# Patient Record
Sex: MALE | Race: WHITE | NOT HISPANIC OR LATINO | Employment: OTHER | URBAN - METROPOLITAN AREA
[De-identification: names, ages, dates, MRNs, and addresses within clinical notes are randomized per-mention and may not be internally consistent; named-entity substitution may affect disease eponyms.]

---

## 2017-02-14 ENCOUNTER — TRANSCRIBE ORDERS (OUTPATIENT)
Dept: LAB | Facility: CLINIC | Age: 77
End: 2017-02-14

## 2017-02-14 ENCOUNTER — APPOINTMENT (OUTPATIENT)
Dept: LAB | Facility: CLINIC | Age: 77
End: 2017-02-14
Payer: COMMERCIAL

## 2017-02-14 DIAGNOSIS — R10.9 ABDOMINAL PAIN, UNSPECIFIED SITE: ICD-10-CM

## 2017-02-14 DIAGNOSIS — R73.9 BLOOD GLUCOSE ELEVATED: ICD-10-CM

## 2017-02-14 DIAGNOSIS — R53.83 OTHER MALAISE AND FATIGUE: ICD-10-CM

## 2017-02-14 DIAGNOSIS — E55.9 UNSPECIFIED VITAMIN D DEFICIENCY: ICD-10-CM

## 2017-02-14 DIAGNOSIS — N40.0: ICD-10-CM

## 2017-02-14 DIAGNOSIS — N13.8 ENLARGED PROSTATE WITH URINARY OBSTRUCTION: ICD-10-CM

## 2017-02-14 DIAGNOSIS — D64.0 CONGENITAL SIDEROBLASTIC ANEMIA (HCC): ICD-10-CM

## 2017-02-14 DIAGNOSIS — E78.2 MIXED HYPERLIPIDEMIA: Primary | ICD-10-CM

## 2017-02-14 DIAGNOSIS — R94.5 NONSPECIFIC ABNORMAL RESULTS OF LIVER FUNCTION STUDY: ICD-10-CM

## 2017-02-14 DIAGNOSIS — M79.10 MYALGIA: ICD-10-CM

## 2017-02-14 DIAGNOSIS — R53.81 OTHER MALAISE AND FATIGUE: ICD-10-CM

## 2017-02-14 DIAGNOSIS — R07.9 CHEST PAIN, UNSPECIFIED: ICD-10-CM

## 2017-02-14 DIAGNOSIS — I10 ESSENTIAL HYPERTENSION, BENIGN: ICD-10-CM

## 2017-02-14 DIAGNOSIS — E10.9 INSULIN DEPENDENT DIABETES MELLITUS TYPE IA (HCC): ICD-10-CM

## 2017-02-14 DIAGNOSIS — M25.00: ICD-10-CM

## 2017-02-14 DIAGNOSIS — N40.1 ENLARGED PROSTATE WITH URINARY OBSTRUCTION: ICD-10-CM

## 2017-02-14 DIAGNOSIS — R51.9 FACIAL PAIN: ICD-10-CM

## 2017-02-14 DIAGNOSIS — Z09 FOLLOW-UP EXAMINATION, FOLLOWING OTHER SURGERY: ICD-10-CM

## 2017-02-14 DIAGNOSIS — Z79.899 ENCOUNTER FOR LONG-TERM (CURRENT) USE OF OTHER MEDICATIONS: ICD-10-CM

## 2017-02-14 DIAGNOSIS — N18.1 CHRONIC KIDNEY DISEASE, STAGE I: ICD-10-CM

## 2017-02-14 LAB
ALBUMIN SERPL BCP-MCNC: 3.1 G/DL (ref 3.5–5)
ALP SERPL-CCNC: 103 U/L (ref 46–116)
ALT SERPL W P-5'-P-CCNC: 31 U/L (ref 12–78)
ANION GAP SERPL CALCULATED.3IONS-SCNC: 7 MMOL/L (ref 4–13)
AST SERPL W P-5'-P-CCNC: 20 U/L (ref 5–45)
BACTERIA UR QL AUTO: ABNORMAL /HPF
BASOPHILS # BLD AUTO: 0.02 THOUSANDS/ΜL (ref 0–0.1)
BASOPHILS NFR BLD AUTO: 0 % (ref 0–1)
BILIRUB SERPL-MCNC: 0.82 MG/DL (ref 0.2–1)
BILIRUB UR QL STRIP: NEGATIVE
BUN SERPL-MCNC: 21 MG/DL (ref 5–25)
CALCIUM SERPL-MCNC: 9 MG/DL (ref 8.3–10.1)
CHLORIDE SERPL-SCNC: 105 MMOL/L (ref 100–108)
CHOLEST SERPL-MCNC: 116 MG/DL (ref 50–200)
CLARITY UR: ABNORMAL
CO2 SERPL-SCNC: 28 MMOL/L (ref 21–32)
COLOR UR: YELLOW
CREAT SERPL-MCNC: 1.23 MG/DL (ref 0.6–1.3)
EOSINOPHIL # BLD AUTO: 0.31 THOUSAND/ΜL (ref 0–0.61)
EOSINOPHIL NFR BLD AUTO: 5 % (ref 0–6)
ERYTHROCYTE [DISTWIDTH] IN BLOOD BY AUTOMATED COUNT: 14.7 % (ref 11.6–15.1)
EST. AVERAGE GLUCOSE BLD GHB EST-MCNC: 131 MG/DL
GFR SERPL CREATININE-BSD FRML MDRD: 57.2 ML/MIN/1.73SQ M
GLUCOSE SERPL-MCNC: 112 MG/DL (ref 65–140)
GLUCOSE UR STRIP-MCNC: NEGATIVE MG/DL
HBA1C MFR BLD: 6.2 % (ref 4.2–6.3)
HCT VFR BLD AUTO: 37.8 % (ref 36.5–49.3)
HDLC SERPL-MCNC: 34 MG/DL (ref 40–60)
HGB BLD-MCNC: 12.7 G/DL (ref 12–17)
HGB UR QL STRIP.AUTO: ABNORMAL
HYALINE CASTS #/AREA URNS LPF: ABNORMAL /LPF
KETONES UR STRIP-MCNC: NEGATIVE MG/DL
LDLC SERPL CALC-MCNC: 57 MG/DL (ref 0–100)
LEUKOCYTE ESTERASE UR QL STRIP: ABNORMAL
LYMPHOCYTES # BLD AUTO: 2.04 THOUSANDS/ΜL (ref 0.6–4.47)
LYMPHOCYTES NFR BLD AUTO: 32 % (ref 14–44)
MCH RBC QN AUTO: 30.3 PG (ref 26.8–34.3)
MCHC RBC AUTO-ENTMCNC: 33.6 G/DL (ref 31.4–37.4)
MCV RBC AUTO: 90 FL (ref 82–98)
MONOCYTES # BLD AUTO: 0.6 THOUSAND/ΜL (ref 0.17–1.22)
MONOCYTES NFR BLD AUTO: 9 % (ref 4–12)
NEUTROPHILS # BLD AUTO: 3.48 THOUSANDS/ΜL (ref 1.85–7.62)
NEUTS SEG NFR BLD AUTO: 54 % (ref 43–75)
NITRITE UR QL STRIP: NEGATIVE
NON-SQ EPI CELLS URNS QL MICRO: ABNORMAL /HPF
NRBC BLD AUTO-RTO: 0 /100 WBCS
PH UR STRIP.AUTO: 5.5 [PH] (ref 4.5–8)
PLATELET # BLD AUTO: 138 THOUSANDS/UL (ref 149–390)
PMV BLD AUTO: 10.6 FL (ref 8.9–12.7)
POTASSIUM SERPL-SCNC: 4.5 MMOL/L (ref 3.5–5.3)
PROT SERPL-MCNC: 7.8 G/DL (ref 6.4–8.2)
PROT UR STRIP-MCNC: ABNORMAL MG/DL
RBC # BLD AUTO: 4.19 MILLION/UL (ref 3.88–5.62)
RBC #/AREA URNS AUTO: ABNORMAL /HPF
SODIUM SERPL-SCNC: 140 MMOL/L (ref 136–145)
SP GR UR STRIP.AUTO: 1.02 (ref 1–1.03)
TRIGL SERPL-MCNC: 127 MG/DL
TSH SERPL DL<=0.05 MIU/L-ACNC: 2.08 UIU/ML (ref 0.36–3.74)
UROBILINOGEN UR QL STRIP.AUTO: 0.2 E.U./DL
WBC # BLD AUTO: 6.47 THOUSAND/UL (ref 4.31–10.16)
WBC #/AREA URNS AUTO: ABNORMAL /HPF

## 2017-02-14 PROCEDURE — 80053 COMPREHEN METABOLIC PANEL: CPT

## 2017-02-14 PROCEDURE — 84443 ASSAY THYROID STIM HORMONE: CPT

## 2017-02-14 PROCEDURE — 85025 COMPLETE CBC W/AUTO DIFF WBC: CPT

## 2017-02-14 PROCEDURE — 83036 HEMOGLOBIN GLYCOSYLATED A1C: CPT

## 2017-02-14 PROCEDURE — 36415 COLL VENOUS BLD VENIPUNCTURE: CPT

## 2017-02-14 PROCEDURE — 81001 URINALYSIS AUTO W/SCOPE: CPT

## 2017-02-14 PROCEDURE — 80061 LIPID PANEL: CPT

## 2017-04-13 ENCOUNTER — OFFICE VISIT (OUTPATIENT)
Dept: CARDIAC REHAB | Facility: CLINIC | Age: 77
End: 2017-04-13
Payer: COMMERCIAL

## 2017-04-13 DIAGNOSIS — Z95.1 POSTSURGICAL AORTOCORONARY BYPASS STATUS: ICD-10-CM

## 2017-04-13 PROCEDURE — 93797 PHYS/QHP OP CAR RHAB WO ECG: CPT

## 2017-04-17 ENCOUNTER — OFFICE VISIT (OUTPATIENT)
Dept: CARDIAC REHAB | Facility: CLINIC | Age: 77
End: 2017-04-17
Payer: COMMERCIAL

## 2017-04-17 DIAGNOSIS — Z95.1 POSTSURGICAL AORTOCORONARY BYPASS STATUS: ICD-10-CM

## 2017-04-17 PROCEDURE — 93798 PHYS/QHP OP CAR RHAB W/ECG: CPT

## 2017-04-19 ENCOUNTER — OFFICE VISIT (OUTPATIENT)
Dept: CARDIAC REHAB | Facility: CLINIC | Age: 77
End: 2017-04-19
Payer: COMMERCIAL

## 2017-04-19 DIAGNOSIS — Z95.1 POSTSURGICAL AORTOCORONARY BYPASS STATUS: ICD-10-CM

## 2017-04-19 PROCEDURE — 93798 PHYS/QHP OP CAR RHAB W/ECG: CPT

## 2017-04-21 ENCOUNTER — OFFICE VISIT (OUTPATIENT)
Dept: CARDIAC REHAB | Facility: CLINIC | Age: 77
End: 2017-04-21
Payer: COMMERCIAL

## 2017-04-21 DIAGNOSIS — Z95.1 POSTSURGICAL AORTOCORONARY BYPASS STATUS: ICD-10-CM

## 2017-04-21 PROCEDURE — 93798 PHYS/QHP OP CAR RHAB W/ECG: CPT

## 2017-04-24 ENCOUNTER — OFFICE VISIT (OUTPATIENT)
Dept: CARDIAC REHAB | Facility: CLINIC | Age: 77
End: 2017-04-24
Payer: COMMERCIAL

## 2017-04-24 DIAGNOSIS — Z95.1 POSTSURGICAL AORTOCORONARY BYPASS STATUS: ICD-10-CM

## 2017-04-24 PROCEDURE — 93798 PHYS/QHP OP CAR RHAB W/ECG: CPT

## 2017-04-26 ENCOUNTER — OFFICE VISIT (OUTPATIENT)
Dept: CARDIAC REHAB | Facility: CLINIC | Age: 77
End: 2017-04-26
Payer: COMMERCIAL

## 2017-04-26 DIAGNOSIS — Z95.1 POSTSURGICAL AORTOCORONARY BYPASS STATUS: ICD-10-CM

## 2017-04-26 PROCEDURE — 93798 PHYS/QHP OP CAR RHAB W/ECG: CPT

## 2017-04-28 ENCOUNTER — APPOINTMENT (OUTPATIENT)
Dept: CARDIAC REHAB | Facility: CLINIC | Age: 77
End: 2017-04-28
Payer: COMMERCIAL

## 2017-05-01 ENCOUNTER — OFFICE VISIT (OUTPATIENT)
Dept: CARDIAC REHAB | Facility: CLINIC | Age: 77
End: 2017-05-01
Payer: COMMERCIAL

## 2017-05-01 DIAGNOSIS — Z95.1 POSTSURGICAL AORTOCORONARY BYPASS STATUS: ICD-10-CM

## 2017-05-01 PROCEDURE — 93798 PHYS/QHP OP CAR RHAB W/ECG: CPT

## 2017-05-03 ENCOUNTER — OFFICE VISIT (OUTPATIENT)
Dept: CARDIAC REHAB | Facility: CLINIC | Age: 77
End: 2017-05-03
Payer: COMMERCIAL

## 2017-05-03 DIAGNOSIS — Z95.1 POSTSURGICAL AORTOCORONARY BYPASS STATUS: ICD-10-CM

## 2017-05-03 PROCEDURE — 93798 PHYS/QHP OP CAR RHAB W/ECG: CPT

## 2017-05-05 ENCOUNTER — OFFICE VISIT (OUTPATIENT)
Dept: CARDIAC REHAB | Facility: CLINIC | Age: 77
End: 2017-05-05
Payer: COMMERCIAL

## 2017-05-05 DIAGNOSIS — Z95.1 POSTSURGICAL AORTOCORONARY BYPASS STATUS: ICD-10-CM

## 2017-05-05 PROCEDURE — 93798 PHYS/QHP OP CAR RHAB W/ECG: CPT

## 2017-05-08 ENCOUNTER — OFFICE VISIT (OUTPATIENT)
Dept: CARDIAC REHAB | Facility: CLINIC | Age: 77
End: 2017-05-08
Payer: COMMERCIAL

## 2017-05-08 DIAGNOSIS — Z95.1 POSTSURGICAL AORTOCORONARY BYPASS STATUS: ICD-10-CM

## 2017-05-08 PROCEDURE — 93798 PHYS/QHP OP CAR RHAB W/ECG: CPT

## 2017-05-10 ENCOUNTER — OFFICE VISIT (OUTPATIENT)
Dept: CARDIAC REHAB | Facility: CLINIC | Age: 77
End: 2017-05-10
Payer: COMMERCIAL

## 2017-05-10 DIAGNOSIS — Z95.1 POSTSURGICAL AORTOCORONARY BYPASS STATUS: ICD-10-CM

## 2017-05-10 PROCEDURE — 93798 PHYS/QHP OP CAR RHAB W/ECG: CPT

## 2017-05-12 ENCOUNTER — OFFICE VISIT (OUTPATIENT)
Dept: CARDIAC REHAB | Facility: CLINIC | Age: 77
End: 2017-05-12
Payer: COMMERCIAL

## 2017-05-12 DIAGNOSIS — Z95.1 POSTSURGICAL AORTOCORONARY BYPASS STATUS: ICD-10-CM

## 2017-05-12 PROCEDURE — 93798 PHYS/QHP OP CAR RHAB W/ECG: CPT

## 2017-05-15 ENCOUNTER — OFFICE VISIT (OUTPATIENT)
Dept: CARDIAC REHAB | Facility: CLINIC | Age: 77
End: 2017-05-15
Payer: COMMERCIAL

## 2017-05-15 DIAGNOSIS — Z95.1 POSTSURGICAL AORTOCORONARY BYPASS STATUS: ICD-10-CM

## 2017-05-15 PROCEDURE — 93798 PHYS/QHP OP CAR RHAB W/ECG: CPT

## 2017-05-17 ENCOUNTER — OFFICE VISIT (OUTPATIENT)
Dept: CARDIAC REHAB | Facility: CLINIC | Age: 77
End: 2017-05-17
Payer: COMMERCIAL

## 2017-05-17 DIAGNOSIS — Z95.1 POSTSURGICAL AORTOCORONARY BYPASS STATUS: ICD-10-CM

## 2017-05-17 PROCEDURE — 93798 PHYS/QHP OP CAR RHAB W/ECG: CPT

## 2017-05-19 ENCOUNTER — OFFICE VISIT (OUTPATIENT)
Dept: CARDIAC REHAB | Facility: CLINIC | Age: 77
End: 2017-05-19
Payer: COMMERCIAL

## 2017-05-19 DIAGNOSIS — I21.4 NSTEMI (NON-ST ELEVATED MYOCARDIAL INFARCTION) (HCC): ICD-10-CM

## 2017-05-19 PROCEDURE — 93798 PHYS/QHP OP CAR RHAB W/ECG: CPT

## 2017-05-22 ENCOUNTER — OFFICE VISIT (OUTPATIENT)
Dept: CARDIAC REHAB | Facility: CLINIC | Age: 77
End: 2017-05-22
Payer: COMMERCIAL

## 2017-05-22 DIAGNOSIS — I21.4 NSTEMI (NON-ST ELEVATED MYOCARDIAL INFARCTION) (HCC): ICD-10-CM

## 2017-05-22 PROCEDURE — 93798 PHYS/QHP OP CAR RHAB W/ECG: CPT

## 2017-05-26 ENCOUNTER — OFFICE VISIT (OUTPATIENT)
Dept: CARDIAC REHAB | Facility: CLINIC | Age: 77
End: 2017-05-26
Payer: COMMERCIAL

## 2017-05-26 DIAGNOSIS — I21.4 NSTEMI (NON-ST ELEVATED MYOCARDIAL INFARCTION) (HCC): ICD-10-CM

## 2017-05-26 PROCEDURE — 93798 PHYS/QHP OP CAR RHAB W/ECG: CPT

## 2017-05-31 ENCOUNTER — OFFICE VISIT (OUTPATIENT)
Dept: CARDIAC REHAB | Facility: CLINIC | Age: 77
End: 2017-05-31
Payer: COMMERCIAL

## 2017-05-31 DIAGNOSIS — Z95.1 POSTSURGICAL AORTOCORONARY BYPASS STATUS: ICD-10-CM

## 2017-05-31 PROCEDURE — 93798 PHYS/QHP OP CAR RHAB W/ECG: CPT

## 2017-06-02 ENCOUNTER — OFFICE VISIT (OUTPATIENT)
Dept: CARDIAC REHAB | Facility: CLINIC | Age: 77
End: 2017-06-02
Payer: COMMERCIAL

## 2017-06-02 DIAGNOSIS — Z95.1 POSTSURGICAL AORTOCORONARY BYPASS STATUS: ICD-10-CM

## 2017-06-02 PROCEDURE — 93798 PHYS/QHP OP CAR RHAB W/ECG: CPT

## 2017-06-05 ENCOUNTER — OFFICE VISIT (OUTPATIENT)
Dept: CARDIAC REHAB | Facility: CLINIC | Age: 77
End: 2017-06-05
Payer: COMMERCIAL

## 2017-06-05 DIAGNOSIS — Z95.1 POSTSURGICAL AORTOCORONARY BYPASS STATUS: ICD-10-CM

## 2017-06-05 PROCEDURE — 93798 PHYS/QHP OP CAR RHAB W/ECG: CPT

## 2017-06-07 ENCOUNTER — OFFICE VISIT (OUTPATIENT)
Dept: CARDIAC REHAB | Facility: CLINIC | Age: 77
End: 2017-06-07
Payer: COMMERCIAL

## 2017-06-07 DIAGNOSIS — Z95.1 POSTSURGICAL AORTOCORONARY BYPASS STATUS: ICD-10-CM

## 2017-06-07 PROCEDURE — 93798 PHYS/QHP OP CAR RHAB W/ECG: CPT

## 2017-06-09 ENCOUNTER — OFFICE VISIT (OUTPATIENT)
Dept: CARDIAC REHAB | Facility: CLINIC | Age: 77
End: 2017-06-09
Payer: COMMERCIAL

## 2017-06-09 DIAGNOSIS — Z95.1 STATUS POST CORONARY ARTERY BYPASS GRAFT: ICD-10-CM

## 2017-06-09 PROCEDURE — 93798 PHYS/QHP OP CAR RHAB W/ECG: CPT

## 2017-06-16 ENCOUNTER — APPOINTMENT (OUTPATIENT)
Dept: CARDIAC REHAB | Facility: CLINIC | Age: 77
End: 2017-06-16
Payer: COMMERCIAL

## 2017-06-16 DIAGNOSIS — I21.4 NSTEMI (NON-ST ELEVATED MYOCARDIAL INFARCTION) (HCC): ICD-10-CM

## 2017-06-16 PROCEDURE — 93798 PHYS/QHP OP CAR RHAB W/ECG: CPT

## 2017-06-19 ENCOUNTER — APPOINTMENT (OUTPATIENT)
Dept: CARDIAC REHAB | Facility: CLINIC | Age: 77
End: 2017-06-19
Payer: COMMERCIAL

## 2017-06-21 ENCOUNTER — APPOINTMENT (OUTPATIENT)
Dept: CARDIAC REHAB | Facility: CLINIC | Age: 77
End: 2017-06-21
Payer: COMMERCIAL

## 2017-06-21 DIAGNOSIS — Z95.1 POSTSURGICAL AORTOCORONARY BYPASS STATUS: ICD-10-CM

## 2017-06-21 PROCEDURE — 93798 PHYS/QHP OP CAR RHAB W/ECG: CPT

## 2017-06-23 ENCOUNTER — APPOINTMENT (OUTPATIENT)
Dept: CARDIAC REHAB | Facility: CLINIC | Age: 77
End: 2017-06-23
Payer: COMMERCIAL

## 2017-06-23 DIAGNOSIS — Z95.1 POSTSURGICAL AORTOCORONARY BYPASS STATUS: ICD-10-CM

## 2017-06-23 PROCEDURE — 93798 PHYS/QHP OP CAR RHAB W/ECG: CPT

## 2017-06-26 ENCOUNTER — APPOINTMENT (OUTPATIENT)
Dept: CARDIAC REHAB | Facility: CLINIC | Age: 77
End: 2017-06-26
Payer: COMMERCIAL

## 2017-06-26 DIAGNOSIS — Z95.1 POSTSURGICAL AORTOCORONARY BYPASS STATUS: ICD-10-CM

## 2017-06-26 PROCEDURE — 93798 PHYS/QHP OP CAR RHAB W/ECG: CPT

## 2017-06-28 ENCOUNTER — APPOINTMENT (OUTPATIENT)
Dept: CARDIAC REHAB | Facility: CLINIC | Age: 77
End: 2017-06-28
Payer: COMMERCIAL

## 2017-06-28 DIAGNOSIS — Z95.1 POSTSURGICAL AORTOCORONARY BYPASS STATUS: ICD-10-CM

## 2017-06-28 PROCEDURE — 93798 PHYS/QHP OP CAR RHAB W/ECG: CPT

## 2017-06-30 ENCOUNTER — APPOINTMENT (OUTPATIENT)
Dept: CARDIAC REHAB | Facility: CLINIC | Age: 77
End: 2017-06-30
Payer: COMMERCIAL

## 2017-06-30 DIAGNOSIS — Z95.1 POSTSURGICAL AORTOCORONARY BYPASS STATUS: ICD-10-CM

## 2017-06-30 PROCEDURE — 93798 PHYS/QHP OP CAR RHAB W/ECG: CPT

## 2017-07-03 ENCOUNTER — APPOINTMENT (OUTPATIENT)
Dept: CARDIAC REHAB | Facility: CLINIC | Age: 77
End: 2017-07-03
Payer: COMMERCIAL

## 2017-07-03 DIAGNOSIS — Z95.1 POSTSURGICAL AORTOCORONARY BYPASS STATUS: ICD-10-CM

## 2017-07-03 PROCEDURE — 93798 PHYS/QHP OP CAR RHAB W/ECG: CPT

## 2017-07-05 ENCOUNTER — APPOINTMENT (OUTPATIENT)
Dept: CARDIAC REHAB | Facility: CLINIC | Age: 77
End: 2017-07-05
Payer: COMMERCIAL

## 2017-07-05 DIAGNOSIS — Z95.1 POSTSURGICAL AORTOCORONARY BYPASS STATUS: ICD-10-CM

## 2017-07-05 PROCEDURE — 93798 PHYS/QHP OP CAR RHAB W/ECG: CPT

## 2017-07-07 ENCOUNTER — APPOINTMENT (OUTPATIENT)
Dept: CARDIAC REHAB | Facility: CLINIC | Age: 77
End: 2017-07-07
Payer: COMMERCIAL

## 2017-07-07 DIAGNOSIS — Z95.1 POSTSURGICAL AORTOCORONARY BYPASS STATUS: ICD-10-CM

## 2017-07-07 PROCEDURE — 93798 PHYS/QHP OP CAR RHAB W/ECG: CPT

## 2017-07-10 ENCOUNTER — APPOINTMENT (OUTPATIENT)
Dept: CARDIAC REHAB | Facility: CLINIC | Age: 77
End: 2017-07-10
Payer: COMMERCIAL

## 2017-07-10 DIAGNOSIS — Z95.1 POSTSURGICAL AORTOCORONARY BYPASS STATUS: ICD-10-CM

## 2017-07-10 PROCEDURE — 93798 PHYS/QHP OP CAR RHAB W/ECG: CPT

## 2017-07-12 ENCOUNTER — APPOINTMENT (OUTPATIENT)
Dept: CARDIAC REHAB | Facility: CLINIC | Age: 77
End: 2017-07-12
Payer: COMMERCIAL

## 2017-07-12 DIAGNOSIS — Z95.1 POSTSURGICAL AORTOCORONARY BYPASS STATUS: ICD-10-CM

## 2017-07-12 PROCEDURE — 93798 PHYS/QHP OP CAR RHAB W/ECG: CPT

## 2017-07-14 ENCOUNTER — APPOINTMENT (OUTPATIENT)
Dept: CARDIAC REHAB | Facility: CLINIC | Age: 77
End: 2017-07-14
Payer: COMMERCIAL

## 2017-11-17 ENCOUNTER — HOSPITAL ENCOUNTER (OUTPATIENT)
Dept: RADIOLOGY | Facility: HOSPITAL | Age: 77
Discharge: HOME | End: 2017-11-17
Attending: INTERNAL MEDICINE
Payer: COMMERCIAL

## 2017-11-17 ENCOUNTER — TRANSCRIBE ORDERS (OUTPATIENT)
Dept: ADMINISTRATIVE | Facility: HOSPITAL | Age: 77
End: 2017-11-17

## 2017-11-17 DIAGNOSIS — R52 PAIN: Primary | ICD-10-CM

## 2017-11-17 DIAGNOSIS — R60.9 SWELLING: ICD-10-CM

## 2017-11-17 DIAGNOSIS — R52 PAIN: ICD-10-CM

## 2017-11-17 PROCEDURE — 73060 X-RAY EXAM OF HUMERUS: CPT

## 2017-11-17 PROCEDURE — 73030 X-RAY EXAM OF SHOULDER: CPT

## 2019-07-03 ENCOUNTER — APPOINTMENT (EMERGENCY)
Dept: RADIOLOGY | Facility: HOSPITAL | Age: 79
DRG: 280 | End: 2019-07-03
Attending: EMERGENCY MEDICINE
Payer: COMMERCIAL

## 2019-07-03 ENCOUNTER — APPOINTMENT (EMERGENCY)
Dept: RADIOLOGY | Facility: HOSPITAL | Age: 79
DRG: 280 | End: 2019-07-03
Payer: COMMERCIAL

## 2019-07-03 ENCOUNTER — HOSPITAL ENCOUNTER (INPATIENT)
Facility: HOSPITAL | Age: 79
LOS: 12 days | Discharge: NURSING, REHAB OR LONG-TERM CARE | DRG: 280 | End: 2019-07-15
Attending: EMERGENCY MEDICINE | Admitting: INTERNAL MEDICINE
Payer: COMMERCIAL

## 2019-07-03 DIAGNOSIS — E10.22 STAGE 3 CHRONIC KIDNEY DISEASE DUE TO TYPE 1 DIABETES MELLITUS (HCC): ICD-10-CM

## 2019-07-03 DIAGNOSIS — N18.30 STAGE 3 CHRONIC KIDNEY DISEASE (HCC): Chronic | ICD-10-CM

## 2019-07-03 DIAGNOSIS — I50.9 ACUTE CONGESTIVE HEART FAILURE (HCC): Primary | ICD-10-CM

## 2019-07-03 DIAGNOSIS — I21.4 NSTEMI (NON-ST ELEVATED MYOCARDIAL INFARCTION) (HCC): ICD-10-CM

## 2019-07-03 DIAGNOSIS — K59.00 CONSTIPATION: ICD-10-CM

## 2019-07-03 DIAGNOSIS — F32.A DEPRESSION: ICD-10-CM

## 2019-07-03 DIAGNOSIS — I12.9 BENIGN HYPERTENSION WITH CHRONIC KIDNEY DISEASE, STAGE III (HCC): ICD-10-CM

## 2019-07-03 DIAGNOSIS — N18.9 CRF (CHRONIC RENAL FAILURE), UNSPECIFIED STAGE: ICD-10-CM

## 2019-07-03 DIAGNOSIS — N18.30 CKD (CHRONIC KIDNEY DISEASE), STAGE III (HCC): Chronic | ICD-10-CM

## 2019-07-03 DIAGNOSIS — I50.33 ACUTE ON CHRONIC DIASTOLIC CHF (CONGESTIVE HEART FAILURE) (HCC): ICD-10-CM

## 2019-07-03 DIAGNOSIS — N18.30 BENIGN HYPERTENSION WITH CHRONIC KIDNEY DISEASE, STAGE III (HCC): ICD-10-CM

## 2019-07-03 DIAGNOSIS — J96.01 ACUTE HYPOXEMIC RESPIRATORY FAILURE (HCC): ICD-10-CM

## 2019-07-03 DIAGNOSIS — J18.9 PNEUMONIA: ICD-10-CM

## 2019-07-03 DIAGNOSIS — N18.30 STAGE 3 CHRONIC KIDNEY DISEASE DUE TO TYPE 1 DIABETES MELLITUS (HCC): ICD-10-CM

## 2019-07-03 LAB
ALBUMIN SERPL BCP-MCNC: 3.3 G/DL (ref 3.5–5)
ALP SERPL-CCNC: 130 U/L (ref 46–116)
ALT SERPL W P-5'-P-CCNC: 36 U/L (ref 12–78)
ANION GAP SERPL CALCULATED.3IONS-SCNC: 8 MMOL/L (ref 4–13)
APTT PPP: 31 SECONDS (ref 24–33)
AST SERPL W P-5'-P-CCNC: 27 U/L (ref 5–45)
BACTERIA UR QL AUTO: ABNORMAL /HPF
BASOPHILS # BLD AUTO: 0.02 THOUSANDS/ΜL (ref 0–0.1)
BASOPHILS NFR BLD AUTO: 0 % (ref 0–1)
BILIRUB SERPL-MCNC: 1.4 MG/DL (ref 0.2–1)
BILIRUB UR QL STRIP: NEGATIVE
BUN SERPL-MCNC: 45 MG/DL (ref 5–25)
CALCIUM SERPL-MCNC: 8.8 MG/DL (ref 8.3–10.1)
CHLORIDE SERPL-SCNC: 105 MMOL/L (ref 100–108)
CLARITY UR: ABNORMAL
CO2 SERPL-SCNC: 26 MMOL/L (ref 21–32)
COLOR UR: YELLOW
CREAT SERPL-MCNC: 1.8 MG/DL (ref 0.6–1.3)
DEPRECATED D DIMER PPP: 1400 NG/ML (FEU) (ref 190–520)
EOSINOPHIL # BLD AUTO: 0.15 THOUSAND/ΜL (ref 0–0.61)
EOSINOPHIL NFR BLD AUTO: 2 % (ref 0–6)
ERYTHROCYTE [DISTWIDTH] IN BLOOD BY AUTOMATED COUNT: 14.3 % (ref 11.6–15.1)
EST. AVERAGE GLUCOSE BLD GHB EST-MCNC: 146 MG/DL
FINE GRAN CASTS URNS QL MICRO: ABNORMAL /LPF
GFR SERPL CREATININE-BSD FRML MDRD: 35 ML/MIN/1.73SQ M
GLUCOSE SERPL-MCNC: 103 MG/DL (ref 65–140)
GLUCOSE SERPL-MCNC: 223 MG/DL (ref 65–140)
GLUCOSE SERPL-MCNC: 91 MG/DL (ref 65–140)
GLUCOSE UR STRIP-MCNC: NEGATIVE MG/DL
HBA1C MFR BLD: 6.7 % (ref 4.2–6.3)
HCT VFR BLD AUTO: 38.4 % (ref 36.5–49.3)
HGB BLD-MCNC: 12.8 G/DL (ref 12–17)
HGB UR QL STRIP.AUTO: ABNORMAL
HYALINE CASTS #/AREA URNS LPF: ABNORMAL /LPF
INR PPP: 1.08 (ref 0.86–1.16)
KETONES UR STRIP-MCNC: NEGATIVE MG/DL
LEUKOCYTE ESTERASE UR QL STRIP: NEGATIVE
LYMPHOCYTES # BLD AUTO: 1.39 THOUSANDS/ΜL (ref 0.6–4.47)
LYMPHOCYTES NFR BLD AUTO: 16 % (ref 14–44)
MCH RBC QN AUTO: 30.6 PG (ref 26.8–34.3)
MCHC RBC AUTO-ENTMCNC: 33.3 G/DL (ref 31.4–37.4)
MCV RBC AUTO: 92 FL (ref 82–98)
MONOCYTES # BLD AUTO: 0.81 THOUSAND/ΜL (ref 0.17–1.22)
MONOCYTES NFR BLD AUTO: 9 % (ref 4–12)
MUCOUS THREADS UR QL AUTO: ABNORMAL
NEUTROPHILS # BLD AUTO: 6.55 THOUSANDS/ΜL (ref 1.85–7.62)
NEUTS SEG NFR BLD AUTO: 73 % (ref 43–75)
NITRITE UR QL STRIP: NEGATIVE
NON-SQ EPI CELLS URNS QL MICRO: ABNORMAL /HPF
NT-PROBNP SERPL-MCNC: 2452 PG/ML
PH UR STRIP.AUTO: 5.5 [PH]
PLATELET # BLD AUTO: 143 THOUSANDS/UL (ref 149–390)
PMV BLD AUTO: 10.8 FL (ref 8.9–12.7)
POTASSIUM SERPL-SCNC: 4.7 MMOL/L (ref 3.5–5.3)
PROCALCITONIN SERPL-MCNC: 0.09 NG/ML
PROT SERPL-MCNC: 7.7 G/DL (ref 6.4–8.2)
PROT UR STRIP-MCNC: >=300 MG/DL
PROTHROMBIN TIME: 11.3 SECONDS (ref 9.4–11.7)
RBC # BLD AUTO: 4.18 MILLION/UL (ref 3.88–5.62)
RBC #/AREA URNS AUTO: ABNORMAL /HPF
SODIUM SERPL-SCNC: 139 MMOL/L (ref 136–145)
SP GR UR STRIP.AUTO: >=1.03 (ref 1–1.03)
TROPONIN I SERPL-MCNC: <0.02 NG/ML
UROBILINOGEN UR QL STRIP.AUTO: 0.2 E.U./DL
WBC # BLD AUTO: 8.92 THOUSAND/UL (ref 4.31–10.16)
WBC #/AREA URNS AUTO: ABNORMAL /HPF

## 2019-07-03 PROCEDURE — 85730 THROMBOPLASTIN TIME PARTIAL: CPT | Performed by: EMERGENCY MEDICINE

## 2019-07-03 PROCEDURE — 99285 EMERGENCY DEPT VISIT HI MDM: CPT

## 2019-07-03 PROCEDURE — 80053 COMPREHEN METABOLIC PANEL: CPT | Performed by: EMERGENCY MEDICINE

## 2019-07-03 PROCEDURE — 85379 FIBRIN DEGRADATION QUANT: CPT | Performed by: EMERGENCY MEDICINE

## 2019-07-03 PROCEDURE — 93005 ELECTROCARDIOGRAM TRACING: CPT

## 2019-07-03 PROCEDURE — 93970 EXTREMITY STUDY: CPT

## 2019-07-03 PROCEDURE — 71045 X-RAY EXAM CHEST 1 VIEW: CPT

## 2019-07-03 PROCEDURE — 85610 PROTHROMBIN TIME: CPT | Performed by: EMERGENCY MEDICINE

## 2019-07-03 PROCEDURE — 81001 URINALYSIS AUTO W/SCOPE: CPT | Performed by: EMERGENCY MEDICINE

## 2019-07-03 PROCEDURE — 36415 COLL VENOUS BLD VENIPUNCTURE: CPT | Performed by: EMERGENCY MEDICINE

## 2019-07-03 PROCEDURE — A9540 TC99M MAA: HCPCS

## 2019-07-03 PROCEDURE — 85025 COMPLETE CBC W/AUTO DIFF WBC: CPT | Performed by: EMERGENCY MEDICINE

## 2019-07-03 PROCEDURE — 84484 ASSAY OF TROPONIN QUANT: CPT | Performed by: EMERGENCY MEDICINE

## 2019-07-03 PROCEDURE — 84145 PROCALCITONIN (PCT): CPT | Performed by: EMERGENCY MEDICINE

## 2019-07-03 PROCEDURE — A9539 TC99M PENTETATE: HCPCS

## 2019-07-03 PROCEDURE — 83880 ASSAY OF NATRIURETIC PEPTIDE: CPT | Performed by: EMERGENCY MEDICINE

## 2019-07-03 PROCEDURE — 82948 REAGENT STRIP/BLOOD GLUCOSE: CPT

## 2019-07-03 PROCEDURE — 78582 LUNG VENTILAT&PERFUS IMAGING: CPT

## 2019-07-03 PROCEDURE — 83036 HEMOGLOBIN GLYCOSYLATED A1C: CPT | Performed by: INTERNAL MEDICINE

## 2019-07-03 RX ORDER — AMLODIPINE BESYLATE 5 MG/1
5 TABLET ORAL 2 TIMES DAILY
Status: DISCONTINUED | OUTPATIENT
Start: 2019-07-03 | End: 2019-07-15 | Stop reason: HOSPADM

## 2019-07-03 RX ORDER — CEFTRIAXONE 1 G/50ML
1000 INJECTION, SOLUTION INTRAVENOUS ONCE
Status: COMPLETED | OUTPATIENT
Start: 2019-07-03 | End: 2019-07-03

## 2019-07-03 RX ORDER — ALPRAZOLAM 0.25 MG/1
0.25 TABLET ORAL
Status: DISCONTINUED | OUTPATIENT
Start: 2019-07-03 | End: 2019-07-15 | Stop reason: HOSPADM

## 2019-07-03 RX ORDER — AMLODIPINE BESYLATE 10 MG/1
TABLET ORAL
COMMUNITY
End: 2019-07-15 | Stop reason: HOSPADM

## 2019-07-03 RX ORDER — ATORVASTATIN CALCIUM 40 MG/1
TABLET, FILM COATED ORAL
COMMUNITY
End: 2021-03-22 | Stop reason: HOSPADM

## 2019-07-03 RX ORDER — CEFTRIAXONE 1 G/50ML
1000 INJECTION, SOLUTION INTRAVENOUS EVERY 24 HOURS
Status: DISCONTINUED | OUTPATIENT
Start: 2019-07-04 | End: 2019-07-06

## 2019-07-03 RX ORDER — ACETAMINOPHEN 325 MG/1
650 TABLET ORAL EVERY 6 HOURS PRN
Status: DISCONTINUED | OUTPATIENT
Start: 2019-07-03 | End: 2019-07-15 | Stop reason: HOSPADM

## 2019-07-03 RX ORDER — FUROSEMIDE 10 MG/ML
40 INJECTION INTRAMUSCULAR; INTRAVENOUS ONCE
Status: COMPLETED | OUTPATIENT
Start: 2019-07-03 | End: 2019-07-03

## 2019-07-03 RX ORDER — ESCITALOPRAM OXALATE 10 MG/1
20 TABLET ORAL
Status: DISCONTINUED | OUTPATIENT
Start: 2019-07-03 | End: 2019-07-09

## 2019-07-03 RX ORDER — FUROSEMIDE 10 MG/ML
40 INJECTION INTRAMUSCULAR; INTRAVENOUS
Status: DISCONTINUED | OUTPATIENT
Start: 2019-07-04 | End: 2019-07-06

## 2019-07-03 RX ORDER — ATORVASTATIN CALCIUM 40 MG/1
40 TABLET, FILM COATED ORAL
Status: DISCONTINUED | OUTPATIENT
Start: 2019-07-03 | End: 2019-07-06

## 2019-07-03 RX ORDER — ASPIRIN 81 MG/1
81 TABLET ORAL DAILY
Status: DISCONTINUED | OUTPATIENT
Start: 2019-07-04 | End: 2019-07-15 | Stop reason: HOSPADM

## 2019-07-03 RX ORDER — AZITHROMYCIN 250 MG/1
500 TABLET, FILM COATED ORAL EVERY 24 HOURS
Status: DISCONTINUED | OUTPATIENT
Start: 2019-07-04 | End: 2019-07-12

## 2019-07-03 RX ORDER — ASPIRIN 81 MG/1
TABLET ORAL DAILY
COMMUNITY
Start: 2017-02-24

## 2019-07-03 RX ORDER — HYDRALAZINE HYDROCHLORIDE 20 MG/ML
10 INJECTION INTRAMUSCULAR; INTRAVENOUS EVERY 6 HOURS PRN
Status: DISCONTINUED | OUTPATIENT
Start: 2019-07-03 | End: 2019-07-15 | Stop reason: HOSPADM

## 2019-07-03 RX ORDER — AZITHROMYCIN 250 MG/1
500 TABLET, FILM COATED ORAL ONCE
Status: COMPLETED | OUTPATIENT
Start: 2019-07-03 | End: 2019-07-03

## 2019-07-03 RX ADMIN — HYDRALAZINE HYDROCHLORIDE 10 MG: 20 INJECTION INTRAMUSCULAR; INTRAVENOUS at 18:17

## 2019-07-03 RX ADMIN — ATORVASTATIN CALCIUM 40 MG: 40 TABLET, FILM COATED ORAL at 18:16

## 2019-07-03 RX ADMIN — ESCITALOPRAM OXALATE 20 MG: 10 TABLET ORAL at 21:37

## 2019-07-03 RX ADMIN — INSULIN LISPRO 2 UNITS: 100 INJECTION, SOLUTION INTRAVENOUS; SUBCUTANEOUS at 21:37

## 2019-07-03 RX ADMIN — CEFTRIAXONE 1000 MG: 1 INJECTION, SOLUTION INTRAVENOUS at 16:45

## 2019-07-03 RX ADMIN — AZITHROMYCIN 500 MG: 250 TABLET, FILM COATED ORAL at 18:16

## 2019-07-03 RX ADMIN — METOPROLOL TARTRATE 25 MG: 25 TABLET, FILM COATED ORAL at 21:37

## 2019-07-03 RX ADMIN — ALPRAZOLAM 0.25 MG: 0.25 TABLET ORAL at 21:37

## 2019-07-03 RX ADMIN — FUROSEMIDE 40 MG: 10 INJECTION, SOLUTION INTRAMUSCULAR; INTRAVENOUS at 16:35

## 2019-07-03 RX ADMIN — INSULIN DETEMIR 20 UNITS: 100 INJECTION, SOLUTION SUBCUTANEOUS at 21:37

## 2019-07-03 RX ADMIN — AMLODIPINE BESYLATE 5 MG: 5 TABLET ORAL at 18:17

## 2019-07-03 NOTE — ED PROVIDER NOTES
History  Chief Complaint   Patient presents with    Shortness of Breath     Pt c/o SOB for the past 3-4 days, made worse by walking  Pt states hes fine if hes sitting still     Patient presents for evaluation of shortness of breath with exertion for 3-4 days  Does not feel any symptoms at rest and denies chest pain  States feels like its getting worse  No recent illness or fever  No cough  Feels some fatigue as well  History provided by:  Patient   used: No    Shortness of Breath   Associated symptoms: no chest pain, no cough and no fever        Prior to Admission Medications   Prescriptions Last Dose Informant Patient Reported? Taking? amLODIPine (NORVASC) 10 mg tablet 7/2/2019 at Unknown time  Yes Yes   Sig: amlodipine besylate 10 mg tabs   aspirin (ASPIR-LOW) 81 mg EC tablet 7/2/2019 at Unknown time  Yes Yes   Sig: Daily   atorvastatin (LIPITOR) 40 mg tablet 7/2/2019 at Unknown time  Yes Yes   Sig: atorvastatin 40 mg tablet   TAKE ONE TABLET DAILY   escitalopram (LEXAPRO) 20 mg tablet 7/2/2019 at Unknown time  Yes Yes   Sig: Take 20 mg by mouth daily at bedtime  insulin aspart (NovoLOG) 100 units/mL injection 7/2/2019 at Unknown time  Yes Yes   Sig: Inject 15 Units under the skin 3 (three) times a day before meals   insulin aspart protamine-insulin aspart (NOVOLOG MIX 70/30 FLEXPEN) 100 Units/mL injection pen 7/2/2019 at Unknown time  Yes Yes   Sig: 3 (three) times a day   insulin detemir (LEVEMIR) 100 units/mL subcutaneous injection 7/2/2019 at Unknown time  Yes Yes   Sig: Inject 50 Units under the skin 2 (two) times a day Indications: Type 2 Diabetes  Taken at 0700 and 1700    metoprolol tartrate (LOPRESSOR) 25 mg tablet 7/2/2019 at Unknown time  Yes Yes   Sig: Take 25 mg by mouth every morning          Facility-Administered Medications: None       Past Medical History:   Diagnosis Date    Arthritis     Back pain     Bladder cancer ([facility] )     diagnosed  2/2016    Cancer ([facility] ) bladder; chemo; resection;     Coronary artery disease     has 2 coronary stents    Dental crowns present      5    Diabetes mellitus [facility] )     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    Hematuria     hx of    Hypercholesteremia     Hypertension     Kidney stones     Wears glasses        Past Surgical History:   Procedure Laterality Date    ABDOMINAL SURGERY      CARDIAC SURGERY      CHOLECYSTECTOMY      open    CORONARY ANGIOPLASTY WITH STENT PLACEMENT      2 stents  2009    CYSTECTOMY, PARTIAL N/A 11/30/2016    Procedure: PARTIAL CYSTECTOMY, LEFT LYMPHADENECTOMY;  Surgeon: Elvia Herman MD;  Location: [address] [place] [address];  Service:     CYSTOSCOPY Left 10/20/2016    Procedure: CYSTOSCOPY, BILATERAL RETROGRADE PYLEOGRAM, LEFT SIDE BLADDER BIOPSY, TURBT;  Surgeon: Elvia Herman MD;  Location: [address] [place] [address];  Service:     HERNIA REPAIR      repair [facility]    ID CYSTOURETHROSCOPY N/A 2/25/2016    Procedure: CYSTOSCOPY;  Surgeon: Elvia Herman MD;  Location: [address] [place] [address];  Service: Urology    ID CYSTOURETHROSCOPY,FULGUR 2-5 CM LESN N/A 2/25/2016    Procedure: TRANSURETHRAL RESECTION OF BLADDER TUMOR (TURBT); Surgeon: Elvia Herman MD;  Location: [address] [place] [address];  Service: Urology    TRANSURETHRAL RESECTION OF BLADDER TUMOR N/A 7/7/2016    Procedure: TRANSURETHRAL RESECTION OF BLADDER TUMOR (TURBT), Cystoscopy, deep biopsy;  Surgeon: Elvia Herman MD;  [facility];  Service:     TRANSURETHRAL RESECTION OF BLADDER TUMOR Bilateral 6/9/2016    Procedure: TRANSURETHRAL RESECTION OF BLADDER TUMOR (TURBT), Cysto, retrograde, BLADDER BIOPSY;  Surgeon: Elvia Herman MD;  Location: [facility];  Service:        Family History   Problem Relation Age of Onset    Heart disease Mother     Diabetes Mother     Diabetes Father      I have reviewed and agree with the history as documented      Social History     Tobacco Use    Smoking status: Former Smoker     Packs/day: 0 50     Years: 5 00 Pack years: 2 50     Last attempt to quit: 1965     Years since quittin 5    Smokeless tobacco: Never Used   Substance Use Topics    Alcohol use: Yes     Comment: socially    Drug use: No        Review of Systems   Constitutional: Negative for chills and fever  Respiratory: Positive for shortness of breath  Negative for cough  Cardiovascular: Positive for leg swelling  Negative for chest pain  All other systems reviewed and are negative  Physical Exam  Physical Exam   Constitutional: He is oriented to person, place, and time  No distress  HENT:   Mouth/Throat: Oropharynx is clear and moist    Eyes: Pupils are equal, round, and reactive to light  Neck: Normal range of motion  No JVD present  Cardiovascular: Normal rate, regular rhythm and intact distal pulses  Pulmonary/Chest: Effort normal  No respiratory distress  He has rales  Rales right lower lung   Abdominal: Soft  There is no tenderness  Musculoskeletal: Normal range of motion  He exhibits edema  +1 edema bilaterally with chronic venous stasis changes bilaterally  Neurological: He is alert and oriented to person, place, and time  Skin: Capillary refill takes less than 2 seconds  He is not diaphoretic  Nursing note and vitals reviewed  Vital Signs  ED Triage Vitals [19 1015]   Temperature Pulse Respirations Blood Pressure SpO2   98 8 °F (37 1 °C) 69 22 (!) 190/81 97 %      Temp Source Heart Rate Source Patient Position - Orthostatic VS BP Location FiO2 (%)   Temporal Monitor Lying Right arm --      Pain Score       No Pain           Vitals:    19 1015   BP: (!) 190/81   Pulse: 69   Patient Position - Orthostatic VS: Lying         Visual Acuity      ED Medications  Medications - No data to display    Diagnostic Studies  Results Reviewed     Procedure Component Value Units Date/Time    D-Dimer [[id number]] Collected:  19 1045    Lab Status:   In process Specimen:  Blood from Arm, Right Updated: 07/03/19 1118    B-type natriuretic peptide [[id number]]  (Abnormal) Collected:  07/03/19 1045    Lab Status:  Final result Specimen:  Blood from Arm, Right Updated:  07/03/19 1114     NT-proBNP 2,452 pg/mL     Troponin I [[id number]]  (Normal) Collected:  07/03/19 1045    Lab Status:  Final result Specimen:  Blood from Arm, Right Updated:  07/03/19 1113     Troponin I <0 02 ng/mL     Protime-INR [[id number]]  (Normal) Collected:  07/03/19 1045    Lab Status:  Final result Specimen:  Blood from Arm, Right Updated:  07/03/19 1110     Protime 11 3 seconds      INR 1 08    APTT [[id number]]  (Normal) Collected:  07/03/19 1045    Lab Status:  Final result Specimen:  Blood from Arm, Right Updated:  07/03/19 1110     PTT 31 seconds     Comprehensive metabolic panel [[id number]]  (Abnormal) Collected:  07/03/19 1045    Lab Status:  Final result Specimen:  Blood from Arm, Right Updated:  07/03/19 1109     Sodium 139 mmol/L      Potassium 4 7 mmol/L      Chloride 105 mmol/L      CO2 26 mmol/L      ANION GAP 8 mmol/L      BUN 45 mg/dL      Creatinine 1 80 mg/dL      Glucose 103 mg/dL      Calcium 8 8 mg/dL      AST 27 U/L      ALT 36 U/L      Alkaline Phosphatase 130 U/L      Total Protein 7 7 g/dL      Albumin 3 3 g/dL      Total Bilirubin 1 40 mg/dL      eGFR 35 ml/min/1 73sq m     Narrative:       [place] guidelines for Chronic Kidney Disease (CKD):     Stage 1 with normal or high GFR (GFR > 90 mL/min/1 73 square meters)    Stage 2 Mild CKD (GFR = 60-89 mL/min/1 73 square meters)    Stage 3A Moderate CKD (GFR = 45-59 mL/min/1 73 square meters)    Stage 3B Moderate CKD (GFR = 30-44 mL/min/1 73 square meters)    Stage 4 Severe CKD (GFR = 15-29 mL/min/1 73 square meters)    Stage 5 End Stage CKD (GFR <15 mL/min/1 73 square meters)  Note: GFR calculation is accurate only with a steady state creatinine    CBC and differential [[id number]]  (Abnormal) Collected:  07/03/19 1045    Lab Status:  Final result Specimen:  Blood from Arm, Right Updated:  07/03/19 1052     WBC 8 92 Thousand/uL      RBC 4 18 Million/uL      Hemoglobin 12 8 g/dL      Hematocrit 38 4 %      MCV 92 fL      MCH 30 6 pg      MCHC 33 3 g/dL      RDW 14 3 %      MPV 10 8 fL      Platelets 195 Thousands/uL      Neutrophils Relative 73 %      Lymphocytes Relative 16 %      Monocytes Relative 9 %      Eosinophils Relative 2 %      Basophils Relative 0 %      Neutrophils Absolute 6 55 Thousands/µL      Lymphocytes Absolute 1 39 Thousands/µL      Monocytes Absolute 0 81 Thousand/µL      Eosinophils Absolute 0 15 Thousand/µL      Basophils Absolute 0 02 Thousands/µL     UA w Reflex to Microscopic [[id number]]     Lab Status:  No result Specimen:  Urine                  XR chest 1 view portable    (Results Pending)   VAS lower limb venous duplex study, complete bilateral    (Results Pending)              Procedures  Procedures       ED Course  ED Course as of Jul 04 1737 Wed Jul 03, 2019   1120 Spoke with Dr Alejandro Thomas, states patient was hypoxic in the office 87% and had increased LE swelling that was asymmetric R>L, concerned for DVT/PE       1223 Discussed admission with the patient as NM can not do the scan until   Patient states he lives alone and has "things to do" but does not elaborate  States he cant stay but he can come back Friday if needed  1228 Elevated however GFR<40 unable to get CTA  Will order VQ scan and US doppler LE    D-DIMER QUANTITATIVE(!): 1,400   1604 Patient now agreeable to admission after discussion of test results  Dr Alejandro Thomas called, no answer left voicemail  1614 CXR showed effusion and radiology reading right hilar infiltrate  No fever or elevated WBC clinical history more consistent with CHF  Will treat for CAP and send procalcitonin to guide further treatment  Will also give dose of IV lasix                     PERC Rule for PE      Most Recent Value   PERC Rule for PE   Age >=50  1 Filed at: 07/03/2019 1122 HR >=100  0 Filed at: 07/03/2019 1122   O2 Sat on room air < 95%  0 Filed at: 07/03/2019 1122   History of PE or DVT  0 Filed at: 07/03/2019 1122   Recent trauma or surgery  0 Filed at: 07/03/2019 1122   Hemoptysis  0 Filed at: 07/03/2019 1122   Exogenous estrogen  0 Filed at: 07/03/2019 1122   Unilateral leg swelling  0 Filed at: 07/03/2019 1122   PERC Rule for PE Results  1 Filed at: 07/03/2019 1122                Wells' Criteria for PE      Most Recent Value   Wells' Criteria for PE   Clinical signs and symptoms of DVT  3 Filed at: 07/03/2019 1121   PE is primary diagnosis or equally likely  3 Filed at: 07/03/2019 1121   HR >100  0 Filed at: 07/03/2019 1121   Immobilization at least 3 days or Surgery in the previous 4 weeks  0 Filed at: 07/03/2019 1121   Previous, objectively diagnosed PE or DVT  0 Filed at: 07/03/2019 1121   Hemoptysis  0 Filed at: 07/03/2019 1121   Malignancy with treatment within 6 months or palliative  1 Filed at: 07/03/2019 1121   Wells' Criteria Total  7 Filed at: 07/03/2019 1121            MDM  Number of Diagnoses or Management Options  Diagnosis management comments: Pulse ox 97% on RA indicating adequate oxygenation  CXR: right pleural effusion, PVC as read by me       Amount and/or Complexity of Data Reviewed  Clinical lab tests: ordered and reviewed  Tests in the radiology section of CPT®: ordered and reviewed  Decide to obtain previous medical records or to obtain history from someone other than the patient: yes  Review and summarize past medical records: yes  Discuss the patient with other providers: yes  Independent visualization of images, tracings, or specimens: yes    Patient Progress  Patient progress: stable      Disposition  Final diagnoses:   None     ED Disposition     None      Follow-up Information    None         Patient's Medications   Discharge Prescriptions    No medications on file     No discharge procedures on file      ED Provider  Electronically Signed by Christian Pacheco,   07/04/19 1731

## 2019-07-03 NOTE — ED PROCEDURE NOTE
PROCEDURE  ECG 12 Lead Documentation Only  Date/Time: 7/3/2019 10:24 AM  Performed by: Iván Neal DO  Authorized by: Iván Neal DO     ECG reviewed by me, the ED Provider: yes    Patient location:  ED  Interpretation:     Interpretation: abnormal    Rate:     ECG rate:  71    ECG rate assessment: normal    Rhythm:     Rhythm: sinus rhythm    Ectopy:     Ectopy: none    Conduction:     Conduction: abnormal      Abnormal conduction: 1st degree    ST segments:     ST segments:  Non-specific    Depression:  AVL and I  Other findings:     Other findings: [address] [place] [address]  07/03/19 1025

## 2019-07-03 NOTE — PLAN OF CARE
Problem: Potential for Falls  Goal: Patient will remain free of falls  Description  INTERVENTIONS:  - Assess patient frequently for physical needs  -  Identify cognitive and physical deficits and behaviors that affect risk of falls    -  [place] fall precautions as indicated by assessment   - Educate patient/family on patient safety including physical limitations  - Instruct patient to call for assistance with activity based on assessment  - Modify environment to reduce risk of injury  - Consider OT/PT consult to assist with strengthening/mobility  Outcome: Progressing     Problem: PAIN - ADULT  Goal: Verbalizes/displays adequate comfort level or baseline comfort level  Description  Interventions:  - Encourage patient to monitor pain and request assistance  - Assess pain using appropriate pain scale  - Administer analgesics based on type and severity of pain and evaluate response  - Implement non-pharmacological measures as appropriate and evaluate response  - Consider cultural and social influences on pain and pain management  - Notify physician/advanced practitioner if interventions unsuccessful or patient reports new pain  Outcome: Progressing     Problem: INFECTION - ADULT  Goal: Absence or prevention of progression during hospitalization  Description  INTERVENTIONS:  - Assess and monitor for signs and symptoms of infection  - Monitor lab/diagnostic results  - Monitor all insertion sites, i e  IV  - [place] appropriate cooling/warming therapies per order  - Administer medications as ordered  - Instruct and encourage patient and family to use good hand hygiene technique  - Identify and instruct in appropriate isolation precautions for identified infection/condition   Outcome: Progressing     Problem: SAFETY ADULT  Goal: Patient will remain free of falls  Description  INTERVENTIONS:  - Assess patient frequently for physical needs  -  Identify cognitive and physical deficits and behaviors that affect risk of falls   -  [place] fall precautions as indicated by assessment   - Educate patient/family on patient safety including physical limitations  - Instruct patient to call for assistance with activity based on assessment  - Modify environment to reduce risk of injury  - Consider OT/PT consult to assist with strengthening/mobility  Outcome: Progressing  Goal: Maintain or return to baseline ADL function  Description  INTERVENTIONS:  -  Assess patient's ability to carry out ADLs; assess patient's baseline for ADL function and identify physical deficits which impact ability to perform ADLs (bathing, care of mouth/teeth, toileting, grooming, dressing, etc )  - Assess/evaluate cause of self-care deficits   - Assess range of motion  - Assess patient's mobility; develop plan if impaired  - Assess patient's need for assistive devices and provide as appropriate  - Encourage maximum independence but intervene and supervise when necessary  ¯ Involve family in performance of ADLs  ¯ Assess for home care needs following discharge   ¯ Request OT consult to assist with ADL evaluation and planning for discharge  ¯ Provide patient education as appropriate  Outcome: Progressing  Goal: Maintain or return mobility status to optimal level  Description  INTERVENTIONS:  - Assess patient's baseline mobility status (ambulation, transfers, stairs, etc )    - Identify cognitive and physical deficits and behaviors that affect mobility  - Identify mobility aids required to assist with transfers and/or ambulation (gait belt, sit-to-stand, lift, walker, cane, etc )  - [place] fall precautions as indicated by assessment  - Record patient progress and toleration of activity level on Mobility SBAR; progress patient to next Phase/Stage  - Instruct patient to call for assistance with activity based on assessment  - Request Rehabilitation consult to assist with strengthening/weightbearing, etc   Outcome: Progressing     Problem: DISCHARGE PLANNING  Goal: Discharge to home or other facility with appropriate resources  Description  INTERVENTIONS:  - Identify barriers to discharge w/patient and caregiver  - Arrange for needed discharge resources and transportation as appropriate  - Identify discharge learning needs (meds, wound care, etc )  - Arrange for interpretive services to assist at discharge as needed  - Refer to Case Management Department for coordinating discharge planning if the patient needs post-hospital services based on physician/advanced practitioner order or complex needs related to functional status, cognitive ability, or social support system  Outcome: Progressing     Problem: Knowledge Deficit  Goal: Patient/family/caregiver demonstrates understanding of disease process, treatment plan, medications, and discharge instructions  Description  Complete learning assessment and assess knowledge base    Interventions:  - Provide teaching at level of understanding  - Provide teaching via preferred learning methods  Outcome: Progressing

## 2019-07-03 NOTE — ED NOTES
Pt experienced increased SOB when trying to remove pants    Dr Edgar Perez made aware     Haseeb Martínez, RN  07/03/19 9991

## 2019-07-03 NOTE — ED NOTES
Spoke with nuclear medicine, scan will not occur until 3:30 or 4:00 pm   Pt made aware of delay     Beryl Knutson RN  07/03/19 2311

## 2019-07-03 NOTE — H&P
H&P Exam - Adrianne Naik 66 y o  male MRN: [id number]    Unit/Bed#: KARLY Encounter: [id number]      History of Present Illness     HPI:  Adrianne Naik is a 66 y o  male who presents with increasing difficulty breathing and swelling of both lower extremity for last 2 days patient has past medical history of diabetes Hypertension hypertensive heart disease coronary artery disease status post CABG was in usual state of health about 2 days ago when started having some difficulty breathing on minimal activities and last 24 hour patient felt a trouble breathing even with day-to-day activities and some coughing with scanty expectoration and also notation last 2 days patient has both legs were swollen and has been getting worse for last 1 week seen in the office with patient's pulse ox on room air was 87% and patient's respiration rate was 28 and was mild respiratory distress and patient claims that even walking few steps that was making him sit down for few minutes before start any new activities denies any fever chills so was sent to the emergency room where patient evaluated and a chest x-ray showed pleural effusion and suspected pneumonia along with congestive heart failure also had lower extremity venous Doppler no DVT and V/Q scan low probability of PE was admitted for further workup and treatment        Review of Systems   Constitutional: Positive for activity change, appetite change, diaphoresis and fatigue  Negative for chills, fever and unexpected weight change  HENT: Negative for congestion, dental problem, drooling, ear discharge, ear pain, facial swelling, hearing loss, mouth sores, nosebleeds, postnasal drip, rhinorrhea, sinus pressure, sneezing, sore throat, tinnitus, trouble swallowing and voice change  Eyes: Negative for photophobia, pain, discharge, redness, itching and visual disturbance  Respiratory: Positive for cough, chest tightness and shortness of breath   Negative for apnea, choking, wheezing and stridor  Cardiovascular: Positive for palpitations and leg swelling  Negative for chest pain  Gastrointestinal: Negative for abdominal distention, abdominal pain, anal bleeding, blood in stool, constipation, diarrhea, nausea, rectal pain and vomiting  Endocrine: Negative for cold intolerance, heat intolerance, polydipsia, polyphagia and polyuria  Genitourinary: Negative for decreased urine volume, difficulty urinating, dysuria, enuresis, flank pain, frequency, genital sores, hematuria and urgency  Musculoskeletal: Negative for arthralgias, back pain, gait problem, joint swelling, myalgias, neck pain and neck stiffness  Skin: Negative for color change, pallor, rash and wound  Allergic/Immunologic: Negative  Negative for environmental allergies, food allergies and immunocompromised state  Neurological: Negative for dizziness, tremors, seizures, syncope, facial asymmetry, speech difficulty, weakness, light-headedness, numbness and headaches  Psychiatric/Behavioral: Negative for agitation, behavioral problems, confusion, decreased concentration, dysphoric mood, hallucinations, self-injury, sleep disturbance and suicidal ideas  The patient is not nervous/anxious and is not hyperactive          Historical Information   Past Medical History:   Diagnosis Date    Arthritis     Back pain     Bladder cancer ([facility] [address] )     diagnosed  2/2016    [facility])     bladder; chemo; resection;     Coronary artery disease     has 2 coronary stents    Dental crowns present      5    Diabetes mellitus ([facility] [address] )     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[facility] [address] )     fluid behind right eye    Hematuria     hx of    Hypercholesteremia     Hypertension     Kidney stones     Wears glasses      Past Surgical History:   Procedure Laterality Date    ABDOMINAL SURGERY      CARDIAC SURGERY      CHOLECYSTECTOMY      open    CORONARY ANGIOPLASTY WITH STENT PLACEMENT      2 stents  2009    CYSTECTOMY, PARTIAL N/A 2016    Procedure: PARTIAL CYSTECTOMY, LEFT LYMPHADENECTOMY;  Surgeon: Allie Shearer MD;  Location: [address] [place] [address];  Service:    Candy Marquez Left 10/20/2016    Procedure: CYSTOSCOPY, BILATERAL RETROGRADE PYLEOGRAM, LEFT SIDE BLADDER BIOPSY, TURBT;  Surgeon: Allie Shearer MD;  Location: [address] [place] [address];  Service:     HERNIA REPAIR      repair RI    [place] CYSTOURETHROSCOPY N/A 2016    Procedure: Thurnell Livers;  Surgeon: Allie Shearer MD;  Location: [address] [place] [address];  Service: Urology    IL CYSTOURETHROSCOPY,FULGUR 2-5 CM LESN N/A 2016    Procedure: TRANSURETHRAL RESECTION OF BLADDER TUMOR (TURBT); Surgeon: Allie Shearer MD;  Location: [address] [place] [address];  Service: Urology    TRANSURETHRAL RESECTION OF BLADDER TUMOR N/A 2016    Procedure: TRANSURETHRAL RESECTION OF BLADDER TUMOR (TURBT), Cystoscopy, deep biopsy;  Surgeon: Allie Shearer MD;  Location: [facility];  Service:     TRANSURETHRAL RESECTION OF BLADDER TUMOR Bilateral 2016    Procedure: TRANSURETHRAL RESECTION OF BLADDER TUMOR (TURBT), Cysto, retrograde, BLADDER BIOPSY;  Surgeon: Allie Shearer MD;  Location: [facility];  Service:      Social History   Social History     Substance and Sexual Activity   Alcohol Use Yes    Comment: socially     Social History     Substance and Sexual Activity   Drug Use No     Social History     Tobacco Use   Smoking Status Former Smoker    Packs/day: 0 50    Years: 5 00    Pack years: 2 50    Last attempt to quit: Yady Shown Years since quittin 5   Smokeless Tobacco Never Used     Family History: non-contributory    Meds/Allergies   all medications and allergies reviewed  No Known Allergies    Objective   Vitals: Blood pressure (!) 187/89, pulse 78, temperature 98 2 °F (36 8 °C), temperature source Tympanic, resp  rate 20, SpO2 98 %      No intake or output data in the 24 hours ending 19 1658    Invasive Devices     Peripheral Intravenous Line            Peripheral IV 16 Left Antecubital 941 days Peripheral IV 12/04/16 Left Forearm 941 days    Peripheral IV 07/03/19 Right Antecubital less than 1 day          Drain            Open Drain Left; Anterior; Other (Comment)  days    Urethral Catheter Latex 22 Fr  945 days                Physical Exam   Constitutional: He is oriented to person, place, and time  He appears well-developed and well-nourished  Non-toxic appearance  He does not have a sickly appearance  He does not appear ill  He appears distressed  HENT:   Head: Normocephalic and atraumatic  Right Ear: External ear normal    Left Ear: External ear normal    Nose: Nose normal    Mouth/Throat: Oropharynx is clear and moist  No oropharyngeal exudate  Eyes: Pupils are equal, round, and reactive to light  Conjunctivae, EOM and lids are normal  Lids are everted and swept, no foreign bodies found  Right eye exhibits no discharge  Left eye exhibits no discharge  No scleral icterus  Neck: Normal range of motion  Neck supple  Normal carotid pulses, no hepatojugular reflux and no JVD present  No tracheal tenderness, no spinous process tenderness and no muscular tenderness present  Carotid bruit is not present  No neck rigidity  No tracheal deviation, no edema, no erythema and normal range of motion present  No thyromegaly present  Cardiovascular: Normal rate, regular rhythm, intact distal pulses and normal pulses  Exam reveals no gallop and no friction rub  Murmur heard  Pulmonary/Chest: Effort normal and breath sounds normal  No stridor  No respiratory distress  He has no wheezes  He has no rales  He exhibits no tenderness  Decreased air entry bilateral bilateral rales   Abdominal: Soft  Bowel sounds are normal  He exhibits no distension, no ascites and no mass  There is no tenderness  There is no rebound and no guarding  Musculoskeletal: Normal range of motion  He exhibits no edema, tenderness or deformity     Lymphadenopathy:        Head (right side): No submental, no submandibular, no tonsillar, no preauricular and no posterior auricular adenopathy present  Head (left side): No submental, no submandibular, no tonsillar, no preauricular, no posterior auricular and no occipital adenopathy present  He has no cervical adenopathy  Right cervical: No superficial cervical, no deep cervical and no posterior cervical adenopathy present  Left cervical: No superficial cervical, no deep cervical and no posterior cervical adenopathy present  Right axillary: No pectoral and no lateral adenopathy present  Left axillary: No pectoral and no lateral adenopathy present  Neurological: He is alert and oriented to person, place, and time  He has normal strength and normal reflexes  He displays no tremor and normal reflexes  No cranial nerve deficit or sensory deficit  He exhibits normal muscle tone  He displays a negative Romberg sign  He displays no seizure activity  Coordination abnormal  Gait normal    Skin: Skin is warm  No rash noted  He is diaphoretic  No erythema  No pallor  Psychiatric: He has a normal mood and affect  His behavior is normal  Judgment and thought content normal        Lab Results: I have personally reviewed pertinent reports      Admission on 07/03/2019   Component Date Value    WBC 07/03/2019 8 92     RBC 07/03/2019 4 18     Hemoglobin 07/03/2019 12 8     Hematocrit 07/03/2019 38 4     MCV 07/03/2019 92     MCH 07/03/2019 30 6     MCHC 07/03/2019 33 3     RDW 07/03/2019 14 3     MPV 07/03/2019 10 8     Platelets 72/62/1151 143*    Neutrophils Relative 07/03/2019 73     Lymphocytes Relative 07/03/2019 16     Monocytes Relative 07/03/2019 9     Eosinophils Relative 07/03/2019 2     Basophils Relative 07/03/2019 0     Neutrophils Absolute 07/03/2019 6 55     Lymphocytes Absolute 07/03/2019 1 39     Monocytes Absolute 07/03/2019 0 81     Eosinophils Absolute 07/03/2019 0 15     Basophils Absolute 07/03/2019 0 02     Protime 07/03/2019 11 3     INR 07/03/2019 1 08     PTT 07/03/2019 31     Sodium 07/03/2019 139     Potassium 07/03/2019 4 7     Chloride 07/03/2019 105     CO2 07/03/2019 26     ANION GAP 07/03/2019 8     BUN 07/03/2019 45*    Creatinine 07/03/2019 1 80*    Glucose 07/03/2019 103     Calcium 07/03/2019 8 8     AST 07/03/2019 27     ALT 07/03/2019 36     Alkaline Phosphatase 07/03/2019 130*    Total Protein 07/03/2019 7 7     Albumin 07/03/2019 3 3*    Total Bilirubin 07/03/2019 1 40*    eGFR 07/03/2019 35     Troponin I 07/03/2019 <0 02     NT-proBNP 07/03/2019 2,452*    Color, UA 07/03/2019 Yellow     Clarity, UA 07/03/2019 Slightly Cloudy     Specific Gravity, UA 07/03/2019 >=1 030     pH, UA 07/03/2019 5 5     Leukocytes, UA 07/03/2019 Negative     Nitrite, UA 07/03/2019 Negative     Protein, UA 07/03/2019 >=300*    Glucose, UA 07/03/2019 Negative     Ketones, UA 07/03/2019 Negative     Urobilinogen, UA 07/03/2019 0 2     Bilirubin, UA 07/03/2019 Negative     Blood, UA 07/03/2019 Moderate*    D-Dimer, Quant 07/03/2019 1,400*    RBC, UA 07/03/2019 1-2*    WBC, UA 07/03/2019 4-10*    Epithelial Cells 07/03/2019 Occasional     Bacteria, UA 07/03/2019 Occasional     Hyaline Casts, UA 07/03/2019 0-1*    Fine granular casts 07/03/2019 1-2     MUCUS THREADS 07/03/2019 Occasional*       Imaging: I have personally reviewed pertinent reports  EKG, Pathology, and Other Studies: I have personally reviewed pertinent reports  Assessment/Plan     Active Problems:    * No active hospital problems   *    Admitted with respiratory distress and shortness of breath very high BNP chest x-ray suggestive of CHF and pleural effusion and suspected pneumonia  Assessment:  Acute on chronic diastolic CHF  Pneumonia  Pleural effusion  Type 1 diabetes  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  Hypertension uncontrolled    Plan:  Admit the patient on telemetry with daily weight and input output  IV Lasix q 12  Cardiology consult  Resume home med  IV hydralazine p r n  For blood pressure control  Nephrology consult  Sliding scale with NovoLog  Labs in the morning  DVT prophylaxis      Code Status: Prior  Advance Directive and Living Will:      Power of :    POLST:      Counseling / Coordination of Care  Total floor / unit time spent today 55 minutes  Greater than 50% of total time was spent with the patient and / or family counseling and / or coordination of care  A description of the counseling / coordination of care:  Discussed at length with the ER physician floor

## 2019-07-04 ENCOUNTER — APPOINTMENT (INPATIENT)
Dept: RADIOLOGY | Facility: HOSPITAL | Age: 79
DRG: 280 | End: 2019-07-04
Payer: COMMERCIAL

## 2019-07-04 PROBLEM — I50.33 ACUTE ON CHRONIC DIASTOLIC CHF (CONGESTIVE HEART FAILURE) (HCC): Status: ACTIVE | Noted: 2019-07-04

## 2019-07-04 LAB
ANION GAP SERPL CALCULATED.3IONS-SCNC: 7 MMOL/L (ref 4–13)
BUN SERPL-MCNC: 47 MG/DL (ref 5–25)
CALCIUM SERPL-MCNC: 8.1 MG/DL (ref 8.3–10.1)
CHLORIDE SERPL-SCNC: 106 MMOL/L (ref 100–108)
CK MB SERPL-MCNC: 1.7 % (ref 0–2.5)
CK MB SERPL-MCNC: 4.9 NG/ML (ref 0–5)
CK SERPL-CCNC: 287 U/L (ref 39–308)
CO2 SERPL-SCNC: 26 MMOL/L (ref 21–32)
CREAT SERPL-MCNC: 1.91 MG/DL (ref 0.6–1.3)
GFR SERPL CREATININE-BSD FRML MDRD: 33 ML/MIN/1.73SQ M
GLUCOSE SERPL-MCNC: 127 MG/DL (ref 65–140)
GLUCOSE SERPL-MCNC: 203 MG/DL (ref 65–140)
GLUCOSE SERPL-MCNC: 219 MG/DL (ref 65–140)
GLUCOSE SERPL-MCNC: 222 MG/DL (ref 65–140)
GLUCOSE SERPL-MCNC: 226 MG/DL (ref 65–140)
POTASSIUM SERPL-SCNC: 4.9 MMOL/L (ref 3.5–5.3)
PROCALCITONIN SERPL-MCNC: 0.1 NG/ML
SODIUM SERPL-SCNC: 139 MMOL/L (ref 136–145)

## 2019-07-04 PROCEDURE — 82550 ASSAY OF CK (CPK): CPT | Performed by: INTERNAL MEDICINE

## 2019-07-04 PROCEDURE — 71250 CT THORAX DX C-: CPT

## 2019-07-04 PROCEDURE — 84145 PROCALCITONIN (PCT): CPT | Performed by: INTERNAL MEDICINE

## 2019-07-04 PROCEDURE — 93970 EXTREMITY STUDY: CPT | Performed by: SURGERY

## 2019-07-04 PROCEDURE — 99222 1ST HOSP IP/OBS MODERATE 55: CPT | Performed by: INTERNAL MEDICINE

## 2019-07-04 PROCEDURE — 82948 REAGENT STRIP/BLOOD GLUCOSE: CPT

## 2019-07-04 PROCEDURE — 80048 BASIC METABOLIC PNL TOTAL CA: CPT | Performed by: INTERNAL MEDICINE

## 2019-07-04 PROCEDURE — 82553 CREATINE MB FRACTION: CPT | Performed by: INTERNAL MEDICINE

## 2019-07-04 RX ORDER — HYDRALAZINE HYDROCHLORIDE 25 MG/1
25 TABLET, FILM COATED ORAL EVERY 8 HOURS SCHEDULED
Status: DISCONTINUED | OUTPATIENT
Start: 2019-07-04 | End: 2019-07-05

## 2019-07-04 RX ADMIN — FUROSEMIDE 40 MG: 10 INJECTION, SOLUTION INTRAMUSCULAR; INTRAVENOUS at 08:17

## 2019-07-04 RX ADMIN — AMLODIPINE BESYLATE 5 MG: 5 TABLET ORAL at 17:28

## 2019-07-04 RX ADMIN — INSULIN LISPRO 2 UNITS: 100 INJECTION, SOLUTION INTRAVENOUS; SUBCUTANEOUS at 21:50

## 2019-07-04 RX ADMIN — HYDRALAZINE HYDROCHLORIDE 25 MG: 25 TABLET ORAL at 21:49

## 2019-07-04 RX ADMIN — HYDRALAZINE HYDROCHLORIDE 25 MG: 25 TABLET ORAL at 15:00

## 2019-07-04 RX ADMIN — METOPROLOL TARTRATE 25 MG: 25 TABLET, FILM COATED ORAL at 21:49

## 2019-07-04 RX ADMIN — ALPRAZOLAM 0.25 MG: 0.25 TABLET ORAL at 21:49

## 2019-07-04 RX ADMIN — CEFTRIAXONE 1000 MG: 1 INJECTION, SOLUTION INTRAVENOUS at 17:26

## 2019-07-04 RX ADMIN — AZITHROMYCIN 500 MG: 250 TABLET, FILM COATED ORAL at 08:18

## 2019-07-04 RX ADMIN — INSULIN DETEMIR 20 UNITS: 100 INJECTION, SOLUTION SUBCUTANEOUS at 21:50

## 2019-07-04 RX ADMIN — INSULIN DETEMIR 20 UNITS: 100 INJECTION, SOLUTION SUBCUTANEOUS at 09:50

## 2019-07-04 RX ADMIN — INSULIN LISPRO 4 UNITS: 100 INJECTION, SOLUTION INTRAVENOUS; SUBCUTANEOUS at 17:26

## 2019-07-04 RX ADMIN — HYDRALAZINE HYDROCHLORIDE 25 MG: 25 TABLET ORAL at 09:50

## 2019-07-04 RX ADMIN — METOPROLOL TARTRATE 25 MG: 25 TABLET, FILM COATED ORAL at 08:18

## 2019-07-04 RX ADMIN — ASPIRIN 81 MG: 81 TABLET, COATED ORAL at 08:20

## 2019-07-04 RX ADMIN — FUROSEMIDE 40 MG: 10 INJECTION, SOLUTION INTRAMUSCULAR; INTRAVENOUS at 15:21

## 2019-07-04 RX ADMIN — ENOXAPARIN SODIUM 40 MG: 40 INJECTION SUBCUTANEOUS at 09:49

## 2019-07-04 RX ADMIN — AMLODIPINE BESYLATE 5 MG: 5 TABLET ORAL at 08:20

## 2019-07-04 RX ADMIN — INSULIN LISPRO 4 UNITS: 100 INJECTION, SOLUTION INTRAVENOUS; SUBCUTANEOUS at 12:27

## 2019-07-04 RX ADMIN — ESCITALOPRAM OXALATE 20 MG: 10 TABLET ORAL at 21:49

## 2019-07-04 RX ADMIN — ATORVASTATIN CALCIUM 40 MG: 40 TABLET, FILM COATED ORAL at 17:28

## 2019-07-04 NOTE — PLAN OF CARE
Problem: Potential for Falls  Goal: Patient will remain free of falls  Description  INTERVENTIONS:  - Assess patient frequently for physical needs  -  Identify cognitive and physical deficits and behaviors that affect risk of falls    -  [place] fall precautions as indicated by assessment   - Educate patient/family on patient safety including physical limitations  - Instruct patient to call for assistance with activity based on assessment  - Modify environment to reduce risk of injury  - Consider OT/PT consult to assist with strengthening/mobility  Outcome: Progressing     Problem: PAIN - ADULT  Goal: Verbalizes/displays adequate comfort level or baseline comfort level  Description  Interventions:  - Encourage patient to monitor pain and request assistance  - Assess pain using appropriate pain scale  - Administer analgesics based on type and severity of pain and evaluate response  - Implement non-pharmacological measures as appropriate and evaluate response  - Consider cultural and social influences on pain and pain management  - Notify physician/advanced practitioner if interventions unsuccessful or patient reports new pain  Outcome: Progressing     Problem: INFECTION - ADULT  Goal: Absence or prevention of progression during hospitalization  Description  INTERVENTIONS:  - Assess and monitor for signs and symptoms of infection  - Monitor lab/diagnostic results  - Monitor all insertion sites, i e  IV  - [place] appropriate cooling/warming therapies per order  - Administer medications as ordered  - Instruct and encourage patient and family to use good hand hygiene technique  - Identify and instruct in appropriate isolation precautions for identified infection/condition   Outcome: Progressing     Problem: SAFETY ADULT  Goal: Patient will remain free of falls  Description  INTERVENTIONS:  - Assess patient frequently for physical needs  -  Identify cognitive and physical deficits and behaviors that affect risk of falls   -  [place] fall precautions as indicated by assessment   - Educate patient/family on patient safety including physical limitations  - Instruct patient to call for assistance with activity based on assessment  - Modify environment to reduce risk of injury  - Consider OT/PT consult to assist with strengthening/mobility  Outcome: Progressing  Goal: Maintain or return to baseline ADL function  Description  INTERVENTIONS:  -  Assess patient's ability to carry out ADLs; assess patient's baseline for ADL function and identify physical deficits which impact ability to perform ADLs (bathing, care of mouth/teeth, toileting, grooming, dressing, etc )  - Assess/evaluate cause of self-care deficits   - Assess range of motion  - Assess patient's mobility; develop plan if impaired  - Assess patient's need for assistive devices and provide as appropriate  - Encourage maximum independence but intervene and supervise when necessary  ¯ Involve family in performance of ADLs  ¯ Assess for home care needs following discharge   ¯ Request OT consult to assist with ADL evaluation and planning for discharge  ¯ Provide patient education as appropriate  Outcome: Progressing  Goal: Maintain or return mobility status to optimal level  Description  INTERVENTIONS:  - Assess patient's baseline mobility status (ambulation, transfers, stairs, etc )    - Identify cognitive and physical deficits and behaviors that affect mobility  - Identify mobility aids required to assist with transfers and/or ambulation (gait belt, sit-to-stand, lift, walker, cane, etc )  - [place] fall precautions as indicated by assessment  - Record patient progress and toleration of activity level on Mobility SBAR; progress patient to next Phase/Stage  - Instruct patient to call for assistance with activity based on assessment  - Request Rehabilitation consult to assist with strengthening/weightbearing, etc   Outcome: Progressing     Problem: DISCHARGE PLANNING  Goal: Discharge to home or other facility with appropriate resources  Description  INTERVENTIONS:  - Identify barriers to discharge w/patient and caregiver  - Arrange for needed discharge resources and transportation as appropriate  - Identify discharge learning needs (meds, wound care, etc )  - Arrange for interpretive services to assist at discharge as needed  - Refer to Case Management Department for coordinating discharge planning if the patient needs post-hospital services based on physician/advanced practitioner order or complex needs related to functional status, cognitive ability, or social support system  Outcome: Progressing     Problem: Knowledge Deficit  Goal: Patient/family/caregiver demonstrates understanding of disease process, treatment plan, medications, and discharge instructions  Description  Complete learning assessment and assess knowledge base    Interventions:  - Provide teaching at level of understanding  - Provide teaching via preferred learning methods  Outcome: Progressing

## 2019-07-04 NOTE — PROGRESS NOTES
Progress Note - Ilene Deleon 66 y o  male MRN: [id number]    Unit/Bed#: 24882 [facility] 422-01 Encounter: [id number]      Subjective:   Patient still having difficulty breathing although improved minimal coughing was not able to sleep last night some anxiety no chest pain no fever chills remaining review of system unremarkable unchanged since yesterday total of 12 done    Objective:   As below    Vitals: Blood pressure 150/69, pulse 68, temperature 97 8 °F (36 6 °C), temperature source Oral, resp  rate 18, height 6' 1" (1 854 m), weight 104 kg (230 lb 2 6 oz), SpO2 95 %  ,Body mass index is 30 37 kg/m²      Intake/Output:    Intake/Output Summary (Last 24 hours) at 7/4/2019 0934  Last data filed at 7/4/2019 6264  Gross per 24 hour   Intake    Output 650 ml   Net -650 ml       Physical Exam:  General Appearance:  Not any acute distress anxious alert oriented x3  Skin:  No rash  H/ENT:  No congestion  Eyes:  No redness no discharged  Cardiac:  Regular soft systolic murmur  Pulmonary:  Decreased air entry bilateral bilateral rales  Gastrointestinal:  Nontender no distension no mass palpable  Extremities:  2+ edema bilateral  Musculoskeletal:  Knee swelling bilateral  Neuro:  No new deficit gait dysfunction    Current Facility-Administered Medications   Medication Dose Route Frequency    acetaminophen (TYLENOL) tablet 650 mg  650 mg Oral Q6H PRN    ALPRAZolam (XANAX) tablet 0 25 mg  0 25 mg Oral HS    amLODIPine (NORVASC) tablet 5 mg  5 mg Oral BID    aspirin (ECOTRIN LOW STRENGTH) EC tablet 81 mg  81 mg Oral Daily    atorvastatin (LIPITOR) tablet 40 mg  40 mg Oral Daily With Dinner    azithromycin (ZITHROMAX) tablet 500 mg  500 mg Oral Q24H    cefTRIAXone (ROCEPHIN) IVPB (premix) 1,000 mg  1,000 mg Intravenous Q24H    enoxaparin (LOVENOX) subcutaneous injection 40 mg  40 mg Subcutaneous Daily    escitalopram (LEXAPRO) tablet 20 mg  20 mg Oral HS    furosemide (LASIX) injection 40 mg  40 mg Intravenous BID (diuretic)  hydrALAZINE (APRESOLINE) injection 10 mg  10 mg Intravenous Q6H PRN    hydrALAZINE (APRESOLINE) tablet 25 mg  25 mg Oral Q8H [address]    insulin detemir (LEVEMIR) subcutaneous injection 20 Units  20 Units Subcutaneous Q12H [address]    insulin lispro (HumaLOG) 100 units/mL subcutaneous injection 1-6 Units  1-6 Units Subcutaneous HS    insulin lispro (HumaLOG) 100 units/mL subcutaneous injection 2-12 Units  2-12 Units Subcutaneous TID AC    metoprolol tartrate (LOPRESSOR) tablet 25 mg  25 mg Oral Q12H [address]    pneumococcal 13-valent conjugate vaccine (PREVNAR-13) IM injection 0 5 mL  0 5 mL Intramuscular Prior to discharge       Radiology Results:  Reviewed    Lab, Imaging and other studies:   CBC:   Lab Results   Component Value Date    WBC 8 92 07/03/2019    WBC 8 8 01/05/2016    RBC 4 18 07/03/2019    RBC 4 61 (L) 01/05/2016     BMP:   Lab Results   Component Value Date    GLUCOSE 131 (H) 01/05/2016    SODIUM 139 07/03/2019    CO2 26 07/03/2019    CO2 27 01/05/2016    BUN 45 (H) 07/03/2019    BUN 35 (H) 01/05/2016    CREATININE 1 80 (H) 07/03/2019    CREATININE 1 5 (H) 01/05/2016    CALCIUM 8 8 07/03/2019    CALCIUM 9 0 01/05/2016     Coagulation:   Lab Results   Component Value Date    INR 1 08 07/03/2019     Cardiac markers: No results found for: CKMB, TROPONINT, MYOGLOBIN  ABGs: No results found for: PH   Results from last 7 days   Lab Units 07/03/19  1045   POTASSIUM mmol/L 4 7   CHLORIDE mmol/L 105   CO2 mmol/L 26   BUN mg/dL 45*   CREATININE mg/dL 1 80*   CALCIUM mg/dL 8 8   ALK PHOS U/L 130*   ALT U/L 36   AST U/L 27       Assessment:  Principal Problem:    Acute on chronic diastolic CHF (congestive heart failure) (HCC)   Acute on chronic renal failure as evident the creatinine 1 8 and baseline creatinine 1 3  suspected pneumonia  Assessment:  Acute on chronic diastolic CHF  Pneumonia  Pleural effusion  Type 1 diabetes  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  Hypertension uncontrolled  Plan:  Continue monitoring on telemetry while patient being treated for acute CHF  Continue IV Lasix q 12  Added hydralazine for blood pressure control  Continue IV ceftriaxone Zithromax for suspected pneumonia while awaiting CT of the chest to confirm pneumonia  Follow up on the procalcitonin level  Follow up on the BMP today and tomorrow and  Nephrology consult reviewed appreciated  Awaiting cardiology consult      VTE Pharmacologic Prophylaxis: Enoxaparin (Lovenox)    Belkys Caceres MD  7/4/2019, 9:34 AM

## 2019-07-04 NOTE — CONSULTS
[address] Fadi Cagle 66 y o  male MRN: [id number]  Unit/Bed#: [address]01 Encounter: [id number]    ASSESSMENT and PLAN:  1  BRIAN (POA)  · Admission creatinine of 1 80  No new labs today - will recheck  · UA on admission is not indicative of hematuria but does have moderate blood on dip and has proteinuria  Will check a CK level  · Suspect that there may be a component of prerenal azotemia from fluid overload - would continue with diuresis with Furosemide 40 mg IV BID  · Check renal US and bladder scan to evaluate for post renal etiologies  2  CKD III:  · Baseline creatinine is around 1 2 to 1 5 since 2015  · No prior renal follow up  3  HTN: BP elevated  Monitor with diuretics  4  Leg edema, SOB: Possibly due to CHF  Continue Furosemide 40 mg IV BID  5  Proteinuria: Check UPC ratio once at steady state  SUMMARY OF RECOMMENDATIONS:  · Continue Furosemide 40 mg IV BID  · Check CK level and BMP today  · Check renal US and bladder scan  · Keep O>I  · Daily weights  · Consider checking echocardiogram      HISTORY OF PRESENT ILLNESS:  Requesting Physician: Dana Banda MD  Reason for Consult: renal disease  Fide Martínez is a 66 y o  male who was admitted to [place] [address] on 7/3/19 after presenting with SOB and leg swelling  A renal consultation is requested today for assistance in the management of CKD  Mr Claudia Saul has a history of HTN, DM, HLP, CAD s/p stents and CABG, osteoarthritis, bladder cancer s/p partial cystectomy and CKD  He is aware of his renal disease but does not see a nephrologist  His baseline creatinine is around 1 2 to 1 5 dating back to around 2015  His most recent creatinine in the [facility]'s system is 1 23 from 2/14/17  He now presents with SOB and leg swelling  He reports SOB on exertion over the past 3-4 days associated with some cough  He denied any chest pain, fever, or chills  There is no orthopnea or PND   Although he was not aware of it, his PCP noted leg swelling as well  Due to such, he came to the [facility] ER and was subsequently admitted  On admission, his creatinine was 1 80 prompting renal consultation  Work up revealed a R infrahilar infiltrate and a small pleural effusion  He is being treated for a pneumonia and CHF with antibiotics and IV diuresis respectively  PAST MEDICAL HISTORY:  Past Medical History:   Diagnosis Date    Arthritis     Back pain     Bladder cancer ([facility] [address] )     diagnosed  2/2016    Cancer [facility])     bladder; chemo; resection;     Coronary artery disease     has 2 coronary stents    Dental crowns present      5    Diabetes mellitus ([facility] [address] )     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    Hematuria     hx of    Hypercholesteremia     Hypertension     Kidney stones     Wears glasses      PAST SURGICAL HISTORY:  Past Surgical History:   Procedure Laterality Date    ABDOMINAL SURGERY      CARDIAC SURGERY      CHOLECYSTECTOMY      open    CORONARY ANGIOPLASTY WITH STENT PLACEMENT      2 stents  2009    CYSTECTOMY, PARTIAL N/A 11/30/2016    Procedure: PARTIAL CYSTECTOMY, LEFT LYMPHADENECTOMY;  Surgeon: Elham Moncada MD;  Location: [address] [place] [address];  Service:     CYSTOSCOPY Left 10/20/2016    Procedure: CYSTOSCOPY, BILATERAL RETROGRADE PYLEOGRAM, LEFT SIDE BLADDER BIOPSY, TURBT;  Surgeon: Elham Moncada MD;  Location: [address] [place] [address];  Service:     HERNIA REPAIR      repair [facility]    MT CYSTOURETHROSCOPY N/A 2/25/2016    Procedure: CYSTOSCOPY;  Surgeon: Elham Moncada MD;  Location: [address] [place] [address];  Service: Urology    MT CYSTOURETHROSCOPY,FULGUR 2-5 CM LESN N/A 2/25/2016    Procedure: TRANSURETHRAL RESECTION OF BLADDER TUMOR (TURBT);   Surgeon: Elham Moncada MD;  Location: [address] [place] [address];  Service: Urology    TRANSURETHRAL RESECTION OF BLADDER TUMOR N/A 7/7/2016    Procedure: TRANSURETHRAL RESECTION OF BLADDER TUMOR (TURBT), Cystoscopy, deep biopsy;  Surgeon: Elham Moncada MD; Location: [facility];  Service:     TRANSURETHRAL RESECTION OF BLADDER TUMOR Bilateral 2016    Procedure: TRANSURETHRAL RESECTION OF BLADDER TUMOR (TURBT), Cysto, retrograde, BLADDER BIOPSY;  Surgeon: Rio Pedroza MD;  Location: [facility];  Service:      ALLERGIES:  No Known Allergies    SOCIAL HISTORY:  Social History     Substance and Sexual Activity   Alcohol Use Yes    Comment: socially     Social History     Substance and Sexual Activity   Drug Use No     Social History     Tobacco Use   Smoking Status Former Smoker    Packs/day: 0 50    Years: 5 00    Pack years: 2 50    Last attempt to quit: [facility] Years since quittin 5   Smokeless Tobacco Never Used     FAMILY HISTORY:  Family History   Problem Relation Age of Onset    Heart disease Mother     Diabetes Mother     Diabetes Father      MEDICATIONS:    Current Facility-Administered Medications:     acetaminophen (TYLENOL) tablet 650 mg, 650 mg, Oral, Q6H PRN, Sean Alvarez MD    ALPRAZolam (XANAX) tablet 0 25 mg, 0 25 mg, Oral, HS, William Yen MD, 0 25 mg at 19    amLODIPine (NORVASC) tablet 5 mg, 5 mg, Oral, BID, William Yen MD, 5 mg at 19    aspirin (ECOTRIN LOW STRENGTH) EC tablet 81 mg, 81 mg, Oral, Daily, Sean Alvarez MD    atorvastatin (LIPITOR) tablet 40 mg, 40 mg, Oral, Daily With Franklin Bhatt MD, 40 mg at 19    azithromycin (ZITHROMAX) tablet 500 mg, 500 mg, Oral, Q24H, Sean Alvarez MD    cefTRIAXone (ROCEPHIN) IVPB (premix) 1,000 mg, 1,000 mg, Intravenous, Q24H, Sean Alvarez MD    enoxaparin (LOVENOX) subcutaneous injection 40 mg, 40 mg, Subcutaneous, Daily, Sean Alvarez MD    escitalopram (LEXAPRO) tablet 20 mg, 20 mg, Oral, HS, Sean Alvarez MD, 20 mg at 19    furosemide (LASIX) injection 40 mg, 40 mg, Intravenous, BID (diuretic), Sean Alvarez MD    hydrALAZINE (APRESOLINE) injection 10 mg, 10 mg, Intravenous, Q6H PRN, Sean Alvarez MD, 10 mg at 19    insulin detemir (LEVEMIR) subcutaneous injection 20 Units, 20 Units, Subcutaneous, Q12H [address], Sean Alvarez MD, 20 Units at 07/03/19 2137    insulin lispro (HumaLOG) 100 units/mL subcutaneous injection 1-6 Units, 1-6 Units, Subcutaneous, HS, eSan Alvarez MD, 2 Units at 07/03/19 2137    insulin lispro (HumaLOG) 100 units/mL subcutaneous injection 2-12 Units, 2-12 Units, Subcutaneous, TID AC **AND** Fingerstick Glucose (POCT), , , TID AC, Sean Alvarez MD    metoprolol tartrate (LOPRESSOR) tablet 25 mg, 25 mg, Oral, Q12H [address], Sean Alvarez MD, 25 mg at 07/03/19 2137    pneumococcal 13-valent conjugate vaccine (PREVNAR-13) IM injection 0 5 mL, 0 5 mL, Intramuscular, Prior to discharge, Franchesca Schwarz MD    REVIEW OF SYSTEMS:  Constitutional: Negative for fatigue, anorexia, fever, chills, diaphoresis  HENT: Negative for postnasal drip  Eyes: Negative for visual disturbance  Respiratory: (+) cough, exertional dyspnea  Negative for wheezing  Cardiovascular: (+) leg swelling  Negative for chest pain, palpitations  No PND or orthopnea  Gastrointestinal: Negative for abdominal pain, constipation, diarrhea, nausea and vomiting  Genitourinary: No dysuria, hematuria  Endocrine: Negative for polyuria  Musculoskeletal: (+) arthralgias, back pain  Negative for joint swelling  Skin: Negative for rash  Neurological: Negative for focal weakness, headaches, dizziness  Hematological: Negative for easy bruising or bleeding  Psychiatric/Behavioral: Negative for confusion and sleep disturbance  All the systems were reviewed and were negative except as documented on the HPI      PHYSICAL EXAM:  Current Weight: Weight - Scale: 104 kg (230 lb 2 6 oz)  First Weight: Weight - Scale: 106 kg (234 lb 5 6 oz)  Vitals:    07/03/19 2137 07/03/19 2300 07/04/19 0335 07/04/19 0600   BP:  169/77 163/74    BP Location:  Right arm Right arm    Pulse: 64 67 62    Resp:  20 20    Temp:  98 1 °F (36 7 °C) 98 3 °F (36 8 °C)    TempSrc:  Tympanic Tympanic SpO2:  99% 92%    Weight:    104 kg (230 lb 2 6 oz)   Height:           Intake/Output Summary (Last 24 hours) at 7/4/2019 0801  Last data filed at 7/4/2019 8749  Gross per 24 hour   Intake    Output 650 ml   Net -650 ml     Physical Exam  General: conscious, coherent, cooperative, not in distress  Skin: warm, dry, good turgor  Eyes: pink conjunctivae, no scleral icterus  ENT: moist lips and mucous membranes  Neck: supple, (+) JVD  Chest/Lungs: decreased on the R base with some crackles  CVS: distinct heart sounds, normal rate, regular rhythm  Abdomen: soft, non-tender, non-distended, normoactive bowel sounds  Extremities: (+) edema of thighs and legs  : deferred   Neuro: awake, alert, oriented to time, place and person  Psych: appropriate affect         Lab Results:   Results from last 7 days   Lab Units 07/03/19  1045   WBC Thousand/uL 8 92   HEMOGLOBIN g/dL 12 8   HEMATOCRIT % 38 4   PLATELETS Thousands/uL 143*   POTASSIUM mmol/L 4 7   CHLORIDE mmol/L 105   CO2 mmol/L 26   BUN mg/dL 45*   CREATININE mg/dL 1 80*   CALCIUM mg/dL 8 8   ALK PHOS U/L 130*   ALT U/L 36   AST U/L 27     Other Studies:

## 2019-07-04 NOTE — UTILIZATION REVIEW
Initial Clinical Review    Admission: Date/Time/Statement: 7/3/19 @ 1624     Orders Placed This Encounter   Procedures    Inpatient Admission (expected length of stay for this patient Order details is greater than two midnights)     Standing Status:   Standing     Number of Occurrences:   1     Order Specific Question:   Admitting Physician     Answer:   JOSELUIS VAZQUEZ [[id number]]     Order Specific Question:   Level of Care     Answer:   Med Surg [[id number]]     Order Specific Question:   Estimated length of stay     Answer:   More than 2 Midnights     Order Specific Question:   Certification     Answer:   I certify that inpatient services are medically necessary for this patient for a duration of greater than two midnights  See H&P and MD Progress Notes for additional information about the patient's course of treatment  ED Arrival Information     Expected Arrival Acuity Means of Arrival Escorted By Service Admission Type    - 7/3/2019 10:07 Urgent Walk-In Self General Medicine Urgent    Arrival Complaint    -        Chief Complaint   Patient presents with    Shortness of Breath     Pt c/o SOB for the past 3-4 days, made worse by walking  Pt states hes fine if hes sitting still     Assessment/Plan:   79 YO MALE AMBULATORY TO ER FROM HOME C/O INCREASED DIFFICULTY BREATHING & SWELLING BLE X2 DAYS  HX DM, HTN, CAD/CABG, CKD, PARTIAL CYSTECTOMY FOR BLADDER CANCER  PRESENTS HYPERTENSIVE & SOB WITH BILATERAL RALES, PALPITATIONS &  BLE EDEMA  ADMISSION WORK-UP SHOWING R PLEURAL EFFUSION & INFILTRATE  ADMITTED TO INPATIENT STATUS FOR ACUTE ON CHRONIC CHF, STARTED ON IV DIURESIS WITH LASIX BID & IVABT  CARDIO & RENAL CONSULTED     ED Triage Vitals   Temperature Pulse Respirations Blood Pressure SpO2   07/03/19 1015 07/03/19 1015 07/03/19 1015 07/03/19 1015 07/03/19 1015   98 8 °F (37 1 °C) 69 22 (!) 190/81 97 %      Temp Source Heart Rate Source Patient Position - Orthostatic VS BP Location FiO2 (%)   07/03/19 1015 07/03/19 1015 07/03/19 1015 07/03/19 1015 07/03/19 2300   Temporal Monitor Lying Right arm 2      Pain Score       07/03/19 1015       No Pain        Wt Readings from Last 1 Encounters:   07/04/19 104 kg (230 lb 2 6 oz)     Additional Vital Signs:   07/03/19 1953  98 8 °F (37 1 °C)  71  19  150/68  95 %  None (Room air)  Lying   07/03/19 1700  98 8 °F (37 1 °C)  69  20  176/75Abnormal   96 %  None (Room air)  Lying   07/03/19 1640  98 2 °F (36 8 °C)  78  20  187/89Abnormal   98 %  None (Room air)  Lying   07/03/19 1330    64  21  172/74Abnormal   96 %    Lying   07/03/19 1245    66  19  174/78Abnormal   97 %  None (Room air)     07/03/19 1215    66  21  162/73  98 %  None (Room air)  Lying   07/03/19 1145    64  18  163/93  95 %  None (Room air)  Lying   07/03/19 1130    62  21  170/72  96 %       07/03/19 1026            None (Room air)     07/03/19 1015  98 8 °F (37 1 °C)  69  22  190/81Abnormal   97 %  None (Room air)  Lying   Pertinent Labs/Diagnostic Test Results:   Results from last 7 days   Lab Units 07/03/19  1045   WBC Thousand/uL 8 92   HEMOGLOBIN g/dL 12 8   HEMATOCRIT % 38 4   PLATELETS Thousands/uL 143*   NEUTROS ABS Thousands/µL 6 55     Results from last 7 days   Lab Units 07/03/19  1045   SODIUM mmol/L 139   POTASSIUM mmol/L 4 7   CHLORIDE mmol/L 105   CO2 mmol/L 26   ANION GAP mmol/L 8   BUN mg/dL 45*   CREATININE mg/dL 1 80*   EGFR ml/min/1 73sq m 35   CALCIUM mg/dL 8 8     Results from last 7 days   Lab Units 07/03/19  1045   AST U/L 27   ALT U/L 36   ALK PHOS U/L 130*   TOTAL PROTEIN g/dL 7 7   ALBUMIN g/dL 3 3*   TOTAL BILIRUBIN mg/dL 1 40*     Results from last 7 days   Lab Units 07/04/19  0649 07/03/19  2100 07/03/19  1712   POC GLUCOSE mg/dl 127 223* 91     Results from last 7 days   Lab Units 07/03/19  1045   GLUCOSE RANDOM mg/dL 103     Results from last 7 days   Lab Units 07/03/19  1729   HEMOGLOBIN A1C % 6 7*   EAG mg/dl 146     Results from last 7 days   Lab Units 07/03/19  1045 TROPONIN I ng/mL <0 02     Results from last 7 days   Lab Units 07/03/19  1045   D DIMER QUANT ng/ml (FEU) 1,400*     Results from last 7 days   Lab Units 07/03/19  1045   PROTIME seconds 11 3   INR  1 08   PTT seconds 31     Results from last 7 days   Lab Units 07/03/19  1641   PROCALCITONIN ng/ml 0 09     Results from last 7 days   Lab Units 07/03/19  1045   NT-PRO BNP pg/mL 2,452*     Results from last 7 days   Lab Units 07/03/19  1331   CLARITY UA  Slightly Cloudy   COLOR UA  Yellow   SPEC GRAV UA  >=1 030   PH UA  5 5   GLUCOSE UA mg/dl Negative   KETONES UA mg/dl Negative   BLOOD UA  Moderate*   PROTEIN UA mg/dl >=300*   NITRITE UA  Negative   BILIRUBIN UA  Negative   UROBILINOGEN UA E U /dl 0 2   LEUKOCYTES UA  Negative   WBC UA /hpf 4-10*   RBC UA /hpf 1-2*   BACTERIA UA /hpf Occasional   EPITHELIAL CELLS WET PREP /hpf Occasional   MUCUS THREADS  Occasional*   CXR=Right infrahilar infiltrate with small pleural effusion  VQ SCAN=The probability for pulmonary embolus is low  BLE VENOUS DUPLEX=RIGHT LOWER LIMB:  No evidence of acute or chronic deep vein thrombosis  No evidence of superficial thrombophlebitis noted  Doppler evaluation shows a normal response to augmentation maneuvers  Popliteal, posterior tibial and anterior tibial arterial Doppler waveforms are  triphasic  LEFT LOWER LIMB:  No evidence of acute or chronic deep vein thrombosis  No evidence of superficial thrombophlebitis noted  Doppler evaluation shows a normal response to augmentation maneuvers  Popliteal, posterior tibial and anterior tibial arterial Doppler waveforms are  triphasic  Pulsatile waveforms in the venous system may suggest heart failure or tricuspid valve insufficiency    EKG=Rhythm: sinus rhythm      Ectopy: none      Conduction: abnormal      Abnormal conduction: 1st degree      ST segments:  Non-specific    Depression:  AVL and I    Other findings: LVH    ED Treatment:   Medication Administration from 07/03/2019 1006 to 07/03/2019 1707       Date/Time Order Dose Route Action Action by Comments     07/03/2019 1645 cefTRIAXone (ROCEPHIN) IVPB (premix) 1,000 mg 1,000 mg Intravenous New Bag Kristel Altman RN      07/03/2019 1635 furosemide (LASIX) injection 40 mg 40 mg Intravenous Given Presley Gautam RN         Past Medical History:   Diagnosis Date    Acute on chronic diastolic CHF (congestive heart failure) ([facility] ) 7/4/2019    Arthritis     Back pain     Bladder cancer ([place] [address] )     diagnosed  2/2016    Cancer ([place] [address] )     bladder; chemo; resection;     Coronary artery disease     has 2 coronary stents    Dental crowns present      5    Diabetes mellitus ([place] [address] )     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[place] [address] )     fluid behind right eye    Hematuria     hx of    Hypercholesteremia     Hypertension     Kidney stones     Wears glasses      Present on Admission:   Acute on chronic diastolic CHF (congestive heart failure) ([facility])  Admitting Diagnosis: Shortness of breath [R06 02]  Acute congestive heart failure (HCC) [I50 9]  Stage 3 chronic kidney disease ([facility] ) [N18 3]  Age/Sex: 66 y o  male  Admission Orders:  TELEMETRY  ACCUCHECKS WITH COVERAGE SCALE  CONSULT CARDIO  CONSULT RENAL  Current Facility-Administered Medications:  acetaminophen 650 mg Oral Q6H PRN   ALPRAZolam 0 25 mg Oral HS   amLODIPine 5 mg Oral BID   aspirin 81 mg Oral Daily   atorvastatin 40 mg Oral Daily With Dinner   azithromycin 500 mg Oral Q24H   cefTRIAXone 1,000 mg Intravenous Q24H   enoxaparin 40 mg Subcutaneous Daily   escitalopram 20 mg Oral HS   furosemide 40 mg Intravenous BID (diuretic)   hydrALAZINE 10 mg Intravenous Q6H PRN   hydrALAZINE 25 mg Oral Q8H [address]   insulin detemir 20 Units Subcutaneous Q12H [place]   insulin lispro 1-6 Units Subcutaneous HS   insulin lispro 2-12 Units Subcutaneous TID AC   metoprolol tartrate 25 mg Oral Q12H [address]   pneumococcal 13-valent conjugate vaccine 0 5 mL Intramuscular Prior to discharge     Network Utilization Review Department  Phone: [phone]; Fax [phone]  [email or web address]  ATTENTION: Please call with any questions or concerns to [phone] and carefully listen to the prompts so that you are directed to the right person  Send all requests for admission clinical reviews, approved or denied determinations and any other requests to fax [phone]   All voicemails are confidential

## 2019-07-04 NOTE — CONSULTS
Consultation - Cardiology   Steve Currie 66 y o  male MRN: [id number]  Unit/Bed#: [address] [address] Encounter: [id number]  07/04/19  8:18 AM          Physician Requesting Consult: Fatou Rogel MD  Reason for Consult / Principal Problem: CHF      Assessment:  1  Likely acute on chronic diastolic CHF - will obtain 2D echocardiogram tomorrow for evaluation of EF  Continue Lasix 40mg IV bid  2  Hypertension - continue current medication regimen  3  CAD with previous PCI and CABG I- continue ASA  4  DM - continue insulin  5  Dyslipidemia    Plan:  As above  Discussed with PMD       History of Present Illness   HPI: Steve Currie is a 66y o  year old male who presents with shortness of breath and bilateral LE edema  Symptoms have been present for the past week  3 weeks ago he had bronchitis/cough that was treated with antibiotics  He felt better afterwards but last week, he developed worsening dyspnea with exertion  He would have symptoms with minimal exertion such as walking in his house  He is sedentary due to chronic back pain  He saw Dr Valinda Boast and was referred to ED  He has a history of CAD with previous PCI and CABG  He had a NSTEMI in 2016 while ASA and Plavix were stopped prior to bladder surgery  He was treated with CABG at [facility]   Echocardiogram done at that time showed an EF of 55-60% with grade 2 diastolic dysfunction and mild AS and MR  Review of Systems:    Review of Systems   Constitutional: Positive for fatigue  Negative for chills and fever  HENT: Negative for congestion, nosebleeds and postnasal drip  Respiratory: Positive for shortness of breath  Negative for cough and chest tightness  Cardiovascular: Positive for leg swelling  Negative for chest pain and palpitations  Gastrointestinal: Negative for abdominal distention, abdominal pain, diarrhea, nausea and vomiting  Endocrine: Negative for polydipsia, polyphagia and polyuria     Musculoskeletal: Negative for gait problem and myalgias  Skin: Negative for color change, pallor and rash  Allergic/Immunologic: Negative for environmental allergies, food allergies and immunocompromised state  Neurological: Negative for dizziness, seizures, syncope and light-headedness  Hematological: Negative for adenopathy  Does not bruise/bleed easily  Psychiatric/Behavioral: Negative for dysphoric mood  The patient is not nervous/anxious  Historical Information   Past Medical History:   Diagnosis Date    Arthritis     Back pain     [facility] )     diagnosed  2/2016    Cancer [facility])     bladder; chemo; resection;     Coronary artery disease     has 2 coronary stents    Dental crowns present      5    Diabetes mellitus ([facility] )     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    Hematuria     hx of    Hypercholesteremia     Hypertension     Kidney stones     Wears glasses      Past Surgical History:   Procedure Laterality Date    ABDOMINAL SURGERY      CARDIAC SURGERY      CHOLECYSTECTOMY      open    CORONARY ANGIOPLASTY WITH STENT PLACEMENT      2 stents  2009    CYSTECTOMY, PARTIAL N/A 11/30/2016    Procedure: PARTIAL CYSTECTOMY, LEFT LYMPHADENECTOMY;  Surgeon: Allie Shearer MD;  Location: [address] [place] [address];  Service:     CYSTOSCOPY Left 10/20/2016    Procedure: CYSTOSCOPY, BILATERAL RETROGRADE PYLEOGRAM, LEFT SIDE BLADDER BIOPSY, TURBT;  Surgeon: lAlie Shearer MD;  Location: [address] [place] [address];  Service:     HERNIA REPAIR      repair [facility]    IL CYSTOURETHROSCOPY N/A 2/25/2016    Procedure: CYSTOSCOPY;  Surgeon: Allie Shearer MD;  Location: [address] [place] [address];  Service: Urology    IL CYSTOURETHROSCOPY,FULGUR 2-5 CM LESN N/A 2/25/2016    Procedure: TRANSURETHRAL RESECTION OF BLADDER TUMOR (TURBT);   Surgeon: Allie Shearer MD;  Location: [address] [place] [address];  Service: Urology    TRANSURETHRAL RESECTION OF BLADDER TUMOR N/A 7/7/2016    Procedure: TRANSURETHRAL RESECTION OF BLADDER TUMOR (TURBT), Cystoscopy, deep biopsy;  Surgeon: Lattie Hatchet, MD;  Location: [facility];  Service:     TRANSURETHRAL RESECTION OF BLADDER TUMOR Bilateral 2016    Procedure: TRANSURETHRAL RESECTION OF BLADDER TUMOR (TURBT), Cysto, retrograde, BLADDER BIOPSY;  Surgeon: Lattie Hatchet, MD;  Location: [facility];  Service:      Social History     Substance and Sexual Activity   Alcohol Use Yes    Comment: socially     Social History     Substance and Sexual Activity   Drug Use No     Social History     Tobacco Use   Smoking Status Former Smoker    Packs/day: 0 50    Years: 5 00    Pack years: 2 50    Last attempt to quit: 1965    Years since quittin 5   Smokeless Tobacco Never Used       Family History:   Family History   Problem Relation Age of Onset    Heart disease Mother     Diabetes Mother     Diabetes Father        Meds/Allergies   current meds:   Current Facility-Administered Medications   Medication Dose Route Frequency    acetaminophen (TYLENOL) tablet 650 mg  650 mg Oral Q6H PRN    ALPRAZolam (XANAX) tablet 0 25 mg  0 25 mg Oral HS    amLODIPine (NORVASC) tablet 5 mg  5 mg Oral BID    aspirin (ECOTRIN LOW STRENGTH) EC tablet 81 mg  81 mg Oral Daily    atorvastatin (LIPITOR) tablet 40 mg  40 mg Oral Daily With Dinner    azithromycin (ZITHROMAX) tablet 500 mg  500 mg Oral Q24H    cefTRIAXone (ROCEPHIN) IVPB (premix) 1,000 mg  1,000 mg Intravenous Q24H    enoxaparin (LOVENOX) subcutaneous injection 40 mg  40 mg Subcutaneous Daily    escitalopram (LEXAPRO) tablet 20 mg  20 mg Oral HS    furosemide (LASIX) injection 40 mg  40 mg Intravenous BID (diuretic)    hydrALAZINE (APRESOLINE) injection 10 mg  10 mg Intravenous Q6H PRN    insulin detemir (LEVEMIR) subcutaneous injection 20 Units  20 Units Subcutaneous Q12H [facility]    insulin lispro (HumaLOG) 100 units/mL subcutaneous injection 1-6 Units  1-6 Units Subcutaneous HS    insulin lispro (HumaLOG) 100 units/mL subcutaneous injection 2-12 Units  2-12 Units Subcutaneous TID AC    metoprolol tartrate (LOPRESSOR) tablet 25 mg  25 mg Oral Q12H [address]    pneumococcal 13-valent conjugate vaccine (PREVNAR-13) IM injection 0 5 mL  0 5 mL Intramuscular Prior to discharge     No Known Allergies    Objective   Vitals: Blood pressure 150/69, pulse 68, temperature 97 8 °F (36 6 °C), temperature source Oral, resp  rate 18, height 6' 1" (1 854 m), weight 104 kg (230 lb 2 6 oz), SpO2 95 %  , Body mass index is 30 37 kg/m²  Physical Exam   Constitutional: He appears healthy  No distress  HENT:   Nose: Nose normal    Mouth/Throat: Oropharynx is clear  Eyes: Pupils are equal, round, and reactive to light  Conjunctivae are normal    Neck: Neck supple  No JVD present  Cardiovascular: Normal rate and regular rhythm  Exam reveals no distant heart sounds and no friction rub  Murmur heard  Pulmonary/Chest: Effort normal  He has no wheezes  He has bibasilar rales  He exhibits no tenderness  Abdominal: Soft  He exhibits no distension  There is no tenderness  Musculoskeletal: He exhibits edema  Neurological: He is alert and oriented to person, place, and time  Skin: Skin is warm and dry  No rash noted         Lab Results:     Troponins:   Results from last 7 days   Lab Units 07/03/19  1045   TROPONIN I ng/mL <0 02       CBC with diff:   Results from last 7 days   Lab Units 07/03/19  1045   WBC Thousand/uL 8 92   HEMOGLOBIN g/dL 12 8   HEMATOCRIT % 38 4   MCV fL 92   PLATELETS Thousands/uL 143*   MCH pg 30 6   MCHC g/dL 33 3   RDW % 14 3   MPV fL 10 8       CMP:   Results from last 7 days   Lab Units 07/03/19  1045   POTASSIUM mmol/L 4 7   CHLORIDE mmol/L 105   CO2 mmol/L 26   BUN mg/dL 45*   CREATININE mg/dL 1 80*   CALCIUM mg/dL 8 8   AST U/L 27   ALT U/L 36   ALK PHOS U/L 130*   EGFR ml/min/1 73sq m 35       Magnesium:       Coags:   Results from last 7 days   Lab Units 07/03/19  1045   PTT seconds 31   INR  1 08       Lipid Profile:         Cardiac testing:   Results for orders placed during the hospital encounter of 16   Echo complete with contrast if indicated    Narrative [address]  [address] [facility] [address] [phone]    Transthoracic Echocardiogram  2D, M-mode, Doppler, and Color Doppler    Study date:  05-Dec-2016    Patient: Tracy Deleon  MR number: [id number]  Account number: [de-identified]  : [id number]  Age: 68 years  Gender: Male  Status: Routine  Location: Bedside  Height: 73 in  Weight: 216 5 lb  BP: 163/ 95 mmHg    Indications: Chest Pain    Diagnoses: 786 50 - CHEST PAIN NOS    Sonographer:  Terrance Parisi  Primary Physician:  Horacio Balderas MD  Referring Physician:  Silvia Knowles MD  Group:  [facility]  Interpreting Physician:  Kaylin Quiroz MD    SUMMARY    PROCEDURE INFORMATION:  The procedure was performed at the bedside  This was a technically difficult study  LEFT VENTRICLE:  Systolic function was normal  Ejection fraction was estimated in the range of  55 % to 60 %  Although no diagnostic regional wall motion abnormality was  identified, this possibility cannot be completely excluded on the basis of this  study  There was mild concentric hypertrophy  Features were consistent with a pseudonormal left ventricular filling pattern,  with concomitant abnormal relaxation and increased filling pressure (grade 2  diastolic dysfunction)  LEFT ATRIUM:  The atrium was mildly dilated  MITRAL VALVE:  There was mild annular calcification  There was no evidence for stenosis  There was mild regurgitation  AORTIC VALVE:  Transaortic velocity was increased at 2 1 m/s due to valvular stenosis  Peak  gradient was 17 with mean of 9 mmHg  There was mild stenosis  TRICUSPID VALVE:  Regurgitation could not be evaluated due to poor window  At least mild  regurgitation noted  The tricuspid jet envelope definition was inadequate for  estimation of RV systolic pressure   There are no indirect findings (abnormal RV  volume or geometry, altered pulmonary flow velocity profile, or leftward septal  displacement) which would suggest moderate or severe pulmonary hypertension  PULMONIC VALVE:  Transpulmonic velocity was increased due to increased transvalvular flow  There was mild regurgitation  IVC, HEPATIC VEINS:  Respirophasic changes were blunted (less than 50% variation)  HISTORY: PRIOR HISTORY: HTN, HCL, DM, CAD, S/P Stents, Bladder Cancer    PROCEDURE: The procedure was performed at the bedside  This was a routine  study  The transthoracic approach was used  The study included complete 2D  imaging, M-mode, complete spectral Doppler, and color Doppler  The heart rate  was 65 bpm, at the start of the study  This was a technically difficult study  LEFT VENTRICLE: Size was normal  Systolic function was normal  Ejection  fraction was estimated in the range of 55 % to 60 %  Although no diagnostic  regional wall motion abnormality was identified, this possibility cannot be  completely excluded on the basis of this study  There was mild concentric  hypertrophy  DOPPLER: Features were consistent with a pseudonormal left  ventricular filling pattern, with concomitant abnormal relaxation and increased  filling pressure (grade 2 diastolic dysfunction)  RIGHT VENTRICLE: The size was normal  Systolic function was normal     LEFT ATRIUM: The atrium was mildly dilated  RIGHT ATRIUM: Size was normal     MITRAL VALVE: There was mild annular calcification  There was mild thickening  No echocardiographic evidence for prolapse  DOPPLER: There was no evidence for  stenosis  There was mild regurgitation  AORTIC VALVE: The valve was trileaflet  Leaflets exhibited mildly increased  thickness, mild calcification, and sclerosis  DOPPLER: Transaortic velocity was  increased at 2 1 m/s due to valvular stenosis  Peak gradient was 17 with mean  of 9 mmHg  There was mild stenosis   There was no significant regurgitation  TRICUSPID VALVE: DOPPLER: Regurgitation could not be evaluated due to poor  window  At least mild regurgitation noted  The tricuspid jet envelope  definition was inadequate for estimation of RV systolic pressure  There are no  indirect findings (abnormal RV volume or geometry, altered pulmonary flow  velocity profile, or leftward septal displacement) which would suggest moderate  or severe pulmonary hypertension  PULMONIC VALVE: DOPPLER: Transpulmonic velocity was increased due to increased  transvalvular flow  There was mild regurgitation  PERICARDIUM: There was no pericardial effusion  The pericardium was normal in  appearance  AORTA: The root exhibited normal size  SYSTEMIC VEINS: IVC: The inferior vena cava was normal in size  Respirophasic  changes were blunted (less than 50% variation)  SYSTEM MEASUREMENT TABLES    2D mode  AoR Diam 2D: 3 4 cm  LA Diam (2D): 4 7 cm  LA/Ao (2D): 1 38  FS (2D Teich): 32 9 %  IVSd (2D): 1 3 cm  LVDEV: 127 cm³  LVESV: 49 5 cm³  LVIDd(2D): 5 16 cm  LVISd (2D): 3 46 cm  LVOT Area 2D: 3 46 cm squared  LVPWd (2D): 1 27 cm  SV (Teich): 77 5 cm³    Apical four chamber  LVEF A4C: 54 %    Unspecified Scan Mode  VIRAL Cont Eq (Peak Darvin): 1 77 cm squared  VIRAL Cont Eq (VTI): 1 86 cm squared  LVOT (VTI): 23 8 cm  LVOT Diam : 2 1 cm  LVOT Vmax: 1060 mm/s  LVOT Vmax; Mean: 1060 mm/s  Peak Grad ; Mean: 4 mm[Hg]  SV (LVOT): 82 cm³  VTI;Mean: 2 mm[Hg]  MV Peak A Darvin: 787 mm/s  MV Peak E Darvin   Mean: 945 mm/s  MVA (PHT): 3 24 cm squared  PHT: 68 ms  RA Area: 13 8 cm squared  RA Volume: 26 5 cm³  TAPSE: 2 3 cm    [employer] Accredited Echocardiography Laboratory    Prepared and electronically signed by    Denis Tomas MD  Signed 05-Dec-2016 10:10:21         EKG: Personally reviewed: NSR with LVH    Imaging: I have personally reviewed pertinent films in PACS

## 2019-07-05 ENCOUNTER — APPOINTMENT (INPATIENT)
Dept: RADIOLOGY | Facility: HOSPITAL | Age: 79
DRG: 280 | End: 2019-07-05
Payer: COMMERCIAL

## 2019-07-05 ENCOUNTER — TRANSCRIBE ORDERS (OUTPATIENT)
Dept: ADMINISTRATIVE | Facility: HOSPITAL | Age: 79
End: 2019-07-05

## 2019-07-05 ENCOUNTER — APPOINTMENT (INPATIENT)
Dept: NON INVASIVE DIAGNOSTICS | Facility: HOSPITAL | Age: 79
DRG: 280 | End: 2019-07-05
Payer: COMMERCIAL

## 2019-07-05 PROBLEM — N18.30 BENIGN HYPERTENSION WITH CHRONIC KIDNEY DISEASE, STAGE III (HCC): Status: ACTIVE | Noted: 2019-07-05

## 2019-07-05 PROBLEM — I12.9 BENIGN HYPERTENSION WITH CHRONIC KIDNEY DISEASE, STAGE III (HCC): Status: ACTIVE | Noted: 2019-07-05

## 2019-07-05 LAB
ANION GAP SERPL CALCULATED.3IONS-SCNC: 9 MMOL/L (ref 4–13)
ATRIAL RATE: 71 BPM
BASOPHILS # BLD AUTO: 0.02 THOUSANDS/ΜL (ref 0–0.1)
BASOPHILS NFR BLD AUTO: 0 % (ref 0–1)
BUN SERPL-MCNC: 47 MG/DL (ref 5–25)
CALCIUM SERPL-MCNC: 8.2 MG/DL (ref 8.3–10.1)
CHLORIDE SERPL-SCNC: 106 MMOL/L (ref 100–108)
CO2 SERPL-SCNC: 25 MMOL/L (ref 21–32)
CREAT SERPL-MCNC: 1.91 MG/DL (ref 0.6–1.3)
EOSINOPHIL # BLD AUTO: 0.1 THOUSAND/ΜL (ref 0–0.61)
EOSINOPHIL NFR BLD AUTO: 1 % (ref 0–6)
ERYTHROCYTE [DISTWIDTH] IN BLOOD BY AUTOMATED COUNT: 14 % (ref 11.6–15.1)
GFR SERPL CREATININE-BSD FRML MDRD: 33 ML/MIN/1.73SQ M
GLUCOSE SERPL-MCNC: 108 MG/DL (ref 65–140)
GLUCOSE SERPL-MCNC: 198 MG/DL (ref 65–140)
GLUCOSE SERPL-MCNC: 198 MG/DL (ref 65–140)
GLUCOSE SERPL-MCNC: 225 MG/DL (ref 65–140)
GLUCOSE SERPL-MCNC: 91 MG/DL (ref 65–140)
HCT VFR BLD AUTO: 34.5 % (ref 36.5–49.3)
HGB BLD-MCNC: 11.2 G/DL (ref 12–17)
IMM GRANULOCYTES # BLD AUTO: 0.04 THOUSAND/UL (ref 0–0.2)
IMM GRANULOCYTES NFR BLD AUTO: 0 % (ref 0–2)
L PNEUMO1 AG UR QL IA.RAPID: NEGATIVE
LYMPHOCYTES # BLD AUTO: 1.21 THOUSANDS/ΜL (ref 0.6–4.47)
LYMPHOCYTES NFR BLD AUTO: 13 % (ref 14–44)
MCH RBC QN AUTO: 30.4 PG (ref 26.8–34.3)
MCHC RBC AUTO-ENTMCNC: 32.5 G/DL (ref 31.4–37.4)
MCV RBC AUTO: 94 FL (ref 82–98)
MONOCYTES # BLD AUTO: 0.97 THOUSAND/ΜL (ref 0.17–1.22)
MONOCYTES NFR BLD AUTO: 11 % (ref 4–12)
NEUTROPHILS # BLD AUTO: 6.78 THOUSANDS/ΜL (ref 1.85–7.62)
NEUTS SEG NFR BLD AUTO: 75 % (ref 43–75)
NRBC BLD AUTO-RTO: 0 /100 WBCS
P AXIS: 60 DEGREES
PLATELET # BLD AUTO: 116 THOUSANDS/UL (ref 149–390)
PMV BLD AUTO: 10.8 FL (ref 8.9–12.7)
POTASSIUM SERPL-SCNC: 4.2 MMOL/L (ref 3.5–5.3)
PR INTERVAL: 210 MS
QRS AXIS: -22 DEGREES
QRSD INTERVAL: 100 MS
QT INTERVAL: 430 MS
QTC INTERVAL: 467 MS
RBC # BLD AUTO: 3.69 MILLION/UL (ref 3.88–5.62)
S PNEUM AG UR QL: NEGATIVE
SODIUM SERPL-SCNC: 140 MMOL/L (ref 136–145)
T WAVE AXIS: 69 DEGREES
TROPONIN I SERPL-MCNC: 0.05 NG/ML
TROPONIN I SERPL-MCNC: 0.13 NG/ML
TROPONIN I SERPL-MCNC: 1.08 NG/ML
VENTRICULAR RATE: 71 BPM
WBC # BLD AUTO: 9.12 THOUSAND/UL (ref 4.31–10.16)

## 2019-07-05 PROCEDURE — 99222 1ST HOSP IP/OBS MODERATE 55: CPT | Performed by: PHYSICIAN ASSISTANT

## 2019-07-05 PROCEDURE — 80048 BASIC METABOLIC PNL TOTAL CA: CPT | Performed by: INTERNAL MEDICINE

## 2019-07-05 PROCEDURE — 76770 US EXAM ABDO BACK WALL COMP: CPT

## 2019-07-05 PROCEDURE — 93306 TTE W/DOPPLER COMPLETE: CPT

## 2019-07-05 PROCEDURE — 82948 REAGENT STRIP/BLOOD GLUCOSE: CPT

## 2019-07-05 PROCEDURE — 99233 SBSQ HOSP IP/OBS HIGH 50: CPT | Performed by: INTERNAL MEDICINE

## 2019-07-05 PROCEDURE — 99232 SBSQ HOSP IP/OBS MODERATE 35: CPT | Performed by: INTERNAL MEDICINE

## 2019-07-05 PROCEDURE — 85025 COMPLETE CBC W/AUTO DIFF WBC: CPT | Performed by: INTERNAL MEDICINE

## 2019-07-05 PROCEDURE — 93010 ELECTROCARDIOGRAM REPORT: CPT | Performed by: INTERNAL MEDICINE

## 2019-07-05 PROCEDURE — 87449 NOS EACH ORGANISM AG IA: CPT | Performed by: PHYSICIAN ASSISTANT

## 2019-07-05 PROCEDURE — 84484 ASSAY OF TROPONIN QUANT: CPT | Performed by: INTERNAL MEDICINE

## 2019-07-05 PROCEDURE — 93005 ELECTROCARDIOGRAM TRACING: CPT

## 2019-07-05 PROCEDURE — 93306 TTE W/DOPPLER COMPLETE: CPT | Performed by: INTERNAL MEDICINE

## 2019-07-05 RX ORDER — ISOSORBIDE MONONITRATE 30 MG/1
30 TABLET, EXTENDED RELEASE ORAL DAILY
Status: DISCONTINUED | OUTPATIENT
Start: 2019-07-05 | End: 2019-07-15 | Stop reason: HOSPADM

## 2019-07-05 RX ORDER — NITROGLYCERIN 0.4 MG/1
0.4 TABLET SUBLINGUAL
Status: DISCONTINUED | OUTPATIENT
Start: 2019-07-05 | End: 2019-07-15 | Stop reason: HOSPADM

## 2019-07-05 RX ORDER — MAGNESIUM HYDROXIDE/ALUMINUM HYDROXICE/SIMETHICONE 120; 1200; 1200 MG/30ML; MG/30ML; MG/30ML
30 SUSPENSION ORAL EVERY 4 HOURS PRN
Status: DISCONTINUED | OUTPATIENT
Start: 2019-07-05 | End: 2019-07-15 | Stop reason: HOSPADM

## 2019-07-05 RX ORDER — ONDANSETRON 2 MG/ML
4 INJECTION INTRAMUSCULAR; INTRAVENOUS ONCE
Status: COMPLETED | OUTPATIENT
Start: 2019-07-05 | End: 2019-07-05

## 2019-07-05 RX ORDER — HYDRALAZINE HYDROCHLORIDE 25 MG/1
50 TABLET, FILM COATED ORAL EVERY 8 HOURS SCHEDULED
Status: DISCONTINUED | OUTPATIENT
Start: 2019-07-05 | End: 2019-07-15 | Stop reason: HOSPADM

## 2019-07-05 RX ADMIN — ESCITALOPRAM OXALATE 20 MG: 10 TABLET ORAL at 21:21

## 2019-07-05 RX ADMIN — ISOSORBIDE MONONITRATE 30 MG: 30 TABLET, EXTENDED RELEASE ORAL at 17:11

## 2019-07-05 RX ADMIN — FUROSEMIDE 40 MG: 10 INJECTION, SOLUTION INTRAMUSCULAR; INTRAVENOUS at 09:02

## 2019-07-05 RX ADMIN — NITROGLYCERIN 0.4 MG: 0.4 TABLET SUBLINGUAL at 16:52

## 2019-07-05 RX ADMIN — CEFTRIAXONE 1000 MG: 1 INJECTION, SOLUTION INTRAVENOUS at 16:18

## 2019-07-05 RX ADMIN — HYDRALAZINE HYDROCHLORIDE 50 MG: 25 TABLET ORAL at 21:22

## 2019-07-05 RX ADMIN — INSULIN LISPRO 2 UNITS: 100 INJECTION, SOLUTION INTRAVENOUS; SUBCUTANEOUS at 12:17

## 2019-07-05 RX ADMIN — INSULIN LISPRO 2 UNITS: 100 INJECTION, SOLUTION INTRAVENOUS; SUBCUTANEOUS at 21:23

## 2019-07-05 RX ADMIN — ASPIRIN 81 MG: 81 TABLET, COATED ORAL at 09:02

## 2019-07-05 RX ADMIN — ALUMINUM HYDROXIDE, MAGNESIUM HYDROXIDE, AND SIMETHICONE 30 ML: 200; 200; 20 SUSPENSION ORAL at 18:10

## 2019-07-05 RX ADMIN — NITROGLYCERIN 0.4 MG: 0.4 TABLET SUBLINGUAL at 16:18

## 2019-07-05 RX ADMIN — ATORVASTATIN CALCIUM 40 MG: 40 TABLET, FILM COATED ORAL at 15:44

## 2019-07-05 RX ADMIN — ENOXAPARIN SODIUM 40 MG: 40 INJECTION SUBCUTANEOUS at 09:02

## 2019-07-05 RX ADMIN — METOPROLOL TARTRATE 25 MG: 25 TABLET, FILM COATED ORAL at 09:02

## 2019-07-05 RX ADMIN — AMLODIPINE BESYLATE 5 MG: 5 TABLET ORAL at 17:11

## 2019-07-05 RX ADMIN — HYDRALAZINE HYDROCHLORIDE 25 MG: 25 TABLET ORAL at 06:36

## 2019-07-05 RX ADMIN — INSULIN DETEMIR 20 UNITS: 100 INJECTION, SOLUTION SUBCUTANEOUS at 21:22

## 2019-07-05 RX ADMIN — ALPRAZOLAM 0.25 MG: 0.25 TABLET ORAL at 21:22

## 2019-07-05 RX ADMIN — AMLODIPINE BESYLATE 5 MG: 5 TABLET ORAL at 09:02

## 2019-07-05 RX ADMIN — AZITHROMYCIN 500 MG: 250 TABLET, FILM COATED ORAL at 09:02

## 2019-07-05 RX ADMIN — FUROSEMIDE 40 MG: 10 INJECTION, SOLUTION INTRAMUSCULAR; INTRAVENOUS at 15:44

## 2019-07-05 RX ADMIN — METOPROLOL TARTRATE 25 MG: 25 TABLET, FILM COATED ORAL at 21:21

## 2019-07-05 RX ADMIN — INSULIN DETEMIR 20 UNITS: 100 INJECTION, SOLUTION SUBCUTANEOUS at 09:03

## 2019-07-05 RX ADMIN — ONDANSETRON 4 MG: 2 INJECTION INTRAMUSCULAR; INTRAVENOUS at 16:49

## 2019-07-05 RX ADMIN — ENOXAPARIN SODIUM 105 MG: 120 INJECTION SUBCUTANEOUS at 23:06

## 2019-07-05 RX ADMIN — HYDRALAZINE HYDROCHLORIDE 50 MG: 25 TABLET ORAL at 14:46

## 2019-07-05 NOTE — PROGRESS NOTES
Progress Note - Adrianne Naik 66 y o  male MRN: [id number]    Unit/Bed#: [address] [place] [address] Encounter: [id number]      Subjective:   Patient improved but still having some difficulty breathing persistent leg edema which has improved minimally no acute events overnight could not sleep last night no fever chills minimal coughing consultant follow-up reviewed nephrology and cardiology follow-up reviewed labs reviewed x-ray reviewed remaining review of system unremarkable unchanged since yesterday total of 12 done    Objective:   As below    Vitals: Blood pressure 152/68, pulse 72, temperature 97 7 °F (36 5 °C), temperature source Oral, resp  rate 18, height 6' 1" (1 854 m), weight 105 kg (231 lb 4 2 oz), SpO2 95 %  ,Body mass index is 30 51 kg/m²      Intake/Output:    Intake/Output Summary (Last 24 hours) at 7/5/2019 0817  Last data filed at 7/5/2019 0450  Gross per 24 hour   Intake 1180 ml   Output 2700 ml   Net -1520 ml       Physical Exam:  General Appearance:  Awake alert x3 oriented x3 not any acute distress  Skin:  No rash normal  H/ENT:  No congestion  Eyes:  No redness no discharged normal  Cardiac:  Regular soft systolic murmur  Pulmonary:  Decreased air entry bilateral bilateral rales no wheezing Complete exam done today  Gastrointestinal:  Nontender no distension no mass palpable  Extremities:  2+ edema bilateral persistent bilateral  Musculoskeletal:  Knee swelling bilateral  Neuro:  No new deficit gait dysfunction  Complete exam done as above for July 5, 2019  Current Facility-Administered Medications   Medication Dose Route Frequency    acetaminophen (TYLENOL) tablet 650 mg  650 mg Oral Q6H PRN    ALPRAZolam (XANAX) tablet 0 25 mg  0 25 mg Oral HS    amLODIPine (NORVASC) tablet 5 mg  5 mg Oral BID    aspirin (ECOTRIN LOW STRENGTH) EC tablet 81 mg  81 mg Oral Daily    atorvastatin (LIPITOR) tablet 40 mg  40 mg Oral Daily With Dinner    azithromycin (ZITHROMAX) tablet 500 mg  500 mg Oral Q24H    cefTRIAXone (ROCEPHIN) IVPB (premix) 1,000 mg  1,000 mg Intravenous Q24H    enoxaparin (LOVENOX) subcutaneous injection 40 mg  40 mg Subcutaneous Daily    escitalopram (LEXAPRO) tablet 20 mg  20 mg Oral HS    furosemide (LASIX) injection 40 mg  40 mg Intravenous BID (diuretic)    hydrALAZINE (APRESOLINE) injection 10 mg  10 mg Intravenous Q6H PRN    hydrALAZINE (APRESOLINE) tablet 50 mg  50 mg Oral Q8H [address]    insulin detemir (LEVEMIR) subcutaneous injection 20 Units  20 Units Subcutaneous Q12H [address]    insulin lispro (HumaLOG) 100 units/mL subcutaneous injection 1-6 Units  1-6 Units Subcutaneous HS    insulin lispro (HumaLOG) 100 units/mL subcutaneous injection 2-12 Units  2-12 Units Subcutaneous TID AC    metoprolol tartrate (LOPRESSOR) tablet 25 mg  25 mg Oral Q12H [address]    pneumococcal 13-valent conjugate vaccine (PREVNAR-13) IM injection 0 5 mL  0 5 mL Intramuscular Prior to discharge       Radiology Results:  Reviewed    Lab, Imaging and other studies:   CBC:   Lab Results   Component Value Date    WBC 9 12 07/05/2019    WBC 8 8 01/05/2016    RBC 3 69 (L) 07/05/2019    RBC 4 61 (L) 01/05/2016     BMP:   Lab Results   Component Value Date    GLUCOSE 131 (H) 01/05/2016    SODIUM 140 07/05/2019    CO2 25 07/05/2019    CO2 27 01/05/2016    BUN 47 (H) 07/05/2019    BUN 35 (H) 01/05/2016    CREATININE 1 91 (H) 07/05/2019    CREATININE 1 5 (H) 01/05/2016    CALCIUM 8 2 (L) 07/05/2019    CALCIUM 9 0 01/05/2016     Coagulation:   Lab Results   Component Value Date    INR 1 08 07/03/2019     Cardiac markers:   Lab Results   Component Value Date    CKMB 4 9 07/04/2019     ABGs: No results found for: PH   Results from last 7 days   Lab Units 07/05/19  0631  07/03/19  1045   POTASSIUM mmol/L 4 2   < > 4 7   CHLORIDE mmol/L 106   < > 105   CO2 mmol/L 25   < > 26   BUN mg/dL 47*   < > 45*   CREATININE mg/dL 1 91*   < > 1 80*   CALCIUM mg/dL 8 2*   < > 8 8   ALK PHOS U/L  --   --  130*   ALT U/L  --   --  36   AST U/L --   --  27    < > = values in this interval not displayed         Assessment:  Principal Problem:    Acute on chronic diastolic CHF (congestive heart failure) (HCC)   Acute on chronic renal failure as evident the creatinine 1 8 and baseline creatinine 1 3  suspected pneumonia  Assessment:  Acute on chronic diastolic CHF  Pneumonia  Pleural effusion  Type 1 diabetes  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  Hypertension uncontrolled  Plan:  Continue monitoring on telemetry while patient being treated for acute CHF  Continue IV Lasix q 12  Added hydralazine for blood pressure control  Continue IV ceftriaxone Zithromax for suspected pneumonia  CT of the chest reviewed shows bilateral patchy infiltrate  Consult Pulmonary  Follow up on the procalcitonin level  Follow up on the BMP today and tomorrow and  Nephrology consult reviewed appreciated   cardiology consult reviewed awaiting echocardiogram      VTE Pharmacologic Prophylaxis: Enoxaparin (Lovenox)    William Yen MD  7/5/2019, 8:17 AM

## 2019-07-05 NOTE — PROGRESS NOTES
[address] [facility] NOTE   Kylah Elizabeth 66 y o  male MRN: [id number]  Unit/Bed#: [address] [place] [address] Encounter: [id number]  Reason for Consult: BRIAN, CKD    ASSESSMENT and PLAN:  65 yo make with HTN, DM, HLP, CAD/CABG, CKD III, OA, bladder cancer s/p partial cystectomy admitted to [facility] on 7/3/19 with SOB and edema  Diagnosed with pneumonia vs CHF and on abx and diuretics  1  BRIAN (POA)  · Admission creatinine of 1 80 on 7/3/19  · Creatinine up to 1 91 the past 2 days in the setting of diuresis  · Etiology felt to be cardiorenal syndrome  Follow up renal US  · Still fluid overloaded - continue with diuretics       2  CKD III: Baseline creatinine is around 1 2 to 1 5 since 2015  No prior renal follow up  3  HTN: BP elevated  Monitor with diuretics  On Amlodipine + Metoprolol at home  Hydralazine added this admission  3  Leg edema, SOB: Possibly due to CHF  Continue Furosemide 40 mg IV BID  For echo today  4  Proteinuria: Check UPC ratio once at steady state  DISPOSITION:  · Continue Furosemide 40 mg IV BID  · Follow up echo and renal US  SUBJECTIVE / INTERVAL HISTORY:  Reports that he has not walked around enough to become SOB  Legs still swollen  OBJECTIVE:  Current Weight: Weight - Scale: 105 kg (231 lb 4 2 oz)  Vitals:    07/05/19 0412 07/05/19 0547 07/05/19 0636 07/05/19 0752   BP: 151/71  157/74 152/68   BP Location: Left arm   Left arm   Pulse: 64   72   Resp: 20   18   Temp: 97 9 °F (36 6 °C)   97 7 °F (36 5 °C)   TempSrc: Oral   Oral   SpO2: 97%   95%   Weight:  105 kg (231 lb 4 2 oz)     Height:           Intake/Output Summary (Last 24 hours) at 7/5/2019 0913  Last data filed at 7/5/2019 0450  Gross per 24 hour   Intake 1180 ml   Output 2700 ml   Net -1520 ml     General: conscious, coherent, cooperative, no distress  Chest/Lungs: clear breath sounds  CVS: distinct heart sounds, normal rate  Abdomen: soft  Extremities: (+) bilateral thigh and leg edema     : no chowdhury catheter  Neuro: awake, alert       Medications:    Current Facility-Administered Medications:     acetaminophen (TYLENOL) tablet 650 mg, 650 mg, Oral, Q6H PRN, Sean Alvarez MD    ALPRAZolam (XANAX) tablet 0 25 mg, 0 25 mg, Oral, HS, Sean Alvarez MD, 0 25 mg at 07/04/19 2149    amLODIPine (NORVASC) tablet 5 mg, 5 mg, Oral, BID, Sean Alvarez MD, 5 mg at 07/05/19 0902    aspirin (ECOTRIN LOW STRENGTH) EC tablet 81 mg, 81 mg, Oral, Daily, Sean Alvarez MD, 81 mg at 07/05/19 0902    atorvastatin (LIPITOR) tablet 40 mg, 40 mg, Oral, Daily With Dinner, Delmar Cortez MD, 40 mg at 07/04/19 1728    azithromycin (ZITHROMAX) tablet 500 mg, 500 mg, Oral, Q24H, Sean Alvarez MD, 500 mg at 07/05/19 0902    cefTRIAXone (ROCEPHIN) IVPB (premix) 1,000 mg, 1,000 mg, Intravenous, Q24H, Sean Alvarez MD, Last Rate: 100 mL/hr at 07/04/19 1726, 1,000 mg at 07/04/19 1726    enoxaparin (LOVENOX) subcutaneous injection 40 mg, 40 mg, Subcutaneous, Daily, Sean Alvarez MD, 40 mg at 07/05/19 0902    escitalopram (LEXAPRO) tablet 20 mg, 20 mg, Oral, HS, Sean Alvarez MD, 20 mg at 07/04/19 2149    furosemide (LASIX) injection 40 mg, 40 mg, Intravenous, BID (diuretic), Delmar Cortez MD, 40 mg at 07/05/19 0902    hydrALAZINE (APRESOLINE) injection 10 mg, 10 mg, Intravenous, Q6H PRN, Delmar Cortez MD, 10 mg at 07/03/19 1817    hydrALAZINE (APRESOLINE) tablet 50 mg, 50 mg, Oral, Q8H [address], TABITHA Espinal    insulin detemir (LEVEMIR) subcutaneous injection 20 Units, 20 Units, Subcutaneous, Q12H [address], Delmar Cortez MD, 20 Units at 07/05/19 0903    insulin lispro (HumaLOG) 100 units/mL subcutaneous injection 1-6 Units, 1-6 Units, Subcutaneous, HS, Sean Alvarez MD, 2 Units at 07/04/19 2150    insulin lispro (HumaLOG) 100 units/mL subcutaneous injection 2-12 Units, 2-12 Units, Subcutaneous, TID AC, 4 Units at 07/04/19 1726 **AND** Fingerstick Glucose (POCT), , , TID AC, Sean Alvarez MD    metoprolol tartrate (LOPRESSOR) tablet 25 mg, 25 mg, Oral, Q12H [address], Muna Eller MD, 25 mg at 07/05/19 0902    pneumococcal 13-valent conjugate vaccine (PREVNAR-13) IM injection 0 5 mL, 0 5 mL, Intramuscular, Prior to discharge, Muna Eller MD    Laboratory Results:  Results from last 7 days   Lab Units 07/05/19  0631 07/04/19  1005 07/03/19  1045   WBC Thousand/uL 9 12  --  8 92   HEMOGLOBIN g/dL 11 2*  --  12 8   HEMATOCRIT % 34 5*  --  38 4   PLATELETS Thousands/uL 116*  --  143*   POTASSIUM mmol/L 4 2 4 9 4 7   CHLORIDE mmol/L 106 106 105   CO2 mmol/L 25 26 26   BUN mg/dL 47* 47* 45*   CREATININE mg/dL 1 91* 1 91* 1 80*   CALCIUM mg/dL 8 2* 8 1* 8 8

## 2019-07-05 NOTE — PROGRESS NOTES
Pt received two doses of sublingual nitro afterwards he stated he felt "better" but still had some discomfort  Pt had one episode of sudden nausea and brought up "only mucus" as he stated  Called and informed Dr Fabio Lai about condition and received order for Zofran  Pt's vitals continue to be stable  Dr Shelley Haque was also updated on pt's condition and prescribed imdur  Will continue to monitor pt closely

## 2019-07-05 NOTE — PROGRESS NOTES
Pt found resting in bed and c/o left sided chest pain rating it a 2 out of 10, pt describes it as discomfort  Pt as c/o shortness of breath and "trouble catching his breath"  Vitals done all within normal limits  /70, HR 78, O2 96 on 2L NC  EKG performed and showed NSR, possible left atrial enlargement, left ventricular hypertrophy with repolarization abnormality, inferior infarct age undetermined; anteroseptal infarct, age undetermined, abnormal EKG  When asking the pt if he's ever had chest pain like this he stated " yes right before I had my heart attack" Dr Vincent Ziegler, cardiologist made aware and said he would put in for troponins  Will continue to monitor pt closely

## 2019-07-05 NOTE — CONSULTS
Consultation - Pulmonary Medicine  Fide Martínez 66 y o  male MRN: [id number]  Unit/Bed#: [address] [place] [address] Encounter: [id number]    Assessment/Plan:  Acute Hypoxemic Respiratory Failure in setting of CHF, Pleural Effusions and small right-sided basilar atelectasis, and Possible Pneumonia:  -hypoxemia is likely largely secondary to CHF as are the pleural effusions  -currently only requiring 2 L nasal cannula  -can treat effusions with diuresis at this time, if refractory hypoxemia will consider imaging with U/S for thoracentesis  -patient is continue ongoing diuresis as managed per Nephrology and Cardiology  -okay to continue antibiotics for total of 5 days, would convert to P O  Azithromycin and Ceftin   -given lack of mediastinal adenopathy could simply likely follow-up with PA and lateral x-ray in the outpatient setting without need for repeat CT        Thank you for this consultation; we will be happy to follow with you     ______________________________________________________________________      History of Present Illness   Physician Requesting Consult: Dana Banda MD  Reason for Consult / Principal Problem:  Pneumonia  HX and PE limited by:     HPI:  Fide Martínez is a 66 y o  male  with a past medical history of HTN, hyperlipidemia, type 2 diabetes, CKD 3 baseline creatinine 1 2-1 5, CAD post remote CABG, and chronic diastolic heart failure with grade 2 diastolic dysfunction as of most recent echocardiogram December 2016 with follow-up echocardiogram performed today pending result  He was admitted with shortness of breath and leg edema with kidney injury on his chronic kidney disease with the admission serum creatinine of 1 8 in calculated GFR of 35 up from his baseline of approximately 55    Nephrology and Cardiology are consulted and following the patient with diuretic management and further echocardiographic imaging    The initial noncontrast CT chest showed multifocal areas of consolidation as well as bilateral pleural effusions right greater than left in the setting of hypoxemic respiratory failure and normal procalcitonin levels, no fevers  He was in respiratory distress in the emergency department with respiratory rate of 28 and oxygen saturation of 87% on room air oxygen  He also had a V/Q scan performed which was low probability for PE and venous duplex of lower extremities which were negative  His history is that he was having several days worth of progressive dyspnea without any significant cough, mucus production or fevers, or rigors  He noted approximately 20 lb weight gain over several weeks and progressive dyspnea on exertion leading up to an outpatient visit to his PCP and subsequent redirection to the emergency department for his ROWLEY  He has no history of asthma and a very minimal smoking history  No family history of COPD, no family history of lung cancers or other significant lung parenchymal disease  He specifically denies any history of dysphagia and tolerates both liquids and solids like without any signs of aspirating  HPI    Smoking history:  He is a former smoker of 1/2 pack per day x5 years and quit 1965  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  Travel history:  No recent travel  Lives locally in [address] [place] [address]  No recent sick contacts      Review of Systems   Constitutional: Positive for fatigue  Negative for activity change and appetite change  HENT: Negative for dental problem, drooling, postnasal drip, rhinorrhea, sinus pressure, sinus pain and sneezing  Eyes: Negative for visual disturbance  Respiratory: Positive for shortness of breath  Negative for apnea  Cardiovascular: Positive for leg swelling  Negative for chest pain  Gastrointestinal: Negative for abdominal pain, diarrhea, nausea and vomiting  Endocrine: Negative for cold intolerance and heat intolerance     Genitourinary: Negative for dysuria and flank pain  Musculoskeletal: Negative for arthralgias, joint swelling and myalgias  Skin: Negative for color change and rash  Allergic/Immunologic: Negative for environmental allergies  Neurological: Negative for dizziness and syncope  Hematological: Does not bruise/bleed easily  Psychiatric/Behavioral: Negative for confusion  Past Medical/Surgical History  Past Medical History:   Diagnosis Date    Acute on chronic diastolic CHF (congestive heart failure) ([facility] ) 7/4/2019    Arthritis     Back pain     Bladder cancer ([facility] )     diagnosed  2/2016    Cancer [facility])     bladder; chemo; resection;     Coronary artery disease     has 2 coronary stents    Dental crowns present      5    [facility]     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    Hematuria     hx of    Hypercholesteremia     Hypertension     Kidney stones     Wears glasses      Past Surgical History:   Procedure Laterality Date    ABDOMINAL SURGERY      CARDIAC SURGERY      CHOLECYSTECTOMY      open    CORONARY ANGIOPLASTY WITH STENT PLACEMENT      2 stents  2009    CYSTECTOMY, PARTIAL N/A 11/30/2016    Procedure: PARTIAL CYSTECTOMY, LEFT LYMPHADENECTOMY;  Surgeon: Michael Garcia MD;  Location: [address] [place] [address];  Service:     CYSTOSCOPY Left 10/20/2016    Procedure: CYSTOSCOPY, BILATERAL RETROGRADE PYLEOGRAM, LEFT SIDE BLADDER BIOPSY, TURBT;  Surgeon: Michael Garcia MD;  Location: [facility];  Service:     HERNIA REPAIR      repair [facility]    HI CYSTOURETHROSCOPY N/A 2/25/2016    Procedure: CYSTOSCOPY;  Surgeon: Michael Garcia MD;  Location: [address] [place] [address];  Service: Urology    HI CYSTOURETHROSCOPY,FULGUR 2-5 CM LESN N/A 2/25/2016    Procedure: TRANSURETHRAL RESECTION OF BLADDER TUMOR (TURBT);   Surgeon: Michael Garcia MD;  Location: [address] [place] [address];  Service: Urology    TRANSURETHRAL RESECTION OF BLADDER TUMOR N/A 7/7/2016    Procedure: TRANSURETHRAL RESECTION OF BLADDER TUMOR (TURBT), Cystoscopy, deep biopsy;  Surgeon: Ren Palm MD;  Location: [facility];  Service:     TRANSURETHRAL RESECTION OF BLADDER TUMOR Bilateral 2016    Procedure: TRANSURETHRAL RESECTION OF BLADDER TUMOR (TURBT), Cysto, retrograde, BLADDER BIOPSY;  Surgeon: Ren Palm MD;  Location: [facility];  Service:        Social History  Social History     Substance and Sexual Activity   Alcohol Use Yes    Comment: socially     Social History     Substance and Sexual Activity   Drug Use No     Social History     Tobacco Use   Smoking Status Former Smoker    Packs/day: 0 50    Years: 5 00    Pack years: 2 50    Last attempt to quit: 1965    Years since quittin 5   Smokeless Tobacco Never Used       Family History  Family History   Problem Relation Age of Onset    Heart disease Mother     Diabetes Mother     Diabetes Father        Allergies  No Known Allergies    Home Meds:   Medications Prior to Admission   Medication Sig Dispense Refill Last Dose    [] amLODIPine (NORVASC) 10 mg tablet amlodipine besylate 10 mg tabs   2019 at Unknown time    aspirin (ASPIR-LOW) 81 mg EC tablet Daily   2019 at Unknown time    atorvastatin (LIPITOR) 40 mg tablet atorvastatin 40 mg tablet   TAKE ONE TABLET DAILY   2019 at Unknown time    escitalopram (LEXAPRO) 20 mg tablet Take 20 mg by mouth daily at bedtime  2019 at Unknown time    insulin aspart (NovoLOG) 100 units/mL injection Inject 15 Units under the skin 3 (three) times a day before meals   2019 at Unknown time    insulin aspart protamine-insulin aspart (NOVOLOG MIX 70/30 FLEXPEN) 100 Units/mL injection pen 3 (three) times a day   2019 at Unknown time    insulin detemir (LEVEMIR) 100 units/mL subcutaneous injection Inject 50 Units under the skin 2 (two) times a day Indications: Type 2 Diabetes  Taken at 0700 and 1700    2019 at Unknown time    metoprolol tartrate (LOPRESSOR) 25 mg tablet Take 25 mg by mouth every morning       2019 at Unknown time     Current Meds:   Scheduled Meds:  Current Facility-Administered Medications:  acetaminophen 650 mg Oral Q6H PRN Sean Alvarez MD    ALPRAZolam 0 25 mg Oral HS Sean Alvarez MD    amLODIPine 5 mg Oral BID Sarah Knott MD    aspirin 81 mg Oral Daily Sean Alvarez MD    atorvastatin 40 mg Oral Daily With Yohan Mcgill MD    azithromycin 500 mg Oral Q24H Sean Alvarez MD    cefTRIAXone 1,000 mg Intravenous Q24H Sean Alvarez MD Last Rate: 1,000 mg (07/04/19 1726)   enoxaparin 40 mg Subcutaneous Daily Sean Alvarez MD    escitalopram 20 mg Oral HS Sean Alvarez MD    furosemide 40 mg Intravenous BID (diuretic) Sarah Knott MD    hydrALAZINE 10 mg Intravenous Q6H PRN Sean Alvarez MD    hydrALAZINE 50 mg Oral Q8H [address] TABITHA Li    insulin detemir 20 Units Subcutaneous Q12H [address] Sean Alvarez MD    insulin lispro 1-6 Units Subcutaneous HS Sarah Knott MD    insulin lispro 2-12 Units Subcutaneous TID AC Sean Alvaerz MD    metoprolol tartrate 25 mg Oral Q12H [address] Sarah Knott MD    pneumococcal 13-valent conjugate vaccine 0 5 mL Intramuscular Prior to discharge Sarah Knott MD      PRN Meds:  acetaminophen 650 mg Q6H PRN   hydrALAZINE 10 mg Q6H PRN   pneumococcal 13-valent conjugate vaccine 0 5 mL Prior to discharge       ____________________________________________________________________    Objective   Vitals:   Temp:  [97 7 °F (36 5 °C)-98 9 °F (37 2 °C)] 97 7 °F (36 5 °C)  HR:  [64-72] 72  Resp:  [18-20] 18  BP: (151-167)/(68-74) 152/68  Weight (last 2 days)     Date/Time   Weight    07/05/19 0547   105 (231 26)    07/04/19 0600   104 (230 16)    07/03/19 1700   106 (234 35)            Oxygen Therapy  SpO2: 95 %  O2 Flow Rate (L/min): 2 L/min        IV Infusions:        Nutrition:        Diet Orders   (From admission, onward)            Start     Ordered    07/04/19 1006  Room Service  Once     Question:  Type of Service  Answer:  Room Service - Appropriate with Assistance    07/04/19 1005    07/03/19 1715  Diet Morgan/CHO Controlled; Consistent Carbohydrate Diet Level 2 (5 carb servings/75 grams CHO/meal)  Diet effective now     Question Answer Comment   Diet Type Morgan/CHO Controlled    Morgan/CHO Controlled Consistent Carbohydrate Diet Level 2 (5 carb servings/75 grams CHO/meal)    RD to adjust diet per protocol? Yes        07/03/19 1714            Ins/Outs:   I/O       07/03 0701 - 07/04 0700 07/04 0701 - 07/05 0700 07/05 0701 - 07/06 0700    P  O   1150 405    I V  (mL/kg)  30 (0 3)     Total Intake(mL/kg)  1180 (11 2) 405 (3 9)    Urine (mL/kg/hr) 650 2700 (1 1) 450 (0 6)    Total Output 650 2700 450    Net -650 -1520 -45                   Lines/Drains:  Invasive Devices     Peripheral Intravenous Line            Peripheral IV 07/03/19 Right Antecubital 2 days          Drain            Open Drain Left; Anterior; Other (Comment)  days                ____________________________________________________________________      Physical Exam   Constitutional: He is oriented to person, place, and time  He appears well-developed  HENT:   Head: Normocephalic and atraumatic  Eyes: Pupils are equal, round, and reactive to light  Conjunctivae are normal    Neck: Normal range of motion  Neck supple  Cardiovascular: Normal rate, regular rhythm and normal heart sounds  Exam reveals no gallop and no friction rub  No murmur heard  Pulmonary/Chest: Effort normal  No accessory muscle usage  No tachypnea  No respiratory distress  He has decreased breath sounds in the right upper field, the right middle field, the right lower field, the left upper field, the left middle field and the left lower field  He has no wheezes  He has no rhonchi  He has no rales  He exhibits no tenderness  Mild to moderately decreased breath sounds bases    Currently on 2 L nasal cannula oxygen resting in bed   Abdominal: Soft  Bowel sounds are normal    Musculoskeletal: Normal range of motion  He exhibits no edema          Right forearm: He exhibits swelling  Left forearm: He exhibits swelling  Right lower leg: He exhibits swelling  Left lower leg: He exhibits swelling  Patient has upper and lower extremity pitting edema   Neurological: He is alert and oriented to person, place, and time  Skin: Skin is warm and dry  Psychiatric: He has a normal mood and affect        ____________________________________________________________________    Labs:   CBC: Results from last 7 days   Lab Units 07/05/19  0631 07/03/19  1045   WBC Thousand/uL 9 12 8 92   HEMOGLOBIN g/dL 11 2* 12 8   HEMATOCRIT % 34 5* 38 4   MCV fL 94 92   PLATELETS Thousands/uL 116* 143*     CMP: Results from last 7 days   Lab Units 07/05/19  0631 07/04/19  1005 07/03/19  1045   POTASSIUM mmol/L 4 2 4 9 4 7   CHLORIDE mmol/L 106 106 105   CO2 mmol/L 25 26 26   BUN mg/dL 47* 47* 45*   CREATININE mg/dL 1 91* 1 91* 1 80*   CALCIUM mg/dL 8 2* 8 1* 8 8   AST U/L  --   --  27   ALT U/L  --   --  36   ALK PHOS U/L  --   --  130*   EGFR ml/min/1 73sq m 33 33 35     Magnesium:     Phosphorous:     Troponin:   Results from last 7 days   Lab Units 07/03/19  1045   TROPONIN I ng/mL <0 02     PT/INR:   Results from last 7 days   Lab Units 07/03/19  1045   PTT seconds 31   INR  1 08     Lactic Acid:     BNP:   Results from last 7 days   Lab Units 07/03/19  1045   NT-PRO BNP pg/mL 2,452*     ABG:      Procalcitonin: Invalid input(s): PROCALCITONIN    Imaging:   CT chest wo contrast   Final Result by Savanna Arango MD (07/04 1200)   1  Patchy areas of consolidation bilaterally most likely pneumonia  Recommend follow-up chest CT in 3 months to ensure complete resolution  2   Moderate right and small left pleural effusions  Mild cardiomegaly        Workstation performed: [id number]         VAS lower limb venous duplex study, complete bilateral   Final Result by Jeffery Chaudhari MD (07/04 7106)      NM lung ventilation / perfusion   Final Result by Divina Saha MD (07/03 6771)      The probability for pulmonary embolus is low  Workstation performed: [id number]         XR chest 1 view portable   Final Result by Aurora Schuler MD ( 1246)      Right infrahilar infiltrate with small pleural effusion  Workstation performed: [id number]         US kidney and bladder    (Results Pending)     [address]  [address] [facility]  [phone]     Transthoracic Echocardiogram  2D, M-mode, Doppler, and Color Doppler     Study date:  05-Dec-2016     Patient: Sherman Marcelino  MR number: [id number]  Account number: [de-identified]  : [id number]  Age: 68 years  Gender: Male  Status: Routine  Location: Bedside  Height: 73 in  Weight: 216 5 lb  BP: 163/ 95 mmHg     Indications: Chest Pain     Diagnoses: 786 50 - CHEST PAIN NOS     Sonographer:  Terrance Gr  Primary Physician:  Ha Rivas MD  Referring Physician:  Jacobo Pelayo MD  Group:  [facility]  Interpreting Physician:  Laura Baez MD     SUMMARY     PROCEDURE INFORMATION:  The procedure was performed at the bedside  This was a technically difficult study      LEFT VENTRICLE:  Systolic function was normal  Ejection fraction was estimated in the range of  55 % to 60 %  Although no diagnostic regional wall motion abnormality was  identified, this possibility cannot be completely excluded on the basis of this  study  There was mild concentric hypertrophy  Features were consistent with a pseudonormal left ventricular filling pattern,  with concomitant abnormal relaxation and increased filling pressure (grade 2  diastolic dysfunction)      LEFT ATRIUM:  The atrium was mildly dilated      MITRAL VALVE:  There was mild annular calcification  There was no evidence for stenosis  There was mild regurgitation      AORTIC VALVE:  Transaortic velocity was increased at 2 1 m/s due to valvular stenosis  Peak  gradient was 17 with mean of 9 mmHg    There was mild stenosis      TRICUSPID VALVE:  Regurgitation could not be evaluated due to poor window  At least mild  regurgitation noted  The tricuspid jet envelope definition was inadequate for  estimation of RV systolic pressure  There are no indirect findings (abnormal RV  volume or geometry, altered pulmonary flow velocity profile, or leftward septal  displacement) which would suggest moderate or severe pulmonary hypertension      PULMONIC VALVE:  Transpulmonic velocity was increased due to increased transvalvular flow  There was mild regurgitation      IVC, HEPATIC VEINS:  Respirophasic changes were blunted (less than 50% variation)      HISTORY: PRIOR HISTORY: HTN, HCL, DM, CAD, S/P Stents, Bladder Cancer     PROCEDURE: The procedure was performed at the bedside  This was a routine  study  The transthoracic approach was used  The study included complete 2D  imaging, M-mode, complete spectral Doppler, and color Doppler  The heart rate  was 65 bpm, at the start of the study  This was a technically difficult study      LEFT VENTRICLE: Size was normal  Systolic function was normal  Ejection  fraction was estimated in the range of 55 % to 60 %  Although no diagnostic  regional wall motion abnormality was identified, this possibility cannot be  completely excluded on the basis of this study  There was mild concentric  hypertrophy  DOPPLER: Features were consistent with a pseudonormal left  ventricular filling pattern, with concomitant abnormal relaxation and increased  filling pressure (grade 2 diastolic dysfunction)      RIGHT VENTRICLE: The size was normal  Systolic function was normal      LEFT ATRIUM: The atrium was mildly dilated      RIGHT ATRIUM: Size was normal      MITRAL VALVE: There was mild annular calcification  There was mild thickening  No echocardiographic evidence for prolapse  DOPPLER: There was no evidence for  stenosis  There was mild regurgitation      AORTIC VALVE: The valve was trileaflet   Leaflets exhibited mildly increased  thickness, mild calcification, and sclerosis  DOPPLER: Transaortic velocity was  increased at 2 1 m/s due to valvular stenosis  Peak gradient was 17 with mean  of 9 mmHg  There was mild stenosis  There was no significant regurgitation      TRICUSPID VALVE: DOPPLER: Regurgitation could not be evaluated due to poor  window  At least mild regurgitation noted  The tricuspid jet envelope  definition was inadequate for estimation of RV systolic pressure  There are no  indirect findings (abnormal RV volume or geometry, altered pulmonary flow  velocity profile, or leftward septal displacement) which would suggest moderate  or severe pulmonary hypertension      PULMONIC VALVE: DOPPLER: Transpulmonic velocity was increased due to increased  transvalvular flow  There was mild regurgitation      PERICARDIUM: There was no pericardial effusion  The pericardium was normal in  appearance      AORTA: The root exhibited normal size      SYSTEMIC VEINS: IVC: The inferior vena cava was normal in size  Respirophasic  changes were blunted (less than 50% variation)      SYSTEM MEASUREMENT TABLES     2D mode  AoR Diam 2D: 3 4 cm  LA Diam (2D): 4 7 cm  LA/Ao (2D): 1 38  FS (2D Teich): 32 9 %  IVSd (2D): 1 3 cm  LVDEV: 127 cm³  LVESV: 49 5 cm³  LVIDd(2D): 5 16 cm  LVISd (2D): 3 46 cm  LVOT Area 2D: 3 46 cm squared  LVPWd (2D): 1 27 cm  SV (Teich): 77 5 cm³     Apical four chamber  LVEF A4C: 54 %     Unspecified Scan Mode  VIRAL Cont Eq (Peak Darvin): 1 77 cm squared  VIRAL Cont Eq (VTI): 1 86 cm squared  LVOT (VTI): 23 8 cm  LVOT Diam : 2 1 cm  LVOT Vmax: 1060 mm/s  LVOT Vmax; Mean: 1060 mm/s  Peak Grad ; Mean: 4 mm[Hg]  SV (LVOT): 82 cm³  VTI;Mean: 2 mm[Hg]  MV Peak A Darvin: 787 mm/s  MV Peak E Darvin   Mean: 945 mm/s  MVA (PHT): 3 24 cm squared  PHT: 68 ms  RA Area: 13 8 cm squared  RA Volume: 26 5 cm³  TAPSE: 2 3 cm     Inters\[facility] [place] [facility]\"" Commission Accredited Echocardiography Laboratory     Prepared and electronically signed by     Pati Causey MD  Signed 05-Dec-2016 10:10:21      Micro: Lab Results   Component Value Date    URINECX  03/04/2016     >100,000 cfu/ml Klebsiella oxytoca-Extended Beta Lactamase     URINECX 80,000-89,000 cfu/ml Mixed Contaminants X3 01/15/2016    MRSACULTURE  11/29/2016     No Methicillin Resistant Staphlyococcus aureus (MRSA) isolated        ____________________________________________________________________      Code Status: Level 1 - Full Code

## 2019-07-05 NOTE — PROGRESS NOTES
Progress Note - Cardiology   [facility]     Deny Martinez 66 y o  male MRN: [id number]  : 1940  Unit/Bed#: [address] [place] [address] Encounter: [id number]    Assessment and Plan:   1  Acute on Chronic Diastolic Heart Failure:  Patient has diuresed approximately 3 liters since admission  Continue Lasix 40 mg IV bid with close monitoring of renal function, vital signs and I&O  No ACE/ARB due to BRIAN/CKD  Continue BB  Hydralazine added on admission,  Patient needs good BP control  CHF education  Repeat Echo is pending  2   Acute Kidney Injury on CKD:  Followed by Nephrology  Appreciate recommendations  3   Hypertension:   BP slowing improving  Continue to monitor with addition of Hydralazine  Continue BB and Norvasc  4   Coronary Artery Disease with History of CABG x1:  Continue ASA, BB and Statin therapy  5   Diabetes:  Managed per primary team    6  Dyslipidemia:  Continue Statin therapy    7  Chronic Back Pain/Osteoarthritis:  Encourage Physical Activity  Avoid NSAIDs due to BRIAN/CKD/CHF  8   CAP:  On Azithromycin per primary team    9:  Chronic Venous Stasis    Subjective / Objective:   Patient seen and examined  Has diuresed approximately 3 liters since admission  He feels that his breathing has improved, but observed to be dyspneic with mild exertion  Vitals: Blood pressure 152/68, pulse 72, temperature 97 7 °F (36 5 °C), temperature source Oral, resp  rate 18, height 6' 1" (1 854 m), weight 105 kg (231 lb 4 2 oz), SpO2 95 %  Vitals:    19 0600 19 0547   Weight: 104 kg (230 lb 2 6 oz) 105 kg (231 lb 4 2 oz)     Body mass index is 30 51 kg/m²    BP Readings from Last 3 Encounters:   19 152/68   16 136/62   10/20/16 168/88     Orthostatic Blood Pressures      Most Recent Value   Blood Pressure  152/68 filed at 2019 0752   Patient Position - Orthostatic VS  Lying filed at 2019 0752        I/O        0701 -  0700 07/04 0701 - 07/05 0700 07/05 0701 - 07/06 0700    P  O   1150 180    I V  (mL/kg)  30 (0 3)     Total Intake(mL/kg)  1180 (11 2) 180 (1 7)    Urine (mL/kg/hr) 650 2700 (1 1) 150 (0 4)    Total Output 650 2700 150    Net -650 -1520 +30               Invasive Devices     Peripheral Intravenous Line            Peripheral IV 07/03/19 Right Antecubital 1 day          Drain            Open Drain Left; Anterior; Other (Comment)  days                  Intake/Output Summary (Last 24 hours) at 7/5/2019 1011  Last data filed at 7/5/2019 0901  Gross per 24 hour   Intake 1100 ml   Output 2450 ml   Net -1350 ml         Physical Exam:   Physical Exam   Constitutional: He is oriented to person, place, and time  He appears well-developed and well-nourished  No distress  HENT:   Head: Normocephalic  Right Ear: External ear normal    Left Ear: External ear normal    Eyes: Pupils are equal, round, and reactive to light  Conjunctivae are normal  Right eye exhibits no discharge  Left eye exhibits no discharge  No scleral icterus  Neck: Normal range of motion  Neck supple  No JVD present  No thyromegaly present  Cardiovascular: Normal rate, regular rhythm, intact distal pulses and normal pulses  Occasional extrasystoles are present  Exam reveals no gallop  No murmur heard  Pulmonary/Chest: No accessory muscle usage  No respiratory distress  He has decreased breath sounds  He has wheezes  He has rales in the right lower field and the left lower field  Dyspneic with slight exertion  Scattered expiratory wheezing  Decreased all fields with bibasilar crackles  Abdominal: Soft  Bowel sounds are normal    Musculoskeletal: He exhibits edema  +2 non-pitting edema BLE to popliteal area  Neurological: He is alert and oriented to person, place, and time  Skin: Skin is warm and dry  Capillary refill takes less than 2 seconds  He is not diaphoretic     Bilateral lower extremity chronic venous status changes   Psychiatric: He has a normal mood and affect  Nursing note and vitals reviewed              Medications/ Allergies:     Current Facility-Administered Medications:  acetaminophen 650 mg Oral Q6H PRN Sean Alvarez MD    ALPRAZolam 0 25 mg Oral HS Sean Alvarez MD    amLODIPine 5 mg Oral BID Lindsay Huynh MD    aspirin 81 mg Oral Daily Sean Alvarez MD    atorvastatin 40 mg Oral Daily With Ishan Paz MD    azithromycin 500 mg Oral Q24H Sean Alvarez MD    cefTRIAXone 1,000 mg Intravenous Q24H Sean Alvarez MD Last Rate: 1,000 mg (07/04/19 1726)   enoxaparin 40 mg Subcutaneous Daily Sean Alvarez MD    escitalopram 20 mg Oral HS Lindasy Hunyh MD    furosemide 40 mg Intravenous BID (diuretic) Lindsay Huynh MD    hydrALAZINE 10 mg Intravenous Q6H PRN Sean Alvarez MD    hydrALAZINE 50 mg Oral Q8H [facility] TABITHA Ruggiero    insulin detemir 20 Units Subcutaneous Q12H [facility] Lindsay Huynh MD    insulin lispro 1-6 Units Subcutaneous HS Lindsay Huynh MD    insulin lispro 2-12 Units Subcutaneous TID AC Sean Alvarez MD    metoprolol tartrate 25 mg Oral Q12H [facility] Lindsay Huynh MD    pneumococcal 13-valent conjugate vaccine 0 5 mL Intramuscular Prior to discharge Lindsay Huynh MD        acetaminophen 650 mg Q6H PRN   hydrALAZINE 10 mg Q6H PRN   pneumococcal 13-valent conjugate vaccine 0 5 mL Prior to discharge     No Known Allergies    VTE Pharmacologic Prophylaxis:   Sequential compression device (Venodyne)     Labs:   Troponins:  Results from last 7 days   Lab Units 07/04/19  1005 07/03/19  1045   CK TOTAL U/L 287  --    TROPONIN I ng/mL  --  <0 02   CK MB INDEX % 1 7  --        CBC with diff:  Results from last 7 days   Lab Units 07/05/19  0631 07/03/19  1045   WBC Thousand/uL 9 12 8 92   HEMOGLOBIN g/dL 11 2* 12 8   HEMATOCRIT % 34 5* 38 4   MCV fL 94 92   PLATELETS Thousands/uL 116* 143*   MCH pg 30 4 30 6   MCHC g/dL 32 5 33 3   RDW % 14 0 14 3   MPV fL 10 8 10 8   NRBC AUTO /100 WBCs 0  --        CMP:  Results from last 7 days   Lab Units 07/05/19  0653 07/04/19  1005 07/03/19  1045   SODIUM mmol/L 140 139 139   POTASSIUM mmol/L 4 2 4 9 4 7   CHLORIDE mmol/L 106 106 105   CO2 mmol/L 25 26 26   ANION GAP mmol/L 9 7 8   BUN mg/dL 47* 47* 45*   CREATININE mg/dL 1 91* 1 91* 1 80*   CALCIUM mg/dL 8 2* 8 1* 8 8   AST U/L  --   --  27   ALT U/L  --   --  36   ALK PHOS U/L  --   --  130*   TOTAL PROTEIN g/dL  --   --  7 7   ALBUMIN g/dL  --   --  3 3*   TOTAL BILIRUBIN mg/dL  --   --  1 40*   EGFR ml/min/1 73sq m 33 33 35     Coags:  Results from last 7 days   Lab Units 07/03/19  1045   PTT seconds 31   INR  1 08     Hgb A1c:  Results from last 7 days   Lab Units 07/03/19  1729   HEMOGLOBIN A1C % 6 7*     NT-proBNP:   Recent Labs     07/03/19  1045   NTBNP 2,452*        Imaging & Testing   I have personally reviewed pertinent reports  Xr Chest 1 View Portable    Result Date: 7/3/2019  Narrative: CHEST INDICATION:   SOB  COMPARISON:  12/4/2016 EXAM PERFORMED/VIEWS:  XR CHEST PORTABLE  AP semierect Images: 1 FINDINGS:  There are median sternotomy wires indicating prior cardiac surgery  Heart shadow is enlarged but unchanged from prior exam  Right infrahilar infiltrate noted with small pleural effusion  Left lung clear  No pneumothorax  Osseous structures appear within normal limits for patient age  Impression: Right infrahilar infiltrate with small pleural effusion  Workstation performed: [id number]     Ct Chest Wo Contrast    Result Date: 7/4/2019  Narrative: CT CHEST WITHOUT IV CONTRAST INDICATION:   Shortness of breath  COMPARISON:  No prior chest CTs available TECHNIQUE: CT examination of the chest was performed without intravenous contrast   Axial, sagittal, and coronal 2D reformatted images were created from the source data and submitted for interpretation  Radiation dose length product (DLP) for this visit:  429 43 mGy-cm     This examination, like all CT scans performed in the [facility], was performed utilizing techniques to minimize radiation dose exposure, including the use of iterative  reconstruction and automated exposure control  FINDINGS: LUNGS:  Patchy areas of focal consolidation involving the left lower lobe, left upper lobe and right middle lobe  Subsegmental atelectasis in the right lower lobe     There is no tracheal or endobronchial lesion  PLEURA:  Moderate right and small left pleural effusions  HEART/GREAT VESSELS:  Mild cardiomegaly status post CABG aortic atherosclerosis, without aneurysm  MEDIASTINUM AND LEEANN:  Unremarkable  CHEST WALL AND LOWER NECK:   Mild bilateral gynecomastia  VISUALIZED STRUCTURES IN THE UPPER ABDOMEN:  Status post cholecystectomy OSSEOUS STRUCTURES:  No acute fracture or destructive osseous lesion  Impression: 1  Patchy areas of consolidation bilaterally most likely pneumonia  Recommend follow-up chest CT in 3 months to ensure complete resolution  2   Moderate right and small left pleural effusions  Mild cardiomegaly  Workstation performed: [id number]     Nm Lung Ventilation / Perfusion    Result Date: 7/3/2019  Narrative: VENTILATION AND PERFUSION SCAN INDICATION: Shortness of breath  COMPARISON:  Chest radiograph  7/3/2019 TECHNIQUE:  Posterior ventilation imaging was performed after the inhalation of 33 mCi nebulized Tc-99m DTPA with deposition of less than 1 mCi of activity within the lungs  Multiplanar perfusion imaging was next performed following the intravenous administration of 4 4 mCi Tc-99m labeled MAA  FINDINGS:  Ventilation imaging demonstrates heterogeneous distribution of the radiopharmaceutical with some clumping centrally  Perfusion imaging demonstrates mildly heterogeneous distribution of the radiopharmaceutical throughout both lungs  There is no significant  segmental or subsegmental defect  Impression: The probability for pulmonary embolus is low   Workstation performed: [id number]     Vas Lower Limb Venous Duplex Study, Complete Bilateral    Result Date: 2019  Narrative:  [facility] REPORT CLINICAL: Indications: Swelling of Limb [R22 4]  Dyspnea [R06 02]  Patient presents with shortness of breath for the past several days  Patient reports short of breath  Risk Factors The patient has history of HTN, Diabetes (Yes), HLD, CKD and previous smoking (quit >10yrs ago)  FINDINGS:  Segment  Right            Left              Impression       Impression       CFV      Normal (Patent)  Normal (Patent)     CONCLUSION:  Impression: RIGHT LOWER LIMB: No evidence of acute or chronic deep vein thrombosis  No evidence of superficial thrombophlebitis noted  Doppler evaluation shows a normal response to augmentation maneuvers  Popliteal, posterior tibial and anterior tibial arterial Doppler waveforms are triphasic  LEFT LOWER LIMB: No evidence of acute or chronic deep vein thrombosis  No evidence of superficial thrombophlebitis noted  Doppler evaluation shows a normal response to augmentation maneuvers  Popliteal, posterior tibial and anterior tibial arterial Doppler waveforms are triphasic  Pulsatile waveforms in the venous system may suggest heart failure or tricuspid valve insufficiency  Technical findings were given to Dr Leland Tariq 13:30  SIGNATURE: Electronically Signed by: Minerva Ng on 2019 09:11:23 AM       EKG / Monitor: Personally reviewed  Telemetry demonstrates Sinus Rhythm with occasional PACs and Sinus Arrhythmia  No PVCs       Cardiac testing:   Results for orders placed during the hospital encounter of 16   Echo complete with contrast if indicated    Narrative [address]  [address] [facility]  [phone]    Transthoracic Echocardiogram  2D, M-mode, Doppler, and Color Doppler    Study date:  05-Dec-2016    Patient: Xochitl Holbrook  MR number: [id number]  Account number: [de-identified]  : [id number]  Age: 68 years  Gender: Male  Status: Routine  Location: Bedside  Height: 73 in  Weight: 216 5 lb  BP: 163/ 95 mmHg    Indications: Chest Pain    Diagnoses: 786 50 - CHEST PAIN NOS    Sonographer:  Terrance Parisi  Primary Physician:  Horacio Balderas MD  Referring Physician:  Silvia Knowles MD  Group:  [facility]  Interpreting Physician:  Kaylin Quiroz MD    SUMMARY    PROCEDURE INFORMATION:  The procedure was performed at the bedside  This was a technically difficult study  LEFT VENTRICLE:  Systolic function was normal  Ejection fraction was estimated in the range of  55 % to 60 %  Although no diagnostic regional wall motion abnormality was  identified, this possibility cannot be completely excluded on the basis of this  study  There was mild concentric hypertrophy  Features were consistent with a pseudonormal left ventricular filling pattern,  with concomitant abnormal relaxation and increased filling pressure (grade 2  diastolic dysfunction)  LEFT ATRIUM:  The atrium was mildly dilated  MITRAL VALVE:  There was mild annular calcification  There was no evidence for stenosis  There was mild regurgitation  AORTIC VALVE:  Transaortic velocity was increased at 2 1 m/s due to valvular stenosis  Peak  gradient was 17 with mean of 9 mmHg  There was mild stenosis  TRICUSPID VALVE:  Regurgitation could not be evaluated due to poor window  At least mild  regurgitation noted  The tricuspid jet envelope definition was inadequate for  estimation of RV systolic pressure  There are no indirect findings (abnormal RV  volume or geometry, altered pulmonary flow velocity profile, or leftward septal  displacement) which would suggest moderate or severe pulmonary hypertension  PULMONIC VALVE:  Transpulmonic velocity was increased due to increased transvalvular flow  There was mild regurgitation  IVC, HEPATIC VEINS:  Respirophasic changes were blunted (less than 50% variation)      HISTORY: PRIOR HISTORY: HTN, HCL, DM, CAD, S/P Stents, Bladder Cancer    PROCEDURE: The procedure was performed at the bedside  This was a routine  study  The transthoracic approach was used  The study included complete 2D  imaging, M-mode, complete spectral Doppler, and color Doppler  The heart rate  was 65 bpm, at the start of the study  This was a technically difficult study  LEFT VENTRICLE: Size was normal  Systolic function was normal  Ejection  fraction was estimated in the range of 55 % to 60 %  Although no diagnostic  regional wall motion abnormality was identified, this possibility cannot be  completely excluded on the basis of this study  There was mild concentric  hypertrophy  DOPPLER: Features were consistent with a pseudonormal left  ventricular filling pattern, with concomitant abnormal relaxation and increased  filling pressure (grade 2 diastolic dysfunction)  RIGHT VENTRICLE: The size was normal  Systolic function was normal     LEFT ATRIUM: The atrium was mildly dilated  RIGHT ATRIUM: Size was normal     MITRAL VALVE: There was mild annular calcification  There was mild thickening  No echocardiographic evidence for prolapse  DOPPLER: There was no evidence for  stenosis  There was mild regurgitation  AORTIC VALVE: The valve was trileaflet  Leaflets exhibited mildly increased  thickness, mild calcification, and sclerosis  DOPPLER: Transaortic velocity was  increased at 2 1 m/s due to valvular stenosis  Peak gradient was 17 with mean  of 9 mmHg  There was mild stenosis  There was no significant regurgitation  TRICUSPID VALVE: DOPPLER: Regurgitation could not be evaluated due to poor  window  At least mild regurgitation noted  The tricuspid jet envelope  definition was inadequate for estimation of RV systolic pressure  There are no  indirect findings (abnormal RV volume or geometry, altered pulmonary flow  velocity profile, or leftward septal displacement) which would suggest moderate  or severe pulmonary hypertension      PULMONIC VALVE: DOPPLER: Transpulmonic velocity was increased due to increased  transvalvular flow  There was mild regurgitation  PERICARDIUM: There was no pericardial effusion  The pericardium was normal in  appearance  AORTA: The root exhibited normal size  SYSTEMIC VEINS: IVC: The inferior vena cava was normal in size  Respirophasic  changes were blunted (less than 50% variation)  SYSTEM MEASUREMENT TABLES    2D mode  AoR Diam 2D: 3 4 cm  LA Diam (2D): 4 7 cm  LA/Ao (2D): 1 38  FS (2D Teich): 32 9 %  IVSd (2D): 1 3 cm  LVDEV: 127 cm³  LVESV: 49 5 cm³  LVIDd(2D): 5 16 cm  LVISd (2D): 3 46 cm  LVOT Area 2D: 3 46 cm squared  LVPWd (2D): 1 27 cm  SV (Teich): 77 5 cm³    Apical four chamber  LVEF A4C: 54 %    Unspecified Scan Mode  VIRAL Cont Eq (Peak Darvin): 1 77 cm squared  VIRAL Cont Eq (VTI): 1 86 cm squared  LVOT (VTI): 23 8 cm  LVOT Diam : 2 1 cm  LVOT Vmax: 1060 mm/s  LVOT Vmax; Mean: 1060 mm/s  Peak Grad ; Mean: 4 mm[Hg]  SV (LVOT): 82 cm³  VTI;Mean: 2 mm[Hg]  MV Peak A Darvin: 787 mm/s  MV Peak E Darvin  Mean: 945 mm/s  MVA (PHT): 3 24 cm squared  PHT: 68 ms  RA Area: 13 8 cm squared  RA Volume: 26 5 cm³  TAPSE: 2 3 cm    [employer] Accredited [facility]ocardi[facility]    Prepared and electronically signed by    Pippa Mcmillan MD  Signed 05-Dec-2016 10:10:21         TABITHA Samuel        "This note has been constructed using a voice recognition system  Therefore there may be syntax, spelling, and/or grammatical errors   Please call if you have any questions  "

## 2019-07-06 ENCOUNTER — APPOINTMENT (INPATIENT)
Dept: RADIOLOGY | Facility: HOSPITAL | Age: 79
DRG: 280 | End: 2019-07-06
Payer: COMMERCIAL

## 2019-07-06 LAB
ALBUMIN SERPL BCP-MCNC: 2.6 G/DL (ref 3.5–5)
ALP SERPL-CCNC: 97 U/L (ref 46–116)
ALT SERPL W P-5'-P-CCNC: 35 U/L (ref 12–78)
ANION GAP SERPL CALCULATED.3IONS-SCNC: 7 MMOL/L (ref 4–13)
APTT PPP: 38 SECONDS (ref 23–37)
ARTERIAL PATENCY WRIST A: YES
AST SERPL W P-5'-P-CCNC: 44 U/L (ref 5–45)
ATRIAL RATE: 63 BPM
ATRIAL RATE: 69 BPM
ATRIAL RATE: 79 BPM
BASE EXCESS BLDA CALC-SCNC: -1.6 MMOL/L
BASOPHILS # BLD AUTO: 0.02 THOUSANDS/ΜL (ref 0–0.1)
BASOPHILS NFR BLD AUTO: 0 % (ref 0–1)
BILIRUB SERPL-MCNC: 1.4 MG/DL (ref 0.2–1)
BODY TEMPERATURE: 98.3 DEGREES FEHRENHEIT
BUN SERPL-MCNC: 56 MG/DL (ref 5–25)
CALCIUM SERPL-MCNC: 8.3 MG/DL (ref 8.3–10.1)
CHLORIDE SERPL-SCNC: 105 MMOL/L (ref 100–108)
CO2 SERPL-SCNC: 27 MMOL/L (ref 21–32)
CREAT SERPL-MCNC: 2.16 MG/DL (ref 0.6–1.3)
EOSINOPHIL # BLD AUTO: 0.04 THOUSAND/ΜL (ref 0–0.61)
EOSINOPHIL NFR BLD AUTO: 1 % (ref 0–6)
ERYTHROCYTE [DISTWIDTH] IN BLOOD BY AUTOMATED COUNT: 14.2 % (ref 11.6–15.1)
ERYTHROCYTE [DISTWIDTH] IN BLOOD BY AUTOMATED COUNT: 14.3 % (ref 11.6–15.1)
GFR SERPL CREATININE-BSD FRML MDRD: 28 ML/MIN/1.73SQ M
GLUCOSE SERPL-MCNC: 122 MG/DL (ref 65–140)
GLUCOSE SERPL-MCNC: 124 MG/DL (ref 65–140)
GLUCOSE SERPL-MCNC: 208 MG/DL (ref 65–140)
GLUCOSE SERPL-MCNC: 245 MG/DL (ref 65–140)
GLUCOSE SERPL-MCNC: 313 MG/DL (ref 65–140)
HCO3 BLDA-SCNC: 23 MMOL/L (ref 22–28)
HCT VFR BLD AUTO: 33.4 % (ref 36.5–49.3)
HCT VFR BLD AUTO: 35.4 % (ref 36.5–49.3)
HGB BLD-MCNC: 10.8 G/DL (ref 12–17)
HGB BLD-MCNC: 11.3 G/DL (ref 12–17)
IMM GRANULOCYTES # BLD AUTO: 0.03 THOUSAND/UL (ref 0–0.2)
IMM GRANULOCYTES NFR BLD AUTO: 0 % (ref 0–2)
INR PPP: 1.12 (ref 0.86–1.16)
LYMPHOCYTES # BLD AUTO: 1.25 THOUSANDS/ΜL (ref 0.6–4.47)
LYMPHOCYTES NFR BLD AUTO: 15 % (ref 14–44)
MCH RBC QN AUTO: 30.3 PG (ref 26.8–34.3)
MCH RBC QN AUTO: 30.6 PG (ref 26.8–34.3)
MCHC RBC AUTO-ENTMCNC: 31.9 G/DL (ref 31.4–37.4)
MCHC RBC AUTO-ENTMCNC: 32.3 G/DL (ref 31.4–37.4)
MCV RBC AUTO: 95 FL (ref 82–98)
MCV RBC AUTO: 95 FL (ref 82–98)
MONOCYTES # BLD AUTO: 0.89 THOUSAND/ΜL (ref 0.17–1.22)
MONOCYTES NFR BLD AUTO: 11 % (ref 4–12)
NASAL CANNULA: 3
NEUTROPHILS # BLD AUTO: 6.08 THOUSANDS/ΜL (ref 1.85–7.62)
NEUTS SEG NFR BLD AUTO: 73 % (ref 43–75)
NRBC BLD AUTO-RTO: 0 /100 WBCS
O2 CT BLDA-SCNC: 15.4 ML/DL (ref 16–23)
OXYHGB MFR BLDA: 93.3 % (ref 94–97)
P AXIS: 29 DEGREES
P AXIS: 50 DEGREES
P AXIS: 86 DEGREES
PCO2 BLDA: 38.4 MM HG (ref 36–44)
PCO2 TEMP ADJ BLDA: 38.1 MM HG (ref 36–44)
PH BLD: 7.4 [PH] (ref 7.35–7.45)
PH BLDA: 7.39 [PH] (ref 7.35–7.45)
PLATELET # BLD AUTO: 118 THOUSANDS/UL (ref 149–390)
PLATELET # BLD AUTO: 127 THOUSANDS/UL (ref 149–390)
PMV BLD AUTO: 10.3 FL (ref 8.9–12.7)
PMV BLD AUTO: 10.8 FL (ref 8.9–12.7)
PO2 BLD: 69.3 MM HG (ref 75–129)
PO2 BLDA: 70.2 MM HG (ref 75–129)
POTASSIUM SERPL-SCNC: 4.4 MMOL/L (ref 3.5–5.3)
PR INTERVAL: 176 MS
PR INTERVAL: 202 MS
PR INTERVAL: 202 MS
PROCALCITONIN SERPL-MCNC: 0.16 NG/ML
PROT SERPL-MCNC: 6.7 G/DL (ref 6.4–8.2)
PROTHROMBIN TIME: 11.7 SECONDS (ref 9.4–11.7)
QRS AXIS: -13 DEGREES
QRS AXIS: -15 DEGREES
QRS AXIS: -24 DEGREES
QRSD INTERVAL: 100 MS
QRSD INTERVAL: 96 MS
QRSD INTERVAL: 96 MS
QT INTERVAL: 420 MS
QT INTERVAL: 434 MS
QT INTERVAL: 468 MS
QTC INTERVAL: 444 MS
QTC INTERVAL: 481 MS
QTC INTERVAL: 501 MS
RBC # BLD AUTO: 3.53 MILLION/UL (ref 3.88–5.62)
RBC # BLD AUTO: 3.73 MILLION/UL (ref 3.88–5.62)
SODIUM SERPL-SCNC: 139 MMOL/L (ref 136–145)
SPECIMEN SOURCE: ABNORMAL
T WAVE AXIS: 103 DEGREES
T WAVE AXIS: 57 DEGREES
T WAVE AXIS: 81 DEGREES
TROPONIN I SERPL-MCNC: 2.74 NG/ML
TROPONIN I SERPL-MCNC: 3.56 NG/ML
TROPONIN I SERPL-MCNC: 3.68 NG/ML
TROPONIN I SERPL-MCNC: 5.16 NG/ML
VENTRICULAR RATE: 63 BPM
VENTRICULAR RATE: 69 BPM
VENTRICULAR RATE: 79 BPM
WBC # BLD AUTO: 10.91 THOUSAND/UL (ref 4.31–10.16)
WBC # BLD AUTO: 8.31 THOUSAND/UL (ref 4.31–10.16)

## 2019-07-06 PROCEDURE — 99232 SBSQ HOSP IP/OBS MODERATE 35: CPT | Performed by: PHYSICIAN ASSISTANT

## 2019-07-06 PROCEDURE — 93010 ELECTROCARDIOGRAM REPORT: CPT | Performed by: INTERNAL MEDICINE

## 2019-07-06 PROCEDURE — 80053 COMPREHEN METABOLIC PANEL: CPT | Performed by: INTERNAL MEDICINE

## 2019-07-06 PROCEDURE — 36600 WITHDRAWAL OF ARTERIAL BLOOD: CPT

## 2019-07-06 PROCEDURE — 99232 SBSQ HOSP IP/OBS MODERATE 35: CPT | Performed by: INTERNAL MEDICINE

## 2019-07-06 PROCEDURE — 85610 PROTHROMBIN TIME: CPT | Performed by: NURSE PRACTITIONER

## 2019-07-06 PROCEDURE — 84484 ASSAY OF TROPONIN QUANT: CPT | Performed by: INTERNAL MEDICINE

## 2019-07-06 PROCEDURE — 85730 THROMBOPLASTIN TIME PARTIAL: CPT | Performed by: NURSE PRACTITIONER

## 2019-07-06 PROCEDURE — 71046 X-RAY EXAM CHEST 2 VIEWS: CPT

## 2019-07-06 PROCEDURE — 93005 ELECTROCARDIOGRAM TRACING: CPT

## 2019-07-06 PROCEDURE — 85025 COMPLETE CBC W/AUTO DIFF WBC: CPT | Performed by: INTERNAL MEDICINE

## 2019-07-06 PROCEDURE — 84484 ASSAY OF TROPONIN QUANT: CPT | Performed by: NURSE PRACTITIONER

## 2019-07-06 PROCEDURE — 85027 COMPLETE CBC AUTOMATED: CPT | Performed by: NURSE PRACTITIONER

## 2019-07-06 PROCEDURE — 84145 PROCALCITONIN (PCT): CPT | Performed by: PHYSICIAN ASSISTANT

## 2019-07-06 PROCEDURE — 99233 SBSQ HOSP IP/OBS HIGH 50: CPT | Performed by: INTERNAL MEDICINE

## 2019-07-06 PROCEDURE — 82948 REAGENT STRIP/BLOOD GLUCOSE: CPT

## 2019-07-06 PROCEDURE — 82805 BLOOD GASES W/O2 SATURATION: CPT | Performed by: STUDENT IN AN ORGANIZED HEALTH CARE EDUCATION/TRAINING PROGRAM

## 2019-07-06 PROCEDURE — 71045 X-RAY EXAM CHEST 1 VIEW: CPT

## 2019-07-06 RX ORDER — CEFUROXIME AXETIL 250 MG/1
500 TABLET ORAL EVERY 12 HOURS SCHEDULED
Status: DISCONTINUED | OUTPATIENT
Start: 2019-07-06 | End: 2019-07-12

## 2019-07-06 RX ORDER — HEPARIN SODIUM 10000 [USP'U]/100ML
3-20 INJECTION, SOLUTION INTRAVENOUS
Status: DISCONTINUED | OUTPATIENT
Start: 2019-07-06 | End: 2019-07-06

## 2019-07-06 RX ORDER — METOPROLOL TARTRATE 50 MG/1
50 TABLET, FILM COATED ORAL EVERY 12 HOURS SCHEDULED
Status: DISCONTINUED | OUTPATIENT
Start: 2019-07-06 | End: 2019-07-15 | Stop reason: HOSPADM

## 2019-07-06 RX ORDER — ATORVASTATIN CALCIUM 80 MG/1
80 TABLET, FILM COATED ORAL
Status: DISCONTINUED | OUTPATIENT
Start: 2019-07-06 | End: 2019-07-15 | Stop reason: HOSPADM

## 2019-07-06 RX ADMIN — FUROSEMIDE 40 MG: 10 INJECTION, SOLUTION INTRAMUSCULAR; INTRAVENOUS at 08:43

## 2019-07-06 RX ADMIN — HYDRALAZINE HYDROCHLORIDE 50 MG: 25 TABLET ORAL at 22:21

## 2019-07-06 RX ADMIN — ATORVASTATIN CALCIUM 80 MG: 80 TABLET ORAL at 17:16

## 2019-07-06 RX ADMIN — INSULIN LISPRO 5 UNITS: 100 INJECTION, SOLUTION INTRAVENOUS; SUBCUTANEOUS at 22:23

## 2019-07-06 RX ADMIN — CEFUROXIME AXETIL 500 MG: 250 TABLET ORAL at 12:31

## 2019-07-06 RX ADMIN — INSULIN DETEMIR 20 UNITS: 100 INJECTION, SOLUTION SUBCUTANEOUS at 08:39

## 2019-07-06 RX ADMIN — AZITHROMYCIN 500 MG: 250 TABLET, FILM COATED ORAL at 08:43

## 2019-07-06 RX ADMIN — ESCITALOPRAM OXALATE 20 MG: 10 TABLET ORAL at 22:20

## 2019-07-06 RX ADMIN — METOPROLOL TARTRATE 50 MG: 50 TABLET, FILM COATED ORAL at 21:45

## 2019-07-06 RX ADMIN — HYDRALAZINE HYDROCHLORIDE 50 MG: 25 TABLET ORAL at 05:54

## 2019-07-06 RX ADMIN — ENOXAPARIN SODIUM 105 MG: 120 INJECTION SUBCUTANEOUS at 08:41

## 2019-07-06 RX ADMIN — ASPIRIN 81 MG: 81 TABLET, COATED ORAL at 08:40

## 2019-07-06 RX ADMIN — HYDRALAZINE HYDROCHLORIDE 50 MG: 25 TABLET ORAL at 14:06

## 2019-07-06 RX ADMIN — INSULIN LISPRO 4 UNITS: 100 INJECTION, SOLUTION INTRAVENOUS; SUBCUTANEOUS at 12:31

## 2019-07-06 RX ADMIN — ALUMINUM HYDROXIDE, MAGNESIUM HYDROXIDE, AND SIMETHICONE 30 ML: 200; 200; 20 SUSPENSION ORAL at 20:35

## 2019-07-06 RX ADMIN — AMLODIPINE BESYLATE 5 MG: 5 TABLET ORAL at 17:16

## 2019-07-06 RX ADMIN — ISOSORBIDE MONONITRATE 30 MG: 30 TABLET, EXTENDED RELEASE ORAL at 08:39

## 2019-07-06 RX ADMIN — CEFUROXIME AXETIL 500 MG: 250 TABLET ORAL at 21:45

## 2019-07-06 RX ADMIN — ALPRAZOLAM 0.25 MG: 0.25 TABLET ORAL at 20:36

## 2019-07-06 RX ADMIN — METOPROLOL TARTRATE 25 MG: 25 TABLET, FILM COATED ORAL at 08:39

## 2019-07-06 RX ADMIN — INSULIN LISPRO 4 UNITS: 100 INJECTION, SOLUTION INTRAVENOUS; SUBCUTANEOUS at 17:16

## 2019-07-06 RX ADMIN — INSULIN DETEMIR 20 UNITS: 100 INJECTION, SOLUTION SUBCUTANEOUS at 21:45

## 2019-07-06 RX ADMIN — AMLODIPINE BESYLATE 5 MG: 5 TABLET ORAL at 08:40

## 2019-07-06 NOTE — PROGRESS NOTES
Progress Note - Pulmonary   Flo Oswald 66 y o  male MRN: [id number]  Unit/Bed#: [address] [place] [address] Encounter: [id number]     Flo Oswald is a 66 y o  male with a past medical history of HTN, hyperlipidemia, type 2 diabetes, CKD 3 baseline creatinine 1 2-1 5, CAD post remote CABG, and chronic diastolic heart failure with grade 2 diastolic dysfunction as of most recent echocardiogram December 2016 and c/w recent echo on 7/5 and moderate MR and has     He had bilateral pulmonary infiltrates with negative procalcitonin levels and no progressive symptoms for pneumonia which could have been c/w either early pneumonia, aspiration and or combination of atelectasis    As of 7/6 she is Antibiotic Day 4 on Rocephin and Zithromax and is fully transitioned to PO Abx at this point  He is still notably volume overloaded and w/ Acute on Chronic DCHF in the setting of NSTEMI with worsening Cr likely 2/2 Cardiorenal syndrome  He currently is requiring minimal oxygen for maintenance of adequate oxygen saturations  Assessment/Plan:  Acute Hypoxemic Respiratory Failure in setting of CHF, Pleural Effusions and small right-sided basilar atelectasis, and Possible Pneumonia:  -hypoxemia is likely largely secondary to CHF as are the pleural effusions  -currently only requiring 2 L nasal cannula  -can treat effusions with diuresis at this time, if refractory hypoxemia will consider imaging with U/S for thoracentesis  -patient is continue ongoing diuresis as managed per Nephrology and Cardiology  -okay to continue antibiotics for total of 5 days, would convert to P O   Azithromycin and Ceftin   -given lack of mediastinal adenopathy could simply likely follow-up with PA and lateral x-ray in the outpatient setting without need for repeat CT  -Patient is otherwise ok to follow up with PCP  -Plan to finish antibiotics tomorrow  -wean oxygen for saturations 90-94%  -Pulmonary signing off but please contact with questions or concerns regarding this patient         ______________________________________________________________________    Subjective: Pt seen and examined at bedside  Was started on heparin infusion for elevated troponins concerning for NSTEMI  No current complaints from the patient  Tele Events:     Vitals:   Temp:  [97 7 °F (36 5 °C)-98 3 °F (36 8 °C)] 98 3 °F (36 8 °C)  HR:  [64-80] 74  Resp:  [18-22] 20  BP: (124-167)/(58-70) 148/66  Weight (last 2 days)     Date/Time   Weight    07/06/19 0600   104 (228 4)    07/05/19 0547   105 (231 26)    07/04/19 0600   104 (230 16)            Oxygen Therapy  SpO2: 95 %  O2 Flow Rate (L/min): 2 L/min    IV Infusions:    heparin (porcine) 3-20 Units/kg/hr (Order-Specific)       Nutrition:        Diet Orders   (From admission, onward)            Start     Ordered    07/04/19 1006  Room Service  Once     Question:  Type of Service  Answer:  Room Service - Appropriate with Assistance    07/04/19 1005    07/03/19 1715  Diet [place]/CHO Controlled; Consistent Carbohydrate Diet Level 2 (5 carb servings/75 grams CHO/meal)  Diet effective now     Question Answer Comment   Diet Type [place]/CHO Controlled    Morgan/CHO Controlled Consistent Carbohydrate Diet Level 2 (5 carb servings/75 grams CHO/meal)    RD to adjust diet per protocol? Yes        07/03/19 1714          Ins/Outs:   I/O       07/04 0701 - 07/05 0700 07/05 0701 - 07/06 0700 07/06 0701 - 07/07 0700    P  O  1150 645     I V  (mL/kg) 30 (0 3) 10 (0 1)     Total Intake(mL/kg) 1180 (11 2) 655 (6 3)     Urine (mL/kg/hr) 2700 (1 1) 1200 (0 5) 240 (0 6)    Total Output 2700 1200 240    Net -1520 -545 -240                 Lines/Drains:  Invasive Devices     Peripheral Intravenous Line            Peripheral IV 07/03/19 Right Antecubital 3 days          Drain            Open Drain Left; Anterior; Other (Comment)  days                 Active medications:  Scheduled Meds:  Current Facility-Administered Medications:  acetaminophen 650 mg Oral Q6H PRN Dilip Valinda Boast, MD   ALPRAZolam 0 25 mg Oral HS Sean Alvarez MD   aluminum-magnesium hydroxide-simethicone 30 mL Oral Q4H PRN Bhavna Kapadia MD   amLODIPine 5 mg Oral BID Sean Alvarez MD   aspirin 81 mg Oral Daily Sean Alvarez MD   atorvastatin 80 mg Oral Daily With Dinner TABITHA White   azithromycin 500 mg Oral Q24H Sean Alvarez MD   cefuroxime 500 mg Oral Q12H [address] [place] [address]   escitalopram 20 mg Oral HS Sean Alvarez MD   heparin (porcine) 3-20 Units/kg/hr (Order-Specific) Intravenous Titrated TABITHA White   hydrALAZINE 10 mg Intravenous Q6H PRN Sean Alvarez MD   hydrALAZINE 50 mg Oral Q8H [address] TABITHA White   insulin detemir 20 Units Subcutaneous Q12H [address] Sean Alvarez MD   insulin lispro 1-6 Units Subcutaneous HS Sean Alvarez MD   insulin lispro 2-12 Units Subcutaneous TID AC Sean Alvarez MD   isosorbide mononitrate 30 mg Oral Daily Navtej Isabella, DO   metoprolol tartrate 25 mg Oral Q12H [address] Sean Alvarez MD   nitroglycerin 0 4 mg Sublingual Q5 Min PRN Navtej Isabella, DO   pneumococcal 13-valent conjugate vaccine 0 5 mL Intramuscular Prior to discharge Fatou Rogel MD     PRN Meds:  acetaminophen 650 mg Q6H PRN   aluminum-magnesium hydroxide-simethicone 30 mL Q4H PRN   hydrALAZINE 10 mg Q6H PRN   nitroglycerin 0 4 mg Q5 Min PRN   pneumococcal 13-valent conjugate vaccine 0 5 mL Prior to discharge     ____________________________________________________________________      Physical Exam   Constitutional: He is oriented to person, place, and time  He appears well-developed  HENT:   Head: Normocephalic and atraumatic  Eyes: Pupils are equal, round, and reactive to light  Conjunctivae are normal    Neck: Normal range of motion  Neck supple  Cardiovascular: Normal rate, regular rhythm and normal heart sounds  Exam reveals no gallop and no friction rub  No murmur heard  Pulmonary/Chest: Effort normal  No accessory muscle usage  No tachypnea  No respiratory distress   He has decreased breath sounds in the right lower field and the left lower field  He has no wheezes  He has no rhonchi  He has no rales  He exhibits no tenderness  Mildly decreased breath sounds in the bases    Currently on 2 L NC      Abdominal: Soft  Bowel sounds are normal    Musculoskeletal: Normal range of motion  He exhibits no edema  Neurological: He is alert and oriented to person, place, and time  Skin: Skin is warm and dry  Psychiatric: He has a normal mood and affect            ____________________________________________________________________    Labs:   CBC: Results from last 7 days   Lab Units 07/06/19  0914 07/06/19  0553 07/05/19  0631   WBC Thousand/uL 10 91* 8 31 9 12   HEMOGLOBIN g/dL 11 3* 10 8* 11 2*   HEMATOCRIT % 35 4* 33 4* 34 5*   MCV fL 95 95 94   PLATELETS Thousands/uL 127* 118* 116*     CMP: Results from last 7 days   Lab Units 07/06/19  0553 07/05/19  0631 07/04/19  1005 07/03/19  1045   POTASSIUM mmol/L 4 4 4 2 4 9 4 7   CHLORIDE mmol/L 105 106 106 105   CO2 mmol/L 27 25 26 26   BUN mg/dL 56* 47* 47* 45*   CREATININE mg/dL 2 16* 1 91* 1 91* 1 80*   CALCIUM mg/dL 8 3 8 2* 8 1* 8 8   AST U/L 44  --   --  27   ALT U/L 35  --   --  36   ALK PHOS U/L 97  --   --  130*   EGFR ml/min/1 73sq m 28 33 33 35     No components found for: ABG    Magnesium:     Phosphorous:     Troponin:   Results from last 7 days   Lab Units 07/06/19  0914 07/06/19  0553   TROPONIN I ng/mL 3 68* 5 16*     PT/INR:   Results from last 7 days   Lab Units 07/06/19 0914   PTT seconds 38*   INR  1 12     Lactic Acid:     BNP:   Results from last 7 days   Lab Units 07/03/19  1045   NT-PRO BNP pg/mL 2,452*     TSH:     Procalcitonin: Invalid input(s): PROCALCITONIN      Imaging:   US kidney and bladder   Final Result by Halima Landin MD (07/05 1505)      No hydronephrosis  Workstation performed: [id number]         CT chest wo contrast   Final Result by Sathish Gasca MD (07/04 1200)   1    Patchy areas of consolidation bilaterally most likely pneumonia  Recommend follow-up chest CT in 3 months to ensure complete resolution  2   Moderate right and small left pleural effusions  Mild cardiomegaly  Workstation performed: [id number]         VAS lower limb venous duplex study, complete bilateral   Final Result by Raul Akhtar MD (07/04 3264)      NM lung ventilation / perfusion   Final Result by Cole Dia MD (07/03 8917)      The probability for pulmonary embolus is low  Workstation performed: [id number]         XR chest 1 view portable   Final Result by Efrain Gregory MD (07/03 0536)      Right infrahilar infiltrate with small pleural effusion              Workstation performed: [id number]               Micro: Lab Results   Component Value Date    URINECX  03/04/2016     >100,000 cfu/ml Klebsiella oxytoca-Extended Beta Lactamase     URINECX 80,000-89,000 cfu/ml Mixed Contaminants X3 01/15/2016    MRSACULTURE  11/29/2016     No Methicillin Resistant Staphlyococcus aureus (MRSA) isolated            Invalid input(s): LEGIONELLAURINARYANTIGEN        Code Status: Level 1 - Full Code

## 2019-07-06 NOTE — PROGRESS NOTES
Progress Note - Jericho Carlos 66 y o  male MRN: [id number]    Unit/Bed#: [address] [facility] [address]01 Encounter: [id number]      Subjective:   Patient had a chest pain yesterday evening EKG done abnormal troponin slightly elevated chest pain subsided this morning feel anxious some difficulty breathing with subsided the E not able to sleep last night remaining review of system unremarkable unchanged since yesterday total of 12 done    Objective:   As below    Vitals: Blood pressure 136/65, pulse 67, temperature 98 2 °F (36 8 °C), temperature source Oral, resp  rate 18, height 6' 1" (1 854 m), weight 104 kg (228 lb 6 3 oz), SpO2 96 %  ,Body mass index is 30 13 kg/m²  Intake/Output:    Intake/Output Summary (Last 24 hours) at 7/6/2019 0859  Last data filed at 7/6/2019 0601  Gross per 24 hour   Intake 655 ml   Output 1200 ml   Net -545 ml       Physical Exam:  General Appearance:   This alert oriented x3 unremarkable  Skin:  No rash normal  H/ENT:  No congestion  Eyes:  No redness no discharged normal  Cardiac:  Regular soft systolic murmur  Pulmonary:  Decreased air entry bilateral bilateral rales no wheezing Complete exam done as above today  Gastrointestinal:  Nontender no distension no mass palpable  Extremities:  2+ edema bilateral persistent bilateral  Musculoskeletal:  Knee swelling bilateral  Neuro:  No new deficit gait dysfunction  Complete exam done today as above  Complete exam done as above for July 6, 2019  Current Facility-Administered Medications   Medication Dose Route Frequency    acetaminophen (TYLENOL) tablet 650 mg  650 mg Oral Q6H PRN    ALPRAZolam (XANAX) tablet 0 25 mg  0 25 mg Oral HS    aluminum-magnesium hydroxide-simethicone (MYLANTA) 200-200-20 mg/5 mL oral suspension 30 mL  30 mL Oral Q4H PRN    amLODIPine (NORVASC) tablet 5 mg  5 mg Oral BID    aspirin (ECOTRIN LOW STRENGTH) EC tablet 81 mg  81 mg Oral Daily    atorvastatin (LIPITOR) tablet 40 mg  40 mg Oral Daily With Dinner    azithromycin [facility]) tablet 500 mg  500 mg Oral Q24H    cefTRIAXone (ROCEPHIN) IVPB (premix) 1,000 mg  1,000 mg Intravenous Q24H    escitalopram (LEXAPRO) tablet 20 mg  20 mg Oral HS    furosemide (LASIX) injection 40 mg  40 mg Intravenous BID (diuretic)    heparin (porcine) 25,000 units in 250 mL infusion (premix)  3-20 Units/kg/hr (Order-Specific) Intravenous Titrated    hydrALAZINE (APRESOLINE) injection 10 mg  10 mg Intravenous Q6H PRN    hydrALAZINE (APRESOLINE) tablet 50 mg  50 mg Oral Q8H [address]    insulin detemir (LEVEMIR) subcutaneous injection 20 Units  20 Units Subcutaneous Q12H [address]    insulin lispro (HumaLOG) 100 units/mL subcutaneous injection 1-6 Units  1-6 Units Subcutaneous HS    insulin lispro (HumaLOG) 100 units/mL subcutaneous injection 2-12 Units  2-12 Units Subcutaneous TID AC    isosorbide mononitrate (IMDUR) 24 hr tablet 30 mg  30 mg Oral Daily    metoprolol tartrate (LOPRESSOR) tablet 25 mg  25 mg Oral Q12H [address]    nitroglycerin (NITROSTAT) SL tablet 0 4 mg  0 4 mg Sublingual Q5 Min PRN    pneumococcal 13-valent conjugate vaccine (PREVNAR-13) IM injection 0 5 mL  0 5 mL Intramuscular Prior to discharge       Radiology Results:  Reviewed    Lab, Imaging and other studies:   CBC:   Lab Results   Component Value Date    WBC 8 31 07/06/2019    WBC 8 8 01/05/2016    RBC 3 53 (L) 07/06/2019    RBC 4 61 (L) 01/05/2016     BMP:   Lab Results   Component Value Date    GLUCOSE 131 (H) 01/05/2016    SODIUM 139 07/06/2019    CO2 27 07/06/2019    CO2 27 01/05/2016    BUN 56 (H) 07/06/2019    BUN 35 (H) 01/05/2016    CREATININE 2 16 (H) 07/06/2019    CREATININE 1 5 (H) 01/05/2016    CALCIUM 8 3 07/06/2019    CALCIUM 9 0 01/05/2016     Coagulation:   Lab Results   Component Value Date    INR 1 08 07/03/2019     Cardiac markers:   Lab Results   Component Value Date    CKMB 4 9 07/04/2019     ABGs: No results found for: PH   Results from last 7 days   Lab Units 07/06/19  0553   POTASSIUM mmol/L 4 4   CHLORIDE mmol/L 105   CO2 mmol/L 27   BUN mg/dL 56*   CREATININE mg/dL 2 16*   CALCIUM mg/dL 8 3   ALK PHOS U/L 97   ALT U/L 35   AST U/L 44       Assessment:  Principal Problem:    Acute on chronic diastolic CHF (congestive heart failure) ([facility])  Active Problems:    CKD (chronic kidney disease), stage III (HCC)    Benign hypertension with chronic kidney disease, stage III (HCC)   Acute on chronic renal failure as evident the creatinine 1 8 and baseline creatinine 1 3  suspected pneumonia  Assessment:  Acute on chronic diastolic CHF  Pneumonia  Pleural effusion  Type 1 diabetes  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  Hypertension uncontrolled  Plan:  Patient's troponin elevated at started on subcu heparin  Continue monitoring on telemetry while patient being treated for acute CHF  Creatinine jumped to 2 1 hold Lasix for now discussed with cardio and Nephro  Added hydralazine for blood pressure control  Continue IV ceftriaxone Zithromax for suspected pneumonia  CT of the chest reviewed shows bilateral patchy infiltrate  Continue current treatment  Follow up on the [facility] today and tomorrow and  Nephrology consult reviewed appreciated   cardiology consult reviewed awaiting echocardiogram      VTE Pharmacologic Prophylaxis: Enoxaparin (Lovenox)    Marisol Carolina MD  7/6/2019, 8:59 AM

## 2019-07-06 NOTE — PROGRESS NOTES
[address] [facility] NOTE   Loyda Bell 66 y o  male MRN: [id number]  Unit/Bed#: [address] [place] [address] Encounter: [id number]  Reason for Consult: BRIAN, CKD    ASSESSMENT and PLAN:  67 yo make with HTN, DM, HLP, CAD/CABG, CKD III, OA, bladder cancer s/p partial cystectomy admitted to [facility] on 7/3/19 with SOB and edema  Diagnosed with pneumonia vs CHF and on abx and diuretics  Developed chest pain on 7/5/19 and now with NSTEMI  1  BRIAN (POA)  · Admission creatinine of 1 80 on 7/3/19  Renal US showed no hydronephrosis  UA not indicative of GN  · Creatinine up to 1 91 with diuresis and now further up to 2  16    · Etiology felt to be cardiorenal syndrome in the setting of hemodynamic alterations from NSTEMI  · Given rise in the creatinine and stable SOB symptoms, will stop Furosemide  2  CKD III:  ·  Baseline creatinine is around 1 2 to 1 5 since 2015  No prior renal follow up  3  NSTEMI: per cardiology  Now on Heparin  4  HTN:   · BP was elevated and now better  · On Amlodipine + Metoprolol at home  · Hydralazine added this admission  3  CHF: Will likely need ischemic work up  4  Proteinuria: Check UPC ratio once at steady state  DISPOSITION:  · Stop Furosemide  · Trend creatinine  SUBJECTIVE / INTERVAL HISTORY:  Developed chest pain yesterday afternoon which is now better after Nitro  Troponins noted to be trending up       OBJECTIVE:  Current Weight: Weight - Scale: 104 kg (228 lb 6 3 oz)  Vitals:    07/05/19 2121 07/05/19 2346 07/06/19 0410 07/06/19 0600   BP: 142/68 124/58 136/65    BP Location:  Right arm Left arm    Pulse: 78 65 67    Resp:  20 18    Temp:  98 °F (36 7 °C) 98 2 °F (36 8 °C)    TempSrc:  Tympanic Oral    SpO2:  96% 96%    Weight:    104 kg (228 lb 6 3 oz)   Height:           Intake/Output Summary (Last 24 hours) at 7/6/2019 0938  Last data filed at 7/6/2019 0601  Gross per 24 hour   Intake 475 ml   Output 1050 ml   Net -575 ml     General: conscious, coherent, cooperative, no distress  Chest/Lungs: clear breath sounds  CVS: distinct heart sounds, normal rate  Abdomen: soft  Extremities: (+) leg and thigh edema - improved      : no chowdhury catheter  Neuro: awake, alert       Medications:    Current Facility-Administered Medications:     acetaminophen (TYLENOL) tablet 650 mg, 650 mg, Oral, Q6H PRN, Sean Alvarez MD    ALPRAZolam (XANAX) tablet 0 25 mg, 0 25 mg, Oral, HS, Sean Alvarez MD, 0 25 mg at 07/05/19 2122    aluminum-magnesium hydroxide-simethicone (MYLANTA) 200-200-20 mg/5 mL oral suspension 30 mL, 30 mL, Oral, Q4H PRN, Sandra Leonardo MD, 30 mL at 07/05/19 1810    amLODIPine (NORVASC) tablet 5 mg, 5 mg, Oral, BID, Sean Alvarez MD, 5 mg at 07/06/19 0840    aspirin (ECOTRIN LOW STRENGTH) EC tablet 81 mg, 81 mg, Oral, Daily, Sean Alvarez MD, 81 mg at 07/06/19 0840    atorvastatin (LIPITOR) tablet 80 mg, 80 mg, Oral, Daily With Dinner, TABITHA Estevez    azithromycin [facility]) tablet 500 mg, 500 mg, Oral, Q24H, Sean Alvarez MD, 500 mg at 07/06/19 0843    cefTRIAXone (ROCEPHIN) IVPB (premix) 1,000 mg, 1,000 mg, Intravenous, Q24H, Sean Alvarez MD, Last Rate: 100 mL/hr at 07/05/19 1618, 1,000 mg at 07/05/19 1618    escitalopram (LEXAPRO) tablet 20 mg, 20 mg, Oral, HS, Genaro Gayle MD, 20 mg at 07/05/19 2121    heparin (porcine) 25,000 units in 250 mL infusion (premix), 3-20 Units/kg/hr (Order-Specific), Intravenous, Titrated, TABITHA Estevez    hydrALAZINE (APRESOLINE) injection 10 mg, 10 mg, Intravenous, Q6H PRN, Genaro Gayle MD, 10 mg at 07/03/19 1817    hydrALAZINE (APRESOLINE) tablet 50 mg, 50 mg, Oral, Q8H [address], TABITHA Estevez, 50 mg at 07/06/19 0554    insulin detemir (LEVEMIR) subcutaneous injection 20 Units, 20 Units, Subcutaneous, Q12H [address], Genaro Gayle MD, 20 Units at 07/06/19 0839    insulin lispro (HumaLOG) 100 units/mL subcutaneous injection 1-6 Units, 1-6 Units, Subcutaneous, HS, Genaro Gayle MD, 2 Units at 07/05/19 8709   insulin lispro (HumaLOG) 100 units/mL subcutaneous injection 2-12 Units, 2-12 Units, Subcutaneous, TID AC, 2 Units at 07/05/19 1217 **AND** Fingerstick Glucose (POCT), , , TID AC, Sean Alvarez MD    isosorbide mononitrate (IMDUR) 24 hr tablet 30 mg, 30 mg, Oral, Daily, Lizzy Mason DO, 30 mg at 07/06/19 0839    metoprolol tartrate (LOPRESSOR) tablet 25 mg, 25 mg, Oral, Q12H [facility], Sean Alvarez MD, 25 mg at 07/06/19 0839    nitroglycerin (NITROSTAT) SL tablet 0 4 mg, 0 4 mg, Sublingual, Q5 Min PRN, Lizzy Mason DO, 0 4 mg at 07/05/19 1652    pneumococcal 13-valent conjugate vaccine (PREVNAR-13) IM injection 0 5 mL, 0 5 mL, Intramuscular, Prior to discharge, Aislinn Mari MD    Laboratory Results:  Results from last 7 days   Lab Units 07/06/19  0914 07/06/19  0553 07/05/19  0631 07/04/19  1005 07/03/19  1045   WBC Thousand/uL 10 91* 8 31 9 12  --  8 92   HEMOGLOBIN g/dL 11 3* 10 8* 11 2*  --  12 8   HEMATOCRIT % 35 4* 33 4* 34 5*  --  38 4   PLATELETS Thousands/uL 127* 118* 116*  --  143*   POTASSIUM mmol/L  --  4 4 4 2 4 9 4 7   CHLORIDE mmol/L  --  105 106 106 105   CO2 mmol/L  --  27 25 26 26   BUN mg/dL  --  56* 47* 47* 45*   CREATININE mg/dL  --  2 16* 1 91* 1 91* 1 80*   CALCIUM mg/dL  --  8 3 8 2* 8 1* 8 8

## 2019-07-06 NOTE — PLAN OF CARE
Problem: Potential for Falls  Goal: Patient will remain free of falls  Description  INTERVENTIONS:  - Assess patient frequently for physical needs  -  Identify cognitive and physical deficits and behaviors that affect risk of falls    -  [place] fall precautions as indicated by assessment   - Educate patient/family on patient safety including physical limitations  - Instruct patient to call for assistance with activity based on assessment  - Modify environment to reduce risk of injury  - Consider OT/PT consult to assist with strengthening/mobility  Outcome: Progressing

## 2019-07-06 NOTE — PROGRESS NOTES
Progress Note - Cardiology   [facility]     Courtney Herman 66 y o  male MRN: [id number]  : 1940  Unit/Bed#: [address] [place] [address] Encounter: [id number]    Assessment and Plan:   1  Chest pain with elevated troponins concerning for ACS:  Patient initially started on weight based Lovenox, due to abnormal renal function will transition him to IV heparin ACLS protocol without bolus  Will continue beta-blocker, aspirin and increase him to high-intensity statin therapy  Will need ischemic workup once stable from respiratory and renal standpoint  Will continue to trend troponins  Will attempt to obtain records from [facility] regarding his open heart surgery  2  Acute on chronic diastolic heart failure:  Patient's diuresis on has brisk as initially on admission  Patient's BUN and creatinine has bumped and creatinine now is 2 16  Will hold diuretics  Nephrology continuing to follow  Continue beta-blocker  3  Acute kidney injury on chronic kidney disease: Followed by Nephrology  4  Hypertension:  Imdur added yesterday  Continue beta-blocker and hydralazine with close monitoring of vital signs  5  Dyslipidemia:  Will increase patient high-intensity statins due to recent abnormal troponins  6  Diabetes:  Managed per primary team    7  Coronary artery disease with history of CABG x1:  Patient does not remember which vessels have been stented or bypass  Will attempt to obtain records from [facility]   Continue optimal medical therapy  8  Community-acquired pneumonia:  Continue azithromycin per primary team    9  Chronic venous stasis    10  Chronic back pain/osteoarthritis  Subjective / Objective:   Patient seen and examined  Breathing at rest appears to be improved from yesterday  Patient had bout of 2/10 chest discomfort yesterday at approximately 4:00 p m  Aleshia Lightning Patient had EKG changes concerning for high lateral ischemia    Initial troponin was 0 05, troponin this morning was 5  16  Will continue to trend  Patient was initially placed on weight based Lovenox, will transition to IV heparin ACLS protocol due to abnormal renal function  Patient's BUN and creatinine has bumped with a creatinine of 2 16 now  Will hold patient's diuretic  Vitals: Blood pressure 136/65, pulse 67, temperature 98 2 °F (36 8 °C), temperature source Oral, resp  rate 18, height 6' 1" (1 854 m), weight 104 kg (228 lb 6 3 oz), SpO2 96 %  Vitals:    07/05/19 0547 07/06/19 0600   Weight: 105 kg (231 lb 4 2 oz) 104 kg (228 lb 6 3 oz)     Body mass index is 30 13 kg/m²  BP Readings from Last 3 Encounters:   07/06/19 136/65   12/05/16 136/62   10/20/16 168/88     Orthostatic Blood Pressures      Most Recent Value   Blood Pressure  136/65 filed at 07/06/2019 0410   Patient Position - Orthostatic VS  Lying filed at 07/06/2019 0410        I/O       07/04 0701 - 07/05 0700 07/05 0701 - 07/06 0700 07/06 0701 - 07/07 0700    P  O  1150 645     I V  (mL/kg) 30 (0 3) 10 (0 1)     Total Intake(mL/kg) 1180 (11 2) 655 (6 3)     Urine (mL/kg/hr) 2700 (1 1) 1200 (0 5)     Total Output 2700 1200     Net -1520 -545                Invasive Devices     Peripheral Intravenous Line            Peripheral IV 07/03/19 Right Antecubital 2 days          Drain            Open Drain Left; Anterior; Other (Comment)  days                  Intake/Output Summary (Last 24 hours) at 7/6/2019 0859  Last data filed at 7/6/2019 0601  Gross per 24 hour   Intake 655 ml   Output 1200 ml   Net -545 ml         Physical Exam:   Physical Exam   Constitutional: He is oriented to person, place, and time  He appears well-developed and well-nourished  No distress  HENT:   Head: Normocephalic and atraumatic  Right Ear: External ear normal    Left Ear: External ear normal    Eyes: Pupils are equal, round, and reactive to light  Conjunctivae are normal  Right eye exhibits no discharge  Left eye exhibits no discharge  No scleral icterus  Neck: Normal range of motion  Neck supple  No JVD present  No thyromegaly present  Cardiovascular: Normal rate, regular rhythm, intact distal pulses and normal pulses  Occasional extrasystoles are present  Murmur heard  Systolic murmur is present with a grade of 2/6  Third intercostal space right sternal border   Pulmonary/Chest: Effort normal  No accessory muscle usage  No respiratory distress  He has decreased breath sounds  He has no wheezes  He has no rales  Abdominal: Soft  Bowel sounds are normal  He exhibits no distension and no mass  Musculoskeletal: He exhibits edema  Plus one pedal and ankle edema   Neurological: He is alert and oriented to person, place, and time  Skin: Skin is warm and dry  Capillary refill takes less than 2 seconds  He is not diaphoretic  Psychiatric: He has a normal mood and affect  Nursing note and vitals reviewed                Medications/ Allergies:     Current Facility-Administered Medications:  acetaminophen 650 mg Oral Q6H PRN Sean Alvarez MD    ALPRAZolam 0 25 mg Oral HS Sean Alvarez MD    aluminum-magnesium hydroxide-simethicone 30 mL Oral Q4H PRN Cory Noel MD    amLODIPine 5 mg Oral BID Sarah Knott MD    aspirin 81 mg Oral Daily Sean Alvarez MD    atorvastatin 40 mg Oral Daily With Yohan Mcgill MD    azithromycin 500 mg Oral Q24H Sean Alvarez MD    cefTRIAXone 1,000 mg Intravenous Q24H Sean Alvarez MD Last Rate: 1,000 mg (07/05/19 1618)   escitalopram 20 mg Oral HS Sean Alvarez MD    furosemide 40 mg Intravenous BID (diuretic) Sarah Knott MD    heparin (porcine) 3-20 Units/kg/hr (Order-Specific) Intravenous Titrated TABITHA Li    hydrALAZINE 10 mg Intravenous Q6H PRN Sean Alvarez MD    hydrALAZINE 50 mg Oral Q8H [address] TABITHA Li    insulin detemir 20 Units Subcutaneous Q12H [address] Sean Alvarez MD    insulin lispro 1-6 Units Subcutaneous HS Sarah Knott MD    insulin lispro 2-12 Units Subcutaneous TID AC Sean Alvarez MD    isosorbide mononitrate 30 mg Oral Daily Navtej Isabella, DO    metoprolol tartrate 25 mg Oral Q12H [facility] Sean Alvarez MD    nitroglycerin 0 4 mg Sublingual Q5 Min PRN Navtej Isabella, DO    pneumococcal 13-valent conjugate vaccine 0 5 mL Intramuscular Prior to discharge Aislinn Mari MD        acetaminophen 650 mg Q6H PRN   aluminum-magnesium hydroxide-simethicone 30 mL Q4H PRN   hydrALAZINE 10 mg Q6H PRN   nitroglycerin 0 4 mg Q5 Min PRN   pneumococcal 13-valent conjugate vaccine 0 5 mL Prior to discharge     No Known Allergies    VTE Pharmacologic Prophylaxis:   Sequential compression device (Venodyne)  and Heparin    Labs:   Troponins:  Results from last 7 days   Lab Units 07/06/19  0553 07/05/19  2208 07/05/19  1921  07/04/19  1005   CK TOTAL U/L  --   --   --   --  287   TROPONIN I ng/mL 5 16* 1 08* 0 13*   < >  --    CK MB INDEX %  --   --   --   --  1 7    < > = values in this interval not displayed       CBC with diff:  Results from last 7 days   Lab Units 07/06/19  0553 07/05/19  0631 07/03/19  1045   WBC Thousand/uL 8 31 9 12 8 92   HEMOGLOBIN g/dL 10 8* 11 2* 12 8   HEMATOCRIT % 33 4* 34 5* 38 4   MCV fL 95 94 92   PLATELETS Thousands/uL 118* 116* 143*   MCH pg 30 6 30 4 30 6   MCHC g/dL 32 3 32 5 33 3   RDW % 14 2 14 0 14 3   MPV fL 10 8 10 8 10 8   NRBC AUTO /100 WBCs 0 0  --      CMP:  Results from last 7 days   Lab Units 07/06/19  0553 07/05/19  0631 07/04/19  1005 07/03/19  1045   SODIUM mmol/L 139 140 139 139   POTASSIUM mmol/L 4 4 4 2 4 9 4 7   CHLORIDE mmol/L 105 106 106 105   CO2 mmol/L 27 25 26 26   ANION GAP mmol/L 7 9 7 8   BUN mg/dL 56* 47* 47* 45*   CREATININE mg/dL 2 16* 1 91* 1 91* 1 80*   CALCIUM mg/dL 8 3 8 2* 8 1* 8 8   AST U/L 44  --   --  27   ALT U/L 35  --   --  36   ALK PHOS U/L 97  --   --  130*   TOTAL PROTEIN g/dL 6 7  --   --  7 7   ALBUMIN g/dL 2 6*  --   --  3 3*   TOTAL BILIRUBIN mg/dL 1 40*  --   --  1 40*   EGFR ml/min/1 73sq m 28 33 33 35     Coags:  Results from last 7 days   Lab Units 07/03/19  1045   PTT seconds 31   INR  1 08     Hgb A1c:  Results from last 7 days   Lab Units 07/03/19  1729   HEMOGLOBIN A1C % 6 7*     NT-proBNP:   Recent Labs     07/03/19  1045   NTBNP 2,452*        Imaging & Testing   I have personally reviewed pertinent reports  Xr Chest 1 View Portable    Result Date: 7/3/2019  Narrative: CHEST INDICATION:   SOB  COMPARISON:  12/4/2016 EXAM PERFORMED/VIEWS:  XR CHEST PORTABLE  AP semierect Images: 1 FINDINGS:  There are median sternotomy wires indicating prior cardiac surgery  Heart shadow is enlarged but unchanged from prior exam  Right infrahilar infiltrate noted with small pleural effusion  Left lung clear  No pneumothorax  Osseous structures appear within normal limits for patient age  Impression: Right infrahilar infiltrate with small pleural effusion  Workstation performed: [id number]     Ct Chest Wo Contrast    Result Date: 7/4/2019  Narrative: CT CHEST WITHOUT IV CONTRAST INDICATION:   Shortness of breath  COMPARISON:  No prior chest CTs available TECHNIQUE: CT examination of the chest was performed without intravenous contrast   Axial, sagittal, and coronal 2D reformatted images were created from the source data and submitted for interpretation  Radiation dose length product (DLP) for this visit:  429 43 mGy-cm   This examination, like all CT scans performed in the [facility], was performed utilizing techniques to minimize radiation dose exposure, including the use of iterative  reconstruction and automated exposure control  FINDINGS: LUNGS:  Patchy areas of focal consolidation involving the left lower lobe, left upper lobe and right middle lobe  Subsegmental atelectasis in the right lower lobe     There is no tracheal or endobronchial lesion  PLEURA:  Moderate right and small left pleural effusions  HEART/GREAT VESSELS:  Mild cardiomegaly status post CABG aortic atherosclerosis, without aneurysm  MEDIASTINUM AND LEEANN:  Unremarkable  CHEST WALL AND LOWER NECK:   Mild bilateral gynecomastia  VISUALIZED STRUCTURES IN THE UPPER ABDOMEN:  Status post cholecystectomy OSSEOUS STRUCTURES:  No acute fracture or destructive osseous lesion  Impression: 1  Patchy areas of consolidation bilaterally most likely pneumonia  Recommend follow-up chest CT in 3 months to ensure complete resolution  2   Moderate right and small left pleural effusions  Mild cardiomegaly  Workstation performed: [id number]     Nm Lung Ventilation / Perfusion    Result Date: 7/3/2019  Narrative: VENTILATION AND PERFUSION SCAN INDICATION: Shortness of breath  COMPARISON:  Chest radiograph  7/3/2019 TECHNIQUE:  Posterior ventilation imaging was performed after the inhalation of 33 mCi nebulized Tc-99m DTPA with deposition of less than 1 mCi of activity within the lungs  Multiplanar perfusion imaging was next performed following the intravenous administration of 4 4 mCi Tc-99m labeled MAA  FINDINGS:  Ventilation imaging demonstrates heterogeneous distribution of the radiopharmaceutical with some clumping centrally  Perfusion imaging demonstrates mildly heterogeneous distribution of the radiopharmaceutical throughout both lungs  There is no significant  segmental or subsegmental defect  Impression: The probability for pulmonary embolus is low  Workstation performed: [id number]     Us Kidney And Bladder    Result Date: 7/5/2019  Narrative: RENAL ULTRASOUND INDICATION:   N18 9: Chronic kidney disease, unspecified  COMPARISON: 12/5/2016 TECHNIQUE:   Ultrasound of the retroperitoneum was performed with a curvilinear transducer utilizing volumetric sweeps and still imaging techniques  FINDINGS: KIDNEYS: Symmetric and normal size  Right kidney:  10 8 x 5 6 cm  Left kidney:  10 6 x 5 3 cm  Right kidney Normal echogenicity and contour  No suspicious masses detected  No hydronephrosis  No shadowing calculi  No perinephric fluid collections  Left kidney Normal echogenicity and contour  No suspicious masses detected  No hydronephrosis  No shadowing calculi  No perinephric fluid collections  URETERS: Nonvisualized  BLADDER: The bladder is underdistended  No focal thickening or mass lesions  Impression: No hydronephrosis  Workstation performed: [id number]     Vas Lower Limb Venous Duplex Study, Complete Bilateral    Result Date: 7/4/2019  Narrative:  [facility] REPORT CLINICAL: Indications: Swelling of Limb [R22 4]  Dyspnea [R06 02]  Patient presents with shortness of breath for the past several days  Patient reports short of breath  Risk Factors The patient has history of HTN, Diabetes (Yes), HLD, CKD and previous smoking (quit >10yrs ago)  FINDINGS:  Segment  Right            Left              Impression       Impression       CFV      Normal (Patent)  Normal (Patent)     CONCLUSION:  Impression: RIGHT LOWER LIMB: No evidence of acute or chronic deep vein thrombosis  No evidence of superficial thrombophlebitis noted  Doppler evaluation shows a normal response to augmentation maneuvers  Popliteal, posterior tibial and anterior tibial arterial Doppler waveforms are triphasic  LEFT LOWER LIMB: No evidence of acute or chronic deep vein thrombosis  No evidence of superficial thrombophlebitis noted  Doppler evaluation shows a normal response to augmentation maneuvers  Popliteal, posterior tibial and anterior tibial arterial Doppler waveforms are triphasic  Pulsatile waveforms in the venous system may suggest heart failure or tricuspid valve insufficiency  Technical findings were given to Dr Torrez Session 13:30  SIGNATURE: Electronically Signed by: Arnol Salomon on 2019-07-04 09:11:23 AM       EKG / Monitor: Personally reviewed      Sinus rhythm high lateral ST depression    Cardiac testing:   Results for orders placed during the hospital encounter of 07/03/19   Echo complete with contrast if indicated    Narrative [address]  [address] [facility] [place] [id number]  [phone]    Transthoracic Echocardiogram  2D, M-mode, Doppler, and Color Doppler    Study date:  2019    Patient: Arcola Lundborg  MR number: [id number]  Account number: [de-identified]  : [id number]  Age: 66 years  Gender: Male  Status: Inpatient  Location: Bedside  Height: 73 in  Weight: 230 6 lb  BP: 124/ 68 mmHg    Indications: Cariomyopathy    Diagnoses: I42 9 - Cardiomyopathy, unspecified    Sonographer:  ROHINI Ford  Primary Physician:  Sobia Robles MD  Referring Physician:  Seda Burden DO  Group:  [facility]  Interpreting Physician:  Seda Burden DO    SUMMARY    LEFT VENTRICLE:  Systolic function was normal by visual assessment  Ejection fraction was estimated to be 55 %  There were no regional wall motion abnormalities  There was moderate concentric hypertrophy  Doppler parameters were consistent with restrictive physiology, indicative of decreased left ventricular diastolic compliance and/or increased left atrial pressure  VENTRICULAR SEPTUM:  There was paradoxical motion  These changes are consistent with a post-thoracotomy state  MITRAL VALVE:  There was mild to moderate regurgitation  AORTIC VALVE:  There was no evidence for stenosis  There was no regurgitation  TRICUSPID VALVE:  There was mild regurgitation  Pulmonary artery systolic pressure was mildly increased  HISTORY: PRIOR HISTORY: HTN, DM, CAD, HCL, CABG, Bladder Cancer, Arthritis    PROCEDURE: The procedure was performed at the bedside  This was a routine study  The transthoracic approach was used  The study included complete 2D imaging, M-mode, complete spectral Doppler, and color Doppler  The heart rate was 60 bpm,  at the start of the study  Image quality was adequate  LEFT VENTRICLE: Size was normal  Systolic function was normal by visual assessment  Ejection fraction was estimated to be 55 %  There were no regional wall motion abnormalities   There was moderate concentric hypertrophy  DOPPLER: Doppler  parameters were consistent with restrictive physiology, indicative of decreased left ventricular diastolic compliance and/or increased left atrial pressure  Doppler parameters were consistent with elevated mean left atrial filling pressure  VENTRICULAR SEPTUM: There was paradoxical motion  These changes are consistent with a post-thoracotomy state  RIGHT VENTRICLE: The size was normal  Systolic function was normal  DOPPLER: Systolic pressure was within the normal range  LEFT ATRIUM: The atrium was moderately dilated  RIGHT ATRIUM: The atrium was mildly dilated  MITRAL VALVE: Valve structure was normal  There was normal leaflet separation  No echocardiographic evidence for prolapse  DOPPLER: The transmitral velocity was within the normal range  There was no evidence for stenosis  There was mild to  moderate regurgitation  AORTIC VALVE: The valve was trileaflet  Leaflets exhibited normal thickness, normal cuspal separation, and sclerosis  DOPPLER: Transaortic velocity was within the normal range  There was no evidence for stenosis  There was no  regurgitation  TRICUSPID VALVE: The valve structure was normal  There was normal leaflet separation  DOPPLER: The transtricuspid velocity was within the normal range  There was mild regurgitation  Pulmonary artery systolic pressure was mildly increased  Estimated peak PA pressure was 44 mmHg  PULMONIC VALVE: Leaflets exhibited normal thickness, no calcification, and normal cuspal separation  DOPPLER: The transpulmonic velocity was within the normal range  There was no regurgitation  PERICARDIUM: There was no thickening  There was no pericardial effusion  AORTA: The root exhibited normal size      PULMONARY ARTERY: The size was normal  The morphology appeared normal     SYSTEM MEASUREMENT TABLES    2D mode  AoR Diam 2D: 3 4 cm  LA Diam (2D): 4 9 cm  LA/Ao (2D): 1 44  FS (2D Teich): 28 4 %  IVSd (2D): 1 29 cm  LVDEV: 101 cmï¾³  LVESV: 45 8 cmï¾³  LVIDd(2D): 4 68 cm  LVISd (2D): 3 35 cm  LVPWd (2D): 1 26 cm  SV (Teich): 55 2 cmï¾³    Apical four chamber  LVEF A4C: 52 %    Unspecified Scan Mode  MV Peak E Darvin  Mean: 1160 mm/s  MVA (PHT): 5 24 cmï¾²  PHT: 42 ms  Max P mm[Hg]  V Max: 3020 mm/s  Vmax: 2780 mm/s  RA Area: 19 6 cmï¾²  RA Volume: 49 7 cmï¾³  TAPSE: 1 5 cm    [facility] Accredited [facility]    Prepared and electronically signed by    Lexis Franklin DO  Signed 2019 14:05:34         Caitie Talavera        "This note has been constructed using a voice recognition system  Therefore there may be syntax, spelling, and/or grammatical errors   Please call if you have any questions  "

## 2019-07-07 LAB
ALBUMIN SERPL BCP-MCNC: 2.8 G/DL (ref 3.5–5)
ALP SERPL-CCNC: 102 U/L (ref 46–116)
ALT SERPL W P-5'-P-CCNC: 42 U/L (ref 12–78)
ANION GAP SERPL CALCULATED.3IONS-SCNC: 11 MMOL/L (ref 4–13)
AST SERPL W P-5'-P-CCNC: 40 U/L (ref 5–45)
BASOPHILS # BLD AUTO: 0.02 THOUSANDS/ΜL (ref 0–0.1)
BASOPHILS NFR BLD AUTO: 0 % (ref 0–1)
BILIRUB SERPL-MCNC: 1.7 MG/DL (ref 0.2–1)
BUN SERPL-MCNC: 73 MG/DL (ref 5–25)
CALCIUM SERPL-MCNC: 8.3 MG/DL (ref 8.3–10.1)
CHLORIDE SERPL-SCNC: 100 MMOL/L (ref 100–108)
CO2 SERPL-SCNC: 23 MMOL/L (ref 21–32)
CREAT SERPL-MCNC: 2.81 MG/DL (ref 0.6–1.3)
EOSINOPHIL # BLD AUTO: 0.01 THOUSAND/ΜL (ref 0–0.61)
EOSINOPHIL NFR BLD AUTO: 0 % (ref 0–6)
ERYTHROCYTE [DISTWIDTH] IN BLOOD BY AUTOMATED COUNT: 14.2 % (ref 11.6–15.1)
GFR SERPL CREATININE-BSD FRML MDRD: 21 ML/MIN/1.73SQ M
GLUCOSE SERPL-MCNC: 203 MG/DL (ref 65–140)
GLUCOSE SERPL-MCNC: 204 MG/DL (ref 65–140)
GLUCOSE SERPL-MCNC: 270 MG/DL (ref 65–140)
GLUCOSE SERPL-MCNC: 316 MG/DL (ref 65–140)
GLUCOSE SERPL-MCNC: 323 MG/DL (ref 65–140)
HCT VFR BLD AUTO: 33.1 % (ref 36.5–49.3)
HGB BLD-MCNC: 10.6 G/DL (ref 12–17)
IMM GRANULOCYTES # BLD AUTO: 0.06 THOUSAND/UL (ref 0–0.2)
IMM GRANULOCYTES NFR BLD AUTO: 1 % (ref 0–2)
LYMPHOCYTES # BLD AUTO: 1.24 THOUSANDS/ΜL (ref 0.6–4.47)
LYMPHOCYTES NFR BLD AUTO: 12 % (ref 14–44)
MAGNESIUM SERPL-MCNC: 2.9 MG/DL (ref 1.6–2.6)
MCH RBC QN AUTO: 30.4 PG (ref 26.8–34.3)
MCHC RBC AUTO-ENTMCNC: 32 G/DL (ref 31.4–37.4)
MCV RBC AUTO: 95 FL (ref 82–98)
MONOCYTES # BLD AUTO: 0.94 THOUSAND/ΜL (ref 0.17–1.22)
MONOCYTES NFR BLD AUTO: 9 % (ref 4–12)
NEUTROPHILS # BLD AUTO: 8.35 THOUSANDS/ΜL (ref 1.85–7.62)
NEUTS SEG NFR BLD AUTO: 78 % (ref 43–75)
NRBC BLD AUTO-RTO: 0 /100 WBCS
PHOSPHATE SERPL-MCNC: 3.8 MG/DL (ref 2.3–4.1)
PLATELET # BLD AUTO: 132 THOUSANDS/UL (ref 149–390)
PMV BLD AUTO: 11.2 FL (ref 8.9–12.7)
POTASSIUM SERPL-SCNC: 4.9 MMOL/L (ref 3.5–5.3)
PROT SERPL-MCNC: 7.1 G/DL (ref 6.4–8.2)
RBC # BLD AUTO: 3.49 MILLION/UL (ref 3.88–5.62)
SODIUM SERPL-SCNC: 134 MMOL/L (ref 136–145)
WBC # BLD AUTO: 10.62 THOUSAND/UL (ref 4.31–10.16)

## 2019-07-07 PROCEDURE — 84100 ASSAY OF PHOSPHORUS: CPT | Performed by: INTERNAL MEDICINE

## 2019-07-07 PROCEDURE — 94640 AIRWAY INHALATION TREATMENT: CPT

## 2019-07-07 PROCEDURE — 99233 SBSQ HOSP IP/OBS HIGH 50: CPT | Performed by: INTERNAL MEDICINE

## 2019-07-07 PROCEDURE — 80053 COMPREHEN METABOLIC PANEL: CPT | Performed by: INTERNAL MEDICINE

## 2019-07-07 PROCEDURE — 99232 SBSQ HOSP IP/OBS MODERATE 35: CPT | Performed by: INTERNAL MEDICINE

## 2019-07-07 PROCEDURE — 85025 COMPLETE CBC W/AUTO DIFF WBC: CPT | Performed by: INTERNAL MEDICINE

## 2019-07-07 PROCEDURE — 82948 REAGENT STRIP/BLOOD GLUCOSE: CPT

## 2019-07-07 PROCEDURE — 99222 1ST HOSP IP/OBS MODERATE 55: CPT | Performed by: PHYSICIAN ASSISTANT

## 2019-07-07 PROCEDURE — 83735 ASSAY OF MAGNESIUM: CPT | Performed by: INTERNAL MEDICINE

## 2019-07-07 PROCEDURE — 94760 N-INVAS EAR/PLS OXIMETRY 1: CPT

## 2019-07-07 PROCEDURE — 94664 DEMO&/EVAL PT USE INHALER: CPT

## 2019-07-07 RX ORDER — IPRATROPIUM BROMIDE AND ALBUTEROL SULFATE 2.5; .5 MG/3ML; MG/3ML
3 SOLUTION RESPIRATORY (INHALATION)
Status: DISCONTINUED | OUTPATIENT
Start: 2019-07-07 | End: 2019-07-13

## 2019-07-07 RX ORDER — FUROSEMIDE 10 MG/ML
80 INJECTION INTRAMUSCULAR; INTRAVENOUS ONCE
Status: COMPLETED | OUTPATIENT
Start: 2019-07-07 | End: 2019-07-07

## 2019-07-07 RX ORDER — FUROSEMIDE 10 MG/ML
20 SYRINGE (ML) INJECTION CONTINUOUS
Status: DISCONTINUED | OUTPATIENT
Start: 2019-07-07 | End: 2019-07-08

## 2019-07-07 RX ADMIN — IPRATROPIUM BROMIDE AND ALBUTEROL SULFATE 3 ML: 2.5; .5 SOLUTION RESPIRATORY (INHALATION) at 07:33

## 2019-07-07 RX ADMIN — ALPRAZOLAM 0.25 MG: 0.25 TABLET ORAL at 21:22

## 2019-07-07 RX ADMIN — CEFUROXIME AXETIL 500 MG: 250 TABLET ORAL at 21:22

## 2019-07-07 RX ADMIN — CEFUROXIME AXETIL 500 MG: 250 TABLET ORAL at 08:24

## 2019-07-07 RX ADMIN — AZITHROMYCIN 500 MG: 250 TABLET, FILM COATED ORAL at 08:24

## 2019-07-07 RX ADMIN — INSULIN LISPRO 5 UNITS: 100 INJECTION, SOLUTION INTRAVENOUS; SUBCUTANEOUS at 21:26

## 2019-07-07 RX ADMIN — ESCITALOPRAM OXALATE 20 MG: 10 TABLET ORAL at 21:22

## 2019-07-07 RX ADMIN — IPRATROPIUM BROMIDE AND ALBUTEROL SULFATE 3 ML: 2.5; .5 SOLUTION RESPIRATORY (INHALATION) at 20:05

## 2019-07-07 RX ADMIN — INSULIN DETEMIR 20 UNITS: 100 INJECTION, SOLUTION SUBCUTANEOUS at 08:23

## 2019-07-07 RX ADMIN — FUROSEMIDE 80 MG: 10 INJECTION, SOLUTION INTRAMUSCULAR; INTRAVENOUS at 17:49

## 2019-07-07 RX ADMIN — METOPROLOL TARTRATE 50 MG: 50 TABLET, FILM COATED ORAL at 08:24

## 2019-07-07 RX ADMIN — METOPROLOL TARTRATE 50 MG: 50 TABLET, FILM COATED ORAL at 21:21

## 2019-07-07 RX ADMIN — AMLODIPINE BESYLATE 5 MG: 5 TABLET ORAL at 08:26

## 2019-07-07 RX ADMIN — INSULIN LISPRO 8 UNITS: 100 INJECTION, SOLUTION INTRAVENOUS; SUBCUTANEOUS at 17:27

## 2019-07-07 RX ADMIN — INSULIN LISPRO 6 UNITS: 100 INJECTION, SOLUTION INTRAVENOUS; SUBCUTANEOUS at 12:22

## 2019-07-07 RX ADMIN — INSULIN DETEMIR 20 UNITS: 100 INJECTION, SOLUTION SUBCUTANEOUS at 21:25

## 2019-07-07 RX ADMIN — HYDRALAZINE HYDROCHLORIDE 50 MG: 25 TABLET ORAL at 21:22

## 2019-07-07 RX ADMIN — IPRATROPIUM BROMIDE AND ALBUTEROL SULFATE 3 ML: 2.5; .5 SOLUTION RESPIRATORY (INHALATION) at 01:45

## 2019-07-07 RX ADMIN — INSULIN LISPRO 4 UNITS: 100 INJECTION, SOLUTION INTRAVENOUS; SUBCUTANEOUS at 08:23

## 2019-07-07 RX ADMIN — Medication 20 MG/HR: at 17:50

## 2019-07-07 RX ADMIN — FUROSEMIDE 80 MG: 10 INJECTION, SOLUTION INTRAMUSCULAR; INTRAVENOUS at 10:26

## 2019-07-07 RX ADMIN — IPRATROPIUM BROMIDE AND ALBUTEROL SULFATE 3 ML: 2.5; .5 SOLUTION RESPIRATORY (INHALATION) at 14:13

## 2019-07-07 RX ADMIN — ISOSORBIDE MONONITRATE 30 MG: 30 TABLET, EXTENDED RELEASE ORAL at 08:26

## 2019-07-07 RX ADMIN — HYDRALAZINE HYDROCHLORIDE 50 MG: 25 TABLET ORAL at 15:15

## 2019-07-07 RX ADMIN — ATORVASTATIN CALCIUM 80 MG: 80 TABLET ORAL at 16:06

## 2019-07-07 RX ADMIN — HYDRALAZINE HYDROCHLORIDE 50 MG: 25 TABLET ORAL at 05:43

## 2019-07-07 RX ADMIN — ASPIRIN 81 MG: 81 TABLET, COATED ORAL at 08:24

## 2019-07-07 RX ADMIN — AMLODIPINE BESYLATE 5 MG: 5 TABLET ORAL at 17:28

## 2019-07-07 NOTE — TREATMENT PLAN
RENAL NOTE   Antony Shack 66 y o  male MRN: [id number]  Unit/Bed#: [address] [place] [address] Encounter: [id number]    · Discussed with nurse  · UO of 400 cc only since Furosemide 80 mg IV  · Will start Lasix drip at 20 mg/hr  · Give 80 mg bolus prior to starting drip

## 2019-07-07 NOTE — CONSULTS
[address] [place] [address] Rd 66 y o  male MRN: [id number]  Unit/Bed#: [address] 422[address]01 Encounter: [id number]    Physician Requesting Consult: Lionel Reddy MD    Reason for Consultation / Chief Complaint: SOB, tripod    History of Present Illness:  Shon Ramirez is a 66 y o  male originally admitted by Dr Colton Nicole for an acute CHF exacerbation with a 20lbs weight gain recently  Critical care was consulted 7/6/19 to evaluate respiratory distress  Arriving at the bedside pt is on 2L NC sats >94%, resting comfortably in his bed and able to fully participate in examination  When asked patient how he was feeling prior he stated that it was like someone "knocked the wind out of me" and he just couldn't catch his breath  He denies sharp pain anywhere specifically, denies exertional dyspnea  BNP on admission 2,500  Procal  25  No signs of infection  History obtained from chart review and patient      Past Medical History:  Past Medical History:   Diagnosis Date    Acute on chronic diastolic CHF (congestive heart failure) ([facility] ) 7/4/2019    Arthritis     Back pain     Bladder cancer ([facility] )     diagnosed  2/2016    Cancer [facility])     bladder; chemo; resection;     Coronary artery disease     has 2 coronary stents    Dental crowns present      5    Diabetes mellitus (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 behind right eye    Hematuria     hx of    Hypercholesteremia     Hypertension     Kidney stones     Wears glasses        Past Surgical History:  Past Surgical History:   Procedure Laterality Date    ABDOMINAL SURGERY      CARDIAC SURGERY      CHOLECYSTECTOMY      open    CORONARY ANGIOPLASTY WITH STENT PLACEMENT      2 stents  2009    CYSTECTOMY, PARTIAL N/A 11/30/2016    Procedure: PARTIAL CYSTECTOMY, LEFT LYMPHADENECTOMY;  Surgeon: Manav García MD;  Location: [address] [place] [address];  Service:     CYSTOSCOPY Left 10/20/2016    Procedure: CYSTOSCOPY, BILATERAL RETROGRADE PYLEOGRAM, LEFT SIDE BLADDER BIOPSY, TURBT;  Surgeon: Jeannie Gann MD;  Location: [address] [place] [address];  Service:     HERNIA REPAIR      repair [facility]    [place] CYSTOURETHROSCOPY N/A 2016    Procedure: Quilla Que;  Surgeon: Jeannie Gann MD;  Location: [address] [place] [address];  Service: Urology    [place] CYSTOURETHROSCOPY,FULGUR 2-5 CM LESN N/A 2016    Procedure: TRANSURETHRAL RESECTION OF BLADDER TUMOR (TURBT); Surgeon: Jeannie Gann MD;  Location: [address] [place] [address];  Service: Urology    TRANSURETHRAL RESECTION OF BLADDER TUMOR N/A 2016    Procedure: TRANSURETHRAL RESECTION OF BLADDER TUMOR (TURBT), Cystoscopy, deep biopsy;  Surgeon: Jeannie Gann MD;  Location: [facility];  Service:     TRANSURETHRAL RESECTION OF BLADDER TUMOR Bilateral 2016    Procedure: TRANSURETHRAL RESECTION OF BLADDER TUMOR (TURBT), Cysto, retrograde, BLADDER BIOPSY;  Surgeon: Jeannie Gann MD;  Location: [facility];  Service:        Past Family History:  Family History   Problem Relation Age of Onset    Heart disease Mother     Diabetes Mother     Diabetes Father        Social History:  Social History     Tobacco Use   Smoking Status Former Smoker    Packs/day: 0 50    Years: 5 00    Pack years: 2 50    Last attempt to quit: 1965    Years since quittin 5   Smokeless Tobacco Never Used     Social History     Substance and Sexual Activity   Alcohol Use Yes    Comment: socially     Social History     Substance and Sexual Activity   Drug Use No     Marital Status: Single    Home Medications:   Prior to Admission medications    Medication Sig Start Date End Date Taking? Authorizing Provider   aspirin (ASPIR-LOW) 81 mg EC tablet Daily 17  Yes Historical Provider, MD   atorvastatin (LIPITOR) 40 mg tablet atorvastatin 40 mg tablet   TAKE ONE TABLET DAILY   Yes Historical Provider, MD   escitalopram (LEXAPRO) 20 mg tablet Take 20 mg by mouth daily at bedtime     Yes Historical Provider, MD   insulin aspart (NovoLOG) 100 units/mL injection Inject 15 Units under the skin 3 (three) times a day before meals   Yes Historical Provider, MD   insulin aspart protamine-insulin aspart (NOVOLOG MIX 70/30 FLEXPEN) 100 Units/mL injection pen 3 (three) times a day   Yes Historical Provider, MD   insulin detemir (LEVEMIR) 100 units/mL subcutaneous injection Inject 50 Units under the skin 2 (two) times a day Indications: Type 2 Diabetes  Taken at 0700 and 1700    Yes Historical Provider, MD   metoprolol tartrate (LOPRESSOR) 25 mg tablet Take 25 mg by mouth every morning       Yes Historical Provider, MD       Inpatient Medications:  Scheduled Meds:  Current Facility-Administered Medications:  acetaminophen 650 mg Oral Q6H PRN Sean Alvarez MD   ALPRAZolam 0 25 mg Oral HS Sean Alvarez MD   aluminum-magnesium hydroxide-simethicone 30 mL Oral Q4H PRN Rosa Gamboa MD   amLODIPine 5 mg Oral BID Sean Alvarez MD   aspirin 81 mg Oral Daily Sean Alvarez MD   atorvastatin 80 mg Oral Daily With Dinner TABITHA Espinal   azithromycin 500 mg Oral Q24H Sean Alvarez MD   cefuroxime 500 mg Oral Q12H [facility]   escitalopram 20 mg Oral HS Sean Alvarez MD   hydrALAZINE 10 mg Intravenous Q6H PRN Sean Alvarez MD   hydrALAZINE 50 mg Oral Q8H [address] TABITHA Espinal   insulin detemir 20 Units Subcutaneous Q12H [address] Sean Alvarez MD   insulin lispro 1-6 Units Subcutaneous HS Sean Alvarez MD   insulin lispro 2-12 Units Subcutaneous TID AC Sean Alvarez MD   ipratropium-albuterol 3 mL Nebulization Q6H Sean Alvarez MD   isosorbide mononitrate 30 mg Oral Daily Navtej Isabella, DO   metoprolol tartrate 50 mg Oral Q12H [address] Navtej Isabella, DO   nitroglycerin 0 4 mg Sublingual Q5 Min PRN Navtej Isabella, DO   pneumococcal 13-valent conjugate vaccine 0 5 mL Intramuscular Prior to discharge Delmar Cortez MD     Continuous Infusions:   PRN Meds:    acetaminophen 650 mg Q6H PRN   aluminum-magnesium hydroxide-simethicone 30 mL Q4H PRN   hydrALAZINE 10 mg Q6H PRN   nitroglycerin 0 4 mg Q5 Min PRN   pneumococcal 13-valent conjugate vaccine 0 5 mL Prior to discharge       Allergies:  No Known Allergies    ROS:   Review of Systems   Respiratory: Negative  Negative for apnea, chest tightness and shortness of breath  Cardiovascular: Negative  Negative for chest pain  Gastrointestinal: Negative  All other systems reviewed and are negative  Vitals:  Vitals:    19 1558 19 0032 19 0148   BP: 139/65 170/72 (P) 137/63    BP Location: Left arm Left arm (P) Left arm    Pulse: 67 78 56 56   Resp: 20 20 20 22   Temp: 98 1 °F (36 7 °C) 98 °F (36 7 °C) (!) (P) 97 4 °F (36 3 °C)    TempSrc: Oral Oral (P) Tympanic    SpO2: 95% 92% 94% 100%   Weight:       Height:         Temperature:   Temp (24hrs), Av 1 °F (36 7 °C), Min:97 4 °F (36 3 °C), Max:98 5 °F (36 9 °C)    Current Temperature: (!) (P) 97 4 °F (36 3 °C)    Weights:   IBW: 79 9 kg  Body mass index is 30 13 kg/m²  Hemodynamic Monitoring:  N/A     Non-Invasive/Invasive Ventilation Settings:  Respiratory    Lab Data (Last 4 hours)    None         O2/Vent Data (Last 4 hours)    None              Lab Results   Component Value Date    PHART 7 395 2019    HBB9DST 38 4 2019    PO2ART 70 2 (L) 2019    NFH6YHV 23 0 2019    BEART -1 6 2019    SOURCE Radial, Left 2019     SpO2: SpO2: 100 %     Physical Exam:  Physical Exam   Constitutional: He is oriented to person, place, and time  He appears well-developed  No distress  HENT:   Head: Normocephalic and atraumatic  Eyes: Pupils are equal, round, and reactive to light  Conjunctivae are normal    Neck: Normal range of motion  Cardiovascular: Normal rate, regular rhythm, normal heart sounds and intact distal pulses  No murmur heard  Pulmonary/Chest: Effort normal  No respiratory distress  He has no wheezes  Abdominal: Soft  Bowel sounds are normal  He exhibits no distension  There is no tenderness  Musculoskeletal: Normal range of motion   He exhibits edema (BLE, BUE (+2) pitting)  Neurological: He is alert and oriented to person, place, and time  Skin: He is not diaphoretic  Labs:  Results from last 7 days   Lab Units 07/06/19  0914 07/06/19  0553 07/05/19  0631 07/03/19  1045   WBC Thousand/uL 10 91* 8 31 9 12 8 92   HEMOGLOBIN g/dL 11 3* 10 8* 11 2* 12 8   HEMATOCRIT % 35 4* 33 4* 34 5* 38 4   PLATELETS Thousands/uL 127* 118* 116* 143*   NEUTROS PCT %  --  73 75 73   MONOS PCT %  --  11 11 9     Results from last 7 days   Lab Units 07/06/19  0553 07/05/19  0631 07/04/19  1005 07/03/19  1045   SODIUM mmol/L 139 140 139 139   POTASSIUM mmol/L 4 4 4 2 4 9 4 7   CHLORIDE mmol/L 105 106 106 105   CO2 mmol/L 27 25 26 26   ANION GAP mmol/L 7 9 7 8   BUN mg/dL 56* 47* 47* 45*   CREATININE mg/dL 2 16* 1 91* 1 91* 1 80*   CALCIUM mg/dL 8 3 8 2* 8 1* 8 8   ALT U/L 35  --   --  36   AST U/L 44  --   --  27   ALK PHOS U/L 97  --   --  130*   ALBUMIN g/dL 2 6*  --   --  3 3*   TOTAL BILIRUBIN mg/dL 1 40*  --   --  1 40*          Results from last 7 days   Lab Units 07/06/19  0914 07/03/19  1045   INR  1 12 1 08   PTT seconds 38* 31     Results from last 7 days   Lab Units 07/06/19  1616 07/06/19  1211 07/06/19  0914 07/06/19  0553 07/05/19  2208 07/05/19  1921 07/05/19  1616   TROPONIN I ng/mL 2 74* 3 56* 3 68* 5 16* 1 08* 0 13* 0 05*         ABG:  Lab Results   Component Value Date    PHART 7 395 07/06/2019    NPM0VQV 38 4 07/06/2019    PO2ART 70 2 (L) 07/06/2019    FQC9SXD 23 0 07/06/2019    BEART -1 6 07/06/2019    SOURCE Radial, Left 07/06/2019     VBG:  Results from last 7 days   Lab Units 07/06/19  2153   ABG SOURCE  Radial, Left     Results from last 7 days   Lab Units 07/06/19  0553 07/04/19  1004 07/03/19  1641   PROCALCITONIN ng/ml 0 16 0 10 0 09       Imaging: mild-moderate vascular congestion consistent with CHF I have personally reviewed pertinent films in PACS  EKG: NSR with LVH and Prolonged Qtc This was personally reviewed by myself     Micro:  Results from last 7 days   Lab Units 07/05/19  1647   LEGIONELLA URINARY ANTIGEN  Negative   STREP PNEUMONIAE ANTIGEN, URINE  Negative       ______________________________________________________________________    Assessment and Plan:   Principal Problem:    Acute on chronic diastolic CHF (congestive heart failure) ([facility])  Active Problems:    CKD (chronic kidney disease), stage III ([facility])    Benign hypertension with chronic kidney disease, stage III ([facility])  Resolved Problems:    * No resolved hospital problems  *      Neuro: aox3, stable    CV:   Stable BP, troponin trending down, continue regimen per cardiology    Lung:   - Increased o2 requirement, baseline non-oxygen dependent, po2 on ABG 70 while on 2L NC  - increase supplemental o2 as needed  - CXR consistent with CHF, if patient should develop significant changes in respiratory status and begins to fail would recommend continued aggressive diuresis in order to optimize lungs  GI: Continue carb controlled diet, consider fluid restriction    : BRIAN progressing, defer to Renals recs but may need to continue diuresis despite elevating Creatinine  ID: consider discontinuing Abx since procal is low    Heme: stable hgb    Endo: elevated BG    Msk/Skin: activity as tolerated    Disposition: continue medsurg    Counseling / Coordination of Care  Total time spent today 30 minutes  Greater than 50% of total time was spent with the patient and / or family counseling and / or coordination of care  A description of the counseling / coordination of care:    ______________________________________________________________________    VTE Pharmacologic Prophylaxis: Heparin Drip  VTE Mechanical Prophylaxis: sequential compression device    Invasive lines and devices: Invasive Devices     Peripheral Intravenous Line            Peripheral IV 07/03/19 Right Antecubital 3 days          Drain            Open Drain Left; Anterior; Other (Comment)  days                Code Status: Level 1 - Full Code  POA:    POLST:      Given critical illness, patient length of stay will require greater than two midnights  Portions of the record may have been created with voice recognition software  Occasional wrong word or "sound a like" substitutions may have occurred due to the inherent limitations of voice recognition software  Read the chart carefully and recognize, using context, where substitutions have occurred        RENETTA Bellamy    Inpatient consult to Medical Critical Care  Consult performed by: RENETTA Bellamy  Consult ordered by: Alina Lou MD

## 2019-07-07 NOTE — PROGRESS NOTES
Patient c/o difficulty of breathing, Vital signs taken as follows, BP-158/64, WI-79-, RR-20, O2 SAT-95%, T-98 3, Dr Luis Enrique Rodriguez was notified, and was told to have the resident assess the patient  Residents came up and saw the patient but will have the ICU resident see the patient, will continue to monitor

## 2019-07-07 NOTE — UTILIZATION REVIEW
Continued Stay Review    Date: 7/5/19                          Current Patient Class: inpatient  Current Level of Care: med surg    HPI:78 y o  male w/hx CKD, CAD, CABG, HTN  initially admitted as inpatient on 7/3/19 due to acute on chronic CHF requiring IV diuresis and IV antbx  Cardiology and renal consulted  Assessment/Plan:  Per medicine:  improved, still difficulty breathing with persistent BLE edema  Unable to sleep last night  Decreased bilat air entry, +2 BLE edema with bilat knee swelling  Continue IV diuresis, adjusting bp meds-adding hydralazine  Intake/Output Summary (Last 24 hours) at 7/5/2019 0817  Last data filed at 7/5/2019 0450      Gross per 24 hour   Intake 1180 ml   Output 2700 ml   Net -1520 ml     Per renal:Also has pneumonia and BRIAN, felt to be cardiorenal syndrome  Still fluid overloaded, needs diuretics  Per cardio: still appears dyspneic on exertion  approx 3 liters diuresis since admission  No ACE/ARB due to BRIAN  Continue BB  C/o 2/10 L chest pain @1604 associated with sob  nsr on tele  Was better after 2 SL ntg, 1 episode nausea, vomiting mucus, zofran given  Pertinent Labs/Diagnostic Results:   Renal US: no hydronephrosis  Echo: LEFT VENTRICLE:Systolic function was normal by visual assessment  Ejection fraction was estimated to be 55 %  There were no regional wall motion abnormalities  There was moderate concentric hypertrophy  Doppler parameters were consistent with restrictive physiology, indicative of decreased left ventricular diastolic compliance and/or increased left atrial pressure    VENTRICULAR SEPTUM:There was paradoxical motion  These changes are consistent with a post-thoracotomy state   Maciel Deleon was mild to moderate regurgitation    AORTIC VALVE:There was no evidence for stenosis  There was no regurgitation    TRICUSPID VALVE:There was mild regurgitation  Pulmonary artery systolic pressure was mildly increased      Results from last 7 days   Lab Units 07/05/19  0631 07/03/19  1045   WBC Thousand/uL 9 12 8 92   HEMOGLOBIN g/dL 11 2* 12 8   HEMATOCRIT % 34 5* 38 4   PLATELETS Thousands/uL 116* 143*   NEUTROS ABS Thousands/µL 6 78 6 55     Results from last 7 days   Lab Units 07/05/19  0631 07/04/19  1005   SODIUM mmol/L 140 139   POTASSIUM mmol/L 4 2 4 9   CHLORIDE mmol/L 106 106   CO2 mmol/L 25 26   ANION GAP mmol/L 9 7   BUN mg/dL 47* 47*   CREATININE mg/dL 1 91* 1 91*   EGFR ml/min/1 73sq m 33 33   CALCIUM mg/dL 8 2* 8 1*   MAGNESIUM mg/dL  --   --    PHOSPHORUS mg/dL  --   --      Results from last 7 days   Lab Units 07/03/19  1045   AST U/L 27   ALT U/L 36   ALK PHOS U/L 130*   TOTAL PROTEIN g/dL 7 7   ALBUMIN g/dL 3 3*   TOTAL BILIRUBIN mg/dL 1 40*     Lab Units 07/05/19  2103 07/05/19  1608 07/05/19  1151 07/05/19  0738   POC GLUCOSE mg/dl 198* 225* 198* 91     Results from last 7 days   Lab Units 07/05/19  0631 07/04/19  1005 07/03/19  1045   GLUCOSE RANDOM mg/dL 108 219* 103     Results from last 7 days   Lab Units 07/03/19  1729   HEMOGLOBIN A1C % 6 7*   EAG mg/dl 146       Results from last 7 days   Lab Units 07/04/19  1005   CK TOTAL U/L 287   CK MB INDEX % 1 7   CK MB ng/mL 4 9       Results from last 7 days   Lab Units 07/03/19  1045   D DIMER QUANT ng/ml (FEU) 1,400*     Results from last 7 days   Lab Units 07/03/19  1045   PROTIME seconds 11 3   INR  1 08   PTT seconds 31     Results from last 7 days   Lab Units 07/04/19  1004 07/03/19  1641   PROCALCITONIN ng/ml 0 10 0 09     Results from last 7 days   Lab Units 07/03/19  1045   NT-PRO BNP pg/mL 2,452*     Results from last 7 days   Lab Units 07/03/19  1331   CLARITY UA  Slightly Cloudy   COLOR UA  Yellow   SPEC GRAV UA  >=1 030   PH UA  5 5   GLUCOSE UA mg/dl Negative   KETONES UA mg/dl Negative   BLOOD UA  Moderate*   PROTEIN UA mg/dl >=300*   NITRITE UA  Negative   BILIRUBIN UA  Negative   UROBILINOGEN UA E U /dl 0 2   LEUKOCYTES UA  Negative   WBC UA /hpf 4-10*   RBC UA /hpf 1-2* BACTERIA UA /hpf Occasional   EPITHELIAL CELLS WET PREP /hpf Occasional   MUCUS THREADS  Occasional*     Results from last 7 days   Lab 07/05/19  1647   STREP PNEUMONIAE ANTIGEN, URINE Negative   LEGIONELLA URINARY ANTIGEN Negative     Vital Signs:   07/05/19 2346  98 °F (36 7 °C)  65  20  124/58  96 %  Nasal cannula   07/05/19 2121    78    142/68       07/05/19 1928  98 1 °F (36 7 °C)  72  22  146/65  96 %  Nasal cannula 2 liters   07/05/19 1707    80  22  144/65  96 %  Nasal cannula 2 liters   07/05/19 1548    78  22  167/70  96 %     07/05/19 1505  98 2 °F (36 8 °C)  71  19  155/70  96 %  Nasal cannula   07/05/19 1201  97 7 °F (36 5 °C)  64  18  149/67  95 %  Nasal cannula   07/05/19 0752  97 7 °F (36 5 °C)  72  18  152/68  95 %  Nasal cannula   07/05/19 0636        157/74       07/05/19 0412  97 9 °F (36 6 °C)  64  20  151/71  97 %  Nasal cannula         Medications:   Scheduled Meds:   Current Facility-Administered Medications:  acetaminophen 650 mg Oral Q6H PRN   ALPRAZolam 0 25 mg Oral HS   aluminum-magnesium hydroxide-simethicone 30 mL Oral Q4H PRN x 1   amLODIPine 5 mg Oral BID   aspirin 81 mg Oral Daily   atorvastatin 80 mg Oral Daily With Dinner   azithromycin 500 mg Oral Q24H   escitalopram 20 mg Oral HS   hydrALAZINE 10 mg Intravenous Q6H PRN   hydrALAZINE 50 mg Oral Q8H [address] (25mg x 1, then 50mg x 2)   insulin detemir 20 Units Subcutaneous Q12H [place]   insulin lispro 1-6 Units Subcutaneous HS   insulin lispro 2-12 Units Subcutaneous TID AC   ipratropium-albuterol 3 mL Nebulization Q6H   isosorbide mononitrate 30 mg Oral Daily   metoprolol tartrate 25 mg Oral Q12H [place]   nitroglycerin 0 4 mg Sublingual Q5 Min PRN x 2 @1618 and 1652   pneumococcal 13-valent conjugate vaccine 0 5 mL Intramuscular Prior to discharge   IV rocephin q24h  IV zofran x 1  IV lasix bid  SQ lovenox x 2    Discharge Plan: TBD    Network Utilization Review Department  Phone: [phone];  Fax [phone]  [email or web address]  ATTENTION: Please call with any questions or concerns to [phone]  and carefully listen to the prompts so that you are directed to the right person  Send all requests for admission clinical reviews, approved or denied determinations and any other requests to fax [phone]   All voicemails are confidential

## 2019-07-07 NOTE — QUICK NOTE
S: Paged by RN due to patient being in respiratory distress  Patient seen and examined at bedside  Patient was in tripod position, with nasal cannula on 3L, SpO2 at 94%  Patient stated he felt short of breath and had not voided much throughout the day  O:  VS: /72 (BP Location: Left arm)   Pulse 78   Temp 98 °F (36 7 °C) (Oral)   Resp 20   Ht 6' 1" (1 854 m)   Wt 104 kg (228 lb 6 3 oz)   SpO2 92%   BMI 30 13 kg/m²    Gen: AAOx3, mild acute distress  HEENT: no cyanosis of lips  Heart: RRR, no murmurs  Lungs: scant movement of air in lungs bilaterally    Assessment and Plan:  Discussed case with Dr Mega Rodriguez who is covering for Dr Glynn Rodriguez recommended to place consult for ICU for further evaluation and care  Placed consult for ICU and discussed case with ICU PA on call

## 2019-07-07 NOTE — RESPIRATORY THERAPY NOTE
RT Protocol Note  Marcia Cannon 66 y o  male MRN: [id number]  Unit/Bed#: [address] [place] [address] Encounter: [id number]    Assessment    Principal Problem:    Acute on chronic diastolic CHF (congestive heart failure) ([place] [address] )  Active Problems:    CKD (chronic kidney disease), stage III ([place] [address] )    Benign hypertension with chronic kidney disease, stage III ([facility])      Home Pulmonary Medications:  NONE       Past Medical History:   Diagnosis Date    Acute on chronic diastolic CHF (congestive heart failure) ([place] [address] ) 2019    Arthritis     Back pain     Bladder cancer ([place] [address] )     diagnosed  2016    Cancer ([place] [address] )     bladder; chemo; resection;     Coronary artery disease     has 2 coronary stents    Dental crowns present      5    Diabetes mellitus ([place] [address] )     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[place] [address] )     fluid behind right eye    Hematuria     hx of    Hypercholesteremia     Hypertension     Kidney stones     Wears glasses      Social History     Socioeconomic History    Marital status: Single     Spouse name: None    Number of children: None    Years of education: None    Highest education level: None   Occupational History    None   Social Needs    Financial resource strain: None    Food insecurity:     Worry: None     Inability: None    Transportation needs:     Medical: None     Non-medical: None   Tobacco Use    Smoking status: Former Smoker     Packs/day: 0 50     Years: 5 00     Pack years: 2 50     Last attempt to quit: 1965     Years since quittin 5    Smokeless tobacco: Never Used   Substance and Sexual Activity    Alcohol use: Yes     Comment: socially    Drug use: No    Sexual activity: Never   Lifestyle    Physical activity:     Days per week: None     Minutes per session: None    Stress: None   Relationships    Social connections:     Talks on phone: None     Gets together: None     Attends Worship service: None     Active member of club or organization: None     Attends meetings of clubs or organizations: None     Relationship status: None    Intimate partner violence:     Fear of current or ex partner: None     Emotionally abused: None     Physically abused: None     Forced sexual activity: None   Other Topics Concern    None   Social History Narrative    None       Subjective         Objective    Physical Exam:   Assessment Type: Pre-treatment  General Appearance: Alert, Awake  Respiratory Pattern: Dyspnea with exertion  Chest Assessment: Chest expansion symmetrical  Bilateral Breath Sounds: Clear, Diminished  Cough: Non-productive  O2 Device: NC    Vitals:  Blood pressure (P) 137/63, pulse 56, temperature (!) (P) 97 4 °F (36 3 °C), temperature source (P) Tympanic, resp  rate 22, height 6' 1" (1 854 m), weight 104 kg (228 lb 6 3 oz), SpO2 100 %      Results from last 7 days   Lab Units 07/06/19  2153   Dangelo 30 ART  7 395   PCO2 ART mm Hg 38 4   PO2 ART mm Hg 70 2*   HCO3 ART mmol/L 23 0   BASE EXC ART mmol/L -1 6   O2 CONTENT ART mL/dL 15 4*   O2 HGB, ARTERIAL % 93 3*   ABG SOURCE  Radial, Left   SWATI TEST  Yes           O2 Device: NC     Plan    Respiratory Plan: Mild Distress pathway

## 2019-07-07 NOTE — PLAN OF CARE
Problem: Potential for Falls  Goal: Patient will remain free of falls  Description  INTERVENTIONS:  - Assess patient frequently for physical needs  -  Identify cognitive and physical deficits and behaviors that affect risk of falls    -  [place] fall precautions as indicated by assessment   - Educate patient/family on patient safety including physical limitations  - Instruct patient to call for assistance with activity based on assessment  - Modify environment to reduce risk of injury  - Consider OT/PT consult to assist with strengthening/mobility  Outcome: Progressing     Problem: PAIN - ADULT  Goal: Verbalizes/displays adequate comfort level or baseline comfort level  Description  Interventions:  - Encourage patient to monitor pain and request assistance  - Assess pain using appropriate pain scale  - Administer analgesics based on type and severity of pain and evaluate response  - Implement non-pharmacological measures as appropriate and evaluate response  - Consider cultural and social influences on pain and pain management  - Notify physician/advanced practitioner if interventions unsuccessful or patient reports new pain  Outcome: Progressing     Problem: INFECTION - ADULT  Goal: Absence or prevention of progression during hospitalization  Description  INTERVENTIONS:  - Assess and monitor for signs and symptoms of infection  - Monitor lab/diagnostic results  - Monitor all insertion sites, i e  IV  - [place] appropriate cooling/warming therapies per order  - Administer medications as ordered  - Instruct and encourage patient and family to use good hand hygiene technique  - Identify and instruct in appropriate isolation precautions for identified infection/condition   Outcome: Progressing     Problem: SAFETY ADULT  Goal: Patient will remain free of falls  Description  INTERVENTIONS:  - Assess patient frequently for physical needs  -  Identify cognitive and physical deficits and behaviors that affect risk of falls   -  [place] fall precautions as indicated by assessment   - Educate patient/family on patient safety including physical limitations  - Instruct patient to call for assistance with activity based on assessment  - Modify environment to reduce risk of injury  - Consider OT/PT consult to assist with strengthening/mobility  Outcome: Progressing  Goal: Maintain or return to baseline ADL function  Description  INTERVENTIONS:  -  Assess patient's ability to carry out ADLs; assess patient's baseline for ADL function and identify physical deficits which impact ability to perform ADLs (bathing, care of mouth/teeth, toileting, grooming, dressing, etc )  - Assess/evaluate cause of self-care deficits   - Assess range of motion  - Assess patient's mobility; develop plan if impaired  - Assess patient's need for assistive devices and provide as appropriate  - Encourage maximum independence but intervene and supervise when necessary  ¯ Involve family in performance of ADLs  ¯ Assess for home care needs following discharge   ¯ Request OT consult to assist with ADL evaluation and planning for discharge  ¯ Provide patient education as appropriate  Outcome: Progressing  Goal: Maintain or return mobility status to optimal level  Description  INTERVENTIONS:  - Assess patient's baseline mobility status (ambulation, transfers, stairs, etc )    - Identify cognitive and physical deficits and behaviors that affect mobility  - Identify mobility aids required to assist with transfers and/or ambulation (gait belt, sit-to-stand, lift, walker, cane, etc )  - [place] fall precautions as indicated by assessment  - Record patient progress and toleration of activity level on Mobility SBAR; progress patient to next Phase/Stage  - Instruct patient to call for assistance with activity based on assessment  - Request Rehabilitation consult to assist with strengthening/weightbearing, etc   Outcome: Progressing     Problem: DISCHARGE PLANNING  Goal: Discharge to home or other facility with appropriate resources  Description  INTERVENTIONS:  - Identify barriers to discharge w/patient and caregiver  - Arrange for needed discharge resources and transportation as appropriate  - Identify discharge learning needs (meds, wound care, etc )  - Arrange for interpretive services to assist at discharge as needed  - Refer to Case Management Department for coordinating discharge planning if the patient needs post-hospital services based on physician/advanced practitioner order or complex needs related to functional status, cognitive ability, or social support system  Outcome: Progressing     Problem: Knowledge Deficit  Goal: Patient/family/caregiver demonstrates understanding of disease process, treatment plan, medications, and discharge instructions  Description  Complete learning assessment and assess knowledge base  Interventions:  - Provide teaching at level of understanding  - Provide teaching via preferred learning methods  Outcome: Progressing     Problem: CARDIOVASCULAR - ADULT  Goal: Maintains optimal cardiac output and hemodynamic stability  Description  INTERVENTIONS:  - Monitor I/O, vital signs and rhythm  - Monitor for S/S and trends of decreased cardiac output i e  bleeding, hypotension  - Administer and titrate ordered vasoactive medications to optimize hemodynamic stability  - Assess quality of pulses, skin color and temperature  - Assess for signs of decreased coronary artery perfusion - ex   Angina  - Instruct patient to report change in severity of symptoms  Outcome: Progressing  Goal: Absence of cardiac dysrhythmias or at baseline rhythm  Description  INTERVENTIONS:  - Continuous cardiac monitoring, monitor vital signs, obtain 12 lead EKG if indicated  - Administer antiarrhythmic and heart rate control medications as ordered  - Monitor electrolytes and administer replacement therapy as ordered  Outcome: Progressing     Problem: RESPIRATORY - ADULT  Goal: Achieves optimal ventilation and oxygenation  Description  INTERVENTIONS:  - Assess for changes in respiratory status  - Assess for changes in mentation and behavior  - Position to facilitate oxygenation and minimize respiratory effort  - Oxygen administration by appropriate delivery method based on oxygen saturation (per order) or ABGs  - Encourage broncho-pulmonary hygiene including cough, deep breathe, Incentive Spirometry  - Assess the need for suctioning and aspirate as needed  - Assess and instruct to report SOB or any respiratory difficulty  - Respiratory Therapy support as indicated   Outcome: Progressing     Problem: Nutrition/Hydration-ADULT  Goal: Nutrient/Hydration intake appropriate for improving, restoring or maintaining nutritional needs  Description  Monitor and assess patient's nutrition/hydration status for malnutrition (ex- brittle hair, bruises, dry skin, pale skin and conjunctiva, muscle wasting, smooth red tongue, and disorientation)  Collaborate with interdisciplinary team and initiate plan and interventions as ordered  Monitor patient's weight and dietary intake as ordered or per policy  Utilize nutrition screening tool and intervene per policy  Determine patient's food preferences and provide high-protein, high-caloric foods as appropriate       INTERVENTIONS:  - Monitor oral intake, urinary output, labs, and treatment plans  - Assess nutrition and hydration status and recommend course of action  - Evaluate amount of meals eaten  - Assist patient with eating if necessary   - Allow adequate time for meals  - Recommend/ encourage appropriate diets, oral nutritional supplements, and vitamin/mineral supplements  - Order, calculate, and assess calorie counts as needed  - Recommend, monitor, and adjust tube feedings and TPN/PPN based on assessed needs  - Assess need for intravenous fluids  - Provide specific nutrition/hydration education as appropriate  - Include patient/family/caregiver in decisions related to nutrition  Outcome: Progressing

## 2019-07-07 NOTE — UTILIZATION REVIEW
Continued Stay Review    Date: 7/6/19                          Current Patient Class: inpatient  Current Level of Care: med surg    Assessment/Plan: 2/6 systolic murmur, decreased breath snds with +1 pedal/ankle edema  Feeling anxious  dyspnea, edema, and chest pain have improved, results c/w NSTEMI  Trops are elevated today  Renal function worsened, holding diuretic  Starting plavix, lovenox/heparin  Increasing beta blocker for better bp control  Although improved, still notably volume overloaded in setting of likely cardiorenal syndrome  Will need future ischemic workup once respiratory and renal conditions are stabilized  zithromax continues for pneumonia  Renal and cardiology are following  Evening episode of "difficulty breathing" per RN note  Pertinent Labs/Diagnostic Results:   7/6 @1254 CXR: Small bilateral pleural effusions right larger than left  Patchy airspace opacities left lung potentially pneumonia  7/2 @1426 EKG: Sinus rhythm with marked sinus arrhythmia  Possible Left atrial enlargement  Left ventricular hypertrophy  7/6 @2159 PCXR: Mild pulmonary vascular congestion  Left upper and lower lobe patchy airspace disease may represent pulmonary edema from CHF or infectious infiltrate      Lab Units 07/06/19  0914 07/06/19  0553 07/05/19  0631 07/03/19  1045   WBC Thousand/uL 10 91* 8 31 9 12 8 92   HEMOGLOBIN g/dL 11 3* 10 8* 11 2* 12 8   HEMATOCRIT % 35 4* 33 4* 34 5* 38 4   PLATELETS Thousands/uL 127* 118* 116* 143*   NEUTROS ABS Thousands/µL  --  6 08 6 78 6 55     Lab Units 07/06/19  0553 07/05/19  0631 07/04/19  1005   SODIUM mmol/L 139 140 139   POTASSIUM mmol/L 4 4 4 2 4 9   CHLORIDE mmol/L 105 106 106   CO2 mmol/L 27 25 26   ANION GAP mmol/L 7 9 7   BUN mg/dL 56* 47* 47*   CREATININE mg/dL 2 16* 1 91* 1 91*   EGFR ml/min/1 73sq m 28 33 33   CALCIUM mg/dL 8 3 8 2* 8 1*   MAGNESIUM mg/dL  --   --   --    PHOSPHORUS mg/dL  --   --   --      Lab Units 07/06/19  0553 07/03/19  1045   AST U/L 44 27   ALT U/L 35 36   ALK PHOS U/L 97 130*   TOTAL PROTEIN g/dL 6 7 7 7   ALBUMIN g/dL 2 6* 3 3*   TOTAL BILIRUBIN mg/dL 1 40* 1 40*     Lab Units 07/06/19  2051 07/06/19  1604 07/06/19  1159 07/06/19  0718 07/05/19  2103 07/05/19  1608 07/05/19  1151 07/05/19  0738   POC GLUCOSE mg/dl 313* 245* 208* 122 198* 225* 198* 91     Lab Units 07/06/19  0553 07/05/19  0631 07/04/19  1005 07/03/19  1045   GLUCOSE RANDOM mg/dL 124 108 219* 103       Results from last 7 days   Lab Units 07/06/19  2153   [place] [address] ART  7 395   PCO2 ART mm Hg 38 4   PO2 ART mm Hg 70 2*   HCO3 ART mmol/L 23 0   BASE EXC ART mmol/L -1 6   O2 CONTENT ART mL/dL 15 4*   O2 HGB, ARTERIAL % 93 3*   ABG SOURCE  Radial, Left     Results from last 7 days   Lab Units 07/06/19  1616 07/06/19  1211 07/06/19  0914 07/06/19  0553   TROPONIN I ng/mL 2 74* 3 56* 3 68* 5 16*       Results from last 7 days   Lab Units 07/06/19  0914 07/03/19  1045   PROTIME seconds 11 7 11 3   INR  1 12 1 08   PTT seconds 38* 31     Results from last 7 days   Lab Units 07/06/19  0553 07/04/19  1004 07/03/19  1641   PROCALCITONIN ng/ml 0 16 0 10 0 09       Vital Signs:   07/06/19 2242            Nasal cannula 5 liters   07/06/19 2014  98 °F (36 7 °C)  78  20  170/72  92 %  Nasal cannula 2 liters   07/06/19 1558  98 1 °F (36 7 °C)  67  20  139/65  95 %  Nasal cannula 2 liters   07/06/19 1207  98 5 °F (36 9 °C)  66  20  140/63  96 %  Nasal cannula 2 liters   07/06/19 0800  98 3 °F (36 8 °C)  74  20  148/66  95 %  Nasal cannula 2 liters   07/06/19 0410  98 2 °F (36 8 °C)  67  18  136/65  96 %  Nasal cannula 2 liters         Medications:   Scheduled Meds:   Current Facility-Administered Medications:  acetaminophen 650 mg Oral Q6H PRN   ALPRAZolam 0 25 mg Oral HS   aluminum-magnesium hydroxide-simethicone 30 mL Oral Q4H PRN x 1   amLODIPine 5 mg Oral BID   aspirin 81 mg Oral Daily   atorvastatin 80 mg Oral Daily With Dinner   azithromycin 500 mg Oral Q24H   cefuroxime 500 mg Oral Q12H [address]   escitalopram 20 mg Oral HS   hydrALAZINE 10 mg Intravenous Q6H PRN   hydrALAZINE 50 mg Oral Q8H [address]   insulin detemir 20 Units Subcutaneous Q12H [address]   insulin lispro 1-6 Units Subcutaneous HS   insulin lispro 2-12 Units Subcutaneous TID AC   ipratropium-albuterol 3 mL Nebulization Q6H   isosorbide mononitrate 30 mg Oral Daily   metoprolol tartrate 25 mg Oral Q12H [address] x 1, then 50mg x 1   nitroglycerin 0 4 mg Sublingual Q5 Min PRN     SQ lovenox x 1  IV lasix x 1    Discharge Plan: TBD    Network Utilization Review Department  Phone: [phone]; Fax [phone]  [email or web address]  ATTENTION: Please call with any questions or concerns to [phone]  and carefully listen to the prompts so that you are directed to the right person  Send all requests for admission clinical reviews, approved or denied determinations and any other requests to fax [phone]   All voicemails are confidential

## 2019-07-07 NOTE — PROGRESS NOTES
Progress Note - Ilene Deleon 66 y o  male MRN: [id number]    Unit/Bed#: [address] [place] [address] Encounter: [id number]      Subjective:   Patient has some difficulty breathing persistent leg edema last night respiratory distress patient's creatinine jumped to 2 8 urine output is low evaluated by the home critical care all the consult follow-up labs x-ray reviewed remaining review of system unremarkable unchanged since yesterday total of 12 done    Objective:   As below    Vitals: Blood pressure 141/67, pulse 55, temperature 98 2 °F (36 8 °C), temperature source Tympanic, resp  rate 22, height 6' 1" (1 854 m), weight 104 kg (230 lb 3 2 oz), SpO2 95 %  ,Body mass index is 30 37 kg/m²      Intake/Output:    Intake/Output Summary (Last 24 hours) at 7/7/2019 1331  Last data filed at 7/7/2019 1251  Gross per 24 hour   Intake 530 ml   Output 820 ml   Net -290 ml       Physical Exam:  General Appearance:  Alert oriented x3  Skin:  No rash normal  H/ENT:  No congestion no swelling  Eyes:  No redness no discharged normal unremarkable  Cardiac:  Regular soft systolic murmur  Pulmonary:  Decreased air entry bilateral bilateral rales no wheezing Complete exam done as above today  Gastrointestinal:  Nontender no distension no mass palpable  Extremities:  2+ edema bilateral persistent bilateral no improvement  Musculoskeletal:  Knee swelling bilateral  Neuro:  No new deficit gait dysfunction  Complete exam done today as above  Complete exam done as above for July 7, 2019  Current Facility-Administered Medications   Medication Dose Route Frequency    acetaminophen (TYLENOL) tablet 650 mg  650 mg Oral Q6H PRN    ALPRAZolam (XANAX) tablet 0 25 mg  0 25 mg Oral HS    aluminum-magnesium hydroxide-simethicone (MYLANTA) 200-200-20 mg/5 mL oral suspension 30 mL  30 mL Oral Q4H PRN    amLODIPine (NORVASC) tablet 5 mg  5 mg Oral BID    aspirin (ECOTRIN LOW STRENGTH) EC tablet 81 mg  81 mg Oral Daily    atorvastatin (LIPITOR) tablet 80 mg  80 mg Oral Daily With Dinner    azithromycin (ZITHROMAX) tablet 500 mg  500 mg Oral Q24H    cefuroxime (CEFTIN) tablet 500 mg  500 mg Oral Q12H [facility]    escitalopram (LEXAPRO) tablet 20 mg  20 mg Oral HS    hydrALAZINE (APRESOLINE) injection 10 mg  10 mg Intravenous Q6H PRN    hydrALAZINE (APRESOLINE) tablet 50 mg  50 mg Oral Q8H [facility]    insulin detemir (LEVEMIR) subcutaneous injection 20 Units  20 Units Subcutaneous Q12H [facility]    insulin lispro (HumaLOG) 100 units/mL subcutaneous injection 1-6 Units  1-6 Units Subcutaneous HS    insulin lispro (HumaLOG) 100 units/mL subcutaneous injection 2-12 Units  2-12 Units Subcutaneous TID AC    ipratropium-albuterol (DUO-NEB) 0 5-2 5 mg/3 mL inhalation solution 3 mL  3 mL Nebulization Q6H    isosorbide mononitrate (IMDUR) 24 hr tablet 30 mg  30 mg Oral Daily    metoprolol tartrate (LOPRESSOR) tablet 50 mg  50 mg Oral Q12H [place]    nitroglycerin (NITROSTAT) SL tablet 0 4 mg  0 4 mg Sublingual Q5 Min PRN    pneumococcal 13-valent conjugate vaccine (PREVNAR-13) IM injection 0 5 mL  0 5 mL Intramuscular Prior to discharge       Radiology Results:  Reviewed    Lab, Imaging and other studies:   CBC:   Lab Results   Component Value Date    WBC 10 62 (H) 07/07/2019    WBC 8 8 01/05/2016    RBC 3 49 (L) 07/07/2019    RBC 4 61 (L) 01/05/2016     BMP:   Lab Results   Component Value Date    GLUCOSE 131 (H) 01/05/2016    SODIUM 134 (L) 07/07/2019    CO2 23 07/07/2019    CO2 27 01/05/2016    BUN 73 (H) 07/07/2019    BUN 35 (H) 01/05/2016    CREATININE 2 81 (H) 07/07/2019    CREATININE 1 5 (H) 01/05/2016    CALCIUM 8 3 07/07/2019    CALCIUM 9 0 01/05/2016     Coagulation:   Lab Results   Component Value Date    INR 1 12 07/06/2019     Cardiac markers:   Lab Results   Component Value Date    CKMB 4 9 07/04/2019     ABGs: No results found for: PH   Results from last 7 days   Lab Units 07/07/19  0510   POTASSIUM mmol/L 4 9   CHLORIDE mmol/L 100   CO2 mmol/L 23   BUN mg/dL 73*   CREATININE mg/dL 2 81*   CALCIUM mg/dL 8 3   ALK PHOS U/L 102   ALT U/L 42   AST U/L 40       Assessment:  Principal Problem:    Acute on chronic diastolic CHF (congestive heart failure) (HCC)  Active Problems:    CKD (chronic kidney disease), stage III (HCC)    Benign hypertension with chronic kidney disease, stage III (HCC)   coronary artery disease status post CABG  suspected pneumonia  Acute on chronic renal failure creatinine now 2 8 baseline 1 3  Acute on chronic diastolic CHF  Pneumonia  Pleural effusion  Type 1 diabetes  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  Acute MI type 1  Plan:  Patient's troponin elevated at started on subcu heparin  Continue monitoring on telemetry while patient being treated for acute CHF  Creatinine jumped 2 8 given Lasix 80 mg 1 dose depending on the the response adjusted dosage or start the patient on Lasix drip  Patient's elevated troponin suggested acute MI possibly type 1 started on Imdur to control the blood pressure with hydralazine line and beta-blocker  Labs in the morning      VTE Pharmacologic Prophylaxis: Enoxaparin (Lovenox)    Sobia Robles MD  7/7/2019, 1:31 PM

## 2019-07-07 NOTE — PROGRESS NOTES
Progress Note - Cardiology   [facility]     Flo Oswald 66 y o  male MRN: [id number]  : 1940  Unit/Bed#: [address] [place] [address] Encounter: [id number]    Assessment and Plan:   1  Acute on chronic diastolic heart failure:  Patient with appearance of worsening CHF on chest x-ray  Discussed with Nephrology, they will order Lasix 80 mg IV x1 if no improvement noted Lasix drip will need be initiated  If no improvement, patient will most likely need hemodialysis  Continue beta-blocker, nitrate and hydralazine  No ACE/ARB due to chronic kidney disease  Will recheck patient's echocardiogram (limited study) to evaluate for any wall motion abnormality due to recent abnormal troponins  2  Mi type 1:  Peak troponin 5  16  Continue beta-blocker, nitrates and hydralazine  Patient on IV heparin and high-intensity statin therapy  Will need ischemic workup once stable from respiratory and renal standpoint  3  Acute kidney injury on chronic kidney disease:  Lasix had been held due to elevation in creatinine  Patient now with increasing CHF, nephrology to order diuretics and if no improvement in patient status he will need hemodialysis  4  Hypertension:  Stable at present time  Will continue to monitor    5  Dyslipidemia:  Continue high-intensity statins    6  Diabetes:  Managed per the primary team    7  Coronary artery disease with history of CABG x1:  Records pending from [facility]   Continue optimal medical management at this time till respiratory and renal standpoint as stabilized  He    8  Community-acquired pneumonia:  Antibiotics per the primary team    9  Chronic venous stasis both lower extremities    10  Chronic back pain     Subjective / Objective:   Patient seen examined  Events of last evening noted  Patient seen by ICU staff for sudden onset of shortness of breath  Chest x-ray was performed which demonstrates increasing CHF    Patient's diuretics had been discontinued earlier in the day due to worsening renal function  Discussed with Nephrology  Vitals: Blood pressure 142/63, pulse 67, temperature 98 °F (36 7 °C), temperature source Tympanic, resp  rate 20, height 6' 1" (1 854 m), weight 104 kg (230 lb 3 2 oz), SpO2 95 %  Vitals:    07/06/19 0600 07/07/19 0600   Weight: 104 kg (228 lb 6 3 oz) 104 kg (230 lb 3 2 oz)     Body mass index is 30 37 kg/m²  BP Readings from Last 3 Encounters:   07/07/19 142/63   12/05/16 136/62   10/20/16 168/88     Orthostatic Blood Pressures      Most Recent Value   Blood Pressure  142/63 filed at 07/07/2019 0819   Patient Position - Orthostatic VS  Lying filed at 07/07/2019 0819        I/O       07/05 0701 - 07/06 0700 07/06 0701 - 07/07 0700 07/07 0701 - 07/08 0700    P  O  645 640     I V  (mL/kg) 10 (0 1) 10 (0 1)     Total Intake(mL/kg) 655 (6 3) 650 (6 3)     Urine (mL/kg/hr) 1200 (0 5) 990 (0 4)     Total Output 1200 990     Net -545 -340                Invasive Devices     Peripheral Intravenous Line            Peripheral IV 07/03/19 Right Antecubital 3 days          Drain            Open Drain Left; Anterior; Other (Comment)  days                  Intake/Output Summary (Last 24 hours) at 7/7/2019 0850  Last data filed at 7/7/2019 0510  Gross per 24 hour   Intake 650 ml   Output 750 ml   Net -100 ml         Physical Exam:   Physical Exam   Constitutional: He is oriented to person, place, and time  He appears well-developed and well-nourished  No distress  HENT:   Head: Normocephalic and atraumatic  Right Ear: External ear normal    Left Ear: External ear normal    Eyes: Pupils are equal, round, and reactive to light  Conjunctivae are normal  Right eye exhibits no discharge  Left eye exhibits no discharge  No scleral icterus  Neck: Normal range of motion  Neck supple  No JVD present  No thyromegaly present  Cardiovascular: Normal rate, regular rhythm, normal heart sounds, intact distal pulses and normal pulses  Occasional extrasystoles are present  No murmur heard  Pulmonary/Chest: Effort normal  No accessory muscle usage  No respiratory distress  He has no wheezes  He has rales in the right middle field, the right lower field and the left lower field  Increasing crackles in lungs  , chest x-ray performed last evening demonstrates worsening CHF  Abdominal: Soft  Bowel sounds are normal  He exhibits no distension and no mass  Musculoskeletal: He exhibits no edema  Neurological: He is alert and oriented to person, place, and time  Skin: Skin is warm and dry  Capillary refill takes less than 2 seconds  He is not diaphoretic  Psychiatric: He has a normal mood and affect  Nursing note and vitals reviewed              Medications/ Allergies:     Current Facility-Administered Medications:  acetaminophen 650 mg Oral Q6H PRN Sean Alvarez MD   ALPRAZolam 0 25 mg Oral HS Sean Alvarez MD   aluminum-magnesium hydroxide-simethicone 30 mL Oral Q4H PRN Loc Nicole MD   amLODIPine 5 mg Oral BID Aislinn Mari MD   aspirin 81 mg Oral Daily Sean Alvarez MD   atorvastatin 80 mg Oral Daily With Dinner TABITHA Mohr   azithromycin 500 mg Oral Q24H Sean Alvarez MD   cefuroxime 500 mg Oral Q12H [facility]   escitalopram 20 mg Oral HS Sean Alvarez MD   hydrALAZINE 10 mg Intravenous Q6H PRN Sean Alvarez MD   hydrALAZINE 50 mg Oral Q8H [facility] TABITHA Mohr   insulin detemir 20 Units Subcutaneous Q12H [facility] Sean Alvarez MD   insulin lispro 1-6 Units Subcutaneous HS Sean Alvarez MD   insulin lispro 2-12 Units Subcutaneous TID AC Sean Alvarez MD   ipratropium-albuterol 3 mL Nebulization Q6H Sean Alvarez MD   isosorbide mononitrate 30 mg Oral Daily [facility], [facility]   metoprolol tartrate 50 mg Oral Q12H [facility], DO   nitroglycerin 0 4 mg Sublingual Q5 Min PRN Navte Isabella, DO   pneumococcal 13-valent conjugate vaccine 0 5 mL Intramuscular Prior to discharge Aislinn Mari MD       acetaminophen 650 mg Q6H PRN aluminum-magnesium hydroxide-simethicone 30 mL Q4H PRN   hydrALAZINE 10 mg Q6H PRN   nitroglycerin 0 4 mg Q5 Min PRN   pneumococcal 13-valent conjugate vaccine 0 5 mL Prior to discharge     No Known Allergies    VTE Pharmacologic Prophylaxis:   Sequential compression device (Venodyne)     Labs:   Troponins:  Results from last 7 days   Lab Units 07/06/19  1616 07/06/19  1211 07/06/19  0914  07/04/19  1005   CK TOTAL U/L  --   --   --   --  287   TROPONIN I ng/mL 2 74* 3 56* 3 68*   < >  --    CK MB INDEX %  --   --   --   --  1 7    < > = values in this interval not displayed       CBC with diff:  Results from last 7 days   Lab Units 07/07/19  0510 07/06/19  0914 07/06/19  0553 07/05/19  0631 07/03/19  1045   WBC Thousand/uL 10 62* 10 91* 8 31 9 12 8 92   HEMOGLOBIN g/dL 10 6* 11 3* 10 8* 11 2* 12 8   HEMATOCRIT % 33 1* 35 4* 33 4* 34 5* 38 4   MCV fL 95 95 95 94 92   PLATELETS Thousands/uL 132* 127* 118* 116* 143*   MCH pg 30 4 30 3 30 6 30 4 30 6   MCHC g/dL 32 0 31 9 32 3 32 5 33 3   RDW % 14 2 14 3 14 2 14 0 14 3   MPV fL 11 2 10 3 10 8 10 8 10 8   NRBC AUTO /100 WBCs 0  --  0 0  --      CMP:  Results from last 7 days   Lab Units 07/07/19  0510 07/06/19  0553 07/05/19  0631 07/04/19  1005 07/03/19  1045   SODIUM mmol/L 134* 139 140 139 139   POTASSIUM mmol/L 4 9 4 4 4 2 4 9 4 7   CHLORIDE mmol/L 100 105 106 106 105   CO2 mmol/L 23 27 25 26 26   ANION GAP mmol/L 11 7 9 7 8   BUN mg/dL 73* 56* 47* 47* 45*   CREATININE mg/dL 2 81* 2 16* 1 91* 1 91* 1 80*   CALCIUM mg/dL 8 3 8 3 8 2* 8 1* 8 8   AST U/L 40 44  --   --  27   ALT U/L 42 35  --   --  36   ALK PHOS U/L 102 97  --   --  130*   TOTAL PROTEIN g/dL 7 1 6 7  --   --  7 7   ALBUMIN g/dL 2 8* 2 6*  --   --  3 3*   TOTAL BILIRUBIN mg/dL 1 70* 1 40*  --   --  1 40*   EGFR ml/min/1 73sq m 21 28 33 33 35     Magnesium:  Results from last 7 days   Lab Units 07/07/19  0510   MAGNESIUM mg/dL 2 9*     Coags:  Results from last 7 days   Lab Units 07/06/19  0914 07/03/19  1045   PTT seconds 38* 31   INR  1 12 1 08     Hgb A1c:  Results from last 7 days   Lab Units 07/03/19  1729   HEMOGLOBIN A1C % 6 7*       Imaging & Testing   I have personally reviewed pertinent reports  Xr Chest Portable    Result Date: 7/7/2019  Narrative: CHEST INDICATION:   SOB  COMPARISON:  July 6, 2019, 12:54 PM  Mickeal Brush PERFORMED/VIEWS:  XR CHEST PORTABLE FINDINGS: Heart shadow is enlarged but unchanged from prior exam   Post median sternotomy  Mild pulmonary vascular congestion  Left upper and lower lobe patchy airspace disease may represent pulmonary edema or infectious infiltrate  The lungs are clear  No pneumothorax or pleural effusion  Osseous structures appear within normal limits for patient age  Postcholecystectomy  Impression: Mild pulmonary vascular congestion  Left upper and lower lobe patchy airspace disease may represent pulmonary edema from CHF or infectious infiltrate  Workstation performed: [id number]     Xr Chest 1 View Portable    Result Date: 7/3/2019  Narrative: CHEST INDICATION:   SOB  COMPARISON:  12/4/2016 EXAM PERFORMED/VIEWS:  XR CHEST PORTABLE  AP semierect Images: 1 FINDINGS:  There are median sternotomy wires indicating prior cardiac surgery  Heart shadow is enlarged but unchanged from prior exam  Right infrahilar infiltrate noted with small pleural effusion  Left lung clear  No pneumothorax  Osseous structures appear within normal limits for patient age  Impression: Right infrahilar infiltrate with small pleural effusion  Workstation performed: [id number]     Ct Chest Wo Contrast    Result Date: 7/4/2019  Narrative: CT CHEST WITHOUT IV CONTRAST INDICATION:   Shortness of breath  COMPARISON:  No prior chest CTs available TECHNIQUE: CT examination of the chest was performed without intravenous contrast   Axial, sagittal, and coronal 2D reformatted images were created from the source data and submitted for interpretation   Radiation dose length product (DLP) for this visit:  429 43 mGy-cm   This examination, like all CT scans performed in the [facility], was performed utilizing techniques to minimize radiation dose exposure, including the use of iterative  reconstruction and automated exposure control  FINDINGS: LUNGS:  Patchy areas of focal consolidation involving the left lower lobe, left upper lobe and right middle lobe  Subsegmental atelectasis in the right lower lobe     There is no tracheal or endobronchial lesion  PLEURA:  Moderate right and small left pleural effusions  HEART/GREAT VESSELS:  Mild cardiomegaly status post CABG aortic atherosclerosis, without aneurysm  MEDIASTINUM AND LEEANN:  Unremarkable  CHEST WALL AND LOWER NECK:   Mild bilateral gynecomastia  VISUALIZED STRUCTURES IN THE UPPER ABDOMEN:  Status post cholecystectomy OSSEOUS STRUCTURES:  No acute fracture or destructive osseous lesion  Impression: 1  Patchy areas of consolidation bilaterally most likely pneumonia  Recommend follow-up chest CT in 3 months to ensure complete resolution  2   Moderate right and small left pleural effusions  Mild cardiomegaly  Workstation performed: [id number]     Nm Lung Ventilation / Perfusion    Result Date: 7/3/2019  Narrative: VENTILATION AND PERFUSION SCAN INDICATION: Shortness of breath  COMPARISON:  Chest radiograph  7/3/2019 TECHNIQUE:  Posterior ventilation imaging was performed after the inhalation of 33 mCi nebulized Tc-99m DTPA with deposition of less than 1 mCi of activity within the lungs  Multiplanar perfusion imaging was next performed following the intravenous administration of 4 4 mCi Tc-99m labeled MAA  FINDINGS:  Ventilation imaging demonstrates heterogeneous distribution of the radiopharmaceutical with some clumping centrally  Perfusion imaging demonstrates mildly heterogeneous distribution of the radiopharmaceutical throughout both lungs  There is no significant  segmental or subsegmental defect  Impression:  The probability for pulmonary embolus is low  Workstation performed: [id number]     Us Kidney And Bladder    Result Date: 7/5/2019  Narrative: RENAL ULTRASOUND INDICATION:   N18 9: Chronic kidney disease, unspecified  COMPARISON: 12/5/2016 TECHNIQUE:   Ultrasound of the retroperitoneum was performed with a curvilinear transducer utilizing volumetric sweeps and still imaging techniques  FINDINGS: KIDNEYS: Symmetric and normal size  Right kidney:  10 8 x 5 6 cm  Left kidney:  10 6 x 5 3 cm  Right kidney Normal echogenicity and contour  No suspicious masses detected  No hydronephrosis  No shadowing calculi  No perinephric fluid collections  Left kidney Normal echogenicity and contour  No suspicious masses detected  No hydronephrosis  No shadowing calculi  No perinephric fluid collections  URETERS: Nonvisualized  BLADDER: The bladder is underdistended  No focal thickening or mass lesions  Impression: No hydronephrosis  Workstation performed: [id number]     Vas Lower Limb Venous Duplex Study, Complete Bilateral    Result Date: 7/4/2019  Narrative:  [facility] REPORT CLINICAL: Indications: Swelling of Limb [R22 4]  Dyspnea [R06 02]  Patient presents with shortness of breath for the past several days  Patient reports short of breath  Risk Factors The patient has history of HTN, Diabetes (Yes), HLD, CKD and previous smoking (quit >10yrs ago)  FINDINGS:  Segment  Right            Left              Impression       Impression       CFV      Normal (Patent)  Normal (Patent)     CONCLUSION:  Impression: RIGHT LOWER LIMB: No evidence of acute or chronic deep vein thrombosis  No evidence of superficial thrombophlebitis noted  Doppler evaluation shows a normal response to augmentation maneuvers  Popliteal, posterior tibial and anterior tibial arterial Doppler waveforms are triphasic  LEFT LOWER LIMB: No evidence of acute or chronic deep vein thrombosis  No evidence of superficial thrombophlebitis noted   Doppler evaluation shows a normal response to augmentation maneuvers  Popliteal, posterior tibial and anterior tibial arterial Doppler waveforms are triphasic  Pulsatile waveforms in the venous system may suggest heart failure or tricuspid valve insufficiency  Technical findings were given to Dr Nicolette Ely 13:30  SIGNATURE: Electronically Signed by: Chelle Riggins on 2019 09:11:23 AM       EKG / Monitor: Personally reviewed  Sinus rhythm    Cardiac testing:   Results for orders placed during the hospital encounter of 19   Echo complete with contrast if indicated    Lizeth Monroy [address]  [address] [facility] [place] [address]  [phone]    Transthoracic Echocardiogram  2D, M-mode, Doppler, and Color Doppler    Study date:  2019    Patient: Patsy Ramirez  MR number: [id number]  Account number: [de-identified]  :   Age: 66 years  Gender: Male  Status: Inpatient  Location: Bedside  Height: 73 in  Weight: 230 6 lb  BP: 124/ 68 mmHg    Indications: Cariomyopathy    Diagnoses: I42 9 - Cardiomyopathy, unspecified    Sonographer:  ROHINI Perera  Primary Physician:  Jorge Martinez MD  Referring Physician:  Prisca Amos DO  Group:  [facility]  Interpreting Physician:  Prisca Amos DO    SUMMARY    LEFT VENTRICLE:  Systolic function was normal by visual assessment  Ejection fraction was estimated to be 55 %  There were no regional wall motion abnormalities  There was moderate concentric hypertrophy  Doppler parameters were consistent with restrictive physiology, indicative of decreased left ventricular diastolic compliance and/or increased left atrial pressure  VENTRICULAR SEPTUM:  There was paradoxical motion  These changes are consistent with a post-thoracotomy state  MITRAL VALVE:  There was mild to moderate regurgitation  AORTIC VALVE:  There was no evidence for stenosis  There was no regurgitation      TRICUSPID VALVE:  There was mild regurgitation  Pulmonary artery systolic pressure was mildly increased  HISTORY: PRIOR HISTORY: HTN, DM, CAD, HCL, CABG, Bladder Cancer, Arthritis    PROCEDURE: The procedure was performed at the bedside  This was a routine study  The transthoracic approach was used  The study included complete 2D imaging, M-mode, complete spectral Doppler, and color Doppler  The heart rate was 60 bpm,  at the start of the study  Image quality was adequate  LEFT VENTRICLE: Size was normal  Systolic function was normal by visual assessment  Ejection fraction was estimated to be 55 %  There were no regional wall motion abnormalities  There was moderate concentric hypertrophy  DOPPLER: Doppler  parameters were consistent with restrictive physiology, indicative of decreased left ventricular diastolic compliance and/or increased left atrial pressure  Doppler parameters were consistent with elevated mean left atrial filling pressure  VENTRICULAR SEPTUM: There was paradoxical motion  These changes are consistent with a post-thoracotomy state  RIGHT VENTRICLE: The size was normal  Systolic function was normal  DOPPLER: Systolic pressure was within the normal range  LEFT ATRIUM: The atrium was moderately dilated  RIGHT ATRIUM: The atrium was mildly dilated  MITRAL VALVE: Valve structure was normal  There was normal leaflet separation  No echocardiographic evidence for prolapse  DOPPLER: The transmitral velocity was within the normal range  There was no evidence for stenosis  There was mild to  moderate regurgitation  AORTIC VALVE: The valve was trileaflet  Leaflets exhibited normal thickness, normal cuspal separation, and sclerosis  DOPPLER: Transaortic velocity was within the normal range  There was no evidence for stenosis  There was no  regurgitation  TRICUSPID VALVE: The valve structure was normal  There was normal leaflet separation  DOPPLER: The transtricuspid velocity was within the normal range   There was mild regurgitation  Pulmonary artery systolic pressure was mildly increased  Estimated peak PA pressure was 44 mmHg  PULMONIC VALVE: Leaflets exhibited normal thickness, no calcification, and normal cuspal separation  DOPPLER: The transpulmonic velocity was within the normal range  There was no regurgitation  PERICARDIUM: There was no thickening  There was no pericardial effusion  AORTA: The root exhibited normal size  PULMONARY ARTERY: The size was normal  The morphology appeared normal     SYSTEM MEASUREMENT TABLES    2D mode  AoR Diam 2D: 3 4 cm  LA Diam (2D): 4 9 cm  LA/Ao (2D): 1 44  FS (2D Teich): 28 4 %  IVSd (2D): 1 29 cm  LVDEV: 101 cmï¾³  LVESV: 45 8 cmï¾³  LVIDd(2D): 4 68 cm  LVISd (2D): 3 35 cm  LVPWd (2D): 1 26 cm  SV (Teich): 55 2 cmï¾³    Apical four chamber  LVEF A4C: 52 %    Unspecified Scan Mode  MV Peak E Darvin  Mean: 1160 mm/s  MVA (PHT): 5 24 cmï¾²  PHT: 42 ms  Max P mm[Hg]  V Max: 3020 mm/s  Vmax: 2780 mm/s  RA Area: 19 6 cmï¾²  RA Volume: 49 7 cmï¾³  TAPSE: 1 5 cm    [employer] Accredited [facility]ocardi[facility]    Prepared and electronically signed by    Ganesh Villegas DO  Signed 2019 14:05:34         Brooke Barber        "This note has been constructed using a voice recognition system  Therefore there may be syntax, spelling, and/or grammatical errors   Please call if you have any questions  "

## 2019-07-07 NOTE — PROGRESS NOTES
[facility] Manuel [facility] PROGRESS NOTE   Solitario Rosenthal 66 y o  male MRN: [id number]  Unit/Bed#: [address] [place] [address] Encounter: [id number]  Reason for Consult: BRIAN, CKD    ASSESSMENT and PLAN:  65 yo make with HTN, DM, HLP, CAD/CABG, CKD III, OA, bladder cancer s/p partial cystectomy admitted to [facility] on 7/3/19 with SOB and edema  Diagnosed with pneumonia vs CHF and on abx and diuretics  Developed chest pain on 7/5/19 and now with NSTEMI  1  BRIAN (POA)  · Admission creatinine of 1 80 on 7/3/19  Renal US showed no hydronephrosis  UA not indicative of GN  · Creatinine has been gradually worsening with appropriate diuresis  · Today, creatinine is up to 2 81 and he appears to be in CHF  · Will give Furosemide 80 mg IV x 1 now  If he responds, then may redose at BID or TID dosing  · If no response, will need Lasix drip  · I discussed the potential need for dialysis with him today  We went over the risks and benefits of dialysis  He is understands and is willing to proceed if necessary  Consent signed and placed in the chart  2  CKD III:  ·  Baseline creatinine is around 1 2 to 1 5 since 2015  No prior renal follow up  3  NSTEMI: per cardiology  4  CHF: per cardiology  5  HTN:   · BP was elevated and now better  · On Amlodipine + Metoprolol at home  · Hydralazine added this admission  6    Proteinuria: Check UPC ratio once at steady state  DISPOSITION:  · Will give Furosemide 80 mg IV x 1 now  · If he responds, then may redose at BID or TID dosing  · If no response, will need Lasix drip  · HD consent signed  Above plan discussed with RN and cardiology NP, Gerri Castellano  SUBJECTIVE / INTERVAL HISTORY:  Had SOB early this morning  CXR done at that time - vascular congestion - personally reviewed by me in PACS  Reports urine output is lower  No new CP  No hypotension       OBJECTIVE:  Current Weight: Weight - Scale: 104 kg (230 lb 3 2 oz)  Vitals:    07/07/19 0415 07/07/19 0543 07/07/19 0600 07/07/19 0819   BP: 163/74 162/74  142/63   BP Location: Left arm   Left arm   Pulse: 60   67   Resp: 22   20   Temp: 98 °F (36 7 °C)   98 °F (36 7 °C)   TempSrc: Oral   Tympanic   SpO2: 97%   95%   Weight:   104 kg (230 lb 3 2 oz)    Height:           Intake/Output Summary (Last 24 hours) at 7/7/2019 0904  Last data filed at 7/7/2019 0510  Gross per 24 hour   Intake 650 ml   Output 750 ml   Net -100 ml     General: conscious, coherent, cooperative, no distress  Chest/Lungs: (+) rales in both lower lung fields  No ronchi, wheeze, accessory muscle use  CVS: distinct heart sounds, normal rate, regular, no rub  Abdomen: soft, non tender, non distended, normal bowel sounds  Extremities: (+) mild LE edema  : no chowdhury catheter  Neuro: awake, alert, to time place person       Medications:    Current Facility-Administered Medications:     acetaminophen (TYLENOL) tablet 650 mg, 650 mg, Oral, Q6H PRN, Sean Alvarez MD    ALPRAZolam (XANAX) tablet 0 25 mg, 0 25 mg, Oral, HS, Sean Alvarez MD, 0 25 mg at 07/06/19 2036    aluminum-magnesium hydroxide-simethicone (MYLANTA) 200-200-20 mg/5 mL oral suspension 30 mL, 30 mL, Oral, Q4H PRN, Nadia Yepez MD, 30 mL at 07/06/19 2035    amLODIPine (NORVASC) tablet 5 mg, 5 mg, Oral, BID, Sean Alvarez MD, 5 mg at 07/07/19 0826    aspirin (ECOTRIN LOW STRENGTH) EC tablet 81 mg, 81 mg, Oral, Daily, Sean Alvarez MD, 81 mg at 07/07/19 0824    atorvastatin (LIPITOR) tablet 80 mg, 80 mg, Oral, Daily With Dinner, TABITHA Zepeda, 80 mg at 07/06/19 1716    azithromycin (ZITHROMAX) tablet 500 mg, 500 mg, Oral, Q24H, Sean Alvarez MD, 500 mg at 07/07/19 0824    cefuroxime (CEFTIN) tablet 500 mg, 500 mg, Oral, Q12H [address], Monserrat RENETTA Lennon, 500 mg at 07/07/19 0824    escitalopram (LEXAPRO) tablet 20 mg, 20 mg, Oral, HS, Sean Alvarez MD, 20 mg at 07/06/19 2220    hydrALAZINE (APRESOLINE) injection 10 mg, 10 mg, Intravenous, Q6H PRN, Sean Alvarez MD, 10 mg at 07/03/19 1817    hydrALAZINE (APRESOLINE) tablet 50 mg, 50 mg, Oral, Q8H [address], Lovemariann Six, CRNP, 50 mg at 07/07/19 0543    insulin detemir (LEVEMIR) subcutaneous injection 20 Units, 20 Units, Subcutaneous, Q12H [address], Sean Alvarez MD, 20 Units at 07/07/19 0823    insulin lispro (HumaLOG) 100 units/mL subcutaneous injection 1-6 Units, 1-6 Units, Subcutaneous, HS, Sean Alvarez MD, 5 Units at 07/06/19 2223    insulin lispro (HumaLOG) 100 units/mL subcutaneous injection 2-12 Units, 2-12 Units, Subcutaneous, TID AC, 4 Units at 07/07/19 0823 **AND** Fingerstick Glucose (POCT), , , TID AC, Sean Alvarez MD    ipratropium-albuterol (DUO-NEB) 0 5-2 5 mg/3 mL inhalation solution 3 mL, 3 mL, Nebulization, Q6H, Sean Alvarez MD, 3 mL at 07/07/19 0733    isosorbide mononitrate (IMDUR) 24 hr tablet 30 mg, 30 mg, Oral, Daily, Navtej Isabella, DO, 30 mg at 07/07/19 0826    metoprolol tartrate (LOPRESSOR) tablet 50 mg, 50 mg, Oral, Q12H [place], Navtej Isabella, DO, 50 mg at 07/07/19 0824    nitroglycerin (NITROSTAT) SL tablet 0 4 mg, 0 4 mg, Sublingual, Q5 Min PRN, Navtej Isabella, DO, 0 4 mg at 07/05/19 1652    pneumococcal 13-valent conjugate vaccine (PREVNAR-13) IM injection 0 5 mL, 0 5 mL, Intramuscular, Prior to discharge, Muna Eller MD    Laboratory Results:  Results from last 7 days   Lab Units 07/07/19  0510 07/06/19  0914 07/06/19  0553 07/05/19  0631 07/04/19  1005 07/03/19  1045   WBC Thousand/uL 10 62* 10 91* 8 31 9 12  --  8 92   HEMOGLOBIN g/dL 10 6* 11 3* 10 8* 11 2*  --  12 8   HEMATOCRIT % 33 1* 35 4* 33 4* 34 5*  --  38 4   PLATELETS Thousands/uL 132* 127* 118* 116*  --  143*   POTASSIUM mmol/L 4 9  --  4 4 4 2 4 9 4 7   CHLORIDE mmol/L 100  --  105 106 106 105   CO2 mmol/L 23  --  27 25 26 26   BUN mg/dL 73*  --  56* 47* 47* 45*   CREATININE mg/dL 2 81*  --  2 16* 1 91* 1 91* 1 80*   CALCIUM mg/dL 8 3  --  8 3 8 2* 8 1* 8 8   MAGNESIUM mg/dL 2 9*  --   --   --   --   --    PHOSPHORUS mg/dL 3 8  --   --   --   --   --

## 2019-07-07 NOTE — PLAN OF CARE
Problem: Potential for Falls  Goal: Patient will remain free of falls  Description  INTERVENTIONS:  - Assess patient frequently for physical needs  -  Identify cognitive and physical deficits and behaviors that affect risk of falls    -  [place] fall precautions as indicated by assessment   - Educate patient/family on patient safety including physical limitations  - Instruct patient to call for assistance with activity based on assessment  - Modify environment to reduce risk of injury  - Consider OT/PT consult to assist with strengthening/mobility  7/7/2019 1115 by Fab Rosales RN  Outcome: Progressing  7/7/2019 1113 by Fab Rosales RN  Outcome: Progressing     Problem: PAIN - ADULT  Goal: Verbalizes/displays adequate comfort level or baseline comfort level  Description  Interventions:  - Encourage patient to monitor pain and request assistance  - Assess pain using appropriate pain scale  - Administer analgesics based on type and severity of pain and evaluate response  - Implement non-pharmacological measures as appropriate and evaluate response  - Consider cultural and social influences on pain and pain management  - Notify physician/advanced practitioner if interventions unsuccessful or patient reports new pain  7/7/2019 1115 by Fab Rosales RN  Outcome: Progressing  7/7/2019 1113 by Fab Rosales RN  Outcome: Progressing     Problem: INFECTION - ADULT  Goal: Absence or prevention of progression during hospitalization  Description  INTERVENTIONS:  - Assess and monitor for signs and symptoms of infection  - Monitor lab/diagnostic results  - Monitor all insertion sites, i e  0 [facility] appropriate cooling/warming therapies per order  - Administer medications as ordered  - Instruct and encourage patient and family to use good hand hygiene technique  - Identify and instruct in appropriate isolation precautions for identified infection/condition   7/7/2019 1115 by Fab Rosales RN  Outcome: Progressing  7/7/2019 1113 by Yelitza Galvan RN  Outcome: Progressing     Problem: SAFETY ADULT  Goal: Patient will remain free of falls  Description  INTERVENTIONS:  - Assess patient frequently for physical needs  -  Identify cognitive and physical deficits and behaviors that affect risk of falls    -  [place] fall precautions as indicated by assessment   - Educate patient/family on patient safety including physical limitations  - Instruct patient to call for assistance with activity based on assessment  - Modify environment to reduce risk of injury  - Consider OT/PT consult to assist with strengthening/mobility  7/7/2019 1115 by Yelitza Galvan RN  Outcome: Progressing  7/7/2019 1113 by Yelitza Galvan RN  Outcome: Progressing  Goal: Maintain or return to baseline ADL function  Description  INTERVENTIONS:  -  Assess patient's ability to carry out ADLs; assess patient's baseline for ADL function and identify physical deficits which impact ability to perform ADLs (bathing, care of mouth/teeth, toileting, grooming, dressing, etc )  - Assess/evaluate cause of self-care deficits   - Assess range of motion  - Assess patient's mobility; develop plan if impaired  - Assess patient's need for assistive devices and provide as appropriate  - Encourage maximum independence but intervene and supervise when necessary  ¯ Involve family in performance of ADLs  ¯ Assess for home care needs following discharge   ¯ Request OT consult to assist with ADL evaluation and planning for discharge  ¯ Provide patient education as appropriate  7/7/2019 1115 by Yelitza Galvan RN  Outcome: Progressing  7/7/2019 1113 by Yelitza Galvan RN  Outcome: Progressing  Goal: Maintain or return mobility status to optimal level  Description  INTERVENTIONS:  - Assess patient's baseline mobility status (ambulation, transfers, stairs, etc )    - Identify cognitive and physical deficits and behaviors that affect mobility  - Identify mobility aids required to assist with transfers and/or ambulation (gait belt, sit-to-stand, lift, walker, cane, etc )  - [place] fall precautions as indicated by assessment  - Record patient progress and toleration of activity level on Mobility SBAR; progress patient to next Phase/Stage  - Instruct patient to call for assistance with activity based on assessment  - Request Rehabilitation consult to assist with strengthening/weightbearing, etc   7/7/2019 1115 by Jai Mendieta RN  Outcome: Progressing  7/7/2019 1113 by Jai Mendieta RN  Outcome: Progressing     Problem: DISCHARGE PLANNING  Goal: Discharge to home or other facility with appropriate resources  Description  INTERVENTIONS:  - Identify barriers to discharge w/patient and caregiver  - Arrange for needed discharge resources and transportation as appropriate  - Identify discharge learning needs (meds, wound care, etc )  - Arrange for interpretive services to assist at discharge as needed  - Refer to Case Management Department for coordinating discharge planning if the patient needs post-hospital services based on physician/advanced practitioner order or complex needs related to functional status, cognitive ability, or social support system  7/7/2019 1115 by Jai Mendieta RN  Outcome: Progressing  7/7/2019 1113 by Jai Mendieta RN  Outcome: Progressing     Problem: Knowledge Deficit  Goal: Patient/family/caregiver demonstrates understanding of disease process, treatment plan, medications, and discharge instructions  Description  Complete learning assessment and assess knowledge base    Interventions:  - Provide teaching at level of understanding  - Provide teaching via preferred learning methods  7/7/2019 1115 by Jai Mendieta RN  Outcome: Progressing  7/7/2019 1113 by Jai Mendieta RN  Outcome: Progressing     Problem: CARDIOVASCULAR - ADULT  Goal: Maintains optimal cardiac output and hemodynamic stability  Description  INTERVENTIONS:  - Monitor I/O, vital signs and rhythm  - Monitor for S/S and trends of decreased cardiac output i e  bleeding, hypotension  - Administer and titrate ordered vasoactive medications to optimize hemodynamic stability  - Assess quality of pulses, skin color and temperature  - Assess for signs of decreased coronary artery perfusion - ex   Angina  - Instruct patient to report change in severity of symptoms  7/7/2019 1115 by Hai Crouch RN  Outcome: Progressing  7/7/2019 1113 by Hai Crouch RN  Outcome: Progressing  Goal: Absence of cardiac dysrhythmias or at baseline rhythm  Description  INTERVENTIONS:  - Continuous cardiac monitoring, monitor vital signs, obtain 12 lead EKG if indicated  - Administer antiarrhythmic and heart rate control medications as ordered  - Monitor electrolytes and administer replacement therapy as ordered  7/7/2019 1115 by Hai Crouch RN  Outcome: Progressing  7/7/2019 1113 by Hai Crouch RN  Outcome: Progressing     Problem: RESPIRATORY - ADULT  Goal: Achieves optimal ventilation and oxygenation  Description  INTERVENTIONS:  - Assess for changes in respiratory status  - Assess for changes in mentation and behavior  - Position to facilitate oxygenation and minimize respiratory effort  - Oxygen administration by appropriate delivery method based on oxygen saturation (per order) or ABGs  - Encourage broncho-pulmonary hygiene including cough, deep breathe, Incentive Spirometry  - Assess the need for suctioning and aspirate as needed  - Assess and instruct to report SOB or any respiratory difficulty  - Respiratory Therapy support as indicated   7/7/2019 1115 by Hai Crouch RN  Outcome: Progressing  7/7/2019 1113 by Hai Crouch RN  Outcome: Progressing     Problem: Nutrition/Hydration-ADULT  Goal: Nutrient/Hydration intake appropriate for improving, restoring or maintaining nutritional needs  Description  Monitor and assess patient's nutrition/hydration status for malnutrition (ex- brittle hair, bruises, dry skin, pale skin and conjunctiva, muscle wasting, smooth red tongue, and disorientation)  Collaborate with interdisciplinary team and initiate plan and interventions as ordered  Monitor patient's weight and dietary intake as ordered or per policy  Utilize nutrition screening tool and intervene per policy  Determine patient's food preferences and provide high-protein, high-caloric foods as appropriate       INTERVENTIONS:  - Monitor oral intake, urinary output, labs, and treatment plans  - Assess nutrition and hydration status and recommend course of action  - Evaluate amount of meals eaten  - Assist patient with eating if necessary   - Allow adequate time for meals  - Recommend/ encourage appropriate diets, oral nutritional supplements, and vitamin/mineral supplements  - Order, calculate, and assess calorie counts as needed  - Recommend, monitor, and adjust tube feedings and TPN/PPN based on assessed needs  - Assess need for intravenous fluids  - Provide specific nutrition/hydration education as appropriate  - Include patient/family/caregiver in decisions related to nutrition  7/7/2019 1115 by Stacey Mott RN  Outcome: Progressing  7/7/2019 1113 by Stacey Mott, RN  Outcome: Progressing

## 2019-07-08 ENCOUNTER — APPOINTMENT (INPATIENT)
Dept: DIALYSIS | Facility: HOSPITAL | Age: 79
DRG: 280 | End: 2019-07-08
Payer: COMMERCIAL

## 2019-07-08 ENCOUNTER — APPOINTMENT (INPATIENT)
Dept: NON INVASIVE DIAGNOSTICS | Facility: HOSPITAL | Age: 79
DRG: 280 | End: 2019-07-08
Attending: INTERNAL MEDICINE
Payer: COMMERCIAL

## 2019-07-08 ENCOUNTER — APPOINTMENT (INPATIENT)
Dept: NON INVASIVE DIAGNOSTICS | Facility: HOSPITAL | Age: 79
DRG: 280 | End: 2019-07-08
Payer: COMMERCIAL

## 2019-07-08 ENCOUNTER — TRANSCRIBE ORDERS (OUTPATIENT)
Dept: ADMINISTRATIVE | Facility: HOSPITAL | Age: 79
End: 2019-07-08

## 2019-07-08 LAB
ALBUMIN SERPL BCP-MCNC: 2.8 G/DL (ref 3.5–5)
ALP SERPL-CCNC: 103 U/L (ref 46–116)
ALT SERPL W P-5'-P-CCNC: 48 U/L (ref 12–78)
ANION GAP SERPL CALCULATED.3IONS-SCNC: 12 MMOL/L (ref 4–13)
AST SERPL W P-5'-P-CCNC: 35 U/L (ref 5–45)
BACTERIA UR QL AUTO: ABNORMAL /HPF
BASOPHILS # BLD AUTO: 0.01 THOUSANDS/ΜL (ref 0–0.1)
BASOPHILS NFR BLD AUTO: 0 % (ref 0–1)
BILIRUB SERPL-MCNC: 1.6 MG/DL (ref 0.2–1)
BILIRUB UR QL STRIP: NEGATIVE
BUN SERPL-MCNC: 92 MG/DL (ref 5–25)
CALCIUM SERPL-MCNC: 8.3 MG/DL (ref 8.3–10.1)
CHLORIDE SERPL-SCNC: 99 MMOL/L (ref 100–108)
CLARITY UR: CLEAR
CO2 SERPL-SCNC: 24 MMOL/L (ref 21–32)
COARSE GRAN CASTS URNS QL MICRO: ABNORMAL /LPF
COLOR UR: YELLOW
CREAT SERPL-MCNC: 3.26 MG/DL (ref 0.6–1.3)
CREAT UR-MCNC: 61.6 MG/DL
EOSINOPHIL # BLD AUTO: 0.02 THOUSAND/ΜL (ref 0–0.61)
EOSINOPHIL NFR BLD AUTO: 0 % (ref 0–6)
ERYTHROCYTE [DISTWIDTH] IN BLOOD BY AUTOMATED COUNT: 14 % (ref 11.6–15.1)
GFR SERPL CREATININE-BSD FRML MDRD: 17 ML/MIN/1.73SQ M
GLUCOSE SERPL-MCNC: 171 MG/DL (ref 65–140)
GLUCOSE SERPL-MCNC: 171 MG/DL (ref 65–140)
GLUCOSE SERPL-MCNC: 183 MG/DL (ref 65–140)
GLUCOSE SERPL-MCNC: 189 MG/DL (ref 65–140)
GLUCOSE SERPL-MCNC: 293 MG/DL (ref 65–140)
GLUCOSE UR STRIP-MCNC: NEGATIVE MG/DL
HCT VFR BLD AUTO: 31 % (ref 36.5–49.3)
HGB BLD-MCNC: 10.1 G/DL (ref 12–17)
HGB UR QL STRIP.AUTO: NEGATIVE
IMM GRANULOCYTES # BLD AUTO: 0.06 THOUSAND/UL (ref 0–0.2)
IMM GRANULOCYTES NFR BLD AUTO: 1 % (ref 0–2)
KETONES UR STRIP-MCNC: NEGATIVE MG/DL
LEUKOCYTE ESTERASE UR QL STRIP: NEGATIVE
LYMPHOCYTES # BLD AUTO: 0.79 THOUSANDS/ΜL (ref 0.6–4.47)
LYMPHOCYTES NFR BLD AUTO: 7 % (ref 14–44)
MAGNESIUM SERPL-MCNC: 2.9 MG/DL (ref 1.6–2.6)
MCH RBC QN AUTO: 30.3 PG (ref 26.8–34.3)
MCHC RBC AUTO-ENTMCNC: 32.6 G/DL (ref 31.4–37.4)
MCV RBC AUTO: 93 FL (ref 82–98)
MONOCYTES # BLD AUTO: 1.03 THOUSAND/ΜL (ref 0.17–1.22)
MONOCYTES NFR BLD AUTO: 10 % (ref 4–12)
NEUTROPHILS # BLD AUTO: 8.98 THOUSANDS/ΜL (ref 1.85–7.62)
NEUTS SEG NFR BLD AUTO: 82 % (ref 43–75)
NITRITE UR QL STRIP: NEGATIVE
NON-SQ EPI CELLS URNS QL MICRO: ABNORMAL /HPF
NRBC BLD AUTO-RTO: 0 /100 WBCS
PH UR STRIP.AUTO: 5 [PH]
PLATELET # BLD AUTO: 111 THOUSANDS/UL (ref 149–390)
PMV BLD AUTO: 10.9 FL (ref 8.9–12.7)
POTASSIUM SERPL-SCNC: 4.5 MMOL/L (ref 3.5–5.3)
PROT SERPL-MCNC: 7.1 G/DL (ref 6.4–8.2)
PROT UR STRIP-MCNC: ABNORMAL MG/DL
PROT UR-MCNC: 125 MG/DL
PROT/CREAT UR: 2.03 MG/G{CREAT} (ref 0–0.1)
RBC # BLD AUTO: 3.33 MILLION/UL (ref 3.88–5.62)
RBC #/AREA URNS AUTO: ABNORMAL /HPF
SODIUM SERPL-SCNC: 135 MMOL/L (ref 136–145)
SP GR UR STRIP.AUTO: 1.02 (ref 1–1.03)
UROBILINOGEN UR QL STRIP.AUTO: 0.2 E.U./DL
WBC # BLD AUTO: 10.89 THOUSAND/UL (ref 4.31–10.16)
WBC #/AREA URNS AUTO: ABNORMAL /HPF

## 2019-07-08 PROCEDURE — 90935 HEMODIALYSIS ONE EVALUATION: CPT | Performed by: INTERNAL MEDICINE

## 2019-07-08 PROCEDURE — 84166 PROTEIN E-PHORESIS/URINE/CSF: CPT | Performed by: INTERNAL MEDICINE

## 2019-07-08 PROCEDURE — 86706 HEP B SURFACE ANTIBODY: CPT | Performed by: PHYSICIAN ASSISTANT

## 2019-07-08 PROCEDURE — 5A1D70Z PERFORMANCE OF URINARY FILTRATION, INTERMITTENT, LESS THAN 6 HOURS PER DAY: ICD-10-PCS | Performed by: INTERNAL MEDICINE

## 2019-07-08 PROCEDURE — 86803 HEPATITIS C AB TEST: CPT | Performed by: PHYSICIAN ASSISTANT

## 2019-07-08 PROCEDURE — 83735 ASSAY OF MAGNESIUM: CPT | Performed by: INTERNAL MEDICINE

## 2019-07-08 PROCEDURE — 76937 US GUIDE VASCULAR ACCESS: CPT | Performed by: RADIOLOGY

## 2019-07-08 PROCEDURE — 02H633Z INSERTION OF INFUSION DEVICE INTO RIGHT ATRIUM, PERCUTANEOUS APPROACH: ICD-10-PCS | Performed by: RADIOLOGY

## 2019-07-08 PROCEDURE — 81001 URINALYSIS AUTO W/SCOPE: CPT | Performed by: INTERNAL MEDICINE

## 2019-07-08 PROCEDURE — 86704 HEP B CORE ANTIBODY TOTAL: CPT | Performed by: PHYSICIAN ASSISTANT

## 2019-07-08 PROCEDURE — 77001 FLUOROGUIDE FOR VEIN DEVICE: CPT | Performed by: RADIOLOGY

## 2019-07-08 PROCEDURE — NC001 PR NO CHARGE: Performed by: RADIOLOGY

## 2019-07-08 PROCEDURE — 94640 AIRWAY INHALATION TREATMENT: CPT

## 2019-07-08 PROCEDURE — 36558 INSERT TUNNELED CV CATH: CPT

## 2019-07-08 PROCEDURE — 85025 COMPLETE CBC W/AUTO DIFF WBC: CPT | Performed by: INTERNAL MEDICINE

## 2019-07-08 PROCEDURE — 86705 HEP B CORE ANTIBODY IGM: CPT | Performed by: PHYSICIAN ASSISTANT

## 2019-07-08 PROCEDURE — 84156 ASSAY OF PROTEIN URINE: CPT | Performed by: INTERNAL MEDICINE

## 2019-07-08 PROCEDURE — 82948 REAGENT STRIP/BLOOD GLUCOSE: CPT

## 2019-07-08 PROCEDURE — 93308 TTE F-UP OR LMTD: CPT | Performed by: INTERNAL MEDICINE

## 2019-07-08 PROCEDURE — 36556 INSERT NON-TUNNEL CV CATH: CPT | Performed by: RADIOLOGY

## 2019-07-08 PROCEDURE — 84166 PROTEIN E-PHORESIS/URINE/CSF: CPT | Performed by: PATHOLOGY

## 2019-07-08 PROCEDURE — 82570 ASSAY OF URINE CREATININE: CPT | Performed by: INTERNAL MEDICINE

## 2019-07-08 PROCEDURE — 99232 SBSQ HOSP IP/OBS MODERATE 35: CPT | Performed by: INTERNAL MEDICINE

## 2019-07-08 PROCEDURE — NC001 PR NO CHARGE: Performed by: NURSE PRACTITIONER

## 2019-07-08 PROCEDURE — 87340 HEPATITIS B SURFACE AG IA: CPT | Performed by: PHYSICIAN ASSISTANT

## 2019-07-08 PROCEDURE — 77001 FLUOROGUIDE FOR VEIN DEVICE: CPT

## 2019-07-08 PROCEDURE — C1752 CATH,HEMODIALYSIS,SHORT-TERM: HCPCS

## 2019-07-08 PROCEDURE — 93325 DOPPLER ECHO COLOR FLOW MAPG: CPT | Performed by: INTERNAL MEDICINE

## 2019-07-08 PROCEDURE — 94760 N-INVAS EAR/PLS OXIMETRY 1: CPT

## 2019-07-08 PROCEDURE — 93321 DOPPLER ECHO F-UP/LMTD STD: CPT | Performed by: INTERNAL MEDICINE

## 2019-07-08 PROCEDURE — 87205 SMEAR GRAM STAIN: CPT | Performed by: INTERNAL MEDICINE

## 2019-07-08 PROCEDURE — 80053 COMPREHEN METABOLIC PANEL: CPT | Performed by: INTERNAL MEDICINE

## 2019-07-08 PROCEDURE — 93308 TTE F-UP OR LMTD: CPT

## 2019-07-08 RX ORDER — HEPARIN SODIUM 1000 [USP'U]/ML
INJECTION, SOLUTION INTRAVENOUS; SUBCUTANEOUS CODE/TRAUMA/SEDATION MEDICATION
Status: COMPLETED | OUTPATIENT
Start: 2019-07-08 | End: 2019-07-08

## 2019-07-08 RX ORDER — FUROSEMIDE 10 MG/ML
80 INJECTION INTRAMUSCULAR; INTRAVENOUS
Status: DISCONTINUED | OUTPATIENT
Start: 2019-07-08 | End: 2019-07-11

## 2019-07-08 RX ORDER — POLYETHYLENE GLYCOL 3350 17 G/17G
17 POWDER, FOR SOLUTION ORAL DAILY
Status: DISCONTINUED | OUTPATIENT
Start: 2019-07-08 | End: 2019-07-12

## 2019-07-08 RX ORDER — LIDOCAINE HYDROCHLORIDE 10 MG/ML
INJECTION, SOLUTION INFILTRATION; PERINEURAL CODE/TRAUMA/SEDATION MEDICATION
Status: COMPLETED | OUTPATIENT
Start: 2019-07-08 | End: 2019-07-08

## 2019-07-08 RX ADMIN — METOPROLOL TARTRATE 50 MG: 50 TABLET, FILM COATED ORAL at 21:19

## 2019-07-08 RX ADMIN — CEFUROXIME AXETIL 500 MG: 250 TABLET ORAL at 21:20

## 2019-07-08 RX ADMIN — INSULIN DETEMIR 20 UNITS: 100 INJECTION, SOLUTION SUBCUTANEOUS at 08:20

## 2019-07-08 RX ADMIN — HYDRALAZINE HYDROCHLORIDE 50 MG: 25 TABLET ORAL at 05:28

## 2019-07-08 RX ADMIN — HEPARIN SODIUM 1100 UNITS: 1000 INJECTION INTRAVENOUS; SUBCUTANEOUS at 12:44

## 2019-07-08 RX ADMIN — AMLODIPINE BESYLATE 5 MG: 5 TABLET ORAL at 08:19

## 2019-07-08 RX ADMIN — ALPRAZOLAM 0.25 MG: 0.25 TABLET ORAL at 21:39

## 2019-07-08 RX ADMIN — ATORVASTATIN CALCIUM 80 MG: 80 TABLET ORAL at 17:48

## 2019-07-08 RX ADMIN — INSULIN DETEMIR 20 UNITS: 100 INJECTION, SOLUTION SUBCUTANEOUS at 21:20

## 2019-07-08 RX ADMIN — HEPARIN SODIUM 1200 UNITS: 1000 INJECTION INTRAVENOUS; SUBCUTANEOUS at 12:45

## 2019-07-08 RX ADMIN — AZITHROMYCIN 500 MG: 250 TABLET, FILM COATED ORAL at 08:19

## 2019-07-08 RX ADMIN — CEFUROXIME AXETIL 500 MG: 250 TABLET ORAL at 08:20

## 2019-07-08 RX ADMIN — IPRATROPIUM BROMIDE AND ALBUTEROL SULFATE 3 ML: 2.5; .5 SOLUTION RESPIRATORY (INHALATION) at 02:49

## 2019-07-08 RX ADMIN — POLYETHYLENE GLYCOL 3350 17 G: 17 POWDER, FOR SOLUTION ORAL at 17:46

## 2019-07-08 RX ADMIN — INSULIN LISPRO 2 UNITS: 100 INJECTION, SOLUTION INTRAVENOUS; SUBCUTANEOUS at 13:00

## 2019-07-08 RX ADMIN — INSULIN LISPRO 2 UNITS: 100 INJECTION, SOLUTION INTRAVENOUS; SUBCUTANEOUS at 17:48

## 2019-07-08 RX ADMIN — FUROSEMIDE 80 MG: 10 INJECTION, SOLUTION INTRAMUSCULAR; INTRAVENOUS at 17:47

## 2019-07-08 RX ADMIN — ASPIRIN 81 MG: 81 TABLET, COATED ORAL at 08:20

## 2019-07-08 RX ADMIN — AMLODIPINE BESYLATE 5 MG: 5 TABLET ORAL at 17:49

## 2019-07-08 RX ADMIN — ESCITALOPRAM OXALATE 20 MG: 10 TABLET ORAL at 21:19

## 2019-07-08 RX ADMIN — IPRATROPIUM BROMIDE AND ALBUTEROL SULFATE 3 ML: 2.5; .5 SOLUTION RESPIRATORY (INHALATION) at 20:13

## 2019-07-08 RX ADMIN — METOPROLOL TARTRATE 50 MG: 50 TABLET, FILM COATED ORAL at 08:19

## 2019-07-08 RX ADMIN — HYDRALAZINE HYDROCHLORIDE 50 MG: 25 TABLET ORAL at 21:19

## 2019-07-08 RX ADMIN — INSULIN LISPRO 2 UNITS: 100 INJECTION, SOLUTION INTRAVENOUS; SUBCUTANEOUS at 08:21

## 2019-07-08 RX ADMIN — IPRATROPIUM BROMIDE AND ALBUTEROL SULFATE 3 ML: 2.5; .5 SOLUTION RESPIRATORY (INHALATION) at 07:31

## 2019-07-08 RX ADMIN — ISOSORBIDE MONONITRATE 30 MG: 30 TABLET, EXTENDED RELEASE ORAL at 08:19

## 2019-07-08 RX ADMIN — INSULIN LISPRO 4 UNITS: 100 INJECTION, SOLUTION INTRAVENOUS; SUBCUTANEOUS at 21:39

## 2019-07-08 RX ADMIN — LIDOCAINE HYDROCHLORIDE 5 ML: 10 INJECTION, SOLUTION INFILTRATION; PERINEURAL at 12:38

## 2019-07-08 RX ADMIN — Medication 20 MG/HR: at 11:11

## 2019-07-08 NOTE — PROGRESS NOTES
NEPHROLOGY PROGRESS NOTE   Loyda Bell 66 y o  male MRN: [id number]  Unit/Bed#: [address] [place] [address] Encounter: [id number]    ASSESSMENT & PLAN:  65 yo make with HTN, DM, HLP, CAD/CABG, CKD III, OA, bladder cancer s/p partial cystectomy admitted to [facility] on 7/3/19 with SOB and edema  Diagnosed with pneumonia vs CHF and on abx and diuretics  Developed chest pain on 7/5/19 and now with NSTEMI  Patient being followed by Cardiology      1  BRIAN (POA)  · Admission creatinine of 1 80 on 7/3/19  Renal US showed no hydronephrosis  UA not indicative of GN-showed 1-2 RBC with 4-10 WBC and 3+ proteinuria  · Creatinine has been gradually worsening with appropriate diuresis  Patient was given Lasix 80 mg IV on 7/0 7 and started on Lasix drip at 20 milligram/hour but urine output only 1000 mL in last 24 hour and renal function has continued to worsen to creatinine of 3 2 today  Patient still appears fluid overloaded  Acute kidney injury likely due to cardiorenal syndrome versus ATN versus other possibility-will do workup as mentioned below  · For now would continue Lasix drip at 20 milligram/hour  · Consent was obtained by Dr Leydi Garvin on 7/7, I again discussed about the risk and benefit about hemodialysis and patient verbalized understanding  Will start on hemodialysis today after placement of PermCath, patient agreed to the plan  · would switch to intermittent IV Lasix once started on hemodialysis  · Will also check SPEP UPEP light chain ratio to rule out paraproteinemia considering finding of anemia as well as proteinuria  Would check ANCA panel, complements, LANETTE, hepatitis panel      2  CKD III:  ·  Baseline creatinine is around 1 2 to 1 5 since 2015  No prior renal follow up  Before this hospital admission creatinine was 1 23 on 02/2017  No recent outpatient creatinine available to me at this time  · Chronic kidney disease could be due to hypertensive nephrosclerosis     3   Fluid overload/CHF:  Not responding to Lasix drip  Will start on hemodialysis  Echocardiogram showed ejection fraction of 55% and no regional wall motion abnormalities  For now continue Lasix drip at 20 milligram/hour    4  Primary HTN with chronic kidney disease stage 3:   · BP was elevated-likely volume mediated  Should improved with hemodialysis treatment and ultrafiltration   · On Amlodipine + Metoprolol at home  · Hydralazine added this admission       6    Proteinuria: Check UPC ratio, SPEP UPEP light chain ratio  Could be diabetic nephropathy  Continue management of diabetes mellitus per primary team     7  Anemia:  Hemoglobin stable at 10  1      Discussed with Cardiology advanced practitioner  Discussed with Dr Dinorah Allen:  Complaining of shortness of breath and not feeling well  On oxygen by nasal cannula at 4 L    OBJECTIVE:  Current Weight: Weight - Scale: 105 kg (231 lb 6 4 oz)  Vitals:    07/08/19 0814   BP: 155/70   Pulse: 85   Resp: 22   Temp: 98 5 °F (36 9 °C)   SpO2: 95%       Intake/Output Summary (Last 24 hours) at 7/8/2019 0931  Last data filed at 7/8/2019 0535  Gross per 24 hour   Intake 500 ml   Output 910 ml   Net -410 ml       Physical Exam  General:  Ill looking, awake  Eyes: Conjunctivae pink,  Sclera anicteric  ENT: lips and mucous membranes moist  Neck: supple   Chest:  Bilateral basal crackles  Elevated JVD  CVS: S1 & S2 present, normal rate, regular rhythm, no murmur    Abdomen: soft, non-tender, non-distended, Bowel sounds normoactive  Extremities:  2+ pitting edema both lower extremity, bilateral thigh edema  Skin: no rash  Neuro:  Appears lethargic but oriented x3      Medications:    Current Facility-Administered Medications:     acetaminophen (TYLENOL) tablet 650 mg, 650 mg, Oral, Q6H PRN, Sean Alvarez MD    ALPRAZolam (XANAX) tablet 0 25 mg, 0 25 mg, Oral, HS, Sean Alvarez MD, 0 25 mg at 07/07/19 2122    aluminum-magnesium hydroxide-simethicone (MYLANTA) 200-200-20 mg/5 mL oral suspension 30 mL, 30 mL, Oral, Q4H PRN, Loc Nicole MD, 30 mL at 07/06/19 2035    amLODIPine (NORVASC) tablet 5 mg, 5 mg, Oral, BID, Sean Alvarez MD, 5 mg at 07/08/19 0819    aspirin (ECOTRIN LOW STRENGTH) EC tablet 81 mg, 81 mg, Oral, Daily, Sean Alvarez MD, 81 mg at 07/08/19 0820    atorvastatin (LIPITOR) tablet 80 mg, 80 mg, Oral, Daily With TABITHA Mason, 80 mg at 07/07/19 1606    azithromycin (ZITHROMAX) tablet 500 mg, 500 mg, Oral, Q24H, Sean Alvarez MD, 500 mg at 07/08/19 0819    cefuroxime (CEFTIN) tablet 500 mg, 500 mg, Oral, Q12H [address], Burton Guevara PA-C, 500 mg at 07/08/19 0820    escitalopram (LEXAPRO) tablet 20 mg, 20 mg, Oral, HS, Sean Alvarez MD, 20 mg at 07/07/19 2122    furosemide (LASIX) 500 mg infusion 50 mL, 20 mg/hr, Intravenous, Continuous, Juan Hoff MD, Last Rate: 2 mL/hr at 07/07/19 1750, 20 mg/hr at 07/07/19 1750    hydrALAZINE (APRESOLINE) injection 10 mg, 10 mg, Intravenous, Q6H PRN, Aislinn Mari MD, 10 mg at 07/03/19 1817    hydrALAZINE (APRESOLINE) tablet 50 mg, 50 mg, Oral, Q8H [address], TABITHA Mohr, 50 mg at 07/08/19 0528    insulin detemir (LEVEMIR) subcutaneous injection 20 Units, 20 Units, Subcutaneous, Q12H [address], Aislinn Mari MD, 20 Units at 07/08/19 0820    insulin lispro (HumaLOG) 100 units/mL subcutaneous injection 1-6 Units, 1-6 Units, Subcutaneous, HS, Aislinn Mari MD, 5 Units at 07/07/19 2126    insulin lispro (HumaLOG) 100 units/mL subcutaneous injection 2-12 Units, 2-12 Units, Subcutaneous, TID AC, 2 Units at 07/08/19 0821 **AND** Fingerstick Glucose (POCT), , , TID AC, Aislinn Mari MD    ipratropium-albuterol (DUO-NEB) 0 5-2 5 mg/3 mL inhalation solution 3 mL, 3 mL, Nebulization, Q6H, Sean Alvarez MD, 3 mL at 07/08/19 0731    isosorbide mononitrate (IMDUR) 24 hr tablet 30 mg, 30 mg, Oral, Daily, Lizzy Mason DO, 30 mg at 07/08/19 0819    metoprolol tartrate (LOPRESSOR) tablet 50 mg, 50 mg, Oral, Q12H [address], Navtevalerie Navarror DO, 50 mg at 07/08/19 0819    nitroglycerin (NITROSTAT) SL tablet 0 4 mg, 0 4 mg, Sublingual, Q5 Min PRN, Navtej Isabella, DO, 0 4 mg at 07/05/19 1652    pneumococcal 13-valent conjugate vaccine (PREVNAR-13) IM injection 0 5 mL, 0 5 mL, Intramuscular, Prior to discharge, Lionel Reddy MD    Invasive Devices:        Lab Results:   Results from last 7 days   Lab Units 07/08/19  0743 07/07/19  0510 07/06/19  0914 07/06/19  0553   WBC Thousand/uL 10 89* 10 62* 10 91* 8 31   HEMOGLOBIN g/dL 10 1* 10 6* 11 3* 10 8*   HEMATOCRIT % 31 0* 33 1* 35 4* 33 4*   PLATELETS Thousands/uL 111* 132* 127* 118*   POTASSIUM mmol/L 4 5 4 9  --  4 4   CHLORIDE mmol/L 99* 100  --  105   CO2 mmol/L 24 23  --  27   BUN mg/dL 92* 73*  --  56*   CREATININE mg/dL 3 26* 2 81*  --  2 16*   CALCIUM mg/dL 8 3 8 3  --  8 3   MAGNESIUM mg/dL 2 9* 2 9*  --   --    PHOSPHORUS mg/dL  --  3 8  --   --    ALK PHOS U/L 103 102  --  97   ALT U/L 48 42  --  35   AST U/L 35 40  --  44       Previous work up:  RENAL ULTRASOUND     INDICATION:   N18 9: Chronic kidney disease, unspecified      COMPARISON: 12/5/2016     TECHNIQUE:   Ultrasound of the retroperitoneum was performed with a curvilinear transducer utilizing volumetric sweeps and still imaging techniques       FINDINGS:     KIDNEYS:  Symmetric and normal size  Right kidney:  10 8 x 5 6 cm  Left kidney:  10 6 x 5 3 cm      Right kidney  Normal echogenicity and contour  No suspicious masses detected  No hydronephrosis  No shadowing calculi  No perinephric fluid collections      Left kidney  Normal echogenicity and contour  No suspicious masses detected  No hydronephrosis  No shadowing calculi  No perinephric fluid collections      URETERS:  Nonvisualized      BLADDER:   The bladder is underdistended  No focal thickening or mass lesions      IMPRESSION:     No hydronephrosis  Portions of the record may have been created with voice recognition software   Occasional wrong word or "sound a like" substitutions may have occurred due to the inherent limitations of voice recognition software  Read the chart carefully and recognize, using context, where substitutions have occurred  If you have any questions, please contact the dictating provider

## 2019-07-08 NOTE — UTILIZATION REVIEW
Continued Stay Review  Date: 7/7/19                        Current Patient Class: INPATIENT  Current Level of Care: TELEMETRY  Assessment/Plan:   ACUTE ON CHRONIC CHF  WORSENING CXR YESTERDAY  SOB WITH  INCREASED O2 REQUIREMENTS TODAY TO 3LTR NC  LUNGS WITH DIMINISHED BREATH SOUNDS  IV LASIX DOSING PER RENAL, HOWEVER NO IMPROVEMENT, PLACED ON IV LASIX GTT LATER IN THE DAY     Pertinent Labs/Diagnostic Results:   Results from last 7 days   Lab Units 07/08/19  0743 07/07/19  0510 07/06/19  0914 07/06/19  0553 07/05/19  0631   WBC Thousand/uL 10 89* 10 62* 10 91* 8 31 9 12   HEMOGLOBIN g/dL 10 1* 10 6* 11 3* 10 8* 11 2*   HEMATOCRIT % 31 0* 33 1* 35 4* 33 4* 34 5*   PLATELETS Thousands/uL 111* 132* 127* 118* 116*   NEUTROS ABS Thousands/µL 8 98* 8 35*  --  6 08 6 78     Results from last 7 days   Lab Units 07/08/19  0743 07/07/19  0510 07/06/19  0553 07/05/19  0631   SODIUM mmol/L 135* 134* 139 140   POTASSIUM mmol/L 4 5 4 9 4 4 4 2   CHLORIDE mmol/L 99* 100 105 106   CO2 mmol/L 24 23 27 25   ANION GAP mmol/L 12 11 7 9   BUN mg/dL 92* 73* 56* 47*   CREATININE mg/dL 3 26* 2 81* 2 16* 1 91*   EGFR ml/min/1 73sq m 17 21 28 33   CALCIUM mg/dL 8 3 8 3 8 3 8 2*   MAGNESIUM mg/dL 2 9* 2 9*  --   --    PHOSPHORUS mg/dL  --  3 8  --   --      Results from last 7 days   Lab Units 07/08/19  0743 07/07/19  0510 07/06/19  0553 07/03/19  1045   AST U/L 35 40 44 27   ALT U/L 48 42 35 36   ALK PHOS U/L 103 102 97 130*   TOTAL PROTEIN g/dL 7 1 7 1 6 7 7 7   ALBUMIN g/dL 2 8* 2 8* 2 6* 3 3*   TOTAL BILIRUBIN mg/dL 1 60* 1 70* 1 40* 1 40*     Results from last 7 days   Lab Units 07/08/19  1213 07/08/19  0711 07/07/19  2033 07/07/19  1630 07/07/19  1151 07/07/19  0725 07/06/19  2051 07/06/19  1604 07/06/19  1159 07/06/19  0718   POC GLUCOSE mg/dl 171* 189* 323* 316* 270* 203* 313* 245* 208* 122     Results from last 7 days   Lab Units 07/08/19  0743 07/07/19  0510 07/06/19  0553 07/05/19  0631 07/04/19  1005 07/03/19  1045   GLUCOSE RANDOM mg/dL 183* 204* 124 108 219* 103     Results from last 7 days   Lab Units 07/08/19  1205 07/03/19  1331   CLARITY UA  Clear Slightly Cloudy   COLOR UA  Yellow Yellow   SPEC GRAV UA  1 025 >=1 030   PH UA  5 0 5 5   GLUCOSE UA mg/dl Negative Negative   KETONES UA mg/dl Negative Negative   BLOOD UA  Negative Moderate*   PROTEIN UA mg/dl 100 (2+)* >=300*   NITRITE UA  Negative Negative   BILIRUBIN UA  Negative Negative   UROBILINOGEN UA E U /dl 0 2 0 2   LEUKOCYTES UA  Negative Negative   WBC UA /hpf 1-2* 4-10*   RBC UA /hpf 0-1* 1-2*   BACTERIA UA /hpf Moderate* Occasional   EPITHELIAL CELLS WET PREP /hpf Occasional Occasional   MUCUS THREADS   --  Occasional*     Results from last 7 days   Lab Units 07/05/19  1647   STREP PNEUMONIAE ANTIGEN, URINE  Negative   LEGIONELLA URINARY ANTIGEN  Negative     Vital Signs: Ferol Kraig   Vitals:     07/06/19 1558 07/06/19 2014 07/07/19 0032 07/07/19 0148   BP: 139/65 170/72 (P) 137/63     BP Location: Left arm Left arm (P) Left arm     Pulse: 67 78 56 56   Resp: 20 20 20 22   Temp: 98 1 °F (36 7 °C) 98 °F (36 7 °C) (!) (P) 97 4 °F (36 3 °C)     TempSrc: Oral Oral (P) Tympanic     SpO2: 95% 92% 94% 100%   Intake/Output Summary (Last 24 hours) at 7/7/2019 1331  Last data filed at 7/7/2019 1251      Gross per 24 hour   Intake 530 ml   Output 820 ml   Net -290 ml     Medications:   Scheduled Meds:  Current Facility-Administered Medications:  acetaminophen 650 mg Oral Q6H PRN   ALPRAZolam 0 25 mg Oral HS   aluminum-magnesium hydroxide-simethicone 30 mL Oral Q4H PRN   amLODIPine 5 mg Oral BID   aspirin 81 mg Oral Daily   atorvastatin 80 mg Oral Daily With Dinner   azithromycin 500 mg Oral Q24H   cefuroxime 500 mg Oral Q12H FRANKLIN   escitalopram 20 mg Oral HS   hydrALAZINE 10 mg Intravenous Q6H PRN   hydrALAZINE 50 mg Oral Q8H FRANKLIN   insulin detemir 20 Units Subcutaneous Q12H FRANKLIN   insulin lispro 1-6 Units Subcutaneous HS   insulin lispro 2-12 Units Subcutaneous TID AC   ipratropium-albuterol 3 mL Nebulization Q6H   isosorbide mononitrate 30 mg Oral Daily   metoprolol tartrate 50 mg Oral Q12H FRANKLIN   nitroglycerin 0 4 mg Sublingual Q5 Min PRN   pneumococcal 13-valent conjugate vaccine 0 5 mL Intramuscular Prior to discharge    IV LASIX 80 MG X2 DOSES  furosemide (LASIX) 500 mg infusion 50 mL   Rate: 2 mL/hr Dose: 20 mg/hr  Freq: Continuous Route: IV  Last Dose: 20 mg/hr (07/08/19 1111)  Start: 07/07/19 1715    Discharge Plan: TBD  Network Utilization Review Department  Phone: [phone]; Fax [phone]  [email or web address]  ATTENTION: Please call with any questions or concerns to [phone]  and carefully listen to the prompts so that you are directed to the right person  Send all requests for admission clinical reviews, approved or denied determinations and any other requests to fax [phone]   All voicemails are confidential

## 2019-07-08 NOTE — SEDATION DOCUMENTATION
Temp HD catheter inserted without difficulty, okay to use per Dr Vince Sánchez  Patient tolerated well and transferred back to room in stable condition   Report given to primary RN

## 2019-07-08 NOTE — PROGRESS NOTES
Progress Note - Cardiology   [facility]     Marlen Strong 66 y o  male MRN: [id number]  : 1940  Unit/Bed#: [address] [place] [address] Encounter: [id number]    Assessment and Plan:   1  Acute on chronic diastolic heart failure:  Patient with minimal response to Lasix 20 mg Q hour IV drip started by Nephrology  Preliminary repeat echo demonstrates ejection fraction unchanged from previous study  Will continue beta-blocker, nitrate and hydralazine  Patient may require hemodialysis  2  Mi type 1:  Peak troponin 5  16  Continue beta-blocker, high-intensity statin, nitrates and hydralazine  Will need ischemic evaluation once stable from respiratory and renal standpoint  3  Acute kidney injury on chronic kidney disease:  Patient with minimal improvement diuresis on Lasix drip  Being evaluated by Nephrology at this time  May require hemodialysis  4  Hypertension:  Stable at this time will continue monitor    5  Diabetes:  Managed per the primary team     6  Dyslipidemia:  Continue statin therapy    7  Coronary artery disease with history CABG:  Records received from [facility]   Patient had open-heart surgery on December 15, 2016 with CABG x3 (LIMA to LAD and reverse saphenous vein graft to obtuse marginal 1)  Report has been scanned into patient's chart  Subjective / Objective:   Patient seen and examined  He is noted to be conversationally dyspneic and with increasing peripheral edema  Patient started on IV Lasix drip yesterday by Nephrology with minimal improvement in diuresis  Patient notes his breathing slightly worsened than yesterday  Discussed with Dr Xavi Cardenas   He is currently reviewing patient's chart, patient may require dialysis  Vitals: Blood pressure 155/70, pulse 85, temperature 98 5 °F (36 9 °C), temperature source Oral, resp  rate 22, height 6' 1" (1 854 m), weight 105 kg (231 lb 6 4 oz), SpO2 95 %    Vitals:    19 0600 19 0600   Weight: 104 kg (230 lb 3 2 oz) 105 kg (231 lb 6 4 oz)     Body mass index is 30 53 kg/m²  BP Readings from Last 3 Encounters:   07/08/19 155/70   12/05/16 136/62   10/20/16 168/88     Orthostatic Blood Pressures      Most Recent Value   Blood Pressure  155/70 filed at 07/08/2019 0814   Patient Position - Orthostatic VS  Lying filed at 07/08/2019 0814        I/O       07/06 0701 - 07/07 0700 07/07 0701 - 07/08 0700 07/08 0701 - 07/09 0700    P  O  640 700     I V  (mL/kg) 10 (0 1)      Total Intake(mL/kg) 650 (6 3) 700 (6 7)     Urine (mL/kg/hr) 990 (0 4) 1030 (0 4)     Total Output 990 1030     Net -340 -330                Invasive Devices     Peripheral Intravenous Line            Peripheral IV 07/07/19 Right Arm less than 1 day          Drain            Open Drain Left; Anterior; Other (Comment)  days                  Intake/Output Summary (Last 24 hours) at 7/8/2019 0924  Last data filed at 7/8/2019 0535  Gross per 24 hour   Intake 500 ml   Output 910 ml   Net -410 ml         Physical Exam:   Physical Exam   Constitutional: He is oriented to person, place, and time  He appears well-developed and well-nourished  HENT:   Head: Normocephalic  Right Ear: External ear normal    Left Ear: External ear normal    Eyes: Pupils are equal, round, and reactive to light  Conjunctivae are normal  Right eye exhibits no discharge  Left eye exhibits no discharge  No scleral icterus  Neck: Normal range of motion  Neck supple  No JVD present  No thyromegaly present  Cardiovascular: Normal rate, regular rhythm, normal heart sounds and intact distal pulses  No murmur heard  Pulmonary/Chest: Effort normal  No accessory muscle usage  No respiratory distress  He has no wheezes  He has rales  Conversational dyspnea   Abdominal: Soft  Bowel sounds are normal  He exhibits no distension  Musculoskeletal: He exhibits edema  Increasing lower extremity edema, patient also noted to have upper extremity edema and mild facial edema  Neurological: He is alert and oriented to person, place, and time  Skin: Skin is warm and dry  Capillary refill takes less than 2 seconds  Psychiatric: He has a normal mood and affect  Nursing note and vitals reviewed              Medications/ Allergies:     Current Facility-Administered Medications:  acetaminophen 650 mg Oral Q6H PRN Sean Alvarez MD    ALPRAZolam 0 25 mg Oral HS Sean Alvarez MD    aluminum-magnesium hydroxide-simethicone 30 mL Oral Q4H PRN Dallas Connor MD    amLODIPine 5 mg Oral BID Sean Alvarez MD    aspirin 81 mg Oral Daily Sean Alvarez MD    atorvastatin 80 mg Oral Daily With Dinner TABITHA Serrano    azithromycin 500 mg Oral Q24H Sean Alvarez MD    cefuroxime 500 mg Oral Q12H [facility]    escitalopram 20 mg Oral HS Sean Alvarez MD    furosemide 20 mg/hr Intravenous Continuous Ross Garg MD Last Rate: 20 mg/hr (07/07/19 1750)   hydrALAZINE 10 mg Intravenous Q6H PRN Sean Alvarez MD    hydrALAZINE 50 mg Oral Q8H [facility] TABITHA Serrano    insulin detemir 20 Units Subcutaneous Q12H [facility] Sean Alvarez MD    insulin lispro 1-6 Units Subcutaneous HS Sean Alvarez MD    insulin lispro 2-12 Units Subcutaneous TID AC Sean Alvarez MD    ipratropium-albuterol 3 mL Nebulization Q6H Sean Alvarez MD    isosorbide mononitrate 30 mg Oral Daily Jeffrey Isabella, DO    metoprolol tartrate 50 mg Oral Q12H [facility]    nitroglycerin 0 4 mg Sublingual Q5 Min PRN Navtej Isabella, DO    pneumococcal 13-valent conjugate vaccine 0 5 mL Intramuscular Prior to discharge Sobia Robles MD        acetaminophen 650 mg Q6H PRN   aluminum-magnesium hydroxide-simethicone 30 mL Q4H PRN   hydrALAZINE 10 mg Q6H PRN   nitroglycerin 0 4 mg Q5 Min PRN   pneumococcal 13-valent conjugate vaccine 0 5 mL Prior to discharge     No Known Allergies    VTE Pharmacologic Prophylaxis:   Sequential compression device (Venodyne)     Labs:   Troponins:  Results from last 7 days   Lab Units 07/06/19  1616 07/06/19  1214 07/06/19  0914  07/04/19  1005   CK TOTAL U/L  --   --   --   --  287   TROPONIN I ng/mL 2 74* 3 56* 3 68*   < >  --    CK MB INDEX %  --   --   --   --  1 7    < > = values in this interval not displayed  CBC with diff:  Results from last 7 days   Lab Units 07/08/19  0743 07/07/19  0510 07/06/19  0914 07/06/19  0553 07/05/19  0631 07/03/19  1045   WBC Thousand/uL 10 89* 10 62* 10 91* 8 31 9 12 8 92   HEMOGLOBIN g/dL 10 1* 10 6* 11 3* 10 8* 11 2* 12 8   HEMATOCRIT % 31 0* 33 1* 35 4* 33 4* 34 5* 38 4   MCV fL 93 95 95 95 94 92   PLATELETS Thousands/uL 111* 132* 127* 118* 116* 143*   MCH pg 30 3 30 4 30 3 30 6 30 4 30 6   MCHC g/dL 32 6 32 0 31 9 32 3 32 5 33 3   RDW % 14 0 14 2 14 3 14 2 14 0 14 3   MPV fL 10 9 11 2 10 3 10 8 10 8 10 8   NRBC AUTO /100 WBCs 0 0  --  0 0  --      CMP:  Results from last 7 days   Lab Units 07/08/19  0743 07/07/19  0510 07/06/19  0553 07/05/19  0631 07/04/19  1005 07/03/19  1045   SODIUM mmol/L 135* 134* 139 140 139 139   POTASSIUM mmol/L 4 5 4 9 4 4 4 2 4 9 4 7   CHLORIDE mmol/L 99* 100 105 106 106 105   CO2 mmol/L 24 23 27 25 26 26   ANION GAP mmol/L 12 11 7 9 7 8   BUN mg/dL 92* 73* 56* 47* 47* 45*   CREATININE mg/dL 3 26* 2 81* 2 16* 1 91* 1 91* 1 80*   CALCIUM mg/dL 8 3 8 3 8 3 8 2* 8 1* 8 8   AST U/L 35 40 44  --   --  27   ALT U/L 48 42 35  --   --  36   ALK PHOS U/L 103 102 97  --   --  130*   TOTAL PROTEIN g/dL 7 1 7 1 6 7  --   --  7 7   ALBUMIN g/dL 2 8* 2 8* 2 6*  --   --  3 3*   TOTAL BILIRUBIN mg/dL 1 60* 1 70* 1 40*  --   --  1 40*   EGFR ml/min/1 73sq m 17 21 28 33 33 35     Magnesium:  Results from last 7 days   Lab Units 07/08/19  0743 07/07/19  0510   MAGNESIUM mg/dL 2 9* 2 9*     Coags:  Results from last 7 days   Lab Units 07/06/19  0914 07/03/19  1045   PTT seconds 38* 31   INR  1 12 1 08     Hgb A1c:  Results from last 7 days   Lab Units 07/03/19  1729   HEMOGLOBIN A1C % 6 7*       Imaging & Testing   I have personally reviewed pertinent reports      Xr Chest Portable    Result Date: 7/7/2019  Narrative: CHEST INDICATION:   SOB  COMPARISON:  July 6, 2019, 12:54 PM  Shruthi Hilts PERFORMED/VIEWS:  XR CHEST PORTABLE FINDINGS: Heart shadow is enlarged but unchanged from prior exam   Post median sternotomy  Mild pulmonary vascular congestion  Left upper and lower lobe patchy airspace disease may represent pulmonary edema or infectious infiltrate  The lungs are clear  No pneumothorax or pleural effusion  Osseous structures appear within normal limits for patient age  Postcholecystectomy  Impression: Mild pulmonary vascular congestion  Left upper and lower lobe patchy airspace disease may represent pulmonary edema from CHF or infectious infiltrate  Workstation performed: [id number]     Xr Chest 1 View Portable    Result Date: 7/3/2019  Narrative: CHEST INDICATION:   SOB  COMPARISON:  12/4/2016 EXAM PERFORMED/VIEWS:  XR CHEST PORTABLE  AP semierect Images: 1 FINDINGS:  There are median sternotomy wires indicating prior cardiac surgery  Heart shadow is enlarged but unchanged from prior exam  Right infrahilar infiltrate noted with small pleural effusion  Left lung clear  No pneumothorax  Osseous structures appear within normal limits for patient age  Impression: Right infrahilar infiltrate with small pleural effusion  Workstation performed: [id number]     Xr Chest Pa & Lateral    Result Date: 7/7/2019  Narrative: CHEST INDICATION:   ? pneumonia  / CHF / volume overload  COMPARISON:  July 3, 2019 EXAM PERFORMED/VIEWS:  XR CHEST PA & LATERAL FINDINGS:  There are median sternotomy wires indicating prior cardiac surgery  Cardiomediastinal silhouette appears unremarkable  Small right larger than left bilateral pleural effusions  No pneumothorax    Osseous structures appear within normal limits for patient age  Impression: Small bilateral pleural effusions right larger than left  Patchy airspace opacities left lung potentially pneumonia   Workstation performed: [id number] Ct Chest Wo Contrast    Result Date: 7/4/2019  Narrative: CT CHEST WITHOUT IV CONTRAST INDICATION:   Shortness of breath  COMPARISON:  No prior chest CTs available TECHNIQUE: CT examination of the chest was performed without intravenous contrast   Axial, sagittal, and coronal 2D reformatted images were created from the source data and submitted for interpretation  Radiation dose length product (DLP) for this visit:  429 43 mGy-cm   This examination, like all CT scans performed in the [facility], was performed utilizing techniques to minimize radiation dose exposure, including the use of iterative  reconstruction and automated exposure control  FINDINGS: LUNGS:  Patchy areas of focal consolidation involving the left lower lobe, left upper lobe and right middle lobe  Subsegmental atelectasis in the right lower lobe     There is no tracheal or endobronchial lesion  PLEURA:  Moderate right and small left pleural effusions  HEART/GREAT VESSELS:  Mild cardiomegaly status post CABG aortic atherosclerosis, without aneurysm  MEDIASTINUM AND LEEANN:  Unremarkable  CHEST WALL AND LOWER NECK:   Mild bilateral gynecomastia  VISUALIZED STRUCTURES IN THE UPPER ABDOMEN:  Status post cholecystectomy OSSEOUS STRUCTURES:  No acute fracture or destructive osseous lesion  Impression: 1  Patchy areas of consolidation bilaterally most likely pneumonia  Recommend follow-up chest CT in 3 months to ensure complete resolution  2   Moderate right and small left pleural effusions  Mild cardiomegaly  Workstation performed: [id number]     Nm Lung Ventilation / Perfusion    Result Date: 7/3/2019  Narrative: VENTILATION AND PERFUSION SCAN INDICATION: Shortness of breath  COMPARISON:  Chest radiograph  7/3/2019 TECHNIQUE:  Posterior ventilation imaging was performed after the inhalation of 33 mCi nebulized Tc-99m DTPA with deposition of less than 1 mCi of activity within the lungs    Multiplanar perfusion imaging was next performed following the intravenous administration of 4 4 mCi Tc-99m labeled MAA  FINDINGS:  Ventilation imaging demonstrates heterogeneous distribution of the radiopharmaceutical with some clumping centrally  Perfusion imaging demonstrates mildly heterogeneous distribution of the radiopharmaceutical throughout both lungs  There is no significant  segmental or subsegmental defect  Impression: The probability for pulmonary embolus is low  Workstation performed: [id number]     Us Kidney And Bladder    Result Date: 7/5/2019  Narrative: RENAL ULTRASOUND INDICATION:   N18 9: Chronic kidney disease, unspecified  COMPARISON: 12/5/2016 TECHNIQUE:   Ultrasound of the retroperitoneum was performed with a curvilinear transducer utilizing volumetric sweeps and still imaging techniques  FINDINGS: KIDNEYS: Symmetric and normal size  Right kidney:  10 8 x 5 6 cm  Left kidney:  10 6 x 5 3 cm  Right kidney Normal echogenicity and contour  No suspicious masses detected  No hydronephrosis  No shadowing calculi  No perinephric fluid collections  Left kidney Normal echogenicity and contour  No suspicious masses detected  No hydronephrosis  No shadowing calculi  No perinephric fluid collections  URETERS: Nonvisualized  BLADDER: The bladder is underdistended  No focal thickening or mass lesions  Impression: No hydronephrosis  Workstation performed: [id number]     Vas Lower Limb Venous Duplex Study, Complete Bilateral    Result Date: 7/4/2019  Narrative:  [facility] REPORT CLINICAL: Indications: Swelling of Limb [R22 4]  Dyspnea [R06 02]  Patient presents with shortness of breath for the past several days  Patient reports short of breath  Risk Factors The patient has history of HTN, Diabetes (Yes), HLD, CKD and previous smoking (quit >10yrs ago)    FINDINGS:  Segment  Right            Left              Impression       Impression       CFV      Normal (Patent)  Normal (Patent)     CONCLUSION:  Impression: RIGHT LOWER LIMB: No evidence of acute or chronic deep vein thrombosis  No evidence of superficial thrombophlebitis noted  Doppler evaluation shows a normal response to augmentation maneuvers  Popliteal, posterior tibial and anterior tibial arterial Doppler waveforms are triphasic  LEFT LOWER LIMB: No evidence of acute or chronic deep vein thrombosis  No evidence of superficial thrombophlebitis noted  Doppler evaluation shows a normal response to augmentation maneuvers  Popliteal, posterior tibial and anterior tibial arterial Doppler waveforms are triphasic  Pulsatile waveforms in the venous system may suggest heart failure or tricuspid valve insufficiency  Technical findings were given to Dr Leland Tariq 13:30  SIGNATURE: Electronically Signed by: Minerva Ng on 2019 09:11:23 AM       EKG / Monitor: Personally reviewed  Sinus rhythm    Cardiac testing:   Results for orders placed during the hospital encounter of 19   Echo complete with contrast if indicated    Narrative [address]  [address] [facility]  [phone]    Transthoracic Echocardiogram  2D, M-mode, Doppler, and Color Doppler    Study date:  2019    Patient: Xochitl Holbrook  MR number: [id number]  Account number: [de-identified]  : [id number]  Age: 66 years  Gender: Male  Status: Inpatient  Location: Bedside  Height: 73 in  Weight: 230 6 lb  BP: 124/ 68 mmHg    Indications: Cariomyopathy    Diagnoses: I42 9 - Cardiomyopathy, unspecified    Sonographer:  ROHINI Crum  Primary Physician:  Kendra Hylton MD  Referring Physician:  Wanda Glez DO  Group:  [facility]  Interpreting Physician:  Wanda Glez DO    SUMMARY    LEFT VENTRICLE:  Systolic function was normal by visual assessment  Ejection fraction was estimated to be 55 %  There were no regional wall motion abnormalities  There was moderate concentric hypertrophy    Doppler parameters were consistent with restrictive physiology, indicative of decreased left ventricular diastolic compliance and/or increased left atrial pressure  VENTRICULAR SEPTUM:  There was paradoxical motion  These changes are consistent with a post-thoracotomy state  MITRAL VALVE:  There was mild to moderate regurgitation  AORTIC VALVE:  There was no evidence for stenosis  There was no regurgitation  TRICUSPID VALVE:  There was mild regurgitation  Pulmonary artery systolic pressure was mildly increased  HISTORY: PRIOR HISTORY: HTN, DM, CAD, HCL, CABG, Bladder Cancer, Arthritis    PROCEDURE: The procedure was performed at the bedside  This was a routine study  The transthoracic approach was used  The study included complete 2D imaging, M-mode, complete spectral Doppler, and color Doppler  The heart rate was 60 bpm,  at the start of the study  Image quality was adequate  LEFT VENTRICLE: Size was normal  Systolic function was normal by visual assessment  Ejection fraction was estimated to be 55 %  There were no regional wall motion abnormalities  There was moderate concentric hypertrophy  DOPPLER: Doppler  parameters were consistent with restrictive physiology, indicative of decreased left ventricular diastolic compliance and/or increased left atrial pressure  Doppler parameters were consistent with elevated mean left atrial filling pressure  VENTRICULAR SEPTUM: There was paradoxical motion  These changes are consistent with a post-thoracotomy state  RIGHT VENTRICLE: The size was normal  Systolic function was normal  DOPPLER: Systolic pressure was within the normal range  LEFT ATRIUM: The atrium was moderately dilated  RIGHT ATRIUM: The atrium was mildly dilated  MITRAL VALVE: Valve structure was normal  There was normal leaflet separation  No echocardiographic evidence for prolapse  DOPPLER: The transmitral velocity was within the normal range  There was no evidence for stenosis   There was mild to  moderate regurgitation  AORTIC VALVE: The valve was trileaflet  Leaflets exhibited normal thickness, normal cuspal separation, and sclerosis  DOPPLER: Transaortic velocity was within the normal range  There was no evidence for stenosis  There was no  regurgitation  TRICUSPID VALVE: The valve structure was normal  There was normal leaflet separation  DOPPLER: The transtricuspid velocity was within the normal range  There was mild regurgitation  Pulmonary artery systolic pressure was mildly increased  Estimated peak PA pressure was 44 mmHg  PULMONIC VALVE: Leaflets exhibited normal thickness, no calcification, and normal cuspal separation  DOPPLER: The transpulmonic velocity was within the normal range  There was no regurgitation  PERICARDIUM: There was no thickening  There was no pericardial effusion  AORTA: The root exhibited normal size  PULMONARY ARTERY: The size was normal  The morphology appeared normal     SYSTEM MEASUREMENT TABLES    2D mode  AoR Diam 2D: 3 4 cm  LA Diam (2D): 4 9 cm  LA/Ao (2D): 1 44  FS (2D Teich): 28 4 %  IVSd (2D): 1 29 cm  LVDEV: 101 cmï¾³  LVESV: 45 8 cmï¾³  LVIDd(2D): 4 68 cm  LVISd (2D): 3 35 cm  LVPWd (2D): 1 26 cm  SV (Teich): 55 2 cmï¾³    Apical four chamber  LVEF A4C: 52 %    Unspecified Scan Mode  MV Peak E Darvin  Mean: 1160 mm/s  MVA (PHT): 5 24 cmï¾²  PHT: 42 ms  Max P mm[Hg]  V Max: 3020 mm/s  Vmax: 2780 mm/s  RA Area: 19 6 cmï¾²  RA Volume: 49 7 cmï¾³  TAPSE: 1 5 cm    [facility]    Prepared and electronically signed by    Desirae Dial DO  Signed 2019 14:05:34           Julianna Marx        "This note has been constructed using a voice recognition system  Therefore there may be syntax, spelling, and/or grammatical errors   Please call if you have any questions  "

## 2019-07-08 NOTE — PROGRESS NOTES
Progress Note - Courtney Bryan 66 y o  male MRN: [id number]    Unit/Bed#: [address] [place] [address] Encounter: [id number]      Subjective:   Patient very weak lethargic some difficulty breathing chest pain subsided was not able to sleep complains of constipation no fever chills minimal coughing difficulty breathing on minimal exertion remaining review of system unremarkable unchanged since yesterday total of 12 done all the labs x-ray consultants follow-up reviewed and discussed at length with the patient    Objective:   As below    Vitals: Blood pressure 139/62, pulse 64, temperature 97 7 °F (36 5 °C), temperature source Tympanic, resp  rate 22, height 6' 1" (1 854 m), weight 105 kg (231 lb 6 4 oz), SpO2 98 %  ,Body mass index is 30 53 kg/m²      Intake/Output:    Intake/Output Summary (Last 24 hours) at 7/8/2019 1300  Last data filed at 7/8/2019 0959  Gross per 24 hour   Intake 300 ml   Output 810 ml   Net -510 ml       Physical Exam:  General Appearance:  Patient hold alert oriented x3 not any acute distress comfortable  Skin:  No rash normal normal  H/ENT:  No congestion normal  Eyes:  No redness no discharged normal  Cardiac:  Regular soft systolic murmur unchanged heart rate normal  Pulmonary:  Decreased air entry bilateral bilateral rales no wheezing Complete exam done as above today  Gastrointestinal:  Nontender no distension no mass palpable  Extremities:  2+ edema bilateral persistent bilateral no improvement  Musculoskeletal:  Knee swelling bilateral  Neuro:  No new deficit gait dysfunction examined no new deficit  Complete exam done today as above  Complete exam done as above for July 8, 2019  Current Facility-Administered Medications   Medication Dose Route Frequency    acetaminophen (TYLENOL) tablet 650 mg  650 mg Oral Q6H PRN    ALPRAZolam (XANAX) tablet 0 25 mg  0 25 mg Oral HS    aluminum-magnesium hydroxide-simethicone (MYLANTA) 200-200-20 mg/5 mL oral suspension 30 mL  30 mL Oral Q4H PRN    amLODIPine (NORVASC) tablet 5 mg  5 mg Oral BID    aspirin (ECOTRIN LOW STRENGTH) EC tablet 81 mg  81 mg Oral Daily    atorvastatin (LIPITOR) tablet 80 mg  80 mg Oral Daily With Dinner    azithromycin (ZITHROMAX) tablet 500 mg  500 mg Oral Q24H    cefuroxime (CEFTIN) tablet 500 mg  500 mg Oral Q12H [address]    escitalopram (LEXAPRO) tablet 20 mg  20 mg Oral HS    furosemide (LASIX) 500 mg infusion 50 mL  20 mg/hr Intravenous Continuous    hydrALAZINE (APRESOLINE) injection 10 mg  10 mg Intravenous Q6H PRN    hydrALAZINE (APRESOLINE) tablet 50 mg  50 mg Oral Q8H [address]    insulin detemir (LEVEMIR) subcutaneous injection 20 Units  20 Units Subcutaneous Q12H [address]    insulin lispro (HumaLOG) 100 units/mL subcutaneous injection 1-6 Units  1-6 Units Subcutaneous HS    insulin lispro (HumaLOG) 100 units/mL subcutaneous injection 2-12 Units  2-12 Units Subcutaneous TID AC    ipratropium-albuterol (DUO-NEB) 0 5-2 5 mg/3 mL inhalation solution 3 mL  3 mL Nebulization Q6H    isosorbide mononitrate (IMDUR) 24 hr tablet 30 mg  30 mg Oral Daily    metoprolol tartrate (LOPRESSOR) tablet 50 mg  50 mg Oral Q12H [address]    nitroglycerin (NITROSTAT) SL tablet 0 4 mg  0 4 mg Sublingual Q5 Min PRN    pneumococcal 13-valent conjugate vaccine (PREVNAR-13) IM injection 0 5 mL  0 5 mL Intramuscular Prior to discharge    polyethylene glycol (MIRALAX) packet 17 g  17 g Oral Daily       Radiology Results:  Reviewed    Lab, Imaging and other studies:   CBC:   Lab Results   Component Value Date    WBC 10 89 (H) 07/08/2019    WBC 8 8 01/05/2016    RBC 3 33 (L) 07/08/2019    RBC 4 61 (L) 01/05/2016     BMP:   Lab Results   Component Value Date    GLUCOSE 131 (H) 01/05/2016    SODIUM 135 (L) 07/08/2019    CO2 24 07/08/2019    CO2 27 01/05/2016    BUN 92 (H) 07/08/2019    BUN 35 (H) 01/05/2016    CREATININE 3 26 (H) 07/08/2019    CREATININE 1 5 (H) 01/05/2016    CALCIUM 8 3 07/08/2019    CALCIUM 9 0 01/05/2016     Coagulation:   Lab Results   Component Value Date    INR 1 12 07/06/2019     Cardiac markers:   Lab Results   Component Value Date    CKMB 4 9 07/04/2019     ABGs: No results found for: PH   Results from last 7 days   Lab Units 07/08/19  0743   POTASSIUM mmol/L 4 5   CHLORIDE mmol/L 99*   CO2 mmol/L 24   BUN mg/dL 92*   CREATININE mg/dL 3 26*   CALCIUM mg/dL 8 3   ALK PHOS U/L 103   ALT U/L 48   AST U/L 35       Assessment:  Principal Problem:    Acute on chronic diastolic CHF (congestive heart failure) ([facility])  Active Problems:    CKD (chronic kidney disease), stage III ([facility])    Benign hypertension with chronic kidney disease, stage III (HCC)   Acute on chronic renal failure as evident the creatinine 3 2 and baseline creatinine 1 3  suspected pneumonia  Assessment:  Acute on chronic diastolic CHF  Pneumonia  Pleural effusion  Type 1 diabetes  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  Hypertension uncontrolled  Acute MI type 1  Plan:  Continue aspirin Plavix and heparin patient suffered acute MI  Continue IV Lasix drip 20 milligram/hour although no response patient to undergo hemodialysis due to persistent fluid overload/CHF  Continue NovoLog sliding scale  Continue current treatment  OT and PT  Cardiology workup as per cardiologist echocardiogram ejection fraction seems to be optimum no other new abnormality      VTE Pharmacologic Prophylaxis: heparin    Sarah Knott MD  7/8/2019, 1:00 PM

## 2019-07-08 NOTE — TREATMENT PLAN
HEMODIALYSIS PROCEDURE NOTE  The patient was seen and examined on hemodialysis for his first treatment  Time: 2 hours  Sodium: 138 Blood flow: 200   Dialyzer: F160 Potassium: 3 Dialysate flow: 1 5x   Access: right temp cath Bicarbonate: 35 Ultrafiltration goal:  5L   Medications on HD: n/a     Will discontinue lasix drip and start lasix 80mg IV BID  Next HD tomorrow

## 2019-07-08 NOTE — HEMODIALYSIS
Patient received 2 hrs of HD treatment via temp cath, 500 ml removed, tolerated wee, BP stable    Post HD weight 104 5 kg

## 2019-07-08 NOTE — BRIEF OP NOTE (RAD/CATH)
Temporary Dialysis Catheter - Procedure Note    PATIENT NAME: aMrcia Cannon  : 1940  MRN: [id number]     Pre-op Diagnosis:   1  Acute congestive heart failure (HCC)    2  Stage 3 chronic kidney disease ([facility] [address] )    3  Pneumonia    4  CRF (chronic renal failure), unspecified stage    5  CKD (chronic kidney disease), stage III ([facility] [address] )    6  Acute on chronic diastolic CHF (congestive heart failure) ([facility] [address] )    7  Acute hypoxemic respiratory failure (HCC)      Post-op Diagnosis:   1  Acute congestive heart failure (HCC)    2  Stage 3 chronic kidney disease ([facility] [address] )    3  Pneumonia    4  CRF (chronic renal failure), unspecified stage    5  CKD (chronic kidney disease), stage III ([facility] [address] )    6  Acute on chronic diastolic CHF (congestive heart failure) ([facility] [address] )    7  Acute hypoxemic respiratory failure [facility])        Surgeon:   Ro Bills MD  Assistants:     No qualified resident was available, Resident is only observing    Estimated Blood Loss: minimal  Findings: Successful placement non-tunneled dialysis catheter  Plan: May use immediately      Specimens:   * No specimens in log *    Complications:  None immediate    Anesthesia: local lidocaine    Ro Bills MD     Date: 2019  Time: 1:32 PM

## 2019-07-08 NOTE — PROCEDURES
Central Line Insertion  Date/Time: 7/8/2019 1:33 PM  Performed by: Marko York MD  Authorized by: Marko York MD     Patient location:  IR  Consent:     Consent obtained:  Written    Consent given by:  Patient    Risks discussed:  Bleeding and infection    Alternatives discussed:  No treatment and delayed treatment  Universal protocol:     Procedure explained and questions answered to patient or proxy's satisfaction: yes      Relevant documents present and verified: yes      Test results available and properly labeled: yes      Radiology Images displayed and confirmed  If images not available, report reviewed: yes      Required blood products, implants, devices, and special equipment available: yes      Site/side marked: yes      Immediately prior to procedure, a time out was called: yes      Patient identity confirmed:  Verbally with patient and arm band  Pre-procedure details:     Hand hygiene: Hand hygiene performed prior to insertion      Sterile barrier technique: All elements of maximal sterile technique followed      Skin preparation:  2% chlorhexidine    Skin preparation agent: Skin preparation agent completely dried prior to procedure    Indications:     Central line indications: dialysis    Anesthesia (see MAR for exact dosages):      Anesthesia method:  Local infiltration    Local anesthetic:  Lidocaine 1% w/o epi  Procedure details:     Location:  Right internal jugular    Vessel type: vein      Laterality:  Right    Approach: percutaneous technique used      Patient position:  Flat    Catheter type:  Double lumen    Catheter size:  14 Fr    Landmarks identified: yes      Ultrasound guidance: yes      Sterile ultrasound techniques: Sterile gel and sterile probe covers were used      Number of attempts:  1    Successful placement: yes    Post-procedure details:     Post-procedure:  Dressing applied and line sutured    Assessment:  Blood return through all ports and placement verified by x-ray    Post-procedure complications: none      Patient tolerance of procedure:   Tolerated well, no immediate complications

## 2019-07-08 NOTE — SOCIAL WORK
Met with pt again, to start dialysis here  Unsure if will be temporary of permanent at this time  Explained to pt that case management will follow for d/c needs  Also requested PT/OT eval as pt has been feeling weak

## 2019-07-09 ENCOUNTER — APPOINTMENT (INPATIENT)
Dept: DIALYSIS | Facility: HOSPITAL | Age: 79
DRG: 280 | End: 2019-07-09
Attending: INTERNAL MEDICINE
Payer: COMMERCIAL

## 2019-07-09 LAB
ALBUMIN SERPL BCP-MCNC: 2.6 G/DL (ref 3.5–5)
ALP SERPL-CCNC: 102 U/L (ref 46–116)
ALT SERPL W P-5'-P-CCNC: 50 U/L (ref 12–78)
ANION GAP SERPL CALCULATED.3IONS-SCNC: 8 MMOL/L (ref 4–13)
AST SERPL W P-5'-P-CCNC: 35 U/L (ref 5–45)
BASOPHILS # BLD AUTO: 0.01 THOUSANDS/ΜL (ref 0–0.1)
BASOPHILS NFR BLD AUTO: 0 % (ref 0–1)
BILIRUB SERPL-MCNC: 1.5 MG/DL (ref 0.2–1)
BUN SERPL-MCNC: 76 MG/DL (ref 5–25)
C3 SERPL-MCNC: 129 MG/DL (ref 90–180)
C4 SERPL-MCNC: 30 MG/DL (ref 10–40)
CALCIUM SERPL-MCNC: 8.2 MG/DL (ref 8.3–10.1)
CHLORIDE SERPL-SCNC: 100 MMOL/L (ref 100–108)
CO2 SERPL-SCNC: 27 MMOL/L (ref 21–32)
CREAT SERPL-MCNC: 2.8 MG/DL (ref 0.6–1.3)
EOSINOPHIL # BLD AUTO: 0.05 THOUSAND/ΜL (ref 0–0.61)
EOSINOPHIL NFR BLD AUTO: 1 % (ref 0–6)
EOSINOPHIL NFR URNS MANUAL: 0 %
ERYTHROCYTE [DISTWIDTH] IN BLOOD BY AUTOMATED COUNT: 14 % (ref 11.6–15.1)
GFR SERPL CREATININE-BSD FRML MDRD: 21 ML/MIN/1.73SQ M
GLUCOSE SERPL-MCNC: 184 MG/DL (ref 65–140)
GLUCOSE SERPL-MCNC: 186 MG/DL (ref 65–140)
GLUCOSE SERPL-MCNC: 194 MG/DL (ref 65–140)
GLUCOSE SERPL-MCNC: 219 MG/DL (ref 65–140)
GLUCOSE SERPL-MCNC: 311 MG/DL (ref 65–140)
HBV CORE AB SER QL: NORMAL
HBV CORE AB SER QL: NORMAL
HBV CORE IGM SER QL: NORMAL
HBV CORE IGM SER QL: NORMAL
HBV SURFACE AB SER-ACNC: <3.1 MIU/ML
HBV SURFACE AG SER QL: NORMAL
HBV SURFACE AG SER QL: NORMAL
HCT VFR BLD AUTO: 30.5 % (ref 36.5–49.3)
HCV AB SER QL: NORMAL
HCV AB SER QL: NORMAL
HGB BLD-MCNC: 10.1 G/DL (ref 12–17)
IMM GRANULOCYTES # BLD AUTO: 0.05 THOUSAND/UL (ref 0–0.2)
IMM GRANULOCYTES NFR BLD AUTO: 1 % (ref 0–2)
LYMPHOCYTES # BLD AUTO: 0.91 THOUSANDS/ΜL (ref 0.6–4.47)
LYMPHOCYTES NFR BLD AUTO: 10 % (ref 14–44)
MAGNESIUM SERPL-MCNC: 2.7 MG/DL (ref 1.6–2.6)
MCH RBC QN AUTO: 30.6 PG (ref 26.8–34.3)
MCHC RBC AUTO-ENTMCNC: 33.1 G/DL (ref 31.4–37.4)
MCV RBC AUTO: 92 FL (ref 82–98)
MONOCYTES # BLD AUTO: 0.95 THOUSAND/ΜL (ref 0.17–1.22)
MONOCYTES NFR BLD AUTO: 11 % (ref 4–12)
NEUTROPHILS # BLD AUTO: 6.98 THOUSANDS/ΜL (ref 1.85–7.62)
NEUTS SEG NFR BLD AUTO: 77 % (ref 43–75)
NRBC BLD AUTO-RTO: 0 /100 WBCS
PHOSPHATE SERPL-MCNC: 3.9 MG/DL (ref 2.3–4.1)
PLATELET # BLD AUTO: 129 THOUSANDS/UL (ref 149–390)
PMV BLD AUTO: 11.3 FL (ref 8.9–12.7)
POTASSIUM SERPL-SCNC: 3.9 MMOL/L (ref 3.5–5.3)
PROT SERPL-MCNC: 6.9 G/DL (ref 6.4–8.2)
RBC # BLD AUTO: 3.3 MILLION/UL (ref 3.88–5.62)
SODIUM SERPL-SCNC: 135 MMOL/L (ref 136–145)
WBC # BLD AUTO: 8.95 THOUSAND/UL (ref 4.31–10.16)

## 2019-07-09 PROCEDURE — 99233 SBSQ HOSP IP/OBS HIGH 50: CPT | Performed by: INTERNAL MEDICINE

## 2019-07-09 PROCEDURE — G8987 SELF CARE CURRENT STATUS: HCPCS

## 2019-07-09 PROCEDURE — 83883 ASSAY NEPHELOMETRY NOT SPEC: CPT | Performed by: INTERNAL MEDICINE

## 2019-07-09 PROCEDURE — 86038 ANTINUCLEAR ANTIBODIES: CPT | Performed by: INTERNAL MEDICINE

## 2019-07-09 PROCEDURE — 82948 REAGENT STRIP/BLOOD GLUCOSE: CPT

## 2019-07-09 PROCEDURE — 97535 SELF CARE MNGMENT TRAINING: CPT

## 2019-07-09 PROCEDURE — 84100 ASSAY OF PHOSPHORUS: CPT | Performed by: INTERNAL MEDICINE

## 2019-07-09 PROCEDURE — 94640 AIRWAY INHALATION TREATMENT: CPT

## 2019-07-09 PROCEDURE — 86803 HEPATITIS C AB TEST: CPT | Performed by: INTERNAL MEDICINE

## 2019-07-09 PROCEDURE — 97167 OT EVAL HIGH COMPLEX 60 MIN: CPT

## 2019-07-09 PROCEDURE — 85025 COMPLETE CBC W/AUTO DIFF WBC: CPT | Performed by: INTERNAL MEDICINE

## 2019-07-09 PROCEDURE — 83520 IMMUNOASSAY QUANT NOS NONAB: CPT | Performed by: INTERNAL MEDICINE

## 2019-07-09 PROCEDURE — 86255 FLUORESCENT ANTIBODY SCREEN: CPT | Performed by: INTERNAL MEDICINE

## 2019-07-09 PROCEDURE — 94760 N-INVAS EAR/PLS OXIMETRY 1: CPT

## 2019-07-09 PROCEDURE — 86160 COMPLEMENT ANTIGEN: CPT | Performed by: INTERNAL MEDICINE

## 2019-07-09 PROCEDURE — 83735 ASSAY OF MAGNESIUM: CPT | Performed by: INTERNAL MEDICINE

## 2019-07-09 PROCEDURE — 99232 SBSQ HOSP IP/OBS MODERATE 35: CPT | Performed by: NURSE PRACTITIONER

## 2019-07-09 PROCEDURE — 80053 COMPREHEN METABOLIC PANEL: CPT | Performed by: INTERNAL MEDICINE

## 2019-07-09 PROCEDURE — 84165 PROTEIN E-PHORESIS SERUM: CPT | Performed by: INTERNAL MEDICINE

## 2019-07-09 PROCEDURE — 5A1D70Z PERFORMANCE OF URINARY FILTRATION, INTERMITTENT, LESS THAN 6 HOURS PER DAY: ICD-10-PCS | Performed by: INTERNAL MEDICINE

## 2019-07-09 PROCEDURE — 87340 HEPATITIS B SURFACE AG IA: CPT | Performed by: INTERNAL MEDICINE

## 2019-07-09 PROCEDURE — 86704 HEP B CORE ANTIBODY TOTAL: CPT | Performed by: INTERNAL MEDICINE

## 2019-07-09 PROCEDURE — 84165 PROTEIN E-PHORESIS SERUM: CPT | Performed by: PATHOLOGY

## 2019-07-09 PROCEDURE — 86705 HEP B CORE ANTIBODY IGM: CPT | Performed by: INTERNAL MEDICINE

## 2019-07-09 PROCEDURE — G8988 SELF CARE GOAL STATUS: HCPCS

## 2019-07-09 RX ORDER — ESCITALOPRAM OXALATE 10 MG/1
10 TABLET ORAL
Status: DISCONTINUED | OUTPATIENT
Start: 2019-07-09 | End: 2019-07-15 | Stop reason: HOSPADM

## 2019-07-09 RX ADMIN — HYDRALAZINE HYDROCHLORIDE 50 MG: 25 TABLET ORAL at 16:55

## 2019-07-09 RX ADMIN — INSULIN LISPRO 4 UNITS: 100 INJECTION, SOLUTION INTRAVENOUS; SUBCUTANEOUS at 12:18

## 2019-07-09 RX ADMIN — INSULIN LISPRO 2 UNITS: 100 INJECTION, SOLUTION INTRAVENOUS; SUBCUTANEOUS at 10:14

## 2019-07-09 RX ADMIN — HYDRALAZINE HYDROCHLORIDE 50 MG: 25 TABLET ORAL at 06:58

## 2019-07-09 RX ADMIN — CEFUROXIME AXETIL 500 MG: 250 TABLET ORAL at 10:13

## 2019-07-09 RX ADMIN — ALPRAZOLAM 0.25 MG: 0.25 TABLET ORAL at 22:23

## 2019-07-09 RX ADMIN — AMLODIPINE BESYLATE 5 MG: 5 TABLET ORAL at 10:13

## 2019-07-09 RX ADMIN — IPRATROPIUM BROMIDE AND ALBUTEROL SULFATE 3 ML: 2.5; .5 SOLUTION RESPIRATORY (INHALATION) at 20:38

## 2019-07-09 RX ADMIN — INSULIN DETEMIR 20 UNITS: 100 INJECTION, SOLUTION SUBCUTANEOUS at 22:23

## 2019-07-09 RX ADMIN — ISOSORBIDE MONONITRATE 30 MG: 30 TABLET, EXTENDED RELEASE ORAL at 10:12

## 2019-07-09 RX ADMIN — CEFUROXIME AXETIL 500 MG: 250 TABLET ORAL at 20:14

## 2019-07-09 RX ADMIN — METOPROLOL TARTRATE 50 MG: 50 TABLET, FILM COATED ORAL at 10:13

## 2019-07-09 RX ADMIN — INSULIN DETEMIR 20 UNITS: 100 INJECTION, SOLUTION SUBCUTANEOUS at 10:12

## 2019-07-09 RX ADMIN — METOPROLOL TARTRATE 50 MG: 50 TABLET, FILM COATED ORAL at 20:14

## 2019-07-09 RX ADMIN — IPRATROPIUM BROMIDE AND ALBUTEROL SULFATE 3 ML: 2.5; .5 SOLUTION RESPIRATORY (INHALATION) at 01:23

## 2019-07-09 RX ADMIN — FUROSEMIDE 80 MG: 10 INJECTION, SOLUTION INTRAMUSCULAR; INTRAVENOUS at 16:54

## 2019-07-09 RX ADMIN — INSULIN LISPRO 2 UNITS: 100 INJECTION, SOLUTION INTRAVENOUS; SUBCUTANEOUS at 16:57

## 2019-07-09 RX ADMIN — IPRATROPIUM BROMIDE AND ALBUTEROL SULFATE 3 ML: 2.5; .5 SOLUTION RESPIRATORY (INHALATION) at 07:41

## 2019-07-09 RX ADMIN — IPRATROPIUM BROMIDE AND ALBUTEROL SULFATE 3 ML: 2.5; .5 SOLUTION RESPIRATORY (INHALATION) at 13:41

## 2019-07-09 RX ADMIN — INSULIN LISPRO 5 UNITS: 100 INJECTION, SOLUTION INTRAVENOUS; SUBCUTANEOUS at 22:24

## 2019-07-09 RX ADMIN — POLYETHYLENE GLYCOL 3350 17 G: 17 POWDER, FOR SOLUTION ORAL at 10:12

## 2019-07-09 RX ADMIN — ATORVASTATIN CALCIUM 80 MG: 80 TABLET ORAL at 16:55

## 2019-07-09 RX ADMIN — ASPIRIN 81 MG: 81 TABLET, COATED ORAL at 10:12

## 2019-07-09 RX ADMIN — AZITHROMYCIN 500 MG: 250 TABLET, FILM COATED ORAL at 10:12

## 2019-07-09 RX ADMIN — FUROSEMIDE 80 MG: 10 INJECTION, SOLUTION INTRAMUSCULAR; INTRAVENOUS at 10:12

## 2019-07-09 RX ADMIN — AMLODIPINE BESYLATE 5 MG: 5 TABLET ORAL at 17:00

## 2019-07-09 RX ADMIN — HYDRALAZINE HYDROCHLORIDE 50 MG: 25 TABLET ORAL at 22:29

## 2019-07-09 RX ADMIN — ESCITALOPRAM OXALATE 10 MG: 10 TABLET ORAL at 22:27

## 2019-07-09 NOTE — PROGRESS NOTES
NEPHROLOGY PROGRESS NOTE   Rhonda Acosta 66 y o  male MRN: [id number]  Unit/Bed#: [address] [place] [address] Encounter: [id number]    ASSESSMENT & PLAN:  67 yo make with HTN, DM, HLP, CAD/CABG, CKD III, OA, bladder cancer s/p partial cystectomy admitted to [facility] on 7/3/19 with SOB and edema  Diagnosed with pneumonia vs CHF and on abx and diuretics  Developed chest pain on 7/5/19 and  NSTEMI  Patient being followed by Cardiology      1  BRIAN (POA)  · Admission creatinine of 1 80 on 7/3/19  Renal US showed no hydronephrosis  UA not indicative of GN-showed 1-2 RBC with 4-10 WBC and 3+ proteinuria   serological workup ordered as cause for acute kidney injury is not clear  Could be cardiorenal syndrome versus ATN  Check SPEP UPEP light chain ratio to rule out paraproteinemia considering finding of anemia as well as proteinuria  check ANCA panel, complements, LANETTE, hepatitis panel  · Renal function worsened with creatinine up to 3 26 on 7/8  Renal function worsened while on Lasix drip but without adequate urine output and patient was still fluid overloaded, so hemodialysis was initiated on 07/08  Tolerated 1st treatment on 07/08 with ultrafiltration 500 mL  So far no evidence of renal recovery, will do next hemodialysis treatment today, plan for hemodialysis today for 3 hours, blood flow 250 mL/minute and 4 0 potassium with ultrafiltration 1 5-2 kg  · Lasix drip was changed to IV Lasix 80 mg twice daily on 7/8  · Dialysis access:  Right IJ temporary dialysis catheter  Will need PermCath if patient remains dialysis dependent  · Will repeat hemodialysis again tomorrow    2  CKD III:  ·  Baseline creatinine is around 1 2 to 1 5 since 2015  No prior renal follow up  Before this hospital admission creatinine was 1 23 on 02/2017  No recent outpatient creatinine available to me at this time  · Chronic kidney disease could be due to hypertensive nephrosclerosis     3   Fluid overload/CHF:    Echocardiogram showed ejection fraction of 55% and no regional wall motion abnormalities  Continue IV Lasix  Plan for more ultrafiltration on hemodialysis today    4  Primary HTN with chronic kidney disease stage 3:   · BP was elevated-likely volume mediated  Should improved with hemodialysis treatment and ultrafiltration , plan for ultrafiltration 1 5-2 kg today  · On Amlodipine + Metoprolol at home  · Hydralazine added this admission       6    Proteinuria:  UPC ratio-2 0 g, follow-up SPEP UPEP light chain ratio  Could be diabetic nephropathy  Continue management of diabetes mellitus per primary team     7  Anemia:  Hemoglobin stable at 10  1      Will discuss with primary team    SUBJECTIVE:  Still complaining of shortness of breath and not feeling well  On oxygen by nasal cannula  Urine output in adequate    OBJECTIVE:  Current Weight: Weight - Scale: 111 kg (244 lb 3 oz)  Vitals:    07/09/19 0806   BP: 163/70   Pulse: 80   Resp: 20   Temp: 98 6 °F (37 °C)   SpO2: 99%       Intake/Output Summary (Last 24 hours) at 7/9/2019 0828  Last data filed at 7/9/2019 0630  Gross per 24 hour   Intake 740 ml   Output 550 ml   Net 190 ml       Physical Exam  General:  Ill looking, awake  Eyes: Conjunctivae pink,  Sclera anicteric  ENT: lips and mucous membranes moist  Neck: supple   Chest: decreased air entry both lung base  CVS: S1 & S2 present, normal rate, regular rhythm, no murmur    Abdomen: soft, non-tender, non-distended, Bowel sounds normoactive  Extremities:  2+ edema both legs  Skin: no rash  Neuro: awake, alert, oriented x3        Medications:    Current Facility-Administered Medications:     acetaminophen (TYLENOL) tablet 650 mg, 650 mg, Oral, Q6H PRN, Sean Alvarez MD    ALPRAZolam (XANAX) tablet 0 25 mg, 0 25 mg, Oral, HS, Sean Alvarez MD, 0 25 mg at 07/08/19 2139    aluminum-magnesium hydroxide-simethicone (MYLANTA) 200-200-20 mg/5 mL oral suspension 30 mL, 30 mL, Oral, Q4H PRN, Isatu Roca MD, 30 mL at 07/06/19 2035   amLODIPine (NORVASC) tablet 5 mg, 5 mg, Oral, BID, Sean Alvarez MD, 5 mg at 07/08/19 1749    aspirin (ECOTRIN LOW STRENGTH) EC tablet 81 mg, 81 mg, Oral, Daily, Sean Alvarez MD, 81 mg at 07/08/19 0820    atorvastatin (LIPITOR) tablet 80 mg, 80 mg, Oral, Daily With TABITHA Castro, 80 mg at 07/08/19 1748    azithromycin (ZITHROMAX) tablet 500 mg, 500 mg, Oral, Q24H, Sean Alvarez MD, 500 mg at 07/08/19 0819    cefuroxime (CEFTIN) tablet 500 mg, 500 mg, Oral, Q12H [address], Jannetta Baumgarten, PA-C, 500 mg at 07/08/19 2120    escitalopram (LEXAPRO) tablet 20 mg, 20 mg, Oral, HS, Sean Alvarez MD, 20 mg at 07/08/19 2119    furosemide (LASIX) injection 80 mg, 80 mg, Intravenous, BID (diuretic), RENETTA Chakraborty, 80 mg at 07/08/19 1747    hydrALAZINE (APRESOLINE) injection 10 mg, 10 mg, Intravenous, Q6H PRN, Ha Rivas MD, 10 mg at 07/03/19 1817    hydrALAZINE (APRESOLINE) tablet 50 mg, 50 mg, Oral, Q8H [address], TABITHA Ruiz, 50 mg at 07/09/19 0658    insulin detemir (LEVEMIR) subcutaneous injection 20 Units, 20 Units, Subcutaneous, Q12H [address], Ha Rivas MD, 20 Units at 07/08/19 2120    insulin lispro (HumaLOG) 100 units/mL subcutaneous injection 1-6 Units, 1-6 Units, Subcutaneous, HS, Sean Alvarez MD, 4 Units at 07/08/19 2139    insulin lispro (HumaLOG) 100 units/mL subcutaneous injection 2-12 Units, 2-12 Units, Subcutaneous, TID AC, 2 Units at 07/08/19 1748 **AND** Fingerstick Glucose (POCT), , , TID AC, Ha Rivas MD    ipratropium-albuterol (DUO-NEB) 0 5-2 5 mg/3 mL inhalation solution 3 mL, 3 mL, Nebulization, Q6H, Sean Alvarez MD, 3 mL at 07/09/19 0741    isosorbide mononitrate (IMDUR) 24 hr tablet 30 mg, 30 mg, Oral, Daily, Navte Isabella, DO, 30 mg at 07/08/19 0819    metoprolol tartrate (LOPRESSOR) tablet 50 mg, 50 mg, Oral, Q12H [address], Navtej Isabella, DO, 50 mg at 07/08/19 2119    nitroglycerin (NITROSTAT) SL tablet 0 4 mg, 0 4 mg, Sublingual, Q5 Min PRN, Lizzy Isabella, DO, 0 4 mg at 07/05/19 0002   pneumococcal 13-valent conjugate vaccine (PREVNAR-13) IM injection 0 5 mL, 0 5 mL, Intramuscular, Prior to discharge, Claudia Browning MD    polyethylene glycol (MIRALAX) packet 17 g, 17 g, Oral, Daily, Sean Alvarez MD, 17 g at 07/08/19 1746    Invasive Devices:        Lab Results:   Results from last 7 days   Lab Units 07/09/19  0529 07/08/19  0743 07/07/19  0510   WBC Thousand/uL 8 95 10 89* 10 62*   HEMOGLOBIN g/dL 10 1* 10 1* 10 6*   HEMATOCRIT % 30 5* 31 0* 33 1*   PLATELETS Thousands/uL 129* 111* 132*   POTASSIUM mmol/L 3 9 4 5 4 9   CHLORIDE mmol/L 100 99* 100   CO2 mmol/L 27 24 23   BUN mg/dL 76* 92* 73*   CREATININE mg/dL 2 80* 3 26* 2 81*   CALCIUM mg/dL 8 2* 8 3 8 3   MAGNESIUM mg/dL 2 7* 2 9* 2 9*   PHOSPHORUS mg/dL 3 9  --  3 8   ALK PHOS U/L 102 103 102   ALT U/L 50 48 42   AST U/L 35 35 40       Previous work up:  RENAL ULTRASOUND     INDICATION:   N18 9: Chronic kidney disease, unspecified      COMPARISON: 12/5/2016     TECHNIQUE:   Ultrasound of the retroperitoneum was performed with a curvilinear transducer utilizing volumetric sweeps and still imaging techniques       FINDINGS:     KIDNEYS:  Symmetric and normal size  Right kidney:  10 8 x 5 6 cm  Left kidney:  10 6 x 5 3 cm      Right kidney  Normal echogenicity and contour  No suspicious masses detected  No hydronephrosis  No shadowing calculi  No perinephric fluid collections      Left kidney  Normal echogenicity and contour  No suspicious masses detected  No hydronephrosis  No shadowing calculi  No perinephric fluid collections      URETERS:  Nonvisualized      BLADDER:   The bladder is underdistended  No focal thickening or mass lesions      IMPRESSION:     No hydronephrosis  Portions of the record may have been created with voice recognition software  Occasional wrong word or "sound a like" substitutions may have occurred due to the inherent limitations of voice recognition software   Read the chart carefully and recognize, using context, where substitutions have occurred  If you have any questions, please contact the dictating provider

## 2019-07-09 NOTE — PHYSICAL THERAPY NOTE
PT deferred at this time due to dialysis and fatigue  Will re-attempt at a later time as appropriate     Beena Norman [phone]

## 2019-07-09 NOTE — OCCUPATIONAL THERAPY NOTE
Occupational Therapy Evaluation/Treatment       07/09/19 1035   Note Type   Note type Eval/Treat   Restrictions/Precautions   Other Precautions Chair Alarm; Bed Alarm; Fall Risk;O2   Pain Assessment   Pain Assessment No/denies pain   Home Living   Type of [address] [facility] Two level  (7 [address] with rail)   Bathroom Shower/Tub Tub/shower unit   [employer] Tub transfer bench;Commode  (uses commode over toilet )   [address] [facility]  (uses cane for mobility )   Prior Function   Level of [place] Independent with ADLs and functional mobility   Lives With Alone   ADL Assistance Independent   IADLs Independent   Comments pt drives    Lifestyle   Intrinsic Gratification reading and watching TV   ADL   Eating Assistance 5  Supervision/Setup   Grooming Assistance 4  Minimal Assistance   Grooming Deficit Standing with assistive device   UB Bathing Assistance 4  Minimal Assistance   LB Bathing Assistance 3  Moderate Assistance   UB Dressing Assistance 4  Minimal Assistance   LB Dressing Assistance 3  Moderate Assistance   Toileting Assistance  3  Moderate Assistance   Bed Mobility   Supine to Sit 4  Minimal assistance   Additional items Assist x 1   Transfers   Sit to Stand 3  Moderate assistance   Additional items Assist x 1;Verbal cues   Stand to Sit 3  Moderate assistance   Additional items Assist x 1;Verbal cues   Stand pivot 3  Moderate assistance   Additional items Assist x 1;Verbal cues   Functional Mobility   Functional Mobility 3  Moderate assistance   Additional Comments few steps bed to chair with cane and hand hold assist     Additional items SPC   Balance   Static Sitting Fair   Dynamic Sitting Fair -   Static Standing Fair -   Dynamic Standing Poor   Activity Tolerance   Activity Tolerance Patient limited by fatigue  (weakness, SOB)   Medical Staff Made Aware case management STR   Nurse Made Aware yes    RUE Assessment   RUE Assessment WFL  (4-/5)   LUE Assessment   LUE Assessment WFL  (4-/5) Cognition   Overall Cognitive Status WFL   Arousal/Participation Cooperative   Attention Within functional limits   Orientation Level Oriented X4   Following Commands Follows multistep commands with increased time or repetition   Assessment   Limitation Decreased ADL status; Decreased UE strength;Decreased Safe judgement during ADL;Decreased endurance;Decreased self-care trans;Decreased high-level ADLs  (decreased balance and mobility )   Prognosis Good   Assessment Patient evaluated by Occupational Therapy  Patient admitted with Acute on chronic diastolic CHF (congestive heart failure) ([facility] [address] )  The patients occupational profile, medical and therapy history includes a extensive additional review of physical, cognitive, or psychosocial history related to current functional performance  Comorbidities affecting functional mobility and ADLS include: arthritis, CAD, chronic back pain, CHF, diabetes, hypertension and cancer  Prior to admission, patient was independent with functional mobility with cane, independent with ADLS and independent with IADLS  The evaluation identifies the following performance deficits: weakness, impaired balance, decreased endurance, increased fall risk, new onset of impairment of functional mobility, decreased ADLS, decreased IADLS, decreased activity tolerance, decreased safety awareness, impaired judgement, SOB upon exertion and decreased strength, that result in activity limitations and/or participation restrictions  This evaluation requires clinical decision making of high complexity, because the patient presents with comorbidites that affect occupational performance and required significant modification of tasks or assistance with consideration of multiple treatment options  The Barthel Index was used as a functional outcome tool presenting with a score of 40, indicating marked limitations of functional mobility and ADLS    Patient will benefit from skilled Occupational Therapy services to address above deficits and facilitate a safe return to prior level of function  Goals   Patient Goals going home   STG Time Frame   (1-7 days)   Short Term Goal  Goals established to promote patient goal of going home:  Patient will increase standing tolerance to 3 minutes during ADL task to decrease assistance level and decrease fall risk; Patient will increase bed mobility to supervision in preparation for ADLS and transfers; Patient will increase functional mobility to and from bathroom with rolling walker with min assist to increase performance with ADLS and to use a toilet; Patient will tolerate 10 minutes of UE ROM/strengthening to increase general activity tolerance and performance in ADLS/IADLS; Patient will improve functional activity tolerance to 10 minutes of sustained functional tasks to increase participation in basic self-care and decrease assistance level;  Patient will be able to to verbalize understanding and perform energy conservation/proper body mechanics during ADLS and functional mobility at least 75% of the time with minimal cueing to decrease signs of fatigue and increase stamina to return to prior level of function; Patient will increase dynamic sitting balance to fair to improve the ability to sit at edge of bed or on a chair for ADLS;  Patient will increase dynamic standing balance to fair- to improve postural stability and decrease fall risk during standing ADLS and transfers  LTG Time Frame   (8-14 days)   Long Term Goal Goals established to promote patient goal of going home:  Patient will increase standing tolerance to 6 minutes during ADL task to decrease assistance level and decrease fall risk; Patient will increase bed mobility to independent in preparation for ADLS and transfers;  Patient will increase functional mobility to and from bathroom with rolling walker with supervision to increase performance with ADLS and to use a toilet; Patient will tolerate 20 minutes of UE ROM/strengthening to increase general activity tolerance and performance in ADLS/IADLS; Patient will improve functional activity tolerance to 20 minutes of sustained functional tasks to increase participation in basic self-care and decrease assistance level;  Patient will be able to to verbalize understanding and perform energy conservation/proper body mechanics during ADLS and functional mobility at least 90% of the time without cueing to decrease signs of fatigue and increase stamina to return to prior level of function; Patient will increase dynamic sitting balance to fair+ to improve the ability to sit at edge of bed or on a chair for ADLS;  Patient will increase dynamic standing balance to fair to improve postural stability and decrease fall risk during standing ADLS and transfers  Functional Transfer Goals   Pt Will Perform All Functional Transfers   (STG min assist LTG supervision )   ADL Goals   Pt Will Perform Eating   (STG independent )   Pt Will Perform Grooming Standing at sink  (STG supervision LTG independent )   Pt Will Perform Bathing   (STG min assist LTG supervision )   Pt Will Perform UE Dressing   (STG supervision LTG independent )   Pt Will Perform LE Dressing   (STG min assist LTG supervision )   Pt Will Perform Toileting   (STG min assist LTG supervision )   Plan   Treatment Interventions ADL retraining;Functional transfer training;UE strengthening/ROM; Endurance training;Patient/family training;Equipment evaluation/education; Activityengagement; Compensatory technique education; Energy conservation   Goal Expiration Date 07/23/19   OT Frequency 3-5x/wk   Additional Treatment Session   Start Time 1020   End Time 1035   Treatment Assessment Sit to stand mod assist from chair with verbal cues  4 steps with RW with min assist with verbal cues  Static standing x 1 minute with min assist/supervision  Stand to sit min assist/verbal cues  Sit to stand min assist from chair with verbal cues  Patient stood to use urinal with min assist for steadying  Stand to sit min assist   Patient is generally weak and deconditioned  Would benefit from STR       Recommendation   OT Discharge Recommendation Short Term Rehab   Barthel Index   Feeding 5   Bathing 0   Grooming Score 0   Dressing Score 5   Bladder Score 10   Bowels Score 10   Toilet Use Score 5   Transfers (Bed/Chair) Score 5   Mobility (Level Surface) Score 0   Stairs Score 0   Barthel Index Score 40   Licensure   [place] License Number  Dieudonne Hock MS OTR/L [id number]

## 2019-07-09 NOTE — PROGRESS NOTES
Progress Note - Cardiology   [facility]     Marialuisa Farrar 66 y o  male MRN: [id number]  : 1940  Unit/Bed#: [address] [place] [address] Encounter: [id number]    Assessment and Plan:   1  Acute kidney injury on chronic kidney disease secondary to cardiorenal syndrome:  Patient started on hemodialysis yesterday with dropped a creatinine of 3 2-2 8  Patient's baseline creatinine previously appears to be in 1 2-1 5 (in )  Patient being followed by Nephrology  Lasix drip discontinued yesterday and now was on Lasix 80 IV b i d  2  Acute on chronic diastolic heart failure:  Care's noted above  Patient notes very minimal increase in urinary output  Patient is scheduled for 2nd hemodialysis treatment today  Will continue Norvasc, hydralazine and beta-blocker     Continue fluid restriction of 1200 mL per day  CHF education  3  MI type 2:  Repeat limited echocardiogram does not demonstrate any wall motion abnormality  And EF remains at 50%  Elevation in troponins with chest pain most likely combination of mild ischemia and acute kidney injury  Will continue current medications  Will plan nuclear stress tests to evaluate for ischemia once stable from respiratory and renal standpoint  4  Hypertension:  Continue current medications  5  Diabetes:  Managed per primary team    6  Dyslipidemia:  Continue statin therapy    7  Coronary artery disease with history of CABG x3, [place]a to LAD and reverse saphenous vein to obtuse marginal 1  Continue optimal medical management     Subjective / Objective:   Patient seen and examined  He is sitting out of bed in the chair  He did note some dyspnea this morning with initial activity  Patient tolerated 2 hours of hemodialysis yesterday, and is scheduled for 3 hours of hemodialysis today  Creatinine dropped from 3 2-2 8  Nephrology is following      Vitals: Blood pressure 163/70, pulse 80, temperature 98 6 °F (37 °C), temperature source Tympanic, resp  rate 20, height 6' 1" (1 854 m), weight 111 kg (244 lb 3 oz), SpO2 99 %  Vitals:    07/08/19 0600 07/09/19 0600   Weight: 105 kg (231 lb 6 4 oz) 111 kg (244 lb 3 oz)     Body mass index is 32 22 kg/m²  BP Readings from Last 3 Encounters:   07/09/19 163/70   12/05/16 136/62   10/20/16 168/88     Orthostatic Blood Pressures      Most Recent Value   Blood Pressure  163/70 filed at 07/09/2019 0806   Patient Position - Orthostatic VS  Lying filed at 07/09/2019 0806        I/O       07/07 0701 - 07/08 0700 07/08 0701 - 07/09 0700 07/09 0701 - 07/10 0700    P  O  700 240 160    I V  (mL/kg)  500 (4 5) 18 (0 2)    Total Intake(mL/kg) 700 (6 7) 740 (6 7) 178 (1 6)    Urine (mL/kg/hr) 1030 (0 4) 550 (0 2) 200 (0 4)    Total Output 1030 550 200    Net -330 +190 -22               Invasive Devices     Central Venous Catheter Line            CVC Central Lines 07/08/19 Double less than 1 day          Peripheral Intravenous Line            Peripheral IV 07/07/19 Right Arm 1 day          Hemodialysis Catheter            Temporary HD Catheter  less than 1 day          Drain            Open Drain Left; Anterior; Other (Comment)  days                  Intake/Output Summary (Last 24 hours) at 7/9/2019 1141  Last data filed at 7/9/2019 1001  Gross per 24 hour   Intake 678 ml   Output 650 ml   Net 28 ml         Physical Exam:   Physical Exam   Constitutional: He is oriented to person, place, and time  He appears well-developed and well-nourished  No distress  HENT:   Head: Normocephalic and atraumatic  Right Ear: External ear normal    Left Ear: External ear normal    Eyes: Pupils are equal, round, and reactive to light  Conjunctivae are normal  Right eye exhibits no discharge  Left eye exhibits no discharge  No scleral icterus  Neck: Normal range of motion  Neck supple  No JVD present  No thyromegaly present  Cardiovascular: Normal rate, regular rhythm, normal heart sounds and intact distal pulses     No murmur heard   Pulmonary/Chest: Effort normal  No accessory muscle usage  No respiratory distress  He has decreased breath sounds  He has wheezes  He has rales in the right lower field and the left lower field  Abdominal: Soft  Bowel sounds are normal  He exhibits no distension  Scattered end expiratory wheezing, fine crackles at bases, overall decreased   Musculoskeletal: He exhibits edema  Anasarca, but notable improvement in truncal edema, facial edema and upper extremity edema  Hands still swollen and both lower extremities to hip are edematous   Neurological: He is alert and oriented to person, place, and time  Skin: Skin is warm and dry  Capillary refill takes less than 2 seconds  He is not diaphoretic  Psychiatric: He has a normal mood and affect  Nursing note and vitals reviewed              Medications/ Allergies:     Current Facility-Administered Medications:  acetaminophen 650 mg Oral Q6H PRN Sean Alvarez MD   ALPRAZolam 0 25 mg Oral HS Sean Alvarez MD   aluminum-magnesium hydroxide-simethicone 30 mL Oral Q4H PRN Juarez Velazquez MD   amLODIPine 5 mg Oral BID Lindsay Huynh MD   aspirin 81 mg Oral Daily Sean Alvarez MD   atorvastatin 80 mg Oral Daily With TABITHA Mccann   azithromycin 500 mg Oral Q24H Sean Alvarez MD   cefuroxime 500 mg Oral Q12H [address] [facility]   escitalopram 10 mg Oral HS Sean Alvarez MD   furosemide 80 mg Intravenous BID (diuretic) [facility]   hydrALAZINE 10 mg Intravenous Q6H PRN Sean Alvarez MD   hydrALAZINE 50 mg Oral Q8H [facility] TABITHA Ruggiero   insulin detemir 20 Units Subcutaneous Q12H [facility] Sean Alvarez MD   insulin lispro 1-6 Units Subcutaneous HS Lindsay Huynh MD   insulin lispro 2-12 Units Subcutaneous TID AC Sean Alvarez MD   ipratropium-albuterol 3 mL Nebulization Q6H Sean Alvarez MD   isosorbide mononitrate 30 mg Oral Daily Lizzy Navarror, DO   metoprolol tartrate 50 mg Oral Q12H [facility] Lizzy Navarror, DO   nitroglycerin 0 4 mg Sublingual Q5 Min PRN Lizzy Isabella, DO   pneumococcal 13-valent conjugate vaccine 0 5 mL Intramuscular Prior to discharge Sean Alvarez MD   polyethylene glycol 17 g Oral Daily Sean Alvarez MD       acetaminophen 650 mg Q6H PRN   aluminum-magnesium hydroxide-simethicone 30 mL Q4H PRN   hydrALAZINE 10 mg Q6H PRN   nitroglycerin 0 4 mg Q5 Min PRN   pneumococcal 13-valent conjugate vaccine 0 5 mL Prior to discharge     No Known Allergies    VTE Pharmacologic Prophylaxis:   Sequential compression device (Venodyne)     Labs:   Troponins:  Results from last 7 days   Lab Units 07/06/19  1616 07/06/19  1211 07/06/19  0914  07/04/19  1005   CK TOTAL U/L  --   --   --   --  287   TROPONIN I ng/mL 2 74* 3 56* 3 68*   < >  --    CK MB INDEX %  --   --   --   --  1 7    < > = values in this interval not displayed       CBC with diff:  Results from last 7 days   Lab Units 07/09/19  0529 07/08/19  0743 07/07/19  0510 07/06/19  0914 07/06/19  0553 07/05/19  0631 07/03/19  1045   WBC Thousand/uL 8 95 10 89* 10 62* 10 91* 8 31 9 12 8 92   HEMOGLOBIN g/dL 10 1* 10 1* 10 6* 11 3* 10 8* 11 2* 12 8   HEMATOCRIT % 30 5* 31 0* 33 1* 35 4* 33 4* 34 5* 38 4   MCV fL 92 93 95 95 95 94 92   PLATELETS Thousands/uL 129* 111* 132* 127* 118* 116* 143*   MCH pg 30 6 30 3 30 4 30 3 30 6 30 4 30 6   MCHC g/dL 33 1 32 6 32 0 31 9 32 3 32 5 33 3   RDW % 14 0 14 0 14 2 14 3 14 2 14 0 14 3   MPV fL 11 3 10 9 11 2 10 3 10 8 10 8 10 8   NRBC AUTO /100 WBCs 0 0 0  --  0 0  --      CMP:  Results from last 7 days   Lab Units 07/09/19  0529 07/08/19  0743 07/07/19  0510 07/06/19  0553 07/05/19  0631 07/04/19  1005 07/03/19  1045   SODIUM mmol/L 135* 135* 134* 139 140 139 139   POTASSIUM mmol/L 3 9 4 5 4 9 4 4 4 2 4 9 4 7   CHLORIDE mmol/L 100 99* 100 105 106 106 105   CO2 mmol/L 27 24 23 27 25 26 26   ANION GAP mmol/L 8 12 11 7 9 7 8   BUN mg/dL 76* 92* 73* 56* 47* 47* 45*   CREATININE mg/dL 2 80* 3 26* 2 81* 2 16* 1 91* 1 91* 1 80*   CALCIUM mg/dL 8 2* 8 3 8 3 8 3 8 2* 8 1* 8 8   AST U/L 35 35 40 44  --   --  27   ALT U/L 50 48 42 35  --   --  36   ALK PHOS U/L 102 103 102 97  --   --  130*   TOTAL PROTEIN g/dL 6 9 7 1 7 1 6 7  --   --  7 7   ALBUMIN g/dL 2 6* 2 8* 2 8* 2 6*  --   --  3 3*   TOTAL BILIRUBIN mg/dL 1 50* 1 60* 1 70* 1 40*  --   --  1 40*   EGFR ml/min/1 73sq m 21 17 21 28 33 33 35     Magnesium:  Results from last 7 days   Lab Units 07/09/19  0529 07/08/19  0743 07/07/19  0510   MAGNESIUM mg/dL 2 7* 2 9* 2 9*     Coags:  Results from last 7 days   Lab Units 07/06/19  0914 07/03/19  1045   PTT seconds 38* 31   INR  1 12 1 08     Hgb A1c:  Results from last 7 days   Lab Units 07/03/19  1729   HEMOGLOBIN A1C % 6 7*       Imaging & Testing   I have personally reviewed pertinent reports  Xr Chest Portable    Result Date: 7/7/2019  Narrative: CHEST INDICATION:   SOB  COMPARISON:  July 6, 2019, 12:54 PM  Bette Landin PERFORMED/VIEWS:  XR CHEST PORTABLE FINDINGS: Heart shadow is enlarged but unchanged from prior exam   Post median sternotomy  Mild pulmonary vascular congestion  Left upper and lower lobe patchy airspace disease may represent pulmonary edema or infectious infiltrate  The lungs are clear  No pneumothorax or pleural effusion  Osseous structures appear within normal limits for patient age  Postcholecystectomy  Impression: Mild pulmonary vascular congestion  Left upper and lower lobe patchy airspace disease may represent pulmonary edema from CHF or infectious infiltrate  Workstation performed: [id number]     Xr Chest 1 View Portable    Result Date: 7/3/2019  Narrative: CHEST INDICATION:   SOB  COMPARISON:  12/4/2016 EXAM PERFORMED/VIEWS:  XR CHEST PORTABLE  AP semierect Images: 1 FINDINGS:  There are median sternotomy wires indicating prior cardiac surgery  Heart shadow is enlarged but unchanged from prior exam  Right infrahilar infiltrate noted with small pleural effusion  Left lung clear  No pneumothorax  Osseous structures appear within normal limits for patient age  Impression: Right infrahilar infiltrate with small pleural effusion  Workstation performed: [id number]     Xr Chest Pa & Lateral    Result Date: 7/7/2019  Narrative: CHEST INDICATION:   ? pneumonia  / CHF / volume overload  COMPARISON:  July 3, 2019 EXAM PERFORMED/VIEWS:  XR CHEST PA & LATERAL FINDINGS:  There are median sternotomy wires indicating prior cardiac surgery  Cardiomediastinal silhouette appears unremarkable  Small right larger than left bilateral pleural effusions  No pneumothorax    Osseous structures appear within normal limits for patient age  Impression: Small bilateral pleural effusions right larger than left  Patchy airspace opacities left lung potentially pneumonia  Workstation performed: [id number]     Ct Chest Wo Contrast    Result Date: 7/4/2019  Narrative: CT CHEST WITHOUT IV CONTRAST INDICATION:   Shortness of breath  COMPARISON:  No prior chest CTs available TECHNIQUE: CT examination of the chest was performed without intravenous contrast   Axial, sagittal, and coronal 2D reformatted images were created from the source data and submitted for interpretation  Radiation dose length product (DLP) for this visit:  429 43 mGy-cm   This examination, like all CT scans performed in the [facility], was performed utilizing techniques to minimize radiation dose exposure, including the use of iterative  reconstruction and automated exposure control  FINDINGS: LUNGS:  Patchy areas of focal consolidation involving the left lower lobe, left upper lobe and right middle lobe  Subsegmental atelectasis in the right lower lobe     There is no tracheal or endobronchial lesion  PLEURA:  Moderate right and small left pleural effusions  HEART/GREAT VESSELS:  Mild cardiomegaly status post CABG aortic atherosclerosis, without aneurysm  MEDIASTINUM AND LEEANN:  Unremarkable  CHEST WALL AND LOWER NECK:   Mild bilateral gynecomastia   VISUALIZED STRUCTURES IN THE UPPER ABDOMEN:  Status post cholecystectomy OSSEOUS STRUCTURES:  No acute fracture or destructive osseous lesion  Impression: 1  Patchy areas of consolidation bilaterally most likely pneumonia  Recommend follow-up chest CT in 3 months to ensure complete resolution  2   Moderate right and small left pleural effusions  Mild cardiomegaly  Workstation performed: [id number]     Nm Lung Ventilation / Perfusion    Result Date: 7/3/2019  Narrative: VENTILATION AND PERFUSION SCAN INDICATION: Shortness of breath  COMPARISON:  Chest radiograph  7/3/2019 TECHNIQUE:  Posterior ventilation imaging was performed after the inhalation of 33 mCi nebulized Tc-99m DTPA with deposition of less than 1 mCi of activity within the lungs  Multiplanar perfusion imaging was next performed following the intravenous administration of 4 4 mCi Tc-99m labeled MAA  FINDINGS:  Ventilation imaging demonstrates heterogeneous distribution of the radiopharmaceutical with some clumping centrally  Perfusion imaging demonstrates mildly heterogeneous distribution of the radiopharmaceutical throughout both lungs  There is no significant  segmental or subsegmental defect  Impression: The probability for pulmonary embolus is low  Workstation performed: [id number]     Us Kidney And Bladder    Result Date: 7/5/2019  Narrative: RENAL ULTRASOUND INDICATION:   N18 9: Chronic kidney disease, unspecified  COMPARISON: 12/5/2016 TECHNIQUE:   Ultrasound of the retroperitoneum was performed with a curvilinear transducer utilizing volumetric sweeps and still imaging techniques  FINDINGS: KIDNEYS: Symmetric and normal size  Right kidney:  10 8 x 5 6 cm  Left kidney:  10 6 x 5 3 cm  Right kidney Normal echogenicity and contour  No suspicious masses detected  No hydronephrosis  No shadowing calculi  No perinephric fluid collections  Left kidney Normal echogenicity and contour  No suspicious masses detected  No hydronephrosis  No shadowing calculi  No perinephric fluid collections   URETERS: Nonvisualized  BLADDER: The bladder is underdistended  No focal thickening or mass lesions  Impression: No hydronephrosis  Workstation performed: [id number]     Vas Lower Limb Venous Duplex Study, Complete Bilateral    Result Date: 7/4/2019  Narrative:  [facility] REPORT CLINICAL: Indications: Swelling of Limb [R22 4]  Dyspnea [R06 02]  Patient presents with shortness of breath for the past several days  Patient reports short of breath  Risk Factors The patient has history of HTN, Diabetes (Yes), HLD, CKD and previous smoking (quit >10yrs ago)  FINDINGS:  Segment  Right            Left              Impression       Impression       CFV      Normal (Patent)  Normal (Patent)     CONCLUSION:  Impression: RIGHT LOWER LIMB: No evidence of acute or chronic deep vein thrombosis  No evidence of superficial thrombophlebitis noted  Doppler evaluation shows a normal response to augmentation maneuvers  Popliteal, posterior tibial and anterior tibial arterial Doppler waveforms are triphasic  LEFT LOWER LIMB: No evidence of acute or chronic deep vein thrombosis  No evidence of superficial thrombophlebitis noted  Doppler evaluation shows a normal response to augmentation maneuvers  Popliteal, posterior tibial and anterior tibial arterial Doppler waveforms are triphasic  Pulsatile waveforms in the venous system may suggest heart failure or tricuspid valve insufficiency  Technical findings were given to Dr Douglas Noble 13:30  SIGNATURE: Electronically Signed by: Lo Goyal on 2019-07-04 09:11:23 AM    [facility] Espinosa Hd Cath    Result Date: 7/8/2019  Narrative: Temporary dialysis catheter placement  Clinical History: Renal failure  Fluoro time: 0 4mins Contrast: None Number of Images: 4 Conscious sedation time: None Radiation Dose: 12 65 mGy Technique: The patient was brought to the interventional radiology suite and identified verbally and by wrist band  The patient was placed supine on the table   The right internal jugular vein was evaluated as a potential access site with ultrasound  The vessel was found to be patent and compressible  The right neck and upper chest were prepped and draped in the usual sterile fashion  All elements of maximal sterile barrier technique were followed (cap, mask, sterile gown, sterile gloves, large sterile sheet, hand hygiene, and 2% chlorhexidine for cutaneous antisepsis)  Lidocaine was administered to the skin and a small skin incision was made  Under ultrasound guidance, utilizing sterile ultrasound technique with sterile gel and a sterile probe cover, the right internal jugular vein was accessed using single wall Seldinger technique  Static images of real time needle entry into the vessel were obtained  A 0 018 wire was then advanced through the needle into the central venous system  The needle was removed, and a 5 Kuwaiti coaxial dilator was inserted  An Amplatz wire was inserted through the outer dilator, and after tract dilatation a 14 [place]i Schon XL catheter was placed over the wire  The catheter tip was then positioned in the right atrium under fluoroscopic control  The external portion  of the catheter was sutured to the skin with 2-0 Prolene  The catheter aspirated and flushed well  A sterile dressing was applied and 1,000 unit heparin/cc solution was administered into each of the lumens  Impression: Impression: 1  Successful ultrasound and fluoroscopically guided placement of a 15 cm temporary dialysis Schon XL catheter via the right internal jugular vein  The tip of the catheter is in the right atrium and may be used immediately   Workstation performed: [id number]       Cardiac testing:   Results for orders placed during the hospital encounter of 07/03/19   Echo complete with contrast if indicated    [facility] [address]  [address] [address]  [phone]    Transthoracic Echocardiogram  2D, M-mode, Doppler, and Color Doppler    Study date:  2019    Patient: Ciro Rosa  MR number: [id number]  Account number: [de-identified]  : [id number]  Age: 66 years  Gender: Male  Status: Inpatient  Location: Bedside  Height: 73 in  Weight: 230 6 lb  BP: 124/ 68 mmHg    Indications: Cariomyopathy    Diagnoses: I42 9 - Cardiomyopathy, unspecified    Sonographer:  ROHINI Pichardo  Primary Physician:  Lakshmi Nelson MD  Referring Physician:  Sara Valencia DO  Group:  [facility]  Interpreting Physician:  Sara Valencia DO    SUMMARY    LEFT VENTRICLE:  Systolic function was normal by visual assessment  Ejection fraction was estimated to be 55 %  There were no regional wall motion abnormalities  There was moderate concentric hypertrophy  Doppler parameters were consistent with restrictive physiology, indicative of decreased left ventricular diastolic compliance and/or increased left atrial pressure  VENTRICULAR SEPTUM:  There was paradoxical motion  These changes are consistent with a post-thoracotomy state  MITRAL VALVE:  There was mild to moderate regurgitation  AORTIC VALVE:  There was no evidence for stenosis  There was no regurgitation  TRICUSPID VALVE:  There was mild regurgitation  Pulmonary artery systolic pressure was mildly increased  HISTORY: PRIOR HISTORY: HTN, DM, CAD, HCL, CABG, Bladder Cancer, Arthritis    PROCEDURE: The procedure was performed at the bedside  This was a routine study  The transthoracic approach was used  The study included complete 2D imaging, M-mode, complete spectral Doppler, and color Doppler  The heart rate was 60 bpm,  at the start of the study  Image quality was adequate  LEFT VENTRICLE: Size was normal  Systolic function was normal by visual assessment  Ejection fraction was estimated to be 55 %  There were no regional wall motion abnormalities  There was moderate concentric hypertrophy   DOPPLER: Doppler  parameters were consistent with restrictive physiology, indicative of decreased left ventricular diastolic compliance and/or increased left atrial pressure  Doppler parameters were consistent with elevated mean left atrial filling pressure  VENTRICULAR SEPTUM: There was paradoxical motion  These changes are consistent with a post-thoracotomy state  RIGHT VENTRICLE: The size was normal  Systolic function was normal  DOPPLER: Systolic pressure was within the normal range  LEFT ATRIUM: The atrium was moderately dilated  RIGHT ATRIUM: The atrium was mildly dilated  MITRAL VALVE: Valve structure was normal  There was normal leaflet separation  No echocardiographic evidence for prolapse  DOPPLER: The transmitral velocity was within the normal range  There was no evidence for stenosis  There was mild to  moderate regurgitation  AORTIC VALVE: The valve was trileaflet  Leaflets exhibited normal thickness, normal cuspal separation, and sclerosis  DOPPLER: Transaortic velocity was within the normal range  There was no evidence for stenosis  There was no  regurgitation  TRICUSPID VALVE: The valve structure was normal  There was normal leaflet separation  DOPPLER: The transtricuspid velocity was within the normal range  There was mild regurgitation  Pulmonary artery systolic pressure was mildly increased  Estimated peak PA pressure was 44 mmHg  PULMONIC VALVE: Leaflets exhibited normal thickness, no calcification, and normal cuspal separation  DOPPLER: The transpulmonic velocity was within the normal range  There was no regurgitation  PERICARDIUM: There was no thickening  There was no pericardial effusion  AORTA: The root exhibited normal size      PULMONARY ARTERY: The size was normal  The morphology appeared normal     SYSTEM MEASUREMENT TABLES    2D mode  AoR Diam 2D: 3 4 cm  LA Diam (2D): 4 9 cm  LA/Ao (2D): 1 44  FS (2D Teich): 28 4 %  IVSd (2D): 1 29 cm  LVDEV: 101 cmï¾³  LVESV: 45 8 cmï¾³  LVIDd(2D): 4 68 cm  LVISd (2D): 3 35 cm  LVPWd (2D): 1 26 cm  SV (Teich): 55 2 cmï¾³    Apical four chamber  LVEF A4C: 52 %    Unspecified Scan Mode  MV Peak E Darvin  Mean: 1160 mm/s  MVA (PHT): 5 24 cmï¾²  PHT: 42 ms  Max P mm[Hg]  V Max: 3020 mm/s  Vmax: 2780 mm/s  RA Area: 19 6 cmï¾²  RA Volume: 49 7 cmï¾³  TAPSE: 1 5 cm    [employer] Accredited Echocardiography Laboratory    Prepared and electronically signed by    Pam Garcia DO  Signed 2019 14:05:34           Kevin Eddy        "This note has been constructed using a voice recognition system  Therefore there may be syntax, spelling, and/or grammatical errors   Please call if you have any questions  "

## 2019-07-09 NOTE — HEMODIALYSIS
Second Tx completed via catheter  Adequate blood flow through tx  Pt tolerated well  1 9 l removed    Post HD weight 104 2 kg

## 2019-07-09 NOTE — SOCIAL WORK
Patient is alert and oriented, able to make needs known  Patient is not a bundle or a readmission  He lives in a two level home with 7 steps to enter and was independent with ADLs prior to admission  Patient does drive  Patient uses a cane and has a commode and tub transfer bench  No hx of MH or D&A  Patient PCP is Dr Alvarez and he uses [facility] [employer]  DANAY discussed rehab recommendation for STR  Patient has been to [facility] in the past and is requesting a referral to be made to all three [facility] facilities with a first choice being [facility]  DANAY made referrals  Will follow

## 2019-07-09 NOTE — PROGRESS NOTES
Progress Note - Courtney Bryan 66 y o  male MRN: [id number]    Unit/Bed#: [address] [place] [address] Encounter: [id number]      Subjective:    Complaints of profound weakness dizziness difficulty walking anxious back pain persistent difficulty breathing improved minimally diminished system unremarkable injections yesterday total of 12 done  All the labs x-ray reviewed  All the consultant follow-up reviewed  Discussed with the patient plan at length    Objective:   As below    Vitals: Blood pressure 163/70, pulse 80, temperature 98 6 °F (37 °C), temperature source Tympanic, resp  rate 20, height 6' 1" (1 854 m), weight 111 kg (244 lb 3 oz), SpO2 99 %  ,Body mass index is 32 22 kg/m²      Intake/Output:    Intake/Output Summary (Last 24 hours) at 7/9/2019 0950  Last data filed at 7/9/2019 0630  Gross per 24 hour   Intake 500 ml   Output 550 ml   Net -50 ml       Physical Exam:  General Appearance:  Alert oriented x3 not in any acute distress  Skin:  No rash normal normal  H/ENT:  No congestion normal no swelling  Eyes:  No redness no discharged normal  Cardiac:  Regular normal heart rate soft systolic murmur  Pulmonary:  Decreased air entry bilateral bilateral rales minimal improvement no wheezing Complete exam done as above today  Gastrointestinal:  Nontender no distension no mass palpable  Extremities:  2+ edema bilateral persistent bilateral no improvement  Musculoskeletal:  Knee swelling bilateral  Neuro:  No new deficit gait dysfunction no new deficit  Complete exam done today as above  Complete exam done as above for July 9, 2019  Current Facility-Administered Medications   Medication Dose Route Frequency    acetaminophen (TYLENOL) tablet 650 mg  650 mg Oral Q6H PRN    ALPRAZolam (XANAX) tablet 0 25 mg  0 25 mg Oral HS    aluminum-magnesium hydroxide-simethicone (MYLANTA) 200-200-20 mg/5 mL oral suspension 30 mL  30 mL Oral Q4H PRN    amLODIPine (NORVASC) tablet 5 mg  5 mg Oral BID    aspirin (ECOTRIN LOW STRENGTH) EC tablet 81 mg  81 mg Oral Daily    atorvastatin (LIPITOR) tablet 80 mg  80 mg Oral Daily With Dinner    azithromycin (ZITHROMAX) tablet 500 mg  500 mg Oral Q24H    cefuroxime (CEFTIN) tablet 500 mg  500 mg Oral Q12H [address]    escitalopram (LEXAPRO) tablet 10 mg  10 mg Oral HS    furosemide (LASIX) injection 80 mg  80 mg Intravenous BID (diuretic)    hydrALAZINE (APRESOLINE) injection 10 mg  10 mg Intravenous Q6H PRN    hydrALAZINE (APRESOLINE) tablet 50 mg  50 mg Oral Q8H [address]    insulin detemir (LEVEMIR) subcutaneous injection 20 Units  20 Units Subcutaneous Q12H [address]    insulin lispro (HumaLOG) 100 units/mL subcutaneous injection 1-6 Units  1-6 Units Subcutaneous HS    insulin lispro (HumaLOG) 100 units/mL subcutaneous injection 2-12 Units  2-12 Units Subcutaneous TID AC    ipratropium-albuterol (DUO-NEB) 0 5-2 5 mg/3 mL inhalation solution 3 mL  3 mL Nebulization Q6H    isosorbide mononitrate (IMDUR) 24 hr tablet 30 mg  30 mg Oral Daily    metoprolol tartrate (LOPRESSOR) tablet 50 mg  50 mg Oral Q12H [address]    nitroglycerin (NITROSTAT) SL tablet 0 4 mg  0 4 mg Sublingual Q5 Min PRN    pneumococcal 13-valent conjugate vaccine (PREVNAR-13) IM injection 0 5 mL  0 5 mL Intramuscular Prior to discharge    polyethylene glycol (MIRALAX) packet 17 g  17 g Oral Daily       Radiology Results:  Reviewed    Lab, Imaging and other studies:   CBC:   Lab Results   Component Value Date    WBC 8 95 07/09/2019    WBC 8 8 01/05/2016    RBC 3 30 (L) 07/09/2019    RBC 4 61 (L) 01/05/2016     BMP:   Lab Results   Component Value Date    GLUCOSE 131 (H) 01/05/2016    SODIUM 135 (L) 07/09/2019    CO2 27 07/09/2019    CO2 27 01/05/2016    BUN 76 (H) 07/09/2019    BUN 35 (H) 01/05/2016    CREATININE 2 80 (H) 07/09/2019    CREATININE 1 5 (H) 01/05/2016    CALCIUM 8 2 (L) 07/09/2019    CALCIUM 9 0 01/05/2016     Coagulation:   Lab Results   Component Value Date    INR 1 12 07/06/2019     Cardiac markers:   Lab Results   Component Value Date    CKMB 4 9 07/04/2019     ABGs: No results found for: PH   Results from last 7 days   Lab Units 07/09/19  0529   POTASSIUM mmol/L 3 9   CHLORIDE mmol/L 100   CO2 mmol/L 27   BUN mg/dL 76*   CREATININE mg/dL 2 80*   CALCIUM mg/dL 8 2*   ALK PHOS U/L 102   ALT U/L 50   AST U/L 35       Assessment:  Principal Problem:    Acute on chronic diastolic CHF (congestive heart failure) ([facility])  Active Problems:    CKD (chronic kidney disease), stage III ([facility])    Benign hypertension with chronic kidney disease, stage III (HCC)   Acute on chronic renal failure as evident the creatinine 3 2 and baseline creatinine 1 3  suspected pneumonia  Assessment:  Acute on chronic diastolic CHF  Pneumonia  Pleural effusion  Type 1 diabetes  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  Hypertension uncontrolled  Acute MI type 1  Plan:  Continue aspirin Plavix and heparin patient suffered acute MI  Now switched to Lasix 80 mg twice a day  Hemodialysis today  IV antibiotics reached oral  Labs in the morning  OT PT    VTE Pharmacologic Prophylaxis: heparin    Abraham Navarro MD  7/9/2019, 9:50 AM

## 2019-07-10 ENCOUNTER — APPOINTMENT (INPATIENT)
Dept: DIALYSIS | Facility: HOSPITAL | Age: 79
DRG: 280 | End: 2019-07-10
Payer: COMMERCIAL

## 2019-07-10 LAB
ALBUMIN SERPL BCP-MCNC: 2.4 G/DL (ref 3.5–5)
ALBUMIN SERPL ELPH-MCNC: 3.13 G/DL (ref 3.5–5)
ALBUMIN SERPL ELPH-MCNC: 46.7 % (ref 52–65)
ALBUMIN UR ELPH-MCNC: 74.6 %
ALP SERPL-CCNC: 108 U/L (ref 46–116)
ALPHA1 GLOB MFR UR ELPH: 4.1 %
ALPHA1 GLOB SERPL ELPH-MCNC: 0.52 G/DL (ref 0.1–0.4)
ALPHA1 GLOB SERPL ELPH-MCNC: 7.7 % (ref 2.5–5)
ALPHA2 GLOB MFR UR ELPH: 5 %
ALPHA2 GLOB SERPL ELPH-MCNC: 1.08 G/DL (ref 0.4–1.2)
ALPHA2 GLOB SERPL ELPH-MCNC: 16.1 % (ref 7–13)
ALT SERPL W P-5'-P-CCNC: 47 U/L (ref 12–78)
ANION GAP SERPL CALCULATED.3IONS-SCNC: 8 MMOL/L (ref 4–13)
AST SERPL W P-5'-P-CCNC: 42 U/L (ref 5–45)
B-GLOBULIN MFR UR ELPH: 4.3 %
BASOPHILS # BLD AUTO: 0.02 THOUSANDS/ΜL (ref 0–0.1)
BASOPHILS NFR BLD AUTO: 0 % (ref 0–1)
BETA GLOB ABNORMAL SERPL ELPH-MCNC: 0.36 G/DL (ref 0.4–0.8)
BETA1 GLOB SERPL ELPH-MCNC: 5.3 % (ref 5–13)
BETA2 GLOB SERPL ELPH-MCNC: 7.5 % (ref 2–8)
BETA2+GAMMA GLOB SERPL ELPH-MCNC: 0.5 G/DL (ref 0.2–0.5)
BILIRUB SERPL-MCNC: 1.6 MG/DL (ref 0.2–1)
BUN SERPL-MCNC: 61 MG/DL (ref 5–25)
CALCIUM SERPL-MCNC: 8.3 MG/DL (ref 8.3–10.1)
CHLORIDE SERPL-SCNC: 100 MMOL/L (ref 100–108)
CO2 SERPL-SCNC: 26 MMOL/L (ref 21–32)
CREAT SERPL-MCNC: 2.41 MG/DL (ref 0.6–1.3)
EOSINOPHIL # BLD AUTO: 0.07 THOUSAND/ΜL (ref 0–0.61)
EOSINOPHIL NFR BLD AUTO: 1 % (ref 0–6)
ERYTHROCYTE [DISTWIDTH] IN BLOOD BY AUTOMATED COUNT: 14 % (ref 11.6–15.1)
GAMMA GLOB ABNORMAL SERPL ELPH-MCNC: 1.12 G/DL (ref 0.5–1.6)
GAMMA GLOB MFR UR ELPH: 12 %
GAMMA GLOB SERPL ELPH-MCNC: 16.7 % (ref 12–22)
GFR SERPL CREATININE-BSD FRML MDRD: 25 ML/MIN/1.73SQ M
GLUCOSE SERPL-MCNC: 144 MG/DL (ref 65–140)
GLUCOSE SERPL-MCNC: 200 MG/DL (ref 65–140)
GLUCOSE SERPL-MCNC: 206 MG/DL (ref 65–140)
GLUCOSE SERPL-MCNC: 258 MG/DL (ref 65–140)
GLUCOSE SERPL-MCNC: 284 MG/DL (ref 65–140)
HCT VFR BLD AUTO: 31.3 % (ref 36.5–49.3)
HGB BLD-MCNC: 9.9 G/DL (ref 12–17)
IGG/ALB SER: 0.88 {RATIO} (ref 1.1–1.8)
IMM GRANULOCYTES # BLD AUTO: 0.07 THOUSAND/UL (ref 0–0.2)
IMM GRANULOCYTES NFR BLD AUTO: 1 % (ref 0–2)
KAPPA LC FREE SER-MCNC: 118.2 MG/L (ref 3.3–19.4)
KAPPA LC FREE/LAMBDA FREE SER: 2.23 {RATIO} (ref 0.26–1.65)
LAMBDA LC FREE SERPL-MCNC: 52.9 MG/L (ref 5.7–26.3)
LYMPHOCYTES # BLD AUTO: 0.95 THOUSANDS/ΜL (ref 0.6–4.47)
LYMPHOCYTES NFR BLD AUTO: 12 % (ref 14–44)
MAGNESIUM SERPL-MCNC: 2.9 MG/DL (ref 1.6–2.6)
MCH RBC QN AUTO: 30.7 PG (ref 26.8–34.3)
MCHC RBC AUTO-ENTMCNC: 31.6 G/DL (ref 31.4–37.4)
MCV RBC AUTO: 97 FL (ref 82–98)
MONOCYTES # BLD AUTO: 1.03 THOUSAND/ΜL (ref 0.17–1.22)
MONOCYTES NFR BLD AUTO: 13 % (ref 4–12)
NEUTROPHILS # BLD AUTO: 5.84 THOUSANDS/ΜL (ref 1.85–7.62)
NEUTS SEG NFR BLD AUTO: 73 % (ref 43–75)
NRBC BLD AUTO-RTO: 0 /100 WBCS
PLATELET # BLD AUTO: 123 THOUSANDS/UL (ref 149–390)
PMV BLD AUTO: 11 FL (ref 8.9–12.7)
POTASSIUM SERPL-SCNC: 4.5 MMOL/L (ref 3.5–5.3)
PROT PATTERN SERPL ELPH-IMP: ABNORMAL
PROT PATTERN UR ELPH-IMP: ABNORMAL
PROT SERPL-MCNC: 6.7 G/DL (ref 6.4–8.2)
PROT SERPL-MCNC: 6.7 G/DL (ref 6.4–8.2)
PROT UR-MCNC: 119 MG/DL
RBC # BLD AUTO: 3.23 MILLION/UL (ref 3.88–5.62)
RYE IGE QN: NEGATIVE
SODIUM SERPL-SCNC: 134 MMOL/L (ref 136–145)
WBC # BLD AUTO: 7.98 THOUSAND/UL (ref 4.31–10.16)

## 2019-07-10 PROCEDURE — 85025 COMPLETE CBC W/AUTO DIFF WBC: CPT | Performed by: INTERNAL MEDICINE

## 2019-07-10 PROCEDURE — 83735 ASSAY OF MAGNESIUM: CPT | Performed by: INTERNAL MEDICINE

## 2019-07-10 PROCEDURE — 94640 AIRWAY INHALATION TREATMENT: CPT

## 2019-07-10 PROCEDURE — 94760 N-INVAS EAR/PLS OXIMETRY 1: CPT

## 2019-07-10 PROCEDURE — G8978 MOBILITY CURRENT STATUS: HCPCS

## 2019-07-10 PROCEDURE — 80053 COMPREHEN METABOLIC PANEL: CPT | Performed by: INTERNAL MEDICINE

## 2019-07-10 PROCEDURE — G8979 MOBILITY GOAL STATUS: HCPCS

## 2019-07-10 PROCEDURE — 90935 HEMODIALYSIS ONE EVALUATION: CPT | Performed by: INTERNAL MEDICINE

## 2019-07-10 PROCEDURE — 97163 PT EVAL HIGH COMPLEX 45 MIN: CPT

## 2019-07-10 PROCEDURE — 82948 REAGENT STRIP/BLOOD GLUCOSE: CPT

## 2019-07-10 PROCEDURE — 99232 SBSQ HOSP IP/OBS MODERATE 35: CPT | Performed by: INTERNAL MEDICINE

## 2019-07-10 RX ADMIN — INSULIN LISPRO 3 UNITS: 100 INJECTION, SOLUTION INTRAVENOUS; SUBCUTANEOUS at 21:27

## 2019-07-10 RX ADMIN — ESCITALOPRAM OXALATE 10 MG: 10 TABLET ORAL at 21:26

## 2019-07-10 RX ADMIN — IPRATROPIUM BROMIDE AND ALBUTEROL SULFATE 3 ML: 2.5; .5 SOLUTION RESPIRATORY (INHALATION) at 02:45

## 2019-07-10 RX ADMIN — HYDRALAZINE HYDROCHLORIDE 50 MG: 25 TABLET ORAL at 21:26

## 2019-07-10 RX ADMIN — HYDRALAZINE HYDROCHLORIDE 50 MG: 25 TABLET ORAL at 14:53

## 2019-07-10 RX ADMIN — IPRATROPIUM BROMIDE AND ALBUTEROL SULFATE 3 ML: 2.5; .5 SOLUTION RESPIRATORY (INHALATION) at 13:39

## 2019-07-10 RX ADMIN — INSULIN DETEMIR 20 UNITS: 100 INJECTION, SOLUTION SUBCUTANEOUS at 12:08

## 2019-07-10 RX ADMIN — METOPROLOL TARTRATE 50 MG: 50 TABLET, FILM COATED ORAL at 21:26

## 2019-07-10 RX ADMIN — HYDRALAZINE HYDROCHLORIDE 50 MG: 25 TABLET ORAL at 05:07

## 2019-07-10 RX ADMIN — ATORVASTATIN CALCIUM 80 MG: 80 TABLET ORAL at 17:43

## 2019-07-10 RX ADMIN — AMLODIPINE BESYLATE 5 MG: 5 TABLET ORAL at 17:43

## 2019-07-10 RX ADMIN — INSULIN LISPRO 6 UNITS: 100 INJECTION, SOLUTION INTRAVENOUS; SUBCUTANEOUS at 17:44

## 2019-07-10 RX ADMIN — FUROSEMIDE 80 MG: 10 INJECTION, SOLUTION INTRAMUSCULAR; INTRAVENOUS at 12:06

## 2019-07-10 RX ADMIN — INSULIN DETEMIR 20 UNITS: 100 INJECTION, SOLUTION SUBCUTANEOUS at 21:26

## 2019-07-10 RX ADMIN — AZITHROMYCIN 500 MG: 250 TABLET, FILM COATED ORAL at 12:07

## 2019-07-10 RX ADMIN — ALPRAZOLAM 0.25 MG: 0.25 TABLET ORAL at 21:26

## 2019-07-10 RX ADMIN — FUROSEMIDE 80 MG: 10 INJECTION, SOLUTION INTRAMUSCULAR; INTRAVENOUS at 17:43

## 2019-07-10 RX ADMIN — IPRATROPIUM BROMIDE AND ALBUTEROL SULFATE 3 ML: 2.5; .5 SOLUTION RESPIRATORY (INHALATION) at 07:30

## 2019-07-10 RX ADMIN — ISOSORBIDE MONONITRATE 30 MG: 30 TABLET, EXTENDED RELEASE ORAL at 12:07

## 2019-07-10 RX ADMIN — METOPROLOL TARTRATE 50 MG: 50 TABLET, FILM COATED ORAL at 12:07

## 2019-07-10 RX ADMIN — CEFUROXIME AXETIL 500 MG: 250 TABLET ORAL at 21:29

## 2019-07-10 RX ADMIN — POLYETHYLENE GLYCOL 3350 17 G: 17 POWDER, FOR SOLUTION ORAL at 12:06

## 2019-07-10 RX ADMIN — ASPIRIN 81 MG: 81 TABLET, COATED ORAL at 12:07

## 2019-07-10 RX ADMIN — AMLODIPINE BESYLATE 5 MG: 5 TABLET ORAL at 12:08

## 2019-07-10 RX ADMIN — IPRATROPIUM BROMIDE AND ALBUTEROL SULFATE 3 ML: 2.5; .5 SOLUTION RESPIRATORY (INHALATION) at 19:17

## 2019-07-10 RX ADMIN — CEFUROXIME AXETIL 500 MG: 250 TABLET ORAL at 12:09

## 2019-07-10 NOTE — PROGRESS NOTES
Progress Note - Rhonda Acosta 66 y o  male MRN: [id number]    Unit/Bed#: [address] [place] [address] Encounter: [id number]      Subjective:   No acute events overnight patient is difficulty breathing improved some complains of back pain profound weakness difficulty walking dry mouth the remaining review of system unremarkable unchanged since yesterday total of 12 done  All the labs x-ray reviewed  All the consultant follow-up reviewed  Discussed with the patient plan at length    Objective:   As below    Vitals: Blood pressure 133/69, pulse 74, temperature 98 °F (36 7 °C), temperature source Oral, resp  rate 20, height 6' 1" (1 854 m), weight 108 kg (237 lb 14 oz), SpO2 98 %  ,Body mass index is 31 38 kg/m²      Intake/Output:    Intake/Output Summary (Last 24 hours) at 7/10/2019 0837  Last data filed at 7/10/2019 0805  Gross per 24 hour   Intake 978 ml   Output 3000 ml   Net -2022 ml       Physical Exam:  General Appearance:  A alert oriented x3 not any acute distress comfortable  Skin:  No rash normal normal  H/ENT:  No congestion normal no swelling  Eyes:  No redness no discharged normal  Cardiac:  Regular normal heart rate soft systolic murmur  Pulmonary:  Decreased air entry bilateral improving Complete exam done bilateral rales minimal improvement no wheezing   Gastrointestinal:  Nontender no distension no mass palpable bowel sounds normal no distension  Extremities:  2+ edema bilateral persistent bilateral edema slowly improving bilateral  Musculoskeletal:  Knee swelling bilateral  Neuro:  No new deficit gait dysfunction no new deficit  Complete exam done today as above  Complete exam done as above for July 10, 2019  Current Facility-Administered Medications   Medication Dose Route Frequency    acetaminophen (TYLENOL) tablet 650 mg  650 mg Oral Q6H PRN    ALPRAZolam (XANAX) tablet 0 25 mg  0 25 mg Oral HS    aluminum-magnesium hydroxide-simethicone (MYLANTA) 200-200-20 mg/5 mL oral suspension 30 mL  30 mL Oral Q4H PRN  amLODIPine (NORVASC) tablet 5 mg  5 mg Oral BID    aspirin (ECOTRIN LOW STRENGTH) EC tablet 81 mg  81 mg Oral Daily    atorvastatin (LIPITOR) tablet 80 mg  80 mg Oral Daily With Dinner    azithromycin (ZITHROMAX) tablet 500 mg  500 mg Oral Q24H    cefuroxime (CEFTIN) tablet 500 mg  500 mg Oral Q12H [facility]    escitalopram (LEXAPRO) tablet 10 mg  10 mg Oral HS    furosemide (LASIX) injection 80 mg  80 mg Intravenous BID (diuretic)    hydrALAZINE (APRESOLINE) injection 10 mg  10 mg Intravenous Q6H PRN    hydrALAZINE (APRESOLINE) tablet 50 mg  50 mg Oral Q8H [facility]    insulin detemir (LEVEMIR) subcutaneous injection 20 Units  20 Units Subcutaneous Q12H [place]    insulin lispro (HumaLOG) 100 units/mL subcutaneous injection 1-6 Units  1-6 Units Subcutaneous HS    insulin lispro (HumaLOG) 100 units/mL subcutaneous injection 2-12 Units  2-12 Units Subcutaneous TID AC    ipratropium-albuterol (DUO-NEB) 0 5-2 5 mg/3 mL inhalation solution 3 mL  3 mL Nebulization Q6H    isosorbide mononitrate (IMDUR) 24 hr tablet 30 mg  30 mg Oral Daily    metoprolol tartrate (LOPRESSOR) tablet 50 mg  50 mg Oral Q12H [facility]    nitroglycerin (NITROSTAT) SL tablet 0 4 mg  0 4 mg Sublingual Q5 Min PRN    pneumococcal 13-valent conjugate vaccine (PREVNAR-13) IM injection 0 5 mL  0 5 mL Intramuscular Prior to discharge    polyethylene glycol (MIRALAX) packet 17 g  17 g Oral Daily       Radiology Results:  Reviewed    Lab, Imaging and other studies:   CBC:   Lab Results   Component Value Date    WBC 7 98 07/10/2019    WBC 8 8 01/05/2016    RBC 3 23 (L) 07/10/2019    RBC 4 61 (L) 01/05/2016     BMP:   Lab Results   Component Value Date    GLUCOSE 131 (H) 01/05/2016    SODIUM 134 (L) 07/10/2019    CO2 26 07/10/2019    CO2 27 01/05/2016    BUN 61 (H) 07/10/2019    BUN 35 (H) 01/05/2016    CREATININE 2 41 (H) 07/10/2019    CREATININE 1 5 (H) 01/05/2016    CALCIUM 8 3 07/10/2019    CALCIUM 9 0 01/05/2016     Coagulation:   Lab Results Component Value Date    INR 1 12 07/06/2019     Cardiac markers:   Lab Results   Component Value Date    CKMB 4 9 07/04/2019     ABGs: No results found for: PH   Results from last 7 days   Lab Units 07/10/19  0507   POTASSIUM mmol/L 4 5   CHLORIDE mmol/L 100   CO2 mmol/L 26   BUN mg/dL 61*   CREATININE mg/dL 2 41*   CALCIUM mg/dL 8 3   ALK PHOS U/L 108   ALT U/L 47   AST U/L 42       Assessment:  Principal Problem:    Acute on chronic diastolic CHF (congestive heart failure) ([facility])  Active Problems:    CKD (chronic kidney disease), stage III ([facility])    Benign hypertension with chronic kidney disease, stage III (HCC)   Acute on chronic renal failure as evident the creatinine 3 2 and baseline creatinine 1 3  suspected pneumonia  Assessment:  Acute on chronic diastolic CHF  Pneumonia  Pleural effusion  Type 1 diabetes  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  Hypertension uncontrolled  Acute MI type 1  Plan:  Continue aspirin Plavix and heparin patient suffered acute MI  Now switched to Lasix IV 80 mg twice a day  Hemodialysis today patient is on hemodialysis currently  IV antibiotics switched oral discontinue after 7 days course  Labs in the morning  OT PT    VTE Pharmacologic Prophylaxis: heparin    Betito San MD  7/10/2019, 8:37 AM

## 2019-07-10 NOTE — SOCIAL WORK
Faxed requested info late yesterday to the [facility] in [place] for chair time  Pt was agreeable to referral there  Rec'd call from Mark Castillo, designated person to help with dialysis schedule  Her cell is [phone]  Discussed with pt need for possible alternative clinic if he can not get a chair time that will work with him  Was agreeable  Discussed private pay for transportation from [facility], was agreeable to pay out of pocket  May plan to drive himself after [facility] if able  Will continue to follow for d/c

## 2019-07-10 NOTE — PROGRESS NOTES
NEPHROLOGY PROGRESS NOTE   John Motta 66 y o  male MRN: [id number]  Unit/Bed#: [address] [place] [address] Encounter: [id number]    ASSESSMENT & PLAN:  67 yo make with HTN, DM, HLP, CAD/CABG, CKD III, OA, bladder cancer s/p partial cystectomy admitted to [facility] on 7/3/19 with SOB and edema  Diagnosed with pneumonia vs CHF and on abx and diuretics  Developed chest pain on 7/5/19 and  NSTEMI  Patient being followed by Cardiology      1  BRIAN (POA)  · Admission creatinine of 1 80 on 7/3/19  · Renal US showed no hydronephrosis  UA not indicative of GN-showed 1-2 RBC with 4-10 WBC and 3+ proteinuria   serological workup ordered as cause for acute kidney injury is not clear  Could be cardiorenal syndrome versus ATN  SPEP pending, UPEP no monoclonal protein, C3-C4 within normal limits  LANETTE and ANCA panel pending  hepatitis panel-hepatitis-B and C nonreactive  · Acute kidney injury likely ATN  · Renal function worsened with creatinine up to 3 26 on 7/8  Renal function worsened while on Lasix drip but without adequate urine output and patient was still fluid overloaded, so hemodialysis was initiated on 07/08  Tolerated 1st treatment on 07/08 with ultrafiltration 500 mL  Second hemodialysis treatment on 07/09  · Lasix drip was changed to IV Lasix 80 mg twice daily on 7/8  · Dialysis access:  Right IJ temporary dialysis catheter  Will need PermCath if patient remains dialysis dependent  Will plan for PermCath placement for Friday  · Patient seen and examined on hemodialysis treatment, single visit  Tolerating hemodialysis  Na+ 138 mEq/L    Na+ Modeling No    K+ KPP (Potassium per Protocol)    Bicarb 35 mEq/L    Dialyzer F160    BFR-As tolerated to a maximum of: 300ml/min    DFR 1 5x BFR    Duration of Treatment 3 5 Hours    Dialysate Temperature (C) 36    Fluid Removal (Liters): 2      Next hemodialysis treatment on Friday  Will do Monday Wednesday Friday  2   CKD III:  ·  Baseline creatinine is around 1 2 to 1 5 since 2015  No prior renal follow up  Before this hospital admission creatinine was 1 23 on 02/2017  No recent outpatient creatinine available to me at this time  · Chronic kidney disease could be due to hypertensive nephrosclerosis     3  Fluid overload/CHF:    Echocardiogram showed ejection fraction of 55% and no regional wall motion abnormalities  Continue IV Lasix  Plan for more ultrafiltration on hemodialysis today, plan for 2 L ultrafiltration  4  Primary HTN with chronic kidney disease stage 3:   · BP stable  · On Amlodipine + Metoprolol at home  · Hydralazine added this admission       6    Proteinuria:  UPC ratio-2 0 g, follow-up SPEP UPEP light chain ratio  Could be diabetic nephropathy  Continue management of diabetes mellitus per primary team     7  Anemia:  Hemoglobin dropped slightly to 9 9, continue monitoring     Will discuss with primary team, patient will need outpatient placement for hemodialysis  So far no renal recovery  SUBJECTIVE:  Patient undergoing hemodialysis treatment, shortness of Breath improving  No renal recovery so far    OBJECTIVE:  Current Weight: Weight - Scale: 108 kg (237 lb 14 oz)  Vitals:    07/10/19 0900   BP: 134/57   Pulse: 72   Resp:    Temp:    SpO2:        Intake/Output Summary (Last 24 hours) at 7/10/2019 0912  Last data filed at 7/10/2019 0805  Gross per 24 hour   Intake 978 ml   Output 3000 ml   Net -2022 ml       Physical Exam  General:  Ill looking, awake  Eyes: Conjunctivae pink,  Sclera anicteric  ENT: lips and mucous membranes moist  Neck: supple   Chest: Clear to Auscultation both lungs,  no crackles, ronchus or wheezing  Right IJ temporary dialysis catheter  CVS: S1 & S2 present, normal rate, regular rhythm, no murmur    Abdomen: soft, non-tender, non-distended, Bowel sounds normoactive  Extremities:  2+ edema both legs  Skin: no rash  Neuro: awake, alert, oriented      Medications:    Current Facility-Administered Medications:    acetaminophen (TYLENOL) tablet 650 mg, 650 mg, Oral, Q6H PRN, Sean Alvarez MD    ALPRAZolam (XANAX) tablet 0 25 mg, 0 25 mg, Oral, HS, Sean Alvarez MD, 0 25 mg at 07/09/19 2223    aluminum-magnesium hydroxide-simethicone (MYLANTA) 200-200-20 mg/5 mL oral suspension 30 mL, 30 mL, Oral, Q4H PRN, Claudean Mae, MD, 30 mL at 07/06/19 2035    amLODIPine (NORVASC) tablet 5 mg, 5 mg, Oral, BID, Sean Alvarez MD, 5 mg at 07/09/19 1700    aspirin (ECOTRIN LOW STRENGTH) EC tablet 81 mg, 81 mg, Oral, Daily, Sean Alvarez MD, 81 mg at 07/09/19 1012    atorvastatin (LIPITOR) tablet 80 mg, 80 mg, Oral, Daily With TABITHA Castro, 80 mg at 07/09/19 1655    azithromycin (ZITHROMAX) tablet 500 mg, 500 mg, Oral, Q24H, Sean Alvarez MD, 500 mg at 07/09/19 1012    cefuroxime (CEFTIN) tablet 500 mg, 500 mg, Oral, Q12H [facility], Jannetta Baumgarten, RENETTA, 500 mg at 07/09/19 2014    escitalopram (LEXAPRO) tablet 10 mg, 10 mg, Oral, HS, Sean Alvarez MD, 10 mg at 07/09/19 2227    furosemide (LASIX) injection 80 mg, 80 mg, Intravenous, BID (diuretic), [facility], PA-C, 80 mg at 07/09/19 1654    hydrALAZINE (APRESOLINE) injection 10 mg, 10 mg, Intravenous, Q6H PRN, Sean Alvarez MD, 10 mg at 07/03/19 1817    hydrALAZINE (APRESOLINE) tablet 50 mg, 50 mg, Oral, Q8H [facility], TABITHA Ruiz, 50 mg at 07/10/19 0507    insulin detemir (LEVEMIR) subcutaneous injection 20 Units, 20 Units, Subcutaneous, Q12H [facility], Ha Rivas MD, 20 Units at 07/09/19 2223    insulin lispro (HumaLOG) 100 units/mL subcutaneous injection 1-6 Units, 1-6 Units, Subcutaneous, HS, Sean Alvarez MD, 5 Units at 07/09/19 2224    insulin lispro (HumaLOG) 100 units/mL subcutaneous injection 2-12 Units, 2-12 Units, Subcutaneous, TID AC, 2 Units at 07/09/19 1657 **AND** Fingerstick Glucose (POCT), , , TID AC, Sean Alvarez MD    ipratropium-albuterol (DUO-NEB) 0 5-2 5 mg/3 mL inhalation solution 3 mL, 3 mL, Nebulization, Q6H, Sean Alvarez MD, 3 mL at 07/10/19 0730   isosorbide mononitrate (IMDUR) 24 hr tablet 30 mg, 30 mg, Oral, Daily, Navtej Isabella, DO, 30 mg at 07/09/19 1012    metoprolol tartrate (LOPRESSOR) tablet 50 mg, 50 mg, Oral, Q12H [facility], Navtej Isabella, DO, 50 mg at 07/09/19 2014    nitroglycerin (NITROSTAT) SL tablet 0 4 mg, 0 4 mg, Sublingual, Q5 Min PRN, Navtej Isabella, DO, 0 4 mg at 07/05/19 1652    pneumococcal 13-valent conjugate vaccine (PREVNAR-13) IM injection 0 5 mL, 0 5 mL, Intramuscular, Prior to discharge, Robina Arguello MD    polyethylene glycol (MIRALAX) packet 17 g, 17 g, Oral, Daily, Sean Alvarez MD, 17 g at 07/09/19 1012    Invasive Devices:        Lab Results:   Results from last 7 days   Lab Units 07/10/19  0507 07/10/19  0501 07/09/19  0529 07/08/19  0743 07/07/19  0510   WBC Thousand/uL  --  7 98 8 95 10 89* 10 62*   HEMOGLOBIN g/dL  --  9 9* 10 1* 10 1* 10 6*   HEMATOCRIT %  --  31 3* 30 5* 31 0* 33 1*   PLATELETS Thousands/uL  --  123* 129* 111* 132*   POTASSIUM mmol/L 4 5  --  3 9 4 5 4 9   CHLORIDE mmol/L 100  --  100 99* 100   CO2 mmol/L 26  --  27 24 23   BUN mg/dL 61*  --  76* 92* 73*   CREATININE mg/dL 2 41*  --  2 80* 3 26* 2 81*   CALCIUM mg/dL 8 3  --  8 2* 8 3 8 3   MAGNESIUM mg/dL 2 9*  --  2 7* 2 9* 2 9*   PHOSPHORUS mg/dL  --   --  3 9  --  3 8   ALK PHOS U/L 108  --  102 103 102   ALT U/L 47  --  50 48 42   AST U/L 42  --  35 35 40       Previous work up:  RENAL ULTRASOUND     INDICATION:   N18 9: Chronic kidney disease, unspecified      COMPARISON: 12/5/2016     TECHNIQUE:   Ultrasound of the retroperitoneum was performed with a curvilinear transducer utilizing volumetric sweeps and still imaging techniques       FINDINGS:     KIDNEYS:  Symmetric and normal size  Right kidney:  10 8 x 5 6 cm  Left kidney:  10 6 x 5 3 cm      Right kidney  Normal echogenicity and contour  No suspicious masses detected  No hydronephrosis  No shadowing calculi  No perinephric fluid collections      Left kidney  Normal echogenicity and contour     No suspicious masses detected  No hydronephrosis  No shadowing calculi  No perinephric fluid collections      URETERS:  Nonvisualized      BLADDER:   The bladder is underdistended  No focal thickening or mass lesions      IMPRESSION:     No hydronephrosis  Portions of the record may have been created with voice recognition software  Occasional wrong word or "sound a like" substitutions may have occurred due to the inherent limitations of voice recognition software  Read the chart carefully and recognize, using context, where substitutions have occurred  If you have any questions, please contact the dictating provider

## 2019-07-10 NOTE — PHYSICAL THERAPY NOTE
PT EVALUATION     07/10/19 1525   Pain Assessment   Pain Assessment No/denies pain   Home Living   Type of [address] Two level;Stairs to enter with rails  (7 stairs to enter)   Bathroom Equipment Tub transfer bench;Commode   Home Equipment Cane   Additional Comments patient independent with cane prior to admission   Prior Function   Level of [place] Independent with ADLs and functional mobility   Lives With Alone   ADL Assistance Independent   IADLs Independent   Restrictions/Precautions   Other Precautions Fall Risk   General   Additional Pertinent History chart reviewed, patient admitted with CHF  now on dialysis/hemo   Family/Caregiver Present Yes   Cognition   Overall Cognitive Status WFL   Arousal/Participation Cooperative   Attention Within functional limits   Orientation Level Oriented X4   Following Commands Follows multistep commands with increased time or repetition   RLE Assessment   RLE Assessment   (ROMWFL,strength 3+/4-)   LLE Assessment   LLE Assessment   (ROM WFL, strength 3+/4-)   Coordination   Movements are Fluid and Coordinated 0   Bed Mobility   Supine to Sit 4  Minimal assistance   Additional items Assist x 1;Verbal cues   Transfers   Sit to Stand 3  Moderate assistance   Additional items Assist x 1   Stand to Sit 3  Moderate assistance   Additional items Assist x 1   Ambulation/Elevation   Gait Assistance 3  Moderate assist   Additional items Assist x 1   Assistive Device Rolling walker   Distance 10 feet with change in direction   Balance   Static Sitting Fair   Dynamic Sitting Fair -   Static Standing Fair -   Dynamic Standing Poor +   Ambulatory Poor   Activity Tolerance   Activity Tolerance Patient tolerated treatment well;Patient limited by fatigue   Nurse Made Aware yes   Assessment   Prognosis Good   Problem List Decreased strength;Decreased range of motion;Decreased endurance; Impaired balance;Decreased mobility; Decreased coordination   Assessment Patient seen for Physical Therapy evaluation  Patient admitted with Acute on chronic diastolic CHF (congestive heart failure) ([facility] [address] )  Comorbidities affecting patient's physical performance include: arthritis, CAD, chronic back pain, CHF, diabetes, hypertension and cancer   Personal factors affecting patient at time of initial evaluation include: lives in two story house, stairs to enter home, inability to ambulate household distances, inability to navigate community distances, inability to navigate level surfaces without external assistance, inability to perform dynamic tasks in community and limited home support  Prior to admission, patient was independent with functional mobility with cane, independent with ADLS, independent with IADLS, living in a multi-level home, ambulating household distance and ambulating community distances  Please find objective findings from Physical Therapy assessment regarding body systems outlined above with impairments and limitations including weakness, decreased ROM, impaired balance, decreased endurance, impaired coordination, gait deviations, decreased activity tolerance, decreased functional mobility tolerance and fall risk  The Barthel Index was used as a functional outcome tool presenting with a score of 40 today indicating marked limitations of functional mobility and ADLS  Patient's clinical presentation is currently unstable/unpredictable as seen in patient's presentation of vital sign response, changing level of pain, increased fall risk, new onset of impairment of functional mobility, decreased endurance and new onset of weakness  Pt would benefit from continued Physical Therapy treatment to address deficits as defined above and maximize level of functional mobility  As demonstrated by objective findings, the assigned level of complexity for this evaluation is high     Goals   Patient Goals get stronger and go home   STG Expiration Date 07/17/19   Short Term Goal #1 transfers and gait with roller walker with supervision   Short Term Goal #2 gait endurance to functional household distances, strength BLEs 4-/5 all to meet pateint goal of improved strength and returning home   LTG Expiration Date 07/24/19   Long Term Goal #1 transfers and gait with cane independently   Long Term Goal #2 gait endurance to functional community distances   Plan   Treatment/Interventions ADL retraining;Functional transfer training;LE strengthening/ROM; Therapeutic exercise; Endurance training;Elevations; Patient/family training;Equipment eval/education; Bed mobility;Gait training; Compensatory technique education   PT Frequency 5x/wk   Recommendation   Recommendation Short-term skilled PT   Barthel Index   Feeding 10   Bathing 0   Grooming Score 0   Dressing Score 5   Bladder Score 5   Bowels Score 10   Toilet Use Score 5   Transfers (Bed/Chair) Score 5   Mobility (Level Surface) Score 0   Stairs Score 0   Barthel Index Score 36   Licensure   [place] License Number  Cecilio Hansonshree BENSON [id number]

## 2019-07-10 NOTE — PHYSICAL THERAPY NOTE
PT deferred until a later time due to dialysis  Will re-attempt in the afternoon     Rafael Grit [id number]

## 2019-07-10 NOTE — PROGRESS NOTES
Progress Note - Cardiology   [facility]     Fide Martínez 66 y o  male MRN: [id number]  : 1940  Unit/Bed#: [address] [place] [address] Encounter: [id number]    Assessment and Plan:   1  Acute kidney injury on chronic kidney disease secondary to cardiorenal syndrome:  Patient tolerating hemodialysis, creatinine improving but still elevated  Patient is followed by Nephrology and per their note will most likely required dialysis in the outpatient setting  2  Acute on chronic diastolic heart failure:  Patient still without significant urinary output  Continue current medications and fluid restriction  3  MI type 2:  Repeat echocardiogram demonstrates stable ejection fraction  Continue patient's beta-blocker and Imdur  Will need ischemic workup with Lexiscan stress test in a m  4  Hypertension:  Continue beta-blocker, Imdur, Norvasc and hydralazine  Close monitoring of vital signs especially while on dialysis  5  Diabetes:  Managed primary team    6  Dyslipidemia: Continue statin therapy    7  Coronary artery disease:  Patient with history of CABG x3, LIMA to LAD and reverse saphenous vein to obtuse marginal 1    Subjective / Objective:   Patient seen and examined  He is resting comfortably in bed on hemodialysis  No complaints offered  Edema slowly resolving  Denies any chest pain or pressure  Vitals: Blood pressure 135/66, pulse 68, temperature 98 °F (36 7 °C), temperature source Oral, resp  rate 20, height 6' 1" (1 854 m), weight 108 kg (237 lb 14 oz), SpO2 98 %  Vitals:    19 0600 07/10/19 0600   Weight: 111 kg (244 lb 3 oz) 108 kg (237 lb 14 oz)     Body mass index is 31 38 kg/m²    BP Readings from Last 3 Encounters:   07/10/19 135/66   16 136/62   10/20/16 168/88     Orthostatic Blood Pressures      Most Recent Value   Blood Pressure  135/66 filed at 07/10/2019 1100   Patient Position - Orthostatic VS  Lying filed at 07/10/2019 0459        I/O        7664 - 07/09 0700 07/09 0701 - 07/10 0700 07/10 0701 - 07/11 0700    P  O  240 260     I V  (mL/kg) 500 (4 5) 518 (4 8) 200 (1 9)    Total Intake(mL/kg) 740 (6 7) 778 (7 2) 200 (1 9)    Urine (mL/kg/hr) 550 (0 2) 600 (0 2)     Other  2400     Total Output 550 3000     Net +190 -2222 +200               Invasive Devices     Central Venous Catheter Line            CVC Central Lines 07/08/19 Double 1 day          Peripheral Intravenous Line            Peripheral IV 07/07/19 Right Arm 2 days          Hemodialysis Catheter            Temporary HD Catheter  1 day          Drain            Open Drain Left; Anterior; Other (Comment)  days                  Intake/Output Summary (Last 24 hours) at 7/10/2019 1114  Last data filed at 7/10/2019 0805  Gross per 24 hour   Intake 800 ml   Output 2800 ml   Net -2000 ml         Physical Exam:   Physical Exam   Constitutional: He is oriented to person, place, and time  He appears well-developed and well-nourished  No distress  HENT:   Head: Normocephalic and atraumatic  Right Ear: External ear normal    Left Ear: External ear normal    Eyes: Pupils are equal, round, and reactive to light  Conjunctivae are normal  Right eye exhibits no discharge  Left eye exhibits no discharge  No scleral icterus  Neck: Normal range of motion  Neck supple  No JVD present  No thyromegaly present  Cardiovascular: Normal rate, regular rhythm, normal heart sounds and intact distal pulses  Pulmonary/Chest: Effort normal  No accessory muscle usage  No respiratory distress  He has wheezes  He has rales in the right lower field and the left lower field  Faint intermittent expiratory wheeze   Abdominal: Soft  Bowel sounds are normal  He exhibits no distension and no mass  Musculoskeletal: He exhibits edema  Neurological: He is alert and oriented to person, place, and time  Skin: Skin is warm and dry  Capillary refill takes less than 2 seconds  He is not diaphoretic     Psychiatric: He has a normal mood and affect  Nursing note and vitals reviewed              Medications/ Allergies:     Current Facility-Administered Medications:  acetaminophen 650 mg Oral Q6H PRN Sean Alvarez MD   ALPRAZolam 0 25 mg Oral HS Sean Alvarez MD   aluminum-magnesium hydroxide-simethicone 30 mL Oral Q4H PRN Chris Livingston MD   amLODIPine 5 mg Oral BID Sean Alvarez MD   aspirin 81 mg Oral Daily Sean Alvarez MD   atorvastatin 80 mg Oral Daily With TABITHA Johnson   azithromycin 500 mg Oral Q24H Sean Alvarez MD   cefuroxime 500 mg Oral Q12H [address] [facility], [place]   escitalopram 10 mg Oral HS Sean Alvarez MD   furosemide 80 mg Intravenous BID (diuretic) [facility]-   hydrALAZINE 10 mg Intravenous Q6H PRN Sean Alvarez MD   hydrALAZINE 50 mg Oral Q8H [address] TABITHA Wilkerson   insulin detemir 20 Units Subcutaneous Q12H [address] Sean Alvarez MD   insulin lispro 1-6 Units Subcutaneous HS Sean Alvarez MD   insulin lispro 2-12 Units Subcutaneous TID AC Sean Alvarez MD   ipratropium-albuterol 3 mL Nebulization Q6H Sean Alvarez MD   isosorbide mononitrate 30 mg Oral Daily Navtej Isabella, DO   metoprolol tartrate 50 mg Oral Q12H [address] Navtej Isabella, DO   nitroglycerin 0 4 mg Sublingual Q5 Min PRN Navtej Isabella, DO   pneumococcal 13-valent conjugate vaccine 0 5 mL Intramuscular Prior to discharge Jojo Vergara MD   polyethylene glycol 17 g Oral Daily Sean Alvarez MD       acetaminophen 650 mg Q6H PRN   aluminum-magnesium hydroxide-simethicone 30 mL Q4H PRN   hydrALAZINE 10 mg Q6H PRN   nitroglycerin 0 4 mg Q5 Min PRN   pneumococcal 13-valent conjugate vaccine 0 5 mL Prior to discharge     No Known Allergies    VTE Pharmacologic Prophylaxis:   Sequential compression device (Venodyne)     Labs:   Troponins:  Results from last 7 days   Lab Units 07/06/19  1616 07/06/19  1211 07/06/19  0914  07/04/19  1005   CK TOTAL U/L  --   --   --   --  287   TROPONIN I ng/mL 2 74* 3 56* 3 68*   < >  --    CK MB INDEX %  --   --   --   --  1 7    < > = values in this interval not displayed  CBC with diff:  Results from last 7 days   Lab Units 07/10/19  0501 07/09/19  0529 07/08/19  0743 07/07/19  0510 07/06/19  0914 07/06/19  0553 07/05/19  0631   WBC Thousand/uL 7 98 8 95 10 89* 10 62* 10 91* 8 31 9 12   HEMOGLOBIN g/dL 9 9* 10 1* 10 1* 10 6* 11 3* 10 8* 11 2*   HEMATOCRIT % 31 3* 30 5* 31 0* 33 1* 35 4* 33 4* 34 5*   MCV fL 97 92 93 95 95 95 94   PLATELETS Thousands/uL 123* 129* 111* 132* 127* 118* 116*   MCH pg 30 7 30 6 30 3 30 4 30 3 30 6 30 4   MCHC g/dL 31 6 33 1 32 6 32 0 31 9 32 3 32 5   RDW % 14 0 14 0 14 0 14 2 14 3 14 2 14 0   MPV fL 11 0 11 3 10 9 11 2 10 3 10 8 10 8   NRBC AUTO /100 WBCs 0 0 0 0  --  0 0     CMP:  Results from last 7 days   Lab Units 07/10/19  0507 07/09/19  0529 07/08/19  0743 07/07/19  0510 07/06/19  0553 07/05/19  0631 07/04/19  1005   SODIUM mmol/L 134* 135* 135* 134* 139 140 139   POTASSIUM mmol/L 4 5 3 9 4 5 4 9 4 4 4 2 4 9   CHLORIDE mmol/L 100 100 99* 100 105 106 106   CO2 mmol/L 26 27 24 23 27 25 26   ANION GAP mmol/L 8 8 12 11 7 9 7   BUN mg/dL 61* 76* 92* 73* 56* 47* 47*   CREATININE mg/dL 2 41* 2 80* 3 26* 2 81* 2 16* 1 91* 1 91*   CALCIUM mg/dL 8 3 8 2* 8 3 8 3 8 3 8 2* 8 1*   AST U/L 42 35 35 40 44  --   --    ALT U/L 47 50 48 42 35  --   --    ALK PHOS U/L 108 102 103 102 97  --   --    TOTAL PROTEIN g/dL 6 7 6 9 7 1 7 1 6 7  --   --    ALBUMIN g/dL 2 4* 2 6* 2 8* 2 8* 2 6*  --   --    TOTAL BILIRUBIN mg/dL 1 60* 1 50* 1 60* 1 70* 1 40*  --   --    EGFR ml/min/1 73sq m 25 21 17 21 28 33 33     Magnesium:  Results from last 7 days   Lab Units 07/10/19  0507 07/09/19  0529 07/08/19  0743 07/07/19  0510   MAGNESIUM mg/dL 2 9* 2 7* 2 9* 2 9*     Coags:  Results from last 7 days   Lab Units 07/06/19  0914   PTT seconds 38*   INR  1 12     Hgb A1c:  Results from last 7 days   Lab Units 07/03/19  1729   HEMOGLOBIN A1C % 6 7*        Imaging & Testing   I have personally reviewed pertinent reports      Xr Chest Portable    Result Date: 7/7/2019  Narrative: CHEST INDICATION:   SOB  COMPARISON:  July 6, 2019, 12:54 PM  Yo Holbrook PERFORMED/VIEWS:  XR CHEST PORTABLE FINDINGS: Heart shadow is enlarged but unchanged from prior exam   Post median sternotomy  Mild pulmonary vascular congestion  Left upper and lower lobe patchy airspace disease may represent pulmonary edema or infectious infiltrate  The lungs are clear  No pneumothorax or pleural effusion  Osseous structures appear within normal limits for patient age  Postcholecystectomy  Impression: Mild pulmonary vascular congestion  Left upper and lower lobe patchy airspace disease may represent pulmonary edema from CHF or infectious infiltrate  Workstation performed: [id number]     Xr Chest 1 View Portable    Result Date: 7/3/2019  Narrative: CHEST INDICATION:   SOB  COMPARISON:  12/4/2016 EXAM PERFORMED/VIEWS:  XR CHEST PORTABLE  AP semierect Images: 1 FINDINGS:  There are median sternotomy wires indicating prior cardiac surgery  Heart shadow is enlarged but unchanged from prior exam  Right infrahilar infiltrate noted with small pleural effusion  Left lung clear  No pneumothorax  Osseous structures appear within normal limits for patient age  Impression: Right infrahilar infiltrate with small pleural effusion  Workstation performed: [id number]     Xr Chest Pa & Lateral    Result Date: 7/7/2019  Narrative: CHEST INDICATION:   ? pneumonia  / CHF / volume overload  COMPARISON:  July 3, 2019 EXAM PERFORMED/VIEWS:  XR CHEST PA & LATERAL FINDINGS:  There are median sternotomy wires indicating prior cardiac surgery  Cardiomediastinal silhouette appears unremarkable  Small right larger than left bilateral pleural effusions  No pneumothorax    Osseous structures appear within normal limits for patient age  Impression: Small bilateral pleural effusions right larger than left  Patchy airspace opacities left lung potentially pneumonia   Workstation performed: [id number]     Ct Chest Wo Contrast    Result Date: 7/4/2019  Narrative: CT CHEST WITHOUT IV CONTRAST INDICATION:   Shortness of breath  COMPARISON:  No prior chest CTs available TECHNIQUE: CT examination of the chest was performed without intravenous contrast   Axial, sagittal, and coronal 2D reformatted images were created from the source data and submitted for interpretation  Radiation dose length product (DLP) for this visit:  429 43 mGy-cm   This examination, like all CT scans performed in the [facility], was performed utilizing techniques to minimize radiation dose exposure, including the use of iterative  reconstruction and automated exposure control  FINDINGS: LUNGS:  Patchy areas of focal consolidation involving the left lower lobe, left upper lobe and right middle lobe  Subsegmental atelectasis in the right lower lobe     There is no tracheal or endobronchial lesion  PLEURA:  Moderate right and small left pleural effusions  HEART/GREAT VESSELS:  Mild cardiomegaly status post CABG aortic atherosclerosis, without aneurysm  MEDIASTINUM AND LEEANN:  Unremarkable  CHEST WALL AND LOWER NECK:   Mild bilateral gynecomastia  VISUALIZED STRUCTURES IN THE UPPER ABDOMEN:  Status post cholecystectomy OSSEOUS STRUCTURES:  No acute fracture or destructive osseous lesion  Impression: 1  Patchy areas of consolidation bilaterally most likely pneumonia  Recommend follow-up chest CT in 3 months to ensure complete resolution  2   Moderate right and small left pleural effusions  Mild cardiomegaly  Workstation performed: [id number]     Nm Lung Ventilation / Perfusion    Result Date: 7/3/2019  Narrative: VENTILATION AND PERFUSION SCAN INDICATION: Shortness of breath  COMPARISON:  Chest radiograph  7/3/2019 TECHNIQUE:  Posterior ventilation imaging was performed after the inhalation of 33 mCi nebulized Tc-99m DTPA with deposition of less than 1 mCi of activity within the lungs    Multiplanar perfusion imaging was next performed following the intravenous administration of 4 4 mCi Tc-99m labeled MAA  FINDINGS:  Ventilation imaging demonstrates heterogeneous distribution of the radiopharmaceutical with some clumping centrally  Perfusion imaging demonstrates mildly heterogeneous distribution of the radiopharmaceutical throughout both lungs  There is no significant  segmental or subsegmental defect  Impression: The probability for pulmonary embolus is low  Workstation performed: [id number]     Us Kidney And Bladder    Result Date: 7/5/2019  Narrative: RENAL ULTRASOUND INDICATION:   N18 9: Chronic kidney disease, unspecified  COMPARISON: 12/5/2016 TECHNIQUE:   Ultrasound of the retroperitoneum was performed with a curvilinear transducer utilizing volumetric sweeps and still imaging techniques  FINDINGS: KIDNEYS: Symmetric and normal size  Right kidney:  10 8 x 5 6 cm  Left kidney:  10 6 x 5 3 cm  Right kidney Normal echogenicity and contour  No suspicious masses detected  No hydronephrosis  No shadowing calculi  No perinephric fluid collections  Left kidney Normal echogenicity and contour  No suspicious masses detected  No hydronephrosis  No shadowing calculi  No perinephric fluid collections  URETERS: Nonvisualized  BLADDER: The bladder is underdistended  No focal thickening or mass lesions  Impression: No hydronephrosis  Workstation performed: [id number]     Vas Lower Limb Venous Duplex Study, Complete Bilateral    Result Date: 7/4/2019  Narrative:  [facility] REPORT CLINICAL: Indications: Swelling of Limb [R22 4]  Dyspnea [R06 02]  Patient presents with shortness of breath for the past several days  Patient reports short of breath  Risk Factors The patient has history of HTN, Diabetes (Yes), HLD, CKD and previous smoking (quit >10yrs ago)    FINDINGS:  Segment  Right            Left              Impression       Impression       CFV      Normal (Patent)  Normal (Patent)     CONCLUSION:  Impression: RIGHT LOWER LIMB: No evidence of acute or chronic deep vein thrombosis  No evidence of superficial thrombophlebitis noted  Doppler evaluation shows a normal response to augmentation maneuvers  Popliteal, posterior tibial and anterior tibial arterial Doppler waveforms are triphasic  LEFT LOWER LIMB: No evidence of acute or chronic deep vein thrombosis  No evidence of superficial thrombophlebitis noted  Doppler evaluation shows a normal response to augmentation maneuvers  Popliteal, posterior tibial and anterior tibial arterial Doppler waveforms are triphasic  Pulsatile waveforms in the venous system may suggest heart failure or tricuspid valve insufficiency  Technical findings were given to Dr Ivan Trujillo 13:30  SIGNATURE: Electronically Signed by: Josh Guajardo on 2019-07-04 09:11:23 AM    Lindsey Laila Jason Hd Cath    Result Date: 7/8/2019  Narrative: Temporary dialysis catheter placement  Clinical History: Renal failure  Fluoro time: 0 4mins Contrast: None Number of Images: 4 Conscious sedation time: None Radiation Dose: 12 65 mGy Technique: The patient was brought to the interventional radiology suite and identified verbally and by wrist band  The patient was placed supine on the table  The right internal jugular vein was evaluated as a potential access site with ultrasound  The vessel was found to be patent and compressible  The right neck and upper chest were prepped and draped in the usual sterile fashion  All elements of maximal sterile barrier technique were followed (cap, mask, sterile gown, sterile gloves, large sterile sheet, hand hygiene, and 2% chlorhexidine for cutaneous antisepsis)  Lidocaine was administered to the skin and a small skin incision was made  Under ultrasound guidance, utilizing sterile ultrasound technique with sterile gel and a sterile probe cover, the right internal jugular vein was accessed using single wall Seldinger technique  Static images of real time needle entry into the vessel were obtained   A 0 018 wire was then advanced through the needle into the central venous system  The needle was removed, and a 5 Greenlandic coaxial dilator was inserted  An Amplatz wire was inserted through the outer dilator, and after tract dilatation a 14 Greenlandic Schon XL catheter was placed over the wire  The catheter tip was then positioned in the right atrium under fluoroscopic control  The external portion  of the catheter was sutured to the skin with 2-0 Prolene  The catheter aspirated and flushed well  A sterile dressing was applied and 1,000 unit heparin/cc solution was administered into each of the lumens  Impression: Impression: 1  Successful ultrasound and fluoroscopically guided placement of a 15 cm temporary dialysis Schon XL catheter via the right internal jugular vein  The tip of the catheter is in the right atrium and may be used immediately  Workstation performed: [id number]        EKG / Monitor: Personally reviewed  Sinus rhythm    Cardiac testing:   Results for orders placed during the hospital encounter of 19   Echo complete with contrast if indicated    Lizeth Monroy [address]  [address] [facility] Liz Mooney [address]  [phone]    Transthoracic Echocardiogram  2D, M-mode, Doppler, and Color Doppler    Study date:  2019    Patient: Genet Bryson  MR number: [id number]  Account number: [de-identified]  : [id number]  Age: 66 years  Gender: Male  Status: Inpatient  Location: Bedside  Height: 73 in  Weight: 230 6 lb  BP: 124/ 68 mmHg    Indications: Cariomyopathy    Diagnoses: I42 9 - Cardiomyopathy, unspecified    Sonographer:  ROHINI Collado  Primary Physician:  Zoya Galarza MD  Referring Physician:  Lisandra Erickson DO  Group:  [facility]  Interpreting Physician:  Lisandra Erickson DO    SUMMARY    LEFT VENTRICLE:  Systolic function was normal by visual assessment  Ejection fraction was estimated to be 55 %  There were no regional wall motion abnormalities    There was moderate concentric hypertrophy  Doppler parameters were consistent with restrictive physiology, indicative of decreased left ventricular diastolic compliance and/or increased left atrial pressure  VENTRICULAR SEPTUM:  There was paradoxical motion  These changes are consistent with a post-thoracotomy state  MITRAL VALVE:  There was mild to moderate regurgitation  AORTIC VALVE:  There was no evidence for stenosis  There was no regurgitation  TRICUSPID VALVE:  There was mild regurgitation  Pulmonary artery systolic pressure was mildly increased  HISTORY: PRIOR HISTORY: HTN, DM, CAD, HCL, CABG, Bladder Cancer, Arthritis    PROCEDURE: The procedure was performed at the bedside  This was a routine study  The transthoracic approach was used  The study included complete 2D imaging, M-mode, complete spectral Doppler, and color Doppler  The heart rate was 60 bpm,  at the start of the study  Image quality was adequate  LEFT VENTRICLE: Size was normal  Systolic function was normal by visual assessment  Ejection fraction was estimated to be 55 %  There were no regional wall motion abnormalities  There was moderate concentric hypertrophy  DOPPLER: Doppler  parameters were consistent with restrictive physiology, indicative of decreased left ventricular diastolic compliance and/or increased left atrial pressure  Doppler parameters were consistent with elevated mean left atrial filling pressure  VENTRICULAR SEPTUM: There was paradoxical motion  These changes are consistent with a post-thoracotomy state  RIGHT VENTRICLE: The size was normal  Systolic function was normal  DOPPLER: Systolic pressure was within the normal range  LEFT ATRIUM: The atrium was moderately dilated  RIGHT ATRIUM: The atrium was mildly dilated  MITRAL VALVE: Valve structure was normal  There was normal leaflet separation  No echocardiographic evidence for prolapse   DOPPLER: The transmitral velocity was within the normal range  There was no evidence for stenosis  There was mild to  moderate regurgitation  AORTIC VALVE: The valve was trileaflet  Leaflets exhibited normal thickness, normal cuspal separation, and sclerosis  DOPPLER: Transaortic velocity was within the normal range  There was no evidence for stenosis  There was no  regurgitation  TRICUSPID VALVE: The valve structure was normal  There was normal leaflet separation  DOPPLER: The transtricuspid velocity was within the normal range  There was mild regurgitation  Pulmonary artery systolic pressure was mildly increased  Estimated peak PA pressure was 44 mmHg  PULMONIC VALVE: Leaflets exhibited normal thickness, no calcification, and normal cuspal separation  DOPPLER: The transpulmonic velocity was within the normal range  There was no regurgitation  PERICARDIUM: There was no thickening  There was no pericardial effusion  AORTA: The root exhibited normal size  PULMONARY ARTERY: The size was normal  The morphology appeared normal     SYSTEM MEASUREMENT TABLES    2D mode  AoR Diam 2D: 3 4 cm  LA Diam (2D): 4 9 cm  LA/Ao (2D): 1 44  FS (2D Teich): 28 4 %  IVSd (2D): 1 29 cm  LVDEV: 101 cmï¾³  LVESV: 45 8 cmï¾³  LVIDd(2D): 4 68 cm  LVISd (2D): 3 35 cm  LVPWd (2D): 1 26 cm  SV (Teich): 55 2 cmï¾³    Apical four chamber  LVEF A4C: 52 %    Unspecified Scan Mode  MV Peak E Darvin  Mean: 1160 mm/s  MVA (PHT): 5 24 cmï¾²  PHT: 42 ms  Max P mm[Hg]  V Max: 3020 mm/s  Vmax: 2780 mm/s  RA Area: 19 6 cmï¾²  RA Volume: 49 7 cmï¾³  TAPSE: 1 5 cm    [employer] Accredited Echocardiography Laboratory    Prepared and electronically signed by    Ganesh Villegas DO  Signed 2019 14:05:34         Brokoe Barber        "This note has been constructed using a voice recognition system  Therefore there may be syntax, spelling, and/or grammatical errors   Please call if you have any questions  "

## 2019-07-10 NOTE — HEMODIALYSIS
Pt completed 3rd HD tx using catheter  Able to maintain prescribed BFR  2 kg removed without difficulty  Post weight 102 5 kg  Next HD will be 7/12

## 2019-07-11 ENCOUNTER — APPOINTMENT (INPATIENT)
Dept: NON INVASIVE DIAGNOSTICS | Facility: HOSPITAL | Age: 79
DRG: 280 | End: 2019-07-11
Payer: COMMERCIAL

## 2019-07-11 ENCOUNTER — APPOINTMENT (INPATIENT)
Dept: RADIOLOGY | Facility: HOSPITAL | Age: 79
DRG: 280 | End: 2019-07-11
Payer: COMMERCIAL

## 2019-07-11 LAB
ALBUMIN SERPL BCP-MCNC: 2.2 G/DL (ref 3.5–5)
ALP SERPL-CCNC: 113 U/L (ref 46–116)
ALT SERPL W P-5'-P-CCNC: 49 U/L (ref 12–78)
ANION GAP SERPL CALCULATED.3IONS-SCNC: 5 MMOL/L (ref 4–13)
AST SERPL W P-5'-P-CCNC: 35 U/L (ref 5–45)
BASOPHILS # BLD AUTO: 0.02 THOUSANDS/ΜL (ref 0–0.1)
BASOPHILS NFR BLD AUTO: 0 % (ref 0–1)
BILIRUB SERPL-MCNC: 1.5 MG/DL (ref 0.2–1)
BUN SERPL-MCNC: 51 MG/DL (ref 5–25)
C-ANCA TITR SER IF: NORMAL TITER
CALCIUM SERPL-MCNC: 8.1 MG/DL (ref 8.3–10.1)
CHEST PAIN STATEMENT: NORMAL
CHLORIDE SERPL-SCNC: 100 MMOL/L (ref 100–108)
CO2 SERPL-SCNC: 30 MMOL/L (ref 21–32)
CREAT SERPL-MCNC: 2.63 MG/DL (ref 0.6–1.3)
EOSINOPHIL # BLD AUTO: 0.17 THOUSAND/ΜL (ref 0–0.61)
EOSINOPHIL NFR BLD AUTO: 2 % (ref 0–6)
ERYTHROCYTE [DISTWIDTH] IN BLOOD BY AUTOMATED COUNT: 13.8 % (ref 11.6–15.1)
GBM AB SER IA-ACNC: 3 UNITS (ref 0–20)
GFR SERPL CREATININE-BSD FRML MDRD: 22 ML/MIN/1.73SQ M
GLUCOSE SERPL-MCNC: 122 MG/DL (ref 65–140)
GLUCOSE SERPL-MCNC: 131 MG/DL (ref 65–140)
GLUCOSE SERPL-MCNC: 189 MG/DL (ref 65–140)
GLUCOSE SERPL-MCNC: 297 MG/DL (ref 65–140)
GLUCOSE SERPL-MCNC: 311 MG/DL (ref 65–140)
HCT VFR BLD AUTO: 28.7 % (ref 36.5–49.3)
HGB BLD-MCNC: 9.3 G/DL (ref 12–17)
IMM GRANULOCYTES # BLD AUTO: 0.05 THOUSAND/UL (ref 0–0.2)
IMM GRANULOCYTES NFR BLD AUTO: 1 % (ref 0–2)
LYMPHOCYTES # BLD AUTO: 0.95 THOUSANDS/ΜL (ref 0.6–4.47)
LYMPHOCYTES NFR BLD AUTO: 13 % (ref 14–44)
MAX DIASTOLIC BP: 65 MMHG
MAX HEART RATE: 78 BPM
MAX PREDICTED HEART RATE: 142 BPM
MAX. SYSTOLIC BP: 144 MMHG
MCH RBC QN AUTO: 30.5 PG (ref 26.8–34.3)
MCHC RBC AUTO-ENTMCNC: 32.4 G/DL (ref 31.4–37.4)
MCV RBC AUTO: 94 FL (ref 82–98)
MONOCYTES # BLD AUTO: 0.94 THOUSAND/ΜL (ref 0.17–1.22)
MONOCYTES NFR BLD AUTO: 13 % (ref 4–12)
MYELOPEROXIDASE AB SER IA-ACNC: <9 U/ML (ref 0–9)
NEUTROPHILS # BLD AUTO: 5.35 THOUSANDS/ΜL (ref 1.85–7.62)
NEUTS SEG NFR BLD AUTO: 71 % (ref 43–75)
NRBC BLD AUTO-RTO: 0 /100 WBCS
P-ANCA ATYPICAL TITR SER IF: NORMAL TITER
P-ANCA TITR SER IF: NORMAL TITER
PLATELET # BLD AUTO: 121 THOUSANDS/UL (ref 149–390)
PMV BLD AUTO: 10.2 FL (ref 8.9–12.7)
POTASSIUM SERPL-SCNC: 4.1 MMOL/L (ref 3.5–5.3)
PROT SERPL-MCNC: 6.3 G/DL (ref 6.4–8.2)
PROTEINASE3 AB SER IA-ACNC: <3.5 U/ML (ref 0–3.5)
PROTOCOL NAME: NORMAL
RBC # BLD AUTO: 3.05 MILLION/UL (ref 3.88–5.62)
REASON FOR TERMINATION: NORMAL
SODIUM SERPL-SCNC: 135 MMOL/L (ref 136–145)
TARGET HR FORMULA: NORMAL
TEST INDICATION: NORMAL
TIME IN EXERCISE PHASE: NORMAL
WBC # BLD AUTO: 7.48 THOUSAND/UL (ref 4.31–10.16)

## 2019-07-11 PROCEDURE — 78452 HT MUSCLE IMAGE SPECT MULT: CPT

## 2019-07-11 PROCEDURE — 93017 CV STRESS TEST TRACING ONLY: CPT

## 2019-07-11 PROCEDURE — 94760 N-INVAS EAR/PLS OXIMETRY 1: CPT

## 2019-07-11 PROCEDURE — 94640 AIRWAY INHALATION TREATMENT: CPT

## 2019-07-11 PROCEDURE — 99232 SBSQ HOSP IP/OBS MODERATE 35: CPT | Performed by: INTERNAL MEDICINE

## 2019-07-11 PROCEDURE — 80053 COMPREHEN METABOLIC PANEL: CPT | Performed by: INTERNAL MEDICINE

## 2019-07-11 PROCEDURE — 93018 CV STRESS TEST I&R ONLY: CPT | Performed by: INTERNAL MEDICINE

## 2019-07-11 PROCEDURE — 93016 CV STRESS TEST SUPVJ ONLY: CPT | Performed by: INTERNAL MEDICINE

## 2019-07-11 PROCEDURE — 78452 HT MUSCLE IMAGE SPECT MULT: CPT | Performed by: INTERNAL MEDICINE

## 2019-07-11 PROCEDURE — 82948 REAGENT STRIP/BLOOD GLUCOSE: CPT

## 2019-07-11 PROCEDURE — 85025 COMPLETE CBC W/AUTO DIFF WBC: CPT | Performed by: INTERNAL MEDICINE

## 2019-07-11 PROCEDURE — A9502 TC99M TETROFOSMIN: HCPCS

## 2019-07-11 RX ORDER — FUROSEMIDE 80 MG
80 TABLET ORAL
Status: DISCONTINUED | OUTPATIENT
Start: 2019-07-11 | End: 2019-07-15 | Stop reason: HOSPADM

## 2019-07-11 RX ORDER — LACTULOSE 20 G/30ML
30 SOLUTION ORAL DAILY
Status: DISCONTINUED | OUTPATIENT
Start: 2019-07-11 | End: 2019-07-12

## 2019-07-11 RX ADMIN — ALPRAZOLAM 0.25 MG: 0.25 TABLET ORAL at 21:49

## 2019-07-11 RX ADMIN — FUROSEMIDE 80 MG: 80 TABLET ORAL at 11:13

## 2019-07-11 RX ADMIN — INSULIN LISPRO 2 UNITS: 100 INJECTION, SOLUTION INTRAVENOUS; SUBCUTANEOUS at 11:30

## 2019-07-11 RX ADMIN — LACTULOSE 30 G: 10 SOLUTION ORAL at 14:27

## 2019-07-11 RX ADMIN — IPRATROPIUM BROMIDE AND ALBUTEROL SULFATE 3 ML: 2.5; .5 SOLUTION RESPIRATORY (INHALATION) at 01:25

## 2019-07-11 RX ADMIN — AMLODIPINE BESYLATE 5 MG: 5 TABLET ORAL at 11:07

## 2019-07-11 RX ADMIN — IPRATROPIUM BROMIDE AND ALBUTEROL SULFATE 3 ML: 2.5; .5 SOLUTION RESPIRATORY (INHALATION) at 13:17

## 2019-07-11 RX ADMIN — METOPROLOL TARTRATE 50 MG: 50 TABLET, FILM COATED ORAL at 11:08

## 2019-07-11 RX ADMIN — ESCITALOPRAM OXALATE 10 MG: 10 TABLET ORAL at 21:48

## 2019-07-11 RX ADMIN — IPRATROPIUM BROMIDE AND ALBUTEROL SULFATE 3 ML: 2.5; .5 SOLUTION RESPIRATORY (INHALATION) at 19:08

## 2019-07-11 RX ADMIN — INSULIN LISPRO 6 UNITS: 100 INJECTION, SOLUTION INTRAVENOUS; SUBCUTANEOUS at 16:53

## 2019-07-11 RX ADMIN — AZITHROMYCIN 500 MG: 250 TABLET, FILM COATED ORAL at 11:04

## 2019-07-11 RX ADMIN — INSULIN LISPRO 5 UNITS: 100 INJECTION, SOLUTION INTRAVENOUS; SUBCUTANEOUS at 21:49

## 2019-07-11 RX ADMIN — HYDRALAZINE HYDROCHLORIDE 50 MG: 25 TABLET ORAL at 14:23

## 2019-07-11 RX ADMIN — ATORVASTATIN CALCIUM 80 MG: 80 TABLET ORAL at 16:52

## 2019-07-11 RX ADMIN — ISOSORBIDE MONONITRATE 30 MG: 30 TABLET, EXTENDED RELEASE ORAL at 11:08

## 2019-07-11 RX ADMIN — METOPROLOL TARTRATE 50 MG: 50 TABLET, FILM COATED ORAL at 20:59

## 2019-07-11 RX ADMIN — CEFUROXIME AXETIL 500 MG: 250 TABLET ORAL at 11:09

## 2019-07-11 RX ADMIN — ASPIRIN 81 MG: 81 TABLET, COATED ORAL at 11:09

## 2019-07-11 RX ADMIN — IPRATROPIUM BROMIDE AND ALBUTEROL SULFATE 3 ML: 2.5; .5 SOLUTION RESPIRATORY (INHALATION) at 07:23

## 2019-07-11 RX ADMIN — REGADENOSON 0.4 MG: 0.08 INJECTION, SOLUTION INTRAVENOUS at 09:04

## 2019-07-11 RX ADMIN — HYDRALAZINE HYDROCHLORIDE 50 MG: 25 TABLET ORAL at 06:55

## 2019-07-11 RX ADMIN — INSULIN DETEMIR 20 UNITS: 100 INJECTION, SOLUTION SUBCUTANEOUS at 20:59

## 2019-07-11 RX ADMIN — HYDRALAZINE HYDROCHLORIDE 50 MG: 25 TABLET ORAL at 21:47

## 2019-07-11 RX ADMIN — FUROSEMIDE 80 MG: 80 TABLET ORAL at 16:52

## 2019-07-11 RX ADMIN — INSULIN DETEMIR 20 UNITS: 100 INJECTION, SOLUTION SUBCUTANEOUS at 11:08

## 2019-07-11 RX ADMIN — POLYETHYLENE GLYCOL 3350 17 G: 17 POWDER, FOR SOLUTION ORAL at 11:10

## 2019-07-11 RX ADMIN — CEFUROXIME AXETIL 500 MG: 250 TABLET ORAL at 20:59

## 2019-07-11 RX ADMIN — AMLODIPINE BESYLATE 5 MG: 5 TABLET ORAL at 17:00

## 2019-07-11 NOTE — PLAN OF CARE
Problem: Potential for Falls  Goal: Patient will remain free of falls  Description  INTERVENTIONS:  - Assess patient frequently for physical needs  -  Identify cognitive and physical deficits and behaviors that affect risk of falls    -  [place] fall precautions as indicated by assessment   - Educate patient/family on patient safety including physical limitations  - Instruct patient to call for assistance with activity based on assessment  - Modify environment to reduce risk of injury  - Consider OT/PT consult to assist with strengthening/mobility  Outcome: Progressing     Problem: PAIN - ADULT  Goal: Verbalizes/displays adequate comfort level or baseline comfort level  Description  Interventions:  - Encourage patient to monitor pain and request assistance  - Assess pain using appropriate pain scale  - Administer analgesics based on type and severity of pain and evaluate response  - Implement non-pharmacological measures as appropriate and evaluate response  - Consider cultural and social influences on pain and pain management  - Notify physician/advanced practitioner if interventions unsuccessful or patient reports new pain  Outcome: Progressing     Problem: INFECTION - ADULT  Goal: Absence or prevention of progression during hospitalization  Description  INTERVENTIONS:  - Assess and monitor for signs and symptoms of infection  - Monitor lab/diagnostic results  - Monitor all insertion sites, i e  IV  - [place] appropriate cooling/warming therapies per order  - Administer medications as ordered  - Instruct and encourage patient and family to use good hand hygiene technique  - Identify and instruct in appropriate isolation precautions for identified infection/condition   Outcome: Progressing     Problem: SAFETY ADULT  Goal: Patient will remain free of falls  Description  INTERVENTIONS:  - Assess patient frequently for physical needs  -  Identify cognitive and physical deficits and behaviors that affect risk of falls   -  [place] fall precautions as indicated by assessment   - Educate patient/family on patient safety including physical limitations  - Instruct patient to call for assistance with activity based on assessment  - Modify environment to reduce risk of injury  - Consider OT/PT consult to assist with strengthening/mobility  Outcome: Progressing  Goal: Maintain or return to baseline ADL function  Description  INTERVENTIONS:  -  Assess patient's ability to carry out ADLs; assess patient's baseline for ADL function and identify physical deficits which impact ability to perform ADLs (bathing, care of mouth/teeth, toileting, grooming, dressing, etc )  - Assess/evaluate cause of self-care deficits   - Assess range of motion  - Assess patient's mobility; develop plan if impaired  - Assess patient's need for assistive devices and provide as appropriate  - Encourage maximum independence but intervene and supervise when necessary  ¯ Involve family in performance of ADLs  ¯ Assess for home care needs following discharge   ¯ Request OT consult to assist with ADL evaluation and planning for discharge  ¯ Provide patient education as appropriate  Outcome: Progressing  Goal: Maintain or return mobility status to optimal level  Description  INTERVENTIONS:  - Assess patient's baseline mobility status (ambulation, transfers, stairs, etc )    - Identify cognitive and physical deficits and behaviors that affect mobility  - Identify mobility aids required to assist with transfers and/or ambulation (gait belt, sit-to-stand, lift, walker, cane, etc )  - [place] fall precautions as indicated by assessment  - Record patient progress and toleration of activity level on Mobility SBAR; progress patient to next Phase/Stage  - Instruct patient to call for assistance with activity based on assessment  - Request Rehabilitation consult to assist with strengthening/weightbearing, etc   Outcome: Progressing     Problem: SAFETY ADULT  Goal: Maintain or return to baseline ADL function  Description  INTERVENTIONS:  -  Assess patient's ability to carry out ADLs; assess patient's baseline for ADL function and identify physical deficits which impact ability to perform ADLs (bathing, care of mouth/teeth, toileting, grooming, dressing, etc )  - Assess/evaluate cause of self-care deficits   - Assess range of motion  - Assess patient's mobility; develop plan if impaired  - Assess patient's need for assistive devices and provide as appropriate  - Encourage maximum independence but intervene and supervise when necessary  ¯ Involve family in performance of ADLs  ¯ Assess for home care needs following discharge   ¯ Request OT consult to assist with ADL evaluation and planning for discharge  ¯ Provide patient education as appropriate  Outcome: Progressing     Problem: SAFETY ADULT  Goal: Patient will remain free of falls  Description  INTERVENTIONS:  - Assess patient frequently for physical needs  -  Identify cognitive and physical deficits and behaviors that affect risk of falls    -  [place] fall precautions as indicated by assessment   - Educate patient/family on patient safety including physical limitations  - Instruct patient to call for assistance with activity based on assessment  - Modify environment to reduce risk of injury  - Consider OT/PT consult to assist with strengthening/mobility  Outcome: Progressing  Goal: Maintain or return to baseline ADL function  Description  INTERVENTIONS:  -  Assess patient's ability to carry out ADLs; assess patient's baseline for ADL function and identify physical deficits which impact ability to perform ADLs (bathing, care of mouth/teeth, toileting, grooming, dressing, etc )  - Assess/evaluate cause of self-care deficits   - Assess range of motion  - Assess patient's mobility; develop plan if impaired  - Assess patient's need for assistive devices and provide as appropriate  - Encourage maximum independence but intervene and supervise when necessary  ¯ Involve family in performance of ADLs  ¯ Assess for home care needs following discharge   ¯ Request OT consult to assist with ADL evaluation and planning for discharge  ¯ Provide patient education as appropriate  Outcome: Progressing  Goal: Maintain or return mobility status to optimal level  Description  INTERVENTIONS:  - Assess patient's baseline mobility status (ambulation, transfers, stairs, etc )    - Identify cognitive and physical deficits and behaviors that affect mobility  - Identify mobility aids required to assist with transfers and/or ambulation (gait belt, sit-to-stand, lift, walker, cane, etc )  - [place] fall precautions as indicated by assessment  - Record patient progress and toleration of activity level on Mobility SBAR; progress patient to next Phase/Stage  - Instruct patient to call for assistance with activity based on assessment  - Request Rehabilitation consult to assist with strengthening/weightbearing, etc   Outcome: Progressing     Problem: DISCHARGE PLANNING  Goal: Discharge to home or other facility with appropriate resources  Description  INTERVENTIONS:  - Identify barriers to discharge w/patient and caregiver  - Arrange for needed discharge resources and transportation as appropriate  - Identify discharge learning needs (meds, wound care, etc )  - Arrange for interpretive services to assist at discharge as needed  - Refer to Case Management Department for coordinating discharge planning if the patient needs post-hospital services based on physician/advanced practitioner order or complex needs related to functional status, cognitive ability, or social support system  Outcome: Progressing     Problem: DISCHARGE PLANNING  Goal: Discharge to home or other facility with appropriate resources  Description  INTERVENTIONS:  - Identify barriers to discharge w/patient and caregiver  - Arrange for needed discharge resources and transportation as appropriate  - Identify discharge learning needs (meds, wound care, etc )  - Arrange for interpretive services to assist at discharge as needed  - Refer to Case Management Department for coordinating discharge planning if the patient needs post-hospital services based on physician/advanced practitioner order or complex needs related to functional status, cognitive ability, or social support system  Outcome: Progressing     Problem: Knowledge Deficit  Goal: Patient/family/caregiver demonstrates understanding of disease process, treatment plan, medications, and discharge instructions  Description  Complete learning assessment and assess knowledge base  Interventions:  - Provide teaching at level of understanding  - Provide teaching via preferred learning methods  Outcome: Progressing     Problem: CARDIOVASCULAR - ADULT  Goal: Maintains optimal cardiac output and hemodynamic stability  Description  INTERVENTIONS:  - Monitor I/O, vital signs and rhythm  - Monitor for S/S and trends of decreased cardiac output i e  bleeding, hypotension  - Administer and titrate ordered vasoactive medications to optimize hemodynamic stability  - Assess quality of pulses, skin color and temperature  - Assess for signs of decreased coronary artery perfusion - ex   Angina  - Instruct patient to report change in severity of symptoms  Outcome: Progressing  Goal: Absence of cardiac dysrhythmias or at baseline rhythm  Description  INTERVENTIONS:  - Continuous cardiac monitoring, monitor vital signs, obtain 12 lead EKG if indicated  - Administer antiarrhythmic and heart rate control medications as ordered  - Monitor electrolytes and administer replacement therapy as ordered  Outcome: Progressing     Problem: CARDIOVASCULAR - ADULT  Goal: Maintains optimal cardiac output and hemodynamic stability  Description  INTERVENTIONS:  - Monitor I/O, vital signs and rhythm  - Monitor for S/S and trends of decreased cardiac output i e  bleeding, hypotension  - Administer and titrate ordered vasoactive medications to optimize hemodynamic stability  - Assess quality of pulses, skin color and temperature  - Assess for signs of decreased coronary artery perfusion - ex  Angina  - Instruct patient to report change in severity of symptoms  Outcome: Progressing  Goal: Absence of cardiac dysrhythmias or at baseline rhythm  Description  INTERVENTIONS:  - Continuous cardiac monitoring, monitor vital signs, obtain 12 lead EKG if indicated  - Administer antiarrhythmic and heart rate control medications as ordered  - Monitor electrolytes and administer replacement therapy as ordered  Outcome: Progressing     Problem: CARDIOVASCULAR - ADULT  Goal: Maintains optimal cardiac output and hemodynamic stability  Description  INTERVENTIONS:  - Monitor I/O, vital signs and rhythm  - Monitor for S/S and trends of decreased cardiac output i e  bleeding, hypotension  - Administer and titrate ordered vasoactive medications to optimize hemodynamic stability  - Assess quality of pulses, skin color and temperature  - Assess for signs of decreased coronary artery perfusion - ex   Angina  - Instruct patient to report change in severity of symptoms  Outcome: Progressing  Goal: Absence of cardiac dysrhythmias or at baseline rhythm  Description  INTERVENTIONS:  - Continuous cardiac monitoring, monitor vital signs, obtain 12 lead EKG if indicated  - Administer antiarrhythmic and heart rate control medications as ordered  - Monitor electrolytes and administer replacement therapy as ordered  Outcome: Progressing     Problem: RESPIRATORY - ADULT  Goal: Achieves optimal ventilation and oxygenation  Description  INTERVENTIONS:  - Assess for changes in respiratory status  - Assess for changes in mentation and behavior  - Position to facilitate oxygenation and minimize respiratory effort  - Oxygen administration by appropriate delivery method based on oxygen saturation (per order) or ABGs  - Encourage broncho-pulmonary hygiene including cough, deep breathe, Incentive Spirometry  - Assess the need for suctioning and aspirate as needed  - Assess and instruct to report SOB or any respiratory difficulty  - Respiratory Therapy support as indicated   Outcome: Progressing     Problem: RESPIRATORY - ADULT  Goal: Achieves optimal ventilation and oxygenation  Description  INTERVENTIONS:  - Assess for changes in respiratory status  - Assess for changes in mentation and behavior  - Position to facilitate oxygenation and minimize respiratory effort  - Oxygen administration by appropriate delivery method based on oxygen saturation (per order) or ABGs  - Encourage broncho-pulmonary hygiene including cough, deep breathe, Incentive Spirometry  - Assess the need for suctioning and aspirate as needed  - Assess and instruct to report SOB or any respiratory difficulty  - Respiratory Therapy support as indicated   Outcome: Progressing     Problem: Nutrition/Hydration-ADULT  Goal: Nutrient/Hydration intake appropriate for improving, restoring or maintaining nutritional needs  Description  Monitor and assess patient's nutrition/hydration status for malnutrition (ex- brittle hair, bruises, dry skin, pale skin and conjunctiva, muscle wasting, smooth red tongue, and disorientation)  Collaborate with interdisciplinary team and initiate plan and interventions as ordered  Monitor patient's weight and dietary intake as ordered or per policy  Utilize nutrition screening tool and intervene per policy  Determine patient's food preferences and provide high-protein, high-caloric foods as appropriate       INTERVENTIONS:  - Monitor oral intake, urinary output, labs, and treatment plans  - Assess nutrition and hydration status and recommend course of action  - Evaluate amount of meals eaten  - Assist patient with eating if necessary   - Allow adequate time for meals  - Recommend/ encourage appropriate diets, oral nutritional supplements, and vitamin/mineral supplements  - Order, calculate, and assess calorie counts as needed  - Assess need for intravenous fluids  - Provide specific nutrition/hydration education as appropriate  - Include patient/family/caregiver in decisions related to nutrition   Outcome: Progressing     Problem: Nutrition/Hydration-ADULT  Goal: Nutrient/Hydration intake appropriate for improving, restoring or maintaining nutritional needs  Description  Monitor and assess patient's nutrition/hydration status for malnutrition (ex- brittle hair, bruises, dry skin, pale skin and conjunctiva, muscle wasting, smooth red tongue, and disorientation)  Collaborate with interdisciplinary team and initiate plan and interventions as ordered  Monitor patient's weight and dietary intake as ordered or per policy  Utilize nutrition screening tool and intervene per policy  Determine patient's food preferences and provide high-protein, high-caloric foods as appropriate       INTERVENTIONS:  - Monitor oral intake, urinary output, labs, and treatment plans  - Assess nutrition and hydration status and recommend course of action  - Evaluate amount of meals eaten  - Assist patient with eating if necessary   - Allow adequate time for meals  - Recommend/ encourage appropriate diets, oral nutritional supplements, and vitamin/mineral supplements  - Order, calculate, and assess calorie counts as needed  - Assess need for intravenous fluids  - Provide specific nutrition/hydration education as appropriate  - Include patient/family/caregiver in decisions related to nutrition   Outcome: Progressing     Problem: Prexisting or High Potential for Compromised Skin Integrity  Goal: Skin integrity is maintained or improved  Description  INTERVENTIONS:  - Identify patients at risk for skin breakdown  - Assess and monitor skin integrity  - Assess and monitor nutrition and hydration status  - Monitor labs (i e  albumin)  - Assess for incontinence   - Turn and reposition patient  - Assist with mobility/ambulation  - Relieve pressure over bony prominences  - Avoid friction and shearing  - Provide appropriate hygiene as needed including keeping skin clean and dry  - Evaluate need for skin moisturizer/barrier cream  - Collaborate with interdisciplinary team (i e  Nutrition, Rehabilitation, etc )   - Patient/family teaching  Outcome: Progressing

## 2019-07-11 NOTE — PROGRESS NOTES
Progress Note - Cardiology   [facility]     Courtney Bryan 66 y o  male MRN: [id number]  : 1940  Unit/Bed#: [address] [place] [address] Encounter: [id number]    Assessment and Plan:   1  Acute kidney injury on chronic kidney disease secondary to cardiorenal syndrome:  Patient has been tolerating hemodialysis here at hospital   For PermCath placement tomorrow  Case management working on outpatient dialysis  2  Chronic diastolic heart failure:  Patient now on hemodialysis  Continue current medications and fluid restrictions  3  MI type 2:  Repeat echocardiogram demonstrates normal ejection fraction  Continue beta-blocker and Imdur  Lexiscan stress pending    4  Hypertension:  Stable  Continue current medications  5  Diabetes:  Managed per primary team    6  Dyslipidemia continue statin therapy    7  Coronary artery disease with history of CABG x3     Subjective / Objective:   Patient seen and examined  Had Jerilee Mano nuclear stress test this morning, images are currently pending  No further complaints of chest discomfort  Patient is scheduled for PermCath placement in a m  For outpatient dialysis  Vitals: Blood pressure 120/56, pulse 69, temperature 98 4 °F (36 9 °C), temperature source Oral, resp  rate 18, height 6' 1" (1 854 m), weight 108 kg (237 lb 14 oz), SpO2 97 %  Vitals:    19 0600 07/10/19 06   Weight: 111 kg (244 lb 3 oz) 108 kg (237 lb 14 oz)     Body mass index is 31 38 kg/m²  BP Readings from Last 3 Encounters:   19 120/56   16 136/62   10/20/16 168/88     Orthostatic Blood Pressures      Most Recent Value   Blood Pressure  120/56 filed at 2019 0725   Patient Position - Orthostatic VS  Lying filed at 2019 0725        I/O       701 - 07/10 0700 07/10 0701 -  07 07    P  O  260 340     I V  (mL/kg) 518 (4 8) 510 (4 7)     Total Intake(mL/kg) 778 (7 2) 850 (7 9)     Urine (mL/kg/hr) 600 (0 2) 400 (0 2) Other 2400 2500     Total Output 3000 2900     Net -2222 -2050                Invasive Devices     Central Venous Catheter Line            CVC Central Lines 07/08/19 Double 2 days          Peripheral Intravenous Line            Peripheral IV 07/07/19 Left Arm 3 days          Hemodialysis Catheter            Temporary HD Catheter  2 days          Drain            Open Drain Left; Anterior; Other (Comment)  days                  Intake/Output Summary (Last 24 hours) at 7/11/2019 1201  Last data filed at 7/11/2019 0400  Gross per 24 hour   Intake 250 ml   Output 300 ml   Net -50 ml         Physical Exam:   Physical Exam   Constitutional: He is oriented to person, place, and time  He appears well-developed and well-nourished  No distress  HENT:   Head: Normocephalic and atraumatic  Right Ear: External ear normal    Left Ear: External ear normal    Eyes: Pupils are equal, round, and reactive to light  Conjunctivae are normal  Right eye exhibits no discharge  Left eye exhibits no discharge  No scleral icterus  Neck: Normal range of motion  Neck supple  No JVD present  No thyromegaly present  Cardiovascular: Normal rate, regular rhythm, normal heart sounds and intact distal pulses  Pulmonary/Chest: Effort normal and breath sounds normal  No accessory muscle usage  No respiratory distress  He has no wheezes  He has no rales  Abdominal: Soft  Bowel sounds are normal  He exhibits no distension  Musculoskeletal: He exhibits no edema  Neurological: He is alert and oriented to person, place, and time  Skin: Skin is warm and dry  Capillary refill takes less than 2 seconds  He is not diaphoretic  Psychiatric: He has a normal mood and affect  Nursing note and vitals reviewed              Medications/ Allergies:     Current Facility-Administered Medications:  acetaminophen 650 mg Oral Q6H PRN Sean Alvarez MD   ALPRAZolam 0 25 mg Oral HS Sean Alvarez MD   aluminum-magnesium hydroxide-simethicone 30 mL Oral Q4H PRN Melissa Mancilla MD   amLODIPine 5 mg Oral BID Sean Alvarez MD   aspirin 81 mg Oral Daily Sean Alvarez MD   atorvastatin 80 mg Oral Daily With TABITHA Drake   azithromycin 500 mg Oral Q24H Fidelina Muir MD   cefuroxime 500 mg Oral Q12H [address], [place]-   escitalopram 10 mg Oral HS Sean Alvarez MD   furosemide 80 mg Oral BID (diuretic) Ari Mendieta MD   hydrALAZINE 10 mg Intravenous Q6H PRN Fidelina Muir MD   hydrALAZINE 50 mg Oral Q8H [address] TABITHA Leyva   insulin detemir 20 Units Subcutaneous Q12H [address] Sean Alvarez MD   insulin lispro 1-6 Units Subcutaneous HS Sean Alvarez MD   insulin lispro 2-12 Units Subcutaneous TID AC Sean Alvarez MD   ipratropium-albuterol 3 mL Nebulization Q6H Sean Alvarez MD   isosorbide mononitrate 30 mg Oral Daily Navtej Isabella, DO   metoprolol tartrate 50 mg Oral Q12H [address] Navtej Isabella, DO   nitroglycerin 0 4 mg Sublingual Q5 Min PRN Navtej Isabella, DO   pneumococcal 13-valent conjugate vaccine 0 5 mL Intramuscular Prior to discharge Fidelina Muir MD   polyethylene glycol 17 g Oral Daily Sean Alvarez MD       acetaminophen 650 mg Q6H PRN   aluminum-magnesium hydroxide-simethicone 30 mL Q4H PRN   hydrALAZINE 10 mg Q6H PRN   nitroglycerin 0 4 mg Q5 Min PRN   pneumococcal 13-valent conjugate vaccine 0 5 mL Prior to discharge     No Known Allergies    VTE Pharmacologic Prophylaxis:   Sequential compression device (Venodyne)     Labs:   Troponins:  Results from last 7 days   Lab Units 07/06/19  1616 07/06/19  1211 07/06/19  0914   TROPONIN I ng/mL 2 74* 3 56* 3 68*     CBC with diff:  Results from last 7 days   Lab Units 07/11/19  0602 07/10/19  0501 07/09/19  0529 07/08/19  0743 07/07/19  0510 07/06/19  0914 07/06/19  0553 07/05/19  0631   WBC Thousand/uL 7 48 7 98 8 95 10 89* 10 62* 10 91* 8 31 9 12   HEMOGLOBIN g/dL 9 3* 9 9* 10 1* 10 1* 10 6* 11 3* 10 8* 11 2*   HEMATOCRIT % 28 7* 31 3* 30 5* 31 0* 33 1* 35 4* 33 4* 34 5*   MCV fL 94 97 92 93 95 95 95 94   PLATELETS Thousands/uL 121* 123* 129* 111* 132* 127* 118* 116*   MCH pg 30 5 30 7 30 6 30 3 30 4 30 3 30 6 30 4   MCHC g/dL 32 4 31 6 33 1 32 6 32 0 31 9 32 3 32 5   RDW % 13 8 14 0 14 0 14 0 14 2 14 3 14 2 14 0   MPV fL 10 2 11 0 11 3 10 9 11 2 10 3 10 8 10 8   NRBC AUTO /100 WBCs 0 0 0 0 0  --  0 0     CMP:  Results from last 7 days   Lab Units 07/11/19  0602 07/10/19  0507 07/09/19  0529 07/08/19  0743 07/07/19  0510 07/06/19  0553 07/05/19  0631   SODIUM mmol/L 135* 134* 135* 135* 134* 139 140   POTASSIUM mmol/L 4 1 4 5 3 9 4 5 4 9 4 4 4 2   CHLORIDE mmol/L 100 100 100 99* 100 105 106   CO2 mmol/L 30 26 27 24 23 27 25   ANION GAP mmol/L 5 8 8 12 11 7 9   BUN mg/dL 51* 61* 76* 92* 73* 56* 47*   CREATININE mg/dL 2 63* 2 41* 2 80* 3 26* 2 81* 2 16* 1 91*   CALCIUM mg/dL 8 1* 8 3 8 2* 8 3 8 3 8 3 8 2*   AST U/L 35 42 35 35 40 44  --    ALT U/L 49 47 50 48 42 35  --    ALK PHOS U/L 113 108 102 103 102 97  --    TOTAL PROTEIN g/dL 6 3* 6 7 6 9  6 7 7 1 7 1 6 7  --    ALBUMIN g/dL 2 2* 2 4* 2 6* 2 8* 2 8* 2 6*  --    TOTAL BILIRUBIN mg/dL 1 50* 1 60* 1 50* 1 60* 1 70* 1 40*  --    EGFR ml/min/1 73sq m 22 25 21 17 21 28 33     Magnesium:  Results from last 7 days   Lab Units 07/10/19  0507 07/09/19  0529 07/08/19  0743 07/07/19  0510   MAGNESIUM mg/dL 2 9* 2 7* 2 9* 2 9*     Coags:  Results from last 7 days   Lab Units 07/06/19  0914   PTT seconds 38*   INR  1 12       Imaging & Testing   I have personally reviewed pertinent reports  Xr Chest Portable    Result Date: 7/7/2019  Narrative: CHEST INDICATION:   SOB  COMPARISON:  July 6, 2019, 12:54 PM  Juanita Cerna PERFORMED/VIEWS:  XR CHEST PORTABLE FINDINGS: Heart shadow is enlarged but unchanged from prior exam   Post median sternotomy  Mild pulmonary vascular congestion  Left upper and lower lobe patchy airspace disease may represent pulmonary edema or infectious infiltrate  The lungs are clear  No pneumothorax or pleural effusion   Osseous structures appear within normal limits for patient age  Postcholecystectomy  Impression: Mild pulmonary vascular congestion  Left upper and lower lobe patchy airspace disease may represent pulmonary edema from CHF or infectious infiltrate  Workstation performed: [id number]     Xr Chest 1 View Portable    Result Date: 7/3/2019  Narrative: CHEST INDICATION:   SOB  COMPARISON:  12/4/2016 EXAM PERFORMED/VIEWS:  XR CHEST PORTABLE  AP semierect Images: 1 FINDINGS:  There are median sternotomy wires indicating prior cardiac surgery  Heart shadow is enlarged but unchanged from prior exam  Right infrahilar infiltrate noted with small pleural effusion  Left lung clear  No pneumothorax  Osseous structures appear within normal limits for patient age  Impression: Right infrahilar infiltrate with small pleural effusion  Workstation performed: [id number]     Xr Chest Pa & Lateral    Result Date: 7/7/2019  Narrative: CHEST INDICATION:   ? pneumonia  / CHF / volume overload  COMPARISON:  July 3, 2019 EXAM PERFORMED/VIEWS:  XR CHEST PA & LATERAL FINDINGS:  There are median sternotomy wires indicating prior cardiac surgery  Cardiomediastinal silhouette appears unremarkable  Small right larger than left bilateral pleural effusions  No pneumothorax    Osseous structures appear within normal limits for patient age  Impression: Small bilateral pleural effusions right larger than left  Patchy airspace opacities left lung potentially pneumonia  Workstation performed: [id number]     Ct Chest Wo Contrast    Result Date: 7/4/2019  Narrative: CT CHEST WITHOUT IV CONTRAST INDICATION:   Shortness of breath  COMPARISON:  No prior chest CTs available TECHNIQUE: CT examination of the chest was performed without intravenous contrast   Axial, sagittal, and coronal 2D reformatted images were created from the source data and submitted for interpretation  Radiation dose length product (DLP) for this visit:  429 43 mGy-cm     This examination, like all CT [facility], was performed utilizing techniques to minimize radiation dose exposure, including the use of iterative  reconstruction and automated exposure control  FINDINGS: LUNGS:  Patchy areas of focal consolidation involving the left lower lobe, left upper lobe and right middle lobe  Subsegmental atelectasis in the right lower lobe     There is no tracheal or endobronchial lesion  PLEURA:  Moderate right and small left pleural effusions  HEART/GREAT VESSELS:  Mild cardiomegaly status post CABG aortic atherosclerosis, without aneurysm  MEDIASTINUM AND LEEANN:  Unremarkable  CHEST WALL AND LOWER NECK:   Mild bilateral gynecomastia  VISUALIZED STRUCTURES IN THE UPPER ABDOMEN:  Status post cholecystectomy OSSEOUS STRUCTURES:  No acute fracture or destructive osseous lesion  Impression: 1  Patchy areas of consolidation bilaterally most likely pneumonia  Recommend follow-up chest CT in 3 months to ensure complete resolution  2   Moderate right and small left pleural effusions  Mild cardiomegaly  Workstation performed: [id number]     Nm Lung Ventilation / Perfusion    Result Date: 7/3/2019  Narrative: VENTILATION AND PERFUSION SCAN INDICATION: Shortness of breath  COMPARISON:  Chest radiograph  7/3/2019 TECHNIQUE:  Posterior ventilation imaging was performed after the inhalation of 33 mCi nebulized Tc-99m DTPA with deposition of less than 1 mCi of activity within the lungs  Multiplanar perfusion imaging was next performed following the intravenous administration of 4 4 mCi Tc-99m labeled MAA  FINDINGS:  Ventilation imaging demonstrates heterogeneous distribution of the radiopharmaceutical with some clumping centrally  Perfusion imaging demonstrates mildly heterogeneous distribution of the radiopharmaceutical throughout both lungs  There is no significant  segmental or subsegmental defect  Impression: The probability for pulmonary embolus is low   Workstation performed: [id number]     Us Kidney And Bladder    Result Date: 7/5/2019  Narrative: RENAL ULTRASOUND INDICATION:   N18 9: Chronic kidney disease, unspecified  COMPARISON: 12/5/2016 TECHNIQUE:   Ultrasound of the retroperitoneum was performed with a curvilinear transducer utilizing volumetric sweeps and still imaging techniques  FINDINGS: KIDNEYS: Symmetric and normal size  Right kidney:  10 8 x 5 6 cm  Left kidney:  10 6 x 5 3 cm  Right kidney Normal echogenicity and contour  No suspicious masses detected  No hydronephrosis  No shadowing calculi  No perinephric fluid collections  Left kidney Normal echogenicity and contour  No suspicious masses detected  No hydronephrosis  No shadowing calculi  No perinephric fluid collections  URETERS: Nonvisualized  BLADDER: The bladder is underdistended  No focal thickening or mass lesions  Impression: No hydronephrosis  Workstation performed: [id number]     Vas Lower Limb Venous Duplex Study, Complete Bilateral    Result Date: 7/4/2019  Narrative:  [facility] REPORT CLINICAL: Indications: Swelling of Limb [R22 4]  Dyspnea [R06 02]  Patient presents with shortness of breath for the past several days  Patient reports short of breath  Risk Factors The patient has history of HTN, Diabetes (Yes), HLD, CKD and previous smoking (quit >10yrs ago)  FINDINGS:  Segment  Right            Left              Impression       Impression       CFV      Normal (Patent)  Normal (Patent)     CONCLUSION:  Impression: RIGHT LOWER LIMB: No evidence of acute or chronic deep vein thrombosis  No evidence of superficial thrombophlebitis noted  Doppler evaluation shows a normal response to augmentation maneuvers  Popliteal, posterior tibial and anterior tibial arterial Doppler waveforms are triphasic  LEFT LOWER LIMB: No evidence of acute or chronic deep vein thrombosis  No evidence of superficial thrombophlebitis noted  Doppler evaluation shows a normal response to augmentation maneuvers   Popliteal, posterior tibial and anterior tibial arterial Doppler waveforms are triphasic  Pulsatile waveforms in the venous system may suggest heart failure or tricuspid valve insufficiency  Technical findings were given to Dr Merline Huerta 13:30  SIGNATURE: Electronically Signed by: Renetta Pastor on 2019-07-04 09:11:23 AM    Lindsey Romero Hd Cath    Result Date: 7/8/2019  Narrative: Temporary dialysis catheter placement  Clinical History: Renal failure  Fluoro time: 0 4mins Contrast: None Number of Images: 4 Conscious sedation time: None Radiation Dose: 12 65 mGy Technique: The patient was brought to the interventional radiology suite and identified verbally and by wrist band  The patient was placed supine on the table  The right internal jugular vein was evaluated as a potential access site with ultrasound  The vessel was found to be patent and compressible  The right neck and upper chest were prepped and draped in the usual sterile fashion  All elements of maximal sterile barrier technique were followed (cap, mask, sterile gown, sterile gloves, large sterile sheet, hand hygiene, and 2% chlorhexidine for cutaneous antisepsis)  Lidocaine was administered to the skin and a small skin incision was made  Under ultrasound guidance, utilizing sterile ultrasound technique with sterile gel and a sterile probe cover, the right internal jugular vein was accessed using single wall Seldinger technique  Static images of real time needle entry into the vessel were obtained  A 0 018 wire was then advanced through the needle into the central venous system  The needle was removed, and a 5 Trinidadian coaxial dilator was inserted  An Amplatz wire was inserted through the outer dilator, and after tract dilatation a 14 Trinidadian Schon XL catheter was placed over the wire  The catheter tip was then positioned in the right atrium under fluoroscopic control  The external portion  of the catheter was sutured to the skin with 2-0 Prolene  The catheter aspirated and flushed well   A sterile dressing was applied and 1,000 unit heparin/cc solution was administered into each of the lumens  Impression: Impression: 1  Successful ultrasound and fluoroscopically guided placement of a 15 cm temporary dialysis Schon XL catheter via the right internal jugular vein  The tip of the catheter is in the right atrium and may be used immediately  Workstation performed: [id number]        EKG / Monitor: Personally reviewed  Sinus rhythm    Cardiac testing:   Results for orders placed during the hospital encounter of 19   Echo complete with contrast if indicated    L[address]  [address] [facility]   [phone]    Transthoracic Echocardiogram  2D, M-mode, Doppler, and Color Doppler    Study date:  2019    Patient: Sherman Marcelino  MR number: [id number]  Account number: [de-identified]  : [id number]  Age: 66 years  Gender: Male  Status: Inpatient  Location: Bedside  Height: 73 in  Weight: 230 6 lb  BP: 124/ 68 mmHg    Indications: Cariomyopathy    Diagnoses: I42 9 - Cardiomyopathy, unspecified    Sonographer:  ROHINI Montiel  Primary Physician:  Ha Rivas MD  Referring Physician:  Grecia Craig DO  Group:  [facility]  Interpreting Physician:  Grecia Craig DO    SUMMARY    LEFT VENTRICLE:  Systolic function was normal by visual assessment  Ejection fraction was estimated to be 55 %  There were no regional wall motion abnormalities  There was moderate concentric hypertrophy  Doppler parameters were consistent with restrictive physiology, indicative of decreased left ventricular diastolic compliance and/or increased left atrial pressure  VENTRICULAR SEPTUM:  There was paradoxical motion  These changes are consistent with a post-thoracotomy state  MITRAL VALVE:  There was mild to moderate regurgitation  AORTIC VALVE:  There was no evidence for stenosis  There was no regurgitation      TRICUSPID VALVE:  There was mild regurgitation  Pulmonary artery systolic pressure was mildly increased  HISTORY: PRIOR HISTORY: HTN, DM, CAD, HCL, CABG, Bladder Cancer, Arthritis    PROCEDURE: The procedure was performed at the bedside  This was a routine study  The transthoracic approach was used  The study included complete 2D imaging, M-mode, complete spectral Doppler, and color Doppler  The heart rate was 60 bpm,  at the start of the study  Image quality was adequate  LEFT VENTRICLE: Size was normal  Systolic function was normal by visual assessment  Ejection fraction was estimated to be 55 %  There were no regional wall motion abnormalities  There was moderate concentric hypertrophy  DOPPLER: Doppler  parameters were consistent with restrictive physiology, indicative of decreased left ventricular diastolic compliance and/or increased left atrial pressure  Doppler parameters were consistent with elevated mean left atrial filling pressure  VENTRICULAR SEPTUM: There was paradoxical motion  These changes are consistent with a post-thoracotomy state  RIGHT VENTRICLE: The size was normal  Systolic function was normal  DOPPLER: Systolic pressure was within the normal range  LEFT ATRIUM: The atrium was moderately dilated  RIGHT ATRIUM: The atrium was mildly dilated  MITRAL VALVE: Valve structure was normal  There was normal leaflet separation  No echocardiographic evidence for prolapse  DOPPLER: The transmitral velocity was within the normal range  There was no evidence for stenosis  There was mild to  moderate regurgitation  AORTIC VALVE: The valve was trileaflet  Leaflets exhibited normal thickness, normal cuspal separation, and sclerosis  DOPPLER: Transaortic velocity was within the normal range  There was no evidence for stenosis  There was no  regurgitation  TRICUSPID VALVE: The valve structure was normal  There was normal leaflet separation  DOPPLER: The transtricuspid velocity was within the normal range   There was mild regurgitation  Pulmonary artery systolic pressure was mildly increased  Estimated peak PA pressure was 44 mmHg  PULMONIC VALVE: Leaflets exhibited normal thickness, no calcification, and normal cuspal separation  DOPPLER: The transpulmonic velocity was within the normal range  There was no regurgitation  PERICARDIUM: There was no thickening  There was no pericardial effusion  AORTA: The root exhibited normal size  PULMONARY ARTERY: The size was normal  The morphology appeared normal     SYSTEM MEASUREMENT TABLES    2D mode  AoR Diam 2D: 3 4 cm  LA Diam (2D): 4 9 cm  LA/Ao (2D): 1 44  FS (2D Teich): 28 4 %  IVSd (2D): 1 29 cm  LVDEV: 101 cmï¾³  LVESV: 45 8 cmï¾³  LVIDd(2D): 4 68 cm  LVISd (2D): 3 35 cm  LVPWd (2D): 1 26 cm  SV (Teich): 55 2 cmï¾³    Apical four chamber  LVEF A4C: 52 %    Unspecified Scan Mode  MV Peak E Darvin  Mean: 1160 mm/s  MVA (PHT): 5 24 cmï¾²  PHT: 42 ms  Max P mm[Hg]  V Max: 3020 mm/s  Vmax: 2780 mm/s  RA Area: 19 6 cmï¾²  RA Volume: 49 7 cmï¾³  TAPSE: 1 5 cm    [employer] Accredited Echocardiography Laboratory    Prepared and electronically signed by    Iván Sierra DO  Signed 2019 14:05:34         Joshua Sutton        "This note has been constructed using a voice recognition system  Therefore there may be syntax, spelling, and/or grammatical errors   Please call if you have any questions  "

## 2019-07-11 NOTE — PLAN OF CARE
Problem: Potential for Falls  Goal: Patient will remain free of falls  Description  INTERVENTIONS:  - Assess patient frequently for physical needs  -  Identify cognitive and physical deficits and behaviors that affect risk of falls    -  [place] fall precautions as indicated by assessment   - Educate patient/family on patient safety including physical limitations  - Instruct patient to call for assistance with activity based on assessment  - Modify environment to reduce risk of injury  - Consider OT/PT consult to assist with strengthening/mobility  Outcome: Progressing     Problem: PAIN - ADULT  Goal: Verbalizes/displays adequate comfort level or baseline comfort level  Description  Interventions:  - Encourage patient to monitor pain and request assistance  - Assess pain using appropriate pain scale  - Administer analgesics based on type and severity of pain and evaluate response  - Implement non-pharmacological measures as appropriate and evaluate response  - Consider cultural and social influences on pain and pain management  - Notify physician/advanced practitioner if interventions unsuccessful or patient reports new pain  Outcome: Progressing     Problem: INFECTION - ADULT  Goal: Absence or prevention of progression during hospitalization  Description  INTERVENTIONS:  - Assess and monitor for signs and symptoms of infection  - Monitor lab/diagnostic results  - Monitor all insertion sites, i e  IV  - [place] appropriate cooling/warming therapies per order  - Administer medications as ordered  - Instruct and encourage patient and family to use good hand hygiene technique  - Identify and instruct in appropriate isolation precautions for identified infection/condition   Outcome: Progressing     Problem: SAFETY ADULT  Goal: Patient will remain free of falls  Description  INTERVENTIONS:  - Assess patient frequently for physical needs  -  Identify cognitive and physical deficits and behaviors that affect risk of falls   -  [place] fall precautions as indicated by assessment   - Educate patient/family on patient safety including physical limitations  - Instruct patient to call for assistance with activity based on assessment  - Modify environment to reduce risk of injury  - Consider OT/PT consult to assist with strengthening/mobility  Outcome: Progressing  Goal: Maintain or return to baseline ADL function  Description  INTERVENTIONS:  -  Assess patient's ability to carry out ADLs; assess patient's baseline for ADL function and identify physical deficits which impact ability to perform ADLs (bathing, care of mouth/teeth, toileting, grooming, dressing, etc )  - Assess/evaluate cause of self-care deficits   - Assess range of motion  - Assess patient's mobility; develop plan if impaired  - Assess patient's need for assistive devices and provide as appropriate  - Encourage maximum independence but intervene and supervise when necessary  ¯ Involve family in performance of ADLs  ¯ Assess for home care needs following discharge   ¯ Request OT consult to assist with ADL evaluation and planning for discharge  ¯ Provide patient education as appropriate  Outcome: Progressing  Goal: Maintain or return mobility status to optimal level  Description  INTERVENTIONS:  - Assess patient's baseline mobility status (ambulation, transfers, stairs, etc )    - Identify cognitive and physical deficits and behaviors that affect mobility  - Identify mobility aids required to assist with transfers and/or ambulation (gait belt, sit-to-stand, lift, walker, cane, etc )  - [place] fall precautions as indicated by assessment  - Record patient progress and toleration of activity level on Mobility SBAR; progress patient to next Phase/Stage  - Instruct patient to call for assistance with activity based on assessment  - Request Rehabilitation consult to assist with strengthening/weightbearing, etc   Outcome: Progressing     Problem: DISCHARGE PLANNING  Goal: Discharge to home or other facility with appropriate resources  Description  INTERVENTIONS:  - Identify barriers to discharge w/patient and caregiver  - Arrange for needed discharge resources and transportation as appropriate  - Identify discharge learning needs (meds, wound care, etc )  - Arrange for interpretive services to assist at discharge as needed  - Refer to Case Management Department for coordinating discharge planning if the patient needs post-hospital services based on physician/advanced practitioner order or complex needs related to functional status, cognitive ability, or social support system  Outcome: Progressing     Problem: Knowledge Deficit  Goal: Patient/family/caregiver demonstrates understanding of disease process, treatment plan, medications, and discharge instructions  Description  Complete learning assessment and assess knowledge base  Interventions:  - Provide teaching at level of understanding  - Provide teaching via preferred learning methods  Outcome: Progressing     Problem: CARDIOVASCULAR - ADULT  Goal: Maintains optimal cardiac output and hemodynamic stability  Description  INTERVENTIONS:  - Monitor I/O, vital signs and rhythm  - Monitor for S/S and trends of decreased cardiac output i e  bleeding, hypotension  - Administer and titrate ordered vasoactive medications to optimize hemodynamic stability  - Assess quality of pulses, skin color and temperature  - Assess for signs of decreased coronary artery perfusion - ex   Angina  - Instruct patient to report change in severity of symptoms  Outcome: Progressing  Goal: Absence of cardiac dysrhythmias or at baseline rhythm  Description  INTERVENTIONS:  - Continuous cardiac monitoring, monitor vital signs, obtain 12 lead EKG if indicated  - Administer antiarrhythmic and heart rate control medications as ordered  - Monitor electrolytes and administer replacement therapy as ordered  Outcome: Progressing     Problem: RESPIRATORY - ADULT  Goal: Achieves optimal ventilation and oxygenation  Description  INTERVENTIONS:  - Assess for changes in respiratory status  - Assess for changes in mentation and behavior  - Position to facilitate oxygenation and minimize respiratory effort  - Oxygen administration by appropriate delivery method based on oxygen saturation (per order) or ABGs  - Encourage broncho-pulmonary hygiene including cough, deep breathe, Incentive Spirometry  - Assess the need for suctioning and aspirate as needed  - Assess and instruct to report SOB or any respiratory difficulty  - Respiratory Therapy support as indicated   Outcome: Progressing     Problem: Nutrition/Hydration-ADULT  Goal: Nutrient/Hydration intake appropriate for improving, restoring or maintaining nutritional needs  Description  Monitor and assess patient's nutrition/hydration status for malnutrition (ex- brittle hair, bruises, dry skin, pale skin and conjunctiva, muscle wasting, smooth red tongue, and disorientation)  Collaborate with interdisciplinary team and initiate plan and interventions as ordered  Monitor patient's weight and dietary intake as ordered or per policy  Utilize nutrition screening tool and intervene per policy  Determine patient's food preferences and provide high-protein, high-caloric foods as appropriate       INTERVENTIONS:  - Monitor oral intake, urinary output, labs, and treatment plans  - Assess nutrition and hydration status and recommend course of action  - Evaluate amount of meals eaten  - Assist patient with eating if necessary   - Allow adequate time for meals  - Recommend/ encourage appropriate diets, oral nutritional supplements, and vitamin/mineral supplements  - Order, calculate, and assess calorie counts as needed  - Assess need for intravenous fluids  - Provide specific nutrition/hydration education as appropriate  - Include patient/family/caregiver in decisions related to nutrition   Outcome: Progressing

## 2019-07-11 NOTE — PROGRESS NOTES
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 MRN: [id number]    Unit/Bed#: [address] [place] [address] Encounter: [id number]      Subjective:   Patient complains of constipation some abdominal discomfort nausea profound weakness difficulty breathing unchanged minimal at rest more so with minimal exertion remaining review of system unremarkable unchanged since yesterday total of 12 done  All the labs x-ray reviewed  All the consultant follow-up reviewed  Discussed with the patient plan at length    Objective:   As below    Vitals: Blood pressure 120/56, pulse 69, temperature 98 4 °F (36 9 °C), temperature source Oral, resp  rate 18, height 6' 1" (1 854 m), weight 108 kg (237 lb 14 oz), SpO2 97 %  ,Body mass index is 31 38 kg/m²      Intake/Output:    Intake/Output Summary (Last 24 hours) at 7/11/2019 1205  Last data filed at 7/11/2019 0400  Gross per 24 hour   Intake 250 ml   Output 300 ml   Net -50 ml       Physical Exam:  General Appearance:  A alert oriented x3 not any acute distress comfortable  Skin:  No rash normal normal  H/ENT:  No congestion normal no swelling  Eyes:  No redness no discharged normal  Cardiac:  Regular  Pulmonary:  Decreased air entry bilateral improving Complete exam done bilateral rales minimal improved improvement no wheezing   Gastrointestinal:  Nontender no distension no mass palpable bowel sounds normal no distension no mass palpable normal exam slight distension  Extremities:  2+ edema bilateral persistent bilateral edema slowly improving   Musculoskeletal:  Knee swelling bilateral  Neuro:  No new deficit gait dysfunction no new deficit  Complete exam done today as above  Complete exam done as above for July 11, 2019  Current Facility-Administered Medications   Medication Dose Route Frequency    acetaminophen (TYLENOL) tablet 650 mg  650 mg Oral Q6H PRN    ALPRAZolam (XANAX) tablet 0 25 mg  0 25 mg Oral HS    aluminum-magnesium hydroxide-simethicone (MYLANTA) 200-200-20 mg/5 mL oral suspension 30 mL  30 mL Oral Q4H PRN    amLODIPine (NORVASC) tablet 5 mg  5 mg Oral BID    aspirin (ECOTRIN LOW STRENGTH) EC tablet 81 mg  81 mg Oral Daily    atorvastatin (LIPITOR) tablet 80 mg  80 mg Oral Daily With Dinner    azithromycin (ZITHROMAX) tablet 500 mg  500 mg Oral Q24H    cefuroxime (CEFTIN) tablet 500 mg  500 mg Oral Q12H [address]    escitalopram (LEXAPRO) tablet 10 mg  10 mg Oral HS    furosemide (LASIX) tablet 80 mg  80 mg Oral BID (diuretic)    hydrALAZINE (APRESOLINE) injection 10 mg  10 mg Intravenous Q6H PRN    hydrALAZINE (APRESOLINE) tablet 50 mg  50 mg Oral Q8H [address]    insulin detemir (LEVEMIR) subcutaneous injection 20 Units  20 Units Subcutaneous Q12H [address]    insulin lispro (HumaLOG) 100 units/mL subcutaneous injection 1-6 Units  1-6 Units Subcutaneous HS    insulin lispro (HumaLOG) 100 units/mL subcutaneous injection 2-12 Units  2-12 Units Subcutaneous TID AC    ipratropium-albuterol (DUO-NEB) 0 5-2 5 mg/3 mL inhalation solution 3 mL  3 mL Nebulization Q6H    isosorbide mononitrate (IMDUR) 24 hr tablet 30 mg  30 mg Oral Daily    lactulose 20 g/30 mL oral solution 30 g  30 g Oral Daily    metoprolol tartrate (LOPRESSOR) tablet 50 mg  50 mg Oral Q12H [place]    nitroglycerin (NITROSTAT) SL tablet 0 4 mg  0 4 mg Sublingual Q5 Min PRN    pneumococcal 13-valent conjugate vaccine (PREVNAR-13) IM injection 0 5 mL  0 5 mL Intramuscular Prior to discharge    polyethylene glycol (MIRALAX) packet 17 g  17 g Oral Daily       Radiology Results:  Reviewed    Lab, Imaging and other studies:   CBC:   Lab Results   Component Value Date    WBC 7 48 07/11/2019    WBC 8 8 01/05/2016    RBC 3 05 (L) 07/11/2019    RBC 4 61 (L) 01/05/2016     BMP:   Lab Results   Component Value Date    GLUCOSE 131 (H) 01/05/2016    SODIUM 135 (L) 07/11/2019    CO2 30 07/11/2019    CO2 27 01/05/2016    BUN 51 (H) 07/11/2019    BUN 35 (H) 01/05/2016    CREATININE 2 63 (H) 07/11/2019    CREATININE 1 5 (H) 01/05/2016    CALCIUM 8 1 (L) 07/11/2019    CALCIUM 9 0 01/05/2016     Coagulation:   Lab Results   Component Value Date    INR 1 12 07/06/2019     Cardiac markers:   Lab Results   Component Value Date    CKMB 4 9 07/04/2019     ABGs: No results found for: PH   Results from last 7 days   Lab Units 07/11/19  0602   POTASSIUM mmol/L 4 1   CHLORIDE mmol/L 100   CO2 mmol/L 30   BUN mg/dL 51*   CREATININE mg/dL 2 63*   CALCIUM mg/dL 8 1*   ALK PHOS U/L 113   ALT U/L 49   AST U/L 35       Assessment:  Principal Problem:    Acute on chronic diastolic CHF (congestive heart failure) ([facility])  Active Problems:    CKD (chronic kidney disease), stage III (HCC)    Benign hypertension with chronic kidney disease, stage III (HCC)   Acute on chronic renal failure as evident the creatinine 3 2 and baseline creatinine 1 3  suspected pneumonia  Assessment:  Acute on chronic diastolic CHF  Pneumonia  Pleural effusion  Type 1 diabetes  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  Hypertension uncontrolled  Acute MI type 1  Plan:  Continue hemodialysis for Perma-Cath tomorrow NPO from midnight  Lexiscan Myoview stress test today  Continue aspirin Plavix subcu heparin therapeutic dose  Labs in the morning  OT PT  Nephrology consult Cardiology consult follow-up reviewed appreciated  Constipation will try lactulose if needed any murmur  VTE Pharmacologic Prophylaxis: heparin    Horacio Balderas MD  7/11/2019, 12:05 PM

## 2019-07-11 NOTE — PROGRESS NOTES
NEPHROLOGY PROGRESS NOTE   Marilauisa Farrar 66 y o  male MRN: [id number]  Unit/Bed#: [address] [place] [address] Encounter: [id number]    ASSESSMENT & PLAN:  65 yo make with HTN, DM, HLP, CAD/CABG, CKD III, OA, bladder cancer s/p partial cystectomy admitted to [facility] on 7/3/19 with SOB and edema  Diagnosed with pneumonia vs CHF and on abx and diuretics  Developed chest pain on 7/5/19 and  NSTEMI  Nephrology consulted for acute kidney injury     1  BRIAN (POA)  · Admission creatinine of 1 80 on 7/3/19  · Renal US showed no hydronephrosis  UA not indicative of GN-showed 1-2 RBC with 4-10 WBC and 3+ proteinuria  · serological workup ordered as cause for acute kidney injury is not clear  Could be cardiorenal syndrome versus ATN  SPEP: no monoclonal band, UPEP no monoclonal protein, C3-C4 within normal limits  ANCA panel pending  Anti GBM ab- negative  LANETTE negative  hepatitis panel-hepatitis-B and C nonreactive  · Acute kidney injury possibly due to ATN  · Renal function worsened with creatinine up to 3 26 on 7/8  Renal function worsened while on Lasix drip but without adequate urine output and patient was still fluid overloaded, so hemodialysis was initiated on 07/08  Patient completed 3 dialysis treatment so far  · Next hemodialysis tomorrow  · Dialysis access:  Right IJ temporary dialysis catheter  Will plan for PermCath placement for Friday, IR aware  Will place NPO after midnight  · Will need arrangement for outpatient HD  Next hemodialysis treatment on Friday  Will do Monday Wednesday Friday    2  CKD III:  ·  Baseline creatinine is around 1 2 to 1 5 since 2015  No prior renal follow up  Before this hospital admission creatinine was 1 23 on 02/2017  No recent outpatient creatinine available to me at this time  · Chronic kidney disease could be due to hypertensive nephrosclerosis     3  Fluid overload/CHF:    Echocardiogram showed ejection fraction of 55% and no regional wall motion abnormalities  Will continue ultrafiltration on hemodialysis treatment  Urine output only 400 mL despite IV Lasix, changed IV Lasix to p o  Lasix on 07/11  4  Primary HTN with chronic kidney disease stage 3:   · BP stable  · On Amlodipine + Metoprolol at home  · Hydralazine added this admission       6    Proteinuria:  UPC ratio-2 0 g,  Could be diabetic nephropathy  Continue management of diabetes mellitus per primary team     7  Anemia:  Hemoglobin dropped slightly to 9 3, continue monitoring     Will need arrangements for outpatient hemodialysis  SUBJECTIVE:  Shortness of breath improving  Patient received 3 hemo dialysis treatment show far    OBJECTIVE:  Current Weight: Weight - Scale: 108 kg (237 lb 14 oz)  Vitals:    07/11/19 0725   BP:    Pulse:    Resp:    Temp:    SpO2: 97%   /72   Pulse 67   Temp 97 9 °F (36 6 °C) (Oral)   Resp 19   Ht 6' 1" (1 854 m)   Wt 108 kg (237 lb 14 oz)   SpO2 97%   BMI 31 38 kg/m²       Intake/Output Summary (Last 24 hours) at 7/11/2019 1047  Last data filed at 7/11/2019 0400  Gross per 24 hour   Intake 550 ml   Output 2800 ml   Net -2250 ml       Physical Exam  General:  Ill looking, awake  Eyes: Conjunctivae pink,  Sclera anicteric  ENT: lips and mucous membranes moist  Neck: supple   Chest:  Bilateral basal crackles  CVS: S1 & S2 present, normal rate, regular rhythm, no murmur    Abdomen: soft, non-tender, non-distended, Bowel sounds normoactive  Extremities:  1+ edema both legs  Skin: no rash  Neuro: awake, alert, oriented x3      Medications:    Current Facility-Administered Medications:     acetaminophen (TYLENOL) tablet 650 mg, 650 mg, Oral, Q6H PRN, Sean Alvarez MD    ALPRAZolam (XANAX) tablet 0 25 mg, 0 25 mg, Oral, HS, Sean Alvarez MD, 0 25 mg at 07/10/19 2126    aluminum-magnesium hydroxide-simethicone (MYLANTA) 200-200-20 mg/5 mL oral suspension 30 mL, 30 mL, Oral, Q4H PRN, Cuca Marvin MD, 30 mL at 07/06/19 2035    amLODIPine (NORVASC) tablet 5 mg, 5 mg, Oral, BID, Sean Alvarez MD, 5 mg at 07/10/19 1743    aspirin (ECOTRIN LOW STRENGTH) EC tablet 81 mg, 81 mg, Oral, Daily, Sean Alvarez MD, 81 mg at 07/10/19 1207    atorvastatin (LIPITOR) tablet 80 mg, 80 mg, Oral, Daily With TABITHA Shahid, 80 mg at 07/10/19 1743    azithromycin (ZITHROMAX) tablet 500 mg, 500 mg, Oral, Q24H, Sean Alvarez MD, 500 mg at 07/10/19 1207    cefuroxime (CEFTIN) tablet 500 mg, 500 mg, Oral, Q12H [facility], Janet Rodriguez, PA-C, 500 mg at 07/10/19 2129    escitalopram (LEXAPRO) tablet 10 mg, 10 mg, Oral, HS, Sean Alvarez MD, 10 mg at 07/10/19 2126    furosemide (LASIX) injection 80 mg, 80 mg, Intravenous, BID (diuretic), [facility], PA-C, 80 mg at 07/10/19 1743    hydrALAZINE (APRESOLINE) injection 10 mg, 10 mg, Intravenous, Q6H PRN, Vicki Hannah MD, 10 mg at 07/03/19 1817    hydrALAZINE (APRESOLINE) tablet 50 mg, 50 mg, Oral, Q8H [facility], TABITHA Samuel, 50 mg at 07/11/19 0655    insulin detemir (LEVEMIR) subcutaneous injection 20 Units, 20 Units, Subcutaneous, Q12H [facility], Vicki Hannah MD, 20 Units at 07/10/19 2126    insulin lispro (HumaLOG) 100 units/mL subcutaneous injection 1-6 Units, 1-6 Units, Subcutaneous, HS, Vicki Hannah MD, 3 Units at 07/10/19 2127    insulin lispro (HumaLOG) 100 units/mL subcutaneous injection 2-12 Units, 2-12 Units, Subcutaneous, TID AC, 6 Units at 07/10/19 1744 **AND** Fingerstick Glucose (POCT), , , TID AC, Vicki Hannah MD    ipratropium-albuterol (DUO-NEB) 0 5-2 5 mg/3 mL inhalation solution 3 mL, 3 mL, Nebulization, Q6H, Sean Alvarez MD, 3 mL at 07/11/19 0723    isosorbide mononitrate (IMDUR) 24 hr tablet 30 mg, 30 mg, Oral, Daily, Jeffrey Isabella, DO, 30 mg at 07/10/19 1207    metoprolol tartrate (LOPRESSOR) tablet 50 mg, 50 mg, Oral, Q12H [facility], Lizzy Isabella, DO, 50 mg at 07/10/19 2126    nitroglycerin (NITROSTAT) SL tablet 0 4 mg, 0 4 mg, Sublingual, Q5 Min PRN, Lizzy Navarror, DO, 0 4 mg at 07/05/19 1652    pneumococcal 13-valent conjugate vaccine (PREVNAR-13) IM injection 0 5 mL, 0 5 mL, Intramuscular, Prior to discharge, Delmar Cortez MD    polyethylene glycol (MIRALAX) packet 17 g, 17 g, Oral, Daily, Sean Alvarez MD, 17 g at 07/10/19 1206    Invasive Devices:        Lab Results:   Results from last 7 days   Lab Units 07/11/19  0602 07/10/19  0507 07/10/19  0501 07/09/19  0529 07/08/19  0743 07/07/19  0510   WBC Thousand/uL 7 48  --  7 98 8 95 10 89* 10 62*   HEMOGLOBIN g/dL 9 3*  --  9 9* 10 1* 10 1* 10 6*   HEMATOCRIT % 28 7*  --  31 3* 30 5* 31 0* 33 1*   PLATELETS Thousands/uL 121*  --  123* 129* 111* 132*   POTASSIUM mmol/L 4 1 4 5  --  3 9 4 5 4 9   CHLORIDE mmol/L 100 100  --  100 99* 100   CO2 mmol/L 30 26  --  27 24 23   BUN mg/dL 51* 61*  --  76* 92* 73*   CREATININE mg/dL 2 63* 2 41*  --  2 80* 3 26* 2 81*   CALCIUM mg/dL 8 1* 8 3  --  8 2* 8 3 8 3   MAGNESIUM mg/dL  --  2 9*  --  2 7* 2 9* 2 9*   PHOSPHORUS mg/dL  --   --   --  3 9  --  3 8   ALK PHOS U/L 113 108  --  102 103 102   ALT U/L 49 47  --  50 48 42   AST U/L 35 42  --  35 35 40       Previous work up:  RENAL ULTRASOUND     INDICATION:   N18 9: Chronic kidney disease, unspecified      COMPARISON: 12/5/2016     TECHNIQUE:   Ultrasound of the retroperitoneum was performed with a curvilinear transducer utilizing volumetric sweeps and still imaging techniques       FINDINGS:     KIDNEYS:  Symmetric and normal size  Right kidney:  10 8 x 5 6 cm  Left kidney:  10 6 x 5 3 cm      Right kidney  Normal echogenicity and contour  No suspicious masses detected  No hydronephrosis  No shadowing calculi  No perinephric fluid collections      Left kidney  Normal echogenicity and contour  No suspicious masses detected  No hydronephrosis  No shadowing calculi  No perinephric fluid collections      URETERS:  Nonvisualized      BLADDER:   The bladder is underdistended  No focal thickening or mass lesions      IMPRESSION:     No hydronephrosis         Portions of the record may have been created with voice recognition software  Occasional wrong word or "sound a like" substitutions may have occurred due to the inherent limitations of voice recognition software  Read the chart carefully and recognize, using context, where substitutions have occurred  If you have any questions, please contact the dictating provider

## 2019-07-12 ENCOUNTER — APPOINTMENT (INPATIENT)
Dept: NON INVASIVE DIAGNOSTICS | Facility: HOSPITAL | Age: 79
DRG: 280 | End: 2019-07-12
Attending: INTERNAL MEDICINE
Payer: COMMERCIAL

## 2019-07-12 ENCOUNTER — APPOINTMENT (INPATIENT)
Dept: DIALYSIS | Facility: HOSPITAL | Age: 79
DRG: 280 | End: 2019-07-12
Payer: COMMERCIAL

## 2019-07-12 LAB
ALBUMIN SERPL BCP-MCNC: 2.4 G/DL (ref 3.5–5)
ALP SERPL-CCNC: 152 U/L (ref 46–116)
ALT SERPL W P-5'-P-CCNC: 67 U/L (ref 12–78)
ANION GAP SERPL CALCULATED.3IONS-SCNC: 7 MMOL/L (ref 4–13)
AST SERPL W P-5'-P-CCNC: 55 U/L (ref 5–45)
ATRIAL RATE: 73 BPM
BASOPHILS # BLD AUTO: 0.02 THOUSANDS/ΜL (ref 0–0.1)
BASOPHILS NFR BLD AUTO: 0 % (ref 0–1)
BILIRUB SERPL-MCNC: 1.5 MG/DL (ref 0.2–1)
BUN SERPL-MCNC: 69 MG/DL (ref 5–25)
CALCIUM SERPL-MCNC: 8.4 MG/DL (ref 8.3–10.1)
CHLORIDE SERPL-SCNC: 99 MMOL/L (ref 100–108)
CO2 SERPL-SCNC: 29 MMOL/L (ref 21–32)
CREAT SERPL-MCNC: 3.04 MG/DL (ref 0.6–1.3)
EOSINOPHIL # BLD AUTO: 0.19 THOUSAND/ΜL (ref 0–0.61)
EOSINOPHIL NFR BLD AUTO: 2 % (ref 0–6)
ERYTHROCYTE [DISTWIDTH] IN BLOOD BY AUTOMATED COUNT: 13.8 % (ref 11.6–15.1)
GFR SERPL CREATININE-BSD FRML MDRD: 19 ML/MIN/1.73SQ M
GLUCOSE SERPL-MCNC: 173 MG/DL (ref 65–140)
GLUCOSE SERPL-MCNC: 186 MG/DL (ref 65–140)
GLUCOSE SERPL-MCNC: 194 MG/DL (ref 65–140)
GLUCOSE SERPL-MCNC: 283 MG/DL (ref 65–140)
GLUCOSE SERPL-MCNC: 350 MG/DL (ref 65–140)
HCT VFR BLD AUTO: 29.9 % (ref 36.5–49.3)
HGB BLD-MCNC: 9.6 G/DL (ref 12–17)
IMM GRANULOCYTES # BLD AUTO: 0.05 THOUSAND/UL (ref 0–0.2)
IMM GRANULOCYTES NFR BLD AUTO: 1 % (ref 0–2)
INR PPP: 1.14 (ref 0.86–1.16)
LYMPHOCYTES # BLD AUTO: 1.07 THOUSANDS/ΜL (ref 0.6–4.47)
LYMPHOCYTES NFR BLD AUTO: 12 % (ref 14–44)
MCH RBC QN AUTO: 30 PG (ref 26.8–34.3)
MCHC RBC AUTO-ENTMCNC: 32.1 G/DL (ref 31.4–37.4)
MCV RBC AUTO: 93 FL (ref 82–98)
MONOCYTES # BLD AUTO: 0.93 THOUSAND/ΜL (ref 0.17–1.22)
MONOCYTES NFR BLD AUTO: 10 % (ref 4–12)
NEUTROPHILS # BLD AUTO: 6.79 THOUSANDS/ΜL (ref 1.85–7.62)
NEUTS SEG NFR BLD AUTO: 75 % (ref 43–75)
NRBC BLD AUTO-RTO: 0 /100 WBCS
P AXIS: 22 DEGREES
PLATELET # BLD AUTO: 141 THOUSANDS/UL (ref 149–390)
PMV BLD AUTO: 10.9 FL (ref 8.9–12.7)
POTASSIUM SERPL-SCNC: 4 MMOL/L (ref 3.5–5.3)
PR INTERVAL: 200 MS
PROT SERPL-MCNC: 6.6 G/DL (ref 6.4–8.2)
PROTHROMBIN TIME: 11.9 SECONDS (ref 9.4–11.7)
QRS AXIS: 43 DEGREES
QRSD INTERVAL: 98 MS
QT INTERVAL: 412 MS
QTC INTERVAL: 453 MS
RBC # BLD AUTO: 3.2 MILLION/UL (ref 3.88–5.62)
SODIUM SERPL-SCNC: 135 MMOL/L (ref 136–145)
T WAVE AXIS: 40 DEGREES
VENTRICULAR RATE: 73 BPM
WBC # BLD AUTO: 9.05 THOUSAND/UL (ref 4.31–10.16)

## 2019-07-12 PROCEDURE — 77001 FLUOROGUIDE FOR VEIN DEVICE: CPT | Performed by: RADIOLOGY

## 2019-07-12 PROCEDURE — 85025 COMPLETE CBC W/AUTO DIFF WBC: CPT | Performed by: INTERNAL MEDICINE

## 2019-07-12 PROCEDURE — 99232 SBSQ HOSP IP/OBS MODERATE 35: CPT | Performed by: INTERNAL MEDICINE

## 2019-07-12 PROCEDURE — 06PY33Z REMOVAL OF INFUSION DEVICE FROM LOWER VEIN, PERCUTANEOUS APPROACH: ICD-10-PCS | Performed by: RADIOLOGY

## 2019-07-12 PROCEDURE — 94760 N-INVAS EAR/PLS OXIMETRY 1: CPT

## 2019-07-12 PROCEDURE — 0JH63XZ INSERTION OF TUNNELED VASCULAR ACCESS DEVICE INTO CHEST SUBCUTANEOUS TISSUE AND FASCIA, PERCUTANEOUS APPROACH: ICD-10-PCS | Performed by: RADIOLOGY

## 2019-07-12 PROCEDURE — 99152 MOD SED SAME PHYS/QHP 5/>YRS: CPT | Performed by: RADIOLOGY

## 2019-07-12 PROCEDURE — 99152 MOD SED SAME PHYS/QHP 5/>YRS: CPT

## 2019-07-12 PROCEDURE — 93010 ELECTROCARDIOGRAM REPORT: CPT | Performed by: INTERNAL MEDICINE

## 2019-07-12 PROCEDURE — C1750 CATH, HEMODIALYSIS,LONG-TERM: HCPCS

## 2019-07-12 PROCEDURE — 80053 COMPREHEN METABOLIC PANEL: CPT | Performed by: INTERNAL MEDICINE

## 2019-07-12 PROCEDURE — 94640 AIRWAY INHALATION TREATMENT: CPT

## 2019-07-12 PROCEDURE — 36558 INSERT TUNNELED CV CATH: CPT | Performed by: RADIOLOGY

## 2019-07-12 PROCEDURE — 36558 INSERT TUNNELED CV CATH: CPT

## 2019-07-12 PROCEDURE — 85610 PROTHROMBIN TIME: CPT | Performed by: INTERNAL MEDICINE

## 2019-07-12 PROCEDURE — 02H633Z INSERTION OF INFUSION DEVICE INTO RIGHT ATRIUM, PERCUTANEOUS APPROACH: ICD-10-PCS | Performed by: RADIOLOGY

## 2019-07-12 PROCEDURE — C1769 GUIDE WIRE: HCPCS

## 2019-07-12 PROCEDURE — 82948 REAGENT STRIP/BLOOD GLUCOSE: CPT

## 2019-07-12 PROCEDURE — 77001 FLUOROGUIDE FOR VEIN DEVICE: CPT

## 2019-07-12 RX ORDER — HEPARIN SODIUM 1000 [USP'U]/ML
INJECTION, SOLUTION INTRAVENOUS; SUBCUTANEOUS CODE/TRAUMA/SEDATION MEDICATION
Status: COMPLETED | OUTPATIENT
Start: 2019-07-12 | End: 2019-07-12

## 2019-07-12 RX ORDER — FENTANYL CITRATE 50 UG/ML
INJECTION, SOLUTION INTRAMUSCULAR; INTRAVENOUS CODE/TRAUMA/SEDATION MEDICATION
Status: COMPLETED | OUTPATIENT
Start: 2019-07-12 | End: 2019-07-12

## 2019-07-12 RX ORDER — POLYETHYLENE GLYCOL 3350 17 G/17G
17 POWDER, FOR SOLUTION ORAL DAILY PRN
Status: DISCONTINUED | OUTPATIENT
Start: 2019-07-12 | End: 2019-07-15 | Stop reason: HOSPADM

## 2019-07-12 RX ORDER — LIDOCAINE HYDROCHLORIDE 10 MG/ML
INJECTION, SOLUTION INFILTRATION; PERINEURAL CODE/TRAUMA/SEDATION MEDICATION
Status: COMPLETED | OUTPATIENT
Start: 2019-07-12 | End: 2019-07-12

## 2019-07-12 RX ORDER — MIDAZOLAM HYDROCHLORIDE 1 MG/ML
INJECTION INTRAMUSCULAR; INTRAVENOUS CODE/TRAUMA/SEDATION MEDICATION
Status: COMPLETED | OUTPATIENT
Start: 2019-07-12 | End: 2019-07-12

## 2019-07-12 RX ADMIN — ISOSORBIDE MONONITRATE 30 MG: 30 TABLET, EXTENDED RELEASE ORAL at 09:17

## 2019-07-12 RX ADMIN — HEPARIN SODIUM 2000 UNITS: 1000 INJECTION INTRAVENOUS; SUBCUTANEOUS at 08:12

## 2019-07-12 RX ADMIN — IPRATROPIUM BROMIDE AND ALBUTEROL SULFATE 3 ML: 2.5; .5 SOLUTION RESPIRATORY (INHALATION) at 19:14

## 2019-07-12 RX ADMIN — ALPRAZOLAM 0.25 MG: 0.25 TABLET ORAL at 21:55

## 2019-07-12 RX ADMIN — IPRATROPIUM BROMIDE AND ALBUTEROL SULFATE 3 ML: 2.5; .5 SOLUTION RESPIRATORY (INHALATION) at 13:43

## 2019-07-12 RX ADMIN — ATORVASTATIN CALCIUM 80 MG: 80 TABLET ORAL at 15:56

## 2019-07-12 RX ADMIN — HYDRALAZINE HYDROCHLORIDE 50 MG: 25 TABLET ORAL at 05:11

## 2019-07-12 RX ADMIN — METOPROLOL TARTRATE 50 MG: 50 TABLET, FILM COATED ORAL at 21:54

## 2019-07-12 RX ADMIN — METOPROLOL TARTRATE 50 MG: 50 TABLET, FILM COATED ORAL at 09:16

## 2019-07-12 RX ADMIN — FUROSEMIDE 80 MG: 80 TABLET ORAL at 15:56

## 2019-07-12 RX ADMIN — ASPIRIN 81 MG: 81 TABLET, COATED ORAL at 09:16

## 2019-07-12 RX ADMIN — LIDOCAINE HYDROCHLORIDE 10 ML: 10 INJECTION, SOLUTION INFILTRATION; PERINEURAL at 08:06

## 2019-07-12 RX ADMIN — ESCITALOPRAM OXALATE 10 MG: 10 TABLET ORAL at 21:54

## 2019-07-12 RX ADMIN — INSULIN LISPRO 2 UNITS: 100 INJECTION, SOLUTION INTRAVENOUS; SUBCUTANEOUS at 17:05

## 2019-07-12 RX ADMIN — INSULIN DETEMIR 20 UNITS: 100 INJECTION, SOLUTION SUBCUTANEOUS at 21:55

## 2019-07-12 RX ADMIN — INSULIN LISPRO 10 UNITS: 100 INJECTION, SOLUTION INTRAVENOUS; SUBCUTANEOUS at 12:32

## 2019-07-12 RX ADMIN — FUROSEMIDE 80 MG: 80 TABLET ORAL at 09:16

## 2019-07-12 RX ADMIN — AMLODIPINE BESYLATE 5 MG: 5 TABLET ORAL at 09:16

## 2019-07-12 RX ADMIN — FENTANYL CITRATE 50 MCG: 50 INJECTION, SOLUTION INTRAMUSCULAR; INTRAVENOUS at 08:07

## 2019-07-12 RX ADMIN — AMLODIPINE BESYLATE 5 MG: 5 TABLET ORAL at 17:05

## 2019-07-12 RX ADMIN — IPRATROPIUM BROMIDE AND ALBUTEROL SULFATE 3 ML: 2.5; .5 SOLUTION RESPIRATORY (INHALATION) at 07:21

## 2019-07-12 RX ADMIN — FENTANYL CITRATE 50 MCG: 50 INJECTION, SOLUTION INTRAMUSCULAR; INTRAVENOUS at 08:12

## 2019-07-12 RX ADMIN — INSULIN DETEMIR 20 UNITS: 100 INJECTION, SOLUTION SUBCUTANEOUS at 09:36

## 2019-07-12 RX ADMIN — IPRATROPIUM BROMIDE AND ALBUTEROL SULFATE 3 ML: 2.5; .5 SOLUTION RESPIRATORY (INHALATION) at 01:32

## 2019-07-12 RX ADMIN — INSULIN LISPRO 2 UNITS: 100 INJECTION, SOLUTION INTRAVENOUS; SUBCUTANEOUS at 09:36

## 2019-07-12 RX ADMIN — MIDAZOLAM HYDROCHLORIDE 1 MG: 1 INJECTION, SOLUTION INTRAMUSCULAR; INTRAVENOUS at 08:07

## 2019-07-12 RX ADMIN — MIDAZOLAM HYDROCHLORIDE 1 MG: 1 INJECTION, SOLUTION INTRAMUSCULAR; INTRAVENOUS at 08:12

## 2019-07-12 RX ADMIN — INSULIN LISPRO 4 UNITS: 100 INJECTION, SOLUTION INTRAVENOUS; SUBCUTANEOUS at 21:55

## 2019-07-12 NOTE — SOCIAL WORK
DANAY called Denise to start pre authorization for STR at [facility] for a weekend discharge  Case Number-[id number]  Clinical faxed for review

## 2019-07-12 NOTE — PROGRESS NOTES
Progress Note - Falguni Gomez 66 y o  male MRN: [id number]    Unit/Bed#: [address] [place] [address] Encounter: [id number]      Subjective:   Patient's constipation improved still having difficulty breathing more so today than yesterday no chest pain otherwise no acute events overnight patient a Q comfortable at present time not any acute distress all the follow-up reviewed by the consultant x-ray labs reviewed  All the labs x-ray reviewed  All the consultant follow-up reviewed  Discussed with the patient plan at length    Objective:   As below    Vitals: Blood pressure 160/65, pulse 74, temperature 98 6 °F (37 °C), temperature source Oral, resp  rate 17, height 6' 1" (1 854 m), weight 100 kg (220 lb 9 6 oz), SpO2 99 %  ,Body mass index is 29 1 kg/m²      Intake/Output:    Intake/Output Summary (Last 24 hours) at 7/12/2019 0835  Last data filed at 7/12/2019 0618  Gross per 24 hour   Intake 200 ml   Output 475 ml   Net -275 ml       Physical Exam:  Patient was examined today see my examination for the detail overall unchanged  General Appearance:  A alert oriented x3 not any acute distress comfortable  Skin:  No rash normal normal  H/ENT:  No congestion normal no swelling  Eyes:  No redness no discharged normal  Cardiac:  Regular  Pulmonary:  Decreased air entry bilateral improving Complete exam done bilateral rales minimal improved improvement no wheezing   Gastrointestinal:  Nontender no distension no mass palpable bowel sounds normal no distension no mass palpable normal exam slight distension  Extremities:  2+ edema bilateral persistent bilateral edema slowly improving   Musculoskeletal:  Knee swelling bilateral  Neuro:  No new deficit gait dysfunction no new deficit  Complete exam done today as above  Complete exam done as above for July 12, 2019  Current Facility-Administered Medications   Medication Dose Route Frequency    acetaminophen (TYLENOL) tablet 650 mg  650 mg Oral Q6H PRN    ALPRAZolam (XANAX) tablet 0 25 mg 0 25 mg Oral HS    aluminum-magnesium hydroxide-simethicone (MYLANTA) 200-200-20 mg/5 mL oral suspension 30 mL  30 mL Oral Q4H PRN    amLODIPine (NORVASC) tablet 5 mg  5 mg Oral BID    aspirin (ECOTRIN LOW STRENGTH) EC tablet 81 mg  81 mg Oral Daily    atorvastatin (LIPITOR) tablet 80 mg  80 mg Oral Daily With Dinner    escitalopram (LEXAPRO) tablet 10 mg  10 mg Oral HS    furosemide (LASIX) tablet 80 mg  80 mg Oral BID (diuretic)    hydrALAZINE (APRESOLINE) injection 10 mg  10 mg Intravenous Q6H PRN    hydrALAZINE (APRESOLINE) tablet 50 mg  50 mg Oral Q8H [address]    insulin detemir (LEVEMIR) subcutaneous injection 20 Units  20 Units Subcutaneous Q12H [place]    insulin lispro (HumaLOG) 100 units/mL subcutaneous injection 1-6 Units  1-6 Units Subcutaneous HS    insulin lispro (HumaLOG) 100 units/mL subcutaneous injection 2-12 Units  2-12 Units Subcutaneous TID AC    ipratropium-albuterol (DUO-NEB) 0 5-2 5 mg/3 mL inhalation solution 3 mL  3 mL Nebulization Q6H    isosorbide mononitrate (IMDUR) 24 hr tablet 30 mg  30 mg Oral Daily    metoprolol tartrate (LOPRESSOR) tablet 50 mg  50 mg Oral Q12H [address]    nitroglycerin (NITROSTAT) SL tablet 0 4 mg  0 4 mg Sublingual Q5 Min PRN    pneumococcal 13-valent conjugate vaccine (PREVNAR-13) IM injection 0 5 mL  0 5 mL Intramuscular Prior to discharge    polyethylene glycol (MIRALAX) packet 17 g  17 g Oral Daily PRN       Radiology Results:  Reviewed    Lab, Imaging and other studies:   CBC:   Lab Results   Component Value Date    WBC 9 05 07/12/2019    WBC 8 8 01/05/2016    RBC 3 20 (L) 07/12/2019    RBC 4 61 (L) 01/05/2016     BMP:   Lab Results   Component Value Date    GLUCOSE 131 (H) 01/05/2016    SODIUM 135 (L) 07/12/2019    CO2 29 07/12/2019    CO2 27 01/05/2016    BUN 69 (H) 07/12/2019    BUN 35 (H) 01/05/2016    CREATININE 3 04 (H) 07/12/2019    CREATININE 1 5 (H) 01/05/2016    CALCIUM 8 4 07/12/2019    CALCIUM 9 0 01/05/2016     Coagulation:   Lab Results Component Value Date    INR 1 14 07/12/2019     Cardiac markers:   Lab Results   Component Value Date    CKMB 4 9 07/04/2019     ABGs: No results found for: PH   Results from last 7 days   Lab Units 07/12/19  0452   POTASSIUM mmol/L 4 0   CHLORIDE mmol/L 99*   CO2 mmol/L 29   BUN mg/dL 69*   CREATININE mg/dL 3 04*   CALCIUM mg/dL 8 4   ALK PHOS U/L 152*   ALT U/L 67   AST U/L 55*       Assessment:  Principal Problem:    Acute on chronic diastolic CHF (congestive heart failure) ([facility])  Active Problems:    CKD (chronic kidney disease), stage III ([facility])    Benign hypertension with chronic kidney disease, stage III ([facility])   Acute on chronic renal failure as evident the creatinine 3 2 and baseline creatinine 1 3  suspected pneumonia  Assessment:  Acute on chronic diastolic CHF  Pneumonia  Pleural effusion  Type 1 diabetes  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  Hypertension uncontrolled  Acute MI type 1  Plan:  Patient for PermCath today   Creatinine jumped more than 3  Patient's blood pressure is uncontrolled increase the hydralazine dose is 50 mg Q 8  Hemodialysis today  If stable and if all arrangements made for transfer to short-term rehab and the hemodialysis arrangements made and we will plan for discharge in a     VTE Pharmacologic Prophylaxis: heparin    Ha Rivas MD  7/12/2019, 8:35 AM

## 2019-07-12 NOTE — PROGRESS NOTES
Progress Note - Cardiology   [facility]     Ilene Deleon 66 y o  male MRN: [id number]  : 1940  Unit/Bed#: [address] [place] [address] Encounter: [id number]    Assessment and Plan:   1  Acute kidney injury on chronic disease status post now dialysis with PermCath  Patient is getting dialysis again  He is tolerating dialysis very well  2  Chronic diastolic heart failure  Most likely secondary to cardiorenal syndrome currently compensated  3  Non ST elevation MI most likely type 2  Nuclear stress test shows no significant perfusion abnormality but LV cavity was dilated cannot rule out balance ischemia  Currently asymptomatic    4  CAD status post CABG  Has been following with [facility]  Continue medical Rx    5  Dyslipidemia  Continue statin     6  Essential hypertension  Blood pressure is pretty well controlled with current therapy  Issues related to patient's stress test discussed with him  Stress test all discussed  Continue current Rx  Subjective / Objective:   Patient seen and evaluated  Feeling much better  Getting dialysis again  Nuclear stress test shows no significant ischemia but LV cavity was dilated could be due to [place]  Certainly cannot rule out balanced ischemia  Vitals: Blood pressure 133/64, pulse 75, temperature 97 8 °F (36 6 °C), temperature source Oral, resp  rate 18, height 6' 1" (1 854 m), weight 100 kg (220 lb 9 6 oz), SpO2 96 %  Vitals:    19 1100 19 0600   Weight: 101 kg (222 lb 14 2 oz) 100 kg (220 lb 9 6 oz)     Body mass index is 29 1 kg/m²  BP Readings from Last 3 Encounters:   19 133/64   16 136/62   10/20/16 168/88     Orthostatic Blood Pressures      Most Recent Value   Blood Pressure  133/64 filed at 2019 0900   Patient Position - Orthostatic VS  Lying filed at 2019 0900        I/O       07/10 0701 -  0700  07 -  0700 701 -  07    P  O  340 200 120    I V  (mL/kg) 510 (4 7)      Total Intake(mL/kg) 850 (7 9) 200 (2) 120 (1 2)    Urine (mL/kg/hr) 400 (0 2) 475 (0 2)     Other 2500      Total Output 2900 475     Net -2050 -275 +120               Invasive Devices     Central Venous Catheter Line            CVC Central Lines 07/08/19 Double 3 days          Peripheral Intravenous Line            Peripheral IV 07/12/19 Right;Ventral (anterior) Forearm less than 1 day          Hemodialysis Catheter            Permanent HD Catheter  less than 1 day          Drain            Open Drain Left; Anterior; Other (Comment)  days                  Intake/Output Summary (Last 24 hours) at 7/12/2019 1100  Last data filed at 7/12/2019 1001  Gross per 24 hour   Intake 120 ml   Output 375 ml   Net -255 ml         Physical Exam:   Physical Exam    Neurologic:  Alert & oriented x 3,  no focal deficits noted   Constitutional:  Well developed, well nourished,  With no acute distress  Eyes:  PERRL, conjunctiva normal   HENT:  Atraumatic, external ears normal, nose normal,   NECK: Normal range of motion, no tenderness, neck is supple , No JVP  Dialysis catheter is in place clean dressing  Respiratory:  Bilateral air entry with coarse breath sounds  Cardiovascular: S1-S2 regular with a 2/6 ejection systolic murmur  S4 is heard  GI:  Soft, nondistended, normal bowel sounds, nontender, no hepatosplenomegaly appreciated  Musculoskeletal:  No tenderness, no deformities      Extremities:  No edema  Psychiatric:  Speech and behavior appropriate             Medications/ Allergies:     Current Facility-Administered Medications:  acetaminophen 650 mg Oral Q6H PRN Sean Alvarez MD   ALPRAZolam 0 25 mg Oral HS Saen Alvarez MD   aluminum-magnesium hydroxide-simethicone 30 mL Oral Q4H PRN Laney Valdez MD   amLODIPine 5 mg Oral BID Sean Alvarez MD   aspirin 81 mg Oral Daily Sean Alvarez MD   atorvastatin 80 mg Oral Daily [address]   escitalopram 10 mg Oral HS Sean Alvarez MD   furosemide 80 mg Oral BID (diuretic) Leonid Cross MD   hydrALAZINE 10 mg Intravenous Q6H PRN Sean Alvarez MD   hydrALAZINE 50 mg Oral Q8H [facility] TABITHA Morales   insulin detemir 20 Units Subcutaneous Q12H [facility] Sean Alvarez MD   insulin lispro 1-6 Units Subcutaneous HS Horacio Balderas MD   insulin lispro 2-12 Units Subcutaneous TID AC Sean Alvarez MD   ipratropium-albuterol 3 mL Nebulization Q6H Sean Alvarez MD   isosorbide mononitrate 30 mg Oral Daily Navte Isabella, DO   metoprolol tartrate 50 mg Oral Q12H [facility] Isabella, DO   nitroglycerin 0 4 mg Sublingual Q5 Min PRN Navtej Isabella, DO   pneumococcal 13-valent conjugate vaccine 0 5 mL Intramuscular Prior to discharge Horacio Balderas MD   polyethylene glycol 17 g Oral Daily PRN Sean Alvarez MD       acetaminophen 650 mg Q6H PRN   aluminum-magnesium hydroxide-simethicone 30 mL Q4H PRN   hydrALAZINE 10 mg Q6H PRN   nitroglycerin 0 4 mg Q5 Min PRN   pneumococcal 13-valent conjugate vaccine 0 5 mL Prior to discharge   polyethylene glycol 17 g Daily PRN     No Known Allergies      Labs:   Troponins:  Results from last 7 days   Lab Units 07/06/19  1616 07/06/19  1211 07/06/19  0914   TROPONIN I ng/mL 2 74* 3 56* 3 68*       CBC with diff:  Results from last 7 days   Lab Units 07/12/19  0452 07/11/19  0602 07/10/19  0501 07/09/19  0529 07/08/19  0743 07/07/19  0510 07/06/19  0914 07/06/19  0553   WBC Thousand/uL 9 05 7 48 7 98 8 95 10 89* 10 62* 10 91* 8 31   HEMOGLOBIN g/dL 9 6* 9 3* 9 9* 10 1* 10 1* 10 6* 11 3* 10 8*   HEMATOCRIT % 29 9* 28 7* 31 3* 30 5* 31 0* 33 1* 35 4* 33 4*   MCV fL 93 94 97 92 93 95 95 95   PLATELETS Thousands/uL 141* 121* 123* 129* 111* 132* 127* 118*   MCH pg 30 0 30 5 30 7 30 6 30 3 30 4 30 3 30 6   MCHC g/dL 32 1 32 4 31 6 33 1 32 6 32 0 31 9 32 3   RDW % 13 8 13 8 14 0 14 0 14 0 14 2 14 3 14 2   MPV fL 10 9 10 2 11 0 11 3 10 9 11 2 10 3 10 8   NRBC AUTO /100 WBCs 0 0 0 0 0 0  --  0       CMP:  Results from last 7 days   Lab Units 07/12/19  0452 07/11/19  0602 07/10/19  0507 07/09/19  0529 07/08/19  0743 07/07/19  0510 07/06/19  0553   SODIUM mmol/L 135* 135* 134* 135* 135* 134* 139   POTASSIUM mmol/L 4 0 4 1 4 5 3 9 4 5 4 9 4 4   CHLORIDE mmol/L 99* 100 100 100 99* 100 105   CO2 mmol/L 29 30 26 27 24 23 27   ANION GAP mmol/L 7 5 8 8 12 11 7   BUN mg/dL 69* 51* 61* 76* 92* 73* 56*   CREATININE mg/dL 3 04* 2 63* 2 41* 2 80* 3 26* 2 81* 2 16*   CALCIUM mg/dL 8 4 8 1* 8 3 8 2* 8 3 8 3 8 3   AST U/L 55* 35 42 35 35 40 44   ALT U/L 67 49 47 50 48 42 35   ALK PHOS U/L 152* 113 108 102 103 102 97   TOTAL PROTEIN g/dL 6 6 6 3* 6 7 6 9  6 7 7 1 7 1 6 7   ALBUMIN g/dL 2 4* 2 2* 2 4* 2 6* 2 8* 2 8* 2 6*   TOTAL BILIRUBIN mg/dL 1 50* 1 50* 1 60* 1 50* 1 60* 1 70* 1 40*   EGFR ml/min/1 73sq m 19 22 25 21 17 21 28       Magnesium:  Results from last 7 days   Lab Units 07/10/19  0507 07/09/19  0529 07/08/19  0743 07/07/19  0510   MAGNESIUM mg/dL 2 9* 2 7* 2 9* 2 9*     Coags:  Results from last 7 days   Lab Units 07/12/19  0452 07/06/19  0914   PTT seconds  --  38*   INR  1 14 1 12       Imaging & Testing   I have personally reviewed pertinent reports  Xr Chest Portable    Result Date: 7/7/2019  Narrative: CHEST INDICATION:   SOB  COMPARISON:  July 6, 2019, 12:54 PM  Tr Sol PERFORMED/VIEWS:  XR CHEST PORTABLE FINDINGS: Heart shadow is enlarged but unchanged from prior exam   Post median sternotomy  Mild pulmonary vascular congestion  Left upper and lower lobe patchy airspace disease may represent pulmonary edema or infectious infiltrate  The lungs are clear  No pneumothorax or pleural effusion  Osseous structures appear within normal limits for patient age  Postcholecystectomy  Impression: Mild pulmonary vascular congestion  Left upper and lower lobe patchy airspace disease may represent pulmonary edema from CHF or infectious infiltrate   Workstation performed: [id number]     Xr Chest 1 View Portable    Result Date: 7/3/2019  Narrative: CHEST INDICATION:   SOB  COMPARISON:  12/4/2016 EXAM PERFORMED/VIEWS:  XR CHEST PORTABLE  AP semierect Images: 1 FINDINGS:  There are median sternotomy wires indicating prior cardiac surgery  Heart shadow is enlarged but unchanged from prior exam  Right infrahilar infiltrate noted with small pleural effusion  Left lung clear  No pneumothorax  Osseous structures appear within normal limits for patient age  Impression: Right infrahilar infiltrate with small pleural effusion  Workstation performed: [id number]     Xr Chest Pa & Lateral    Result Date: 7/7/2019  Narrative: CHEST INDICATION:   ? pneumonia  / CHF / volume overload  COMPARISON:  July 3, 2019 EXAM PERFORMED/VIEWS:  XR CHEST PA & LATERAL FINDINGS:  There are median sternotomy wires indicating prior cardiac surgery  Cardiomediastinal silhouette appears unremarkable  Small right larger than left bilateral pleural effusions  No pneumothorax    Osseous structures appear within normal limits for patient age  Impression: Small bilateral pleural effusions right larger than left  Patchy airspace opacities left lung potentially pneumonia  Workstation performed: [id number]     Ct Chest Wo Contrast    Result Date: 7/4/2019  Narrative: CT CHEST WITHOUT IV CONTRAST INDICATION:   Shortness of breath  COMPARISON:  No prior chest CTs available TECHNIQUE: CT examination of the chest was performed without intravenous contrast   Axial, sagittal, and coronal 2D reformatted images were created from the source data and submitted for interpretation  Radiation dose length product (DLP) for this visit:  429 43 mGy-cm   This examination, like all CT scans performed in the [facility], was performed utilizing techniques to minimize radiation dose exposure, including the use of iterative  reconstruction and automated exposure control  FINDINGS: LUNGS:  Patchy areas of focal consolidation involving the left lower lobe, left upper lobe and right middle lobe  Subsegmental atelectasis in the right lower lobe  Nadya Solis There is no tracheal or endobronchial lesion  PLEURA:  Moderate right and small left pleural effusions  HEART/GREAT VESSELS:  Mild cardiomegaly status post CABG aortic atherosclerosis, without aneurysm  MEDIASTINUM AND LEEANN:  Unremarkable  CHEST WALL AND LOWER NECK:   Mild bilateral gynecomastia  VISUALIZED STRUCTURES IN THE UPPER ABDOMEN:  Status post cholecystectomy OSSEOUS STRUCTURES:  No acute fracture or destructive osseous lesion  Impression: 1  Patchy areas of consolidation bilaterally most likely pneumonia  Recommend follow-up chest CT in 3 months to ensure complete resolution  2   Moderate right and small left pleural effusions  Mild cardiomegaly  Workstation performed: [id number]     Nm Lung Ventilation / Perfusion    Result Date: 7/3/2019  Narrative: VENTILATION AND PERFUSION SCAN INDICATION: Shortness of breath  COMPARISON:  Chest radiograph  7/3/2019 TECHNIQUE:  Posterior ventilation imaging was performed after the inhalation of 33 mCi nebulized Tc-99m DTPA with deposition of less than 1 mCi of activity within the lungs  Multiplanar perfusion imaging was next performed following the intravenous administration of 4 4 mCi Tc-99m labeled MAA  FINDINGS:  Ventilation imaging demonstrates heterogeneous distribution of the radiopharmaceutical with some clumping centrally  Perfusion imaging demonstrates mildly heterogeneous distribution of the radiopharmaceutical throughout both lungs  There is no significant  segmental or subsegmental defect  Impression: The probability for pulmonary embolus is low  Workstation performed: [id number]     Us Kidney And Bladder    Result Date: 7/5/2019  Narrative: RENAL ULTRASOUND INDICATION:   N18 9: Chronic kidney disease, unspecified  COMPARISON: 12/5/2016 TECHNIQUE:   Ultrasound of the retroperitoneum was performed with a curvilinear transducer utilizing volumetric sweeps and still imaging techniques  FINDINGS: KIDNEYS: Symmetric and normal size   Right kidney: 10 8 x 5 6 cm  Left kidney:  10 6 x 5 3 cm  Right kidney Normal echogenicity and contour  No suspicious masses detected  No hydronephrosis  No shadowing calculi  No perinephric fluid collections  Left kidney Normal echogenicity and contour  No suspicious masses detected  No hydronephrosis  No shadowing calculi  No perinephric fluid collections  URETERS: Nonvisualized  BLADDER: The bladder is underdistended  No focal thickening or mass lesions  Impression: No hydronephrosis  Workstation performed: [id number]     Vas Lower Limb Venous Duplex Study, Complete Bilateral    Result Date: 7/4/2019  Narrative:  [facility] REPORT CLINICAL: Indications: Swelling of Limb [R22 4]  Dyspnea [R06 02]  Patient presents with shortness of breath for the past several days  Patient reports short of breath  Risk Factors The patient has history of HTN, Diabetes (Yes), HLD, CKD and previous smoking (quit >10yrs ago)  FINDINGS:  Segment  Right            Left              Impression       Impression       CFV      Normal (Patent)  Normal (Patent)     CONCLUSION:  Impression: RIGHT LOWER LIMB: No evidence of acute or chronic deep vein thrombosis  No evidence of superficial thrombophlebitis noted  Doppler evaluation shows a normal response to augmentation maneuvers  Popliteal, posterior tibial and anterior tibial arterial Doppler waveforms are triphasic  LEFT LOWER LIMB: No evidence of acute or chronic deep vein thrombosis  No evidence of superficial thrombophlebitis noted  Doppler evaluation shows a normal response to augmentation maneuvers  Popliteal, posterior tibial and anterior tibial arterial Doppler waveforms are triphasic  Pulsatile waveforms in the venous system may suggest heart failure or tricuspid valve insufficiency    Technical findings were given to Dr Franklyn Soto 13:30  SIGNATURE: Electronically Signed by: Oz Richard on 2019-07-04 09:11:23 AM    Ir Permacath Placement    Result Date: 7/12/2019  Narrative: EXAMINATION: Conversion of nontunneled to tunneled hemodialysis catheter INDICATION: 68-year-old male with ESRD underwent a nontunneled hemodialysis catheter placement 4 days prior  Patient returns for conversion of the nontunneled to a tunneled hemodialysis catheter  CONTRAST: N/A FLUOROSCOPY TIME:   0 4 min IMAGES:  2 ANESTHESIA: Moderate sedation and local lidocaine PROCEDURE:  The patient was identified verbally and by wristband  Timeout was performed  Informed consent was obtained  Following obtaining informed consent, the patient was prepped and draped in the usual sterile fashion  All elements of maximal sterile barrier technique, cap and mask and sterile gown and sterile gloves and sterile full-body drape and hand hygiene and 2% chlorhexidine for cutaneous antisepsis  1% local lidocaine was infiltrated in the skin and subcutaneous tissues overlying the right anterior chest wall  A 1 cm incision was made in the right anterior chest wall and a 14  5-[place] 28 cm Medcomp Hemo-Flow double-lumen dialysis catheter was tunneled in the subcutaneous tissues and brought out at the existing right IJV catheter site  A stiff Amplatz wire was advanced through the existing nontunneled hemodialysis catheter into the IVC  The existing hemodialysis catheter was removed and a peel-away sheath placed  The new tunneled hemodialysis catheter was inserted through the peel-away sheath, followed by removal of the sheath  Final positioning of the catheter was verified under fluoroscopy  Catheter was found to aspirate and flush easily  Catheter was secured to skin using 2-0 Prolene suture  The right IJV puncture site was closed using Histoacryl glue  Sterile antibiotic dressings were applied  The patient tolerated the procedure well without complication  The patient left the IR department in stable condition    Findings: Successful conversion of nontunneled to a tunneled hemodialysis catheter using 14 5-[place] 28 cm Medcomp Hemo-Flow double-lumen catheter  Impression: Impression: Successful fluoroscopy-guided conversion of nontunneled to a tunneled hemodialysis catheter  Workstation performed: [id number]     Ir Temp Hd Cath    Result Date: 7/8/2019  Narrative: Temporary dialysis catheter placement  Clinical History: Renal failure  Fluoro time: 0 4mins Contrast: None Number of Images: 4 Conscious sedation time: None Radiation Dose: 12 65 mGy Technique: The patient was brought to the interventional radiology suite and identified verbally and by wrist band  The patient was placed supine on the table  The right internal jugular vein was evaluated as a potential access site with ultrasound  The vessel was found to be patent and compressible  The right neck and upper chest were prepped and draped in the usual sterile fashion  All elements of maximal sterile barrier technique were followed (cap, mask, sterile gown, sterile gloves, large sterile sheet, hand hygiene, and 2% chlorhexidine for cutaneous antisepsis)  Lidocaine was administered to the skin and a small skin incision was made  Under ultrasound guidance, utilizing sterile ultrasound technique with sterile gel and a sterile probe cover, the right internal jugular vein was accessed using single wall Seldinger technique  Static images of real time needle entry into the vessel were obtained  A 0 018 wire was then advanced through the needle into the central venous system  The needle was removed, and a 5 [place] coaxial dilator was inserted  An Amplatz wire was inserted through the outer dilator, and after tract dilatation a 14 [place] Schon XL catheter was placed over the wire  The catheter tip was then positioned in the right atrium under fluoroscopic control  The external portion  of the catheter was sutured to the skin with 2-0 Prolene  The catheter aspirated and flushed well   A sterile dressing was applied and 1,000 unit heparin/cc solution was administered into each of the lumens  Impression: Impression: 1  Successful ultrasound and fluoroscopically guided placement of a 15 cm temporary dialysis Schon XL catheter via the right internal jugular vein  The tip of the catheter is in the right atrium and may be used immediately  Workstation performed: [id number]        Monitor shows normal sinus rhythm    Cardiac testing:   Results for orders placed during the hospital encounter of 19   Echo complete with contrast if indicated    [address]  [place]  [phone]    Transthoracic Echocardiogram  2D, M-mode, Doppler, and Color Doppler    Study date:  2019    Patient: Genet Bryson  MR number: [id number]  Account number: [de-identified]  : [id number]  Age: 66 years  Gender: Male  Status: Inpatient  Location: Bedside  Height: 73 in  Weight: 230 6 lb  BP: 124/ 68 mmHg    Indications: Cariomyopathy    Diagnoses: I42 9 - Cardiomyopathy, unspecified    Sonographer:  ROHINI Collado  Primary Physician:  Zoya Galarza MD  Referring Physician:  Lisandra Erickson DO  Group:  [facility]  Interpreting Physician:  Lisandra Erickson DO    SUMMARY    LEFT VENTRICLE:  Systolic function was normal by visual assessment  Ejection fraction was estimated to be 55 %  There were no regional wall motion abnormalities  There was moderate concentric hypertrophy  Doppler parameters were consistent with restrictive physiology, indicative of decreased left ventricular diastolic compliance and/or increased left atrial pressure  VENTRICULAR SEPTUM:  There was paradoxical motion  These changes are consistent with a post-thoracotomy state  MITRAL VALVE:  There was mild to moderate regurgitation  AORTIC VALVE:  There was no evidence for stenosis  There was no regurgitation  TRICUSPID VALVE:  There was mild regurgitation  Pulmonary artery systolic pressure was mildly increased      HISTORY: PRIOR HISTORY: HTN, DM, CAD, HCL, CABG, Bladder Cancer, Arthritis    PROCEDURE: The procedure was performed at the bedside  This was a routine study  The transthoracic approach was used  The study included complete 2D imaging, M-mode, complete spectral Doppler, and color Doppler  The heart rate was 60 bpm,  at the start of the study  Image quality was adequate  LEFT VENTRICLE: Size was normal  Systolic function was normal by visual assessment  Ejection fraction was estimated to be 55 %  There were no regional wall motion abnormalities  There was moderate concentric hypertrophy  DOPPLER: Doppler  parameters were consistent with restrictive physiology, indicative of decreased left ventricular diastolic compliance and/or increased left atrial pressure  Doppler parameters were consistent with elevated mean left atrial filling pressure  VENTRICULAR SEPTUM: There was paradoxical motion  These changes are consistent with a post-thoracotomy state  RIGHT VENTRICLE: The size was normal  Systolic function was normal  DOPPLER: Systolic pressure was within the normal range  LEFT ATRIUM: The atrium was moderately dilated  RIGHT ATRIUM: The atrium was mildly dilated  MITRAL VALVE: Valve structure was normal  There was normal leaflet separation  No echocardiographic evidence for prolapse  DOPPLER: The transmitral velocity was within the normal range  There was no evidence for stenosis  There was mild to  moderate regurgitation  AORTIC VALVE: The valve was trileaflet  Leaflets exhibited normal thickness, normal cuspal separation, and sclerosis  DOPPLER: Transaortic velocity was within the normal range  There was no evidence for stenosis  There was no  regurgitation  TRICUSPID VALVE: The valve structure was normal  There was normal leaflet separation  DOPPLER: The transtricuspid velocity was within the normal range  There was mild regurgitation  Pulmonary artery systolic pressure was mildly increased    Estimated peak PA pressure was 44 mmHg  PULMONIC VALVE: Leaflets exhibited normal thickness, no calcification, and normal cuspal separation  DOPPLER: The transpulmonic velocity was within the normal range  There was no regurgitation  PERICARDIUM: There was no thickening  There was no pericardial effusion  AORTA: The root exhibited normal size  PULMONARY ARTERY: The size was normal  The morphology appeared normal     SYSTEM MEASUREMENT TABLES    2D mode  AoR Diam 2D: 3 4 cm  LA Diam (2D): 4 9 cm  LA/Ao (2D): 1 44  FS (2D Teich): 28 4 %  IVSd (2D): 1 29 cm  LVDEV: 101 cmï¾³  LVESV: 45 8 cmï¾³  LVIDd(2D): 4 68 cm  LVISd (2D): 3 35 cm  LVPWd (2D): 1 26 cm  SV (Teich): 55 2 cmï¾³    Apical four chamber  LVEF A4C: 52 %    Unspecified Scan Mode  MV Peak E Darvin  Mean: 1160 mm/s  MVA (PHT): 5 24 cmï¾²  PHT: 42 ms  Max P mm[Hg]  V Max: 3020 mm/s  Vmax: 2780 mm/s  RA Area: 19 6 cmï¾²  RA Volume: 49 7 cmï¾³  TAPSE: 1 5 cm    [employer] Accredited Echocardiography Laboratory    Prepared and electronically signed by    Shannan Epstein DO  Signed 2019 14:05:34         Dr Nuno Tan MD [facility]      "This note has been constructed using a voice recognition system  Therefore there may be syntax, spelling, and/or grammatical errors   Please call if you have any questions  "

## 2019-07-12 NOTE — PLAN OF CARE
Pt educated on the importance of the renal diet in relation to fluid status, edema, and potassium  Pt verbalized understanding

## 2019-07-12 NOTE — BRIEF OP NOTE (RAD/CATH)
Conversion of right IJV non-tunneled to tunneled HD catheter  Procedure Note    PATIENT NAME: Marlen Strong  : 1940  MRN: [id number]     Pre-op Diagnosis:   1  Acute congestive heart failure (HCC)    2  Stage 3 chronic kidney disease ([address] )    3  Pneumonia    4  CRF (chronic renal failure), unspecified stage    5  CKD (chronic kidney disease), stage III ([facility] )    6  Acute on chronic diastolic CHF (congestive heart failure) ([facility] )    7  Acute hypoxemic respiratory failure (HCC)      Post-op Diagnosis:   1  Acute congestive heart failure (HCC)    2  Stage 3 chronic kidney disease ([address] )    3  Pneumonia    4  CRF (chronic renal failure), unspecified stage    5  CKD (chronic kidney disease), stage III ([facility] )    6  Acute on chronic diastolic CHF (congestive heart failure) ([facility] )    7  Acute hypoxemic respiratory failure [facility])        Surgeon:   Benjamin Lala MD    Estimated Blood Loss: 2 cc    Findings: Successful conversion of right IJV non-tunneled to tunneled HD catheter using 14 5 Fr 28 cm double lumen catheter      Specimens: None    Complications:  None    Anesthesia: Conscious sedation and Local    Benjamin Lala MD     Date: 2019  Time: 8:15 AM

## 2019-07-12 NOTE — SEDATION DOCUMENTATION
Temp to permacath conversion successfully completed, patient tolerated well and stable for transfer back to room via bed on O2 at 2L via N/C   Report given to primary RN

## 2019-07-12 NOTE — HEMODIALYSIS
Pt completed HD treatment using Perm cath placed today  Able to maintain prescribed BFR  UF goal reduced d/t drop in bp  Removed 2 kg   Post weight 101 9 kg

## 2019-07-12 NOTE — OCCUPATIONAL THERAPY NOTE
Attempted OT treatment, however pt is on dialysis  Will continue to follow    Sherly Hawkins MS OTR/L [id number]

## 2019-07-12 NOTE — PROGRESS NOTES
NEPHROLOGY PROGRESS NOTE   Marialuisa Farrar 66 y o  male MRN: [id number]  Unit/Bed#: [address] [place] [address] Encounter: [id number]    ASSESSMENT & PLAN:  65 yo make with HTN, DM, HLP, CAD/CABG, CKD III, OA, bladder cancer s/p partial cystectomy admitted to [facility] on 7/3/19 with SOB and edema  Diagnosed with pneumonia vs CHF and on abx and diuretics  Developed chest pain on 7/5/19 and  NSTEMI  Nephrology consulted for acute kidney injury  Admission creatinine was 1 8 and he was fluid overloaded, so started on Lasix drip but renal function continue to worsen without adequate urine output, he was started on hemodialysis on 07/08      1  BRIAN (POA)  · Admission creatinine of 1 80 on 7/3/19  · Renal US showed no hydronephrosis  UA not indicative of GN-showed 1-2 RBC with 4-10 WBC and 3+ proteinuria  · serological workup ordered as cause for acute kidney injury is not clear  Could be cardiorenal syndrome versus ATN  SPEP: no monoclonal band, UPEP no monoclonal protein, C3-C4 within normal limits  ANCA panel negative  Anti GBM ab- negative  LANETTE negative  hepatitis panel-hepatitis-B and C nonreactive  · Acute kidney injury possibly due to ATN  · Renal function worsened with creatinine up to 3 26 on 7/8  Renal function worsened while on Lasix drip but without adequate urine output and patient was still fluid overloaded, so hemodialysis was initiated on 07/08  Patient completed 3 dialysis treatment so far  · Next hemodialysis later today with more ultrafiltration, dry weight to be determined  Still appears fluid overloaded and is on oxygen  · Dialysis access:  Right IJ PermCath placed on 07/12  · Will need arrangement for outpatient HD  So far no renal recovery  May consider renal biopsy if no renal recovery over next couple weeks  · Phosphorus is at goal   Will do Monday Wednesday Friday HD while in the hospital    2  CKD III:  ·  Baseline creatinine is around 1 2 to 1 5 since 2015   No prior renal follow up   Before this hospital admission creatinine was 1 23 on 02/2017  No recent outpatient creatinine available to me at this time  · Chronic kidney disease could be due to hypertensive nephrosclerosis     3  Fluid overload/CHF:    Echocardiogram showed ejection fraction of 55% and no regional wall motion abnormalities  Will continue ultrafiltration on hemodialysis treatment  Continue oral Lasix  4  Primary HTN with chronic kidney disease stage 3:   · BP stable  · On Amlodipine + Metoprolol at home  · Hydralazine added this admission and dose adjusted by PCP       6    Proteinuria:  UPC ratio-2 0 g,  Could be diabetic nephropathy  Continue management of diabetes mellitus per primary team     7  Anemia:  Hemoglobin stable at 9 6  Continue monitoring  Check iron stores      Discussed with Dr Vanda Hand for outpatient care and outpatient HD if arrangement made  Referral made to [facility] as per case management note and to George mccrary  SUBJECTIVE:  Shortness of breath improving  Had PermCath placement done today    OBJECTIVE:  Current Weight: Weight - Scale: 100 kg (220 lb 9 6 oz)  Vitals:    07/12/19 0900   BP: 133/64   Pulse: 75   Resp: 18   Temp: 97 8 °F (36 6 °C)   SpO2: 96%   /64 (BP Location: Left arm)   Pulse 75   Temp 97 8 °F (36 6 °C) (Oral)   Resp 18   Ht 6' 1" (1 854 m)   Wt 100 kg (220 lb 9 6 oz)   SpO2 96%   BMI 29 10 kg/m²       Intake/Output Summary (Last 24 hours) at 7/12/2019 1009  Last data filed at 7/12/2019 0618  Gross per 24 hour   Intake    Output 375 ml   Net -375 ml       Physical Exam  General:  Ill looking, awake  Still on oxygen by nasal cannula  Eyes: Conjunctivae pink,  Sclera anicteric  ENT: lips and mucous membranes moist  Neck: supple   Chest:  Bilateral basal crackles  CVS: S1 & S2 present, normal rate, regular rhythm, no murmur    Abdomen: soft, non-tender, non-distended, Bowel sounds normoactive  Extremities: no edema of  legs  Skin: no rash  Neuro: awake, alert, oriented x3      Medications:    Current Facility-Administered Medications:     acetaminophen (TYLENOL) tablet 650 mg, 650 mg, Oral, Q6H PRN, Sean Alvarez MD    ALPRAZolam (XANAX) tablet 0 25 mg, 0 25 mg, Oral, HS, Sean Alvarez MD, 0 25 mg at 07/11/19 2149    aluminum-magnesium hydroxide-simethicone (MYLANTA) 200-200-20 mg/5 mL oral suspension 30 mL, 30 mL, Oral, Q4H PRN, Cory Noel MD, 30 mL at 07/06/19 2035    amLODIPine (NORVASC) tablet 5 mg, 5 mg, Oral, BID, Sean Alvarez MD, 5 mg at 07/12/19 0916    aspirin (ECOTRIN LOW STRENGTH) EC tablet 81 mg, 81 mg, Oral, Daily, Sean Alvarez MD, 81 mg at 07/12/19 0916    atorvastatin (LIPITOR) tablet 80 mg, 80 mg, Oral, Daily With Dinner, TABITHA Li, 80 mg at 07/11/19 1652    escitalopram (LEXAPRO) tablet 10 mg, 10 mg, Oral, HS, Sean Alvarez MD, 10 mg at 07/11/19 2148    furosemide (LASIX) tablet 80 mg, 80 mg, Oral, BID (diuretic), Zion Mendoza MD, 80 mg at 07/12/19 0916    hydrALAZINE (APRESOLINE) injection 10 mg, 10 mg, Intravenous, Q6H PRN, Sarah Knott MD, 10 mg at 07/03/19 1817    hydrALAZINE (APRESOLINE) tablet 50 mg, 50 mg, Oral, Q8H [address], TABITHA Li, 50 mg at 07/12/19 0511    insulin detemir (LEVEMIR) subcutaneous injection 20 Units, 20 Units, Subcutaneous, Q12H [address], Sarah Knott MD, 20 Units at 07/12/19 0936    insulin lispro (HumaLOG) 100 units/mL subcutaneous injection 1-6 Units, 1-6 Units, Subcutaneous, HS, Sean Alvarez MD, 5 Units at 07/11/19 2149    insulin lispro (HumaLOG) 100 units/mL subcutaneous injection 2-12 Units, 2-12 Units, Subcutaneous, TID AC, 2 Units at 07/12/19 0936 **AND** Fingerstick Glucose (POCT), , , TID AC, Sarah Knott MD    ipratropium-albuterol (DUO-NEB) 0 5-2 5 mg/3 mL inhalation solution 3 mL, 3 mL, Nebulization, Q6H, Sean Alvarez MD, 3 mL at 07/12/19 0721    isosorbide mononitrate (IMDUR) 24 hr tablet 30 mg, 30 mg, Oral, Daily, Lizzy Mason DO, 30 mg at 07/12/19 0917    metoprolol tartrate (LOPRESSOR) tablet 50 mg, 50 mg, Oral, Q12H FRANKLIN, Navtej Isabella, DO, 50 mg at 07/12/19 0916    nitroglycerin (NITROSTAT) SL tablet 0 4 mg, 0 4 mg, Sublingual, Q5 Min PRN, Navtej Isabella, DO, 0 4 mg at 07/05/19 1652    pneumococcal 13-valent conjugate vaccine (PREVNAR-13) IM injection 0 5 mL, 0 5 mL, Intramuscular, Prior to discharge, Pablito Allen MD    polyethylene glycol (MIRALAX) packet 17 g, 17 g, Oral, Daily PRN, Pablito Allen MD    Invasive Devices:        Lab Results:   Results from last 7 days   Lab Units 07/12/19  0452 07/11/19  0602 07/10/19  0507 07/10/19  0501 07/09/19  0529 07/08/19  0743 07/07/19  0510   WBC Thousand/uL 9 05 7 48  --  7 98 8 95 10 89* 10 62*   HEMOGLOBIN g/dL 9 6* 9 3*  --  9 9* 10 1* 10 1* 10 6*   HEMATOCRIT % 29 9* 28 7*  --  31 3* 30 5* 31 0* 33 1*   PLATELETS Thousands/uL 141* 121*  --  123* 129* 111* 132*   POTASSIUM mmol/L 4 0 4 1 4 5  --  3 9 4 5 4 9   CHLORIDE mmol/L 99* 100 100  --  100 99* 100   CO2 mmol/L 29 30 26  --  27 24 23   BUN mg/dL 69* 51* 61*  --  76* 92* 73*   CREATININE mg/dL 3 04* 2 63* 2 41*  --  2 80* 3 26* 2 81*   CALCIUM mg/dL 8 4 8 1* 8 3  --  8 2* 8 3 8 3   MAGNESIUM mg/dL  --   --  2 9*  --  2 7* 2 9* 2 9*   PHOSPHORUS mg/dL  --   --   --   --  3 9  --  3 8   ALK PHOS U/L 152* 113 108  --  102 103 102   ALT U/L 67 49 47  --  50 48 42   AST U/L 55* 35 42  --  35 35 40       Previous work up:  RENAL ULTRASOUND     INDICATION:   N18 9: Chronic kidney disease, unspecified      COMPARISON: 12/5/2016     TECHNIQUE:   Ultrasound of the retroperitoneum was performed with a curvilinear transducer utilizing volumetric sweeps and still imaging techniques       FINDINGS:     KIDNEYS:  Symmetric and normal size  Right kidney:  10 8 x 5 6 cm  Left kidney:  10 6 x 5 3 cm      Right kidney  Normal echogenicity and contour  No suspicious masses detected  No hydronephrosis  No shadowing calculi    No perinephric fluid collections      Left kidney  Normal echogenicity and contour  No suspicious masses detected  No hydronephrosis  No shadowing calculi  No perinephric fluid collections      URETERS:  Nonvisualized      BLADDER:   The bladder is underdistended  No focal thickening or mass lesions      IMPRESSION:     No hydronephrosis  Portions of the record may have been created with voice recognition software  Occasional wrong word or "sound a like" substitutions may have occurred due to the inherent limitations of voice recognition software  Read the chart carefully and recognize, using context, where substitutions have occurred  If you have any questions, please contact the dictating provider

## 2019-07-12 NOTE — RESPIRATORY THERAPY NOTE
Patient had been off [facility] for 5 minutes when I entered the room  SpO2 95% on room air  RN informed; she will monitor patient on room air  Breath sounds clear and patient appears more alert and awake than past 2 nights  Indicates he feels much better today  Consider changing aerosol treatment order to PRN

## 2019-07-13 PROBLEM — I50.33 ACUTE ON CHRONIC DIASTOLIC CHF (CONGESTIVE HEART FAILURE) (HCC): Status: RESOLVED | Noted: 2019-07-04 | Resolved: 2019-07-13

## 2019-07-13 LAB
ALBUMIN SERPL BCP-MCNC: 2.3 G/DL (ref 3.5–5)
ALP SERPL-CCNC: 153 U/L (ref 46–116)
ALT SERPL W P-5'-P-CCNC: 66 U/L (ref 12–78)
ANION GAP SERPL CALCULATED.3IONS-SCNC: 8 MMOL/L (ref 4–13)
AST SERPL W P-5'-P-CCNC: 51 U/L (ref 5–45)
BASOPHILS # BLD AUTO: 0.04 THOUSANDS/ΜL (ref 0–0.1)
BASOPHILS NFR BLD AUTO: 1 % (ref 0–1)
BILIRUB SERPL-MCNC: 1.3 MG/DL (ref 0.2–1)
BUN SERPL-MCNC: 41 MG/DL (ref 5–25)
CALCIUM SERPL-MCNC: 8.2 MG/DL (ref 8.3–10.1)
CHLORIDE SERPL-SCNC: 99 MMOL/L (ref 100–108)
CO2 SERPL-SCNC: 29 MMOL/L (ref 21–32)
CREAT SERPL-MCNC: 2.19 MG/DL (ref 0.6–1.3)
EOSINOPHIL # BLD AUTO: 0.35 THOUSAND/ΜL (ref 0–0.61)
EOSINOPHIL NFR BLD AUTO: 4 % (ref 0–6)
ERYTHROCYTE [DISTWIDTH] IN BLOOD BY AUTOMATED COUNT: 13.5 % (ref 11.6–15.1)
FERRITIN SERPL-MCNC: 385 NG/ML (ref 8–388)
GFR SERPL CREATININE-BSD FRML MDRD: 28 ML/MIN/1.73SQ M
GLUCOSE SERPL-MCNC: 139 MG/DL (ref 65–140)
GLUCOSE SERPL-MCNC: 141 MG/DL (ref 65–140)
GLUCOSE SERPL-MCNC: 263 MG/DL (ref 65–140)
GLUCOSE SERPL-MCNC: 329 MG/DL (ref 65–140)
GLUCOSE SERPL-MCNC: 351 MG/DL (ref 65–140)
HCT VFR BLD AUTO: 29.3 % (ref 36.5–49.3)
HGB BLD-MCNC: 9.5 G/DL (ref 12–17)
IMM GRANULOCYTES # BLD AUTO: 0.04 THOUSAND/UL (ref 0–0.2)
IMM GRANULOCYTES NFR BLD AUTO: 1 % (ref 0–2)
IRON SATN MFR SERPL: 32 %
IRON SERPL-MCNC: 59 UG/DL (ref 65–175)
LYMPHOCYTES # BLD AUTO: 0.99 THOUSANDS/ΜL (ref 0.6–4.47)
LYMPHOCYTES NFR BLD AUTO: 12 % (ref 14–44)
MCH RBC QN AUTO: 30.3 PG (ref 26.8–34.3)
MCHC RBC AUTO-ENTMCNC: 32.4 G/DL (ref 31.4–37.4)
MCV RBC AUTO: 93 FL (ref 82–98)
MONOCYTES # BLD AUTO: 1.01 THOUSAND/ΜL (ref 0.17–1.22)
MONOCYTES NFR BLD AUTO: 12 % (ref 4–12)
NEUTROPHILS # BLD AUTO: 5.87 THOUSANDS/ΜL (ref 1.85–7.62)
NEUTS SEG NFR BLD AUTO: 70 % (ref 43–75)
NRBC BLD AUTO-RTO: 0 /100 WBCS
PLATELET # BLD AUTO: 142 THOUSANDS/UL (ref 149–390)
PMV BLD AUTO: 10.3 FL (ref 8.9–12.7)
POTASSIUM SERPL-SCNC: 3.8 MMOL/L (ref 3.5–5.3)
PROT SERPL-MCNC: 6.4 G/DL (ref 6.4–8.2)
RBC # BLD AUTO: 3.14 MILLION/UL (ref 3.88–5.62)
SODIUM SERPL-SCNC: 136 MMOL/L (ref 136–145)
TIBC SERPL-MCNC: 187 UG/DL (ref 250–450)
WBC # BLD AUTO: 8.3 THOUSAND/UL (ref 4.31–10.16)

## 2019-07-13 PROCEDURE — 82728 ASSAY OF FERRITIN: CPT | Performed by: INTERNAL MEDICINE

## 2019-07-13 PROCEDURE — 97110 THERAPEUTIC EXERCISES: CPT | Performed by: PHYSICAL THERAPIST

## 2019-07-13 PROCEDURE — 97110 THERAPEUTIC EXERCISES: CPT

## 2019-07-13 PROCEDURE — 99232 SBSQ HOSP IP/OBS MODERATE 35: CPT | Performed by: INTERNAL MEDICINE

## 2019-07-13 PROCEDURE — 94760 N-INVAS EAR/PLS OXIMETRY 1: CPT

## 2019-07-13 PROCEDURE — 80053 COMPREHEN METABOLIC PANEL: CPT | Performed by: INTERNAL MEDICINE

## 2019-07-13 PROCEDURE — 83550 IRON BINDING TEST: CPT | Performed by: INTERNAL MEDICINE

## 2019-07-13 PROCEDURE — 85025 COMPLETE CBC W/AUTO DIFF WBC: CPT | Performed by: INTERNAL MEDICINE

## 2019-07-13 PROCEDURE — 94640 AIRWAY INHALATION TREATMENT: CPT

## 2019-07-13 PROCEDURE — 97116 GAIT TRAINING THERAPY: CPT | Performed by: PHYSICAL THERAPIST

## 2019-07-13 PROCEDURE — 82948 REAGENT STRIP/BLOOD GLUCOSE: CPT

## 2019-07-13 PROCEDURE — 83540 ASSAY OF IRON: CPT | Performed by: INTERNAL MEDICINE

## 2019-07-13 PROCEDURE — 97535 SELF CARE MNGMENT TRAINING: CPT

## 2019-07-13 RX ORDER — METOPROLOL TARTRATE 50 MG/1
25 TABLET, FILM COATED ORAL EVERY 12 HOURS SCHEDULED
Qty: 60 TABLET | Refills: 0 | Status: SHIPPED | OUTPATIENT
Start: 2019-07-13 | End: 2021-01-01 | Stop reason: HOSPADM

## 2019-07-13 RX ORDER — ESCITALOPRAM OXALATE 10 MG/1
10 TABLET ORAL
Qty: 30 TABLET | Refills: 0 | Status: SHIPPED | OUTPATIENT
Start: 2019-07-13

## 2019-07-13 RX ORDER — NITROGLYCERIN 0.4 MG/1
0.4 TABLET SUBLINGUAL
Qty: 30 TABLET | Refills: 0 | OUTPATIENT
Start: 2019-07-13

## 2019-07-13 RX ORDER — HYDRALAZINE HYDROCHLORIDE 50 MG/1
50 TABLET, FILM COATED ORAL EVERY 8 HOURS SCHEDULED
Qty: 90 TABLET | Refills: 0 | Status: SHIPPED | OUTPATIENT
Start: 2019-07-13 | End: 2021-02-12 | Stop reason: HOSPADM

## 2019-07-13 RX ORDER — ISOSORBIDE MONONITRATE 30 MG/1
30 TABLET, EXTENDED RELEASE ORAL DAILY
Qty: 60 TABLET | Refills: 0 | Status: SHIPPED | OUTPATIENT
Start: 2019-07-14 | End: 2021-01-01 | Stop reason: HOSPADM

## 2019-07-13 RX ORDER — AMLODIPINE BESYLATE 5 MG/1
5 TABLET ORAL 2 TIMES DAILY
Qty: 60 TABLET | Refills: 0 | OUTPATIENT
Start: 2019-07-13 | End: 2021-02-12 | Stop reason: HOSPADM

## 2019-07-13 RX ORDER — POLYETHYLENE GLYCOL 3350 17 G/17G
17 POWDER, FOR SOLUTION ORAL DAILY PRN
Qty: 14 EACH | Refills: 0 | Status: SHIPPED | OUTPATIENT
Start: 2019-07-13

## 2019-07-13 RX ORDER — FUROSEMIDE 80 MG
80 TABLET ORAL
Qty: 60 TABLET | Refills: 0 | Status: SHIPPED | OUTPATIENT
Start: 2019-07-13 | End: 2021-02-12 | Stop reason: HOSPADM

## 2019-07-13 RX ORDER — IPRATROPIUM BROMIDE AND ALBUTEROL SULFATE 2.5; .5 MG/3ML; MG/3ML
3 SOLUTION RESPIRATORY (INHALATION)
Status: DISCONTINUED | OUTPATIENT
Start: 2019-07-14 | End: 2019-07-15 | Stop reason: HOSPADM

## 2019-07-13 RX ADMIN — INSULIN DETEMIR 20 UNITS: 100 INJECTION, SOLUTION SUBCUTANEOUS at 21:03

## 2019-07-13 RX ADMIN — ESCITALOPRAM OXALATE 10 MG: 10 TABLET ORAL at 21:35

## 2019-07-13 RX ADMIN — METOPROLOL TARTRATE 50 MG: 50 TABLET, FILM COATED ORAL at 09:19

## 2019-07-13 RX ADMIN — FUROSEMIDE 80 MG: 80 TABLET ORAL at 09:19

## 2019-07-13 RX ADMIN — IPRATROPIUM BROMIDE AND ALBUTEROL SULFATE 3 ML: 2.5; .5 SOLUTION RESPIRATORY (INHALATION) at 19:10

## 2019-07-13 RX ADMIN — ISOSORBIDE MONONITRATE 30 MG: 30 TABLET, EXTENDED RELEASE ORAL at 09:20

## 2019-07-13 RX ADMIN — HYDRALAZINE HYDROCHLORIDE 50 MG: 25 TABLET ORAL at 05:22

## 2019-07-13 RX ADMIN — AMLODIPINE BESYLATE 5 MG: 5 TABLET ORAL at 17:10

## 2019-07-13 RX ADMIN — ATORVASTATIN CALCIUM 80 MG: 80 TABLET ORAL at 16:13

## 2019-07-13 RX ADMIN — INSULIN DETEMIR 20 UNITS: 100 INJECTION, SOLUTION SUBCUTANEOUS at 09:20

## 2019-07-13 RX ADMIN — INSULIN LISPRO 10 UNITS: 100 INJECTION, SOLUTION INTRAVENOUS; SUBCUTANEOUS at 17:10

## 2019-07-13 RX ADMIN — FUROSEMIDE 80 MG: 80 TABLET ORAL at 16:13

## 2019-07-13 RX ADMIN — ASPIRIN 81 MG: 81 TABLET, COATED ORAL at 09:19

## 2019-07-13 RX ADMIN — IPRATROPIUM BROMIDE AND ALBUTEROL SULFATE 3 ML: 2.5; .5 SOLUTION RESPIRATORY (INHALATION) at 07:30

## 2019-07-13 RX ADMIN — HYDRALAZINE HYDROCHLORIDE 50 MG: 25 TABLET ORAL at 13:14

## 2019-07-13 RX ADMIN — METOPROLOL TARTRATE 50 MG: 50 TABLET, FILM COATED ORAL at 21:02

## 2019-07-13 RX ADMIN — AMLODIPINE BESYLATE 5 MG: 5 TABLET ORAL at 09:20

## 2019-07-13 RX ADMIN — IPRATROPIUM BROMIDE AND ALBUTEROL SULFATE 3 ML: 2.5; .5 SOLUTION RESPIRATORY (INHALATION) at 14:23

## 2019-07-13 RX ADMIN — INSULIN LISPRO 6 UNITS: 100 INJECTION, SOLUTION INTRAVENOUS; SUBCUTANEOUS at 12:05

## 2019-07-13 RX ADMIN — ALPRAZOLAM 0.25 MG: 0.25 TABLET ORAL at 21:35

## 2019-07-13 RX ADMIN — HYDRALAZINE HYDROCHLORIDE 50 MG: 25 TABLET ORAL at 21:35

## 2019-07-13 RX ADMIN — INSULIN LISPRO 5 UNITS: 100 INJECTION, SOLUTION INTRAVENOUS; SUBCUTANEOUS at 21:39

## 2019-07-13 NOTE — PROGRESS NOTES
NEPHROLOGY PROGRESS NOTE   Shon Ramirez 66 y o  male MRN: [id number]  Unit/Bed#: [address] [place] [address] Encounter: [id number]    ASSESSMENT & PLAN:  65 yo make with HTN, DM, HLP, CAD/CABG, CKD III, OA, bladder cancer s/p partial cystectomy admitted to [facility] on 7/3/19 with SOB and edema  Diagnosed with pneumonia vs CHF and on abx and diuretics  Developed chest pain on 7/5/19 and  NSTEMI  Nephrology consulted for acute kidney injury  Admission creatinine was 1 8 and he was fluid overloaded, so started on Lasix drip but renal function continue to worsen without adequate urine output, he was started on hemodialysis on 07/08      1  BRIAN (POA)  · Admission creatinine of 1 80 on 7/3/19  · Renal US showed no hydronephrosis  UA not indicative of GN-showed 1-2 RBC with 4-10 WBC and 3+ proteinuria  · serological workup ordered as cause for acute kidney injury is not clear  Could be cardiorenal syndrome versus ATN  SPEP: no monoclonal band, UPEP no monoclonal protein, C3-C4 within normal limits  ANCA panel negative  Anti GBM ab- negative  LANETTE negative  hepatitis panel-hepatitis-B and C nonreactive  · Acute kidney injury possibly due to ATN  · Renal function worsened with creatinine up to 3 26 on 7/8  Renal function worsened while on Lasix drip but without adequate urine output and patient was still fluid overloaded, so hemodialysis was initiated on 07/08  Patient completed 4 dialysis treatment so far  · Next hemodialysis on Monday, will do Monday Wednesday Friday schedule  · Dialysis access:  Right IJ PermCath placed on 07/12  · Will need arrangement for outpatient HD  So far no renal recovery  May consider renal biopsy if no renal recovery over next couple weeks  · Phosphorus is at goal    2  CKD III:  ·  Baseline creatinine is around 1 2 to 1 5 since 2015  No prior renal follow up  Before this hospital admission creatinine was 1 23 on 02/2017    No recent outpatient creatinine available to me at this time   · Chronic kidney disease could be due to hypertensive nephrosclerosis     3  Fluid overload/CHF:    Echocardiogram showed ejection fraction of 55% and no regional wall motion abnormalities  Will continue ultrafiltration on hemodialysis treatment  Continue oral Lasix  4  Primary HTN with chronic kidney disease stage 3:   · BP stable  · On Amlodipine + Metoprolol at home  · Hydralazine added this admission and dose adjusted by PCP       6    Proteinuria:  UPC ratio-2 0 g,  Could be diabetic nephropathy  Continue management of diabetes mellitus per primary team     7  Anemia:  Hemoglobin stable at 9 6  Continue monitoring  Iron stores showed iron saturation 32% and and ferritin 385  Continue monitoring     Stable for outpatient care and outpatient HD if arrangement made  Referral made to [facility] as per case management note and to George mccrary  Also awaiting placement for short-term rehab  Next visit will be made by nephrology on Monday, Please call if any issues  SUBJECTIVE:  Shortness of breath improving  Status post hemodialysis on 07/12 with ultrafiltration 2 kg  Post dialysis weight was 101 9 kg  OBJECTIVE:  Current Weight: Weight - Scale: 97 5 kg (215 lb)  Vitals:    07/13/19 1104   BP: 133/61   Pulse: 59   Resp: 18   Temp: 97 6 °F (36 4 °C)   SpO2: 97%   /61 (BP Location: Left arm)   Pulse 59   Temp 97 6 °F (36 4 °C) (Tympanic)   Resp 18   Ht 6' 1" (1 854 m)   Wt 97 5 kg (215 lb)   SpO2 97%   BMI 28 37 kg/m²       Intake/Output Summary (Last 24 hours) at 7/13/2019 1308  Last data filed at 7/13/2019 1226  Gross per 24 hour   Intake 940 ml   Output 2625 ml   Net -1685 ml       Physical Exam  General:  Ill looking, awake  On oxygen by nasal cannula  Eyes: Conjunctivae pink,  Sclera anicteric  ENT: lips and mucous membranes moist  Neck: supple   Chest: Clear to Auscultation both lungs,  no crackles, ronchus or wheezing    Right IJ PermCath  CVS: S1 & S2 present, normal rate, regular rhythm, no murmur    Abdomen: soft, non-tender, non-distended, Bowel sounds normoactive  Extremities:  Trace edema both legs  Skin: no rash  Neuro: awake, alert, oriented        Medications:    Current Facility-Administered Medications:     acetaminophen (TYLENOL) tablet 650 mg, 650 mg, Oral, Q6H PRN, Sean Alvarez MD    ALPRAZolam (XANAX) tablet 0 25 mg, 0 25 mg, Oral, HS, Saen Alvarez MD, 0 25 mg at 07/12/19 2155    aluminum-magnesium hydroxide-simethicone (MYLANTA) 200-200-20 mg/5 mL oral suspension 30 mL, 30 mL, Oral, Q4H PRN, Yamila Paulino MD, 30 mL at 07/06/19 2035    amLODIPine (NORVASC) tablet 5 mg, 5 mg, Oral, BID, Sean Alvarez MD, 5 mg at 07/13/19 0920    aspirin (ECOTRIN LOW STRENGTH) EC tablet 81 mg, 81 mg, Oral, Daily, Sean Alvarez MD, 81 mg at 07/13/19 0919    atorvastatin (LIPITOR) tablet 80 mg, 80 mg, Oral, Daily With Dinner, TABITHA Caraballo, 80 mg at 07/12/19 1556    escitalopram (LEXAPRO) tablet 10 mg, 10 mg, Oral, HS, Sean Alvarez MD, 10 mg at 07/12/19 2154    furosemide (LASIX) tablet 80 mg, 80 mg, Oral, BID (diuretic), Zachary Almanza MD, 80 mg at 07/13/19 0919    hydrALAZINE (APRESOLINE) injection 10 mg, 10 mg, Intravenous, Q6H PRN, Jostin Ward MD, 10 mg at 07/03/19 1817    hydrALAZINE (APRESOLINE) tablet 50 mg, 50 mg, Oral, Q8H [address], TABITHA Caraballo, 50 mg at 07/13/19 0522    insulin detemir (LEVEMIR) subcutaneous injection 20 Units, 20 Units, Subcutaneous, Q12H [address], Jostin Ward MD, 20 Units at 07/13/19 0920    insulin lispro (HumaLOG) 100 units/mL subcutaneous injection 1-6 Units, 1-6 Units, Subcutaneous, HS, Jostin Ward MD, 4 Units at 07/12/19 2155    insulin lispro (HumaLOG) 100 units/mL subcutaneous injection 2-12 Units, 2-12 Units, Subcutaneous, TID AC, 6 Units at 07/13/19 1205 **AND** Fingerstick Glucose (POCT), , , TID AC, Jostin Ward MD    ipratropium-albuterol (DUO-NEB) 0 5-2 5 mg/3 mL inhalation solution 3 mL, 3 mL, Nebulization, Q6H, Sean Alvarez MD, 3 mL at 07/13/19 0730    isosorbide mononitrate (IMDUR) 24 hr tablet 30 mg, 30 mg, Oral, Daily, Navtej Isabella, DO, 30 mg at 07/13/19 0920    metoprolol tartrate (LOPRESSOR) tablet 50 mg, 50 mg, Oral, Q12H FRANKLIN, Navtej Isabella, DO, 50 mg at 07/13/19 0919    nitroglycerin (NITROSTAT) SL tablet 0 4 mg, 0 4 mg, Sublingual, Q5 Min PRN, Navtej Isabella, DO, 0 4 mg at 07/05/19 1652    pneumococcal 13-valent conjugate vaccine (PREVNAR-13) IM injection 0 5 mL, 0 5 mL, Intramuscular, Prior to discharge, Abraham Navarro MD    polyethylene glycol (MIRALAX) packet 17 g, 17 g, Oral, Daily PRN, Abraham Navraro MD    Invasive Devices:        Lab Results:   Results from last 7 days   Lab Units 07/13/19  0520 07/12/19  0452 07/11/19  0602 07/10/19  0507  07/09/19  0529 07/08/19  0743 07/07/19  0510   WBC Thousand/uL 8 30 9 05 7 48  --    < > 8 95 10 89* 10 62*   HEMOGLOBIN g/dL 9 5* 9 6* 9 3*  --    < > 10 1* 10 1* 10 6*   HEMATOCRIT % 29 3* 29 9* 28 7*  --    < > 30 5* 31 0* 33 1*   PLATELETS Thousands/uL 142* 141* 121*  --    < > 129* 111* 132*   POTASSIUM mmol/L 3 8 4 0 4 1 4 5  --  3 9 4 5 4 9   CHLORIDE mmol/L 99* 99* 100 100  --  100 99* 100   CO2 mmol/L 29 29 30 26  --  27 24 23   BUN mg/dL 41* 69* 51* 61*  --  76* 92* 73*   CREATININE mg/dL 2 19* 3 04* 2 63* 2 41*  --  2 80* 3 26* 2 81*   CALCIUM mg/dL 8 2* 8 4 8 1* 8 3  --  8 2* 8 3 8 3   MAGNESIUM mg/dL  --   --   --  2 9*  --  2 7* 2 9* 2 9*   PHOSPHORUS mg/dL  --   --   --   --   --  3 9  --  3 8   ALK PHOS U/L 153* 152* 113 108  --  102 103 102   ALT U/L 66 67 49 47  --  50 48 42   AST U/L 51* 55* 35 42  --  35 35 40    < > = values in this interval not displayed         Previous work up:  RENAL ULTRASOUND     INDICATION:   N18 9: Chronic kidney disease, unspecified      COMPARISON: 12/5/2016     TECHNIQUE:   Ultrasound of the retroperitoneum was performed with a curvilinear transducer utilizing volumetric sweeps and still imaging techniques       FINDINGS:     KIDNEYS:  Symmetric and normal size   Right kidney:  10 8 x 5 6 cm  Left kidney:  10 6 x 5 3 cm      Right kidney  Normal echogenicity and contour  No suspicious masses detected  No hydronephrosis  No shadowing calculi  No perinephric fluid collections      Left kidney  Normal echogenicity and contour  No suspicious masses detected  No hydronephrosis  No shadowing calculi  No perinephric fluid collections      URETERS:  Nonvisualized      BLADDER:   The bladder is underdistended  No focal thickening or mass lesions      IMPRESSION:     No hydronephrosis  Portions of the record may have been created with voice recognition software  Occasional wrong word or "sound a like" substitutions may have occurred due to the inherent limitations of voice recognition software  Read the chart carefully and recognize, using context, where substitutions have occurred  If you have any questions, please contact the dictating provider

## 2019-07-13 NOTE — PROGRESS NOTES
Progress Note - Antony Rider 66 y o  male MRN: [id number]    Unit/Bed#: [address] [place] [address] Encounter: [id number]      Subjective:  Profound weakness difficulty walking  Patient out of bed in chair comfortable no acute events overnight no difficulty breathing no chest pain remaining review of system unremarkable unchanged since yesterday total of 12 done  All the labs x-ray reviewed  All the consultant follow-up reviewed  Discussed with the patient plan at length    Objective:   As below    Vitals: Blood pressure 131/58, pulse 63, temperature 97 6 °F (36 4 °C), temperature source Oral, resp  rate 18, height 6' 1" (1 854 m), weight 97 5 kg (215 lb), SpO2 97 %  ,Body mass index is 28 37 kg/m²      Intake/Output:    Intake/Output Summary (Last 24 hours) at 7/13/2019 1026  Last data filed at 7/13/2019 0901  Gross per 24 hour   Intake 980 ml   Output 2750 ml   Net -1770 ml       Physical Exam:  Patient examined alert orient x3 out of bed in chair comfortable  General Appearance:  Exam done  Skin:  No rash normal normal  H/ENT:  No congestion normal no swelling  Eyes:  No redness no discharged normal  Cardiac:  Regular no murmur  Pulmonary:  Decreased air entry bilateral improving Complete exam done bilateral rales minimal improved improvement no wheezing   Gastrointestinal:  Nontender no distension no mass palpable bowel sounds normal no distension no mass palpable normal exam slight distension  Extremities:  2+ edema bilateral persistent bilateral edema slowly improving   Musculoskeletal:  Knee swelling bilateral  Neuro:  No new deficit gait dysfunction no new deficit  Complete exam done today as above  Complete exam done as above for July 13, 2019  Current Facility-Administered Medications   Medication Dose Route Frequency    acetaminophen (TYLENOL) tablet 650 mg  650 mg Oral Q6H PRN    ALPRAZolam (XANAX) tablet 0 25 mg  0 25 mg Oral HS    aluminum-magnesium hydroxide-simethicone (MYLANTA) 200-200-20 mg/5 mL oral suspension 30 mL  30 mL Oral Q4H PRN    amLODIPine (NORVASC) tablet 5 mg  5 mg Oral BID    aspirin (ECOTRIN LOW STRENGTH) EC tablet 81 mg  81 mg Oral Daily    atorvastatin (LIPITOR) tablet 80 mg  80 mg Oral Daily With Dinner    escitalopram (LEXAPRO) tablet 10 mg  10 mg Oral HS    furosemide (LASIX) tablet 80 mg  80 mg Oral BID (diuretic)    hydrALAZINE (APRESOLINE) injection 10 mg  10 mg Intravenous Q6H PRN    hydrALAZINE (APRESOLINE) tablet 50 mg  50 mg Oral Q8H [address]    insulin detemir (LEVEMIR) subcutaneous injection 20 Units  20 Units Subcutaneous Q12H [address]    insulin lispro (HumaLOG) 100 units/mL subcutaneous injection 1-6 Units  1-6 Units Subcutaneous HS    insulin lispro (HumaLOG) 100 units/mL subcutaneous injection 2-12 Units  2-12 Units Subcutaneous TID AC    ipratropium-albuterol (DUO-NEB) 0 5-2 5 mg/3 mL inhalation solution 3 mL  3 mL Nebulization Q6H    isosorbide mononitrate (IMDUR) 24 hr tablet 30 mg  30 mg Oral Daily    metoprolol tartrate (LOPRESSOR) tablet 50 mg  50 mg Oral Q12H [place]    nitroglycerin (NITROSTAT) SL tablet 0 4 mg  0 4 mg Sublingual Q5 Min PRN    pneumococcal 13-valent conjugate vaccine (PREVNAR-13) IM injection 0 5 mL  0 5 mL Intramuscular Prior to discharge    polyethylene glycol (MIRALAX) packet 17 g  17 g Oral Daily PRN       Radiology Results:  Reviewed    Lab, Imaging and other studies:   CBC:   Lab Results   Component Value Date    WBC 8 30 07/13/2019    WBC 8 8 01/05/2016    RBC 3 14 (L) 07/13/2019    RBC 4 61 (L) 01/05/2016     BMP:   Lab Results   Component Value Date    GLUCOSE 131 (H) 01/05/2016    SODIUM 136 07/13/2019    CO2 29 07/13/2019    CO2 27 01/05/2016    BUN 41 (H) 07/13/2019    BUN 35 (H) 01/05/2016    CREATININE 2 19 (H) 07/13/2019    CREATININE 1 5 (H) 01/05/2016    CALCIUM 8 2 (L) 07/13/2019    CALCIUM 9 0 01/05/2016     Coagulation:   Lab Results   Component Value Date    INR 1 14 07/12/2019     Cardiac markers:   Lab Results   Component Value Date    CKMB 4 9 07/04/2019     ABGs: No results found for: PH   Results from last 7 days   Lab Units 07/13/19  0520   POTASSIUM mmol/L 3 8   CHLORIDE mmol/L 99*   CO2 mmol/L 29   BUN mg/dL 41*   CREATININE mg/dL 2 19*   CALCIUM mg/dL 8 2*   ALK PHOS U/L 153*   ALT U/L 66   AST U/L 51*       Assessment:  Principal Problem:    Acute on chronic diastolic CHF (congestive heart failure) ([facility])  Active Problems:    CKD (chronic kidney disease), stage III ([facility])    Benign hypertension with chronic kidney disease, stage III ([facility])   Acute on chronic renal failure as evident the creatinine 3 2 and baseline creatinine 1 3  suspected pneumonia  Assessment:  Acute on chronic diastolic CHF  Pneumonia  Pleural effusion  Type 1 diabetes  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  Hypertension uncontrolled  Acute MI non ST-elevation MI  Plan:  Patient has PermCath on hemodialysis  Patient overall improved CHF improved still no sign of any recovery in terms of acute on chronic renal failure  Nephrology consult reviewed appreciated  Cardiology consult reviewed appreciated  The process of a pre authorization for the short-term rehab with hemodialysis in progress when approved we will transfer the patient to rehab with hemodialysis on Monday Wednesday and Friday  VTE Pharmacologic Prophylaxis: heparin    Sean Alvarez MD  7/13/2019, 10:26 AM

## 2019-07-13 NOTE — PLAN OF CARE
Problem: Potential for Falls  Goal: Patient will remain free of falls  Description  INTERVENTIONS:  - Assess patient frequently for physical needs  -  Identify cognitive and physical deficits and behaviors that affect risk of falls    -  [place] fall precautions as indicated by assessment   - Educate patient/family on patient safety including physical limitations  - Instruct patient to call for assistance with activity based on assessment  - Modify environment to reduce risk of injury  - Consider OT/PT consult to assist with strengthening/mobility  Outcome: Progressing     Problem: PAIN - ADULT  Goal: Verbalizes/displays adequate comfort level or baseline comfort level  Description  Interventions:  - Encourage patient to monitor pain and request assistance  - Assess pain using appropriate pain scale  - Administer analgesics based on type and severity of pain and evaluate response  - Implement non-pharmacological measures as appropriate and evaluate response  - Consider cultural and social influences on pain and pain management  - Notify physician/advanced practitioner if interventions unsuccessful or patient reports new pain  Outcome: Progressing     Problem: INFECTION - ADULT  Goal: Absence or prevention of progression during hospitalization  Description  INTERVENTIONS:  - Assess and monitor for signs and symptoms of infection  - Monitor lab/diagnostic results  - Monitor all insertion sites, i e  IV  - [place] appropriate cooling/warming therapies per order  - Administer medications as ordered  - Instruct and encourage patient and family to use good hand hygiene technique  - Identify and instruct in appropriate isolation precautions for identified infection/condition   Outcome: Progressing     Problem: SAFETY ADULT  Goal: Patient will remain free of falls  Description  INTERVENTIONS:  - Assess patient frequently for physical needs  -  Identify cognitive and physical deficits and behaviors that affect risk of falls   -  [place] fall precautions as indicated by assessment   - Educate patient/family on patient safety including physical limitations  - Instruct patient to call for assistance with activity based on assessment  - Modify environment to reduce risk of injury  - Consider OT/PT consult to assist with strengthening/mobility  Outcome: Progressing  Goal: Maintain or return to baseline ADL function  Description  INTERVENTIONS:  -  Assess patient's ability to carry out ADLs; assess patient's baseline for ADL function and identify physical deficits which impact ability to perform ADLs (bathing, care of mouth/teeth, toileting, grooming, dressing, etc )  - Assess/evaluate cause of self-care deficits   - Assess range of motion  - Assess patient's mobility; develop plan if impaired  - Assess patient's need for assistive devices and provide as appropriate  - Encourage maximum independence but intervene and supervise when necessary  ¯ Involve family in performance of ADLs  ¯ Assess for home care needs following discharge   ¯ Request OT consult to assist with ADL evaluation and planning for discharge  ¯ Provide patient education as appropriate  Outcome: Progressing  Goal: Maintain or return mobility status to optimal level  Description  INTERVENTIONS:  - Assess patient's baseline mobility status (ambulation, transfers, stairs, etc )    - Identify cognitive and physical deficits and behaviors that affect mobility  - Identify mobility aids required to assist with transfers and/or ambulation (gait belt, sit-to-stand, lift, walker, cane, etc )  - [place] fall precautions as indicated by assessment  - Record patient progress and toleration of activity level on Mobility SBAR; progress patient to next Phase/Stage  - Instruct patient to call for assistance with activity based on assessment  - Request Rehabilitation consult to assist with strengthening/weightbearing, etc   Outcome: Progressing     Problem: DISCHARGE PLANNING  Goal: Discharge to home or other facility with appropriate resources  Description  INTERVENTIONS:  - Identify barriers to discharge w/patient and caregiver  - Arrange for needed discharge resources and transportation as appropriate  - Identify discharge learning needs (meds, wound care, etc )  - Arrange for interpretive services to assist at discharge as needed  - Refer to Case Management Department for coordinating discharge planning if the patient needs post-hospital services based on physician/advanced practitioner order or complex needs related to functional status, cognitive ability, or social support system  Outcome: Progressing     Problem: Knowledge Deficit  Goal: Patient/family/caregiver demonstrates understanding of disease process, treatment plan, medications, and discharge instructions  Description  Complete learning assessment and assess knowledge base  Interventions:  - Provide teaching at level of understanding  - Provide teaching via preferred learning methods  Outcome: Progressing     Problem: CARDIOVASCULAR - ADULT  Goal: Maintains optimal cardiac output and hemodynamic stability  Description  INTERVENTIONS:  - Monitor I/O, vital signs and rhythm  - Monitor for S/S and trends of decreased cardiac output i e  bleeding, hypotension  - Administer and titrate ordered vasoactive medications to optimize hemodynamic stability  - Assess quality of pulses, skin color and temperature  - Assess for signs of decreased coronary artery perfusion - ex   Angina  - Instruct patient to report change in severity of symptoms  Outcome: Progressing  Goal: Absence of cardiac dysrhythmias or at baseline rhythm  Description  INTERVENTIONS:  - Continuous cardiac monitoring, monitor vital signs, obtain 12 lead EKG if indicated  - Administer antiarrhythmic and heart rate control medications as ordered  - Monitor electrolytes and administer replacement therapy as ordered  Outcome: Progressing     Problem: RESPIRATORY - ADULT  Goal: Achieves optimal ventilation and oxygenation  Description  INTERVENTIONS:  - Assess for changes in respiratory status  - Assess for changes in mentation and behavior  - Position to facilitate oxygenation and minimize respiratory effort  - Oxygen administration by appropriate delivery method based on oxygen saturation (per order) or ABGs  - Encourage broncho-pulmonary hygiene including cough, deep breathe, Incentive Spirometry  - Assess the need for suctioning and aspirate as needed  - Assess and instruct to report SOB or any respiratory difficulty  - Respiratory Therapy support as indicated   Outcome: Progressing     Problem: Nutrition/Hydration-ADULT  Goal: Nutrient/Hydration intake appropriate for improving, restoring or maintaining nutritional needs  Description  Monitor and assess patient's nutrition/hydration status for malnutrition (ex- brittle hair, bruises, dry skin, pale skin and conjunctiva, muscle wasting, smooth red tongue, and disorientation)  Collaborate with interdisciplinary team and initiate plan and interventions as ordered  Monitor patient's weight and dietary intake as ordered or per policy  Utilize nutrition screening tool and intervene per policy  Determine patient's food preferences and provide high-protein, high-caloric foods as appropriate       INTERVENTIONS:  - Monitor oral intake, urinary output, labs, and treatment plans  - Assess nutrition and hydration status and recommend course of action  - Evaluate amount of meals eaten  - Assist patient with eating if necessary   - Allow adequate time for meals  - Recommend/ encourage appropriate diets, oral nutritional supplements, and vitamin/mineral supplements  - Order, calculate, and assess calorie counts as needed  - Assess need for intravenous fluids  - Provide specific nutrition/hydration education as appropriate  - Include patient/family/caregiver in decisions related to nutrition   Outcome: Progressing     Problem: Prexisting or High Potential for Compromised Skin Integrity  Goal: Skin integrity is maintained or improved  Description  INTERVENTIONS:  - Identify patients at risk for skin breakdown  - Assess and monitor skin integrity  - Assess and monitor nutrition and hydration status  - Monitor labs (i e  albumin)  - Assess for incontinence   - Turn and reposition patient  - Assist with mobility/ambulation  - Relieve pressure over bony prominences  - Avoid friction and shearing  - Provide appropriate hygiene as needed including keeping skin clean and dry  - Evaluate need for skin moisturizer/barrier cream  - Collaborate with interdisciplinary team (i e  Nutrition, Rehabilitation, etc )   - Patient/family teaching  Outcome: Progressing

## 2019-07-13 NOTE — SOCIAL WORK
CM called Denise for Jose Calvin  Pt was approved Subacute level of care at Level II starting 7/13/19 to 7/18/19  NRD to be on 7/18 to Yasmeen CONNOR at [phone]  Pt has Available Chair time on TTS at [facility]  D/W Nephrology, will do exchange with pt inpt on Monday then plan DC and to follow up for OP dialysis on Tuesday at [facility] to assist with ride upon DC and initiate assist with first ride from [employer] updated Via [employer] with the auth and plan for DC as well as an update to [facility]

## 2019-07-13 NOTE — OCCUPATIONAL THERAPY NOTE
OT TREATMENT     07/13/19 0919   Restrictions/Precautions   Other Precautions O2;Telemetry; Chair Alarm; Bed Alarm; Fall Risk   Pain Assessment   Pain Assessment No/denies pain   Pain Score No Pain   ADL   Where Assessed Standing at sink   Eating Assistance 7  Independent   Grooming Assistance 5  Supervision/Setup   UB Dressing Assistance 4  Minimal Assistance   LB Dressing Assistance 4  Minimal Assistance   Toileting Assistance  5  Supervision/Setup   Functional Standing Tolerance   Time 5 min   Activity ADLS at sink   Comments cues for posture   Bed Mobility   Rolling R 7  Independent   Rolling L 7  Independent   Supine to Sit 7  Independent   Sit to Supine 7  Independent   Transfers   Sit to Stand 4  Minimal assistance   Stand to Sit 4  Minimal assistance   Toilet transfer 4  Minimal assistance   Functional Mobility   Functional Mobility 4  Minimal assistance   Additional Comments 15 feet to and from bathroom   Additional items Rolling walker   Toilet Transfers   Toilet Transfer From [place] Type To and from   Toilet Transfer to Standard toilet   Toilet Transfer Technique Ambulating   Toilet Transfers Minimal assistance   Therapeutic Exercise - ROM   UE-ROM Yes   ROM- Right Upper Extremities   R Shoulder AROM; Flexion; Horizontal ABduction;ABduction   R Elbow AROM;Elbow flexion;Elbow extension   R Wrist AROM; Wrist flexion;Wrist extension   R Position Seated   R Weight/Reps/Sets 10 reps   ROM - Left Upper Extremities    L Shoulder AROM; Flexion;ABduction;Horizontal ABduction   L Elbow AROM;Elbow flexion;Elbow extension   L Wrist AROM; Wrist flexion;Wrist extension   L Position Seated   L Weight/Reps/Sets 10 reps   Cognition   Overall Cognitive Status WFL   Arousal/Participation Alert; Cooperative   Attention Within functional limits   Orientation Level Oriented X4   Following Commands Follows all commands and directions without difficulty   Activity Tolerance   Activity Tolerance Patient limited by fatigue   Assessment   Assessment Pt seen at bedside for ADLS  Pt requires MIN assist for transfers and functional mobility, Supervision and set up to MIN assist for grooming and dressing  Pt SPO2%91-95 on 1 5 liters O2 via NC throughout tx  Pt benefits from rest breaks  Pt will continue to benefit from skilled OT tx  Will follow  Recommend D/C to STR  Thank you   Plan   Treatment Interventions ADL retraining;Functional transfer training;UE strengthening/ROM; Endurance training;Patient/family training;Equipment evaluation/education; Activityengagement; Compensatory technique education; Energy conservation   Recommendation   OT Discharge Recommendation Short Term Rehab   Licensure   [place] License Number  [id number] [place] [address]#[id number]

## 2019-07-13 NOTE — PHYSICAL THERAPY NOTE
PT TREATMENT     07/13/19 0900   Pain Assessment   Pain Assessment No/denies pain   Pain Score No Pain   Restrictions/Precautions   Other Precautions Chair Alarm;Telemetry;O2;Fall Risk   General   Chart Reviewed Yes   Family/Caregiver Present No   Cognition   Overall Cognitive Status WFL   Arousal/Participation Cooperative   Attention Within functional limits   Subjective   Subjective "I want to get up and walk"   Transfers   Sit to Stand 4  Minimal assistance   Stand to Sit 4  Minimal assistance   Additional Comments pulse ox on 1 5LO2 95% with pursed lip breathing at rest  Pulse ox decreased to 90% with gait training with increase to baseline with seated rest break x 45 seconds    Ambulation/Elevation   Gait pattern   (decreased nelsy, decreased B heelstrike)   Gait Assistance 4  Minimal assist   Assistive Device Rolling walker   Distance 50'x1   Balance   Static Sitting Good   Dynamic Sitting Fair   Static Standing Fair   Dynamic Standing Fair -   Ambulatory Fair -   Endurance Deficit   Endurance Deficit Yes   Endurance Deficit Description pt fatigues easily   Activity Tolerance   Activity Tolerance Patient tolerated treatment well   Nurse Made Aware yes   Exercises   Knee AROM Sitting;10 reps;AROM; Bilateral   Ankle Pumps Sitting;20 reps;AROM; Bilateral   Heel Cord Stretch Sitting;5 reps;PROM; Bilateral   Marching Sitting;20 reps;AROM; Bilateral   Balance training  balance training sit to stand transfers with empohasis on proper hand placement to decrease fall risk   Assessment   Prognosis Good   Problem List Decreased strength;Decreased range of motion;Decreased endurance; Impaired balance;Decreased mobility   Assessment Pt progressing towards PT goals and pleased with progress   Goals   Patient Goals "to be able to walk"   Plan   Treatment/Interventions ADL retraining;Functional transfer training;LE strengthening/ROM; Therapeutic exercise; Endurance training;Patient/family training;Bed mobility;Gait training;Spoke to nursing;OT   PT Frequency 5x/wk   Recommendation   Recommendation Short-term skilled PT   Licensure   [place] License Number  [facility] [place], [place] [id number]

## 2019-07-13 NOTE — SOCIAL WORK
CM spoke with PCP this am, currently awaiting Christy Merchant for DC to [address] at [facility]  CM to follow up with auth  Review of [employer] Dialysis referral   Pt has been accepted at both [facility] and [facility] however chair time has been indicated to be TTS  CM reply: pt has accepted services at [facility] 2nd to location and his chair time with them will be at 225pm   Pt will need transportation set up when he is DC to go to his first visitesday  This CM will leave message for Nephrology re: change in dialysis days, will need instruction on when next dialysis will need to be done prior to his discharge and when he will need to start as an OP given he is currently on MWF schedule  Cm now received auth from Costa schilling  for 6 days at subacute level II care starting 7/13/19 thru 7/18/19  Auth number is  [id number]  NR to Yasmeen CONNOR at phone [phone]  Tracie at [facility] aware and updated via Allscripts

## 2019-07-14 LAB
ALBUMIN SERPL BCP-MCNC: 2.4 G/DL (ref 3.5–5)
ALP SERPL-CCNC: 157 U/L (ref 46–116)
ALT SERPL W P-5'-P-CCNC: 65 U/L (ref 12–78)
ANION GAP SERPL CALCULATED.3IONS-SCNC: 7 MMOL/L (ref 4–13)
AST SERPL W P-5'-P-CCNC: 46 U/L (ref 5–45)
BASOPHILS # BLD AUTO: 0.03 THOUSANDS/ΜL (ref 0–0.1)
BASOPHILS NFR BLD AUTO: 0 % (ref 0–1)
BILIRUB SERPL-MCNC: 1.1 MG/DL (ref 0.2–1)
BUN SERPL-MCNC: 58 MG/DL (ref 5–25)
CALCIUM SERPL-MCNC: 8.1 MG/DL (ref 8.3–10.1)
CHLORIDE SERPL-SCNC: 98 MMOL/L (ref 100–108)
CO2 SERPL-SCNC: 29 MMOL/L (ref 21–32)
CREAT SERPL-MCNC: 2.97 MG/DL (ref 0.6–1.3)
EOSINOPHIL # BLD AUTO: 0.31 THOUSAND/ΜL (ref 0–0.61)
EOSINOPHIL NFR BLD AUTO: 4 % (ref 0–6)
ERYTHROCYTE [DISTWIDTH] IN BLOOD BY AUTOMATED COUNT: 13.4 % (ref 11.6–15.1)
GFR SERPL CREATININE-BSD FRML MDRD: 19 ML/MIN/1.73SQ M
GLUCOSE SERPL-MCNC: 184 MG/DL (ref 65–140)
GLUCOSE SERPL-MCNC: 184 MG/DL (ref 65–140)
GLUCOSE SERPL-MCNC: 287 MG/DL (ref 65–140)
GLUCOSE SERPL-MCNC: 313 MG/DL (ref 65–140)
GLUCOSE SERPL-MCNC: 354 MG/DL (ref 65–140)
HCT VFR BLD AUTO: 29.1 % (ref 36.5–49.3)
HGB BLD-MCNC: 9.6 G/DL (ref 12–17)
IMM GRANULOCYTES # BLD AUTO: 0.04 THOUSAND/UL (ref 0–0.2)
IMM GRANULOCYTES NFR BLD AUTO: 1 % (ref 0–2)
LYMPHOCYTES # BLD AUTO: 1.22 THOUSANDS/ΜL (ref 0.6–4.47)
LYMPHOCYTES NFR BLD AUTO: 15 % (ref 14–44)
MCH RBC QN AUTO: 30.5 PG (ref 26.8–34.3)
MCHC RBC AUTO-ENTMCNC: 33 G/DL (ref 31.4–37.4)
MCV RBC AUTO: 92 FL (ref 82–98)
MONOCYTES # BLD AUTO: 0.91 THOUSAND/ΜL (ref 0.17–1.22)
MONOCYTES NFR BLD AUTO: 12 % (ref 4–12)
NEUTROPHILS # BLD AUTO: 5.42 THOUSANDS/ΜL (ref 1.85–7.62)
NEUTS SEG NFR BLD AUTO: 68 % (ref 43–75)
NRBC BLD AUTO-RTO: 0 /100 WBCS
PLATELET # BLD AUTO: 119 THOUSANDS/UL (ref 149–390)
PMV BLD AUTO: 10.6 FL (ref 8.9–12.7)
POTASSIUM SERPL-SCNC: 3.7 MMOL/L (ref 3.5–5.3)
PROT SERPL-MCNC: 6.6 G/DL (ref 6.4–8.2)
RBC # BLD AUTO: 3.15 MILLION/UL (ref 3.88–5.62)
SODIUM SERPL-SCNC: 134 MMOL/L (ref 136–145)
WBC # BLD AUTO: 7.93 THOUSAND/UL (ref 4.31–10.16)

## 2019-07-14 PROCEDURE — 94760 N-INVAS EAR/PLS OXIMETRY 1: CPT

## 2019-07-14 PROCEDURE — 82948 REAGENT STRIP/BLOOD GLUCOSE: CPT

## 2019-07-14 PROCEDURE — 97530 THERAPEUTIC ACTIVITIES: CPT

## 2019-07-14 PROCEDURE — 94640 AIRWAY INHALATION TREATMENT: CPT

## 2019-07-14 PROCEDURE — 99232 SBSQ HOSP IP/OBS MODERATE 35: CPT | Performed by: INTERNAL MEDICINE

## 2019-07-14 PROCEDURE — 85025 COMPLETE CBC W/AUTO DIFF WBC: CPT | Performed by: INTERNAL MEDICINE

## 2019-07-14 PROCEDURE — 80053 COMPREHEN METABOLIC PANEL: CPT | Performed by: INTERNAL MEDICINE

## 2019-07-14 RX ADMIN — IPRATROPIUM BROMIDE AND ALBUTEROL SULFATE 3 ML: 2.5; .5 SOLUTION RESPIRATORY (INHALATION) at 14:32

## 2019-07-14 RX ADMIN — ESCITALOPRAM OXALATE 10 MG: 10 TABLET ORAL at 21:26

## 2019-07-14 RX ADMIN — HYDRALAZINE HYDROCHLORIDE 50 MG: 25 TABLET ORAL at 21:32

## 2019-07-14 RX ADMIN — METOPROLOL TARTRATE 50 MG: 50 TABLET, FILM COATED ORAL at 20:49

## 2019-07-14 RX ADMIN — FUROSEMIDE 80 MG: 80 TABLET ORAL at 08:48

## 2019-07-14 RX ADMIN — ALPRAZOLAM 0.25 MG: 0.25 TABLET ORAL at 21:32

## 2019-07-14 RX ADMIN — IPRATROPIUM BROMIDE AND ALBUTEROL SULFATE 3 ML: 2.5; .5 SOLUTION RESPIRATORY (INHALATION) at 19:08

## 2019-07-14 RX ADMIN — AMLODIPINE BESYLATE 5 MG: 5 TABLET ORAL at 17:10

## 2019-07-14 RX ADMIN — INSULIN DETEMIR 20 UNITS: 100 INJECTION, SOLUTION SUBCUTANEOUS at 21:32

## 2019-07-14 RX ADMIN — INSULIN LISPRO 6 UNITS: 100 INJECTION, SOLUTION INTRAVENOUS; SUBCUTANEOUS at 21:33

## 2019-07-14 RX ADMIN — ASPIRIN 81 MG: 81 TABLET, COATED ORAL at 08:48

## 2019-07-14 RX ADMIN — AMLODIPINE BESYLATE 5 MG: 5 TABLET ORAL at 08:48

## 2019-07-14 RX ADMIN — ATORVASTATIN CALCIUM 80 MG: 80 TABLET ORAL at 15:57

## 2019-07-14 RX ADMIN — METOPROLOL TARTRATE 50 MG: 50 TABLET, FILM COATED ORAL at 08:48

## 2019-07-14 RX ADMIN — ISOSORBIDE MONONITRATE 30 MG: 30 TABLET, EXTENDED RELEASE ORAL at 08:48

## 2019-07-14 RX ADMIN — FUROSEMIDE 80 MG: 80 TABLET ORAL at 15:56

## 2019-07-14 RX ADMIN — INSULIN LISPRO 8 UNITS: 100 INJECTION, SOLUTION INTRAVENOUS; SUBCUTANEOUS at 17:10

## 2019-07-14 RX ADMIN — INSULIN LISPRO 6 UNITS: 100 INJECTION, SOLUTION INTRAVENOUS; SUBCUTANEOUS at 12:24

## 2019-07-14 RX ADMIN — INSULIN LISPRO 2 UNITS: 100 INJECTION, SOLUTION INTRAVENOUS; SUBCUTANEOUS at 08:48

## 2019-07-14 RX ADMIN — IPRATROPIUM BROMIDE AND ALBUTEROL SULFATE 3 ML: 2.5; .5 SOLUTION RESPIRATORY (INHALATION) at 07:23

## 2019-07-14 RX ADMIN — INSULIN DETEMIR 20 UNITS: 100 INJECTION, SOLUTION SUBCUTANEOUS at 08:47

## 2019-07-14 NOTE — PROGRESS NOTES
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 followed by [address] [facility] group  2  Compensated acute on chronic diastolic heart failure from cardiorenal syndrome  3  Likely had type 2 NSTEMI with nonischemic nuclear stress study with LV cavity dilatation, asymptomatic  4  Treated dyslipidemia  5  Controlled essential hypertension      PLAN:    1  Concur with present management  2  To follow with you as necessary        Current Facility-Administered Medications   Medication Dose Route Frequency Provider Last Rate Last Dose    acetaminophen (TYLENOL) tablet 650 mg  650 mg Oral Q6H PRN Sean Alvarez MD        ALPRAZolam (XANAX) tablet 0 25 mg  0 25 mg Oral HS Sean Alvarez MD   0 25 mg at 07/12/19 2155    aluminum-magnesium hydroxide-simethicone (MYLANTA) 200-200-20 mg/5 mL oral suspension 30 mL  30 mL Oral Q4H PRN Loc Nicole MD   30 mL at 07/06/19 2035    amLODIPine (NORVASC) tablet 5 mg  5 mg Oral BID Sean Alvarez MD   5 mg at 07/13/19 1710    aspirin (ECOTRIN LOW STRENGTH) EC tablet 81 mg  81 mg Oral Daily Sean Alvarez MD   81 mg at 07/13/19 0919    atorvastatin (LIPITOR) tablet 80 mg  80 mg Oral Daily With TABITHA Garcia   80 mg at 07/13/19 1613    escitalopram (LEXAPRO) tablet 10 mg  10 mg Oral HS Sean Alvarez MD   10 mg at 07/12/19 2154    furosemide (LASIX) tablet 80 mg  80 mg Oral BID (diuretic) Gris Lovett MD   80 mg at 07/13/19 1613    hydrALAZINE (APRESOLINE) injection 10 mg  10 mg Intravenous Q6H PRN Sean Alvarez MD   10 mg at 07/03/19 1817    hydrALAZINE (APRESOLINE) tablet 50 mg  50 mg Oral Q8H [address] TABITHA Mohr   50 mg at 07/13/19 1314    insulin detemir (LEVEMIR) subcutaneous injection 20 Units  20 Units Subcutaneous Q12H [address] Sean Alvarez MD   20 Units at 07/13/19 0920    insulin lispro (HumaLOG) 100 units/mL subcutaneous injection 1-6 Units  1-6 Units Subcutaneous HS Sean Alvarez MD   4 Units at 07/12/19 2155    insulin lispro (HumaLOG) 100 units/mL subcutaneous injection 2-12 Units  2-12 Units Subcutaneous TID AC Sean Alvarez MD   10 Units at 07/13/19 1710    [START ON 7/14/2019] ipratropium-albuterol (DUO-NEB) 0 5-2 5 mg/3 mL inhalation solution 3 mL  3 mL Nebulization TID Sean Alvarez MD        isosorbide mononitrate (IMDUR) 24 hr tablet 30 mg  30 mg Oral Daily [facility] Isabella, DO   30 mg at 07/13/19 0920    metoprolol tartrate (LOPRESSOR) tablet 50 mg  50 mg Oral Q12H [facility] Is[facility], DO   50 mg at 07/13/19 0919    nitroglycerin (NITROSTAT) SL tablet 0 4 mg  0 4 mg Sublingual Q5 Min PRN [facility], [facility]   0 4 mg at 07/05/19 1652    pneumococcal 13-valent conjugate vaccine (PREVNAR-13) IM injection 0 5 mL  0 5 mL Intramuscular Prior to discharge Tavon Alonzo MD        polyethylene glycol (MIRALAX) packet 17 g  17 g Oral Daily PRN Tavon Alonzo MD             HPI:    Mr Reynaldo Lopez denies new cardiopulmonary or  medical symptoms  Objective   He has completed for hemodialysis treatments thus far and has received ultrafiltration for fluid overload  Physical Exam:   /60 (BP Location: Right arm)   Pulse 72   Temp 98 6 °F (37 °C) (Oral)   Resp 18   Ht 6' 1" (1 854 m)   Wt 97 5 kg (215 lb)   SpO2 96%   BMI 28 37 kg/m²   Body mass index is 28 37 kg/m²      General Appearance:  Alert, cooperative, no distress, appears stated age   Head:  Normocephalic, without obvious abnormality, atraumatic   Eyes:  PERRL, conjunctiva/corneas clear, EOM's intact,   both eyes   Ears:  Normal TM's and external ear canals, both ears   Nose: Nares normal, septum midline, mucosa normal, no drainage or sinus tenderness   Throat: Lips, mucosa, and tongue normal; teeth and gums normal   Neck: Supple, symmetrical, trachea midline, no adenopathy, thyroid: not enlarged, symmetric, no tenderness/mass/nodules, no carotid bruit or JVD   Back:   Symmetric, no curvature, ROM normal, no CVA tenderness   Lungs:   Clear to auscultation bilaterally, respirations unlabored   Chest Wall:  No tenderness or deformity; right IJ perm a cath is in place   Heart:  Regular rate and rhythm, S1, S2 normal, no murmur, rub or gallop   Abdomen:   Soft, non-tender, bowel sounds active all four quadrants,  no masses, no organomegaly   Extremities: Extremities normal, atraumatic, no cyanosis or edema   Pulses: 2+ and symmetric   Skin: Skin showed pale color, with normal texture, turgor and no rashes or lesions   Lymph nodes: Cervical, supraclavicular, and axillary nodes normal   Neurologic: Normal         Cardiographics              Not applicable                        Lab Review   Recent Results (from the past 24 hour(s))   Fingerstick Glucose (POCT)    Collection Time: 07/12/19  9:00 PM   Result Value Ref Range    POC Glucose 283 (H) 65 - 140 mg/dl   CBC and differential    Collection Time: 07/13/19  5:20 AM   Result Value Ref Range    WBC 8 30 4 31 - 10 16 Thousand/uL    RBC 3 14 (L) 3 88 - 5 62 Million/uL    Hemoglobin 9 5 (L) 12 0 - 17 0 g/dL    Hematocrit 29 3 (L) 36 5 - 49 3 %    MCV 93 82 - 98 fL    MCH 30 3 26 8 - 34 3 pg    MCHC 32 4 31 4 - 37 4 g/dL    RDW 13 5 11 6 - 15 1 %    MPV 10 3 8 9 - 12 7 fL    Platelets 855 (L) 743 - 390 Thousands/uL    nRBC 0 /100 WBCs    Neutrophils Relative 70 43 - 75 %    Immat GRANS % 1 0 - 2 %    Lymphocytes Relative 12 (L) 14 - 44 %    Monocytes Relative 12 4 - 12 %    Eosinophils Relative 4 0 - 6 %    Basophils Relative 1 0 - 1 %    Neutrophils Absolute 5 87 1 85 - 7 62 Thousands/µL    Immature Grans Absolute 0 04 0 00 - 0 20 Thousand/uL    Lymphocytes Absolute 0 99 0 60 - 4 47 Thousands/µL    Monocytes Absolute 1 01 0 17 - 1 22 Thousand/µL    Eosinophils Absolute 0 35 0 00 - 0 61 Thousand/µL    Basophils Absolute 0 04 0 00 - 0 10 Thousands/µL   Comprehensive metabolic panel    Collection Time: 07/13/19  5:20 AM   Result Value Ref Range    Sodium 136 136 - 145 mmol/L    Potassium 3 8 3 5 - 5 3 mmol/L    Chloride 99 (L) 100 - 108 mmol/L    CO2 29 21 - 32 mmol/L    ANION GAP 8 4 - 13 mmol/L    BUN 41 (H) 5 - 25 mg/dL    Creatinine 2 19 (H) 0 60 - 1 30 mg/dL    Glucose 141 (H) 65 - 140 mg/dL    Calcium 8 2 (L) 8 3 - 10 1 mg/dL    AST 51 (H) 5 - 45 U/L    ALT 66 12 - 78 U/L    Alkaline Phosphatase 153 (H) 46 - 116 U/L    Total Protein 6 4 6 4 - 8 2 g/dL    Albumin 2 3 (L) 3 5 - 5 0 g/dL    Total Bilirubin 1 30 (H) 0 20 - 1 00 mg/dL    eGFR 28 ml/min/1 73sq m   Ferritin    Collection Time: 07/13/19  5:20 AM   Result Value Ref Range    Ferritin 385 8 - 388 ng/mL   Iron Saturation %    Collection Time: 07/13/19  5:20 AM   Result Value Ref Range    Iron Saturation 32 %    TIBC 187 (L) 250 - 450 ug/dL    Iron 59 (L) 65 - 175 ug/dL   Fingerstick Glucose (POCT)    Collection Time: 07/13/19  7:18 AM   Result Value Ref Range    POC Glucose 139 65 - 140 mg/dl   Fingerstick Glucose (POCT)    Collection Time: 07/13/19 11:07 AM   Result Value Ref Range    POC Glucose 263 (H) 65 - 140 mg/dl   Fingerstick Glucose (POCT)    Collection Time: 07/13/19  4:31 PM   Result Value Ref Range    POC Glucose 351 (H) 65 - 140 mg/dl                 LENORA Houston

## 2019-07-14 NOTE — PLAN OF CARE
Problem: PHYSICAL THERAPY ADULT  Goal: Performs mobility at highest level of function for planned discharge setting  See evaluation for individualized goals  Outcome: Progressing  Note:   Prognosis: Good  Problem List: Decreased strength, Impaired balance, Decreased endurance, Decreased mobility  Assessment: Pt demonstrates improving endurance and function  Pt is motivated and should do well at STR  Recommendation: Short-term skilled PT          See flowsheet documentation for full assessment

## 2019-07-14 NOTE — RESPIRATORY THERAPY NOTE
RT Protocol Note  Eric Bhatt 66 y o  male MRN: [id number]  Unit/Bed#: [address] [place] [address] Encounter: [id number]    Assessment    Active Problems:    CKD (chronic kidney disease), stage III (HCC)    Benign hypertension with chronic kidney disease, stage III (HCC)      Home Pulmonary Medications:         Past Medical History:   Diagnosis Date    Acute on chronic diastolic CHF (congestive heart failure) ([place] [address] ) 2019    Arthritis     Back pain     Bladder cancer ([place] [address] )     diagnosed  2016    Cancer ([place] [address] )     bladder; chemo; resection;     Coronary artery disease     has 2 coronary stents    Dental crowns present      5    Diabetes mellitus ([place] [address] )     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[place] [address] )     fluid behind right eye    Hematuria     hx of    Hypercholesteremia     Hypertension     Kidney stones     Wears glasses      Social History     Socioeconomic History    Marital status: Single     Spouse name: None    Number of children: None    Years of education: None    Highest education level: None   Occupational History    None   Social Needs    Financial resource strain: None    Food insecurity:     Worry: None     Inability: None    Transportation needs:     Medical: None     Non-medical: None   Tobacco Use    Smoking status: Former Smoker     Packs/day: 0 50     Years: 5 00     Pack years: 2 50     Last attempt to quit: 1965     Years since quittin 5    Smokeless tobacco: Never Used   Substance and Sexual Activity    Alcohol use: Yes     Comment: socially    Drug use: No    Sexual activity: Never   Lifestyle    Physical activity:     Days per week: None     Minutes per session: None    Stress: None   Relationships    Social connections:     Talks on phone: None     Gets together: None     Attends Tenriism service: None     Active member of club or organization: None     Attends meetings of clubs or organizations: None     Relationship status: None    Intimate partner violence:     Fear of current or ex partner: None     Emotionally abused: None     Physically abused: None     Forced sexual activity: None   Other Topics Concern    None   Social History Narrative    None       Subjective    Subjective Data: No distress indicated    Objective    Physical Exam:   Assessment Type: Pre-treatment  General Appearance: Alert, Awake  Respiratory Pattern: Normal  Chest Assessment: Chest expansion symmetrical, Trachea midline  Cough: None    Vitals:  Blood pressure 134/60, pulse 72, temperature 98 6 °F (37 °C), temperature source Oral, resp  rate 18, height 6' 1" (1 854 m), weight 97 5 kg (215 lb), SpO2 96 %      Results from last 7 days   Lab Units 07/06/19  2153   \[facility] [place] [facility]\""ache 30 ART  7 395   PCO2 ART mm Hg 38 4   PO2 ART mm Hg 70 2*   HCO3 ART mmol/L 23 0   BASE EXC ART mmol/L -1 6   O2 CONTENT ART mL/dL 15 4*   O2 HGB, ARTERIAL % 93 3*   ABG SOURCE  Radial, Left   SWATI TEST  Yes       Imaging and other studies:     O2 Device: ra     Plan    Respiratory Plan: No distress/Pulmonary history   Duonebs TID         Resp Comments: patient resting comfortable

## 2019-07-14 NOTE — PHYSICAL THERAPY NOTE
PT TREATMENT     07/14/19 1330   Pain Assessment   Pain Assessment No/denies pain   Restrictions/Precautions   Other Precautions Fall Risk;Bed Alarm; Chair Alarm  (HD)   General   Chart Reviewed Yes   Cognition   Overall Cognitive Status WFL   Subjective   Subjective "I haven't been OOB yet"   Bed Mobility   Supine to Sit 5  Supervision   Transfers   Sit to Stand 4  Minimal assistance   Additional items   (difficulty rising from a low chair)   Stand to Sit 4  Minimal assistance   Stand pivot 4  Minimal assistance   Additional items   (with walker)   Additional Comments Pt stood with min assist to urinate in urinal   Ambulation/Elevation   Gait Assistance 4  Minimal assist   Assistive Device Rolling walker   Distance 1x50 feet, 1x 75 feet   Assessment   Prognosis Good   Problem List Decreased strength; Impaired balance;Decreased endurance;Decreased mobility   Assessment Pt demonstrates improving endurance and function  Pt is motivated and should do well at STR  Plan   Treatment/Interventions ADL retraining;Functional transfer training;LE strengthening/ROM; Therapeutic exercise; Endurance training;Gait training;Equipment eval/education;Patient/family training   Progress Progressing toward goals   PT Frequency 5x/wk   Recommendation   Recommendation Short-term skilled PT   Equipment Recommended [address] License Number  [place] BENSON [id number]

## 2019-07-15 ENCOUNTER — APPOINTMENT (INPATIENT)
Dept: DIALYSIS | Facility: HOSPITAL | Age: 79
DRG: 280 | End: 2019-07-15
Payer: COMMERCIAL

## 2019-07-15 VITALS
RESPIRATION RATE: 16 BRPM | WEIGHT: 219.36 LBS | HEART RATE: 67 BPM | TEMPERATURE: 98.1 F | BODY MASS INDEX: 29.07 KG/M2 | OXYGEN SATURATION: 94 % | DIASTOLIC BLOOD PRESSURE: 62 MMHG | SYSTOLIC BLOOD PRESSURE: 120 MMHG | HEIGHT: 73 IN

## 2019-07-15 LAB
ANION GAP SERPL CALCULATED.3IONS-SCNC: 9 MMOL/L (ref 4–13)
BASOPHILS # BLD AUTO: 0.03 THOUSANDS/ΜL (ref 0–0.1)
BASOPHILS NFR BLD AUTO: 0 % (ref 0–1)
BUN SERPL-MCNC: 76 MG/DL (ref 5–25)
CALCIUM SERPL-MCNC: 8.3 MG/DL (ref 8.3–10.1)
CHLORIDE SERPL-SCNC: 98 MMOL/L (ref 100–108)
CO2 SERPL-SCNC: 26 MMOL/L (ref 21–32)
CREAT SERPL-MCNC: 3.23 MG/DL (ref 0.6–1.3)
EOSINOPHIL # BLD AUTO: 0.29 THOUSAND/ΜL (ref 0–0.61)
EOSINOPHIL NFR BLD AUTO: 4 % (ref 0–6)
ERYTHROCYTE [DISTWIDTH] IN BLOOD BY AUTOMATED COUNT: 13.3 % (ref 11.6–15.1)
GFR SERPL CREATININE-BSD FRML MDRD: 17 ML/MIN/1.73SQ M
GLUCOSE SERPL-MCNC: 207 MG/DL (ref 65–140)
GLUCOSE SERPL-MCNC: 212 MG/DL (ref 65–140)
GLUCOSE SERPL-MCNC: 221 MG/DL (ref 65–140)
GLUCOSE SERPL-MCNC: 351 MG/DL (ref 65–140)
HCT VFR BLD AUTO: 30.8 % (ref 36.5–49.3)
HGB BLD-MCNC: 9.8 G/DL (ref 12–17)
IMM GRANULOCYTES # BLD AUTO: 0.06 THOUSAND/UL (ref 0–0.2)
IMM GRANULOCYTES NFR BLD AUTO: 1 % (ref 0–2)
LYMPHOCYTES # BLD AUTO: 1.08 THOUSANDS/ΜL (ref 0.6–4.47)
LYMPHOCYTES NFR BLD AUTO: 13 % (ref 14–44)
MCH RBC QN AUTO: 29.8 PG (ref 26.8–34.3)
MCHC RBC AUTO-ENTMCNC: 31.8 G/DL (ref 31.4–37.4)
MCV RBC AUTO: 94 FL (ref 82–98)
MONOCYTES # BLD AUTO: 0.83 THOUSAND/ΜL (ref 0.17–1.22)
MONOCYTES NFR BLD AUTO: 10 % (ref 4–12)
NEUTROPHILS # BLD AUTO: 5.87 THOUSANDS/ΜL (ref 1.85–7.62)
NEUTS SEG NFR BLD AUTO: 72 % (ref 43–75)
NRBC BLD AUTO-RTO: 0 /100 WBCS
PHOSPHATE SERPL-MCNC: 4.4 MG/DL (ref 2.3–4.1)
PLATELET # BLD AUTO: 124 THOUSANDS/UL (ref 149–390)
PMV BLD AUTO: 11 FL (ref 8.9–12.7)
POTASSIUM SERPL-SCNC: 3.6 MMOL/L (ref 3.5–5.3)
PTH-INTACT SERPL-MCNC: 211.2 PG/ML (ref 18.4–80.1)
RBC # BLD AUTO: 3.29 MILLION/UL (ref 3.88–5.62)
SODIUM SERPL-SCNC: 133 MMOL/L (ref 136–145)
WBC # BLD AUTO: 8.16 THOUSAND/UL (ref 4.31–10.16)

## 2019-07-15 PROCEDURE — 94760 N-INVAS EAR/PLS OXIMETRY 1: CPT

## 2019-07-15 PROCEDURE — 90935 HEMODIALYSIS ONE EVALUATION: CPT | Performed by: INTERNAL MEDICINE

## 2019-07-15 PROCEDURE — 99232 SBSQ HOSP IP/OBS MODERATE 35: CPT | Performed by: INTERNAL MEDICINE

## 2019-07-15 PROCEDURE — 80048 BASIC METABOLIC PNL TOTAL CA: CPT | Performed by: INTERNAL MEDICINE

## 2019-07-15 PROCEDURE — 83970 ASSAY OF PARATHORMONE: CPT | Performed by: INTERNAL MEDICINE

## 2019-07-15 PROCEDURE — 84100 ASSAY OF PHOSPHORUS: CPT | Performed by: INTERNAL MEDICINE

## 2019-07-15 PROCEDURE — 82948 REAGENT STRIP/BLOOD GLUCOSE: CPT

## 2019-07-15 PROCEDURE — 85025 COMPLETE CBC W/AUTO DIFF WBC: CPT | Performed by: INTERNAL MEDICINE

## 2019-07-15 PROCEDURE — 94640 AIRWAY INHALATION TREATMENT: CPT

## 2019-07-15 RX ADMIN — AMLODIPINE BESYLATE 5 MG: 5 TABLET ORAL at 18:18

## 2019-07-15 RX ADMIN — HYDRALAZINE HYDROCHLORIDE 50 MG: 25 TABLET ORAL at 05:32

## 2019-07-15 RX ADMIN — ATORVASTATIN CALCIUM 80 MG: 80 TABLET ORAL at 16:54

## 2019-07-15 RX ADMIN — FUROSEMIDE 80 MG: 80 TABLET ORAL at 08:49

## 2019-07-15 RX ADMIN — FUROSEMIDE 80 MG: 80 TABLET ORAL at 16:54

## 2019-07-15 RX ADMIN — INSULIN LISPRO 4 UNITS: 100 INJECTION, SOLUTION INTRAVENOUS; SUBCUTANEOUS at 16:57

## 2019-07-15 RX ADMIN — ISOSORBIDE MONONITRATE 30 MG: 30 TABLET, EXTENDED RELEASE ORAL at 08:49

## 2019-07-15 RX ADMIN — INSULIN LISPRO 4 UNITS: 100 INJECTION, SOLUTION INTRAVENOUS; SUBCUTANEOUS at 08:52

## 2019-07-15 RX ADMIN — IPRATROPIUM BROMIDE AND ALBUTEROL SULFATE 3 ML: 2.5; .5 SOLUTION RESPIRATORY (INHALATION) at 13:22

## 2019-07-15 RX ADMIN — HYDRALAZINE HYDROCHLORIDE 50 MG: 25 TABLET ORAL at 16:54

## 2019-07-15 RX ADMIN — METOPROLOL TARTRATE 50 MG: 50 TABLET, FILM COATED ORAL at 08:48

## 2019-07-15 RX ADMIN — IPRATROPIUM BROMIDE AND ALBUTEROL SULFATE 3 ML: 2.5; .5 SOLUTION RESPIRATORY (INHALATION) at 07:42

## 2019-07-15 RX ADMIN — ASPIRIN 81 MG: 81 TABLET, COATED ORAL at 08:49

## 2019-07-15 RX ADMIN — INSULIN DETEMIR 20 UNITS: 100 INJECTION, SOLUTION SUBCUTANEOUS at 08:49

## 2019-07-15 RX ADMIN — AMLODIPINE BESYLATE 5 MG: 5 TABLET ORAL at 08:49

## 2019-07-15 RX ADMIN — INSULIN LISPRO 10 UNITS: 100 INJECTION, SOLUTION INTRAVENOUS; SUBCUTANEOUS at 13:44

## 2019-07-15 NOTE — [place] UNIVERSAL TRANSFER FORM
[place] UNIVERSAL TRANSFER FORM  (ALL ITEMS MUST BE COMPLETED)    1  TRANSFER FROM: [facility]    2  DATE OF TRANSFER: 7/15/2019                        TIME OF TRANSFER: 1730    3  PATIENT NAME: AMADO Zafar      YOB: 1940                             GENDER: male    4  LANGUAGE:   English    5  PHYSICIAN NAME:  Fatou Rogel MD                   PHONE: [phone]    6  CODE STATUS: Level 1 - Full Code        Out of Hospital DNR Attached: no    7  :                                      :  Extended Emergency Contact Information  Primary Emergency Contact: Sherrie Weber   [address] Phone: [phone]  Relation: [id number] Representative/Proxy:  No           Legal Guardian:  No             NAME OF:           HEALTH CARE REPRESENTATIVE/PROXY:                                         OR           LEGAL GUARDIAN, IF NOT :                                               PHONE:  (Day)           (Night)                        (Cell)    8  REASON FOR TRANSFER: Short term rehab            V/S: /56   Pulse 66   Temp 98 1 °F (36 7 °C) (Oral)   Resp 16   Ht 6' 1" (1 854 m)   Wt 99 5 kg (219 lb 5 7 oz)   SpO2 94%   BMI 28 94 kg/m²           PAIN: No pain    9  PRIMARY DIAGNOSIS: Acute on chronic diastolic CHF (congestive heart failure) (HCC)      Secondary Diagnosis: Benign hypertension        Pacemaker: No      Internal Defib: No          Mental Health Diagnosis (if Applicable): Depression    10  RESTRAINTS: No    11  RESPIRATORY NEEDS: 02 at 2lpm    12  ISOLATION/PRECAUTION: No    13  ALLERGY: Patient has no known allergies  14  SENSORY: Alert and oriented x's 4           15  SKIN CONDITION: Left antecubital area with skin tear    16  DIET: Renal Diet with 1200 ml fluid restriction      17  IV ACCESS: no   Hemodialysis via right chest wall permacath  18  PERSONAL ITEMS SENT WITH PATIENT:     19  ATTACHED DOCUMENTS: AVS     20  AT RISK ALERTS: Fall risk        HARM TO: No    21  WEIGHT BEARING STATUS:         Left Leg: full        Right Leg: full    22  MENTAL STATUS: alert and oriented    23  FUNCTION:        Walk: with help        Transfer:with help        Toilet: with help        Feed: self    24  IMMUNIZATIONS/SCREENING:   There is no immunization history for the selected administration types on file for this patient  25  BOWEL: continent    26  BLADDER: continent    27   SENDING FACILITY CONTACT: Carine Melendez                  Title: RN BSN        Unit: [address] [facility]        Phone: [phone] [address] (if known):        Title:        Unit:         Phone:         FORM PREFILLED BY (if applicable)       Title:       Unit:        Phone:         FORM COMPLETED BY Carine Encarnacion RN      Title: SEYMOUR RAMACHANDRAN      Phone: [phone]

## 2019-07-15 NOTE — PROGRESS NOTES
Progress Note - Cardiology   [facility]     Falguni Gomez 66 y o  male MRN: [id number]  : 1940  Unit/Bed#: [address] [place] [address] Encounter: [id number]    Assessment and Plan:   1  Acute kidney injury on chronic kidney disease secondary to cardiorenal syndrome:  Patient tolerating his hemodialysis  Vital signs stable  2  Chronic diastolic heart failure:  EF within normal limits  Patient now on hemodialysis  3  MI type 2:  Lexiscan nuclear stress test demonstrated pharmacological vaso dilatation with trans ischemic dilatation of 1 26, this could be due to Lexiscan versus balanced ischemia  Continue optimal medical therapy  4  Hypertension:  Vital signs stable  Continue current medications    Subjective / Objective:   Patient seen and examined  He is tolerating his hemodialysis without difficulty  Tentative for discharge today after his treatment  Vitals: Blood pressure 129/63, pulse 68, temperature 98 1 °F (36 7 °C), temperature source Oral, resp  rate 16, height 6' 1" (1 854 m), weight 99 5 kg (219 lb 5 7 oz), SpO2 94 %  Vitals:    19 0600 07/15/19 0600   Weight: 97 5 kg (214 lb 15 2 oz) 99 5 kg (219 lb 5 7 oz)     Body mass index is 28 94 kg/m²  BP Readings from Last 3 Encounters:   07/15/19 129/63   16 136/62   10/20/16 168/88     Orthostatic Blood Pressures      Most Recent Value   Blood Pressure  129/63 filed at 07/15/2019 0844   Patient Position - Orthostatic VS  Lying filed at 07/15/2019 0844        I/O       701 -  0700  07 - 07/15 0700 07/15 07 -  0700    P  O  700 390     I V  (mL/kg)  10 (0 1)     Total Intake(mL/kg) 700 (7 2) 400 (4)     Urine (mL/kg/hr) 125 (0 1) 1000 (0 4)     Other       Total Output 125 1000     Net +575 -600                Invasive Devices     Central Venous Catheter Line            CVC Central Lines 19 Double 6 days          Peripheral Intravenous Line            Peripheral IV 19 Right;Ventral (anterior) Forearm 3 days          Hemodialysis Catheter            Permanent HD Catheter  3 days          Drain            Open Drain Left; Anterior; Other (Comment)  days                  Intake/Output Summary (Last 24 hours) at 7/15/2019 1029  Last data filed at 7/15/2019 0601  Gross per 24 hour   Intake 280 ml   Output 825 ml   Net -545 ml         Physical Exam:   Physical Exam   Constitutional: He is oriented to person, place, and time  He appears well-developed and well-nourished  No distress  HENT:   Head: Normocephalic and atraumatic  Right Ear: External ear normal    Left Ear: External ear normal    Eyes: Pupils are equal, round, and reactive to light  Conjunctivae are normal  Right eye exhibits no discharge  Left eye exhibits no discharge  No scleral icterus  Neck: Normal range of motion  Neck supple  No JVD present  No thyromegaly present  Cardiovascular: Normal rate, regular rhythm, normal heart sounds and intact distal pulses  No murmur heard  Pulmonary/Chest: Effort normal and breath sounds normal  No accessory muscle usage  No respiratory distress  He has no decreased breath sounds  He has no rhonchi  Abdominal: Soft  Bowel sounds are normal  He exhibits no distension and no mass  Musculoskeletal: He exhibits no edema  Neurological: He is alert and oriented to person, place, and time  Skin: Skin is warm and dry  Capillary refill takes less than 2 seconds  He is not diaphoretic  Psychiatric: He has a normal mood and affect  Nursing note and vitals reviewed              Medications/ Allergies:     Current Facility-Administered Medications:  acetaminophen 650 mg Oral Q6H PRN Sean Alvarez MD   ALPRAZolam 0 25 mg Oral HS Sean Alvarez MD   aluminum-magnesium hydroxide-simethicone 30 mL Oral Q4H PRN Arron Vernon MD   amLODIPine 5 mg Oral BID Sean Alvarez MD   aspirin 81 mg Oral Daily Sean Alvarez MD   atorvastatin 80 mg Oral Daily With [address], CRNP escitalopram 10 mg Oral HS Sean Alvarez MD   furosemide 80 mg Oral BID (diuretic) Anju Kruse MD   hydrALAZINE 10 mg Intravenous Q6H PRN Sean Alvarez MD   hydrALAZINE 50 mg Oral Q8H [facility] Kymberly Elders, CRREINA   insulin detemir 20 Units Subcutaneous Q12H [facility] Sean Alvarez MD   insulin lispro 1-6 Units Subcutaneous HS William Yen MD   insulin lispro 2-12 Units Subcutaneous TID AC Sean Alvarez MD   ipratropium-albuterol 3 mL Nebulization TID Sean Alvarez MD   isosorbide mononitrate 30 mg Oral Daily [facility] Isabella, DO   metoprolol tartrate 50 mg Oral Q12H [facility] Isabella, DO   nitroglycerin 0 4 mg Sublingual Q5 Min PRN [facility] Isabella, DO   pneumococcal 13-valent conjugate vaccine 0 5 mL Intramuscular Prior to discharge William Yen MD   polyethylene glycol 17 g Oral Daily PRN Sean Alvarez MD       acetaminophen 650 mg Q6H PRN   aluminum-magnesium hydroxide-simethicone 30 mL Q4H PRN   hydrALAZINE 10 mg Q6H PRN   nitroglycerin 0 4 mg Q5 Min PRN   pneumococcal 13-valent conjugate vaccine 0 5 mL Prior to discharge   polyethylene glycol 17 g Daily PRN     No Known Allergies    VTE Pharmacologic Prophylaxis:   Sequential compression device (Venodyne)     Labs:     CBC with diff:  Results from last 7 days   Lab Units 07/15/19  0525 07/14/19  0612 07/13/19  0520 07/12/19  0452 07/11/19  0602 07/10/19  0501 07/09/19  0529   WBC Thousand/uL 8 16 7 93 8 30 9 05 7 48 7 98 8 95   HEMOGLOBIN g/dL 9 8* 9 6* 9 5* 9 6* 9 3* 9 9* 10 1*   HEMATOCRIT % 30 8* 29 1* 29 3* 29 9* 28 7* 31 3* 30 5*   MCV fL 94 92 93 93 94 97 92   PLATELETS Thousands/uL 124* 119* 142* 141* 121* 123* 129*   MCH pg 29 8 30 5 30 3 30 0 30 5 30 7 30 6   MCHC g/dL 31 8 33 0 32 4 32 1 32 4 31 6 33 1   RDW % 13 3 13 4 13 5 13 8 13 8 14 0 14 0   MPV fL 11 0 10 6 10 3 10 9 10 2 11 0 11 3   NRBC AUTO /100 WBCs 0 0 0 0 0 0 0     CMP:  Results from last 7 days   Lab Units 07/15/19  0525 07/14/19  0612 07/13/19  0520 07/12/19  0452 07/11/19  0602 07/10/19  0507 07/09/19  0529   SODIUM mmol/L 133* 134* 136 135* 135* 134* 135*   POTASSIUM mmol/L 3 6 3 7 3 8 4 0 4 1 4 5 3 9   CHLORIDE mmol/L 98* 98* 99* 99* 100 100 100   CO2 mmol/L 26 29 29 29 30 26 27   ANION GAP mmol/L 9 7 8 7 5 8 8   BUN mg/dL 76* 58* 41* 69* 51* 61* 76*   CREATININE mg/dL 3 23* 2 97* 2 19* 3 04* 2 63* 2 41* 2 80*   CALCIUM mg/dL 8 3 8 1* 8 2* 8 4 8 1* 8 3 8 2*   AST U/L  --  46* 51* 55* 35 42 35   ALT U/L  --  65 66 67 49 47 50   ALK PHOS U/L  --  157* 153* 152* 113 108 102   TOTAL PROTEIN g/dL  --  6 6 6 4 6 6 6 3* 6 7 6 9  6 7   ALBUMIN g/dL  --  2 4* 2 3* 2 4* 2 2* 2 4* 2 6*   TOTAL BILIRUBIN mg/dL  --  1 10* 1 30* 1 50* 1 50* 1 60* 1 50*   EGFR ml/min/1 73sq m 17 19 28 19 22 25 21     Magnesium:  Results from last 7 days   Lab Units 07/10/19  0507 07/09/19  0529   MAGNESIUM mg/dL 2 9* 2 7*     Coags:  Results from last 7 days   Lab Units 07/12/19  0452   INR  1 14       Imaging & Testing   I have personally reviewed pertinent reports  Xr Chest Portable    Result Date: 7/7/2019  Narrative: CHEST INDICATION:   SOB  COMPARISON:  July 6, 2019, 12:54 PM  Garcia Olivares PERFORMED/VIEWS:  XR CHEST PORTABLE FINDINGS: Heart shadow is enlarged but unchanged from prior exam   Post median sternotomy  Mild pulmonary vascular congestion  Left upper and lower lobe patchy airspace disease may represent pulmonary edema or infectious infiltrate  The lungs are clear  No pneumothorax or pleural effusion  Osseous structures appear within normal limits for patient age  Postcholecystectomy  Impression: Mild pulmonary vascular congestion  Left upper and lower lobe patchy airspace disease may represent pulmonary edema from CHF or infectious infiltrate  Workstation performed: [id number]     Xr Chest 1 View Portable    Result Date: 7/3/2019  Narrative: CHEST INDICATION:   SOB  COMPARISON:  12/4/2016 EXAM PERFORMED/VIEWS:  XR CHEST PORTABLE  AP semierect Images: 1 FINDINGS:  There are median sternotomy wires indicating prior cardiac surgery   Heart shadow is enlarged but unchanged from prior exam  Right infrahilar infiltrate noted with small pleural effusion  Left lung clear  No pneumothorax  Osseous structures appear within normal limits for patient age  Impression: Right infrahilar infiltrate with small pleural effusion  Workstation performed: [id number]     Xr Chest Pa & Lateral    Result Date: 7/7/2019  Narrative: CHEST INDICATION:   ? pneumonia  / CHF / volume overload  COMPARISON:  July 3, 2019 EXAM PERFORMED/VIEWS:  XR CHEST PA & LATERAL FINDINGS:  There are median sternotomy wires indicating prior cardiac surgery  Cardiomediastinal silhouette appears unremarkable  Small right larger than left bilateral pleural effusions  No pneumothorax    Osseous structures appear within normal limits for patient age  Impression: Small bilateral pleural effusions right larger than left  Patchy airspace opacities left lung potentially pneumonia  Workstation performed: [id number]     Ct Chest Wo Contrast    Result Date: 7/4/2019  Narrative: CT CHEST WITHOUT IV CONTRAST INDICATION:   Shortness of breath  COMPARISON:  No prior chest CTs available TECHNIQUE: CT examination of the chest was performed without intravenous contrast   Axial, sagittal, and coronal 2D reformatted images were created from the source data and submitted for interpretation  Radiation dose length product (DLP) for this visit:  429 43 mGy-cm   This examination, like all CT scans performed in the [facility], was performed utilizing techniques to minimize radiation dose exposure, including the use of iterative  reconstruction and automated exposure control  FINDINGS: LUNGS:  Patchy areas of focal consolidation involving the left lower lobe, left upper lobe and right middle lobe  Subsegmental atelectasis in the right lower lobe     There is no tracheal or endobronchial lesion  PLEURA:  Moderate right and small left pleural effusions   HEART/GREAT VESSELS:  Mild cardiomegaly status post CABG aortic atherosclerosis, without aneurysm  MEDIASTINUM AND LEEANN:  Unremarkable  CHEST WALL AND LOWER NECK:   Mild bilateral gynecomastia  VISUALIZED STRUCTURES IN THE UPPER ABDOMEN:  Status post cholecystectomy OSSEOUS STRUCTURES:  No acute fracture or destructive osseous lesion  Impression: 1  Patchy areas of consolidation bilaterally most likely pneumonia  Recommend follow-up chest CT in 3 months to ensure complete resolution  2   Moderate right and small left pleural effusions  Mild cardiomegaly  Workstation performed: [id number]     Nm Lung Ventilation / Perfusion    Result Date: 7/3/2019  Narrative: VENTILATION AND PERFUSION SCAN INDICATION: Shortness of breath  COMPARISON:  Chest radiograph  7/3/2019 TECHNIQUE:  Posterior ventilation imaging was performed after the inhalation of 33 mCi nebulized Tc-99m DTPA with deposition of less than 1 mCi of activity within the lungs  Multiplanar perfusion imaging was next performed following the intravenous administration of 4 4 mCi Tc-99m labeled MAA  FINDINGS:  Ventilation imaging demonstrates heterogeneous distribution of the radiopharmaceutical with some clumping centrally  Perfusion imaging demonstrates mildly heterogeneous distribution of the radiopharmaceutical throughout both lungs  There is no significant  segmental or subsegmental defect  Impression: The probability for pulmonary embolus is low  Workstation performed: [id number]     Us Kidney And Bladder    Result Date: 7/5/2019  Narrative: RENAL ULTRASOUND INDICATION:   N18 9: Chronic kidney disease, unspecified  COMPARISON: 12/5/2016 TECHNIQUE:   Ultrasound of the retroperitoneum was performed with a curvilinear transducer utilizing volumetric sweeps and still imaging techniques  FINDINGS: KIDNEYS: Symmetric and normal size  Right kidney:  10 8 x 5 6 cm  Left kidney:  10 6 x 5 3 cm  Right kidney Normal echogenicity and contour  No suspicious masses detected  No hydronephrosis   No shadowing calculi  No perinephric fluid collections  Left kidney Normal echogenicity and contour  No suspicious masses detected  No hydronephrosis  No shadowing calculi  No perinephric fluid collections  URETERS: Nonvisualized  BLADDER: The bladder is underdistended  No focal thickening or mass lesions  Impression: No hydronephrosis  Workstation performed: [id number]     Vas Lower Limb Venous Duplex Study, Complete Bilateral    Result Date: 7/4/2019  Narrative:  [facility] REPORT CLINICAL: Indications: Swelling of Limb [R22 4]  Dyspnea [R06 02]  Patient presents with shortness of breath for the past several days  Patient reports short of breath  Risk Factors The patient has history of HTN, Diabetes (Yes), HLD, CKD and previous smoking (quit >10yrs ago)  FINDINGS:  Segment  Right            Left              Impression       Impression       CFV      Normal (Patent)  Normal (Patent)     CONCLUSION:  Impression: RIGHT LOWER LIMB: No evidence of acute or chronic deep vein thrombosis  No evidence of superficial thrombophlebitis noted  Doppler evaluation shows a normal response to augmentation maneuvers  Popliteal, posterior tibial and anterior tibial arterial Doppler waveforms are triphasic  LEFT LOWER LIMB: No evidence of acute or chronic deep vein thrombosis  No evidence of superficial thrombophlebitis noted  Doppler evaluation shows a normal response to augmentation maneuvers  Popliteal, posterior tibial and anterior tibial arterial Doppler waveforms are triphasic  Pulsatile waveforms in the venous system may suggest heart failure or tricuspid valve insufficiency    Technical findings were given to Dr Edie Martinez 13:30  SIGNATURE: Electronically Signed by: Dasia Longoria on 2019-07-04 09:11:23 AM    Ir Permacath Placement    Result Date: 7/12/2019  Narrative: EXAMINATION: Conversion of nontunneled to tunneled hemodialysis catheter INDICATION: 51-year-old male with ESRD underwent a nontunneled hemodialysis catheter placement 4 days prior  Patient returns for conversion of the nontunneled to a tunneled hemodialysis catheter  CONTRAST: N/A FLUOROSCOPY TIME:   0 4 min IMAGES:  2 ANESTHESIA: Moderate sedation and local lidocaine PROCEDURE:  The patient was identified verbally and by wristband  Timeout was performed  Informed consent was obtained  Following obtaining informed consent, the patient was prepped and draped in the usual sterile fashion  All elements of maximal sterile barrier technique, cap and mask and sterile gown and sterile gloves and sterile full-body drape and hand hygiene and 2% chlorhexidine for cutaneous antisepsis  1% local lidocaine was infiltrated in the skin and subcutaneous tissues overlying the right anterior chest wall  A 1 cm incision was made in the right anterior chest wall and a 14  5-Faroese 28 cm Medcomp Hemo-Flow double-lumen dialysis catheter was tunneled in the subcutaneous tissues and brought out at the existing right IJV catheter site  A stiff Amplatz wire was advanced through the existing nontunneled hemodialysis catheter into the IVC  The existing hemodialysis catheter was removed and a peel-away sheath placed  The new tunneled hemodialysis catheter was inserted through the peel-away sheath, followed by removal of the sheath  Final positioning of the catheter was verified under fluoroscopy  Catheter was found to aspirate and flush easily  Catheter was secured to skin using 2-0 Prolene suture  The right IJV puncture site was closed using Histoacryl glue  Sterile antibiotic dressings were applied  The patient tolerated the procedure well without complication  The patient left the IR department in stable condition  Findings: Successful conversion of nontunneled to a tunneled hemodialysis catheter using 14  5-Faroese 28 cm Medcomp Hemo-Flow double-lumen catheter       Impression: Impression: Successful fluoroscopy-guided conversion of nontunneled to a tunneled hemodialysis catheter  Workstation performed: [id number]     Ir Temp Hd Cath    Result Date: 7/8/2019  Narrative: Temporary dialysis catheter placement  Clinical History: Renal failure  Fluoro time: 0 4mins Contrast: None Number of Images: 4 Conscious sedation time: None Radiation Dose: 12 65 mGy Technique: The patient was brought to the interventional radiology suite and identified verbally and by wrist band  The patient was placed supine on the table  The right internal jugular vein was evaluated as a potential access site with ultrasound  The vessel was found to be patent and compressible  The right neck and upper chest were prepped and draped in the usual sterile fashion  All elements of maximal sterile barrier technique were followed (cap, mask, sterile gown, sterile gloves, large sterile sheet, hand hygiene, and 2% chlorhexidine for cutaneous antisepsis)  Lidocaine was administered to the skin and a small skin incision was made  Under ultrasound guidance, utilizing sterile ultrasound technique with sterile gel and a sterile probe cover, the right internal jugular vein was accessed using single wall Seldinger technique  Static images of real time needle entry into the vessel were obtained  A 0 018 wire was then advanced through the needle into the central venous system  The needle was removed, and a 5 Gibraltarian coaxial dilator was inserted  An Amplatz wire was inserted through the outer dilator, and after tract dilatation a 14 Gibraltarian Schon XL catheter was placed over the wire  The catheter tip was then positioned in the right atrium under fluoroscopic control  The external portion  of the catheter was sutured to the skin with 2-0 Prolene  The catheter aspirated and flushed well  A sterile dressing was applied and 1,000 unit heparin/cc solution was administered into each of the lumens  Impression: Impression: 1   Successful ultrasound and fluoroscopically guided placement of a 15 cm temporary dialysis Schon XL catheter via the right internal jugular vein  The tip of the catheter is in the right atrium and may be used immediately  Workstation performed: [id number]       Cardiac testing:   Results for orders placed during the hospital encounter of 19   Echo complete with contrast if indicated    Narrative Bob [address]  [address] [facility]  Liz [address] [address]  [phone]    Transthoracic Echocardiogram  2D, M-mode, Doppler, and Color Doppler    Study date:  2019    Patient: Court Acevedo  MR number: [id number]  Account number: [de-identified]  : [id number]  Age: 66 years  Gender: Male  Status: Inpatient  Location: Bedside  Height: 73 in  Weight: 230 6 lb  BP: 124/ 68 mmHg    Indications: Cariomyopathy    Diagnoses: I42 9 - Cardiomyopathy, unspecified    Sonographer:  ROHINI Martin  Primary Physician:  Delmar Cortez MD  Referring Physician:  Pam Garcia DO  Group:  [facility]  Interpreting Physician:  Pam Garcia DO    SUMMARY    LEFT VENTRICLE:  Systolic function was normal by visual assessment  Ejection fraction was estimated to be 55 %  There were no regional wall motion abnormalities  There was moderate concentric hypertrophy  Doppler parameters were consistent with restrictive physiology, indicative of decreased left ventricular diastolic compliance and/or increased left atrial pressure  VENTRICULAR SEPTUM:  There was paradoxical motion  These changes are consistent with a post-thoracotomy state  MITRAL VALVE:  There was mild to moderate regurgitation  AORTIC VALVE:  There was no evidence for stenosis  There was no regurgitation  TRICUSPID VALVE:  There was mild regurgitation  Pulmonary artery systolic pressure was mildly increased  HISTORY: PRIOR HISTORY: HTN, DM, CAD, HCL, CABG, Bladder Cancer, Arthritis    PROCEDURE: The procedure was performed at the bedside  This was a routine study  The transthoracic approach was used   The study included complete 2D imaging, M-mode, complete spectral Doppler, and color Doppler  The heart rate was 60 bpm,  at the start of the study  Image quality was adequate  LEFT VENTRICLE: Size was normal  Systolic function was normal by visual assessment  Ejection fraction was estimated to be 55 %  There were no regional wall motion abnormalities  There was moderate concentric hypertrophy  DOPPLER: Doppler  parameters were consistent with restrictive physiology, indicative of decreased left ventricular diastolic compliance and/or increased left atrial pressure  Doppler parameters were consistent with elevated mean left atrial filling pressure  VENTRICULAR SEPTUM: There was paradoxical motion  These changes are consistent with a post-thoracotomy state  RIGHT VENTRICLE: The size was normal  Systolic function was normal  DOPPLER: Systolic pressure was within the normal range  LEFT ATRIUM: The atrium was moderately dilated  RIGHT ATRIUM: The atrium was mildly dilated  MITRAL VALVE: Valve structure was normal  There was normal leaflet separation  No echocardiographic evidence for prolapse  DOPPLER: The transmitral velocity was within the normal range  There was no evidence for stenosis  There was mild to  moderate regurgitation  AORTIC VALVE: The valve was trileaflet  Leaflets exhibited normal thickness, normal cuspal separation, and sclerosis  DOPPLER: Transaortic velocity was within the normal range  There was no evidence for stenosis  There was no  regurgitation  TRICUSPID VALVE: The valve structure was normal  There was normal leaflet separation  DOPPLER: The transtricuspid velocity was within the normal range  There was mild regurgitation  Pulmonary artery systolic pressure was mildly increased  Estimated peak PA pressure was 44 mmHg  PULMONIC VALVE: Leaflets exhibited normal thickness, no calcification, and normal cuspal separation   DOPPLER: The transpulmonic velocity was within the normal range  There was no regurgitation  PERICARDIUM: There was no thickening  There was no pericardial effusion  AORTA: The root exhibited normal size  PULMONARY ARTERY: The size was normal  The morphology appeared normal     SYSTEM MEASUREMENT TABLES    2D mode  AoR Diam 2D: 3 4 cm  LA Diam (2D): 4 9 cm  LA/Ao (2D): 1 44  FS (2D Teich): 28 4 %  IVSd (2D): 1 29 cm  LVDEV: 101 cmï¾³  LVESV: 45 8 cmï¾³  LVIDd(2D): 4 68 cm  LVISd (2D): 3 35 cm  LVPWd (2D): 1 26 cm  SV (Teich): 55 2 cmï¾³    Apical four chamber  LVEF A4C: 52 %    Unspecified Scan Mode  MV Peak E Darvin  Mean: 1160 mm/s  MVA (PHT): 5 24 cmï¾²  PHT: 42 ms  Max P mm[Hg]  V Max: 3020 mm/s  Vmax: 2780 mm/s  RA Area: 19 6 cmï¾²  RA Volume: 49 7 cmï¾³  TAPSE: 1 5 cm    [employer] Accredited Echocardiography Laboratory    Prepared and electronically signed by    Linda Collins DO  Signed 2019 14:05:34         Marly Castillo        "This note has been constructed using a voice recognition system  Therefore there may be syntax, spelling, and/or grammatical errors   Please call if you have any questions  "

## 2019-07-15 NOTE — SOCIAL WORK
Discharge ordered  Patient will be going to [facility] today via [facility] wheelchair van at 5:30 p m  Nurse, [facility] and patient made aware  SW arranged dialysis transport for tomorrow as well through [facility]

## 2019-07-15 NOTE — HEMODIALYSIS
Pt completed HD tx using perm cath  Pt clotted half way through tx, system changed, tx resumed, able to maintain prescribed BFR  VS stable throughout tx  Removed 2 kg  Post weight 98 2 kg

## 2019-07-15 NOTE — PROGRESS NOTES
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 followed by [facility] cardiology group as an outpatient  2  Compensated acute on chronic diastolic heart failure from cardiorenal syndrome  3  Likely had type 2 NSTEMI with nonischemic nuclear stress study with LV cavity dilatation, asymptomatic  4  Treated dyslipidemia  5  Controlled essential hypertension  6  Acute kidney injury on chronic kidney disease, stage III  7  Anemia of chronic kidney disease  8  Type 2 diabetes mellitus, insulin dependent       PLAN:    1  Concur with treatment plan which includes hemodialysis tomorrow  2  Rounds tomorrow for cardiology will be by Dr Sandrine Whitley        Current Facility-Administered Medications   Medication Dose Route Frequency Provider Last Rate Last Dose    acetaminophen (TYLENOL) tablet 650 mg  650 mg Oral Q6H PRN Sean Alvarez MD        ALPRAZolam (XANAX) tablet 0 25 mg  0 25 mg Oral HS Sean Alvarez MD   0 25 mg at 07/13/19 2135    aluminum-magnesium hydroxide-simethicone (MYLANTA) 200-200-20 mg/5 mL oral suspension 30 mL  30 mL Oral Q4H PRN Rosa Gamboa MD   30 mL at 07/06/19 2035    amLODIPine (NORVASC) tablet 5 mg  5 mg Oral BID Sean Alvarez MD   5 mg at 07/14/19 1710    aspirin (ECOTRIN LOW STRENGTH) EC tablet 81 mg  81 mg Oral Daily Sean Alvarez MD   81 mg at 07/14/19 0848    atorvastatin (LIPITOR) tablet 80 mg  80 mg Oral Daily With TABITHA Garcia   80 mg at 07/14/19 1557    escitalopram (LEXAPRO) tablet 10 mg  10 mg Oral HS Sean Alvarez MD   10 mg at 07/13/19 2135    furosemide (LASIX) tablet 80 mg  80 mg Oral BID (diuretic) Shmuel Jeff MD   80 mg at 07/14/19 1556    hydrALAZINE (APRESOLINE) injection 10 mg  10 mg Intravenous Q6H PRN Sean Alvarez MD   10 mg at 07/03/19 1817    hydrALAZINE (APRESOLINE) tablet 50 mg  50 mg Oral Q8H [address] TABITHA Espinal   50 mg at 07/13/19 2135    insulin detemir (LEVEMIR) subcutaneous injection 20 Units  20 Units Subcutaneous Q12H [address] Sean MD Antonio   20 Units at 07/14/19 0847    insulin lispro (HumaLOG) 100 units/mL subcutaneous injection 1-6 Units  1-6 Units Subcutaneous HS Sean Alvarez MD   5 Units at 07/13/19 2139    insulin lispro (HumaLOG) 100 units/mL subcutaneous injection 2-12 Units  2-12 Units Subcutaneous TID AC Sean Alvarez MD   8 Units at 07/14/19 1710    ipratropium-albuterol (DUO-NEB) 0 5-2 5 mg/3 mL inhalation solution 3 mL  3 mL Nebulization TID Sean Alvarez MD   3 mL at 07/14/19 1908    isosorbide mononitrate (IMDUR) 24 hr tablet 30 mg  30 mg Oral Daily Navtej Isabella, DO   30 mg at 07/14/19 0848    metoprolol tartrate (LOPRESSOR) tablet 50 mg  50 mg Oral Q12H [address] Navtej Isabella, DO   50 mg at 07/14/19 0848    nitroglycerin (NITROSTAT) SL tablet 0 4 mg  0 4 mg Sublingual Q5 Min PRN Navtej Isabella, DO   0 4 mg at 07/05/19 1652    pneumococcal 13-valent conjugate vaccine (PREVNAR-13) IM injection 0 5 mL  0 5 mL Intramuscular Prior to discharge Pj Croft MD        polyethylene glycol (MIRALAX) packet 17 g  17 g Oral Daily PRN Pj Croft MD             HPI:    Mr Sivan Taveras denies new cardiopulmonary or  medical symptoms  He has been able to ambulate without dyspnea or chest discomfort  Objective   Patient has negative fluid balance since admission of 9 1 liters  Average systolic blood pressures today are in the 120s through 130s and heart rates are averaging 54 bpm to 60 bpm   Oxygen saturations have been normal       Physical Exam:   /60 (BP Location: Left arm)   Pulse 70   Temp 97 7 °F (36 5 °C) (Axillary)   Resp 20   Ht 6' 1" (1 854 m)   Wt 97 5 kg (214 lb 15 2 oz)   SpO2 99%   BMI 28 36 kg/m²   Body mass index is 28 36 kg/m²  General Appearance:  Alert, cooperative, no distress, appears stated age and is moderately overweight     Head:  Normocephalic, without obvious abnormality, atraumatic   Eyes:  PERRL, conjunctiva/corneas clear, EOM's intact,   both eyes   Ears:  Normal TM's and external ear canals, both ears Nose: Nares normal, septum midline, mucosa normal, no drainage or sinus tenderness   Throat: Lips, mucosa, and tongue normal; teeth and gums normal   Neck: Supple, symmetrical, trachea midline, no adenopathy, thyroid: not enlarged, symmetric, no tenderness/mass/nodules, no carotid bruit or JVD   Back:   Symmetric, no curvature, ROM normal, no CVA tenderness   Lungs:   Clear to auscultation bilaterally, respirations unlabored   Chest Wall:  No tenderness or deformity   Heart:  Regular rate and rhythm, S1, S2 normal, no murmur, rub or gallop   Abdomen:   Soft, non-tender, bowel sounds active all four quadrants,  no masses, no organomegaly with mild obesity noted   Extremities: Extremities normal, atraumatic, no cyanosis or edema   Pulses: 2+ and symmetric   Skin: Skin showed pale color, with normal texture, turgor and no rashes or lesions   Lymph nodes: Cervical, supraclavicular, and axillary nodes normal   Neurologic: Normal         Cardiographics              Telemetry:  Sinus bradycardia                        Lab Review   Recent Results (from the past 24 hour(s))   Fingerstick Glucose (POCT)    Collection Time: 07/13/19  8:35 PM   Result Value Ref Range    POC Glucose 329 (H) 65 - 140 mg/dl   CBC and differential    Collection Time: 07/14/19  6:12 AM   Result Value Ref Range    WBC 7 93 4 31 - 10 16 Thousand/uL    RBC 3 15 (L) 3 88 - 5 62 Million/uL    Hemoglobin 9 6 (L) 12 0 - 17 0 g/dL    Hematocrit 29 1 (L) 36 5 - 49 3 %    MCV 92 82 - 98 fL    MCH 30 5 26 8 - 34 3 pg    MCHC 33 0 31 4 - 37 4 g/dL    RDW 13 4 11 6 - 15 1 %    MPV 10 6 8 9 - 12 7 fL    Platelets 107 (L) 742 - 390 Thousands/uL    nRBC 0 /100 WBCs    Neutrophils Relative 68 43 - 75 %    Immat GRANS % 1 0 - 2 %    Lymphocytes Relative 15 14 - 44 %    Monocytes Relative 12 4 - 12 %    Eosinophils Relative 4 0 - 6 %    Basophils Relative 0 0 - 1 %    Neutrophils Absolute 5 42 1 85 - 7 62 Thousands/µL    Immature Grans Absolute 0 04 0 00 - 0 20 Thousand/uL    Lymphocytes Absolute 1 22 0 60 - 4 47 Thousands/µL    Monocytes Absolute 0 91 0 17 - 1 22 Thousand/µL    Eosinophils Absolute 0 31 0 00 - 0 61 Thousand/µL    Basophils Absolute 0 03 0 00 - 0 10 Thousands/µL   Comprehensive metabolic panel    Collection Time: 07/14/19  6:12 AM   Result Value Ref Range    Sodium 134 (L) 136 - 145 mmol/L    Potassium 3 7 3 5 - 5 3 mmol/L    Chloride 98 (L) 100 - 108 mmol/L    CO2 29 21 - 32 mmol/L    ANION GAP 7 4 - 13 mmol/L    BUN 58 (H) 5 - 25 mg/dL    Creatinine 2 97 (H) 0 60 - 1 30 mg/dL    Glucose 184 (H) 65 - 140 mg/dL    Calcium 8 1 (L) 8 3 - 10 1 mg/dL    AST 46 (H) 5 - 45 U/L    ALT 65 12 - 78 U/L    Alkaline Phosphatase 157 (H) 46 - 116 U/L    Total Protein 6 6 6 4 - 8 2 g/dL    Albumin 2 4 (L) 3 5 - 5 0 g/dL    Total Bilirubin 1 10 (H) 0 20 - 1 00 mg/dL    eGFR 19 ml/min/1 73sq m   Fingerstick Glucose (POCT)    Collection Time: 07/14/19  7:06 AM   Result Value Ref Range    POC Glucose 184 (H) 65 - 140 mg/dl   Fingerstick Glucose (POCT)    Collection Time: 07/14/19 11:30 AM   Result Value Ref Range    POC Glucose 287 (H) 65 - 140 mg/dl   Fingerstick Glucose (POCT)    Collection Time: 07/14/19  4:00 PM   Result Value Ref Range    POC Glucose 313 (H) 65 - 140 mg/dl                 LENORA Mcknight

## 2019-07-15 NOTE — PROGRESS NOTES
[address] [facility] Emergency NOTE   Reggie Serum 66 y o  male MRN: [id number]  Unit/Bed#: [address] [place] [address] Encounter: [id number]  Reason for Consult: BRIAN on CKD III now on dialysis    ASSESSMENT and PLAN:    79-year-old male with a past medical history of CKD, hypertension, hyperlipidemia, CAD with prior CABG and PCI, arthritis, bladder cancer status post partial cystectomy, does not follow with a nephrologist as an outpatient, who presents to [facility] on 07/03 with shortness of breath and lower extremity edema  Nephrology was consulted for CKD and acute kidney injury  1) ACKI on CKD III - baseline creatinine 1 2-1 5    Initially there is concern for cardiorenal syndrome Versus ATN     -admission creatinine 1 8 mg/dL  -urinalysis with moderate blood, but no significant RBC, proteinuria,  -renal ultrasound-without hydronephrosis   -given rising creatinine, diuretics were discontinued on 07/06    -given continued worsening of clinical status, patient was restarted on diuretic with Lasix drip on 07/07    -U PCR is at 2 g   - SPEP-No monoclonal bands  -UPEP-no monoclonal bands  -light chains-  -LANETTE-negative  -Anca-negative   -anti GBM negative  -complements-within normal range  -hepatitis panel-his hepatitis B and C antibody unrevealing   - hemodialysis started on 07/08 through PermCath   -patient will be on Monday, Wednesday, Friday dialysis for now   -may need to consider renal biopsy if there is no recovery as outpatient  -to start dialysis [facility] TTS  - HD today and then discharge per Primary Team    2) SOB and edema     - initially started on diuretics  concern for decompensated HF   -echocardiogram     3) HTN -stable    -hydralazine was added on admission  -on amlodipine and metoprolol     4) electrolytes -sodium relatively stable  Potassium stable  Phosphorus slightly elevated  Monitor for now      5) acid/base -bicarbonate stable    6) anemia-iron saturation 32%, ferritin 385      SUBJECTIVE / INTERVAL HISTORY:    Urine output 1 L  Blood pressure is 736-835 systolic  Patient denies complaints    No shortness of breath    OBJECTIVE:  Current Weight: Weight - Scale: 99 5 kg (219 lb 5 7 oz)  Vitals:    07/15/19 0532 07/15/19 0600 07/15/19 0742 07/15/19 0844   BP: 132/60   129/63   BP Location:    Right arm   Pulse:   64 68   Resp:   20 16   Temp:    98 1 °F (36 7 °C)   TempSrc:    Oral   SpO2:   94% 94%   Weight:  99 5 kg (219 lb 5 7 oz)     Height:           Intake/Output Summary (Last 24 hours) at 7/15/2019 8014  Last data filed at 7/15/2019 0601  Gross per 24 hour   Intake 280 ml   Output 825 ml   Net -545 ml     General: NAD  Skin: no rash  Eyes: anicteric sclera  ENT: moist mucous membrane  Neck: supple  Chest: CTA b/l, no ronchii, no wheeze, no rubs, no significant rales  CVS: s1s2, no murmur, no gallop, no rub  Abdomen: soft, nontender, nl sounds  Extremities: no significant edema LE b/l  : no chowdhury  Neuro: AAOX3  Psych: normal affect    Medications:    Current Facility-Administered Medications:     acetaminophen (TYLENOL) tablet 650 mg, 650 mg, Oral, Q6H PRN, Sean Alvarez MD    ALPRAZolam (XANAX) tablet 0 25 mg, 0 25 mg, Oral, HS, Sean Alvarez MD, 0 25 mg at 07/14/19 2132    aluminum-magnesium hydroxide-simethicone (MYLANTA) 200-200-20 mg/5 mL oral suspension 30 mL, 30 mL, Oral, Q4H PRN, Simón Cash MD, 30 mL at 07/06/19 2035    amLODIPine (NORVASC) tablet 5 mg, 5 mg, Oral, BID, Sean Alvarez MD, 5 mg at 07/15/19 0849    aspirin (ECOTRIN LOW STRENGTH) EC tablet 81 mg, 81 mg, Oral, Daily, Sean Alvarez MD, 81 mg at 07/15/19 0849    atorvastatin (LIPITOR) tablet 80 mg, 80 mg, Oral, Daily With Dinner, TABITHA Ritter, 80 mg at 07/14/19 1557    escitalopram (LEXAPRO) tablet 10 mg, 10 mg, Oral, HS, Sean Alvarez MD, 10 mg at 07/14/19 2126    furosemide (LASIX) tablet 80 mg, 80 mg, Oral, BID (diuretic), Radha Lacey MD, 80 mg at 07/15/19 0849    hydrALAZINE (APRESOLINE) injection 10 mg, 10 mg, Intravenous, Q6H PRN, Sean Alvarez MD, 10 mg at 07/03/19 1817    hydrALAZINE (APRESOLINE) tablet 50 mg, 50 mg, Oral, Q8H [address], Ammadeleine Rowley, CRNP, 50 mg at 07/15/19 0532    insulin detemir (LEVEMIR) subcutaneous injection 20 Units, 20 Units, Subcutaneous, Q12H [address], Sean Alvarez MD, 20 Units at 07/15/19 0849    insulin lispro (HumaLOG) 100 units/mL subcutaneous injection 1-6 Units, 1-6 Units, Subcutaneous, HS, Sean Alvarez MD, 6 Units at 07/14/19 2133    insulin lispro (HumaLOG) 100 units/mL subcutaneous injection 2-12 Units, 2-12 Units, Subcutaneous, TID AC, 4 Units at 07/15/19 0852 **AND** Fingerstick Glucose (POCT), , , TID AC, Sean Alvarez MD    ipratropium-albuterol (DUO-NEB) 0 5-2 5 mg/3 mL inhalation solution 3 mL, 3 mL, Nebulization, TID, Sean Alvarez MD, 3 mL at 07/15/19 0742    isosorbide mononitrate (IMDUR) 24 hr tablet 30 mg, 30 mg, Oral, Daily, Navtej Isabella, DO, 30 mg at 07/15/19 0849    metoprolol tartrate (LOPRESSOR) tablet 50 mg, 50 mg, Oral, Q12H [place], Tigretevalerie Isabella, DO, 50 mg at 07/15/19 0848    nitroglycerin (NITROSTAT) SL tablet 0 4 mg, 0 4 mg, Sublingual, Q5 Min PRN, Lizzy Isabella, DO, 0 4 mg at 07/05/19 1652    pneumococcal 13-valent conjugate vaccine (PREVNAR-13) IM injection 0 5 mL, 0 5 mL, Intramuscular, Prior to discharge, Sebastian Ng MD    polyethylene glycol (MIRALAX) packet 17 g, 17 g, Oral, Daily PRN, Sebastian Ng MD    Laboratory Results:  Results from last 7 days   Lab Units 07/15/19  0525 07/14/19  0612 07/13/19  0520 07/12/19  0452 07/11/19  0602 07/10/19  0507 07/10/19  0501 07/09/19  0529   WBC Thousand/uL 8 16 7 93 8 30 9 05 7 48  --  7 98 8 95   HEMOGLOBIN g/dL 9 8* 9 6* 9 5* 9 6* 9 3*  --  9 9* 10 1*   HEMATOCRIT % 30 8* 29 1* 29 3* 29 9* 28 7*  --  31 3* 30 5*   PLATELETS Thousands/uL 124* 119* 142* 141* 121*  --  123* 129*   POTASSIUM mmol/L 3 6 3 7 3 8 4 0 4 1 4 5  --  3 9   CHLORIDE mmol/L 98* 98* 99* 99* 100 100  --  100   CO2 mmol/L 26 29 29 29 30 26  --  27   BUN mg/dL 76* 58* 41* 69* 51* 61*  --  76*   CREATININE mg/dL 3 23* 2 97* 2 19* 3 04* 2 63* 2 41*  --  2 80*   CALCIUM mg/dL 8 3 8 1* 8 2* 8 4 8 1* 8 3  --  8 2*   MAGNESIUM mg/dL  --   --   --   --   --  2 9*  --  2 7*   PHOSPHORUS mg/dL 4 4*  --   --   --   --   --   --  3 9

## 2019-07-16 NOTE — SOCIAL WORK
Rec'd Allscipts reply  From Keisha in [place]  Provided option for TTS at 11:30PM chair time  Offered either to pt, states he will stay with closer facility  Keisha made aware  Will follow

## 2019-07-16 NOTE — SOCIAL WORK
Rec'd fax and message from [facility]  Pt has chair time of 2:25PM on MWF schedule  Faxed form to [facility]  Awaiting pt to be medically stable

## 2019-07-16 NOTE — UTILIZATION REVIEW
Notification of Discharge  This is a Notification of Discharge from our facility [address]  Please be advised that this patient has been discharge from our facility  Below you will find the admission and discharge date and time including the patients disposition  PRESENTATION DATE: 7/3/2019 10:13 AM  OBS ADMISSION DATE:   IP ADMISSION DATE: 7/3/19 1624   DISCHARGE DATE: 7/15/2019  6:24 PM  DISPOSITION: Non [facility] SN[facility]U/SNU Non [facility] SNF/TCU/SNU   Admission Orders listed below:  Admission Orders (From admission, onward)    Ordered        07/03/19 1630  Inpatient Admission (expected length of stay for this patient Order details is greater than two midnights)  Once             Please contact the UR Department if additional information is required to close this patient's authorization/case  [address]  Utilization Review Department  Phone: [phone]; Fax [phone]  [email or web address]  ATTENTION: Please call with any questions or concerns to [phone]  and carefully listen to the prompts so that you are directed to the right person  Send all requests for admission clinical reviews, approved or denied determinations and any other requests to fax [phone]   All voicemails are confidential

## 2019-07-18 ENCOUNTER — TELEPHONE (OUTPATIENT)
Dept: OTHER | Facility: HOSPITAL | Age: 79
End: 2019-07-18

## 2019-07-18 NOTE — TELEPHONE ENCOUNTER
Called [address] unit to discuss case  MD not there  Left cell for call back if needed

## 2019-07-18 NOTE — DISCHARGE SUMMARY
Deny Martinez is a 66 y o  male who presents with increasing difficulty breathing and swelling of both lower extremity for last 2 days patient has past medical history of diabetes Hypertension hypertensive heart disease coronary artery disease status post CABG was in usual state of health about 2 days ago when started having some difficulty breathing on minimal activities and last 24 hour patient felt a trouble breathing even with day-to-day activities and some coughing with scanty expectoration and also notation last 2 days patient has both legs were swollen and has been getting worse for last 1 week seen in the office with patient's pulse ox on room air was 87% and patient's respiration rate was 28 and was mild respiratory distress and patient claims that even walking few steps that was making him sit down for few minutes before start any new activities denies any fever chills so was sent to the emergency room where patient evaluated and a chest x-ray showed pleural effusion and suspected pneumonia along with congestive heart failure also had lower extremity venous Doppler no DVT and V/Q scan low probability of PE was admitted for further workup and treatment patient was admitted on the floor telemetry monitoring due to coronary artery disease and congestive heart failure initial cardiology consult requested reviewed see the consult for the findings recommendation nephrology consult requested patient initially started off of the fluid restriction with IV Lasix 40 mg Q 12 chest x-ray shows congestive heart failure cardiomegaly EKG abnormal troponin abnormal suggesting non ST elevation MI treated with her subcu heparin aspirin Plavix and beta-blocker patient showed no response in the CHF with IV Lasix so was increased the dose is 80 mg Q 12 and subsequently put on a the Lasix drip patient's creatinine jumped more than 3 5 with minimal urine output no response in the CHF the also treated with neb treatment started on the NovoLog with sliding scale echocardiogram was done ejection fraction 50% normal also underwent a the [address] nuclear stress test which was nonconclusive and unremarkable no further treatment required for the coronary artery disease as per Cardiology since patient congestive heart failure did not improve and acute on chronic renal failure did not improve underwent the the hemodialysis patient had a PermCath inserted right jugular and treated with hemodialysis patient improved physical therapy started occupational therapy started and was discharged to a short-term rehab with the hemodialysis 3 days a week  Final diagnosis   Acute on chronic diastolic CHF (congestive heart failure) ([address] )  Acute on chronic renal failure  On hemodialysis    CKD (chronic kidney disease), stage III ([address] )    Benign hypertension with chronic kidney disease, stage III ([address] )   Acute on chronic renal failure as evident the creatinine 3 2 and baseline creatinine 1 3  Type 1 diabetes  Non ST-elevation MI present on admission  CKD stage 3 2 to type 1 diabetes  Anemia  Hypertension uncontrolled  Suspected pneumonia  Patient was transferred to short-term rehab for activities follow-up medication see my after visit summary patient to get hemodialysis 3 days a week to be followed by Cardiology and Nephrology as outpatient

## 2019-09-04 ENCOUNTER — APPOINTMENT (OUTPATIENT)
Dept: LAB | Facility: HOSPITAL | Age: 79
End: 2019-09-04
Payer: COMMERCIAL

## 2019-09-04 ENCOUNTER — TRANSCRIBE ORDERS (OUTPATIENT)
Dept: ADMINISTRATIVE | Facility: HOSPITAL | Age: 79
End: 2019-09-04

## 2019-09-04 DIAGNOSIS — N18.30 CHRONIC KIDNEY DISEASE, STAGE III (MODERATE) (HCC): Primary | ICD-10-CM

## 2019-09-04 DIAGNOSIS — N18.30 CHRONIC KIDNEY DISEASE, STAGE III (MODERATE) (HCC): ICD-10-CM

## 2019-09-04 LAB
ALBUMIN SERPL BCP-MCNC: 3.5 G/DL (ref 3.5–5)
ANION GAP SERPL CALCULATED.3IONS-SCNC: 7 MMOL/L (ref 4–13)
BASOPHILS # BLD AUTO: 0.03 THOUSANDS/ΜL (ref 0–0.1)
BASOPHILS NFR BLD AUTO: 0 % (ref 0–1)
BUN SERPL-MCNC: 80 MG/DL (ref 5–25)
CALCIUM SERPL-MCNC: 8.9 MG/DL (ref 8.3–10.1)
CHLORIDE SERPL-SCNC: 101 MMOL/L (ref 100–108)
CO2 SERPL-SCNC: 31 MMOL/L (ref 21–32)
CREAT SERPL-MCNC: 2.6 MG/DL (ref 0.6–1.3)
EOSINOPHIL # BLD AUTO: 0.28 THOUSAND/ΜL (ref 0–0.61)
EOSINOPHIL NFR BLD AUTO: 2 % (ref 0–6)
ERYTHROCYTE [DISTWIDTH] IN BLOOD BY AUTOMATED COUNT: 13.8 % (ref 11.6–15.1)
GFR SERPL CREATININE-BSD FRML MDRD: 23 ML/MIN/1.73SQ M
GLUCOSE SERPL-MCNC: 127 MG/DL (ref 65–140)
HCT VFR BLD AUTO: 38.3 % (ref 36.5–49.3)
HGB BLD-MCNC: 12.4 G/DL (ref 12–17)
IMM GRANULOCYTES # BLD AUTO: 0.05 THOUSAND/UL (ref 0–0.2)
IMM GRANULOCYTES NFR BLD AUTO: 0 % (ref 0–2)
LYMPHOCYTES # BLD AUTO: 2.1 THOUSANDS/ΜL (ref 0.6–4.47)
LYMPHOCYTES NFR BLD AUTO: 18 % (ref 14–44)
MCH RBC QN AUTO: 31.4 PG (ref 26.8–34.3)
MCHC RBC AUTO-ENTMCNC: 32.4 G/DL (ref 31.4–37.4)
MCV RBC AUTO: 97 FL (ref 82–98)
MONOCYTES # BLD AUTO: 1.04 THOUSAND/ΜL (ref 0.17–1.22)
MONOCYTES NFR BLD AUTO: 9 % (ref 4–12)
NEUTROPHILS # BLD AUTO: 8.32 THOUSANDS/ΜL (ref 1.85–7.62)
NEUTS SEG NFR BLD AUTO: 71 % (ref 43–75)
NRBC BLD AUTO-RTO: 0 /100 WBCS
PHOSPHATE SERPL-MCNC: 4.9 MG/DL (ref 2.3–4.1)
PLATELET # BLD AUTO: 155 THOUSANDS/UL (ref 149–390)
PMV BLD AUTO: 10.9 FL (ref 8.9–12.7)
POTASSIUM SERPL-SCNC: 4.3 MMOL/L (ref 3.5–5.3)
RBC # BLD AUTO: 3.95 MILLION/UL (ref 3.88–5.62)
SODIUM SERPL-SCNC: 139 MMOL/L (ref 136–145)
WBC # BLD AUTO: 11.82 THOUSAND/UL (ref 4.31–10.16)

## 2019-09-04 PROCEDURE — 80069 RENAL FUNCTION PANEL: CPT

## 2019-09-04 PROCEDURE — 36415 COLL VENOUS BLD VENIPUNCTURE: CPT

## 2019-09-04 PROCEDURE — 85025 COMPLETE CBC W/AUTO DIFF WBC: CPT | Performed by: PHYSICIAN ASSISTANT

## 2019-09-05 ENCOUNTER — TELEPHONE (OUTPATIENT)
Dept: RADIOLOGY | Facility: HOSPITAL | Age: 79
End: 2019-09-05

## 2019-09-09 ENCOUNTER — HOSPITAL ENCOUNTER (OUTPATIENT)
Dept: NON INVASIVE DIAGNOSTICS | Facility: HOSPITAL | Age: 79
Discharge: HOME | End: 2019-09-09
Admitting: RADIOLOGY
Payer: COMMERCIAL

## 2019-09-09 DIAGNOSIS — N17.9 AKI (ACUTE KIDNEY INJURY) (HCC): ICD-10-CM

## 2019-09-09 PROCEDURE — 36589 REMOVAL TUNNELED CV CATH: CPT | Performed by: RADIOLOGY

## 2019-09-09 PROCEDURE — 36589 REMOVAL TUNNELED CV CATH: CPT

## 2019-09-09 RX ORDER — LIDOCAINE HYDROCHLORIDE AND EPINEPHRINE 10; 10 MG/ML; UG/ML
INJECTION, SOLUTION INFILTRATION; PERINEURAL CODE/TRAUMA/SEDATION MEDICATION
Status: COMPLETED | OUTPATIENT
Start: 2019-09-09 | End: 2019-09-09

## 2019-09-09 RX ADMIN — LIDOCAINE HYDROCHLORIDE AND EPINEPHRINE 8 ML: 10; 10 INJECTION, SOLUTION INFILTRATION; PERINEURAL at 13:25

## 2019-09-09 NOTE — SEDATION DOCUMENTATION
Perm cath successfully removed  Steri strips and DSD placed  Patient tolerated well and ambulated home in stable condition

## 2019-09-09 NOTE — BRIEF OP NOTE (RAD/CATH)
[facility] REMOVAL  Procedure Note    PATIENT NAME: Deny Martinez  : 1940  MRN: [id number]     Pre-op Diagnosis:   1  BRIAN (acute kidney injury) ([facility] [address] )      Post-op Diagnosis:   1   BRIAN (acute kidney injury) [facility])        Surgeon:   Maine Ham MD  Assistants:     No qualified resident was available, Resident is only observing    Estimated Blood Loss: minimal  Findings:  Right chest tunneled dialysis catheter removed in its entirety    Specimens: none    Complications:  None immediate    Anesthesia: Local    Maine Ham MD     Date: 2019  Time: 1:43 PM

## 2021-01-01 ENCOUNTER — APPOINTMENT (EMERGENCY)
Dept: RADIOLOGY | Facility: HOSPITAL | Age: 81
DRG: 281 | End: 2021-01-01
Payer: COMMERCIAL

## 2021-01-01 ENCOUNTER — APPOINTMENT (OUTPATIENT)
Dept: NON INVASIVE DIAGNOSTICS | Facility: HOSPITAL | Age: 81
DRG: 281 | End: 2021-01-01
Payer: COMMERCIAL

## 2021-01-01 ENCOUNTER — HOSPITAL ENCOUNTER (INPATIENT)
Facility: HOSPITAL | Age: 81
LOS: 2 days | Discharge: HOME HEALTH | DRG: 281 | End: 2021-08-19
Attending: EMERGENCY MEDICINE | Admitting: INTERNAL MEDICINE
Payer: COMMERCIAL

## 2021-01-01 ENCOUNTER — TELEPHONE (OUTPATIENT)
Dept: NEUROLOGY | Facility: CLINIC | Age: 81
End: 2021-01-01

## 2021-01-01 ENCOUNTER — TELEPHONE (OUTPATIENT)
Dept: CARDIOLOGY CLINIC | Facility: CLINIC | Age: 81
End: 2021-01-01

## 2021-01-01 VITALS
HEIGHT: 73 IN | DIASTOLIC BLOOD PRESSURE: 58 MMHG | WEIGHT: 196.43 LBS | SYSTOLIC BLOOD PRESSURE: 105 MMHG | BODY MASS INDEX: 26.03 KG/M2 | TEMPERATURE: 98.3 F | OXYGEN SATURATION: 97 % | HEART RATE: 75 BPM | RESPIRATION RATE: 18 BRPM

## 2021-01-01 VITALS
DIASTOLIC BLOOD PRESSURE: 61 MMHG | HEART RATE: 64 BPM | WEIGHT: 194.5 LBS | HEIGHT: 73 IN | OXYGEN SATURATION: 97 % | SYSTOLIC BLOOD PRESSURE: 136 MMHG | BODY MASS INDEX: 25.78 KG/M2 | TEMPERATURE: 97.8 F | RESPIRATION RATE: 18 BRPM

## 2021-01-01 DIAGNOSIS — I21.4 NSTEMI (NON-ST ELEVATED MYOCARDIAL INFARCTION) (HCC): ICD-10-CM

## 2021-01-01 DIAGNOSIS — K21.9 GERD (GASTROESOPHAGEAL REFLUX DISEASE): ICD-10-CM

## 2021-01-01 DIAGNOSIS — N18.9 ACUTE RENAL FAILURE SUPERIMPOSED ON CHRONIC KIDNEY DISEASE (HCC): ICD-10-CM

## 2021-01-01 DIAGNOSIS — I12.9 BENIGN HYPERTENSION WITH CHRONIC KIDNEY DISEASE, STAGE III (HCC): ICD-10-CM

## 2021-01-01 DIAGNOSIS — I21.A1 TYPE 2 MYOCARDIAL INFARCTION (HCC): ICD-10-CM

## 2021-01-01 DIAGNOSIS — E11.65 TYPE 2 DIABETES MELLITUS WITH HYPERGLYCEMIA, WITH LONG-TERM CURRENT USE OF INSULIN (HCC): ICD-10-CM

## 2021-01-01 DIAGNOSIS — N18.4 CKD (CHRONIC KIDNEY DISEASE) STAGE 4, GFR 15-29 ML/MIN (HCC): ICD-10-CM

## 2021-01-01 DIAGNOSIS — N17.9 ACUTE RENAL FAILURE SUPERIMPOSED ON CHRONIC KIDNEY DISEASE (HCC): ICD-10-CM

## 2021-01-01 DIAGNOSIS — R77.8 ELEVATED TROPONIN: ICD-10-CM

## 2021-01-01 DIAGNOSIS — N18.30 ACUTE RENAL FAILURE WITH ACUTE TUBULAR NECROSIS SUPERIMPOSED ON STAGE 3 CHRONIC KIDNEY DISEASE, UNSPECIFIED WHETHER STAGE 3A OR 3B CKD (HCC): ICD-10-CM

## 2021-01-01 DIAGNOSIS — R07.9 CHEST PAIN: Primary | ICD-10-CM

## 2021-01-01 DIAGNOSIS — N17.0 ACUTE RENAL FAILURE WITH ACUTE TUBULAR NECROSIS SUPERIMPOSED ON STAGE 3 CHRONIC KIDNEY DISEASE, UNSPECIFIED WHETHER STAGE 3A OR 3B CKD (HCC): ICD-10-CM

## 2021-01-01 DIAGNOSIS — N18.30 BENIGN HYPERTENSION WITH CHRONIC KIDNEY DISEASE, STAGE III (HCC): ICD-10-CM

## 2021-01-01 DIAGNOSIS — I25.10 CORONARY ARTERY DISEASE INVOLVING NATIVE HEART WITHOUT ANGINA PECTORIS, UNSPECIFIED VESSEL OR LESION TYPE: ICD-10-CM

## 2021-01-01 DIAGNOSIS — Z79.4 TYPE 2 DIABETES MELLITUS WITH HYPERGLYCEMIA, WITH LONG-TERM CURRENT USE OF INSULIN (HCC): ICD-10-CM

## 2021-01-01 DIAGNOSIS — I50.33 ACUTE ON CHRONIC DIASTOLIC CONGESTIVE HEART FAILURE (HCC): ICD-10-CM

## 2021-01-01 LAB
ALBUMIN SERPL BCP-MCNC: 3.8 G/DL (ref 3.5–5)
ALP SERPL-CCNC: 131 U/L (ref 46–116)
ALT SERPL W P-5'-P-CCNC: 70 U/L (ref 12–78)
ANION GAP SERPL CALCULATED.3IONS-SCNC: 10 MMOL/L (ref 4–13)
ANION GAP SERPL CALCULATED.3IONS-SCNC: 12 MMOL/L (ref 4–13)
ANION GAP SERPL CALCULATED.3IONS-SCNC: 12 MMOL/L (ref 4–13)
ANION GAP SERPL CALCULATED.3IONS-SCNC: 14 MMOL/L (ref 4–13)
APTT PPP: 102 SECONDS (ref 23–37)
APTT PPP: 175 SECONDS (ref 23–37)
APTT PPP: 29 SECONDS (ref 23–37)
APTT PPP: 60 SECONDS (ref 23–37)
APTT PPP: 76 SECONDS (ref 23–37)
APTT PPP: 76 SECONDS (ref 23–37)
AST SERPL W P-5'-P-CCNC: 39 U/L (ref 5–45)
ATRIAL RATE: 63 BPM
ATRIAL RATE: 80 BPM
ATRIAL RATE: 80 BPM
BASOPHILS # BLD AUTO: 0.03 THOUSANDS/ΜL (ref 0–0.1)
BASOPHILS # BLD AUTO: 0.04 THOUSANDS/ΜL (ref 0–0.1)
BASOPHILS NFR BLD AUTO: 0 % (ref 0–1)
BASOPHILS NFR BLD AUTO: 0 % (ref 0–1)
BILIRUB SERPL-MCNC: 1.4 MG/DL (ref 0.2–1)
BUN SERPL-MCNC: 66 MG/DL (ref 5–25)
BUN SERPL-MCNC: 67 MG/DL (ref 5–25)
BUN SERPL-MCNC: 71 MG/DL (ref 5–25)
BUN SERPL-MCNC: 71 MG/DL (ref 5–25)
CALCIUM SERPL-MCNC: 8.3 MG/DL (ref 8.3–10.1)
CALCIUM SERPL-MCNC: 8.7 MG/DL (ref 8.3–10.1)
CALCIUM SERPL-MCNC: 8.9 MG/DL (ref 8.3–10.1)
CALCIUM SERPL-MCNC: 9.2 MG/DL (ref 8.3–10.1)
CHLORIDE SERPL-SCNC: 102 MMOL/L (ref 100–108)
CHLORIDE SERPL-SCNC: 103 MMOL/L (ref 100–108)
CHLORIDE SERPL-SCNC: 104 MMOL/L (ref 100–108)
CHLORIDE SERPL-SCNC: 106 MMOL/L (ref 100–108)
CO2 SERPL-SCNC: 21 MMOL/L (ref 21–32)
CO2 SERPL-SCNC: 22 MMOL/L (ref 21–32)
CO2 SERPL-SCNC: 25 MMOL/L (ref 21–32)
CO2 SERPL-SCNC: 25 MMOL/L (ref 21–32)
CREAT SERPL-MCNC: 2.03 MG/DL (ref 0.6–1.3)
CREAT SERPL-MCNC: 2.07 MG/DL (ref 0.6–1.3)
CREAT SERPL-MCNC: 2.16 MG/DL (ref 0.6–1.3)
CREAT SERPL-MCNC: 2.45 MG/DL (ref 0.6–1.3)
EOSINOPHIL # BLD AUTO: 0.01 THOUSAND/ΜL (ref 0–0.61)
EOSINOPHIL # BLD AUTO: 0.07 THOUSAND/ΜL (ref 0–0.61)
EOSINOPHIL NFR BLD AUTO: 0 % (ref 0–6)
EOSINOPHIL NFR BLD AUTO: 1 % (ref 0–6)
ERYTHROCYTE [DISTWIDTH] IN BLOOD BY AUTOMATED COUNT: 13.2 % (ref 11.6–15.1)
ERYTHROCYTE [DISTWIDTH] IN BLOOD BY AUTOMATED COUNT: 13.3 % (ref 11.6–15.1)
ERYTHROCYTE [DISTWIDTH] IN BLOOD BY AUTOMATED COUNT: 13.3 % (ref 11.6–15.1)
GFR SERPL CREATININE-BSD FRML MDRD: 24 ML/MIN/1.73SQ M
GFR SERPL CREATININE-BSD FRML MDRD: 28 ML/MIN/1.73SQ M
GFR SERPL CREATININE-BSD FRML MDRD: 29 ML/MIN/1.73SQ M
GFR SERPL CREATININE-BSD FRML MDRD: 30 ML/MIN/1.73SQ M
GLUCOSE SERPL-MCNC: 105 MG/DL (ref 65–140)
GLUCOSE SERPL-MCNC: 106 MG/DL (ref 65–140)
GLUCOSE SERPL-MCNC: 135 MG/DL (ref 65–140)
GLUCOSE SERPL-MCNC: 153 MG/DL (ref 65–140)
GLUCOSE SERPL-MCNC: 160 MG/DL (ref 65–140)
GLUCOSE SERPL-MCNC: 213 MG/DL (ref 65–140)
GLUCOSE SERPL-MCNC: 217 MG/DL (ref 65–140)
GLUCOSE SERPL-MCNC: 224 MG/DL (ref 65–140)
GLUCOSE SERPL-MCNC: 226 MG/DL (ref 65–140)
GLUCOSE SERPL-MCNC: 240 MG/DL (ref 65–140)
GLUCOSE SERPL-MCNC: 254 MG/DL (ref 65–140)
GLUCOSE SERPL-MCNC: 282 MG/DL (ref 65–140)
GLUCOSE SERPL-MCNC: 284 MG/DL (ref 65–140)
GLUCOSE SERPL-MCNC: 287 MG/DL (ref 65–140)
GLUCOSE SERPL-MCNC: 299 MG/DL (ref 65–140)
GLUCOSE SERPL-MCNC: 315 MG/DL (ref 65–140)
HCT VFR BLD AUTO: 25.3 % (ref 36.5–49.3)
HCT VFR BLD AUTO: 31.5 % (ref 36.5–49.3)
HCT VFR BLD AUTO: 33.1 % (ref 36.5–49.3)
HGB BLD-MCNC: 10.6 G/DL (ref 12–17)
HGB BLD-MCNC: 8 G/DL (ref 12–17)
HGB BLD-MCNC: 9.8 G/DL (ref 12–17)
IMM GRANULOCYTES # BLD AUTO: 0.03 THOUSAND/UL (ref 0–0.2)
IMM GRANULOCYTES # BLD AUTO: 0.07 THOUSAND/UL (ref 0–0.2)
IMM GRANULOCYTES NFR BLD AUTO: 0 % (ref 0–2)
IMM GRANULOCYTES NFR BLD AUTO: 1 % (ref 0–2)
INR PPP: 1.2 (ref 0.84–1.19)
LYMPHOCYTES # BLD AUTO: 0.91 THOUSANDS/ΜL (ref 0.6–4.47)
LYMPHOCYTES # BLD AUTO: 1.48 THOUSANDS/ΜL (ref 0.6–4.47)
LYMPHOCYTES NFR BLD AUTO: 14 % (ref 14–44)
LYMPHOCYTES NFR BLD AUTO: 8 % (ref 14–44)
MAGNESIUM SERPL-MCNC: 2.3 MG/DL (ref 1.6–2.6)
MCH RBC QN AUTO: 30.3 PG (ref 26.8–34.3)
MCH RBC QN AUTO: 30.6 PG (ref 26.8–34.3)
MCH RBC QN AUTO: 30.7 PG (ref 26.8–34.3)
MCHC RBC AUTO-ENTMCNC: 31.1 G/DL (ref 31.4–37.4)
MCHC RBC AUTO-ENTMCNC: 31.6 G/DL (ref 31.4–37.4)
MCHC RBC AUTO-ENTMCNC: 32 G/DL (ref 31.4–37.4)
MCV RBC AUTO: 96 FL (ref 82–98)
MCV RBC AUTO: 96 FL (ref 82–98)
MCV RBC AUTO: 99 FL (ref 82–98)
MONOCYTES # BLD AUTO: 0.49 THOUSAND/ΜL (ref 0.17–1.22)
MONOCYTES # BLD AUTO: 0.7 THOUSAND/ΜL (ref 0.17–1.22)
MONOCYTES NFR BLD AUTO: 4 % (ref 4–12)
MONOCYTES NFR BLD AUTO: 6 % (ref 4–12)
NEUTROPHILS # BLD AUTO: 8.63 THOUSANDS/ΜL (ref 1.85–7.62)
NEUTROPHILS # BLD AUTO: 9.85 THOUSANDS/ΜL (ref 1.85–7.62)
NEUTS SEG NFR BLD AUTO: 80 % (ref 43–75)
NEUTS SEG NFR BLD AUTO: 86 % (ref 43–75)
NRBC BLD AUTO-RTO: 0 /100 WBCS
NRBC BLD AUTO-RTO: 0 /100 WBCS
P AXIS: 39 DEGREES
P AXIS: 44 DEGREES
P AXIS: 46 DEGREES
PLATELET # BLD AUTO: 104 THOUSANDS/UL (ref 149–390)
PLATELET # BLD AUTO: 122 THOUSANDS/UL (ref 149–390)
PLATELET # BLD AUTO: 140 THOUSANDS/UL (ref 149–390)
PMV BLD AUTO: 10.8 FL (ref 8.9–12.7)
PMV BLD AUTO: 10.9 FL (ref 8.9–12.7)
PMV BLD AUTO: 11.1 FL (ref 8.9–12.7)
POTASSIUM SERPL-SCNC: 4.2 MMOL/L (ref 3.5–5.3)
POTASSIUM SERPL-SCNC: 4.5 MMOL/L (ref 3.5–5.3)
POTASSIUM SERPL-SCNC: 4.8 MMOL/L (ref 3.5–5.3)
POTASSIUM SERPL-SCNC: 5.4 MMOL/L (ref 3.5–5.3)
PR INTERVAL: 208 MS
PR INTERVAL: 220 MS
PR INTERVAL: 222 MS
PROT SERPL-MCNC: 8.3 G/DL (ref 6.4–8.2)
PROTHROMBIN TIME: 15.1 SECONDS (ref 11.6–14.5)
QRS AXIS: -21 DEGREES
QRS AXIS: -28 DEGREES
QRS AXIS: -33 DEGREES
QRSD INTERVAL: 100 MS
QT INTERVAL: 420 MS
QT INTERVAL: 426 MS
QT INTERVAL: 474 MS
QTC INTERVAL: 484 MS
QTC INTERVAL: 485 MS
QTC INTERVAL: 491 MS
RBC # BLD AUTO: 2.64 MILLION/UL (ref 3.88–5.62)
RBC # BLD AUTO: 3.19 MILLION/UL (ref 3.88–5.62)
RBC # BLD AUTO: 3.46 MILLION/UL (ref 3.88–5.62)
SODIUM SERPL-SCNC: 137 MMOL/L (ref 136–145)
SODIUM SERPL-SCNC: 137 MMOL/L (ref 136–145)
SODIUM SERPL-SCNC: 140 MMOL/L (ref 136–145)
SODIUM SERPL-SCNC: 142 MMOL/L (ref 136–145)
T WAVE AXIS: 77 DEGREES
T WAVE AXIS: 84 DEGREES
T WAVE AXIS: 93 DEGREES
TROPONIN I SERPL-MCNC: 0.13 NG/ML
TROPONIN I SERPL-MCNC: 13.12 NG/ML
TROPONIN I SERPL-MCNC: 2.76 NG/ML
TROPONIN I SERPL-MCNC: 22.18 NG/ML
TROPONIN I SERPL-MCNC: 23.04 NG/ML
VENTRICULAR RATE: 63 BPM
VENTRICULAR RATE: 80 BPM
VENTRICULAR RATE: 80 BPM
WBC # BLD AUTO: 10.88 THOUSAND/UL (ref 4.31–10.16)
WBC # BLD AUTO: 11.43 THOUSAND/UL (ref 4.31–10.16)
WBC # BLD AUTO: 7.24 THOUSAND/UL (ref 4.31–10.16)

## 2021-01-01 PROCEDURE — 85730 THROMBOPLASTIN TIME PARTIAL: CPT | Performed by: EMERGENCY MEDICINE

## 2021-01-01 PROCEDURE — 99233 SBSQ HOSP IP/OBS HIGH 50: CPT | Performed by: INTERNAL MEDICINE

## 2021-01-01 PROCEDURE — 93325 DOPPLER ECHO COLOR FLOW MAPG: CPT | Performed by: INTERNAL MEDICINE

## 2021-01-01 PROCEDURE — 99239 HOSP IP/OBS DSCHRG MGMT >30: CPT | Performed by: INTERNAL MEDICINE

## 2021-01-01 PROCEDURE — 84484 ASSAY OF TROPONIN QUANT: CPT | Performed by: EMERGENCY MEDICINE

## 2021-01-01 PROCEDURE — 85025 COMPLETE CBC W/AUTO DIFF WBC: CPT | Performed by: PHYSICIAN ASSISTANT

## 2021-01-01 PROCEDURE — 93005 ELECTROCARDIOGRAM TRACING: CPT

## 2021-01-01 PROCEDURE — 97162 PT EVAL MOD COMPLEX 30 MIN: CPT

## 2021-01-01 PROCEDURE — 93308 TTE F-UP OR LMTD: CPT

## 2021-01-01 PROCEDURE — 99205 OFFICE O/P NEW HI 60 MIN: CPT | Performed by: INTERNAL MEDICINE

## 2021-01-01 PROCEDURE — 97110 THERAPEUTIC EXERCISES: CPT

## 2021-01-01 PROCEDURE — 93321 DOPPLER ECHO F-UP/LMTD STD: CPT | Performed by: INTERNAL MEDICINE

## 2021-01-01 PROCEDURE — 85027 COMPLETE CBC AUTOMATED: CPT | Performed by: INTERNAL MEDICINE

## 2021-01-01 PROCEDURE — 99232 SBSQ HOSP IP/OBS MODERATE 35: CPT | Performed by: INTERNAL MEDICINE

## 2021-01-01 PROCEDURE — 82948 REAGENT STRIP/BLOOD GLUCOSE: CPT

## 2021-01-01 PROCEDURE — 84484 ASSAY OF TROPONIN QUANT: CPT | Performed by: NURSE PRACTITIONER

## 2021-01-01 PROCEDURE — 85025 COMPLETE CBC W/AUTO DIFF WBC: CPT | Performed by: EMERGENCY MEDICINE

## 2021-01-01 PROCEDURE — 85730 THROMBOPLASTIN TIME PARTIAL: CPT | Performed by: PHYSICIAN ASSISTANT

## 2021-01-01 PROCEDURE — 85610 PROTHROMBIN TIME: CPT | Performed by: EMERGENCY MEDICINE

## 2021-01-01 PROCEDURE — 97167 OT EVAL HIGH COMPLEX 60 MIN: CPT

## 2021-01-01 PROCEDURE — 83735 ASSAY OF MAGNESIUM: CPT | Performed by: EMERGENCY MEDICINE

## 2021-01-01 PROCEDURE — 85730 THROMBOPLASTIN TIME PARTIAL: CPT | Performed by: INTERNAL MEDICINE

## 2021-01-01 PROCEDURE — 93010 ELECTROCARDIOGRAM REPORT: CPT | Performed by: INTERNAL MEDICINE

## 2021-01-01 PROCEDURE — 71045 X-RAY EXAM CHEST 1 VIEW: CPT

## 2021-01-01 PROCEDURE — 93308 TTE F-UP OR LMTD: CPT | Performed by: INTERNAL MEDICINE

## 2021-01-01 PROCEDURE — 36415 COLL VENOUS BLD VENIPUNCTURE: CPT | Performed by: EMERGENCY MEDICINE

## 2021-01-01 PROCEDURE — 97530 THERAPEUTIC ACTIVITIES: CPT

## 2021-01-01 PROCEDURE — 84484 ASSAY OF TROPONIN QUANT: CPT | Performed by: PHYSICIAN ASSISTANT

## 2021-01-01 PROCEDURE — 80048 BASIC METABOLIC PNL TOTAL CA: CPT | Performed by: PHYSICIAN ASSISTANT

## 2021-01-01 PROCEDURE — 99285 EMERGENCY DEPT VISIT HI MDM: CPT

## 2021-01-01 PROCEDURE — 99220 PR INITIAL OBSERVATION CARE/DAY 70 MINUTES: CPT | Performed by: INTERNAL MEDICINE

## 2021-01-01 PROCEDURE — 80048 BASIC METABOLIC PNL TOTAL CA: CPT | Performed by: INTERNAL MEDICINE

## 2021-01-01 PROCEDURE — 80053 COMPREHEN METABOLIC PANEL: CPT | Performed by: EMERGENCY MEDICINE

## 2021-01-01 PROCEDURE — 99285 EMERGENCY DEPT VISIT HI MDM: CPT | Performed by: EMERGENCY MEDICINE

## 2021-01-01 PROCEDURE — 99222 1ST HOSP IP/OBS MODERATE 55: CPT | Performed by: INTERNAL MEDICINE

## 2021-01-01 RX ORDER — PANTOPRAZOLE SODIUM 40 MG/1
40 TABLET, DELAYED RELEASE ORAL
Qty: 30 TABLET | Refills: 0 | Status: SHIPPED | OUTPATIENT
Start: 2021-01-01

## 2021-01-01 RX ORDER — RANOLAZINE 500 MG/1
500 TABLET, EXTENDED RELEASE ORAL EVERY 12 HOURS SCHEDULED
Status: DISCONTINUED | OUTPATIENT
Start: 2021-01-01 | End: 2021-01-01 | Stop reason: HOSPADM

## 2021-01-01 RX ORDER — TORSEMIDE 20 MG/1
40 TABLET ORAL DAILY
Status: DISCONTINUED | OUTPATIENT
Start: 2021-01-01 | End: 2021-01-01 | Stop reason: HOSPADM

## 2021-01-01 RX ORDER — ISOSORBIDE MONONITRATE 30 MG/1
30 TABLET, EXTENDED RELEASE ORAL ONCE
Status: COMPLETED | OUTPATIENT
Start: 2021-01-01 | End: 2021-01-01

## 2021-01-01 RX ORDER — ISOSORBIDE MONONITRATE 60 MG/1
60 TABLET, EXTENDED RELEASE ORAL DAILY
Qty: 30 TABLET | Refills: 1 | Status: SHIPPED | OUTPATIENT
Start: 2021-01-01

## 2021-01-01 RX ORDER — ATORVASTATIN CALCIUM 80 MG/1
80 TABLET, FILM COATED ORAL EVERY EVENING
Status: DISCONTINUED | OUTPATIENT
Start: 2021-01-01 | End: 2021-01-01 | Stop reason: HOSPADM

## 2021-01-01 RX ORDER — ESCITALOPRAM OXALATE 10 MG/1
10 TABLET ORAL
Status: DISCONTINUED | OUTPATIENT
Start: 2021-01-01 | End: 2021-01-01 | Stop reason: HOSPADM

## 2021-01-01 RX ORDER — CARVEDILOL 6.25 MG/1
6.25 TABLET ORAL 2 TIMES DAILY WITH MEALS
Status: DISCONTINUED | OUTPATIENT
Start: 2021-01-01 | End: 2021-01-01 | Stop reason: HOSPADM

## 2021-01-01 RX ORDER — CARVEDILOL 6.25 MG/1
6.25 TABLET ORAL 2 TIMES DAILY WITH MEALS
Qty: 60 TABLET | Refills: 1 | Status: SHIPPED | OUTPATIENT
Start: 2021-01-01

## 2021-01-01 RX ORDER — HYDRALAZINE HYDROCHLORIDE 25 MG/1
25 TABLET, FILM COATED ORAL EVERY 6 HOURS
Status: DISCONTINUED | OUTPATIENT
Start: 2021-01-01 | End: 2021-01-01

## 2021-01-01 RX ORDER — PANTOPRAZOLE SODIUM 40 MG/1
40 TABLET, DELAYED RELEASE ORAL
Status: DISCONTINUED | OUTPATIENT
Start: 2021-01-01 | End: 2021-01-01 | Stop reason: HOSPADM

## 2021-01-01 RX ORDER — HEPARIN SODIUM 10000 [USP'U]/100ML
3-20 INJECTION, SOLUTION INTRAVENOUS
Status: DISCONTINUED | OUTPATIENT
Start: 2021-01-01 | End: 2021-01-01

## 2021-01-01 RX ORDER — ISOSORBIDE MONONITRATE 60 MG/1
60 TABLET, EXTENDED RELEASE ORAL DAILY
Status: DISCONTINUED | OUTPATIENT
Start: 2021-01-01 | End: 2021-01-01 | Stop reason: HOSPADM

## 2021-01-01 RX ORDER — ASPIRIN 81 MG/1
81 TABLET ORAL DAILY
Status: DISCONTINUED | OUTPATIENT
Start: 2021-01-01 | End: 2021-01-01 | Stop reason: HOSPADM

## 2021-01-01 RX ORDER — ISOSORBIDE MONONITRATE 30 MG/1
30 TABLET, EXTENDED RELEASE ORAL DAILY
Status: DISCONTINUED | OUTPATIENT
Start: 2021-01-01 | End: 2021-01-01

## 2021-01-01 RX ORDER — DOCUSATE SODIUM 100 MG/1
100 CAPSULE, LIQUID FILLED ORAL 2 TIMES DAILY
Status: DISCONTINUED | OUTPATIENT
Start: 2021-01-01 | End: 2021-01-01 | Stop reason: HOSPADM

## 2021-01-01 RX ORDER — ACETAMINOPHEN 325 MG/1
650 TABLET ORAL EVERY 6 HOURS PRN
Status: DISCONTINUED | OUTPATIENT
Start: 2021-01-01 | End: 2021-01-01 | Stop reason: HOSPADM

## 2021-01-01 RX ORDER — HYDRALAZINE HYDROCHLORIDE 25 MG/1
50 TABLET, FILM COATED ORAL EVERY 8 HOURS SCHEDULED
Status: DISCONTINUED | OUTPATIENT
Start: 2021-01-01 | End: 2021-01-01 | Stop reason: HOSPADM

## 2021-01-01 RX ORDER — HEPARIN SODIUM 5000 [USP'U]/ML
5000 INJECTION, SOLUTION INTRAVENOUS; SUBCUTANEOUS EVERY 8 HOURS SCHEDULED
Status: DISCONTINUED | OUTPATIENT
Start: 2021-01-01 | End: 2021-01-01 | Stop reason: HOSPADM

## 2021-01-01 RX ORDER — PRIMIDONE 50 MG/1
50 TABLET ORAL
Status: DISCONTINUED | OUTPATIENT
Start: 2021-01-01 | End: 2021-01-01 | Stop reason: HOSPADM

## 2021-01-01 RX ORDER — RANOLAZINE 500 MG/1
500 TABLET, EXTENDED RELEASE ORAL EVERY 12 HOURS SCHEDULED
Qty: 60 TABLET | Refills: 1 | Status: SHIPPED | OUTPATIENT
Start: 2021-01-01

## 2021-01-01 RX ORDER — HYDRALAZINE HYDROCHLORIDE 50 MG/1
50 TABLET, FILM COATED ORAL EVERY 8 HOURS SCHEDULED
Qty: 90 TABLET | Refills: 0 | Status: SHIPPED | OUTPATIENT
Start: 2021-01-01

## 2021-01-01 RX ORDER — NITROGLYCERIN 0.4 MG/1
0.4 TABLET SUBLINGUAL
Status: DISCONTINUED | OUTPATIENT
Start: 2021-01-01 | End: 2021-01-01 | Stop reason: HOSPADM

## 2021-01-01 RX ORDER — NITROGLYCERIN 0.4 MG/1
0.4 TABLET SUBLINGUAL ONCE
Status: COMPLETED | OUTPATIENT
Start: 2021-01-01 | End: 2021-01-01

## 2021-01-01 RX ORDER — CLOPIDOGREL BISULFATE 75 MG/1
75 TABLET ORAL DAILY
Status: DISCONTINUED | OUTPATIENT
Start: 2021-01-01 | End: 2021-01-01 | Stop reason: HOSPADM

## 2021-01-01 RX ORDER — POLYETHYLENE GLYCOL 3350 17 G/17G
17 POWDER, FOR SOLUTION ORAL DAILY PRN
Status: DISCONTINUED | OUTPATIENT
Start: 2021-01-01 | End: 2021-01-01 | Stop reason: HOSPADM

## 2021-01-01 RX ADMIN — INSULIN LISPRO 1 UNITS: 100 INJECTION, SOLUTION INTRAVENOUS; SUBCUTANEOUS at 12:16

## 2021-01-01 RX ADMIN — HEPARIN SODIUM 5000 UNITS: 5000 INJECTION INTRAVENOUS; SUBCUTANEOUS at 16:52

## 2021-01-01 RX ADMIN — NITROGLYCERIN 0.4 MG: 0.4 TABLET SUBLINGUAL at 02:50

## 2021-01-01 RX ADMIN — CARVEDILOL 6.25 MG: 6.25 TABLET, FILM COATED ORAL at 08:50

## 2021-01-01 RX ADMIN — DOCUSATE SODIUM 100 MG: 100 CAPSULE, LIQUID FILLED ORAL at 19:29

## 2021-01-01 RX ADMIN — ASPIRIN 81 MG: 81 TABLET, COATED ORAL at 08:42

## 2021-01-01 RX ADMIN — ASPIRIN 81 MG: 81 TABLET, COATED ORAL at 08:40

## 2021-01-01 RX ADMIN — ISOSORBIDE MONONITRATE 30 MG: 30 TABLET, EXTENDED RELEASE ORAL at 08:39

## 2021-01-01 RX ADMIN — ATORVASTATIN CALCIUM 80 MG: 80 TABLET ORAL at 17:45

## 2021-01-01 RX ADMIN — INSULIN DETEMIR 20 UNITS: 100 INJECTION, SOLUTION SUBCUTANEOUS at 22:32

## 2021-01-01 RX ADMIN — CLOPIDOGREL BISULFATE 75 MG: 75 TABLET ORAL at 08:40

## 2021-01-01 RX ADMIN — HYDRALAZINE HYDROCHLORIDE 50 MG: 25 TABLET, FILM COATED ORAL at 16:18

## 2021-01-01 RX ADMIN — INSULIN DETEMIR 20 UNITS: 100 INJECTION, SOLUTION SUBCUTANEOUS at 21:49

## 2021-01-01 RX ADMIN — PRIMIDONE 50 MG: 50 TABLET ORAL at 21:49

## 2021-01-01 RX ADMIN — PANTOPRAZOLE SODIUM 40 MG: 40 TABLET, DELAYED RELEASE ORAL at 05:36

## 2021-01-01 RX ADMIN — DOCUSATE SODIUM 100 MG: 100 CAPSULE, LIQUID FILLED ORAL at 08:40

## 2021-01-01 RX ADMIN — TORSEMIDE 40 MG: 20 TABLET ORAL at 08:42

## 2021-01-01 RX ADMIN — ISOSORBIDE MONONITRATE 30 MG: 30 TABLET, EXTENDED RELEASE ORAL at 08:44

## 2021-01-01 RX ADMIN — HEPARIN SODIUM 5000 UNITS: 5000 INJECTION INTRAVENOUS; SUBCUTANEOUS at 05:36

## 2021-01-01 RX ADMIN — CLOPIDOGREL BISULFATE 75 MG: 75 TABLET ORAL at 08:51

## 2021-01-01 RX ADMIN — HEPARIN SODIUM 5000 UNITS: 5000 INJECTION INTRAVENOUS; SUBCUTANEOUS at 13:29

## 2021-01-01 RX ADMIN — CARVEDILOL 6.25 MG: 6.25 TABLET, FILM COATED ORAL at 16:18

## 2021-01-01 RX ADMIN — INSULIN LISPRO 2 UNITS: 100 INJECTION, SOLUTION INTRAVENOUS; SUBCUTANEOUS at 22:35

## 2021-01-01 RX ADMIN — HYDRALAZINE HYDROCHLORIDE 25 MG: 25 TABLET ORAL at 02:49

## 2021-01-01 RX ADMIN — HEPARIN SODIUM 11.1 UNITS/KG/HR: 10000 INJECTION, SOLUTION INTRAVENOUS at 01:34

## 2021-01-01 RX ADMIN — PANTOPRAZOLE SODIUM 40 MG: 40 TABLET, DELAYED RELEASE ORAL at 12:01

## 2021-01-01 RX ADMIN — TORSEMIDE 40 MG: 20 TABLET ORAL at 08:39

## 2021-01-01 RX ADMIN — RANOLAZINE 500 MG: 500 TABLET, FILM COATED, EXTENDED RELEASE ORAL at 21:49

## 2021-01-01 RX ADMIN — HYDRALAZINE HYDROCHLORIDE 50 MG: 25 TABLET, FILM COATED ORAL at 06:40

## 2021-01-01 RX ADMIN — ACETAMINOPHEN 650 MG: 325 TABLET, FILM COATED ORAL at 09:07

## 2021-01-01 RX ADMIN — INSULIN LISPRO 4 UNITS: 100 INJECTION, SOLUTION INTRAVENOUS; SUBCUTANEOUS at 17:45

## 2021-01-01 RX ADMIN — INSULIN DETEMIR 20 UNITS: 100 INJECTION, SOLUTION SUBCUTANEOUS at 11:42

## 2021-01-01 RX ADMIN — RANOLAZINE 500 MG: 500 TABLET, FILM COATED, EXTENDED RELEASE ORAL at 08:50

## 2021-01-01 RX ADMIN — ISOSORBIDE MONONITRATE 30 MG: 30 TABLET, EXTENDED RELEASE ORAL at 11:42

## 2021-01-01 RX ADMIN — HYDRALAZINE HYDROCHLORIDE 50 MG: 25 TABLET, FILM COATED ORAL at 21:49

## 2021-01-01 RX ADMIN — TORSEMIDE 40 MG: 20 TABLET ORAL at 08:50

## 2021-01-01 RX ADMIN — INSULIN LISPRO 3 UNITS: 100 INJECTION, SOLUTION INTRAVENOUS; SUBCUTANEOUS at 13:06

## 2021-01-01 RX ADMIN — HYDRALAZINE HYDROCHLORIDE 25 MG: 25 TABLET ORAL at 08:42

## 2021-01-01 RX ADMIN — PRIMIDONE 50 MG: 50 TABLET ORAL at 22:31

## 2021-01-01 RX ADMIN — CARVEDILOL 6.25 MG: 6.25 TABLET, FILM COATED ORAL at 08:39

## 2021-01-01 RX ADMIN — CARVEDILOL 6.25 MG: 6.25 TABLET, FILM COATED ORAL at 16:53

## 2021-01-01 RX ADMIN — INSULIN DETEMIR 20 UNITS: 100 INJECTION, SOLUTION SUBCUTANEOUS at 08:50

## 2021-01-01 RX ADMIN — NITROGLYCERIN 0.4 MG: 0.4 TABLET SUBLINGUAL at 00:36

## 2021-01-01 RX ADMIN — HYDRALAZINE HYDROCHLORIDE 50 MG: 25 TABLET, FILM COATED ORAL at 13:29

## 2021-01-01 RX ADMIN — DOCUSATE SODIUM 100 MG: 100 CAPSULE, LIQUID FILLED ORAL at 08:42

## 2021-01-01 RX ADMIN — HEPARIN SODIUM 9.1 UNITS/KG/HR: 10000 INJECTION, SOLUTION INTRAVENOUS at 06:40

## 2021-01-01 RX ADMIN — DOCUSATE SODIUM 100 MG: 100 CAPSULE, LIQUID FILLED ORAL at 17:45

## 2021-01-01 RX ADMIN — CLOPIDOGREL BISULFATE 75 MG: 75 TABLET ORAL at 08:42

## 2021-01-01 RX ADMIN — METOPROLOL TARTRATE 25 MG: 25 TABLET, FILM COATED ORAL at 08:42

## 2021-01-01 RX ADMIN — HEPARIN SODIUM 5000 UNITS: 5000 INJECTION INTRAVENOUS; SUBCUTANEOUS at 21:50

## 2021-01-01 RX ADMIN — INSULIN LISPRO 3 UNITS: 100 INJECTION, SOLUTION INTRAVENOUS; SUBCUTANEOUS at 21:49

## 2021-01-01 RX ADMIN — HYDRALAZINE HYDROCHLORIDE 50 MG: 25 TABLET, FILM COATED ORAL at 05:37

## 2021-01-01 RX ADMIN — INSULIN DETEMIR 20 UNITS: 100 INJECTION, SOLUTION SUBCUTANEOUS at 08:42

## 2021-01-01 RX ADMIN — METOPROLOL TARTRATE 25 MG: 25 TABLET, FILM COATED ORAL at 02:49

## 2021-01-01 RX ADMIN — ATORVASTATIN CALCIUM 80 MG: 80 TABLET ORAL at 19:29

## 2021-01-01 RX ADMIN — RANOLAZINE 500 MG: 500 TABLET, FILM COATED, EXTENDED RELEASE ORAL at 13:04

## 2021-01-01 RX ADMIN — ISOSORBIDE MONONITRATE 60 MG: 60 TABLET, EXTENDED RELEASE ORAL at 08:50

## 2021-01-01 RX ADMIN — ASPIRIN 81 MG: 81 TABLET, COATED ORAL at 08:50

## 2021-01-01 RX ADMIN — ESCITALOPRAM OXALATE 10 MG: 10 TABLET, FILM COATED ORAL at 21:49

## 2021-01-01 RX ADMIN — ESCITALOPRAM OXALATE 10 MG: 10 TABLET, FILM COATED ORAL at 22:31

## 2021-01-01 RX ADMIN — PANTOPRAZOLE SODIUM 40 MG: 40 TABLET, DELAYED RELEASE ORAL at 06:40

## 2021-01-01 RX ADMIN — DOCUSATE SODIUM 100 MG: 100 CAPSULE, LIQUID FILLED ORAL at 08:50

## 2021-01-01 RX ADMIN — INSULIN LISPRO 4 UNITS: 100 INJECTION, SOLUTION INTRAVENOUS; SUBCUTANEOUS at 16:53

## 2021-01-01 RX ADMIN — HYDRALAZINE HYDROCHLORIDE 50 MG: 25 TABLET, FILM COATED ORAL at 22:31

## 2021-01-21 ENCOUNTER — HOSPITAL ENCOUNTER (OUTPATIENT)
Facility: HOSPITAL | Age: 81
Setting detail: OBSERVATION
End: 2021-01-22
Attending: EMERGENCY MEDICINE | Admitting: INTERNAL MEDICINE
Payer: COMMERCIAL

## 2021-01-21 ENCOUNTER — APPOINTMENT (EMERGENCY)
Dept: RADIOLOGY | Facility: HOSPITAL | Age: 81
End: 2021-01-21
Payer: COMMERCIAL

## 2021-01-21 DIAGNOSIS — Z95.1 HX OF CABG: ICD-10-CM

## 2021-01-21 DIAGNOSIS — N18.32 STAGE 3B CHRONIC KIDNEY DISEASE (HCC): Chronic | ICD-10-CM

## 2021-01-21 DIAGNOSIS — R07.9 CHEST PAIN: Primary | ICD-10-CM

## 2021-01-21 PROBLEM — D72.829 LEUKOCYTOSIS: Status: ACTIVE | Noted: 2021-01-21

## 2021-01-21 LAB
ALBUMIN SERPL BCP-MCNC: 3.6 G/DL (ref 3.5–5)
ALP SERPL-CCNC: 136 U/L (ref 46–116)
ALT SERPL W P-5'-P-CCNC: 90 U/L (ref 12–78)
ANION GAP SERPL CALCULATED.3IONS-SCNC: 15 MMOL/L (ref 4–13)
AST SERPL W P-5'-P-CCNC: 50 U/L (ref 5–45)
BASOPHILS # BLD AUTO: 0.05 THOUSANDS/ΜL (ref 0–0.1)
BASOPHILS NFR BLD AUTO: 0 % (ref 0–1)
BILIRUB SERPL-MCNC: 1.1 MG/DL (ref 0.2–1)
BUN SERPL-MCNC: 70 MG/DL (ref 5–25)
CALCIUM SERPL-MCNC: 8.7 MG/DL (ref 8.3–10.1)
CHLORIDE SERPL-SCNC: 104 MMOL/L (ref 100–108)
CHOLEST SERPL-MCNC: 92 MG/DL (ref 50–200)
CO2 SERPL-SCNC: 20 MMOL/L (ref 21–32)
CREAT SERPL-MCNC: 2.35 MG/DL (ref 0.6–1.3)
EOSINOPHIL # BLD AUTO: 0.15 THOUSAND/ΜL (ref 0–0.61)
EOSINOPHIL NFR BLD AUTO: 1 % (ref 0–6)
ERYTHROCYTE [DISTWIDTH] IN BLOOD BY AUTOMATED COUNT: 13.7 % (ref 11.6–15.1)
GFR SERPL CREATININE-BSD FRML MDRD: 25 ML/MIN/1.73SQ M
GLUCOSE SERPL-MCNC: 163 MG/DL (ref 65–140)
GLUCOSE SERPL-MCNC: 171 MG/DL (ref 65–140)
HCT VFR BLD AUTO: 35.9 % (ref 36.5–49.3)
HDLC SERPL-MCNC: 27 MG/DL
HGB BLD-MCNC: 11.6 G/DL (ref 12–17)
IMM GRANULOCYTES # BLD AUTO: 0.11 THOUSAND/UL (ref 0–0.2)
IMM GRANULOCYTES NFR BLD AUTO: 1 % (ref 0–2)
LDLC SERPL CALC-MCNC: 38 MG/DL (ref 0–100)
LYMPHOCYTES # BLD AUTO: 2.23 THOUSANDS/ΜL (ref 0.6–4.47)
LYMPHOCYTES NFR BLD AUTO: 16 % (ref 14–44)
MCH RBC QN AUTO: 31.4 PG (ref 26.8–34.3)
MCHC RBC AUTO-ENTMCNC: 32.3 G/DL (ref 31.4–37.4)
MCV RBC AUTO: 97 FL (ref 82–98)
MONOCYTES # BLD AUTO: 1 THOUSAND/ΜL (ref 0.17–1.22)
MONOCYTES NFR BLD AUTO: 7 % (ref 4–12)
NEUTROPHILS # BLD AUTO: 10.47 THOUSANDS/ΜL (ref 1.85–7.62)
NEUTS SEG NFR BLD AUTO: 75 % (ref 43–75)
NRBC BLD AUTO-RTO: 0 /100 WBCS
PLATELET # BLD AUTO: 137 THOUSANDS/UL (ref 149–390)
PLATELET # BLD AUTO: 177 THOUSANDS/UL (ref 149–390)
PMV BLD AUTO: 10.6 FL (ref 8.9–12.7)
PMV BLD AUTO: 9.8 FL (ref 8.9–12.7)
POTASSIUM SERPL-SCNC: 4.1 MMOL/L (ref 3.5–5.3)
PROT SERPL-MCNC: 8.7 G/DL (ref 6.4–8.2)
RBC # BLD AUTO: 3.69 MILLION/UL (ref 3.88–5.62)
SODIUM SERPL-SCNC: 139 MMOL/L (ref 136–145)
TRIGL SERPL-MCNC: 137 MG/DL
TROPONIN I SERPL-MCNC: 0.03 NG/ML
TROPONIN I SERPL-MCNC: 0.28 NG/ML
WBC # BLD AUTO: 14.01 THOUSAND/UL (ref 4.31–10.16)

## 2021-01-21 PROCEDURE — 99285 EMERGENCY DEPT VISIT HI MDM: CPT | Performed by: EMERGENCY MEDICINE

## 2021-01-21 PROCEDURE — 85025 COMPLETE CBC W/AUTO DIFF WBC: CPT

## 2021-01-21 PROCEDURE — 80053 COMPREHEN METABOLIC PANEL: CPT

## 2021-01-21 PROCEDURE — 36415 COLL VENOUS BLD VENIPUNCTURE: CPT

## 2021-01-21 PROCEDURE — 84484 ASSAY OF TROPONIN QUANT: CPT

## 2021-01-21 PROCEDURE — 99219 PR INITIAL OBSERVATION CARE/DAY 50 MINUTES: CPT | Performed by: PHYSICIAN ASSISTANT

## 2021-01-21 PROCEDURE — 82948 REAGENT STRIP/BLOOD GLUCOSE: CPT

## 2021-01-21 PROCEDURE — 71045 X-RAY EXAM CHEST 1 VIEW: CPT

## 2021-01-21 PROCEDURE — 85049 AUTOMATED PLATELET COUNT: CPT | Performed by: PHYSICIAN ASSISTANT

## 2021-01-21 PROCEDURE — 93005 ELECTROCARDIOGRAM TRACING: CPT

## 2021-01-21 PROCEDURE — 80061 LIPID PANEL: CPT | Performed by: PHYSICIAN ASSISTANT

## 2021-01-21 PROCEDURE — 99285 EMERGENCY DEPT VISIT HI MDM: CPT

## 2021-01-21 PROCEDURE — 84484 ASSAY OF TROPONIN QUANT: CPT | Performed by: PHYSICIAN ASSISTANT

## 2021-01-21 RX ORDER — ONDANSETRON 2 MG/ML
4 INJECTION INTRAMUSCULAR; INTRAVENOUS EVERY 6 HOURS PRN
Status: DISCONTINUED | OUTPATIENT
Start: 2021-01-21 | End: 2021-01-22 | Stop reason: HOSPADM

## 2021-01-21 RX ORDER — NITROGLYCERIN 0.4 MG/1
0.4 TABLET SUBLINGUAL
Status: DISCONTINUED | OUTPATIENT
Start: 2021-01-21 | End: 2021-01-22 | Stop reason: HOSPADM

## 2021-01-21 RX ORDER — HYDROMORPHONE HCL/PF 1 MG/ML
0.5 SYRINGE (ML) INJECTION ONCE
Status: COMPLETED | OUTPATIENT
Start: 2021-01-21 | End: 2021-01-21

## 2021-01-21 RX ORDER — CALCIUM CARBONATE 200(500)MG
1000 TABLET,CHEWABLE ORAL DAILY PRN
Status: DISCONTINUED | OUTPATIENT
Start: 2021-01-21 | End: 2021-01-22 | Stop reason: HOSPADM

## 2021-01-21 RX ORDER — HEPARIN SODIUM 5000 [USP'U]/ML
5000 INJECTION, SOLUTION INTRAVENOUS; SUBCUTANEOUS EVERY 8 HOURS SCHEDULED
Status: DISCONTINUED | OUTPATIENT
Start: 2021-01-21 | End: 2021-01-22

## 2021-01-21 RX ORDER — ACETAMINOPHEN 325 MG/1
650 TABLET ORAL EVERY 6 HOURS PRN
Status: DISCONTINUED | OUTPATIENT
Start: 2021-01-21 | End: 2021-01-22 | Stop reason: HOSPADM

## 2021-01-21 RX ORDER — HYDRALAZINE HYDROCHLORIDE 25 MG/1
50 TABLET, FILM COATED ORAL EVERY 8 HOURS SCHEDULED
Status: DISCONTINUED | OUTPATIENT
Start: 2021-01-21 | End: 2021-01-22 | Stop reason: HOSPADM

## 2021-01-21 RX ORDER — ASPIRIN 81 MG/1
81 TABLET ORAL DAILY
Status: DISCONTINUED | OUTPATIENT
Start: 2021-01-22 | End: 2021-01-22 | Stop reason: HOSPADM

## 2021-01-21 RX ORDER — FUROSEMIDE 40 MG/1
40 TABLET ORAL
Status: DISCONTINUED | OUTPATIENT
Start: 2021-01-22 | End: 2021-01-22

## 2021-01-21 RX ORDER — ATORVASTATIN CALCIUM 40 MG/1
40 TABLET, FILM COATED ORAL
Status: DISCONTINUED | OUTPATIENT
Start: 2021-01-22 | End: 2021-01-22 | Stop reason: HOSPADM

## 2021-01-21 RX ORDER — AMLODIPINE BESYLATE 5 MG/1
5 TABLET ORAL 2 TIMES DAILY
Status: DISCONTINUED | OUTPATIENT
Start: 2021-01-21 | End: 2021-01-22 | Stop reason: HOSPADM

## 2021-01-21 RX ORDER — POLYETHYLENE GLYCOL 3350 17 G/17G
17 POWDER, FOR SOLUTION ORAL DAILY PRN
Status: DISCONTINUED | OUTPATIENT
Start: 2021-01-21 | End: 2021-01-22 | Stop reason: HOSPADM

## 2021-01-21 RX ORDER — ESCITALOPRAM OXALATE 10 MG/1
10 TABLET ORAL
Status: DISCONTINUED | OUTPATIENT
Start: 2021-01-21 | End: 2021-01-22 | Stop reason: HOSPADM

## 2021-01-21 RX ADMIN — ESCITALOPRAM OXALATE 10 MG: 10 TABLET ORAL at 22:16

## 2021-01-21 RX ADMIN — AMLODIPINE BESYLATE 5 MG: 5 TABLET ORAL at 22:16

## 2021-01-21 RX ADMIN — METOPROLOL TARTRATE 25 MG: 25 TABLET, FILM COATED ORAL at 22:16

## 2021-01-21 RX ADMIN — HYDRALAZINE HYDROCHLORIDE 50 MG: 25 TABLET, FILM COATED ORAL at 22:16

## 2021-01-21 RX ADMIN — HEPARIN SODIUM 5000 UNITS: 5000 INJECTION INTRAVENOUS; SUBCUTANEOUS at 22:16

## 2021-01-21 RX ADMIN — HYDROMORPHONE HYDROCHLORIDE 0.5 MG: 1 INJECTION, SOLUTION INTRAMUSCULAR; INTRAVENOUS; SUBCUTANEOUS at 19:18

## 2021-01-21 RX ADMIN — INSULIN LISPRO 1 UNITS: 100 INJECTION, SOLUTION INTRAVENOUS; SUBCUTANEOUS at 22:26

## 2021-01-21 RX ADMIN — INSULIN DETEMIR 20 UNITS: 100 INJECTION, SOLUTION SUBCUTANEOUS at 22:25

## 2021-01-21 NOTE — ED PROVIDER NOTES
History  Chief Complaint   Patient presents with    Chest Pain     Cp started today around 1530 (4/10)- pt had 324 ASA at home and had 1 nitro with EMS- pt has now 1/10 cp     [de-identified] y/o male presents with left sided chest pressure started today, non radiating, currently 1/10 improved 1 nitro in the field by medics  Denies dyspnea, pleurisy, no cough,congestion,fevers,chills,no nausea,vomiting,abdominal pain no chest wall trauma no other symptoms  History of CABG a few years ago, due to get a stress test on feb 4 2020  History provided by:  Patient   used: No        Prior to Admission Medications   Prescriptions Last Dose Informant Patient Reported? Taking?    amLODIPine (NORVASC) 5 mg tablet   No No   Sig: Take 1 tablet (5 mg total) by mouth 2 (two) times a day   aspirin (ASPIR-LOW) 81 mg EC tablet   Yes No   Sig: Daily   atorvastatin (LIPITOR) 40 mg tablet   Yes No   Sig: atorvastatin 40 mg tablet   TAKE ONE TABLET DAILY   escitalopram (LEXAPRO) 10 mg tablet   No No   Sig: Take 1 tablet (10 mg total) by mouth daily at bedtime   furosemide (LASIX) 80 mg tablet   No No   Sig: Take 1 tablet (80 mg total) by mouth 2 (two) times a day   Patient taking differently: Take 80 mg by mouth 2 (two) times a day Pt takes 40 mg in AM and20 mg PM   hydrALAZINE (APRESOLINE) 50 mg tablet   No No   Sig: Take 1 tablet (50 mg total) by mouth every 8 (eight) hours   insulin detemir (LEVEMIR) 100 units/mL subcutaneous injection   No No   Sig: Inject 20 Units under the skin every 12 (twelve) hours   insulin lispro (HumaLOG) 100 units/mL injection   No No   Sig: Inject 1-6 Units under the skin daily at bedtime   insulin lispro (HumaLOG) 100 units/mL injection   No No   Sig: Inject 2-12 Units under the skin 3 (three) times a day before meals   isosorbide mononitrate (IMDUR) 30 mg 24 hr tablet   No No   Sig: Take 1 tablet (30 mg total) by mouth daily   metoprolol tartrate (LOPRESSOR) 50 mg tablet   No No   Sig: Take 0 5 tablets (25 mg total) by mouth every 12 (twelve) hours   nitroglycerin (NITROSTAT) 0 4 mg SL tablet   No No   Sig: Place 1 tablet (0 4 mg total) under the tongue every 5 (five) minutes as needed for chest pain   polyethylene glycol (MIRALAX) 17 g packet   No No   Sig: Take 17 g by mouth daily as needed (Constipation)      Facility-Administered Medications: None       Past Medical History:   Diagnosis Date    Acute on chronic diastolic CHF (congestive heart failure) ([facility] ) 7/4/2019    Arthritis     Back pain     Bladder cancer ([place] [address] )     diagnosed  2/2016    Cancer ([place] [address] )     bladder; chemo; resection;     Coronary artery disease     has 2 coronary stents    Dental crowns present      5    Diabetes mellitus ([place] [address] )     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[place] [address] )     fluid behind right eye    Hematuria     hx of    Hypercholesteremia     Hypertension     Kidney stones     Wears glasses        Past Surgical History:   Procedure Laterality Date    ABDOMINAL SURGERY      CARDIAC SURGERY      CHOLECYSTECTOMY      open    CORONARY ANGIOPLASTY WITH STENT PLACEMENT      2 stents  2009    CYSTECTOMY, PARTIAL N/A 11/30/2016    Procedure: PARTIAL CYSTECTOMY, LEFT LYMPHADENECTOMY;  Surgeon: Saige Berman MD;  Location: [address] [place] [address];  Service:     CYSTOSCOPY Left 10/20/2016    Procedure: CYSTOSCOPY, BILATERAL RETROGRADE PYLEOGRAM, LEFT SIDE BLADDER BIOPSY, TURBT;  Surgeon: Saige Berman MD;  Location: [address] [place] [address];  Service:     HERNIA REPAIR      repair [facility]    IR TEMPORARY DIALYSIS CATHETER PLACEMENT  7/8/2019    IR TUNNELED DIALYSIS CATHETER PLACEMENT  7/12/2019    IR TUNNELED DIALYSIS CATHETER REMOVAL  9/9/2019    [place] CYSTOURETHROSCOPY N/A 2/25/2016    Procedure: CYSTOSCOPY;  Surgeon: Saige Berman MD;  Location: [address] [place] [address];  Service: Urology    [place] CYSTOURETHROSCOPY,FULGUR 2-5 CM LESN N/A 2/25/2016    Procedure: TRANSURETHRAL RESECTION OF BLADDER TUMOR (TURBT);   Surgeon: Saige Berman MD;  Location: [address] [place] [address] OR;  Service: Urology    TRANSURETHRAL RESECTION OF BLADDER TUMOR N/A 2016    Procedure: TRANSURETHRAL RESECTION OF BLADDER TUMOR (TURBT), Cystoscopy, deep biopsy;  Surgeon: Melvin Elizabeth MD;  Location: [facility];  Service:     TRANSURETHRAL RESECTION OF BLADDER TUMOR Bilateral 2016    Procedure: TRANSURETHRAL RESECTION OF BLADDER TUMOR (TURBT), Cysto, retrograde, BLADDER BIOPSY;  Surgeon: Melvin Elizabeth MD;  Location: [facility];  Service:        Family History   Problem Relation Age of Onset    Heart disease Mother     Diabetes Mother     Diabetes Father      I have reviewed and agree with the history as documented  E-Cigarette/Vaping     E-Cigarette/Vaping Substances     Social History     Tobacco Use    Smoking status: Former Smoker     Packs/day: 0 50     Years: 5 00     Pack years: 2 50     Quit date:      Years since quittin 0    Smokeless tobacco: Never Used   Substance Use Topics    Alcohol use: Yes     Comment: socially    Drug use: No       Review of Systems   Constitutional: Negative  HENT: Negative  Eyes: Negative  Respiratory: Negative  Cardiovascular: Positive for chest pain  Gastrointestinal: Negative  Endocrine: Negative  Genitourinary: Negative  Musculoskeletal: Negative  Skin: Negative  Allergic/Immunologic: Negative  Neurological: Negative  Hematological: Negative  Psychiatric/Behavioral: Negative  All other systems reviewed and are negative  Physical Exam  Physical Exam  Vitals signs and nursing note reviewed  Constitutional:       Appearance: He is well-developed  HENT:      Head: Normocephalic and atraumatic  Eyes:      Pupils: Pupils are equal, round, and reactive to light  Neck:      Musculoskeletal: Normal range of motion and neck supple  Cardiovascular:      Rate and Rhythm: Normal rate and regular rhythm  Pulmonary:      Effort: Pulmonary effort is normal       Breath sounds: Normal breath sounds  Abdominal:      General: Bowel sounds are normal       Palpations: Abdomen is soft  Musculoskeletal: Normal range of motion  Skin:     General: Skin is warm and dry  Neurological:      Mental Status: He is alert and oriented to person, place, and time           Vital Signs  ED Triage Vitals   Temperature Pulse Respirations Blood Pressure SpO2   01/21/21 1816 01/21/21 1816 01/21/21 1816 01/21/21 1816 01/21/21 1816   98 1 °F (36 7 °C) 84 20 145/65 96 %      Temp Source Heart Rate Source Patient Position - Orthostatic VS BP Location FiO2 (%)   01/21/21 1816 01/21/21 1816 01/21/21 1816 01/21/21 1816 --   Tympanic Monitor Sitting Left arm       Pain Score       01/21/21 1918       3           Vitals:    01/21/21 1816 01/21/21 1845   BP: 145/65 156/72   Pulse: 84 80   Patient Position - Orthostatic VS: Sitting Lying         Visual Acuity      ED Medications  Medications   HYDROmorphone (DILAUDID) injection 0 5 mg (0 5 mg Intravenous Given 1/21/21 1918)       Diagnostic Studies  Results Reviewed     Procedure Component Value Units Date/Time    Troponin I [[id number]]  (Normal) Collected: 01/21/21 1812    Lab Status: Final result Specimen: Blood from Arm, Right Updated: 01/21/21 1838     Troponin I 0 03 ng/mL     Comprehensive metabolic panel [[id number]]  (Abnormal) Collected: 01/21/21 1812    Lab Status: Final result Specimen: Blood from Arm, Right Updated: 01/21/21 1835     Sodium 139 mmol/L      Potassium 4 1 mmol/L      Chloride 104 mmol/L      CO2 20 mmol/L      ANION GAP 15 mmol/L      BUN 70 mg/dL      Creatinine 2 35 mg/dL      Glucose 171 mg/dL      Calcium 8 7 mg/dL      AST 50 U/L      ALT 90 U/L      Alkaline Phosphatase 136 U/L      Total Protein 8 7 g/dL      Albumin 3 6 g/dL      Total Bilirubin 1 10 mg/dL      eGFR 25 ml/min/1 73sq m     Narrative:      Christina guidelines for Chronic Kidney Disease (CKD):     Stage 1 with normal or high GFR (GFR > 90 mL/min/1 73 square meters)    Stage 2 Mild CKD (GFR = 60-89 mL/min/1 73 square meters)    Stage 3A Moderate CKD (GFR = 45-59 mL/min/1 73 square meters)    Stage 3B Moderate CKD (GFR = 30-44 mL/min/1 73 square meters)    Stage 4 Severe CKD (GFR = 15-29 mL/min/1 73 square meters)    Stage 5 End Stage CKD (GFR <15 mL/min/1 73 square meters)  Note: GFR calculation is accurate only with a steady state creatinine    CBC and differential [[id number]]  (Abnormal) Collected: 01/21/21 1812    Lab Status: Final result Specimen: Blood from Arm, Right Updated: 01/21/21 1818     WBC 14 01 Thousand/uL      RBC 3 69 Million/uL      Hemoglobin 11 6 g/dL      Hematocrit 35 9 %      MCV 97 fL      MCH 31 4 pg      MCHC 32 3 g/dL      RDW 13 7 %      MPV 10 6 fL      Platelets 018 Thousands/uL      nRBC 0 /100 WBCs      Neutrophils Relative 75 %      Immat GRANS % 1 %      Lymphocytes Relative 16 %      Monocytes Relative 7 %      Eosinophils Relative 1 %      Basophils Relative 0 %      Neutrophils Absolute 10 47 Thousands/µL      Immature Grans Absolute 0 11 Thousand/uL      Lymphocytes Absolute 2 23 Thousands/µL      Monocytes Absolute 1 00 Thousand/µL      Eosinophils Absolute 0 15 Thousand/µL      Basophils Absolute 0 05 Thousands/µL                  XR chest 1 view portable    (Results Pending)              Procedures  ECG 12 Lead Documentation Only    Date/Time: 1/21/2021 6:22 PM  Performed by: Bety Kaplan DO  Authorized by: Bety Kaplan DO     ECG reviewed by me, the ED Provider: yes    Patient location:  ED  Previous ECG:     Previous ECG:  Compared to current    Similarity:  No change    Comparison to cardiac monitor: Yes    Interpretation:     Interpretation: non-specific    Rate:     ECG rate assessment: normal    Rhythm:     Rhythm: sinus rhythm    Ectopy:     Ectopy: none    QRS:     QRS axis:  Normal  Conduction:     Conduction: normal    ST segments:     ST segments:  Non-specific  T waves:     T waves: non-specific ED Course                             SBIRT 22yo+      Most Recent Value   SBIRT (22 yo +)   In order to provide better care to our patients, we are screening all of our patients for alcohol and drug use  Would it be okay to ask you these screening questions? No Filed at: 01/21/2021 1818                    MDM  Number of Diagnoses or Management Options  Chest pain:      Amount and/or Complexity of Data Reviewed  Clinical lab tests: ordered and reviewed  Tests in the radiology section of CPT®: reviewed and ordered  Tests in the medicine section of CPT®: ordered and reviewed    Patient Progress  Patient progress: stable      Disposition  Final diagnoses:   Chest pain     Time reflects when diagnosis was documented in both MDM as applicable and the Disposition within this note     Time User Action Codes Description Comment    1/21/2021  7:06 PM Monica Kirk Add [R07 9] Chest pain       ED Disposition     ED Disposition Condition Date/Time Comment    Admit Stable Thu Jan 21, 2021  7:06 PM          Follow-up Information    None         Patient's Medications   Discharge Prescriptions    No medications on file     No discharge procedures on file      PDMP Review     None          ED Provider  Electronically Signed by           Kasia Rodarte DO  01/21/21 1931

## 2021-01-22 ENCOUNTER — HOSPITAL ENCOUNTER (INPATIENT)
Facility: HOSPITAL | Age: 81
LOS: 21 days | Discharge: NURSING, REHAB OR LONG-TERM CARE | DRG: 280 | End: 2021-02-12
Attending: INTERNAL MEDICINE | Admitting: INTERNAL MEDICINE
Payer: COMMERCIAL

## 2021-01-22 ENCOUNTER — APPOINTMENT (INPATIENT)
Dept: NON INVASIVE DIAGNOSTICS | Facility: HOSPITAL | Age: 81
DRG: 280 | End: 2021-01-22
Attending: INTERNAL MEDICINE
Payer: COMMERCIAL

## 2021-01-22 ENCOUNTER — APPOINTMENT (INPATIENT)
Dept: NON INVASIVE DIAGNOSTICS | Facility: HOSPITAL | Age: 81
DRG: 280 | End: 2021-01-22
Payer: COMMERCIAL

## 2021-01-22 ENCOUNTER — APPOINTMENT (INPATIENT)
Dept: RADIOLOGY | Facility: HOSPITAL | Age: 81
DRG: 280 | End: 2021-01-22
Payer: COMMERCIAL

## 2021-01-22 VITALS
RESPIRATION RATE: 20 BRPM | HEIGHT: 73 IN | WEIGHT: 219.58 LBS | DIASTOLIC BLOOD PRESSURE: 73 MMHG | TEMPERATURE: 98 F | SYSTOLIC BLOOD PRESSURE: 134 MMHG | BODY MASS INDEX: 29.1 KG/M2 | HEART RATE: 74 BPM | OXYGEN SATURATION: 95 %

## 2021-01-22 DIAGNOSIS — I50.33 ACUTE ON CHRONIC DIASTOLIC CONGESTIVE HEART FAILURE (HCC): ICD-10-CM

## 2021-01-22 DIAGNOSIS — Z79.4 TYPE 2 DIABETES MELLITUS WITHOUT COMPLICATION, WITH LONG-TERM CURRENT USE OF INSULIN (HCC): ICD-10-CM

## 2021-01-22 DIAGNOSIS — N18.30 ACUTE RENAL FAILURE WITH ACUTE TUBULAR NECROSIS SUPERIMPOSED ON STAGE 3 CHRONIC KIDNEY DISEASE, UNSPECIFIED WHETHER STAGE 3A OR 3B CKD (HCC): ICD-10-CM

## 2021-01-22 DIAGNOSIS — N17.0 ACUTE RENAL FAILURE WITH ACUTE TUBULAR NECROSIS SUPERIMPOSED ON STAGE 3 CHRONIC KIDNEY DISEASE, UNSPECIFIED WHETHER STAGE 3A OR 3B CKD (HCC): ICD-10-CM

## 2021-01-22 DIAGNOSIS — I25.10 CORONARY ARTERY DISEASE INVOLVING NATIVE HEART WITHOUT ANGINA PECTORIS, UNSPECIFIED VESSEL OR LESION TYPE: ICD-10-CM

## 2021-01-22 DIAGNOSIS — N18.32 STAGE 3B CHRONIC KIDNEY DISEASE (HCC): Primary | Chronic | ICD-10-CM

## 2021-01-22 DIAGNOSIS — E11.9 TYPE 2 DIABETES MELLITUS WITHOUT COMPLICATION, WITH LONG-TERM CURRENT USE OF INSULIN (HCC): ICD-10-CM

## 2021-01-22 PROBLEM — E87.2 HIGH ANION GAP METABOLIC ACIDOSIS: Status: ACTIVE | Noted: 2021-01-22

## 2021-01-22 PROBLEM — R06.02 SHORTNESS OF BREATH: Status: ACTIVE | Noted: 2021-01-22

## 2021-01-22 LAB
ANION GAP SERPL CALCULATED.3IONS-SCNC: 15 MMOL/L (ref 4–13)
APTT PPP: 34 SECONDS (ref 23–37)
APTT PPP: 85 SECONDS (ref 23–37)
BASOPHILS # BLD AUTO: 0.03 THOUSANDS/ΜL (ref 0–0.1)
BASOPHILS NFR BLD AUTO: 0 % (ref 0–1)
BUN SERPL-MCNC: 76 MG/DL (ref 5–25)
CALCIUM SERPL-MCNC: 8.4 MG/DL (ref 8.3–10.1)
CHLORIDE SERPL-SCNC: 105 MMOL/L (ref 100–108)
CO2 SERPL-SCNC: 16 MMOL/L (ref 21–32)
CREAT SERPL-MCNC: 2.32 MG/DL (ref 0.6–1.3)
EOSINOPHIL # BLD AUTO: 0.02 THOUSAND/ΜL (ref 0–0.61)
EOSINOPHIL NFR BLD AUTO: 0 % (ref 0–6)
ERYTHROCYTE [DISTWIDTH] IN BLOOD BY AUTOMATED COUNT: 14 % (ref 11.6–15.1)
ERYTHROCYTE [DISTWIDTH] IN BLOOD BY AUTOMATED COUNT: 14.1 % (ref 11.6–15.1)
FLUAV RNA RESP QL NAA+PROBE: NEGATIVE
FLUBV RNA RESP QL NAA+PROBE: NEGATIVE
GFR SERPL CREATININE-BSD FRML MDRD: 26 ML/MIN/1.73SQ M
GLUCOSE P FAST SERPL-MCNC: 165 MG/DL (ref 65–99)
GLUCOSE SERPL-MCNC: 154 MG/DL (ref 65–140)
GLUCOSE SERPL-MCNC: 165 MG/DL (ref 65–140)
GLUCOSE SERPL-MCNC: 188 MG/DL (ref 65–140)
GLUCOSE SERPL-MCNC: 192 MG/DL (ref 65–140)
GLUCOSE SERPL-MCNC: 247 MG/DL (ref 65–140)
HCT VFR BLD AUTO: 32.3 % (ref 36.5–49.3)
HCT VFR BLD AUTO: 34 % (ref 36.5–49.3)
HGB BLD-MCNC: 10 G/DL (ref 12–17)
HGB BLD-MCNC: 10.1 G/DL (ref 12–17)
IMM GRANULOCYTES # BLD AUTO: 0.06 THOUSAND/UL (ref 0–0.2)
IMM GRANULOCYTES NFR BLD AUTO: 1 % (ref 0–2)
INR PPP: 1.24 (ref 0.84–1.19)
LYMPHOCYTES # BLD AUTO: 1.01 THOUSANDS/ΜL (ref 0.6–4.47)
LYMPHOCYTES NFR BLD AUTO: 11 % (ref 14–44)
MCH RBC QN AUTO: 31.3 PG (ref 26.8–34.3)
MCH RBC QN AUTO: 31.6 PG (ref 26.8–34.3)
MCHC RBC AUTO-ENTMCNC: 29.4 G/DL (ref 31.4–37.4)
MCHC RBC AUTO-ENTMCNC: 31.3 G/DL (ref 31.4–37.4)
MCV RBC AUTO: 101 FL (ref 82–98)
MCV RBC AUTO: 107 FL (ref 82–98)
MONOCYTES # BLD AUTO: 0.76 THOUSAND/ΜL (ref 0.17–1.22)
MONOCYTES NFR BLD AUTO: 9 % (ref 4–12)
NEUTROPHILS # BLD AUTO: 6.95 THOUSANDS/ΜL (ref 1.85–7.62)
NEUTS SEG NFR BLD AUTO: 79 % (ref 43–75)
NRBC BLD AUTO-RTO: 0 /100 WBCS
PLATELET # BLD AUTO: 133 THOUSANDS/UL (ref 149–390)
PLATELET # BLD AUTO: 134 THOUSANDS/UL (ref 149–390)
PMV BLD AUTO: 10.3 FL (ref 8.9–12.7)
PMV BLD AUTO: 10.6 FL (ref 8.9–12.7)
POTASSIUM SERPL-SCNC: 4 MMOL/L (ref 3.5–5.3)
PROTHROMBIN TIME: 15.5 SECONDS (ref 11.6–14.5)
RBC # BLD AUTO: 3.19 MILLION/UL (ref 3.88–5.62)
RBC # BLD AUTO: 3.2 MILLION/UL (ref 3.88–5.62)
RSV RNA RESP QL NAA+PROBE: NEGATIVE
SARS-COV-2 RNA RESP QL NAA+PROBE: NEGATIVE
SODIUM SERPL-SCNC: 136 MMOL/L (ref 136–145)
TROPONIN I SERPL-MCNC: 1.58 NG/ML
TROPONIN I SERPL-MCNC: 5.59 NG/ML
TROPONIN I SERPL-MCNC: 7.37 NG/ML
WBC # BLD AUTO: 8.83 THOUSAND/UL (ref 4.31–10.16)
WBC # BLD AUTO: 9.99 THOUSAND/UL (ref 4.31–10.16)

## 2021-01-22 PROCEDURE — 85027 COMPLETE CBC AUTOMATED: CPT | Performed by: PHYSICIAN ASSISTANT

## 2021-01-22 PROCEDURE — 80048 BASIC METABOLIC PNL TOTAL CA: CPT | Performed by: PHYSICIAN ASSISTANT

## 2021-01-22 PROCEDURE — 99223 1ST HOSP IP/OBS HIGH 75: CPT | Performed by: INTERNAL MEDICINE

## 2021-01-22 PROCEDURE — 99152 MOD SED SAME PHYS/QHP 5/>YRS: CPT | Performed by: INTERNAL MEDICINE

## 2021-01-22 PROCEDURE — 99222 1ST HOSP IP/OBS MODERATE 55: CPT | Performed by: INTERNAL MEDICINE

## 2021-01-22 PROCEDURE — 84484 ASSAY OF TROPONIN QUANT: CPT | Performed by: PHYSICIAN ASSISTANT

## 2021-01-22 PROCEDURE — 85730 THROMBOPLASTIN TIME PARTIAL: CPT | Performed by: INTERNAL MEDICINE

## 2021-01-22 PROCEDURE — 85025 COMPLETE CBC W/AUTO DIFF WBC: CPT | Performed by: PHYSICIAN ASSISTANT

## 2021-01-22 PROCEDURE — C1894 INTRO/SHEATH, NON-LASER: HCPCS | Performed by: INTERNAL MEDICINE

## 2021-01-22 PROCEDURE — 85730 THROMBOPLASTIN TIME PARTIAL: CPT | Performed by: PHYSICIAN ASSISTANT

## 2021-01-22 PROCEDURE — 84484 ASSAY OF TROPONIN QUANT: CPT | Performed by: INTERNAL MEDICINE

## 2021-01-22 PROCEDURE — 85610 PROTHROMBIN TIME: CPT | Performed by: PHYSICIAN ASSISTANT

## 2021-01-22 PROCEDURE — 99217 PR OBSERVATION CARE DISCHARGE MANAGEMENT: CPT | Performed by: INTERNAL MEDICINE

## 2021-01-22 PROCEDURE — 99153 MOD SED SAME PHYS/QHP EA: CPT | Performed by: INTERNAL MEDICINE

## 2021-01-22 PROCEDURE — 71045 X-RAY EXAM CHEST 1 VIEW: CPT

## 2021-01-22 PROCEDURE — 4A023N7 MEASUREMENT OF CARDIAC SAMPLING AND PRESSURE, LEFT HEART, PERCUTANEOUS APPROACH: ICD-10-PCS | Performed by: INTERNAL MEDICINE

## 2021-01-22 PROCEDURE — 0241U HB NFCT DS VIR RESP RNA 4 TRGT: CPT | Performed by: FAMILY MEDICINE

## 2021-01-22 PROCEDURE — 82948 REAGENT STRIP/BLOOD GLUCOSE: CPT

## 2021-01-22 PROCEDURE — 93455 CORONARY ART/GRFT ANGIO S&I: CPT | Performed by: INTERNAL MEDICINE

## 2021-01-22 PROCEDURE — C1760 CLOSURE DEV, VASC: HCPCS | Performed by: INTERNAL MEDICINE

## 2021-01-22 PROCEDURE — 99215 OFFICE O/P EST HI 40 MIN: CPT | Performed by: INTERNAL MEDICINE

## 2021-01-22 RX ORDER — ONDANSETRON 2 MG/ML
4 INJECTION INTRAMUSCULAR; INTRAVENOUS EVERY 6 HOURS PRN
Status: DISCONTINUED | OUTPATIENT
Start: 2021-01-22 | End: 2021-02-12 | Stop reason: HOSPADM

## 2021-01-22 RX ORDER — SODIUM CHLORIDE 9 MG/ML
60 INJECTION, SOLUTION INTRAVENOUS CONTINUOUS
Status: DISCONTINUED | OUTPATIENT
Start: 2021-01-22 | End: 2021-01-22

## 2021-01-22 RX ORDER — POLYETHYLENE GLYCOL 3350 17 G/17G
17 POWDER, FOR SOLUTION ORAL DAILY PRN
Status: DISCONTINUED | OUTPATIENT
Start: 2021-01-22 | End: 2021-02-01

## 2021-01-22 RX ORDER — HEPARIN SODIUM 10000 [USP'U]/100ML
3-20 INJECTION, SOLUTION INTRAVENOUS
Status: DISCONTINUED | OUTPATIENT
Start: 2021-01-22 | End: 2021-01-22 | Stop reason: HOSPADM

## 2021-01-22 RX ORDER — MIDAZOLAM HYDROCHLORIDE 2 MG/2ML
INJECTION, SOLUTION INTRAMUSCULAR; INTRAVENOUS CODE/TRAUMA/SEDATION MEDICATION
Status: COMPLETED | OUTPATIENT
Start: 2021-01-22 | End: 2021-01-22

## 2021-01-22 RX ORDER — ASPIRIN 81 MG/1
81 TABLET ORAL DAILY
Status: DISCONTINUED | OUTPATIENT
Start: 2021-01-23 | End: 2021-02-12 | Stop reason: HOSPADM

## 2021-01-22 RX ORDER — CLOPIDOGREL BISULFATE 75 MG/1
75 TABLET ORAL DAILY
Status: DISCONTINUED | OUTPATIENT
Start: 2021-01-23 | End: 2021-02-12 | Stop reason: HOSPADM

## 2021-01-22 RX ORDER — POLYETHYLENE GLYCOL 3350 17 G/17G
17 POWDER, FOR SOLUTION ORAL DAILY PRN
Status: CANCELLED | OUTPATIENT
Start: 2021-01-22

## 2021-01-22 RX ORDER — LIDOCAINE HYDROCHLORIDE 10 MG/ML
INJECTION, SOLUTION EPIDURAL; INFILTRATION; INTRACAUDAL; PERINEURAL CODE/TRAUMA/SEDATION MEDICATION
Status: COMPLETED | OUTPATIENT
Start: 2021-01-22 | End: 2021-01-22

## 2021-01-22 RX ORDER — GUAIFENESIN 600 MG
1200 TABLET, EXTENDED RELEASE 12 HR ORAL EVERY 12 HOURS SCHEDULED
Status: DISCONTINUED | OUTPATIENT
Start: 2021-01-22 | End: 2021-02-12 | Stop reason: HOSPADM

## 2021-01-22 RX ORDER — CLOPIDOGREL BISULFATE 75 MG/1
300 TABLET ORAL ONCE
Status: COMPLETED | OUTPATIENT
Start: 2021-01-22 | End: 2021-01-22

## 2021-01-22 RX ORDER — ONDANSETRON 2 MG/ML
4 INJECTION INTRAMUSCULAR; INTRAVENOUS EVERY 6 HOURS PRN
Status: CANCELLED | OUTPATIENT
Start: 2021-01-22

## 2021-01-22 RX ORDER — SODIUM CHLORIDE 9 MG/ML
INJECTION, SOLUTION INTRAVENOUS
Status: COMPLETED | OUTPATIENT
Start: 2021-01-22 | End: 2021-01-22

## 2021-01-22 RX ORDER — ACETAMINOPHEN 325 MG/1
650 TABLET ORAL EVERY 6 HOURS PRN
Status: DISCONTINUED | OUTPATIENT
Start: 2021-01-22 | End: 2021-02-12 | Stop reason: HOSPADM

## 2021-01-22 RX ORDER — HEPARIN SODIUM 10000 [USP'U]/100ML
3-20 INJECTION, SOLUTION INTRAVENOUS
Status: DISCONTINUED | OUTPATIENT
Start: 2021-01-22 | End: 2021-01-22

## 2021-01-22 RX ORDER — NITROGLYCERIN 0.4 MG/1
0.4 TABLET SUBLINGUAL
Status: DISCONTINUED | OUTPATIENT
Start: 2021-01-22 | End: 2021-02-12 | Stop reason: HOSPADM

## 2021-01-22 RX ORDER — HYDRALAZINE HYDROCHLORIDE 25 MG/1
50 TABLET, FILM COATED ORAL EVERY 8 HOURS SCHEDULED
Status: CANCELLED | OUTPATIENT
Start: 2021-01-22

## 2021-01-22 RX ORDER — FUROSEMIDE 10 MG/ML
20 INJECTION INTRAMUSCULAR; INTRAVENOUS ONCE
Status: COMPLETED | OUTPATIENT
Start: 2021-01-22 | End: 2021-01-22

## 2021-01-22 RX ORDER — HEPARIN SODIUM 1000 [USP'U]/ML
4000 INJECTION, SOLUTION INTRAVENOUS; SUBCUTANEOUS
Status: DISCONTINUED | OUTPATIENT
Start: 2021-01-22 | End: 2021-01-22 | Stop reason: HOSPADM

## 2021-01-22 RX ORDER — AMLODIPINE BESYLATE 5 MG/1
5 TABLET ORAL 2 TIMES DAILY
Status: CANCELLED | OUTPATIENT
Start: 2021-01-22

## 2021-01-22 RX ORDER — FENTANYL CITRATE 50 UG/ML
INJECTION, SOLUTION INTRAMUSCULAR; INTRAVENOUS CODE/TRAUMA/SEDATION MEDICATION
Status: COMPLETED | OUTPATIENT
Start: 2021-01-22 | End: 2021-01-22

## 2021-01-22 RX ORDER — ASPIRIN 81 MG/1
81 TABLET ORAL DAILY
Status: CANCELLED | OUTPATIENT
Start: 2021-01-23

## 2021-01-22 RX ORDER — ACETAMINOPHEN 325 MG/1
650 TABLET ORAL EVERY 6 HOURS PRN
Status: CANCELLED | OUTPATIENT
Start: 2021-01-22

## 2021-01-22 RX ORDER — ATORVASTATIN CALCIUM 40 MG/1
40 TABLET, FILM COATED ORAL
Status: DISCONTINUED | OUTPATIENT
Start: 2021-01-22 | End: 2021-02-12 | Stop reason: HOSPADM

## 2021-01-22 RX ORDER — HEPARIN SODIUM 1000 [USP'U]/ML
4000 INJECTION, SOLUTION INTRAVENOUS; SUBCUTANEOUS
Status: CANCELLED | OUTPATIENT
Start: 2021-01-22

## 2021-01-22 RX ORDER — CLOPIDOGREL BISULFATE 75 MG/1
75 TABLET ORAL DAILY
Status: DISCONTINUED | OUTPATIENT
Start: 2021-01-23 | End: 2021-01-22 | Stop reason: HOSPADM

## 2021-01-22 RX ORDER — ATORVASTATIN CALCIUM 40 MG/1
40 TABLET, FILM COATED ORAL
Status: CANCELLED | OUTPATIENT
Start: 2021-01-22

## 2021-01-22 RX ORDER — HEPARIN SODIUM 1000 [USP'U]/ML
2000 INJECTION, SOLUTION INTRAVENOUS; SUBCUTANEOUS
Status: DISCONTINUED | OUTPATIENT
Start: 2021-01-22 | End: 2021-01-22 | Stop reason: HOSPADM

## 2021-01-22 RX ORDER — HYDRALAZINE HYDROCHLORIDE 25 MG/1
50 TABLET, FILM COATED ORAL EVERY 8 HOURS SCHEDULED
Status: DISCONTINUED | OUTPATIENT
Start: 2021-01-22 | End: 2021-01-26

## 2021-01-22 RX ORDER — CALCIUM CARBONATE 200(500)MG
1000 TABLET,CHEWABLE ORAL DAILY PRN
Status: DISCONTINUED | OUTPATIENT
Start: 2021-01-22 | End: 2021-02-12 | Stop reason: HOSPADM

## 2021-01-22 RX ORDER — HEPARIN SODIUM 10000 [USP'U]/100ML
3-20 INJECTION, SOLUTION INTRAVENOUS
Status: CANCELLED | OUTPATIENT
Start: 2021-01-22

## 2021-01-22 RX ORDER — CLOPIDOGREL BISULFATE 75 MG/1
75 TABLET ORAL DAILY
Status: CANCELLED | OUTPATIENT
Start: 2021-01-23

## 2021-01-22 RX ORDER — NITROGLYCERIN 0.4 MG/1
0.4 TABLET SUBLINGUAL
Status: CANCELLED | OUTPATIENT
Start: 2021-01-22

## 2021-01-22 RX ORDER — HEPARIN SODIUM 1000 [USP'U]/ML
2000 INJECTION, SOLUTION INTRAVENOUS; SUBCUTANEOUS
Status: DISCONTINUED | OUTPATIENT
Start: 2021-01-22 | End: 2021-01-22

## 2021-01-22 RX ORDER — HEPARIN SODIUM 1000 [USP'U]/ML
4000 INJECTION, SOLUTION INTRAVENOUS; SUBCUTANEOUS
Status: DISCONTINUED | OUTPATIENT
Start: 2021-01-22 | End: 2021-01-22

## 2021-01-22 RX ORDER — CALCIUM CARBONATE 200(500)MG
1000 TABLET,CHEWABLE ORAL DAILY PRN
Status: CANCELLED | OUTPATIENT
Start: 2021-01-22

## 2021-01-22 RX ORDER — HEPARIN SODIUM 1000 [USP'U]/ML
2000 INJECTION, SOLUTION INTRAVENOUS; SUBCUTANEOUS
Status: CANCELLED | OUTPATIENT
Start: 2021-01-22

## 2021-01-22 RX ORDER — ESCITALOPRAM OXALATE 10 MG/1
10 TABLET ORAL
Status: DISCONTINUED | OUTPATIENT
Start: 2021-01-22 | End: 2021-02-12 | Stop reason: HOSPADM

## 2021-01-22 RX ORDER — AMLODIPINE BESYLATE 5 MG/1
5 TABLET ORAL 2 TIMES DAILY
Status: DISCONTINUED | OUTPATIENT
Start: 2021-01-22 | End: 2021-01-26

## 2021-01-22 RX ORDER — ESCITALOPRAM OXALATE 10 MG/1
10 TABLET ORAL
Status: CANCELLED | OUTPATIENT
Start: 2021-01-22

## 2021-01-22 RX ORDER — HEPARIN SODIUM 1000 [USP'U]/ML
4000 INJECTION, SOLUTION INTRAVENOUS; SUBCUTANEOUS ONCE
Status: COMPLETED | OUTPATIENT
Start: 2021-01-22 | End: 2021-01-22

## 2021-01-22 RX ADMIN — METOPROLOL TARTRATE 25 MG: 25 TABLET, FILM COATED ORAL at 22:24

## 2021-01-22 RX ADMIN — SODIUM CHLORIDE 60 ML/HR: 0.9 INJECTION, SOLUTION INTRAVENOUS at 13:03

## 2021-01-22 RX ADMIN — HEPARIN SODIUM 11.8 UNITS/KG/HR: 10000 INJECTION, SOLUTION INTRAVENOUS at 03:49

## 2021-01-22 RX ADMIN — AMLODIPINE BESYLATE 5 MG: 5 TABLET ORAL at 09:04

## 2021-01-22 RX ADMIN — INSULIN LISPRO 2 UNITS: 100 INJECTION, SOLUTION INTRAVENOUS; SUBCUTANEOUS at 17:15

## 2021-01-22 RX ADMIN — INSULIN DETEMIR 20 UNITS: 100 INJECTION, SOLUTION SUBCUTANEOUS at 22:46

## 2021-01-22 RX ADMIN — SODIUM BICARBONATE 60 ML/HR: 84 INJECTION, SOLUTION INTRAVENOUS at 10:00

## 2021-01-22 RX ADMIN — GUAIFENESIN 1200 MG: 600 TABLET, EXTENDED RELEASE ORAL at 22:24

## 2021-01-22 RX ADMIN — METOPROLOL TARTRATE 25 MG: 25 TABLET, FILM COATED ORAL at 09:04

## 2021-01-22 RX ADMIN — INSULIN LISPRO 1 UNITS: 100 INJECTION, SOLUTION INTRAVENOUS; SUBCUTANEOUS at 09:07

## 2021-01-22 RX ADMIN — FUROSEMIDE 40 MG: 40 TABLET ORAL at 09:04

## 2021-01-22 RX ADMIN — MIDAZOLAM HYDROCHLORIDE 1 MG: 1 INJECTION, SOLUTION INTRAMUSCULAR; INTRAVENOUS at 13:08

## 2021-01-22 RX ADMIN — LIDOCAINE HYDROCHLORIDE 8 ML: 10 INJECTION, SOLUTION EPIDURAL; INFILTRATION; INTRACAUDAL at 13:11

## 2021-01-22 RX ADMIN — HYDRALAZINE HYDROCHLORIDE 50 MG: 25 TABLET, FILM COATED ORAL at 22:24

## 2021-01-22 RX ADMIN — HYDRALAZINE HYDROCHLORIDE 50 MG: 25 TABLET, FILM COATED ORAL at 15:11

## 2021-01-22 RX ADMIN — AMLODIPINE BESYLATE 5 MG: 5 TABLET ORAL at 17:14

## 2021-01-22 RX ADMIN — ESCITALOPRAM 10 MG: 10 TABLET, FILM COATED ORAL at 22:24

## 2021-01-22 RX ADMIN — SODIUM BICARBONATE 60 ML/HR: 84 INJECTION, SOLUTION INTRAVENOUS at 13:53

## 2021-01-22 RX ADMIN — ATORVASTATIN CALCIUM 40 MG: 40 TABLET, FILM COATED ORAL at 17:14

## 2021-01-22 RX ADMIN — ASPIRIN 81 MG: 81 TABLET, COATED ORAL at 09:04

## 2021-01-22 RX ADMIN — CLOPIDOGREL BISULFATE 300 MG: 75 TABLET ORAL at 10:06

## 2021-01-22 RX ADMIN — HYDRALAZINE HYDROCHLORIDE 50 MG: 25 TABLET, FILM COATED ORAL at 06:59

## 2021-01-22 RX ADMIN — FENTANYL CITRATE 25 MCG: 50 INJECTION, SOLUTION INTRAMUSCULAR; INTRAVENOUS at 13:08

## 2021-01-22 RX ADMIN — INSULIN LISPRO 3 UNITS: 100 INJECTION, SOLUTION INTRAVENOUS; SUBCUTANEOUS at 22:25

## 2021-01-22 RX ADMIN — HEPARIN SODIUM 4000 UNITS: 1000 INJECTION INTRAVENOUS; SUBCUTANEOUS at 03:50

## 2021-01-22 RX ADMIN — IOHEXOL 90 ML: 350 INJECTION, SOLUTION INTRAVENOUS at 13:35

## 2021-01-22 RX ADMIN — FUROSEMIDE 20 MG: 10 INJECTION, SOLUTION INTRAMUSCULAR; INTRAVENOUS at 22:25

## 2021-01-22 NOTE — QUICK NOTE
Patient was transferred from [facility] with episodes of chest pain  He had history of coronary artery disease status post CABG with 2 vessel bypass and previously done stenting of unknown vessel, CKD stage 3, hypertension, diabetes mellitus who was admitted with chest pain and had troponin went up to 6  He underwent cardiac catheterization  He is known to have 2 vessel bypass with LIMA to LAD and vein graft to RCA  We believed that RCA was not bypassed  During cardiac catheterization patient is found to have severe three-vessel disease with 99% occluded proximal circumflex, 50% distal left main, mid % occluded, RCA proximally 100% occluded with collaterals from left circulation  Patient graft from LIMA to LAD is widely patent graft to obtuse marginal 1 has ostial around 40% stenosis but no flow-limiting is noted  The most likely etiology appears to be type 2 MI  We used only 90 cc dye  Continue aspirin Plavix statins medical therapy  Patient will be managed by cardiology team at [facility]  Full report to follow  Spoke to patient's sister and give her the report

## 2021-01-22 NOTE — ASSESSMENT & PLAN NOTE
Currently on 2L NC, down from 3L NC after cath      - Check COVID  - Continue NC O2  - Consider D/C fluids

## 2021-01-22 NOTE — ASSESSMENT & PLAN NOTE
Lab Results   Component Value Date    EGFR 26 01/22/2021    EGFR 25 01/21/2021    EGFR 23 09/04/2019    CREATININE 2 32 (H) 01/22/2021    CREATININE 2 35 (H) 01/21/2021    CREATININE 2 60 (H) 09/04/2019     Patient's creatinine close to baseline    Baseline creatinine around 2 3-2 5  Previously on HD in 2019  Patient started on gentle IV hydration bicarbonate drip in preparation for cardiac catheterization

## 2021-01-22 NOTE — H&P
H&P- Kendall Dec 1940, [de-identified] y o  male MRN: [id number]    Unit/Bed#: S -01 Encounter: [id number]    Primary Care Provider: Kojo Matute MD   Date and time admitted to hospital: 1/22/2021 12:28 PM        * NSTEMI (non-ST elevated myocardial infarction) [facility])  Assessment & Plan  Patient complains of a left-sided chest pressure that started around 3pm 1/21/2021 and has been constant since  He denies associated lightheadedness/dizziness, SOB but does report feeling SOB with exertion  Initial troponin was 0 03, repeat 0 28  Hx of CAD s/p stenting and CABG 2016  Patient was scheduled for outpatient stress test on February 4th  - Received ASA 324mg at home and SL nitro x1 in the ER  Troponin peaked at 5 5  Highly suspicious for type 1 MI  Check 2D echo  Patient was loaded with Plavix 300 milligram  Continue metoprolol 25 milligram p o  B i d   Cardiac Cath Completed at [facility]   - three-vessel disease with 99% occluded proximal circumflex, 50% distal left main, mid % occluded, RCA  proximally 100% occluded with collaterals from left circulation  Stend  D/c heparin    High anion gap metabolic acidosis  Assessment & Plan  Bicard 16, anion gap 15  Slight high anion gap metabolic acidosis  Possibly secondary to CKD    - Continue bicarb drip  - Nephrology consulted    Shortness of breath  Assessment & Plan  Currently on 2L NC, down from 3L NC after cath  - Check COVID  - Continue NC O2  - Consider D/C fluids    Hx of CABG  Assessment & Plan  CAD s/p CABG in 2016; history of stenting prior to CABG (LIMA to LAD and SVG to OM)  Continue home meds    Diabetes mellitus [facility])  Assessment & Plan  Lab Results   Component Value Date    HGBA1C 6 7 (H) 07/03/2019       Recent Labs     01/21/21  2048 01/22/21  0752 01/22/21  1412   POCGLU 163* 154* 188*       Blood Sugar Average: Last 72 hrs:  (P) 188   Continue Humalog sliding scale with Accu-Cheks q a c   And HS  Patient also on Levemir 20 units b i d     CKD (chronic kidney disease), stage III  Assessment & Plan  Lab Results   Component Value Date    EGFR 26 01/22/2021    EGFR 25 01/21/2021    EGFR 23 09/04/2019    CREATININE 2 32 (H) 01/22/2021    CREATININE 2 35 (H) 01/21/2021    CREATININE 2 60 (H) 09/04/2019     Patient's creatinine close to baseline  Baseline creatinine around 2 3-2 5  Previously on HD in 2019  Patient started on gentle IV hydration bicarbonate drip   Nephrology consulted        VTE Prophylaxis: Recently on heparin drip  / sequential compression device   Code Status: Full Code  POLST: POLST form is not discussed and not completed at this time  Anticipated Length of Stay:  Patient will be admitted on an Inpatient basis with an anticipated length of stay of  More then 2 midnights  Justification for Hospital Stay: Cardiac Cath    Chief Complaint:   Chest Pain    History of Present Illness:    Sd Garcia is a [de-identified] y o  male who presents after being transferred from [address] [place] [address] for cardiac catheterization  Was noted to have NSTEMI after he presented there with chest pain and elevated troponins  Patient was then transferred to [facility] for successful Cardiac Cath  Found to have three vessel disease at that time  Patient also noted to have [address] at [place]  Started on bicarb drip with Nephrology on board  Concern for COVID by nursing staff as patient is currently SOB  Review of Systems:    Review of Systems   Constitutional: Negative for chills, fatigue and fever  HENT: Negative for congestion, rhinorrhea, sneezing and sore throat  Respiratory: Negative for cough, chest tightness, shortness of breath and wheezing  Cardiovascular: Negative for chest pain and palpitations  Gastrointestinal: Negative for abdominal pain, constipation, diarrhea, nausea and vomiting  Musculoskeletal: Negative for arthralgias, gait problem, myalgias and neck pain  Skin: Negative for rash     Neurological: Negative for dizziness, weakness, numbness and headaches  All other systems reviewed and are negative  Past Medical and Surgical History:     Past Medical History:   Diagnosis Date    Acute on chronic diastolic CHF (congestive heart failure) ([facility] ) 7/4/2019    Arthritis     Back pain     Bladder cancer ([facility] [address] )     diagnosed  2/2016    [facility])     bladder; chemo; resection;     Coronary artery disease     has 2 coronary stents    Dental crowns present      5    Diabetes mellitus 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 )     fluid behind right eye    Hematuria     hx of    Hypercholesteremia     Hypertension     Kidney stones     Wears glasses        Past Surgical History:   Procedure Laterality Date    ABDOMINAL SURGERY      CARDIAC SURGERY      CHOLECYSTECTOMY      open    CORONARY ANGIOPLASTY WITH STENT PLACEMENT      2 stents  2009    CYSTECTOMY, PARTIAL N/A 11/30/2016    Procedure: PARTIAL CYSTECTOMY, LEFT LYMPHADENECTOMY;  Surgeon: Javier Joseph MD;  Location: [address] [place] [address];  Service:     CYSTOSCOPY Left 10/20/2016    Procedure: CYSTOSCOPY, BILATERAL RETROGRADE PYLEOGRAM, LEFT SIDE BLADDER BIOPSY, TURBT;  Surgeon: Javier Joseph MD;  Location: [address] [place] [address];  Service:     HERNIA REPAIR      repair [facility]    IR TEMPORARY DIALYSIS CATHETER PLACEMENT  7/8/2019    IR TUNNELED DIALYSIS CATHETER PLACEMENT  7/12/2019    IR TUNNELED DIALYSIS CATHETER REMOVAL  9/9/2019    DE CYSTOURETHROSCOPY N/A 2/25/2016    Procedure: CYSTOSCOPY;  Surgeon: Javier Joseph MD;  Location: [address] [place] [address];  Service: Urology    DE CYSTOURETHROSCOPY,FULGUR 2-5 CM LESN N/A 2/25/2016    Procedure: TRANSURETHRAL RESECTION OF BLADDER TUMOR (TURBT);   Surgeon: Javier Joseph MD;  Location: [address] [place] [address];  Service: Urology    TRANSURETHRAL RESECTION OF BLADDER TUMOR N/A 7/7/2016    Procedure: TRANSURETHRAL RESECTION OF BLADDER TUMOR (TURBT), Cystoscopy, deep biopsy;  Surgeon: Javier Joseph MD;  Location: [address] [place] [address];  Service:    [employer] TRANSURETHRAL RESECTION OF BLADDER TUMOR Bilateral 6/9/2016    Procedure: TRANSURETHRAL RESECTION OF BLADDER TUMOR (TURBT), Cysto, retrograde, BLADDER BIOPSY;  Surgeon: Arleth Pastor MD;  Location: [facility];  Service:        Meds/Allergies:    Prior to Admission medications    Medication Sig Start Date End Date Taking?  Authorizing Provider   amLODIPine (NORVASC) 5 mg tablet Take 1 tablet (5 mg total) by mouth 2 (two) times a day 7/13/19   Kaila Foreman MD   aspirin (ASPIR-LOW) 81 mg EC tablet Daily 2/24/17   Historical Provider, MD   atorvastatin (LIPITOR) 40 mg tablet atorvastatin 40 mg tablet   TAKE ONE TABLET DAILY    Historical Provider, MD   escitalopram (LEXAPRO) 10 mg tablet Take 1 tablet (10 mg total) by mouth daily at bedtime 7/13/19   Kaila Foreman MD   furosemide (LASIX) 80 mg tablet Take 1 tablet (80 mg total) by mouth 2 (two) times a day  Patient taking differently: Take 80 mg by mouth 2 (two) times a day Pt takes 40 mg in AM and20 mg PM 7/13/19   Kaila Foreman MD   hydrALAZINE (APRESOLINE) 50 mg tablet Take 1 tablet (50 mg total) by mouth every 8 (eight) hours 7/13/19   Kaila Foreman MD   insulin detemir (LEVEMIR) 100 units/mL subcutaneous injection Inject 20 Units under the skin every 12 (twelve) hours 7/13/19   Kaila Foreman MD   insulin lispro (HumaLOG) 100 units/mL injection Inject 1-6 Units under the skin daily at bedtime 7/13/19   Kaila Foreman MD   insulin lispro (HumaLOG) 100 units/mL injection Inject 2-12 Units under the skin 3 (three) times a day before meals 7/13/19   Kaila Foreman MD   isosorbide mononitrate (IMDUR) 30 mg 24 hr tablet Take 1 tablet (30 mg total) by mouth daily  Patient not taking: Reported on 1/21/2021 7/14/19   Kaila Foreman MD   metoprolol tartrate (LOPRESSOR) 50 mg tablet Take 0 5 tablets (25 mg total) by mouth every 12 (twelve) hours 7/13/19   Kaila Foreman MD   nitroglycerin (NITROSTAT) 0 4 mg SL tablet Place 1 tablet (0 4 mg total) under the tongue every 5 (five) minutes as needed for chest pain 19   Suze Hardy MD   polyethylene glycol (MIRALAX) 17 g packet Take 17 g by mouth daily as needed (Constipation) 19   Suze Hardy MD     I have reviewed home medications using allscripts  Allergies: No Known Allergies    Social History:     Marital Status: Single   Occupation: Unknown  Patient Pre-hospital Living Situation: Unknown  Patient Pre-hospital Level of Mobility: Unknown  Patient Pre-hospital Diet Restrictions: Unknown  Substance Use History:   Social History     Substance and Sexual Activity   Alcohol Use Yes    Comment: socially     Social History     Tobacco Use   Smoking Status Former Smoker    Packs/day: 0 50    Years: 5 00    Pack years: 2 50    Quit date: 65    Years since quittin 0   Smokeless Tobacco Never Used     Social History     Substance and Sexual Activity   Drug Use No       Family History:    non-contributory    Physical Exam:     Vitals:   Blood Pressure: 149/59 (21 1515)  Pulse: 67 (21 1515)  Temperature: 97 9 °F (36 6 °C) (21 1515)  Temp Source: Oral (21 1515)  Respirations: 20 (21 1515)  SpO2: 93 % (21 1515)    Physical Exam  Vitals signs reviewed  Constitutional:       General: He is not in acute distress  Appearance: He is well-developed  He is not ill-appearing or toxic-appearing  HENT:      Head: Normocephalic and atraumatic  Eyes:      General: No scleral icterus  Right eye: No discharge  Left eye: No discharge  Conjunctiva/sclera: Conjunctivae normal    Cardiovascular:      Rate and Rhythm: Normal rate and regular rhythm  Heart sounds: Normal heart sounds  No murmur  Pulmonary:      Effort: Respiratory distress present  Breath sounds: Normal breath sounds  No wheezing  Abdominal:      General: Bowel sounds are normal  There is no distension  Palpations: Abdomen is soft  Tenderness: There is no abdominal tenderness  Musculoskeletal: Normal range of motion  General: No tenderness  Skin:     General: Skin is warm  Findings: No erythema or rash  Comments: Left Thigh lesion from catheter insertion     Neurological:      Mental Status: He is alert  Motor: No weakness  Psychiatric:         Behavior: Behavior normal          Additional Data:     Lab Results: I have personally reviewed pertinent reports  Results from last 7 days   Lab Units 01/22/21  0622   WBC Thousand/uL 8 83   HEMOGLOBIN g/dL 10 0*   HEMATOCRIT % 34 0*   PLATELETS Thousands/uL 134*   NEUTROS PCT % 79*   LYMPHS PCT % 11*   MONOS PCT % 9   EOS PCT % 0     Results from last 7 days   Lab Units 01/22/21  0622 01/21/21  1812   POTASSIUM mmol/L 4 0 4 1   CHLORIDE mmol/L 105 104   CO2 mmol/L 16* 20*   BUN mg/dL 76* 70*   CREATININE mg/dL 2 32* 2 35*   CALCIUM mg/dL 8 4 8 7   ALK PHOS U/L  --  136*   ALT U/L  --  90*   AST U/L  --  50*     Results from last 7 days   Lab Units 01/22/21  0254   INR  1 24*       Imaging: I have personally reviewed pertinent reports  Xr Chest 1 View Portable    Result Date: 1/22/2021  Narrative: CHEST INDICATION:   cp  COMPARISON:  None EXAM PERFORMED/VIEWS:  XR CHEST PORTABLE  AP semierect Images:  1 FINDINGS:  There are median sternotomy wires indicating prior cardiac surgery  Cardiomediastinal silhouette appears unremarkable  The lungs are clear  No pneumothorax or pleural effusion  Osseous structures appear within normal limits for patient age  Impression: No acute cardiopulmonary disease  Workstation performed: [id number]       EKG, Pathology, and Other Studies Reviewed on Admission:   · EKG: Nonspecific ST and T Wave changes    Epic / Care Everywhere Records Reviewed: Yes     ** Please Note: This note has been constructed using a voice recognition system   **

## 2021-01-22 NOTE — DISCHARGE SUMMARY
Discharge- Lance Argueta 1940, [de-identified] y o  male MRN: [id number]    Unit/Bed#: [address] [place] [address] Encounter: [id number]    Primary Care Provider: Gerrie Aase, MD   Date and time admitted to hospital: 1/21/2021  5:59 PM        * NSTEMI (non-ST elevated myocardial infarction) [facility])  Assessment & Plan  Patient complains of a left-sided chest pressure that started around 3pm today and has been constant since  He denies associated lightheadedness/dizziness, SOB but does report feeling SOB with exertion  Initial troponin was 0 03, repeat 0 28  Hx of CAD s/p stenting and CABG 2016  Patient was scheduled for outpatient stress test on February 4th  - Received ASA 324mg at home and SL nitro x1 in the ER  Troponin peaked at 5 5  Patient currently on heparin drip  Highly suspicious for type 1 MI  Patient to be transferred [facility] [employer] [facility] for cardiac catheterization  Check 2D echo  Patient was loaded with Plavix 300 milligram  Continue metoprolol 25 milligram p o  B i d     CKD (chronic kidney disease), stage III  Assessment & Plan  Lab Results   Component Value Date    EGFR 26 01/22/2021    EGFR 25 01/21/2021    EGFR 23 09/04/2019    CREATININE 2 32 (H) 01/22/2021    CREATININE 2 35 (H) 01/21/2021    CREATININE 2 60 (H) 09/04/2019     Patient's creatinine close to baseline    Baseline creatinine around 2 3-2 5  Previously on HD in 2019  Patient started on gentle IV hydration bicarbonate drip in preparation for cardiac catheterization    Hx of CABG  Assessment & Plan  CAD s/p CABG in 2016; history of stenting prior to CABG (LIMA to LAD and SVG to OM)  Received ASA 324mg at home, takes 81 mg on a daily basis  Continue home medications as above  Patient scheduled for cardiac catheterization today        Leukocytosis  Assessment & Plan  WBC 14 on admission  Patient denies fevers, chills, cough, congestion, sore throat, abdominal/urinary complaints  Likely stress response  White count has normalized    Benign hypertension with chronic kidney disease, stage III  Assessment & Plan  Continue home medications including Lopressor 25 mg Q12, hydralazine 50 mg Q8, amlodipine 5 mg    Diabetes mellitus (HCC)  Assessment & Plan  Lab Results   Component Value Date    HGBA1C 6 7 (H) 07/03/2019       Recent Labs     01/21/21 2048 01/22/21  0752   POCGLU 163* 154*       Blood Sugar Average: Last 72 hrs:  (P) 158 5     Patient is currently NPO  Continue Humalog sliding scale with Accu-Cheks q a c  And HS  Patient also on Levemir 20 units b i d  Hypercholesteremia  Assessment & Plan  Continue atorvastatin 40 mg daily          Discharging Physician / Practitioner: Wandy Person MD  PCP: Verito Grey MD  Admission Date:   Admission Orders (From admission, onward)     Ordered        01/21/21 1906  Place in Observation  Once                   Discharge Date: 01/22/21    Resolved Problems  Date Reviewed: 1/22/2021    None          Consultations During Hospital Stay:  · Cardiology      Complications:  None    Reason for Admission: [facility] Course:     Marbin Dugan is a [de-identified] y o  male patient with past medical history of hypertension, hyperlipidemia, diabetes, CAD status post CABG, bladder cancer who originally presented to the hospital on 1/21/2021 due to chest pain since 3:00 p m  Yesterday  Patient also complains of shortness of breath on exertion  In the ED patient received aspirin and sublingual nitro and patient's troponin was mildly elevated  Later patient's troponin kept going upto 5 5  Patient has been on heparin drip  Patient was also loaded with Plavix  Patient continued remained stable  Patient was seen by Cardiology  Patient be transferred to [facility] for cardiac catheterization by Dr Munguia        Please see above list of diagnoses and related plan for additional information       Condition at Discharge: fair     Discharge Day Visit / Exam:     Subjective:  Patient denies any chest pain or palpitations  Patient complains of exertional shortness of breath  Vitals: Blood Pressure: 134/73 (01/22/21 0750)  Pulse: 74 (01/22/21 0750)  Temperature: 98 °F (36 7 °C) (01/22/21 0750)  Temp Source: Tympanic (01/21/21 1816)  Respirations: 20 (01/22/21 0750)  Height: 6' 1" (185 4 cm) (01/21/21 1955)  Weight - Scale: 99 6 kg (219 lb 9 3 oz) (01/21/21 1955)  SpO2: 95 % (01/22/21 0750)  Exam:   Physical Exam  Constitutional:       Appearance: Normal appearance  HENT:      Head: Normocephalic and atraumatic  Eyes:      Extraocular Movements: Extraocular movements intact  Pupils: Pupils are equal, round, and reactive to light  Neck:      Musculoskeletal: Normal range of motion and neck supple  Cardiovascular:      Rate and Rhythm: Normal rate and regular rhythm  Heart sounds: No murmur  No gallop  Pulmonary:      Effort: Pulmonary effort is normal       Breath sounds: Normal breath sounds  Abdominal:      General: Bowel sounds are normal       Palpations: Abdomen is soft  Tenderness: There is no abdominal tenderness  Musculoskeletal: Normal range of motion  General: No swelling or deformity  Skin:     General: Skin is warm and dry  Neurological:      General: No focal deficit present  Mental Status: He is alert  Discharge instructions/Information to patient and family:   See after visit summary for information provided to patient and family  Provisions for Follow-Up Care:  See after visit summary for information related to follow-up care and any pertinent home health orders  Disposition:     [address] Transfer to [facility]      Planned Readmission: No     Discharge Statement:  I spent  35 minutes discharging the patient  This time was spent on the day of discharge  I had direct contact with the patient on the day of discharge   Greater than 50% of the total time was spent examining patient, answering all patient questions, arranging and discussing plan of care with patient as well as directly providing post-discharge instructions  Additional time then spent on discharge activities  Discharge Medications:  See after visit summary for reconciled discharge medications provided to patient and family        ** Please Note: This note has been constructed using a voice recognition system **

## 2021-01-22 NOTE — ASSESSMENT & PLAN NOTE
CAD s/p CABG in 2016; history of stenting prior to CABG (LIMA to LAD and SVG to OM)  Continue home meds

## 2021-01-22 NOTE — ASSESSMENT & PLAN NOTE
Lab Results   Component Value Date    HGBA1C 6 7 (H) 07/03/2019       Recent Labs     01/21/21 2048   POCGLU 163*       Blood Sugar Average: Last 72 hrs:  (P) 163     DM2 diet  Continue insulin levemir 20 U Q12 with sliding scale coverage TID AC, QHS

## 2021-01-22 NOTE — ASSESSMENT & PLAN NOTE
CAD s/p CABG in 2016; history of stenting prior to CABG (LIMA to LAD and SVG to OM)  Received ASA 324mg at home, takes 81 mg on a daily basis  Continue home medications as above  Patient scheduled for cardiac catheterization today

## 2021-01-22 NOTE — ASSESSMENT & PLAN NOTE
Lab Results   Component Value Date    HGBA1C 6 7 (H) 07/03/2019       Recent Labs     01/21/21 2048 01/22/21  0752   POCGLU 163* 154*       Blood Sugar Average: Last 72 hrs:  (P) 158 5     Patient is currently NPO  Continue Humalog sliding scale with Accu-Cheks q a c  And HS  Patient also on Levemir 20 units b i d

## 2021-01-22 NOTE — PHYSICAL THERAPY NOTE
01/22/21 0950   PT Last Visit   PT Visit Date 01/22/21   Note Type   Note type Evaluation   Cancel Reasons Medical status  ([facility] for [facility])   Licensure   [place] License Number  [id number] [place] [address] [id number]

## 2021-01-22 NOTE — PROGRESS NOTES
Patient returned to [phone] from cardiac cath lab  Vitals stable, telemetry monitor reading normal sinus rhythm HR 60's  Patient denies any pain or discomfort at this time  Dressing of right femoral access clean dry and intact  Pulses present  Educated patient on post cardiac cath bed rest and precautions, patient cooperative  Call bell within reach  Frequent vitals and site assessment started

## 2021-01-22 NOTE — PLAN OF CARE
Problem: Potential for Falls  Goal: Patient will remain free of falls  Description: INTERVENTIONS:  - Assess patient frequently for physical needs  -  Identify cognitive and physical deficits and behaviors that affect risk of falls    -  [place] fall precautions as indicated by assessment   - Educate patient/family on patient safety including physical limitations  - Instruct patient to call for assistance with activity based on assessment  - Modify environment to reduce risk of injury  - Consider OT/PT consult to assist with strengthening/mobility  Outcome: Progressing

## 2021-01-22 NOTE — ASSESSMENT & PLAN NOTE
WBC 14 on admission  Patient denies fevers, chills, cough, congestion, sore throat, abdominal/urinary complaints  Likely stress response  White count has normalized

## 2021-01-22 NOTE — ASSESSMENT & PLAN NOTE
Patient complains of a left-sided chest pressure that started around 3pm 1/21/2021 and has been constant since  He denies associated lightheadedness/dizziness, SOB but does report feeling SOB with exertion  Initial troponin was 0 03, repeat 0 28  Hx of CAD s/p stenting and CABG 2016  Patient was scheduled for outpatient stress test on February 4th  - Received ASA 324mg at home and SL nitro x1 in the ER  Troponin peaked at 5 5  Highly suspicious for type 1 MI  Check 2D echo  Patient was loaded with Plavix 300 milligram  Continue metoprolol 25 milligram p o  B i d   Cardiac Cath Completed at [facility]   - three-vessel disease with 99% occluded proximal circumflex, 50% distal left main, mid % occluded, RCA  proximally 100% occluded with collaterals from left circulation   Stend  D/c heparin

## 2021-01-22 NOTE — DISCHARGE INSTRUCTIONS
Perma-cath Placement   WHAT YOU NEED TO KNOW:   A perma-cath is a catheter placed through a vein into or near your right atrium  Your right atrium is the right upper chamber of your heart  A perma-cath is used for dialysis in an emergency or until a long-term device is ready to use  After your procedure, you will have some pain and swelling on your chest and neck  You may have some bruises on your chest and neck  You may also have 2 dressings, one on your chest and one on your neck  DISCHARGE INSTRUCTIONS:   Call 911 for any of the following:   · You feel lightheaded, short of breath, and have chest pain  · Your catheter comes out   Contact Interventional Radiology at [phone] Evelyn PATIENTS: Contact Interventional Radiology at [phone]) Christopher James PATIENTS: Contact Interventional Radiology at [phone]) if:  · Blood soaks through your bandage  · You have new swelling in your arm, neck, face, or chest on your right side  · Your catheter gets wet  · Your bruises or pain get worse  · You have a fever or chills  · Persistent nausea or vomiting  · Your incision is red, swollen, or draining pus  · You have questions or concerns about your condition or care  Self-care:       · Resume your normal diet  · Keep your dressings dry  Do not take a shower or swim  You may take a tub bath, but do not get your dressings wet  Water in your wound can cause bacteria to grow and cause an infection  If your dressing gets wet, dry it off and cover it with dry sterile gauze  Call your healthcare provider  Do not use soaps or ointments  · Do not change your dressings  Your healthcare provider or dialysis nurse will change your dressings  Your dressings should stay in place until your healthcare provider removes them  The dressing on your chest will stay as long as you have the catheter in place  The dressing prevents infection  · Do not remove the red and blue caps from the end of your catheter   The caps prevent air from getting into your catheter  Follow up with your healthcare provider as directed: Write down your questions so you remember to ask them during your visits          1  Please see the post cardiac catheterization/ angioseal closure device instructions and stent booklet  No heavy lifting, greater than 10 lbs  or strenuous  activity for 48 hrs  See the post cardiac catheterization/ angioseal closure device instructions  2 Remove band aid tomorrow  Shower and wash area- groin gently with soap and water- beginning tomorrow  Rinse and pat dry  Apply new water seal band aid  Repeat this process for 5 days  No powders, creams lotions or antibiotic ointments  for 5 days  No tub baths, hot tubs or swimming for 5 days       3 No driving for 2 days

## 2021-01-22 NOTE — ED NOTES
Pt asking for something for discomfort pt states" 3/10 pressure L upper chest area  Dr Ivanna Vasquez made aware       Katalina Tinajero, SEYMOUR  01/21/21 3997

## 2021-01-22 NOTE — ASSESSMENT & PLAN NOTE
WBC 14 on admission  Patient denies fevers, chills, cough, congestion, sore throat, abdominal/urinary complaints  Monitor off Abx at this time  Repeat CBC in AM

## 2021-01-22 NOTE — H&P
[facility]  H&P- Isatu Napier 1940, [de-identified] y o  male MRN: [id number]  Unit/Bed#: [address] [address] Encounter: [id number]  Primary Care Provider: Jovana Bhatt MD   Date and time admitted to hospital: 1/21/2021  5:59 PM    * Chest pain  Assessment & Plan  Patient complains of a left-sided chest pressure that started around 3pm today and has been constant since  He denies associated lightheadedness/dizziness, SOB but does report feeling SOB with exertion  Initial troponin was 0 03, repeat 0 28    Hx of CAD s/p stenting and CABG 2016  Due for outpatient stress test on Feb 4th  - Pressure is currently 1/10  - Received ASA 324mg at home and SL nitro x1   - Continue to monitor on telemetry  - Trend troponin  - Cardiology consult in AM    Hx of CABG  Assessment & Plan  CAD s/p CABG in 2016; history of stenting prior to CABG (LIMA to LAD and SVG to OM)  Received ASA 324mg at home, takes 81 mg on a daily basis  Continue home medications as above  Due for repeat stress test on Feb 4th        Leukocytosis  Assessment & Plan  WBC 14 on admission  Patient denies fevers, chills, cough, congestion, sore throat, abdominal/urinary complaints  Monitor off Abx at this time  Repeat CBC in AM    Benign hypertension with chronic kidney disease, stage III  Assessment & Plan  Continue home medications including Lopressor 25 mg Q12, hydralazine 50 mg Q8, amlodipine 5 mg, Lasix 40 mg daily    Diabetes mellitus (HCC)  Assessment & Plan  Lab Results   Component Value Date    HGBA1C 6 7 (H) 07/03/2019       Recent Labs     01/21/21 2048   POCGLU 163*       Blood Sugar Average: Last 72 hrs:  (P) 163     DM2 diet  Continue insulin levemir 20 U Q12 with sliding scale coverage TID AC, QHS      Hypercholesteremia  Assessment & Plan  Continue atorvastatin 40 mg daily    CKD (chronic kidney disease), stage III  Assessment & Plan  Lab Results   Component Value Date    EGFR 25 01/21/2021    EGFR 23 09/04/2019    EGFR 17 07/15/2019    CREATININE 2 35 (H) 01/21/2021    CREATININE 2 60 (H) 09/04/2019    CREATININE 3 23 (H) 07/15/2019     Continue home dose of Lasix, Cr currently at baseline  Previously on HD in 2019  Continue to monitor with daily BMP          VTE Prophylaxis: Heparin  / sequential compression device   Code Status: level 1  POLST: POLST is not applicable to this patient  Discussion with family: No    Anticipated Length of Stay:  Patient will be admitted on an Observation basis with an anticipated length of stay of  < 2 midnights  Justification for Hospital Stay: chest pain r/o ACS    Total Time for Visit, including Counseling / Coordination of Care: 30 minutes  Greater than 50% of this total time spent on direct patient counseling and coordination of care  Chief Complaint:   Chest pain    History of Present Illness:    Skye Delacruz is a [de-identified] y o  male who presents with PMH of HTN, HLD, DM2, CAD s/p CABG in 2016, bladder cancer s/p resection  He presented to the ED today for evaluation of chest pressure that started at 3pm today and has been ongoing since  The pain was described as a pressure, as bad as 5/10 and currently 1/10  He reports feeling SOB with exertion but denies any SOB today, no lightheadedness, dizziness, diaphoresis, or palpitations  He follows with cardiology through [facility] and is scheduled to have a repeat stress test outpatient on 2/04  He has been having intermittent left sided chest discomfort but today's pain felt somewhat different  He lives alone at home and ambulates with a walker  He recently started taking hydralazine and amlodipine and has been taking all other medications as prescribed  He is a former smoker, having quit > 50 years ago  He received ASA 324mg at home and 1 sublingual nitro tab en route to the ED  Review of Systems:    Review of Systems   Constitutional: Negative for chills, diaphoresis, fatigue and fever  HENT: Negative for congestion, rhinorrhea and sore throat  Eyes: Negative for visual disturbance  Respiratory: Positive for shortness of breath (w/ exertion)  Negative for cough and wheezing  Cardiovascular: Positive for chest pain  Negative for palpitations and leg swelling  Gastrointestinal: Negative for abdominal distention, abdominal pain, diarrhea, nausea and vomiting  Genitourinary: Negative for decreased urine volume, dysuria, frequency, hematuria and urgency  Musculoskeletal: Negative for gait problem and myalgias  Skin: Negative for rash and wound  Neurological: Negative for dizziness (occasional, upon standing), weakness, light-headedness, numbness and headaches          Past Medical and Surgical History:     Past Medical History:   Diagnosis Date    Acute on chronic diastolic CHF (congestive heart failure) ([facility] ) 7/4/2019    Arthritis     Back pain     Bladder cancer ([facility] )     diagnosed  2/2016    Cancer [facility])     bladder; chemo; resection;     Coronary artery disease     has 2 coronary stents    Dental crowns present      5    [facility]     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 )     fluid behind right eye    Hematuria     hx of    Hypercholesteremia     Hypertension     Kidney stones     Wears glasses        Past Surgical History:   Procedure Laterality Date    ABDOMINAL SURGERY      CARDIAC SURGERY      CHOLECYSTECTOMY      open    CORONARY ANGIOPLASTY WITH STENT PLACEMENT      2 stents  2009    CYSTECTOMY, PARTIAL N/A 11/30/2016    Procedure: PARTIAL CYSTECTOMY, LEFT LYMPHADENECTOMY;  Surgeon: Tanya Novak MD;  Location: [address] [place] [address];  Service:     CYSTOSCOPY Left 10/20/2016    Procedure: CYSTOSCOPY, BILATERAL RETROGRADE PYLEOGRAM, LEFT SIDE BLADDER BIOPSY, TURBT;  Surgeon: Tanya Novak MD;  Location: [address] [place] [address];  Service:     HERNIA REPAIR      repair [facility]    IR TEMPORARY DIALYSIS CATHETER PLACEMENT  7/8/2019    IR TUNNELED DIALYSIS CATHETER PLACEMENT  7/12/2019    IR TUNNELED DIALYSIS CATHETER REMOVAL 9/9/2019    MT CYSTOURETHROSCOPY N/A 2/25/2016    Procedure: Mindy Brochure;  Surgeon: Becak Pierce MD;  Location: [address] [place] [address];  Service: Urology    MT CYSTOURETHROSCOPY,FULGUR 2-5 CM LESN N/A 2/25/2016    Procedure: TRANSURETHRAL RESECTION OF BLADDER TUMOR (TURBT); Surgeon: Becka Pierce MD;  Location: [address] [place] [address];  Service: Urology    TRANSURETHRAL RESECTION OF BLADDER TUMOR N/A 7/7/2016    Procedure: TRANSURETHRAL RESECTION OF BLADDER TUMOR (TURBT), Cystoscopy, deep biopsy;  Surgeon: Becka Pierce MD;  Location: [address] [place] [address];  Service:     TRANSURETHRAL RESECTION OF BLADDER TUMOR Bilateral 6/9/2016    Procedure: TRANSURETHRAL RESECTION OF BLADDER TUMOR (TURBT), Cysto, retrograde, BLADDER BIOPSY;  Surgeon: Becka Pierce MD;  Location: [facility];  Service:        Meds/Allergies:    Prior to Admission medications    Medication Sig Start Date End Date Taking?  Authorizing Provider   amLODIPine (NORVASC) 5 mg tablet Take 1 tablet (5 mg total) by mouth 2 (two) times a day 7/13/19   aMrco Spencer MD   aspirin (ASPIR-LOW) 81 mg EC tablet Daily 2/24/17   Historical Provider, MD   atorvastatin (LIPITOR) 40 mg tablet atorvastatin 40 mg tablet   TAKE ONE TABLET DAILY    Historical Provider, MD   escitalopram (LEXAPRO) 10 mg tablet Take 1 tablet (10 mg total) by mouth daily at bedtime 7/13/19   Marco Spencer MD   furosemide (LASIX) 80 mg tablet Take 1 tablet (80 mg total) by mouth 2 (two) times a day  Patient taking differently: Take 80 mg by mouth 2 (two) times a day Pt takes 40 mg in AM and20 mg PM 7/13/19   Marco Spencer MD   hydrALAZINE (APRESOLINE) 50 mg tablet Take 1 tablet (50 mg total) by mouth every 8 (eight) hours 7/13/19   Marco Spencer MD   insulin detemir (LEVEMIR) 100 units/mL subcutaneous injection Inject 20 Units under the skin every 12 (twelve) hours 7/13/19   Marco Spencer MD   insulin lispro (HumaLOG) 100 units/mL injection Inject 1-6 Units under the skin daily at bedtime 19   Silvano Wagner MD   insulin lispro (HumaLOG) 100 units/mL injection Inject 2-12 Units under the skin 3 (three) times a day before meals 19   Silvano Wagner MD   isosorbide mononitrate (IMDUR) 30 mg 24 hr tablet Take 1 tablet (30 mg total) by mouth daily  Patient not taking: Reported on 2021   Silvano Wagner MD   metoprolol tartrate (LOPRESSOR) 50 mg tablet Take 0 5 tablets (25 mg total) by mouth every 12 (twelve) hours 19   Silvano Wagner MD   nitroglycerin (NITROSTAT) 0 4 mg SL tablet Place 1 tablet (0 4 mg total) under the tongue every 5 (five) minutes as needed for chest pain 19   Silvano Wagner MD   polyethylene glycol (MIRALAX) 17 g packet Take 17 g by mouth daily as needed (Constipation) 19   Silvano Wagner MD     I have reviewed home medications with patient personally  Allergies: No Known Allergies    Social History:    Social History     Substance and Sexual Activity   Alcohol Use Yes    Comment: socially     Social History     Tobacco Use   Smoking Status Former Smoker    Packs/day: 0 50    Years: 5 00    Pack years: 2 50    Quit date:     Years since quittin 0   Smokeless Tobacco Never Used     Social History     Substance and Sexual Activity   Drug Use No       Family History:    Family History   Problem Relation Age of Onset    Heart disease Mother     Diabetes Mother     Diabetes Father        Physical Exam:     Vitals:   Blood Pressure: 127/60 (21)  Pulse: 66 (21)  Temperature: 98 3 °F (36 8 °C) (21)  Temp Source: Tympanic (21)  Respirations: 18 (21)  Height: 6' 1" (185 4 cm) (21)  Weight - Scale: 99 6 kg (219 lb 9 3 oz) (21)  SpO2: 96 % (21)    Physical Exam  Vitals signs reviewed  Constitutional:       General: He is not in acute distress  Appearance: He is well-developed  He is not ill-appearing     HENT:      Head: Normocephalic and atraumatic  Cardiovascular:      Rate and Rhythm: Normal rate and regular rhythm  Heart sounds: No murmur  Pulmonary:      Effort: Pulmonary effort is normal  No respiratory distress  Breath sounds: Normal breath sounds  No wheezing, rhonchi or rales  Abdominal:      General: Bowel sounds are normal  There is no distension  Palpations: Abdomen is soft  Tenderness: There is no abdominal tenderness  There is no guarding  Musculoskeletal:         General: No swelling or tenderness  Skin:     General: Skin is warm and dry  Findings: No erythema or rash  Neurological:      Mental Status: He is alert and oriented to person, place, and time  Psychiatric:         Mood and Affect: Mood normal          Behavior: Behavior normal          Additional Data:     Lab Results: I have personally reviewed pertinent reports  Results from last 7 days   Lab Units 01/21/21  2213 01/21/21  1812   WBC Thousand/uL  --  14 01*   HEMOGLOBIN g/dL  --  11 6*   HEMATOCRIT %  --  35 9*   PLATELETS Thousands/uL 137* 177   NEUTROS PCT %  --  75   LYMPHS PCT %  --  16   MONOS PCT %  --  7   EOS PCT %  --  1     Results from last 7 days   Lab Units 01/21/21  1812   SODIUM mmol/L 139   POTASSIUM mmol/L 4 1   CHLORIDE mmol/L 104   CO2 mmol/L 20*   BUN mg/dL 70*   CREATININE mg/dL 2 35*   ANION GAP mmol/L 15*   CALCIUM mg/dL 8 7   ALBUMIN g/dL 3 6   TOTAL BILIRUBIN mg/dL 1 10*   ALK PHOS U/L 136*   ALT U/L 90*   AST U/L 50*   GLUCOSE RANDOM mg/dL 171*         Results from last 7 days   Lab Units 01/21/21  2048   POC GLUCOSE mg/dl 163*               Imaging: I have personally reviewed pertinent reports        XR chest 1 view portable    (Results Pending)       EKG, Pathology, and Other Studies Reviewed on Admission:   · EKG: NSR, left ventricular hypertrophy with repolarization abnormality, , mild ST depression in V5, V6, I, aVL    Allscripts / Epic Records Reviewed: Yes     ** Please Note: This note has been constructed using a voice recognition system   **

## 2021-01-22 NOTE — ASSESSMENT & PLAN NOTE
Patient complains of a left-sided chest pressure that started around 3pm today and has been constant since  He denies associated lightheadedness/dizziness, SOB but does report feeling SOB with exertion  Initial troponin was 0 03, repeat 0 28    Hx of CAD s/p stenting and CABG 2016  Patient was scheduled for outpatient stress test on February 4th  - Received ASA 324mg at home and SL nitro x1 in the ER  Troponin peaked at 5 5  Patient currently on heparin drip  Highly suspicious for type 1 MI  Patient to be transferred [facility] for cardiac catheterization  Check 2D echo  Patient was loaded with Plavix 300 milligram  Continue metoprolol 25 milligram p o  B i d

## 2021-01-22 NOTE — ASSESSMENT & PLAN NOTE
Continue home medications including Lopressor 25 mg Q12, hydralazine 50 mg Q8, amlodipine 5 mg, Lasix 40 mg daily

## 2021-01-22 NOTE — ASSESSMENT & PLAN NOTE
Ministerio 16, anion gap 15  Slight high anion gap metabolic acidosis   Possibly secondary to CKD    - Continue bicarb drip  - Nephrology consulted

## 2021-01-22 NOTE — ASSESSMENT & PLAN NOTE
CAD s/p CABG in 2016; history of stenting prior to CABG (LIMA to LAD and SVG to OM)  Received ASA 324mg at home, takes 81 mg on a daily basis  Continue home medications as above  Due for repeat stress test on Feb 4th

## 2021-01-22 NOTE — ASSESSMENT & PLAN NOTE
Lab Results   Component Value Date    EGFR 25 01/21/2021    EGFR 23 09/04/2019    EGFR 17 07/15/2019    CREATININE 2 35 (H) 01/21/2021    CREATININE 2 60 (H) 09/04/2019    CREATININE 3 23 (H) 07/15/2019     Continue home dose of Lasix, Cr currently at baseline  Previously on HD in 2019  Continue to monitor with daily BMP

## 2021-01-22 NOTE — ASSESSMENT & PLAN NOTE
Patient complains of a left-sided chest pressure that started around 3pm today and has been constant since  He denies associated lightheadedness/dizziness, SOB but does report feeling SOB with exertion  Initial troponin was 0 03, repeat 0 28    Hx of CAD s/p stenting and CABG 2016  Due for outpatient stress test on Feb 4th  - Pressure is currently 1/10  - Received ASA 324mg at home and SL nitro x1   - Continue to monitor on telemetry  - Trend troponin  - Cardiology consult in AM

## 2021-01-22 NOTE — NURSING NOTE
Pt transferred to [facility] for cardiac cath with possible stent placement  Report given to SEYMOUR Newman at receiving hospital  Pt to go to room 314-1  Receiving doctor is Dr Suzette Shaw  IV left in place, heparin drip and sodium bicarb infusion running  Report given to transport team  (SLETS) Pt transferred with all belongings including $255 in cash and 3 credit cards

## 2021-01-22 NOTE — PROGRESS NOTES
Patient arrived to room s314 from [address]  Received report from EMT/transport  Patient A&OX 4  No complaints of chest pain or discomfort at this time  Denies SOB, but appears SOB  Vitals stable at this time  Heparin gtt and IVF verified and double checked, see Flowsheets in Epic   SLIM and cath team made aware of patients arrival

## 2021-01-22 NOTE — CONSULTS
2           Consultation - Nephrology   Niki Sánchez [de-identified] y o  male MRN: [id number]  Unit/Bed#: [address] 420-01 Encounter: [id number]      Assessment/Plan     Assessment / Plan:  1  Renal    Patient has chronic kidney disease and baseline creatinine from review record is around 2 5  The patient is having symptoms of unstable angina history of ischemic cardiomyopathy after speak with Cardiology they are going to perform cardiac catheterization today also given the increase in troponin I  The patient understands he is at some risk for contrast induced complications specifically causing declining kidney function  He is going to be transferred to another facility how I will be following him there as well  Creatinine is a little below baseline  Will start IV fluids half-normal saline with 75 mEq sodium bicarb per L at 60 cc/hour as Cardiology felt it was okay to give slow fluids  Monitor renal function postprocedure in urine output  2  Cardiac    The patient has history of cardiac stents as well as bypass surgery  He has symptoms suggesting unstable angina and also increase in troponin I  Cardiology saw patient and scheduling for cardiac catheterization today  History of Present Illness   Physician Requesting Consult: James Mark MD  Reason for Consult / Principal Problem:  Chronic kidney disease  Hx and PE limited by:   HPI: Niki Sánchez is a [de-identified]y o  year old male who has history of ischemic cardiomyopathy  The patient states that his blood pressure started to be more elevated and he saw his cardiologist   Medications were adjusted he continued to have spikes in his blood pressure at home as he was monitor it 3 times a day  He then developed chest pain which he was having often on  He also developed shortness of breath  He came into the hospital was admitted and has chronic kidney disease were asked to see him regarding catheterization and chronic kidney disease    History obtained from chart review and the patient  Consults    Review of Systems   Constitutional: Negative for appetite change, chills, diaphoresis and fever  HENT: Negative  Eyes: Negative  Respiratory: Positive for chest tightness and shortness of breath  Negative for cough and wheezing  Cardiovascular: Positive for chest pain  Negative for palpitations and leg swelling  Gastrointestinal: Negative for abdominal distention, abdominal pain, diarrhea, nausea and vomiting  Genitourinary: Negative for difficulty urinating, dysuria, flank pain and hematuria  Neurological: Negative for dizziness, syncope, light-headedness and headaches  Psychiatric/Behavioral: Negative for agitation, behavioral problems, confusion and decreased concentration         Historical Information   Patient Active Problem List   Diagnosis    Chest pain    Elevated troponin    CKD (chronic kidney disease), stage III    Bladder cancer (HCC)    Hypercholesteremia    [facility]    NSTEMI (non-ST elevated myocardial infarction) ([facility] )    Benign hypertension with chronic kidney disease, stage III    Leukocytosis    Hx of CABG     Past Medical History:   Diagnosis Date    Acute on chronic diastolic CHF (congestive heart failure) ([place] [address] ) 7/4/2019    Arthritis     Back pain     Bladder cancer ([facility] )     diagnosed  2/2016    Cancer ([facility] )     bladder; chemo; resection;     Coronary artery disease     has 2 coronary stents    Dental crowns present      5    Diabetes mellitus ([facility] )     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[facility] )     fluid behind right eye    Hematuria     hx of    Hypercholesteremia     Hypertension     Kidney stones     Wears glasses      Past Surgical History:   Procedure Laterality Date    ABDOMINAL SURGERY      CARDIAC SURGERY      CHOLECYSTECTOMY      open    CORONARY ANGIOPLASTY WITH STENT PLACEMENT      2 stents  2009    CYSTECTOMY, PARTIAL N/A 11/30/2016    Procedure: PARTIAL CYSTECTOMY, LEFT LYMPHADENECTOMY;  Surgeon: Becka Pierce MD;  Location: [address] [place] [address];  Service:     CYSTOSCOPY Left 10/20/2016    Procedure: CYSTOSCOPY, BILATERAL RETROGRADE PYLEOGRAM, LEFT SIDE BLADDER BIOPSY, TURBT;  Surgeon: Becka Pierce MD;  Location: [address] [place] [address];  Service:     HERNIA REPAIR      repair [facility]    IR TEMPORARY DIALYSIS CATHETER PLACEMENT  2019    IR TUNNELED DIALYSIS CATHETER PLACEMENT  2019    IR TUNNELED DIALYSIS CATHETER REMOVAL  2019    RI CYSTOURETHROSCOPY N/A 2016    Procedure: CYSTOSCOPY;  Surgeon: Becka Pierce MD;  Location: [address] [place] [address];  Service: Urology    RI CYSTOURETHROSCOPY,FULGUR 2-5 CM LESN N/A 2016    Procedure: TRANSURETHRAL RESECTION OF BLADDER TUMOR (TURBT);   Surgeon: Becka Pierce MD;  Location: [facility];  Service: Urology    TRANSURETHRAL RESECTION OF BLADDER TUMOR N/A 2016    Procedure: TRANSURETHRAL RESECTION OF BLADDER TUMOR (TURBT), Cystoscopy, deep biopsy;  Surgeon: Becka Pierce MD;  Location: [facility];  Service:     TRANSURETHRAL RESECTION OF BLADDER TUMOR Bilateral 2016    Procedure: TRANSURETHRAL RESECTION OF BLADDER TUMOR (TURBT), Cysto, retrograde, BLADDER BIOPSY;  Surgeon: Becka Pierce MD;  Location: [facility];  Service:      Social History   Social History     Substance and Sexual Activity   Alcohol Use Yes    Comment: socially     Social History     Substance and Sexual Activity   Drug Use No     Social History     Tobacco Use   Smoking Status Former Smoker    Packs/day: 0 50    Years: 5 00    Pack years: 2 50    Quit date: Jimmy Bullock Years since quittin 0   Smokeless Tobacco Never Used     Family History   Problem Relation Age of Onset    Heart disease Mother     Diabetes Mother     Diabetes Father        Meds/Allergies   current meds:   Current Facility-Administered Medications   Medication Dose Route Frequency    acetaminophen (TYLENOL) tablet 650 mg  650 mg Oral Q6H PRN    amLODIPine (NORVASC) tablet 5 mg  5 mg Oral BID    aspirin (ECOTRIN LOW STRENGTH) EC tablet 81 mg  81 mg Oral Daily    atorvastatin (LIPITOR) tablet 40 mg  40 mg Oral Daily With Dinner    calcium carbonate (TUMS) chewable tablet 1,000 mg  1,000 mg Oral Daily PRN    escitalopram (LEXAPRO) tablet 10 mg  10 mg Oral HS    heparin (porcine) 25,000 units in 0 45% NaCl 250 mL infusion (premix)  3-20 Units/kg/hr (Order-Specific) Intravenous Titrated    heparin (porcine) injection 2,000 Units  2,000 Units Intravenous Q1H PRN    heparin (porcine) injection 4,000 Units  4,000 Units Intravenous Q1H PRN    hydrALAZINE (APRESOLINE) tablet 50 mg  50 mg Oral Q8H [address]    insulin detemir (LEVEMIR) subcutaneous injection 20 Units  20 Units Subcutaneous Q12H [address]    insulin lispro (HumaLOG) 100 units/mL subcutaneous injection 1-6 Units  1-6 Units Subcutaneous TID AC    insulin lispro (HumaLOG) 100 units/mL subcutaneous injection 1-6 Units  1-6 Units Subcutaneous HS    metoprolol tartrate (LOPRESSOR) tablet 25 mg  25 mg Oral Q12H [place]    nitroglycerin (NITROSTAT) SL tablet 0 4 mg  0 4 mg Sublingual Q5 Min PRN    ondansetron (ZOFRAN) injection 4 mg  4 mg Intravenous Q6H PRN    pneumococcal 13-valent conjugate vaccine (PREVNAR-13) IM injection 0 5 mL  0 5 mL Intramuscular Prior to discharge    polyethylene glycol (MIRALAX) packet 17 g  17 g Oral Daily PRN    sodium chloride 0 9 % infusion  60 mL/hr Intravenous Continuous     No Known Allergies    Objective   No intake or output data in the 24 hours ending 01/22/21 0907  Body mass index is 28 97 kg/m²  Invasive Devices:        PHYSICAL EXAM:  /73   Pulse 74   Temp 98 °F (36 7 °C)   Resp 20   Ht 6' 1" (1 854 m)   Wt 99 6 kg (219 lb 9 3 oz)   SpO2 95%   BMI 28 97 kg/m²     Physical Exam  Constitutional:       General: He is not in acute distress  Appearance: He is not toxic-appearing or diaphoretic  HENT:      Head: Normocephalic and atraumatic  Neck:      Vascular: No JVD  Cardiovascular:      Rate and Rhythm: Normal rate and regular rhythm  Heart sounds: No friction rub  No gallop  Comments: Mild edema  Pulmonary:      Effort: Pulmonary effort is normal  No tachypnea or respiratory distress  Breath sounds: No decreased breath sounds, wheezing, rhonchi or rales  Abdominal:      General: Bowel sounds are normal       Palpations: Abdomen is soft  Tenderness: There is no abdominal tenderness  There is no rebound  Neurological:      General: No focal deficit present  Mental Status: He is alert and oriented to person, place, and time  Psychiatric:         Mood and Affect: Mood normal  Mood is not anxious  Behavior: Behavior normal  Behavior is not agitated             Current Weight: Weight - Scale: 99 6 kg (219 lb 9 3 oz)  First Weight: Weight - Scale: 99 6 kg (219 lb 9 3 oz)    Lab Results:    Results from last 7 days   Lab Units 01/22/21  0622   WBC Thousand/uL 8 83   HEMOGLOBIN g/dL 10 0*   HEMATOCRIT % 34 0*   PLATELETS Thousands/uL 134*     Results from last 7 days   Lab Units 01/22/21  0622   POTASSIUM mmol/L 4 0   CHLORIDE mmol/L 105   CO2 mmol/L 16*   BUN mg/dL 76*   CREATININE mg/dL 2 32*   CALCIUM mg/dL 8 4     Results from last 7 days   Lab Units 01/22/21  0622 01/21/21  1812   POTASSIUM mmol/L 4 0 4 1   CHLORIDE mmol/L 105 104   CO2 mmol/L 16* 20*   BUN mg/dL 76* 70*   CREATININE mg/dL 2 32* 2 35*   CALCIUM mg/dL 8 4 8 7   ALK PHOS U/L  --  136*   ALT U/L  --  90*   AST U/L  --  50*

## 2021-01-22 NOTE — ASSESSMENT & PLAN NOTE
Lab Results   Component Value Date    HGBA1C 6 7 (H) 07/03/2019       Recent Labs     01/21/21  2048 01/22/21  0752 01/22/21  1412   POCGLU 163* 154* 188*       Blood Sugar Average: Last 72 hrs:  (P) 188   Continue Humalog sliding scale with Accu-Cheks q a c  And HS  Patient also on Levemir 20 units b i d

## 2021-01-22 NOTE — ASSESSMENT & PLAN NOTE
Lab Results   Component Value Date    EGFR 26 01/22/2021    EGFR 25 01/21/2021    EGFR 23 09/04/2019    CREATININE 2 32 (H) 01/22/2021    CREATININE 2 35 (H) 01/21/2021    CREATININE 2 60 (H) 09/04/2019     Patient's creatinine close to baseline    Baseline creatinine around 2 3-2 5  Previously on HD in 2019  Patient started on gentle IV hydration bicarbonate drip   Nephrology consulted

## 2021-01-22 NOTE — UTILIZATION REVIEW
Initial Clinical Review    Admission: Date/Time/Statement:   Admission Orders (From admission, onward)     Ordered        01/21/21 1906  Place in Observation  Once                   Orders Placed This Encounter   Procedures    Place in Observation     Standing Status:   Standing     Number of Occurrences:   1     Order Specific Question:   Level of Care     Answer:   Med Surg [16]     ED Arrival Information     Expected Arrival Acuity Means of Arrival Escorted By Service Admission Type    - 1/21/2021 17:58 Emergent Ambulance [address] [facility]    Arrival Complaint    chest pain        Chief Complaint   Patient presents with    Chest Pain     Cp started today around 1530 (4/10)- pt had 324 ASA at home and had 1 nitro with EMS- pt has now 1/10 cp     Assessment/Plan: [de-identified] y o  male who presents with PMH of HTN, HLD, DM2, CAD s/p CABG in 2016, bladder cancer s/p resection  He presented to the ED today for evaluation of chest pressure that started at 3pm today and has been ongoing since  The pain was described as a pressure, as bad as 5/10 and currently 1/10  He reports feeling SOB with exertion but denies any SOB today, no lightheadedness, dizziness, diaphoresis, or palpitations  He follows with cardiology through [facility] and is scheduled to have a repeat stress test outpatient on 2/04  He has been having intermittent left sided chest discomfort but today's pain felt somewhat different  He lives alone at home and ambulates with a walker  He recently started taking hydralazine and amlodipine and has been taking all other medications as prescribed  He is a former smoker, having quit > 50 years ago  He received ASA 324mg at home and 1 sublingual nitro tab en route to the ED  Chest pain  Assessment & Plan  Patient complains of a left-sided chest pressure that started around 3pm today and has been constant since   He denies associated lightheadedness/dizziness, SOB but does report feeling SOB with exertion  Initial troponin was 0 03, repeat 0 28    Hx of CAD s/p stenting and CABG 2016  Due for outpatient stress test on Feb 4th  - Pressure is currently 1/10  - Received ASA 324mg at home and SL nitro x1   - Continue to monitor on telemetry  - Trend troponin  - Cardiology consult in AM     Hx of CABG  Assessment & Plan  CAD s/p CABG in 2016; history of stenting prior to CABG (LIMA to LAD and SVG to OM)  Received ASA 324mg at home, takes 81 mg on a daily basis  Continue home medications as above  Due for repeat stress test on Feb 4th           Leukocytosis  Assessment & Plan  WBC 14 on admission  Patient denies fevers, chills, cough, congestion, sore throat, abdominal/urinary complaints  Monitor off Abx at this time  Repeat CBC in AM     Benign hypertension with chronic kidney disease, stage III  Assessment & Plan  Continue home medications including Lopressor 25 mg Q12, hydralazine 50 mg Q8, amlodipine 5 mg, Lasix 40 mg daily     Diabetes mellitus (HCC)  Assessment & Plan        Lab Results   Component Value Date     HGBA1C 6 7 (H) 07/03/2019             Recent Labs     01/21/21 2048   POCGLU 163*         Blood Sugar Average: Last 72 hrs:  (P) 163      DM2 diet  Continue insulin levemir 20 U Q12 with sliding scale coverage TID AC, QHS        Hypercholesteremia  Assessment & Plan  Continue atorvastatin 40 mg daily     CKD (chronic kidney disease), stage III  Assessment & Plan        Lab Results   Component Value Date     EGFR 25 01/21/2021     EGFR 23 09/04/2019     EGFR 17 07/15/2019     CREATININE 2 35 (H) 01/21/2021     CREATININE 2 60 (H) 09/04/2019     CREATININE 3 23 (H) 07/15/2019      Continue home dose of Lasix, Cr currently at baseline  Previously on HD in 2019  Continue to monitor with daily BMP              VTE Prophylaxis: Heparin  / sequential compression device   Code Status: level 1  POLST: POLST is not applicable to this patient  Discussion with family: No     Anticipated Length of Stay:  Patient will be admitted on an Observation basis with an anticipated length of stay of  < 2 midnights     Justification for Hospital Stay: chest pain r/o ACS       ED Triage Vitals   Temperature Pulse Respirations Blood Pressure SpO2   01/21/21 1816 01/21/21 1816 01/21/21 1816 01/21/21 1816 01/21/21 1816   98 1 °F (36 7 °C) 84 20 145/65 96 %      Temp Source Heart Rate Source Patient Position - Orthostatic VS BP Location FiO2 (%)   01/21/21 1816 01/21/21 1816 01/21/21 1816 01/21/21 1816 --   Tympanic Monitor Sitting Left arm       Pain Score       01/21/21 1918       3          Wt Readings from Last 1 Encounters:   01/21/21 99 6 kg (219 lb 9 3 oz)     Additional Vital Signs:   Pertinent Labs/Diagnostic Test Results:       Results from last 7 days   Lab Units 01/22/21  0622 01/22/21  0254 01/21/21 2213 01/21/21  1812   WBC Thousand/uL 8 83 9 99  --  14 01*   HEMOGLOBIN g/dL 10 0* 10 1*  --  11 6*   HEMATOCRIT % 34 0* 32 3*  --  35 9*   PLATELETS Thousands/uL 134* 133* 137* 177   NEUTROS ABS Thousands/µL 6 95  --   --  10 47*         Results from last 7 days   Lab Units 01/22/21  0622 01/21/21  1812   SODIUM mmol/L 136 139   POTASSIUM mmol/L 4 0 4 1   CHLORIDE mmol/L 105 104   CO2 mmol/L 16* 20*   ANION GAP mmol/L 15* 15*   BUN mg/dL 76* 70*   CREATININE mg/dL 2 32* 2 35*   EGFR ml/min/1 73sq m 26 25   CALCIUM mg/dL 8 4 8 7     Results from last 7 days   Lab Units 01/21/21  1812   AST U/L 50*   ALT U/L 90*   ALK PHOS U/L 136*   TOTAL PROTEIN g/dL 8 7*   ALBUMIN g/dL 3 6   TOTAL BILIRUBIN mg/dL 1 10*     Results from last 7 days   Lab Units 01/22/21  0752 01/21/21  2048   POC GLUCOSE mg/dl 154* 163*     Results from last 7 days   Lab Units 01/22/21  0622 01/21/21  1812   GLUCOSE RANDOM mg/dL 165* 171*             No results found for: BETA-HYDROXYBUTYRATE                   Results from last 7 days   Lab Units 01/22/21  0623 01/22/21  0104 01/21/21  2213 01/21/21  1812   TROPONIN I ng/mL 5 59* 1 58* 0 28* 0 03         Results from last 7 days   Lab Units 01/22/21  0254   PROTIME seconds 15 5*   INR  1 24*   PTT seconds 34                                                                                               ED Treatment:   Medication Administration from 01/21/2021 1758 to 01/21/2021 1950       Date/Time Order Dose Route Action Action by Comments     01/21/2021 1918 HYDROmorphone (DILAUDID) injection 0 5 mg 0 5 mg Intravenous Given Miguel Burton RN         Past Medical History:   Diagnosis Date    Acute on chronic diastolic CHF (congestive heart failure) (HCC) 7/4/2019    Arthritis     Back pain     [facility] )     diagnosed  2/2016    Cancer [facility])     bladder; chemo; resection;     Coronary artery disease     has 2 coronary stents    Dental crowns present      5    Diabetes mellitus [facility] )     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     fluid behind right eye    Hematuria     hx of    Hypercholesteremia     Hypertension     Kidney stones     Wears glasses      Present on Admission:   Chest pain   CKD (chronic kidney disease), stage III   [facility]   Hypercholesteremia   Benign hypertension with chronic kidney disease, stage III   Leukocytosis      Admitting Diagnosis: Chest pain [R07 9]  Age/Sex: [de-identified] y o  male  Admission Orders:  Scheduled Medications:  amLODIPine, 5 mg, Oral, BID  aspirin, 81 mg, Oral, Daily  atorvastatin, 40 mg, Oral, Daily With Dinner  escitalopram, 10 mg, Oral, HS  hydrALAZINE, 50 mg, Oral, Q8H [place]  insulin detemir, 20 Units, Subcutaneous, Q12H [place]  insulin lispro, 1-6 Units, Subcutaneous, TID AC  insulin lispro, 1-6 Units, Subcutaneous, HS  metoprolol tartrate, 25 mg, Oral, Q12H [address]      Continuous IV Infusions:  heparin (porcine), 3-20 Units/kg/hr (Order-Specific), Intravenous, Titrated  sodium chloride, 60 mL/hr, Intravenous, Continuous      PRN Meds:  acetaminophen, 650 mg, Oral, Q6H PRN  calcium carbonate, 1,000 mg, Oral, Daily PRN  heparin (porcine), 2,000 Units, Intravenous, Q1H PRN  heparin (porcine), 4,000 Units, Intravenous, Q1H PRN  nitroglycerin, 0 4 mg, Sublingual, Q5 Min PRN  ondansetron, 4 mg, Intravenous, Q6H PRN  pneumococcal 13-valent conjugate vaccine, 0 5 mL, Intramuscular, Prior to discharge  polyethylene glycol, 17 g, Oral, Daily PRN        IP CONSULT TO CARDIOLOGY  IP CONSULT TO CASE MANAGEMENT  IP CONSULT TO NEPHROLOGY    Network Utilization Review Department  ATTENTION: Please call with any questions or concerns to [phone] and carefully listen to the prompts so that you are directed to the right person  All voicemails are confidential   [place] Matt all requests for admission clinical reviews, approved or denied determinations and any other requests to dedicated fax number below belonging to the campus where the patient is receiving treatment   List of dedicated fax numbers for the Facilities:  [address] DENIALS (Administrative/Medical Necessity) [phone]   [address] (Maternity/NICU/Pediatrics) [phone] [address] [place] [address] Dr Trinity Sloan 9936 (  Sravanthi Bledsoe "Felicia" 103) [address] [place] [address] Anthony Parsons 1481 [address] [place] [address] [phone]

## 2021-01-22 NOTE — CONSULTS
Consultation - Cardiology   Obey Cook [address] y o  male MRN: [id number]  Unit/Bed#: [address] [address] Encounter: [id number]  01/22/21  9:17 AM          Physician Requesting Consult: Eder Rose MD  Reason for Consult / Principal Problem: chest pain      Assessment:  1  Non ST segment elevation myocardial infarction - likely type 1 MI secondary to acute coronary syndrome  Will require further evaluation with cardiac catheterization  He is currently chest pain free  Given unstable nature of his chest pain, recommend catheterization to be done today  - discussed with patient in detail  He is agreeable to transfer for cardiac catheterization  He does have chronic kidney disease and has been on dialysis in the past   He is aware of risk for worsening renal function and repeat dialysis  He was evaluated by Nephrology today and started on IV fluids  - monitor for any signs of congestive heart failure with hydration  - continue trending troponin  - 2D echocardiogram   - Patient is on aspirin along with heparin infusion   - Will load with Clopidogrel 300 mg PO * 1 now  - continue metoprolol 25 mg b i d     2  Hypertension - patient is on multiple BP medications  BP has been stable  Will continue home medication regimen  3  DM - management per hospitalist team    4  Dyslipidemia - Patient on atorvastatin 40 mg daily  Plan:  As above      History of Present Illness   HPI: Obey Cook is a [address] [facility] [address] o  year old male who presents with chest pain  Approximately 2 months ago, patient developed heaviness in the center part of his chest that would occur with exertion  Associated with chest heaviness was shortness of breath  He went to see his primary cardiologist last week who ordered a stress test   Patient did not have stress test done yet  Last night, he developed worsening chest pain and shortness of breath and came to the emergency room for further evaluation    Chest pain improved with nitroglycerin given in the emergency room  He has been chest pain-free since then  In the emergency room, initial ECG showed LVH with nonspecific ST abnormalities  Initial troponin was 0 28 followed by 1 58 and then 5 59 this morning  Patient has a history of coronary artery disease  He has 2 prior stents and in December 2016, he had 2 vessel coronary artery bypass grafting with LIMA to LAD and SVG to OM  He does not recall similar angina prior to either revascularization procedure  His last 2D echocardiogram was in July 2019 which showed an ejection fraction of 55% with mild valve disease  Review of Systems:    Review of Systems   Constitutional: Negative for chills and fever  HENT: Negative for ear pain and sore throat  Eyes: Negative for pain and visual disturbance  Respiratory: Positive for chest tightness and shortness of breath  Negative for cough  Cardiovascular: Positive for chest pain  Negative for palpitations  Gastrointestinal: Negative for abdominal pain and vomiting  Genitourinary: Negative for dysuria and hematuria  Musculoskeletal: Positive for arthralgias  Negative for back pain  Skin: Negative for color change and rash  Neurological: Negative for seizures and syncope  All other systems reviewed and are negative        Historical Information   Past Medical History:   Diagnosis Date    Acute on chronic diastolic CHF (congestive heart failure) (HCC) 7/4/2019    Arthritis     Back pain     Bladder cancer ([facility] )     diagnosed  2/2016    Cancer [facility])     bladder; chemo; resection;     Coronary artery disease     has 2 coronary stents    Dental crowns present      5    Diabetes mellitus ([address] )     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 behind right eye    Hematuria     hx of    Hypercholesteremia     Hypertension     Kidney stones     Wears glasses      Past Surgical History:   Procedure Laterality Date    ABDOMINAL SURGERY      CARDIAC SURGERY      CHOLECYSTECTOMY open    CORONARY ANGIOPLASTY WITH STENT PLACEMENT      2 stents      CYSTECTOMY, PARTIAL N/A 2016    Procedure: PARTIAL CYSTECTOMY, LEFT LYMPHADENECTOMY;  Surgeon: Brian Dodd MD;  Location: [address] [place] [address];  Service:    Joselin Mcclellans Left 10/20/2016    Procedure: CYSTOSCOPY, BILATERAL RETROGRADE PYLEOGRAM, LEFT SIDE BLADDER BIOPSY, TURBT;  Surgeon: Brian Dodd MD;  Location: [address] [place] [address];  Service:     HERNIA REPAIR      repair RIH    IR TEMPORARY DIALYSIS CATHETER PLACEMENT  2019    IR TUNNELED DIALYSIS CATHETER PLACEMENT  2019    IR TUNNELED DIALYSIS CATHETER REMOVAL  2019    CA CYSTOURETHROSCOPY N/A 2016    Procedure: CYSTOSCOPY;  Surgeon: Brian Dodd MD;  Location: [address] [place] [address];  Service: Urology    CA CYSTOURETHROSCOPY,FULGUR 2-5 CM LESN N/A 2016    Procedure: TRANSURETHRAL RESECTION OF BLADDER TUMOR (TURBT);   Surgeon: Brian Dodd MD;  Location: [facility] OR;  Service: Urology    TRANSURETHRAL RESECTION OF BLADDER TUMOR N/A 2016    Procedure: TRANSURETHRAL RESECTION OF BLADDER TUMOR (TURBT), Cystoscopy, deep biopsy;  Surgeon: Brian Dodd MD;  Location: [facility];  Service:     TRANSURETHRAL RESECTION OF BLADDER TUMOR Bilateral 2016    Procedure: TRANSURETHRAL RESECTION OF BLADDER TUMOR (TURBT), Cysto, retrograde, BLADDER BIOPSY;  Surgeon: Brian Dodd MD;  Location: [facility];  Service:      Social History     Substance and Sexual Activity   Alcohol Use Yes    Comment: socially     Social History     Substance and Sexual Activity   Drug Use No     Social History     Tobacco Use   Smoking Status Former Smoker    Packs/day: 0 50    Years: 5 00    Pack years: 2 50    Quit date: Nigel Browning Years since quittin 0   Smokeless Tobacco Never Used       Family History:   Family History   Problem Relation Age of Onset    Heart disease Mother     Diabetes Mother     Diabetes Father        Meds/Allergies   current meds:   Current Facility-Administered Medications   Medication Dose Route Frequency    acetaminophen (TYLENOL) tablet 650 mg  650 mg Oral Q6H PRN    amLODIPine (NORVASC) tablet 5 mg  5 mg Oral BID    aspirin (ECOTRIN LOW STRENGTH) EC tablet 81 mg  81 mg Oral Daily    atorvastatin (LIPITOR) tablet 40 mg  40 mg Oral Daily With Dinner    calcium carbonate (TUMS) chewable tablet 1,000 mg  1,000 mg Oral Daily PRN    escitalopram (LEXAPRO) tablet 10 mg  10 mg Oral HS    heparin (porcine) 25,000 units in 0 45% NaCl 250 mL infusion (premix)  3-20 Units/kg/hr (Order-Specific) Intravenous Titrated    heparin (porcine) injection 2,000 Units  2,000 Units Intravenous Q1H PRN    heparin (porcine) injection 4,000 Units  4,000 Units Intravenous Q1H PRN    hydrALAZINE (APRESOLINE) tablet 50 mg  50 mg Oral Q8H [facility]    insulin detemir (LEVEMIR) subcutaneous injection 20 Units  20 Units Subcutaneous Q12H [facility]    insulin lispro (HumaLOG) 100 units/mL subcutaneous injection 1-6 Units  1-6 Units Subcutaneous TID AC    insulin lispro (HumaLOG) 100 units/mL subcutaneous injection 1-6 Units  1-6 Units Subcutaneous HS    metoprolol tartrate (LOPRESSOR) tablet 25 mg  25 mg Oral Q12H [facility]    nitroglycerin (NITROSTAT) SL tablet 0 4 mg  0 4 mg Sublingual Q5 Min PRN    ondansetron (ZOFRAN) injection 4 mg  4 mg Intravenous Q6H PRN    pneumococcal 13-valent conjugate vaccine (PREVNAR-13) IM injection 0 5 mL  0 5 mL Intramuscular Prior to discharge    polyethylene glycol (MIRALAX) packet 17 g  17 g Oral Daily PRN    sodium bicarbonate 75 mEq in sodium chloride 0 45 % 1,000 mL infusion  60 mL/hr Intravenous Continuous     No Known Allergies    Objective   Vitals: Blood pressure 134/73, pulse 74, temperature 98 °F (36 7 °C), resp  rate 20, height 6' 1" (1 854 m), weight 99 6 kg (219 lb 9 3 oz), SpO2 95 %  , Body mass index is 28 97 kg/m²  Physical Exam   Constitutional: He appears healthy  No distress     Eyes: Pupils are equal, round, and reactive to light  Conjunctivae are normal    Neck: Normal range of motion  Neck supple  No JVD present  Cardiovascular: Normal rate and regular rhythm  Exam reveals no gallop and no friction rub  Murmur heard  Pulmonary/Chest: Effort normal and breath sounds normal  He has no wheezes  He has no rales  Musculoskeletal:         General: No tenderness, deformity or edema  Neurological: He is alert and oriented to person, place, and time  Skin: Skin is warm and dry  Lab Results:     Troponins:   Results from last 7 days   Lab Units 01/22/21  0623 01/22/21  0104 01/21/21 2213   TROPONIN I ng/mL 5 59* 1 58* 0 28*       CBC with diff:   Results from last 7 days   Lab Units 01/22/21  0622 01/22/21  0254 01/21/21 2213 01/21/21  1812   WBC Thousand/uL 8 83 9 99  --  14 01*   HEMOGLOBIN g/dL 10 0* 10 1*  --  11 6*   HEMATOCRIT % 34 0* 32 3*  --  35 9*   MCV fL 107* 101*  --  97   PLATELETS Thousands/uL 134* 133* 137* 177   MCH pg 31 3 31 6  --  31 4   MCHC g/dL 29 4* 31 3*  --  32 3   RDW % 14 1 14 0  --  13 7   MPV fL 10 6 10 3 9 8 10 6   NRBC AUTO /100 WBCs 0  --   --  0       CMP:   Results from last 7 days   Lab Units 01/22/21  0622 01/21/21  1812   POTASSIUM mmol/L 4 0 4 1   CHLORIDE mmol/L 105 104   CO2 mmol/L 16* 20*   BUN mg/dL 76* 70*   CREATININE mg/dL 2 32* 2 35*   CALCIUM mg/dL 8 4 8 7   AST U/L  --  50*   ALT U/L  --  90*   ALK PHOS U/L  --  136*   EGFR ml/min/1 73sq m 26 25       Magnesium:       Coags:   Results from last 7 days   Lab Units 01/22/21  0254   PTT seconds 34   INR  1 24*       Lipid Profile:   Results from last 7 days   Lab Units 01/21/21 2213   TRIGLYCERIDES mg/dL 137   HDL mg/dL 27*   LDL CALC mg/dL 38         Cardiac testing: All previous testing has been reviewed  See HPI for summary        EKG: Personally reviewed:  Normal sinus rhythm with LVH and nonspecific ST abnormalities    Imaging: I have personally reviewed pertinent films in PACS

## 2021-01-22 NOTE — EMTALA/ACUTE CARE TRANSFER
[address]  Dept: [phone]      ACUTE CARE TRANSFER CONSENT    NAME Tawny Souza                                         1940                              MRN [id number]    I have been informed of my rights regarding examination, treatment, and transfer   by Dr Gricelda Hanks MD    Benefits:  Higher level of care    Risks:   Heart attack, sudden death      Transfer Request:  I acknowledge that my medical condition has been evaluated and explained to me by the treating physician or other qualified medical person and/or my attending physician who has recommended and offered to me further medical examination and treatment  I understand the Hospital's obligation with respect to the treatment and stabilization of my medical condition  I nevertheless request to be transferred  I release the Hospital, the doctor, and any other persons caring for me from all responsibility or liability for any injury or ill effects that may result from my transfer and agree to accept all responsibility for the consequences of my choice to transfer, rather than receive stabilizing treatment at the Hospital  I understand that because the transfer is my request, my insurance may not provide reimbursement for the services  The Hospital will assist and direct me and my family in how to make arrangements for transfer, but the hospital is not liable for any fees charged by the transport service  In spite of this understanding, I refuse to consent to further medical examination and treatment which has been offered to me, and request transfer to    I authorize the performance of emergency medical procedures and treatments upon me in both transit and upon arrival at the receiving facility  Additionally, I authorize the release of any and all medical records to the receiving facility and request they be transported with me, if possible      I authorize the performance of emergency medical procedures and treatments upon me in both transit and upon arrival at the receiving facility  Additionally, I authorize the release of any and all medical records to the receiving facility and request they be transported with me, if possible  I understand that the safest mode of transportation during a medical emergency is an ambulance and that the Hospital advocates the use of this mode of transport  Risks of traveling to the receiving facility by car, including absence of medical control, life sustaining equipment, such as oxygen, and medical personnel has been explained to me and I fully understand them  (GEOVANY CORRECT BOX BELOW)  [  ]  I consent to the stated transfer and to be transported by ambulance/helicopter  [  ]  I consent to the stated transfer, but refuse transportation by ambulance and accept full responsibility for my transportation by car  I understand the risks of non-ambulance transfers and I exonerate the Hospital and its staff from any deterioration in my condition that results from this refusal     X___________________________________________    DATE  21  TIME________  Signature of patient or legally responsible individual signing on patient behalf           RELATIONSHIP TO PATIENT_________________________          Provider Certification    NAME Serena Montenegro                                        [facility] 1940                              MRN [id number]    A medical screening exam was performed on the above named patient    Based on the examination:    Condition Necessitating Transfer NSTEMI    Patient Condition:  [address]    Reason for Transfer:  NSTEMI    Transfer Requirements: Facility     · Space available and qualified personnel available for treatment as acknowledged by    · Agreed to accept transfer and to provide appropriate medical treatment as acknowledged by          · Appropriate medical records of the examination and treatment of the patient are provided at the time of transfer   [facility], [address] _______  · Transfer will be performed by qualified personnel from    and appropriate transfer equipment as required, including the use of necessary and appropriate life support measures  Provider Certification: I have examined the patient and explained the following risks and benefits of being transferred/refusing transfer to the patient/family:         Based on these reasonable risks and benefits to the patient and/or the unborn child(bruno), and based upon the information available at the time of the patients examination, I certify that the medical benefits reasonably to be expected from the provision of appropriate medical treatments at another medical facility outweigh the increasing risks, if any, to the individuals medical condition, and in the case of labor to the unborn child, from effecting the transfer      X____________________________________________ DATE 01/22/21        TIME_______      ORIGINAL - SEND TO MEDICAL RECORDS   COPY - SEND WITH PATIENT DURING TRANSFER

## 2021-01-23 PROBLEM — D64.9 ANEMIA: Status: ACTIVE | Noted: 2021-01-23

## 2021-01-23 LAB
ALBUMIN SERPL BCP-MCNC: 3.1 G/DL (ref 3.5–5)
ALP SERPL-CCNC: 106 U/L (ref 46–116)
ALT SERPL W P-5'-P-CCNC: 71 U/L (ref 12–78)
ANION GAP SERPL CALCULATED.3IONS-SCNC: 13 MMOL/L (ref 4–13)
ANION GAP SERPL CALCULATED.3IONS-SCNC: 14 MMOL/L (ref 4–13)
AST SERPL W P-5'-P-CCNC: 53 U/L (ref 5–45)
ATRIAL RATE: 83 BPM
BILIRUB SERPL-MCNC: 1.55 MG/DL (ref 0.2–1)
BUN SERPL-MCNC: 120 MG/DL (ref 5–25)
BUN SERPL-MCNC: 97 MG/DL (ref 5–25)
CALCIUM ALBUM COR SERPL-MCNC: 9.4 MG/DL (ref 8.3–10.1)
CALCIUM SERPL-MCNC: 8.5 MG/DL (ref 8.3–10.1)
CALCIUM SERPL-MCNC: 8.7 MG/DL (ref 8.3–10.1)
CHLORIDE SERPL-SCNC: 102 MMOL/L (ref 100–108)
CHLORIDE SERPL-SCNC: 97 MMOL/L (ref 100–108)
CO2 SERPL-SCNC: 17 MMOL/L (ref 21–32)
CO2 SERPL-SCNC: 19 MMOL/L (ref 21–32)
CREAT SERPL-MCNC: 2.89 MG/DL (ref 0.6–1.3)
CREAT SERPL-MCNC: 3.93 MG/DL (ref 0.6–1.3)
ERYTHROCYTE [DISTWIDTH] IN BLOOD BY AUTOMATED COUNT: 14.6 % (ref 11.6–15.1)
GFR SERPL CREATININE-BSD FRML MDRD: 14 ML/MIN/1.73SQ M
GFR SERPL CREATININE-BSD FRML MDRD: 20 ML/MIN/1.73SQ M
GLUCOSE SERPL-MCNC: 204 MG/DL (ref 65–140)
GLUCOSE SERPL-MCNC: 209 MG/DL (ref 65–140)
GLUCOSE SERPL-MCNC: 212 MG/DL (ref 65–140)
GLUCOSE SERPL-MCNC: 224 MG/DL (ref 65–140)
GLUCOSE SERPL-MCNC: 251 MG/DL (ref 65–140)
GLUCOSE SERPL-MCNC: 271 MG/DL (ref 65–140)
HCT VFR BLD AUTO: 30.4 % (ref 36.5–49.3)
HGB BLD-MCNC: 9.6 G/DL (ref 12–17)
MCH RBC QN AUTO: 31.8 PG (ref 26.8–34.3)
MCHC RBC AUTO-ENTMCNC: 31.6 G/DL (ref 31.4–37.4)
MCV RBC AUTO: 101 FL (ref 82–98)
P AXIS: 62 DEGREES
PLATELET # BLD AUTO: 152 THOUSANDS/UL (ref 149–390)
PMV BLD AUTO: 11 FL (ref 8.9–12.7)
POTASSIUM SERPL-SCNC: 5.1 MMOL/L (ref 3.5–5.3)
POTASSIUM SERPL-SCNC: 5.3 MMOL/L (ref 3.5–5.3)
PR INTERVAL: 192 MS
PROT SERPL-MCNC: 7.4 G/DL (ref 6.4–8.2)
QRS AXIS: -24 DEGREES
QRSD INTERVAL: 102 MS
QT INTERVAL: 412 MS
QTC INTERVAL: 484 MS
RBC # BLD AUTO: 3.02 MILLION/UL (ref 3.88–5.62)
SODIUM SERPL-SCNC: 128 MMOL/L (ref 136–145)
SODIUM SERPL-SCNC: 134 MMOL/L (ref 136–145)
T WAVE AXIS: 73 DEGREES
VENTRICULAR RATE: 83 BPM
WBC # BLD AUTO: 14.93 THOUSAND/UL (ref 4.31–10.16)

## 2021-01-23 PROCEDURE — 80048 BASIC METABOLIC PNL TOTAL CA: CPT | Performed by: PHYSICIAN ASSISTANT

## 2021-01-23 PROCEDURE — 94760 N-INVAS EAR/PLS OXIMETRY 1: CPT

## 2021-01-23 PROCEDURE — 83880 ASSAY OF NATRIURETIC PEPTIDE: CPT | Performed by: PHYSICIAN ASSISTANT

## 2021-01-23 PROCEDURE — 85027 COMPLETE CBC AUTOMATED: CPT | Performed by: INTERNAL MEDICINE

## 2021-01-23 PROCEDURE — 94002 VENT MGMT INPAT INIT DAY: CPT

## 2021-01-23 PROCEDURE — 99233 SBSQ HOSP IP/OBS HIGH 50: CPT | Performed by: INTERNAL MEDICINE

## 2021-01-23 PROCEDURE — 93010 ELECTROCARDIOGRAM REPORT: CPT | Performed by: INTERNAL MEDICINE

## 2021-01-23 PROCEDURE — 99232 SBSQ HOSP IP/OBS MODERATE 35: CPT | Performed by: INTERNAL MEDICINE

## 2021-01-23 PROCEDURE — 80053 COMPREHEN METABOLIC PANEL: CPT | Performed by: INTERNAL MEDICINE

## 2021-01-23 PROCEDURE — 84145 PROCALCITONIN (PCT): CPT | Performed by: NURSE PRACTITIONER

## 2021-01-23 PROCEDURE — 82948 REAGENT STRIP/BLOOD GLUCOSE: CPT

## 2021-01-23 RX ORDER — BUMETANIDE 0.25 MG/ML
4 INJECTION INTRAMUSCULAR; INTRAVENOUS CONTINUOUS
Status: DISCONTINUED | OUTPATIENT
Start: 2021-01-23 | End: 2021-01-30

## 2021-01-23 RX ORDER — BUMETANIDE 0.25 MG/ML
2 INJECTION, SOLUTION INTRAMUSCULAR; INTRAVENOUS ONCE
Status: COMPLETED | OUTPATIENT
Start: 2021-01-23 | End: 2021-01-23

## 2021-01-23 RX ORDER — METOLAZONE 5 MG/1
10 TABLET ORAL DAILY
Status: COMPLETED | OUTPATIENT
Start: 2021-01-23 | End: 2021-01-23

## 2021-01-23 RX ORDER — BENZONATATE 100 MG/1
100 CAPSULE ORAL 3 TIMES DAILY PRN
Status: DISCONTINUED | OUTPATIENT
Start: 2021-01-23 | End: 2021-02-12 | Stop reason: HOSPADM

## 2021-01-23 RX ORDER — FUROSEMIDE 10 MG/ML
40 INJECTION INTRAMUSCULAR; INTRAVENOUS ONCE
Status: DISCONTINUED | OUTPATIENT
Start: 2021-01-23 | End: 2021-01-23

## 2021-01-23 RX ORDER — FUROSEMIDE 10 MG/ML
60 INJECTION INTRAMUSCULAR; INTRAVENOUS ONCE
Status: COMPLETED | OUTPATIENT
Start: 2021-01-23 | End: 2021-01-23

## 2021-01-23 RX ORDER — FUROSEMIDE 10 MG/ML
20 SYRINGE (ML) INJECTION CONTINUOUS
Status: DISCONTINUED | OUTPATIENT
Start: 2021-01-23 | End: 2021-01-23

## 2021-01-23 RX ORDER — METOLAZONE 5 MG/1
10 TABLET ORAL ONCE
Status: COMPLETED | OUTPATIENT
Start: 2021-01-23 | End: 2021-01-24

## 2021-01-23 RX ADMIN — INSULIN LISPRO 4 UNITS: 100 INJECTION, SOLUTION INTRAVENOUS; SUBCUTANEOUS at 21:55

## 2021-01-23 RX ADMIN — ASPIRIN 81 MG: 81 TABLET ORAL at 08:49

## 2021-01-23 RX ADMIN — CLOPIDOGREL BISULFATE 75 MG: 75 TABLET ORAL at 08:49

## 2021-01-23 RX ADMIN — Medication 20 MG/HR: at 12:45

## 2021-01-23 RX ADMIN — FUROSEMIDE 60 MG: 10 INJECTION, SOLUTION INTRAMUSCULAR; INTRAVENOUS at 10:27

## 2021-01-23 RX ADMIN — ESCITALOPRAM 10 MG: 10 TABLET, FILM COATED ORAL at 21:49

## 2021-01-23 RX ADMIN — INSULIN LISPRO 2 UNITS: 100 INJECTION, SOLUTION INTRAVENOUS; SUBCUTANEOUS at 17:34

## 2021-01-23 RX ADMIN — HYDRALAZINE HYDROCHLORIDE 50 MG: 25 TABLET, FILM COATED ORAL at 05:27

## 2021-01-23 RX ADMIN — METOPROLOL TARTRATE 25 MG: 25 TABLET, FILM COATED ORAL at 21:49

## 2021-01-23 RX ADMIN — ATORVASTATIN CALCIUM 40 MG: 40 TABLET, FILM COATED ORAL at 17:33

## 2021-01-23 RX ADMIN — Medication 2 MG/HR: at 23:30

## 2021-01-23 RX ADMIN — GUAIFENESIN 1200 MG: 600 TABLET, EXTENDED RELEASE ORAL at 21:49

## 2021-01-23 RX ADMIN — HYDRALAZINE HYDROCHLORIDE 50 MG: 25 TABLET, FILM COATED ORAL at 13:59

## 2021-01-23 RX ADMIN — INSULIN DETEMIR 20 UNITS: 100 INJECTION, SOLUTION SUBCUTANEOUS at 21:54

## 2021-01-23 RX ADMIN — ONDANSETRON 4 MG: 2 INJECTION INTRAMUSCULAR; INTRAVENOUS at 00:36

## 2021-01-23 RX ADMIN — GUAIFENESIN 1200 MG: 600 TABLET, EXTENDED RELEASE ORAL at 08:49

## 2021-01-23 RX ADMIN — AMLODIPINE BESYLATE 5 MG: 5 TABLET ORAL at 08:49

## 2021-01-23 RX ADMIN — METOPROLOL TARTRATE 25 MG: 25 TABLET, FILM COATED ORAL at 08:49

## 2021-01-23 RX ADMIN — AMLODIPINE BESYLATE 5 MG: 5 TABLET ORAL at 17:33

## 2021-01-23 RX ADMIN — INSULIN LISPRO 2 UNITS: 100 INJECTION, SOLUTION INTRAVENOUS; SUBCUTANEOUS at 12:44

## 2021-01-23 RX ADMIN — Medication 2 MG/HR: at 19:15

## 2021-01-23 RX ADMIN — BUMETANIDE 2 MG: 0.25 INJECTION INTRAMUSCULAR; INTRAVENOUS at 18:48

## 2021-01-23 RX ADMIN — HYDRALAZINE HYDROCHLORIDE 50 MG: 25 TABLET, FILM COATED ORAL at 21:49

## 2021-01-23 RX ADMIN — METOLAZONE 10 MG: 5 TABLET ORAL at 18:47

## 2021-01-23 RX ADMIN — INSULIN DETEMIR 20 UNITS: 100 INJECTION, SOLUTION SUBCUTANEOUS at 08:57

## 2021-01-23 NOTE — ASSESSMENT & PLAN NOTE
Creating trending up 2 89 with oliguria  Most likely ATN secondary to contrast used during cath  Patient aware of risks and benefits prior to procedure      - Continue to monitor  - Nephrology on board

## 2021-01-23 NOTE — PROGRESS NOTES
Progress Note - Lydia Anglin 1940, [de-identified] y o  male MRN: [id number]    Unit/Bed#: S -01 Encounter: [id number]    Primary Care Provider: Kaila Foreman MD   Date and time admitted to hospital: 1/22/2021 12:28 PM        * NSTEMI (non-ST elevated myocardial infarction) [facility])  Assessment & Plan  Patient complains of a left-sided chest pressure that started around 3pm 1/21/2021 and has been constant since  He denies associated lightheadedness/dizziness, SOB but does report feeling SOB with exertion  Initial troponin was 0 03, repeat 0 28  Hx of CAD s/p stenting and CABG 2016  Patient was scheduled for outpatient stress test on February 4th  - Received ASA 324mg at home and SL nitro x1 in the ER  Troponin peaked at 5 5  Highly suspicious for type 1 MI  Continue metoprolol 25 milligram p o  B i d   Cardiac Cath Completed at [facility]   - three-vessel disease with 99% occluded proximal circumflex, 50% distal left main, mid % occluded, RCA  proximally 100% occluded with collaterals from left circulation  Stend  Check echo  Add plavix and imdur 30 as per cardiology    Acute renal failure [facility])  Assessment & Plan  Creating trending up 2 89 with oliguria  Most likely ATN secondary to contrast used during cath  Patient aware of risks and benefits prior to procedure  - Continue to monitor  - Nephrology on board    Anemia  Assessment & Plan  Hemoglobin 10  Asymptomatic    - Will continue to monitor    High anion gap metabolic acidosis  Assessment & Plan  Slight high anion gap metabolic acidosis  Possibly secondary to CKD    - d/c bicarb drip  - Nephrology consulted  - Continue to monitor    Shortness of breath  Assessment & Plan  Oxygen demand increasing, currently 3L   Patient seems fluid overloaded    - Covid negative  - Continue NC O2  - CXR overnight revealed congestion  - fluids d/c'd  - nephrology/cardiology on board  - Start lasix gtt    Hx of CABG  Assessment & Plan  CAD s/p CABG in 2016; history of stenting prior to CABG (LIMA to LAD and SVG to OM)  Continue home meds    Diabetes mellitus [facility])  Assessment & Plan  Lab Results   Component Value Date    HGBA1C 6 7 (H) 2019       Recent Labs     21  1531 21  2108 21  0724 21  1132   POCGLU 192* 247* 209* 212*       Blood Sugar Average: Last 72 hrs:  (P) 209 6   Continue Humalog sliding scale with Accu-Cheks q a c  And HS  Patient also on Levemir 20 units b i d  CKD (chronic kidney disease), stage III  Assessment & Plan  Lab Results   Component Value Date    EGFR 20 2021    EGFR 26 2021    EGFR 25 2021    CREATININE 2 89 (H) 2021    CREATININE 2 32 (H) 2021    CREATININE 2 35 (H) 2021     Continue to increase 2 89  Baseline creatinine around 2 3-2 5  Previously on HD in   Nephrology on board   Patient oliguric, most likely ATN post contrast for Cath  Nephrology started lasix gtt          VTE Pharmacologic Prophylaxis:   Pharmacologic: Recent Cath  Mechanical VTE Prophylaxis in Place: Yes    Discussions with Specialists or Other Care Team Provider: Nephrology    Education and Discussions with Family / Patient: Patient    Current Length of Stay: 1 day(s)    Current Patient Status: Inpatient     Discharge Plan / Estimated Discharge Date: TBD    Code Status: Level 1 - Full Code      Subjective:   She was seen at bedside in some respiratory distress  States that he has had difficulty breathing since after the catheterization  His oxygen demand is also increased to 3 L  Does complain not being able to urinate  He was bladder scanned multiple times overnight with very little urine output      Objective:     Vitals:   Temp (24hrs), Av 1 °F (36 7 °C), Min:97 9 °F (36 6 °C), Max:98 4 °F (36 9 °C)    Temp:  [97 9 °F (36 6 °C)-98 4 °F (36 9 °C)] 98 4 °F (36 9 °C)  HR:  [66-85] 73  Resp:  [20-24] 21  BP: (141-174)/(59-74) 154/67  SpO2:  [90 %-94 %] 92 %  There is no height or weight on file to calculate BMI  Input and Output Summary (last 24 hours): Intake/Output Summary (Last 24 hours) at 1/23/2021 1339  Last data filed at 1/23/2021 0101  Gross per 24 hour   Intake 240 ml   Output 450 ml   Net -210 ml       Physical Exam:     Physical Exam  Vitals signs reviewed  Constitutional:       General: He is not in acute distress  Appearance: He is well-developed  He is not ill-appearing or toxic-appearing  HENT:      Head: Normocephalic and atraumatic  Eyes:      General: No scleral icterus  Right eye: No discharge  Left eye: No discharge  Conjunctiva/sclera: Conjunctivae normal    Cardiovascular:      Rate and Rhythm: Normal rate and regular rhythm  Heart sounds: Normal heart sounds  No murmur  Pulmonary:      Effort: Respiratory distress present  Breath sounds: Wheezing and rhonchi present  Abdominal:      General: Bowel sounds are normal  There is no distension  Palpations: Abdomen is soft  Tenderness: There is no abdominal tenderness  Musculoskeletal: Normal range of motion  General: No tenderness  Skin:     General: Skin is warm  Findings: No erythema or rash  Comments:      Neurological:      Mental Status: He is alert  Motor: No weakness     Psychiatric:         Behavior: Behavior normal              Additional Data:     Labs:    Results from last 7 days   Lab Units 01/23/21  0530 01/22/21  0622   WBC Thousand/uL 14 93* 8 83   HEMOGLOBIN g/dL 9 6* 10 0*   HEMATOCRIT % 30 4* 34 0*   PLATELETS Thousands/uL 152 134*   NEUTROS PCT %  --  79*   LYMPHS PCT %  --  11*   MONOS PCT %  --  9   EOS PCT %  --  0     Results from last 7 days   Lab Units 01/23/21  0530   POTASSIUM mmol/L 5 1   CHLORIDE mmol/L 102   CO2 mmol/L 19*   BUN mg/dL 97*   CREATININE mg/dL 2 89*   CALCIUM mg/dL 8 7   ALK PHOS U/L 106   ALT U/L 71   AST U/L 53*     Results from last 7 days   Lab Units 01/22/21  0254   INR  1 24*       * I Have Reviewed All Lab Data Listed Above  * Additional Pertinent Lab Tests Reviewed: All Labs Within Last 24 Hours Reviewed    Imaging:    Imaging Reports Reviewed Today Include: CXR  Imaging Personally Reviewed by Myself Includes:  CXR    Recent Cultures (last 7 days):           Last 24 Hours Medication List:   Current Facility-Administered Medications   Medication Dose Route Frequency Provider Last Rate    acetaminophen  650 mg Oral Q6H PRN Farideh Nuñez MD      amLODIPine  5 mg Oral BID Farideh Nuñez MD      aspirin  81 mg Oral Daily Farideh Nuñez MD      atorvastatin  40 mg Oral Daily With Derian Silverio MD      benzonatate  100 mg Oral TID PRN TABITHA Rubio      calcium carbonate  1,000 mg Oral Daily PRN Farideh Nuñez MD      clopidogrel  75 mg Oral Daily Farideh Nuñez MD      escitalopram  10 mg Oral HS Farideh Nuñez MD      furosemide  20 mg/hr Intravenous Continuous Amisha Rosales MD 20 mg/hr (01/23/21 1245)    guaiFENesin  1,200 mg Oral Q12H [address] TABITHA Rubio      hydrALAZINE  50 mg Oral Q8H [address] Farideh Nuñez MD      insulin detemir  20 Units Subcutaneous Q12H [address] Farideh Nuñez MD      insulin lispro  1-6 Units Subcutaneous TID AC Farideh Nuñez MD      insulin lispro  1-6 Units Subcutaneous HS Farideh Nuñez MD      metoprolol tartrate  25 mg Oral Q12H [address] Farideh Nuñez MD      nitroglycerin  0 4 mg Sublingual Q5 Min PRN Farideh Nuñez MD      ondansetron  4 mg Intravenous Q6H PRN Farideh Nuñez MD      pneumococcal 13-valent conjugate vaccine  0 5 mL Intramuscular Prior to discharge Farideh Nuñez MD      polyethylene glycol  17 g Oral Daily PRN Farideh Nuñez MD          Today, Patient Was Seen By: Supa Nova MD    ** Please Note: This note has been constructed using a voice recognition system   **

## 2021-01-23 NOTE — ASSESSMENT & PLAN NOTE
Lab Results   Component Value Date    HGBA1C 6 7 (H) 07/03/2019       Recent Labs     01/22/21  1531 01/22/21  2108 01/23/21  0724 01/23/21  1132   POCGLU 192* 247* 209* 212*       Blood Sugar Average: Last 72 hrs:  (P) 209 6   Continue Humalog sliding scale with Accu-Cheks q a c  And HS  Patient also on Levemir 20 units b i d

## 2021-01-23 NOTE — PROGRESS NOTES
NEPHROLOGY PROGRESS NOTE    Tianna Clements [address] [facility] y o  male MRN: [id number]  Unit/Bed#: S -01 Encounter: [id number]  Reason for Consult:  Acute on chronic kidney disease    The patient was awake  Says that he noted that he has not really urinated since early this morning  Also feeling short of breath  He denied any chest pain no abdominal pain no nausea vomiting or diarrhea  ASSESSMENT/PLAN:  1  Renal    The patient has acute on chronic kidney disease with baseline creatinine of 2 5  Creatinine is slightly up today but unfortunately urine output is dropping often it appears patient is becoming oliguric  He was having symptoms of unstable angina underwent cardiac catheterization yesterday suspect he is in ATN  Given drop off in urine output increasing creatinine I am going to initiate diuretic infusion to try and convert to non oliguria  Lasix 60 mg IV then start Lasix drip 40 mg an hour  Monitor volume status urine output and renal function  Discontinue IV fluids    2  Cardiac    Cardiology is following the patient underwent cardiac catheterization seems to have coronary disease but no intervention was done  He is now on medical therapy heart failure team is following as well in note was reviewed  SUBJECTIVE:  Review of Systems   Constitution: Positive for malaise/fatigue  Negative for chills, fever and night sweats  HENT: Negative  Eyes: Negative  Cardiovascular: Positive for dyspnea on exertion and orthopnea  Negative for chest pain and palpitations  Respiratory: Positive for shortness of breath  Negative for cough, sputum production and wheezing  Upper chest congestion   Gastrointestinal: Negative for abdominal pain, diarrhea, nausea and vomiting  Genitourinary: Negative for dysuria, flank pain, hematuria and incomplete emptying  Neurological: Negative for dizziness, focal weakness, headaches and light-headedness     Psychiatric/Behavioral: Negative for altered mental status, depression, hallucinations and hypervigilance  OBJECTIVE:  Current Weight:    Vitals:Temp (24hrs), Av 1 °F (36 7 °C), Min:97 9 °F (36 6 °C), Max:98 4 °F (36 9 °C)  Current: Temperature: 98 4 °F (36 9 °C)   Blood pressure 154/67, pulse 73, temperature 98 4 °F (36 9 °C), temperature source Oral, resp  rate 21, SpO2 92 %  , There is no height or weight on file to calculate BMI  Intake/Output Summary (Last 24 hours) at 2021 1127  Last data filed at 2021 0101  Gross per 24 hour   Intake 240 ml   Output 750 ml   Net -510 ml       Physical Exam: /67 (BP Location: Left arm)   Pulse 73   Temp 98 4 °F (36 9 °C) (Oral)   Resp 21   SpO2 92%   Physical Exam  Constitutional:       Appearance: He is ill-appearing  He is not toxic-appearing or diaphoretic  HENT:      Head: Normocephalic and atraumatic  Mouth/Throat:      Mouth: Mucous membranes are dry  Eyes:      General: No scleral icterus  Extraocular Movements: Extraocular movements intact  Neck:      Musculoskeletal: Normal range of motion and neck supple  Cardiovascular:      Rate and Rhythm: Normal rate and regular rhythm  Heart sounds: No friction rub  No gallop  Pulmonary:      Effort: No respiratory distress  Breath sounds: Rales present  No wheezing or rhonchi  Abdominal:      General: Bowel sounds are normal  There is no distension  Palpations: Abdomen is soft  Tenderness: There is no abdominal tenderness  There is no rebound  Neurological:      General: No focal deficit present  Mental Status: He is alert and oriented to person, place, and time  Mental status is at baseline  Psychiatric:         Mood and Affect: Mood normal          Behavior: Behavior normal          Thought Content:  Thought content normal          Judgment: Judgment normal          Medications:    Current Facility-Administered Medications:     acetaminophen (TYLENOL) tablet 650 mg, 650 mg, Oral, Q6H PRN, Lia Killian MD    amLODIPine (NORVASC) tablet 5 mg, 5 mg, Oral, BID, Lia Killian MD, 5 mg at 01/23/21 0849    aspirin (ECOTRIN LOW STRENGTH) EC tablet 81 mg, 81 mg, Oral, Daily, Yudelka Schumacher MD, 81 mg at 01/23/21 0849    atorvastatin (LIPITOR) tablet 40 mg, 40 mg, Oral, Daily With Maria Esther Felix MD, 40 mg at 01/22/21 1714    benzonatate (TESSALON PERLES) capsule 100 mg, 100 mg, Oral, TID PRN, TABITHA Spence    calcium carbonate (TUMS) chewable tablet 1,000 mg, 1,000 mg, Oral, Daily PRN, Lia Killian MD    clopidogrel (PLAVIX) tablet 75 mg, 75 mg, Oral, Daily, Lia Killian MD, 75 mg at 01/23/21 0849    escitalopram (LEXAPRO) tablet 10 mg, 10 mg, Oral, HS, Lia Killian MD, 10 mg at 01/22/21 2224    furosemide (LASIX) 500 mg infusion 50 mL, 20 mg/hr, Intravenous, Continuous, Yokasta Nguyen MD    guFENesin [facility]) 12 hr tablet 1,200 mg, 1,200 mg, Oral, Q12H [facility] [place] [place] [facility], TABITHA Spence, 1,200 mg at 01/23/21 0849    hydrALAZINE (APRESOLINE) tablet 50 mg, 50 mg, Oral, Q8H [facility] [place] [place] [facility], Lia Killian MD, 50 mg at 01/23/21 0527    insulin detemir (LEVEMIR) subcutaneous injection 20 Units, 20 Units, Subcutaneous, Q12H [facility] [place] [place] [facility], Lia Killian MD, 20 Units at 01/23/21 0857    insulin lispro (HumaLOG) 100 units/mL subcutaneous injection 1-6 Units, 1-6 Units, Subcutaneous, TID AC, 2 Units at 01/22/21 1715 **AND** Fingerstick Glucose (POCT), , , TID AC, Yudelka Schumacher MD    insulin lispro (HumaLOG) 100 units/mL subcutaneous injection 1-6 Units, 1-6 Units, Subcutaneous, HS, Lia Killian MD, 3 Units at 01/22/21 2225    metoprolol tartrate (LOPRESSOR) tablet 25 mg, 25 mg, Oral, Q12H [facility] [place] [place] [facility], Lia Killian MD, 25 mg at 01/23/21 0849    nitroglycerin (NITROSTAT) SL tablet 0 4 mg, 0 4 mg, Sublingual, Q5 Min PRN, Lia Killian MD    ondansetron (ZOFRAN) injection 4 mg, 4 mg, Intravenous, Q6H PRN, Lia Killian MD, 4 mg at 01/23/21 0036    pneumococcal 13-valent conjugate vaccine (PREVNAR-13) IM injection 0 5 mL, 0 5 mL, Intramuscular, Prior to discharge, Willie Bansal MD    polyethylene glycol (MIRALAX) packet 17 g, 17 g, Oral, Daily PRN, Willie Bansal MD    Laboratory Results:  Lab Results   Component Value Date    WBC 14 93 (H) 01/23/2021    HGB 9 6 (L) 01/23/2021    HCT 30 4 (L) 01/23/2021     (H) 01/23/2021     01/23/2021     Lab Results   Component Value Date    SODIUM 134 (L) 01/23/2021    K 5 1 01/23/2021     01/23/2021    CO2 19 (L) 01/23/2021    BUN 97 (H) 01/23/2021    CREATININE 2 89 (H) 01/23/2021    GLUC 224 (H) 01/23/2021    CALCIUM 8 7 01/23/2021     Lab Results   Component Value Date    CALCIUM 8 7 01/23/2021    PHOS 4 9 (H) 09/04/2019     No results found for: LABPROT

## 2021-01-23 NOTE — UTILIZATION REVIEW
Initial Clinical Review    Admission: Date/Time/Statement:   Admission Orders (From admission, onward)     Ordered        01/22/21 1403  Inpatient Admission  Once                   Orders Placed This Encounter   Procedures    Inpatient Admission     Standing Status:   Standing     Number of Occurrences:   1     Order Specific Question:   Level of Care     Answer:   Med Surg [16]     Order Specific Question:   Bed request comments     Answer:   Telemetry     Order Specific Question:   Estimated length of stay     Answer:   More than 2 Midnights     Order Specific Question:   Certification     Answer:   I certify that inpatient services are medically necessary for this patient for a duration of greater than two midnights  See H&P and MD Progress Notes for additional information about the patient's course of treatment  Assessment/Plan: [de-identified] yo male as a direct admit from  [address] for cardiac cath   Pt was noted to have a NSTEMI after presenting w/ CP and elevated trop   Found to have 3 vessel disease  Also noted to have [address] at [address]   Started on bicarb gtt as recommended by nephrology   Admitted IP status for  NSTEMI three-vessel disease with 99% occluded proximal circumflex, 50% distal left main, mid % occluded, RCA  proximally 100% occluded with collaterals from left circulation  Stent  Plan for echo , cont lopressor , asa, was loaded w/ plavix  C/o SOB on 2l plan to check COVID, consider DC fluids and cont O2   Hx of CABG cont home meds  DM ssi and monitor   CKD gentle IV hydration bicarb gtt , nephrology consulted   1/22 Cardiology consult   NSTEMI , transfer for cardiac cath   aware of risk for worsening renal function and repeat dialysis  He was evaluated by Nephrology today and started on IV fluids  monitor for CHF , cont to trend trop , echo , asa, heparin gtt , load w/ plavix ,cont lopressor        1/22 Nephrology Consult  Renal baseline 2 5  risk for contrast induced complications specifically causing declining kidney   start IV fluids half-normal saline with 75 mEq sodium bicarb per L at 60 cc/hour as Cardiology felt it was okay to give slow fluids  Monitor renal function   1/23 Cardiology Note   NSTEMI [facility] 1/22/21: severe 3V dz: 99% occluded prox LCx, 50% distal left main, 100% mid LAD, 100% prox RCA with collateral from left  Patent graft BURNHAM to LAD, patent graft to OM1 with 40% stenosis  Acute CHF Diuretic: home: lasix 40 mg AM, 20 mg PM; will dose IV here today, Weight:, BNP ,asa, plavix added, statin, beta blocker, imdur  1/23 Nephrology Note   Renal acute on chronic kidney disease with baseline creatinine of 2 5 , urine output dropping , becoming oliguric    iv lasix then start gtt 40 mg /hr , monitor volume status urine output and renal function , DC IVF       1/23 IM Note   Pt in some resp distress , difficulty breathing since cath   O2 demand is inc to 3l   C/o not being able to urinate   Bladder scan w/ very little urine  Check echo   Dc bicarb gtt , start lasix gtt   Nephrology and cardiology following          Temperature Pulse Respirations Blood Pressure SpO2   98 °F (36 7 °C) 65 (!) 24 147/67 97 %      Temp Source Heart Rate Source Patient Position - Orthostatic VS BP Location FiO2 (%)   Oral Monitor Lying Left arm --      Pain Score       No Pain          Wt Readings from Last 1 Encounters:   01/21/21 99 6 kg (219 lb 9 3 oz)     Additional Vital Signs:   01/22/21 2218  98 1 °F (36 7 °C)  85  24Abnormal   155/73  93 %  32  3 L/min  Nasal cannula  Lying   01/22/21 2100  --  --  --  168/70  94 %  32  3 L/min  Nasal cannula  Lying   01/22/21 1845  --  76  24Abnormal   160/71  92 %  --  --  Nasal cannula  Lying   01/22/21 1745  --  78  24Abnormal   146/65  90 %  --  --  Nasal cannula  Lying   01/22/21 1700  --  79  24Abnormal   162/72  91 %  --  --  Nasal cannula  Lying   01/22/21 1545  --  66  20  141/66  93 %  --  --  Nasal cannula  Lying   01/22/21 1515  97 9 °F (36 6 °C)  67  20  149/59  93 %  24  1 L/min  Nasal cannula  Lying   01/22/21 1445  --  70  20  174/72Abnormal   --  --  --  --  Lying   01/22/21 1430  --  70  20  155/66  93 %  28  2 L/min  Nasal cannula  Lying   01/22/21 1415  --  72  22  160/74  93 %  28  2 L/min  Nasal cannula  Lying   01/22/21 1412  --  --  --  --  92 %  28  2 L/min  Nasal cannula  --   01/22/21 1400  --  67  20  152/67  94 %  28  2 L/min  Nasal cannula  Lying   01/22/21 1237  --  --  --  --  --  32             Pertinent Labs/Diagnostic Test Results:   1/21 PCXR No acute cardiopulmonary disease      1/22 PCXR Multifocal parenchymal opacities noted throughout the lung fields, worrisome for multifocal pneumonia  In the setting of clinically suspected/proven COVID-19, this plain film appearance while nonspecific, can be seen in cases of viral pneumonia such as   COVID-19   1/22 Cardiac cath   CORONARY VESSELS:   --  The coronary circulation is right dominant  --  Left main: The vessel was normal sized  In a second lesion, there was a discrete 30 % stenosis in the distal third of the vessel segment  --  LAD: The vessel was normal sized  Angiography showed moderate atherosclerosis  --  Proximal LAD: There was a 40 % stenosis  --  Mid LAD: There was a tubular 40 % stenosis  --  Distal LAD: The vessel was normal sized  There was a 100 % stenosis  This lesion is a chronic total occlusion  LIMA to distal LAD patent  --  1st diagonal: The vessel was medium sized  Angiography showed moderate atherosclerosis  --  2nd diagonal: The vessel was medium sized  Angiography showed moderate atherosclerosis  --  Circumflex: The vessel was normal sized  Angiography showed severe atherosclerosis  --  Ostial circumflex: There was a 99 % stenosis  --  Mid circumflex: There was a tubular 100 % stenosis at the site of a prior stent    --  Distal circumflex: The distal vessel was supplied by extensive retrograde flow from the graft(s) to the second obtuse marginal   --  1st obtuse marginal: The vessel was small sized  --  2nd obtuse marginal: There were two major obtuse marginals  --  3rd obtuse marginal: The vessel was normal sized  Fills with graft from OM2  --  RCA: The vessel was normal sized  Angiography showed severe atherosclerosis  There was a diffuse 100 % stenosis  This lesion is a chronic total occlusion  Distal vessel angiography showed a small to medium sized vessel and moderate  diffuse disease  --  Graft to the LAD: The graft was a normal sized LIMA from LIMA  Widely patent, Fills Distal LAD and has collaterals to RCA  --  Graft to the 2nd obtuse marginal: The graft was a normal sized saphenous vein graft from the ascending aorta  Graft angiography showed mild atherosclerosis  There was a tubular 40 % stenosis at the graft ostium  There was EDUARDA grade 3  flow through the graft (brisk flow)  it aslo fills OM3     IMPRESSIONS:  There is significant triple vessel coronary artery disease   Patient has his patent grafts of LIMA to LAD and SVG to OM2      RECOMMENDATIONS  Risk factor reduction and Medical Rx   1/22 Echo   Results from last 7 days   Lab Units 01/22/21  1450   SARS-COV-2  Negative     Results from last 7 days   Lab Units 01/22/21  0622 01/22/21  0254 01/21/21  2213 01/21/21  1812   WBC Thousand/uL 8 83 9 99  --  14 01*   HEMOGLOBIN g/dL 10 0* 10 1*  --  11 6*   HEMATOCRIT % 34 0* 32 3*  --  35 9*   PLATELETS Thousands/uL 134* 133* 137* 177   NEUTROS ABS Thousands/µL 6 95  --   --  10 47*     Results from last 7 days   Lab Units 01/23/21  0530 01/22/21  0622 01/21/21  1812   SODIUM mmol/L 134* 136 139   POTASSIUM mmol/L 5 1 4 0 4 1   CHLORIDE mmol/L 102 105 104   CO2 mmol/L 19* 16* 20*   ANION GAP mmol/L 13 15* 15*   BUN mg/dL 97* 76* 70*   CREATININE mg/dL 2 89* 2 32* 2 35*   EGFR ml/min/1 73sq m 20 26 25   CALCIUM mg/dL 8 7 8 4 8 7     Results from last 7 days   Lab Units 01/23/21  0530 01/21/21  1812   AST U/L 53* 50*   ALT U/L 71 90* ALK PHOS U/L 106 136*   TOTAL PROTEIN g/dL 7 4 8 7*   ALBUMIN g/dL 3 1* 3 6   TOTAL BILIRUBIN mg/dL 1 55* 1 10*     Results from last 7 days   Lab Units 01/22/21  2108 01/22/21  1531 01/22/21  1412 01/22/21  0752 01/21/21  2048   POC GLUCOSE mg/dl 247* 192* 188* 154* 163*     Results from last 7 days   Lab Units 01/23/21  0530 01/22/21  0622 01/21/21  1812   GLUCOSE RANDOM mg/dL 224* 165* 171*       Results from last 7 days   Lab Units 01/22/21  0937 01/22/21  0623 01/22/21  0104 01/21/21  2213 01/21/21  1812   TROPONIN I ng/mL 7 37* 5 59* 1 58* 0 28* 0 03     Results from last 7 days   Lab Units 01/22/21  0937 01/22/21  0254   PROTIME seconds  --  15 5*   INR   --  1 24*   PTT seconds 85* 34     Results from last 7 days   Lab Units 01/22/21  1450   INFLUENZA A PCR  Negative   INFLUENZA B PCR  Negative   RSV PCR  Negative       Past Medical History:   Diagnosis Date    Acute on chronic diastolic CHF (congestive heart failure) (HCC) 7/4/2019    Arthritis     Back pain     Bladder cancer ([place] [address] )     diagnosed  2/2016    Cancer ([place] [address] )     bladder; chemo; resection;     Coronary artery disease     has 2 coronary stents    Dental crowns present      5    Diabetes mellitus ([facility] )     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    Hematuria     hx of    Hypercholesteremia     Hypertension     Kidney stones     Wears glasses      Present on Admission:   NSTEMI (non-ST elevated myocardial infarction) ([facility] )   CKD (chronic kidney disease), stage III   Diabetes mellitus (HCC)   Shortness of breath   High anion gap metabolic acidosis      Admitting Diagnosis: NSTEMI (non-ST elevated myocardial infarction) ([place] [address] ) [I21 4]  Age/Sex: [de-identified] y o  male  Admission Orders:  Scheduled Medications:  amLODIPine, 5 mg, Oral, BID  aspirin, 81 mg, Oral, Daily  atorvastatin, 40 mg, Oral, Daily With Dinner  clopidogrel, 75 mg, Oral, Daily  escitalopram, 10 mg, Oral, HS  guaiFENesin, 1,200 mg, Oral, Q12H [address]  hydrALAZINE, 50 mg, Oral, Q8H [address]  insulin detemir, 20 Units, Subcutaneous, Q12H [address]  insulin lispro, 1-6 Units, Subcutaneous, TID AC  insulin lispro, 1-6 Units, Subcutaneous, HS  metoprolol tartrate, 25 mg, Oral, Q12H [address]      Continuous IV Infusions:  sodium bicarbonate infusion, 60 mL/hr, Intravenous, Continuous  IV lasix gtt 20 mgh/hr started 1/23  1030    PRN Meds:  acetaminophen, 650 mg, Oral, Q6H PRN  benzonatate, 100 mg, Oral, TID PRN  calcium carbonate, 1,000 mg, Oral, Daily PRN  nitroglycerin, 0 4 mg, Sublingual, Q5 Min PRN  ondansetron, 4 mg, Intravenous, Q6H PRN  pneumococcal 13-valent conjugate vaccine, 0 5 mL, Intramuscular, Prior to discharge  polyethylene glycol, 17 g, Oral, Daily PRN    Fingerstick ac and hs   Cardiac diet   Tele     IP CONSULT TO CASE MANAGEMENT  IP CONSULT TO NEPHROLOGY    Network Utilization Review Department  ATTENTION: Please call with any questions or concerns to [phone] and carefully listen to the prompts so that you are directed to the right person  All voicemails are confidential   Andrew Moon all requests for admission clinical reviews, approved or denied determinations and any other requests to dedicated fax number below belonging to the campus where the patient is receiving treatment   List of dedicated fax numbers for the Facilities:  [address] DENIALS (Administrative/Medical Necessity) [phone]   [address] (Maternity/NICU/Pediatrics) [phone]   [address] [phone]   [address] [address] [facility] Siva Sloan 5890 (  Sravanthi Bledsoe "Felicia" 103) [address] [address] [place] [id number] [facility] 951 [phone]

## 2021-01-23 NOTE — ASSESSMENT & PLAN NOTE
Patient complains of a left-sided chest pressure that started around 3pm 1/21/2021 and has been constant since  He denies associated lightheadedness/dizziness, SOB but does report feeling SOB with exertion  Initial troponin was 0 03, repeat 0 28  Hx of CAD s/p stenting and CABG 2016  Patient was scheduled for outpatient stress test on February 4th  - Received ASA 324mg at home and SL nitro x1 in the ER  Troponin peaked at 5 5  Highly suspicious for type 1 MI  Continue metoprolol 25 milligram p o  B i d   Cardiac Cath Completed at [facility]   - three-vessel disease with 99% occluded proximal circumflex, 50% distal left main, mid % occluded, RCA  proximally 100% occluded with collaterals from left circulation   Stend  Check echo  Add plavix and imdur 30 as per cardiology

## 2021-01-23 NOTE — PROGRESS NOTES
Notified SLIM about 0ml urine output  Pt has been on lasix gtt since 1245pm today  VSS  Bladder scanned pt twice and both times showed 0ml  Awaiting orders  will continue to monitor

## 2021-01-23 NOTE — ASSESSMENT & PLAN NOTE
Lab Results   Component Value Date    EGFR 20 01/23/2021    EGFR 26 01/22/2021    EGFR 25 01/21/2021    CREATININE 2 89 (H) 01/23/2021    CREATININE 2 32 (H) 01/22/2021    CREATININE 2 35 (H) 01/21/2021     Continue to increase 2 89   Baseline creatinine around 2 3-2 5  Previously on HD in 2019  Nephrology on board   Patient oliguric, most likely ATN post contrast for Cath  Nephrology started lasix gtt

## 2021-01-23 NOTE — ASSESSMENT & PLAN NOTE
Oxygen demand increasing, currently 3L   Patient seems fluid overloaded    - Covid negative  - Continue NC O2  - CXR overnight revealed congestion  - fluids d/c'd  - nephrology/cardiology on board  - Start lasix gtt

## 2021-01-23 NOTE — PROGRESS NOTES
Heart Failure/ Pulmonary Hypertension Progress Note - Obey Cook [de-identified] y o  male MRN: [id number]    Unit/Bed#: S -01 Encounter: [id number]      Assessment:    Principal Problem:    NSTEMI (non-ST elevated myocardial infarction) ([facility] )  Active Problems:    CKD (chronic kidney disease), stage III    Diabetes mellitus ([facility] )    Hx of CABG    Shortness of breath    High anion gap metabolic acidosis    Anemia      Subjective:   Patient seen and examined  Pt complaining of dyspnea at rest    Objective: Intake/ Output: not accurate  Weight: 219 lbs  Tele:     # CAD w/ current chest pain- NSTEMI w/ trop to 1 5  Hx of CABG x 2 LIMA to LAD, SVG to OM  C 1/22/21: severe 3V dz: 99% occluded prox LCx, 50% distal left main, 100% mid LAD, 100% prox RCA with collateral from left  Patent graft BURNHAM to LAD, patent graft to OM1 with 40% stenosis    Plan: asa, plavix added, statin, beta blocker, imdur    # Acute on Chronic HFpEF, Stage C  Diuretic: home: lasix 40 mg AM, 20 mg PM; will dose IV here today  Weight:  NT proBNP    Studies- personally reviewed by me    Echocardiogram 7/8/19  LVEF: 55%  LVIDd:  RV: mildly dilated  MR:  PASP:    # IDDM2, HgA1C 6 7%    # BRIAN on CKD,likely due to contrast- only 90 cc, volume overloaded  # HTN- metoprolol tartate 25 mg BID, hydralazine and Imdur  # dyslipidemia- atorvastatin 40 mg daily  LDL 38    Plan: Add back Imdur 30 mg daily  Continue asa, plavix, statin for CAD  Echo to assess EF with CAD progression  rx lasix 60 mg IV now and start on gtt    Review of Systems   Constitutional: Negative for activity change, appetite change, fatigue and unexpected weight change  HENT: Negative for congestion and nosebleeds  Eyes: Negative  Respiratory: Positive for shortness of breath  Negative for cough and chest tightness  Cardiovascular: Negative for chest pain, palpitations and leg swelling  Gastrointestinal: Negative for abdominal distention  Endocrine: Negative  Genitourinary: Negative  Musculoskeletal: Negative  Skin: Negative  Neurological: Negative for dizziness, syncope and weakness  Hematological: Negative  Psychiatric/Behavioral: Negative  [employer] (day, reason): Marinelli catheter (day, reason):    Vitals: Blood pressure 154/67, pulse 73, temperature 98 4 °F (36 9 °C), temperature source Oral, resp  rate 21, SpO2 92 %  , There is no height or weight on file to calculate BMI , I/O last 3 completed shifts: In: 240 [P O :240]  Out: 750 [Urine:750]  No intake/output data recorded  Wt Readings from Last 3 Encounters:   01/21/21 99 6 kg (219 lb 9 3 oz)   07/15/19 99 5 kg (219 lb 5 7 oz)   12/05/16 99 4 kg (219 lb 2 2 oz)       Intake/Output Summary (Last 24 hours) at 1/23/2021 0959  Last data filed at 1/23/2021 0101  Gross per 24 hour   Intake 240 ml   Output 750 ml   Net -510 ml     I/O last 3 completed shifts: In: 240 [P O :240]  Out: 750 [Urine:750]    No significant arrhythmias seen on telemetry review         Physical Exam:  Vitals:    01/22/21 1845 01/22/21 2100 01/22/21 2218 01/23/21 0700   BP: 160/71 168/70 155/73 154/67   BP Location: Left arm Right arm Left arm Left arm   Pulse: 76  85 73   Resp: (!) 24  (!) 24 21   Temp:   98 1 °F (36 7 °C) 98 4 °F (36 9 °C)   TempSrc:   Oral Oral   SpO2: 92% 94% 93% 92%       GEN: Barney Cook appears well, alert and oriented x 3, pleasant and cooperative   HEENT: pupils equal, round, and reactive to light; extraocular muscles intact  NECK: supple, no carotid bruits   HEART: regular rhythm, normal S1 and S2, no murmurs, clicks, gallops or rubs, JVP is    LUNGS:  positive for rales  ABDOMEN: normal bowel sounds, soft, no tenderness, no distention  EXTREMITIES: peripheral pulses normal; no clubbing, cyanosis, or edema  NEURO: no focal findings   SKIN: normal without suspicious lesions on exposed skin      Current Facility-Administered Medications:     acetaminophen (TYLENOL) tablet 650 mg, 650 mg, Oral, Q6H PRN, Raisa Manjarrez MD    amLODIPine (NORVASC) tablet 5 mg, 5 mg, Oral, BID, Swathi Schumacher MD, 5 mg at 01/23/21 0849    aspirin (ECOTRIN LOW STRENGTH) EC tablet 81 mg, 81 mg, Oral, Daily, Swathi Schumacher MD, 81 mg at 01/23/21 0849    atorvastatin (LIPITOR) tablet 40 mg, 40 mg, Oral, Daily With Eder Guillory MD, 40 mg at 01/22/21 1714    benzonatate (TESSALON PERLES) capsule 100 mg, 100 mg, Oral, TID PRN, TABITHA Ambrosio    calcium carbonate (TUMS) chewable tablet 1,000 mg, 1,000 mg, Oral, Daily PRN, Raisa Manjarrez MD    clopidogrel (PLAVIX) tablet 75 mg, 75 mg, Oral, Daily, Ulysses Schumacher MD, 75 mg at 01/23/21 0849    escitalopram (LEXAPRO) tablet 10 mg, 10 mg, Oral, HS, Raisa Manjarrez MD, 10 mg at 01/22/21 2224    furosemide (LASIX) 500 mg infusion 50 mL, 20 mg/hr, Intravenous, Continuous, Shekhar Hernandez MD    furosemide (LASIX) injection 60 mg, 60 mg, Intravenous, Once, Shekhar Hernandez MD    [facility]) 12 hr tablet 1,200 mg, 1,200 mg, Oral, Q12H [address], TABITHA Ambrosio, 1,200 mg at 01/23/21 0849    hydrALAZINE (APRESOLINE) tablet 50 mg, 50 mg, Oral, Q8H [address], Raisa Manjarrez MD, 50 mg at 01/23/21 0527    insulin detemir (LEVEMIR) subcutaneous injection 20 Units, 20 Units, Subcutaneous, Q12H [address], Ulysses Schumacher MD, 20 Units at 01/23/21 0857    insulin lispro (HumaLOG) 100 units/mL subcutaneous injection 1-6 Units, 1-6 Units, Subcutaneous, TID AC, 2 Units at 01/22/21 1715 **AND** Fingerstick Glucose (POCT), , , TID AC, Swathi Schumacher MD    insulin lispro (HumaLOG) 100 units/mL subcutaneous injection 1-6 Units, 1-6 Units, Subcutaneous, HS, Swathi Schumacher MD, 3 Units at 01/22/21 2225    metoprolol tartrate (LOPRESSOR) tablet 25 mg, 25 mg, Oral, Q12H [address], Swathi Schumacher MD, 25 mg at 01/23/21 0849    nitroglycerin (NITROSTAT) SL tablet 0 4 mg, 0 4 mg, Sublingual, Q5 Min PRN, Ulysses Schumacher MD    ondansetron (ZOFRAN) injection 4 mg, 4 mg, Intravenous, Q6H PRN, Nidia Dueñas MD, 4 mg at 01/23/21 0036    pneumococcal 13-valent conjugate vaccine (PREVNAR-13) IM injection 0 5 mL, 0 5 mL, Intramuscular, Prior to discharge, Nidia Dueñas MD    polyethylene glycol (MIRALAX) packet 17 g, 17 g, Oral, Daily PRN, Nidia Dueñas MD      Labs & Results:    Results from last 7 days   Lab Units 01/22/21  0937 01/22/21  0623 01/22/21  0104   TROPONIN I ng/mL 7 37* 5 59* 1 58*     Results from last 7 days   Lab Units 01/22/21  0622 01/22/21  0254 01/21/21  2213 01/21/21  1812   WBC Thousand/uL 8 83 9 99  --  14 01*   HEMOGLOBIN g/dL 10 0* 10 1*  --  11 6*   HEMATOCRIT % 34 0* 32 3*  --  35 9*   PLATELETS Thousands/uL 134* 133* 137* 177         Results from last 7 days   Lab Units 01/23/21  0530 01/22/21  0622 01/21/21  1812   POTASSIUM mmol/L 5 1 4 0 4 1   CHLORIDE mmol/L 102 105 104   CO2 mmol/L 19* 16* 20*   BUN mg/dL 97* 76* 70*   CREATININE mg/dL 2 89* 2 32* 2 35*   CALCIUM mg/dL 8 7 8 4 8 7   ALK PHOS U/L 106  --  136*   ALT U/L 71  --  90*   AST U/L 53*  --  50*     Results from last 7 days   Lab Units 01/22/21  0254   INR  1 24*         Counseling / Coordination of Care  Total floor / unit time spent today 25 minutes  Greater than 50% of total time was spent with the patient and / or family counseling and / or coordination of care  A description of the counseling / coordination of care: 15  Thank you for the opportunity to participate in the care of this patient    [address] [facility] PULMONARY HYPERTENSION  MEDICAL DIRECTOR OF [place]

## 2021-01-23 NOTE — ASSESSMENT & PLAN NOTE
Slight high anion gap metabolic acidosis   Possibly secondary to CKD    - d/c bicarb drip  - Nephrology consulted  - Continue to monitor

## 2021-01-24 ENCOUNTER — APPOINTMENT (INPATIENT)
Dept: NON INVASIVE DIAGNOSTICS | Facility: HOSPITAL | Age: 81
DRG: 280 | End: 2021-01-24
Payer: COMMERCIAL

## 2021-01-24 ENCOUNTER — APPOINTMENT (INPATIENT)
Dept: RADIOLOGY | Facility: HOSPITAL | Age: 81
DRG: 280 | End: 2021-01-24
Payer: COMMERCIAL

## 2021-01-24 PROBLEM — I50.33 ACUTE ON CHRONIC DIASTOLIC CONGESTIVE HEART FAILURE (HCC): Status: ACTIVE | Noted: 2021-01-22

## 2021-01-24 PROBLEM — B37.0 ORAL CANDIDA: Status: ACTIVE | Noted: 2021-01-24

## 2021-01-24 PROBLEM — E87.1 HYPONATREMIA: Status: ACTIVE | Noted: 2021-01-24

## 2021-01-24 LAB
ANION GAP SERPL CALCULATED.3IONS-SCNC: 12 MMOL/L (ref 4–13)
ARTERIAL PATENCY WRIST A: 3
BASE EXCESS BLDA CALC-SCNC: -7 MMOL/L (ref -2–3)
BASOPHILS # BLD AUTO: 0.01 THOUSANDS/ΜL (ref 0–0.1)
BASOPHILS NFR BLD AUTO: 0 % (ref 0–1)
BUN SERPL-MCNC: 122 MG/DL (ref 5–25)
CALCIUM SERPL-MCNC: 8.6 MG/DL (ref 8.3–10.1)
CHLORIDE SERPL-SCNC: 98 MMOL/L (ref 100–108)
CO2 SERPL-SCNC: 18 MMOL/L (ref 21–32)
CREAT SERPL-MCNC: 4.05 MG/DL (ref 0.6–1.3)
EOSINOPHIL # BLD AUTO: 0 THOUSAND/ΜL (ref 0–0.61)
EOSINOPHIL NFR BLD AUTO: 0 % (ref 0–6)
ERYTHROCYTE [DISTWIDTH] IN BLOOD BY AUTOMATED COUNT: 14.3 % (ref 11.6–15.1)
FIO2 GAS DIL.REBREATH: 40 L
FLUAV RNA RESP QL NAA+PROBE: NEGATIVE
FLUBV RNA RESP QL NAA+PROBE: NEGATIVE
GFR SERPL CREATININE-BSD FRML MDRD: 13 ML/MIN/1.73SQ M
GLUCOSE SERPL-MCNC: 218 MG/DL (ref 65–140)
GLUCOSE SERPL-MCNC: 223 MG/DL (ref 65–140)
GLUCOSE SERPL-MCNC: 232 MG/DL (ref 65–140)
GLUCOSE SERPL-MCNC: 250 MG/DL (ref 65–140)
GLUCOSE SERPL-MCNC: 255 MG/DL (ref 65–140)
GLUCOSE SERPL-MCNC: 273 MG/DL (ref 65–140)
HCO3 BLDA-SCNC: 18.3 MMOL/L (ref 22–28)
HCT VFR BLD AUTO: 30.8 % (ref 36.5–49.3)
HCT VFR BLD CALC: 31 % (ref 36.5–49.3)
HGB BLD-MCNC: 9.6 G/DL (ref 12–17)
HGB BLDA-MCNC: 10.5 G/DL (ref 12–17)
IMM GRANULOCYTES # BLD AUTO: 0.14 THOUSAND/UL (ref 0–0.2)
IMM GRANULOCYTES NFR BLD AUTO: 1 % (ref 0–2)
LYMPHOCYTES # BLD AUTO: 0.68 THOUSANDS/ΜL (ref 0.6–4.47)
LYMPHOCYTES NFR BLD AUTO: 4 % (ref 14–44)
MCH RBC QN AUTO: 31.8 PG (ref 26.8–34.3)
MCHC RBC AUTO-ENTMCNC: 31.2 G/DL (ref 31.4–37.4)
MCV RBC AUTO: 102 FL (ref 82–98)
MONOCYTES # BLD AUTO: 1.07 THOUSAND/ΜL (ref 0.17–1.22)
MONOCYTES NFR BLD AUTO: 7 % (ref 4–12)
NEUTROPHILS # BLD AUTO: 13.64 THOUSANDS/ΜL (ref 1.85–7.62)
NEUTS SEG NFR BLD AUTO: 88 % (ref 43–75)
NRBC BLD AUTO-RTO: 0 /100 WBCS
NT-PROBNP SERPL-MCNC: ABNORMAL PG/ML
PCO2 BLD: 19 MMOL/L (ref 21–32)
PCO2 BLD: 33.8 MM HG (ref 36–44)
PH BLD: 7.34 [PH] (ref 7.35–7.45)
PLATELET # BLD AUTO: 149 THOUSANDS/UL (ref 149–390)
PMV BLD AUTO: 10.4 FL (ref 8.9–12.7)
PO2 BLD: 94 MM HG (ref 75–129)
POTASSIUM BLD-SCNC: 5.2 MMOL/L (ref 3.5–5.3)
POTASSIUM SERPL-SCNC: 5.2 MMOL/L (ref 3.5–5.3)
PROCALCITONIN SERPL-MCNC: 0.59 NG/ML
RBC # BLD AUTO: 3.02 MILLION/UL (ref 3.88–5.62)
RSV RNA RESP QL NAA+PROBE: NEGATIVE
SAMPLE SITE: ABNORMAL
SAO2 % BLD FROM PO2: 97 % (ref 60–85)
SARS-COV-2 RNA RESP QL NAA+PROBE: NEGATIVE
SODIUM BLD-SCNC: 133 MMOL/L (ref 136–145)
SODIUM SERPL-SCNC: 128 MMOL/L (ref 136–145)
SPECIMEN SOURCE: ABNORMAL
WBC # BLD AUTO: 15.54 THOUSAND/UL (ref 4.31–10.16)

## 2021-01-24 PROCEDURE — 80048 BASIC METABOLIC PNL TOTAL CA: CPT | Performed by: PHYSICIAN ASSISTANT

## 2021-01-24 PROCEDURE — 71045 X-RAY EXAM CHEST 1 VIEW: CPT

## 2021-01-24 PROCEDURE — 87081 CULTURE SCREEN ONLY: CPT | Performed by: INTERNAL MEDICINE

## 2021-01-24 PROCEDURE — 94003 VENT MGMT INPAT SUBQ DAY: CPT

## 2021-01-24 PROCEDURE — 82803 BLOOD GASES ANY COMBINATION: CPT

## 2021-01-24 PROCEDURE — 85014 HEMATOCRIT: CPT

## 2021-01-24 PROCEDURE — 84295 ASSAY OF SERUM SODIUM: CPT

## 2021-01-24 PROCEDURE — 85025 COMPLETE CBC W/AUTO DIFF WBC: CPT | Performed by: INTERNAL MEDICINE

## 2021-01-24 PROCEDURE — 87040 BLOOD CULTURE FOR BACTERIA: CPT | Performed by: INTERNAL MEDICINE

## 2021-01-24 PROCEDURE — 82947 ASSAY GLUCOSE BLOOD QUANT: CPT

## 2021-01-24 PROCEDURE — 84132 ASSAY OF SERUM POTASSIUM: CPT

## 2021-01-24 PROCEDURE — 0241U HB NFCT DS VIR RESP RNA 4 TRGT: CPT | Performed by: INTERNAL MEDICINE

## 2021-01-24 PROCEDURE — 94002 VENT MGMT INPAT INIT DAY: CPT

## 2021-01-24 PROCEDURE — 99233 SBSQ HOSP IP/OBS HIGH 50: CPT | Performed by: INTERNAL MEDICINE

## 2021-01-24 PROCEDURE — 36600 WITHDRAWAL OF ARTERIAL BLOOD: CPT

## 2021-01-24 PROCEDURE — 94760 N-INVAS EAR/PLS OXIMETRY 1: CPT

## 2021-01-24 PROCEDURE — 94762 N-INVAS EAR/PLS OXIMTRY CONT: CPT

## 2021-01-24 PROCEDURE — 82948 REAGENT STRIP/BLOOD GLUCOSE: CPT

## 2021-01-24 PROCEDURE — 93306 TTE W/DOPPLER COMPLETE: CPT

## 2021-01-24 RX ORDER — OXYCODONE HYDROCHLORIDE 5 MG/1
2.5 TABLET ORAL EVERY 4 HOURS PRN
Status: DISCONTINUED | OUTPATIENT
Start: 2021-01-24 | End: 2021-02-12 | Stop reason: HOSPADM

## 2021-01-24 RX ORDER — HEPARIN SODIUM 5000 [USP'U]/ML
5000 INJECTION, SOLUTION INTRAVENOUS; SUBCUTANEOUS EVERY 8 HOURS SCHEDULED
Status: DISCONTINUED | OUTPATIENT
Start: 2021-01-24 | End: 2021-02-12 | Stop reason: HOSPADM

## 2021-01-24 RX ORDER — HYDROMORPHONE HCL/PF 1 MG/ML
0.2 SYRINGE (ML) INJECTION
Status: DISCONTINUED | OUTPATIENT
Start: 2021-01-24 | End: 2021-02-12 | Stop reason: HOSPADM

## 2021-01-24 RX ORDER — METOLAZONE 5 MG/1
10 TABLET ORAL ONCE
Status: COMPLETED | OUTPATIENT
Start: 2021-01-24 | End: 2021-01-24

## 2021-01-24 RX ADMIN — AMLODIPINE BESYLATE 5 MG: 5 TABLET ORAL at 17:23

## 2021-01-24 RX ADMIN — METOPROLOL TARTRATE 25 MG: 25 TABLET, FILM COATED ORAL at 22:11

## 2021-01-24 RX ADMIN — INSULIN LISPRO 4 UNITS: 100 INJECTION, SOLUTION INTRAVENOUS; SUBCUTANEOUS at 17:26

## 2021-01-24 RX ADMIN — HEPARIN SODIUM 5000 UNITS: 5000 INJECTION INTRAVENOUS; SUBCUTANEOUS at 22:11

## 2021-01-24 RX ADMIN — ESCITALOPRAM 10 MG: 10 TABLET, FILM COATED ORAL at 22:10

## 2021-01-24 RX ADMIN — HYDRALAZINE HYDROCHLORIDE 50 MG: 25 TABLET, FILM COATED ORAL at 22:10

## 2021-01-24 RX ADMIN — GUAIFENESIN 1200 MG: 600 TABLET, EXTENDED RELEASE ORAL at 08:36

## 2021-01-24 RX ADMIN — Medication 4 MG/HR: at 19:42

## 2021-01-24 RX ADMIN — GUAIFENESIN 1200 MG: 600 TABLET, EXTENDED RELEASE ORAL at 22:10

## 2021-01-24 RX ADMIN — Medication 4 MG/HR: at 12:41

## 2021-01-24 RX ADMIN — Medication 4 MG/HR: at 09:26

## 2021-01-24 RX ADMIN — ATORVASTATIN CALCIUM 40 MG: 40 TABLET, FILM COATED ORAL at 17:23

## 2021-01-24 RX ADMIN — HYDRALAZINE HYDROCHLORIDE 50 MG: 25 TABLET, FILM COATED ORAL at 06:19

## 2021-01-24 RX ADMIN — CLOPIDOGREL BISULFATE 75 MG: 75 TABLET ORAL at 08:36

## 2021-01-24 RX ADMIN — HEPARIN SODIUM 5000 UNITS: 5000 INJECTION INTRAVENOUS; SUBCUTANEOUS at 14:40

## 2021-01-24 RX ADMIN — NYSTATIN 500000 UNITS: 100000 SUSPENSION ORAL at 12:04

## 2021-01-24 RX ADMIN — HYDRALAZINE HYDROCHLORIDE 50 MG: 25 TABLET, FILM COATED ORAL at 14:40

## 2021-01-24 RX ADMIN — Medication 4 MG/HR: at 06:19

## 2021-01-24 RX ADMIN — METOPROLOL TARTRATE 25 MG: 25 TABLET, FILM COATED ORAL at 08:36

## 2021-01-24 RX ADMIN — Medication 4 MG/HR: at 22:57

## 2021-01-24 RX ADMIN — INSULIN DETEMIR 20 UNITS: 100 INJECTION, SOLUTION SUBCUTANEOUS at 22:10

## 2021-01-24 RX ADMIN — Medication 4 MG/HR: at 02:18

## 2021-01-24 RX ADMIN — INSULIN DETEMIR 20 UNITS: 100 INJECTION, SOLUTION SUBCUTANEOUS at 08:51

## 2021-01-24 RX ADMIN — NYSTATIN 500000 UNITS: 100000 SUSPENSION ORAL at 17:25

## 2021-01-24 RX ADMIN — INSULIN LISPRO 3 UNITS: 100 INJECTION, SOLUTION INTRAVENOUS; SUBCUTANEOUS at 22:11

## 2021-01-24 RX ADMIN — CEFEPIME HYDROCHLORIDE 2000 MG: 2 INJECTION, POWDER, FOR SOLUTION INTRAVENOUS at 11:40

## 2021-01-24 RX ADMIN — AMLODIPINE BESYLATE 5 MG: 5 TABLET ORAL at 08:36

## 2021-01-24 RX ADMIN — INSULIN LISPRO 3 UNITS: 100 INJECTION, SOLUTION INTRAVENOUS; SUBCUTANEOUS at 17:25

## 2021-01-24 RX ADMIN — Medication 4 MG/HR: at 15:46

## 2021-01-24 RX ADMIN — METOLAZONE 10 MG: 5 TABLET ORAL at 22:45

## 2021-01-24 RX ADMIN — ASPIRIN 81 MG: 81 TABLET ORAL at 08:36

## 2021-01-24 RX ADMIN — INSULIN LISPRO 3 UNITS: 100 INJECTION, SOLUTION INTRAVENOUS; SUBCUTANEOUS at 11:33

## 2021-01-24 RX ADMIN — METOLAZONE 10 MG: 5 TABLET ORAL at 00:04

## 2021-01-24 RX ADMIN — INSULIN LISPRO 2 UNITS: 100 INJECTION, SOLUTION INTRAVENOUS; SUBCUTANEOUS at 08:46

## 2021-01-24 NOTE — ASSESSMENT & PLAN NOTE
Lab Results   Component Value Date    HGBA1C 6 7 (H) 07/03/2019       Recent Labs     01/23/21  1516 01/23/21  2108 01/24/21  0717 01/24/21  1117   POCGLU 204* 271* 223* 250*       Blood Sugar Average: Last 72 hrs:  (P) 221 [id number]   Still poorly controlled  Continue Humalog sliding scale with Accu-Cheks q a c  And HS  Patient already on Levemir  We will add Humalog 3 times a day with meals  Adjust treatment accordingly

## 2021-01-24 NOTE — ASSESSMENT & PLAN NOTE
Lab Results   Component Value Date    EGFR 13 01/24/2021    EGFR 14 01/23/2021    EGFR 20 01/23/2021    CREATININE 4 05 (H) 01/24/2021    CREATININE 3 93 (H) 01/23/2021    CREATININE 2 89 (H) 01/23/2021     Continue to increase 4 05  Baseline creatinine around 2 3-2 5  Previously on HD in 2019  Nephrology on board   Most likely ATN post contrast for Cath  Nephrology on board  Continue Bumex drip  Possibility of requiring dialysis on this admission was discussed with the patient  Monitor kidney function and input and output

## 2021-01-24 NOTE — ASSESSMENT & PLAN NOTE
CAD s/p CABG in 2016; history of stenting prior to CABG (LIMA to LAD and SVG to OM)  Status post cardiac catheterization: significant triple vessel coronary artery disease  Patient has his patent grafts of LIMA to LAD and SVG to OM2  Continue cardiovascular meds

## 2021-01-24 NOTE — PLAN OF CARE
Problem: Potential for Falls  Goal: Patient will remain free of falls  Description: INTERVENTIONS:  - Assess patient frequently for physical needs  -  Identify cognitive and physical deficits and behaviors that affect risk of falls    -  [place] fall precautions as indicated by assessment   - Educate patient/family on patient safety including physical limitations  - Instruct patient to call for assistance with activity based on assessment  - Modify environment to reduce risk of injury  - Consider OT/PT consult to assist with strengthening/mobility  Outcome: Progressing     Problem: CARDIOVASCULAR - ADULT  Goal: Maintains optimal cardiac output and hemodynamic stability  Description: INTERVENTIONS:  - Monitor I/O, vital signs and rhythm  - Monitor for S/S and trends of decreased cardiac output  - Administer and titrate ordered vasoactive medications to optimize hemodynamic stability  - Assess quality of pulses, skin color and temperature  - Assess for signs of decreased coronary artery perfusion  - Instruct patient to report change in severity of symptoms  Outcome: Progressing  Goal: Absence of cardiac dysrhythmias or at baseline rhythm  Description: INTERVENTIONS:  - Continuous cardiac monitoring, vital signs, obtain 12 lead EKG if ordered  - Administer antiarrhythmic and heart rate control medications as ordered  - Monitor electrolytes and administer replacement therapy as ordered  Outcome: Progressing     Problem: RESPIRATORY - ADULT  Goal: Achieves optimal ventilation and oxygenation  Description: INTERVENTIONS:  - Assess for changes in respiratory status  - Assess for changes in mentation and behavior  - Position to facilitate oxygenation and minimize respiratory effort  - Oxygen administered by appropriate delivery if ordered  - Initiate smoking cessation education as indicated  - Encourage broncho-pulmonary hygiene including cough, deep breathe, Incentive Spirometry  - Assess the need for suctioning and aspirate as needed  - Assess and instruct to report SOB or any respiratory difficulty  - Respiratory Therapy support as indicated  Outcome: Progressing     Problem: GASTROINTESTINAL - ADULT  Goal: Maintains adequate nutritional intake  Description: INTERVENTIONS:  - Monitor percentage of each meal consumed  - Identify factors contributing to decreased intake, treat as appropriate  - Assist with meals as needed  - Monitor I&O, weight, and lab values if indicated  - Obtain nutrition services referral as needed  Outcome: Progressing     Problem: GENITOURINARY - ADULT  Goal: Maintains or returns to baseline urinary function  Description: INTERVENTIONS:  - Assess urinary function  - Encourage oral fluids to ensure adequate hydration if ordered  - Administer IV fluids as ordered to ensure adequate hydration  - Administer ordered medications as needed  - Offer frequent toileting  - Follow urinary retention protocol if ordered  Outcome: Progressing  Goal: Absence of urinary retention  Description: INTERVENTIONS:  - Assess patients ability to void and empty bladder  - Monitor I/O  - Bladder scan as needed  - Discuss with physician/AP medications to alleviate retention as needed  - Discuss catheterization for long term situations as appropriate  Outcome: Progressing     Problem: METABOLIC, FLUID AND ELECTROLYTES - ADULT  Goal: Electrolytes maintained within normal limits  Description: INTERVENTIONS:  - Monitor labs and assess patient for signs and symptoms of electrolyte imbalances  - Administer electrolyte replacement as ordered  - Monitor response to electrolyte replacements, including repeat lab results as appropriate  - Instruct patient on fluid and nutrition as appropriate  Outcome: Progressing  Goal: Fluid balance maintained  Description: INTERVENTIONS:  - Monitor labs   - Monitor I/O and WT  - Instruct patient on fluid and nutrition as appropriate  - Assess for signs & symptoms of volume excess or deficit  Outcome: Progressing  Goal: Glucose maintained within target range  Description: INTERVENTIONS:  - Monitor Blood Glucose as ordered  - Assess for signs and symptoms of hyperglycemia and hypoglycemia  - Administer ordered medications to maintain glucose within target range  - Assess nutritional intake and initiate nutrition service referral as needed  Outcome: Progressing     Problem: SKIN/TISSUE INTEGRITY - ADULT  Goal: Skin integrity remains intact  Description: INTERVENTIONS  - Identify patients at risk for skin breakdown  - Assess and monitor skin integrity  - Assess and monitor nutrition and hydration status  - Monitor labs (i e  albumin)  - Assess for incontinence   - Turn and reposition patient  - Assist with mobility/ambulation  - Relieve pressure over bony prominences  - Avoid friction and shearing  - Provide appropriate hygiene as needed including keeping skin clean and dry  - Evaluate need for skin moisturizer/barrier cream  - Collaborate with interdisciplinary team (i e  Nutrition, Rehabilitation, etc )   - Patient/family teaching  Outcome: Progressing     Problem: HEMATOLOGIC - ADULT  Goal: Maintains hematologic stability  Description: INTERVENTIONS  - Assess for signs and symptoms of bleeding or hemorrhage  - Monitor labs  - Administer supportive blood products/factors as ordered and appropriate  Outcome: Progressing     Problem: MUSCULOSKELETAL - ADULT  Goal: Maintain or return mobility to safest level of function  Description: INTERVENTIONS:  - Assess patient's ability to carry out ADLs; assess patient's baseline for ADL function and identify physical deficits which impact ability to perform ADLs (bathing, care of mouth/teeth, toileting, grooming, dressing, etc )  - Assess/evaluate cause of self-care deficits   - Assess range of motion  - Assess patient's mobility  - Assess patient's need for assistive devices and provide as appropriate  - Encourage maximum independence but intervene and supervise when necessary  - Involve family in performance of ADLs  - Assess for home care needs following discharge   - Consider OT consult to assist with ADL evaluation and planning for discharge  - Provide patient education as appropriate  Outcome: Progressing     Problem: PAIN - ADULT  Goal: Verbalizes/displays adequate comfort level or baseline comfort level  Description: Interventions:  - Encourage patient to monitor pain and request assistance  - Assess pain using appropriate pain scale  - Administer analgesics based on type and severity of pain and evaluate response  - Implement non-pharmacological measures as appropriate and evaluate response  - Consider cultural and social influences on pain and pain management  - Notify physician/advanced practitioner if interventions unsuccessful or patient reports new pain  Outcome: Progressing     Problem: INFECTION - ADULT  Goal: Absence or prevention of progression during hospitalization  Description: INTERVENTIONS:  - Assess and monitor for signs and symptoms of infection  - Monitor lab/diagnostic results  - Monitor all insertion sites, i e  indwelling lines, tubes, and drains  - Monitor endotracheal if appropriate and nasal secretions for changes in amount and color  - [place] appropriate cooling/warming therapies per order  - Administer medications as ordered  - Instruct and encourage patient and family to use good hand hygiene technique  - Identify and instruct in appropriate isolation precautions for identified infection/condition  Outcome: Progressing  Goal: Absence of fever/infection during neutropenic period  Description: INTERVENTIONS:  - Monitor WBC    Outcome: Progressing     Problem: SAFETY ADULT  Goal: Patient will remain free of falls  Description: INTERVENTIONS:  - Assess patient frequently for physical needs  -  Identify cognitive and physical deficits and behaviors that affect risk of falls    -  [place] fall precautions as indicated by assessment   - Educate patient/family on patient safety including physical limitations  - Instruct patient to call for assistance with activity based on assessment  - Modify environment to reduce risk of injury  - Consider OT/PT consult to assist with strengthening/mobility  Outcome: Progressing  Goal: Maintain or return to baseline ADL function  Description: INTERVENTIONS:  -  Assess patient's ability to carry out ADLs; assess patient's baseline for ADL function and identify physical deficits which impact ability to perform ADLs (bathing, care of mouth/teeth, toileting, grooming, dressing, etc )  - Assess/evaluate cause of self-care deficits   - Assess range of motion  - Assess patient's mobility; develop plan if impaired  - Assess patient's need for assistive devices and provide as appropriate  - Encourage maximum independence but intervene and supervise when necessary  - Involve family in performance of ADLs  - Assess for home care needs following discharge   - Consider OT consult to assist with ADL evaluation and planning for discharge  - Provide patient education as appropriate  Outcome: Progressing  Goal: Maintain or return mobility status to optimal level  Description: INTERVENTIONS:  - Assess patient's baseline mobility status (ambulation, transfers, stairs, etc )    - Identify cognitive and physical deficits and behaviors that affect mobility  - Identify mobility aids required to assist with transfers and/or ambulation (gait belt, sit-to-stand, lift, walker, cane, etc )  - [place] fall precautions as indicated by assessment  - Record patient progress and toleration of activity level on Mobility SBAR; progress patient to next Phase/Stage  - Instruct patient to call for assistance with activity based on assessment  - Consider rehabilitation consult to assist with strengthening/weightbearing, etc   Outcome: Progressing     Problem: DISCHARGE PLANNING  Goal: Discharge to home or other facility with appropriate resources  Description: INTERVENTIONS:  - Identify barriers to discharge w/patient and caregiver  - Arrange for needed discharge resources and transportation as appropriate  - Identify discharge learning needs (meds, wound care, etc )  - Arrange for interpretive services to assist at discharge as needed  - Refer to Case Management Department for coordinating discharge planning if the patient needs post-hospital services based on physician/advanced practitioner order or complex needs related to functional status, cognitive ability, or social support system  Outcome: Progressing     Problem: Knowledge Deficit  Goal: Patient/family/caregiver demonstrates understanding of disease process, treatment plan, medications, and discharge instructions  Description: Complete learning assessment and assess knowledge base    Interventions:  - Provide teaching at level of understanding  - Provide teaching via preferred learning methods  Outcome: Progressing

## 2021-01-24 NOTE — ASSESSMENT & PLAN NOTE
Type 1 MI  Presented with chest pain at [facility]   Hx of CAD s/p stenting and CABG 2016  Received ASA 324mg at home and SL nitro x1 in the ER  Troponin peaked at 5 5  Continue cardiovascular medications with aspirin, Plavix, statin, beta-blocker and Imdur  Cardiac Cath Completed at [facility]   - three-vessel disease with 99% occluded proximal circumflex, 50% distal left main, mid % occluded, RCA proximally 100% occluded with collaterals from left circulation  Patient has his patent grafts of LIMA to LAD and SVG to OM2    Check echo

## 2021-01-24 NOTE — PROGRESS NOTES
Progress Note - Isatu Napier 1940, [de-identified] y o  male MRN: [id number]    Unit/Bed#: S -01 Encounter: [id number]    Primary Care Provider: Jovana Bhatt MD   Date and time admitted to hospital: 1/22/2021 12:28 PM        * NSTEMI (non-ST elevated myocardial infarction) [facility])  Assessment & Plan  Type 1 MI  Presented with chest pain at [facility]   Hx of CAD s/p stenting and CABG 2016  Received ASA 324mg at home and SL nitro x1 in the ER  Troponin peaked at 5 5  Continue cardiovascular medications with aspirin, Plavix, statin, beta-blocker and Imdur  Cardiac Cath Completed at [facility]   - three-vessel disease with 99% occluded proximal circumflex, 50% distal left main, mid % occluded, RCA proximally 100% occluded with collaterals from left circulation  Patient has his patent grafts of LIMA to LAD and SVG to OM2  Check echo      Acute renal failure superimposed on stage 3 chronic kidney disease [facility])  Assessment & Plan  Lab Results   Component Value Date    EGFR 13 01/24/2021    EGFR 14 01/23/2021    EGFR 20 01/23/2021    CREATININE 4 05 (H) 01/24/2021    CREATININE 3 93 (H) 01/23/2021    CREATININE 2 89 (H) 01/23/2021     Continue to increase 4 05  Baseline creatinine around 2 3-2 5  Previously on HD in 2019  Nephrology on board   Most likely ATN post contrast for Cath  Nephrology on board  Continue Bumex drip  Possibility of requiring dialysis on this admission was discussed with the patient  Monitor kidney function and input and output  Acute on chronic diastolic congestive heart failure [facility])  Assessment & Plan  · Cardiologist and nephrologist on board  · Continue Bumex drip  · Continue cardiovascular and heart failure medications  · Monitor input and output  · Daily weights  High anion gap metabolic acidosis  Assessment & Plan  Elevated anion gap had resolved  Previously with slight high anion gap metabolic acidosis   Possibly secondary to CKD  Previously on bicarb drip, this was discontinued  Still with metabolic acidosis and worsening kidney function  Nephrology on board  Continue Bumex drip  Leukocytosis  Assessment & Plan  · Worsening leukocytosis with elevated procalcitonin and patient having sore throat and cough  · COVID-19, influenza, RSV tests were negative  · Chest x-ray officially read as multifocal pneumonia  · Thus will treat as hospital-acquired pneumonia with IV cefepime  · Blood cultures x2  · Monitor procalcitonin and CBC with differential count  · Cough medicine/antitussive, lozenges and Chloraseptic spray p r n     Oral candida  Assessment & Plan  · Nystatin mouthwash  Hyponatremia  Assessment & Plan  · Likely due to hypervolemia/CHF  · Nephrology on board  · Monitor  · Continue Bumex drip  Anemia  Assessment & Plan  · Monitor  · No active bleeding  · For further workup and management if this worsens significantly    Hx of CABG  Assessment & Plan  CAD s/p CABG in 2016; history of stenting prior to CABG (LIMA to LAD and SVG to OM)  Status post cardiac catheterization: significant triple vessel coronary artery disease  Patient has his patent grafts of LIMA to LAD and SVG to OM2  Continue cardiovascular meds  Diabetes mellitus [facility])  Assessment & Plan  Lab Results   Component Value Date    HGBA1C 6 7 (H) 07/03/2019       Recent Labs     01/23/21  1516 01/23/21  2108 01/24/21  0717 01/24/21  1117   POCGLU 204* 271* 223* 250*       Blood Sugar Average: Last 72 hrs:  (P) 221 [id number]   Still poorly controlled  Continue Humalog sliding scale with Accu-Cheks q a c  And HS  Patient already on Levemir  We will add Humalog 3 times a day with meals  Adjust treatment accordingly  VTE Pharmacologic Prophylaxis:   Pharmacologic: Heparin  Mechanical VTE Prophylaxis in Place: Yes    Patient Centered Rounds: I have performed bedside rounds with nursing staff today      Discussions with Specialists or Other Care Team Provider:     Education and Discussions with Family / Patient:  I discussed with the patient and patient's sister on the phone, our findings, management and plans  I answered all their questions and concerns  They are okay with the management plans  I explained to them, that patient's prognosis is guarded at this point and that patient may need dialysis  Patient and patient's sister understood this well  Time Spent for Care: 45 minutes  More than 50% of total time spent on counseling and coordination of care as described above  Current Length of Stay: 2 day(s)    Current Patient Status: Inpatient   Certification Statement: The patient will continue to require additional inpatient hospital stay due to Above findings and plans  Discharge Plan:  None yet  Code Status: Level 1 - Full Code      Subjective:   According to our night staff, patient had shortness of breath and was placed on a BiPAP machine  When I saw the patient earlier this morning, patient is not on a BiPAP machine anymore and just on nasal cannula oxygen  Patient told me, that his breathing has improved and better  However, patient complains of generalized aches and pains (neck, back, abdomen lower extremities)  Patient also complains of sore throat  Patient admits to some cough  No other complaints  Objective:     Vitals:   Temp (24hrs), Av 2 °F (36 8 °C), Min:97 5 °F (36 4 °C), Max:98 9 °F (37 2 °C)    Temp:  [97 5 °F (36 4 °C)-98 9 °F (37 2 °C)] 98 3 °F (36 8 °C)  HR:  [65-77] 77  Resp:  [18-24] 18  BP: (142-164)/(64-73) 146/73  SpO2:  [90 %-96 %] 93 %  There is no height or weight on file to calculate BMI  Input and Output Summary (last 24 hours): Intake/Output Summary (Last 24 hours) at 2021 1412  Last data filed at 2021 5242  Gross per 24 hour   Intake 420 ml   Output 400 ml   Net 20 ml       Physical Exam:     Physical Exam  Vitals signs and nursing note reviewed     Constitutional: General: He is not in acute distress  Appearance: He is ill-appearing  He is not toxic-appearing or diaphoretic  HENT:      Head: Normocephalic and atraumatic  Mouth/Throat:      Pharynx: No oropharyngeal exudate or posterior oropharyngeal erythema  Comments: No tonsillopharyngeal congestion or exudates noted  However, patient's tongue was found to have whitish lesions  Eyes:      General: No scleral icterus  Right eye: No discharge  Left eye: No discharge  Cardiovascular:      Rate and Rhythm: Normal rate and regular rhythm  Heart sounds: Normal heart sounds  No murmur  No friction rub  No gallop  Pulmonary:      Effort: Pulmonary effort is normal  No respiratory distress  Breath sounds: No stridor  Rales present  No wheezing or rhonchi  Abdominal:      General: Bowel sounds are normal  There is no distension  Palpations: Abdomen is soft  Tenderness: There is no abdominal tenderness  There is no guarding or rebound  Musculoskeletal:      Right lower leg: No edema  Left lower leg: No edema  Skin:     General: Skin is warm  Coloration: Skin is not pale  Findings: No erythema or rash  Neurological:      General: No focal deficit present  Mental Status: He is alert  Psychiatric:      Comments: Anxious              Additional Data:     Labs:    Results from last 7 days   Lab Units 01/24/21  0342   WBC Thousand/uL 15 54*   HEMOGLOBIN g/dL 9 6*   HEMATOCRIT % 30 8*   PLATELETS Thousands/uL 149   NEUTROS PCT % 88*   LYMPHS PCT % 4*   MONOS PCT % 7   EOS PCT % 0     Results from last 7 days   Lab Units 01/24/21  0335 01/24/21  0019  01/23/21  0530   POTASSIUM mmol/L 5 2  --    < > 5 1   CHLORIDE mmol/L 98*  --    < > 102   CO2 mmol/L 18*  --    < > 19*   CO2, I-STAT mmol/L  --  19*  --   --    BUN mg/dL 122*  --    < > 97*   CREATININE mg/dL 4 05*  --    < > 2 89*   CALCIUM mg/dL 8 6  --    < > 8 7   ALK PHOS U/L  --   --   --  106 ALT U/L  --   --   --  71   AST U/L  --   --   --  53*   GLUCOSE, ISTAT mg/dl  --  232*  --   --     < > = values in this interval not displayed  Results from last 7 days   Lab Units 01/22/21  0254   INR  1 24*       * I Have Reviewed All Lab Data Listed Above  * Additional Pertinent Lab Tests Reviewed: [facility] 66 Admission Reviewed    Imaging:    Imaging Reports Reviewed Today Include:  Diagnostic imaging studies that were done on this admission  Imaging Personally Reviewed by Myself Includes:  None      Recent Cultures (last 7 days):           Last 24 Hours Medication List:   Current Facility-Administered Medications   Medication Dose Route Frequency Provider Last Rate    acetaminophen  650 mg Oral Q6H PRN Zahra Costa MD      amLODIPine  5 mg Oral BID Zahra Costa MD      aspirin  81 mg Oral Daily Zahra Costa MD      atorvastatin  40 mg Oral Daily With Jorge Rosales MD      benzonatate  100 mg Oral TID PRN TABITHA Briceno      bumetanide  4 mg/hr Intravenous Continuous DAVID Zavala-SUZY 4 mg/hr (01/24/21 1241)    calcium carbonate  1,000 mg Oral Daily PRN Zahra Costa MD      cefepime  2,000 mg Intravenous Q24H Faye Correa MD 2,000 mg (01/24/21 1140)    clopidogrel  75 mg Oral Daily Zahra Costa MD      escitalopram  10 mg Oral HS Zahra Costa MD      guaiFENesin  1,200 mg Oral Q12H [address] TABITHA Briceno      hydrALAZINE  50 mg Oral Q8H [address] Zahra Costa MD      HYDROmorphone  0 2 mg Intravenous Q3H PRN Faye Correa MD      insulin detemir  20 Units Subcutaneous Q12H [address] Zahra Costa MD      insulin lispro  1-6 Units Subcutaneous TID AC Zahra Costa MD      insulin lispro  1-6 Units Subcutaneous HS Zahra Costa MD      metoprolol tartrate  25 mg Oral Q12H [address] Zahra Costa MD      nitroglycerin  0 4 mg Sublingual Q5 Min PRN Zahra Costa MD      nystatin  500,000 Units Swish & Swallow 4x Daily Faye Correa MD      ondansetron  4 mg Intravenous Q6H PRN Anuj Allen MD      oxyCODONE  2 5 mg Oral Q4H PRN Faye Correa MD      phenol  1 spray Mouth/Throat Q4H PRN Faye Correa MD      pneumococcal 13-valent conjugate vaccine  0 5 mL Intramuscular Prior to discharge Anuj Allen MD      polyethylene glycol  17 g Oral Daily PRN Anuj Allen MD          Today, Patient Was Seen By: Chip Bernal MD    ** Please Note: Dragon 360 Dictation voice to text software may have been used in the creation of this document   **

## 2021-01-24 NOTE — QUICK NOTE
Notify the patient having increasing respiratory effort  Patient was placed on BiPAP prior to my signing onto his case  Bumex drip was started earlier tonight after patient received 10 mg of Zaroxolyn with an additional 2 mg bolus of Bumex  Still has not had any urine output and continues to be short of breath  Chest x-ray multiple multifocal pneumonia versus volume overload  Repeat BMP shows worsening of renal function with creatinine rising from 2 8 to 3 9  His potassium is stable at 5 3  Anion gap is now increased to 14 with a BUN of 120 with bicarbonate of 17  Decrease in sodium noted at 128  Case was discussed with on-call nephrologist who recommended increasing Bumex drip to 4 milligrams/hour and bolusing patient with additional 10 mg of Zaroxolyn  Patient will be upgraded to level two step-down  Discussed with patient about high likelihood of him needing hemodialysis  He was agreeable  Will check stat blood gas

## 2021-01-24 NOTE — ASSESSMENT & PLAN NOTE
· Cardiologist and nephrologist on board  · Continue Bumex drip  · Continue cardiovascular and heart failure medications  · Monitor input and output  · Daily weights

## 2021-01-24 NOTE — ASSESSMENT & PLAN NOTE
· Worsening leukocytosis with elevated procalcitonin and patient having sore throat and cough  · COVID-19, influenza, RSV tests were negative  · Chest x-ray officially read as multifocal pneumonia  · Thus will treat as hospital-acquired pneumonia with IV cefepime  · Blood cultures x2  · Monitor procalcitonin and CBC with differential count  · Cough medicine/antitussive, lozenges and Chloraseptic spray liudmila Guzman

## 2021-01-24 NOTE — PROGRESS NOTES
Heart Failure/ Pulmonary Hypertension Progress Note - Lance Argueta [de-identified] y o  male MRN: [id number]    Unit/Bed#: S -01 Encounter: [id number]      Assessment:    Principal Problem:    NSTEMI (non-ST elevated myocardial infarction) ([facility] [address] )  Active Problems:    CKD (chronic kidney disease), stage III    Diabetes mellitus (HCC)    Leukocytosis    Hx of CABG    Acute on chronic diastolic congestive heart failure (HCC)    High anion gap metabolic acidosis    Anemia    Acute renal failure (HCC)    Hyponatremia      Subjective:   Patient seen and examined  Pt complaining of dyspnea at rest    Objective: Intake/ Output: 600/400? bumex gtt 4mg/hr  Weight: 219 lbs  Tele:   Cr up to 3 9 and started on bumex gtt at 4 mg/hr and given zaroxolyn  Cr up to 4 today    ABG 1/24/21 at midnight: 7 3/34/94    Last COVID 1/22: negative    # CAD w/ current chest pain- NSTEMI w/ trop to 1 5  Hx of CABG x 2 LIMA to LAD, SVG to OM  LHC 1/22/21: severe 3V dz: 99% occluded prox LCx, 50% distal left main, 100% mid LAD, 100% prox RCA with collateral from left   Patent graft BURNHAM to LAD, patent graft to OM1 with 40% stenosis    Plan: asa, plavix added, statin, beta blocker, imdur    # Acute on Chronic HFpEF, Stage C  Diuretic: home: lasix 40 mg AM, 20 mg PM; will dose IV here today  Weight:  NT proBNP: 1/23: 25,215    Studies- personally reviewed by me    Echocardiogram 7/8/19  LVEF: 55%  LVIDd:  RV: mildly dilated  MR:  PASP:    # IDDM2, HgA1C 6 7%    # BRIAN on CKD,likely due to contrast- only 90 cc, volume overloaded  Started on bumex gtt 4 mg/hr and zaroxolyn  # HTN- metoprolol tartate 25 mg BID, hydralazine and Imdur  # dyslipidemia- atorvastatin 40 mg daily  LDL 38    Plan:  Continue with gtt as started by nephrology, may need another dose of zaroxolyn, suspect ATN- last dose given at midnight- that was second dose  Hopefully opens up soon, oxygen sats maintaining  Echo pending to assess for any drop in EF with CAD dz progression      Review of Systems   Constitutional: Negative for activity change, appetite change, fatigue and unexpected weight change  HENT: Negative for congestion and nosebleeds  Eyes: Negative  Respiratory: Positive for shortness of breath  Negative for cough and chest tightness  Cardiovascular: Negative for chest pain, palpitations and leg swelling  Gastrointestinal: Negative for abdominal distention  Endocrine: Negative  Genitourinary: Negative  Musculoskeletal: Negative  Skin: Negative  Neurological: Negative for dizziness, syncope and weakness  Hematological: Negative  Psychiatric/Behavioral: Negative  [employer] (day, reason): Marinelli catheter (day, reason):    Vitals: Blood pressure 146/73, pulse 77, temperature 98 3 °F (36 8 °C), temperature source Axillary, resp  rate 18, SpO2 93 %  , There is no height or weight on file to calculate BMI , I/O last 3 completed shifts: In: 600 [P O :600]  Out: 850 [Urine:850]  No intake/output data recorded  Wt Readings from Last 3 Encounters:   01/21/21 99 6 kg (219 lb 9 3 oz)   07/15/19 99 5 kg (219 lb 5 7 oz)   12/05/16 99 4 kg (219 lb 2 2 oz)       Intake/Output Summary (Last 24 hours) at 1/24/2021 0904  Last data filed at 1/24/2021 8662  Gross per 24 hour   Intake 420 ml   Output 400 ml   Net 20 ml     I/O last 3 completed shifts: In: 600 [P O :600]  Out: 850 [Urine:850]    No significant arrhythmias seen on telemetry review         Physical Exam:  Vitals:    01/24/21 0329 01/24/21 0619 01/24/21 0700 01/24/21 0851   BP: 155/70 159/72 146/73    BP Location: Left arm  Left arm    Pulse: 70  77    Resp: 18  18    Temp:   98 3 °F (36 8 °C)    TempSrc:   Axillary    SpO2: 96%  94% 93%       GEN: Barney Cook appears well, alert and oriented x 3, pleasant and cooperative   HEENT: pupils equal, round, and reactive to light; extraocular muscles intact  NECK: supple, no carotid bruits   HEART: regular rhythm, normal S1 and S2, no murmurs, clicks, gallops or rubs, JVP is    LUNGS:  positive for rales  ABDOMEN: normal bowel sounds, soft, no tenderness, no distention  EXTREMITIES: peripheral pulses normal; no clubbing, cyanosis, or edema  NEURO: no focal findings   SKIN: normal without suspicious lesions on exposed skin      Current Facility-Administered Medications:     acetaminophen (TYLENOL) tablet 650 mg, 650 mg, Oral, Q6H PRN, Eula Schumacher MD    amLODIPine (NORVASC) tablet 5 mg, 5 mg, Oral, BID, Swathi Schumacher MD, 5 mg at 01/24/21 0836    aspirin (ECOTRIN LOW STRENGTH) EC tablet 81 mg, 81 mg, Oral, Daily, Swathi Schumacher MD, 81 mg at 01/24/21 0836    atorvastatin (LIPITOR) tablet 40 mg, 40 mg, Oral, Daily With Liza Griffin MD, 40 mg at 01/23/21 1733    benzonatate (TESSALON PERLES) capsule 100 mg, 100 mg, Oral, TID PRN, TABITHA Heath    bumetanide (BUMEX) 12 5 mg infusion 50 mL, 4 mg/hr, Intravenous, Continuous, Barney Sibley PA-C, Last Rate: 16 mL/hr at 01/24/21 0619, 4 mg/hr at 01/24/21 0619    calcium carbonate (TUMS) chewable tablet 1,000 mg, 1,000 mg, Oral, Daily PRN, Michael Sierra MD    cefepime (MAXIPIME) 2,000 mg in dextrose 5 % 50 mL IVPB, 2,000 mg, Intravenous, Q24H, Faye Correa MD    clopidogrel (PLAVIX) tablet 75 mg, 75 mg, Oral, Daily, Swathi Schumacher MD, 75 mg at 01/24/21 0836    escitalopram (LEXAPRO) tablet 10 mg, 10 mg, Oral, HS, Swathi Schumacher MD, 10 mg at 01/23/21 2149    guaiFENesin (MUCINEX) 12 hr tablet 1,200 mg, 1,200 mg, Oral, Q12H [facility], TABITHA Heath, 1,200 mg at 01/24/21 0836    hydrALAZINE (APRESOLINE) tablet 50 mg, 50 mg, Oral, Q8H [facility], wSathi Schumacher MD, 50 mg at 01/24/21 0619    insulin detemir (LEVEMIR) subcutaneous injection 20 Units, 20 Units, Subcutaneous, Q12H [facility], Swathi Schumacher MD, 20 Units at 01/24/21 0851    insulin lispro (HumaLOG) 100 units/mL subcutaneous injection 1-6 Units, 1-6 Units, Subcutaneous, TID AC, 2 Units at 01/24/21 0846 **AND** Fingerstick Glucose (POCT), , , TID AC, Swathi Schumacher MD    insulin lispro (HumaLOG) 100 units/mL subcutaneous injection 1-6 Units, 1-6 Units, Subcutaneous, HS, Swathi Schumacher MD, 4 Units at 01/23/21 2155    metoprolol tartrate (LOPRESSOR) tablet 25 mg, 25 mg, Oral, Q12H [facility], Swathi Schumacher MD, 25 mg at 01/24/21 0836    nitroglycerin (NITROSTAT) SL tablet 0 4 mg, 0 4 mg, Sublingual, Q5 Min PRN, [place] Favio Schumacher MD    ondansetron (ZOFRAN) injection 4 mg, 4 mg, Intravenous, Q6H PRN, Jordyn Akins MD, 4 mg at 01/23/21 0036    pneumococcal 13-valent conjugate vaccine (PREVNAR-13) IM injection 0 5 mL, 0 5 mL, Intramuscular, Prior to discharge, Jordyn Akins MD    polyethylene glycol (MIRALAX) packet 17 g, 17 g, Oral, Daily PRN, Jordyn Akins MD      Labs & Results:    Results from last 7 days   Lab Units 01/22/21  0937 01/22/21  0623 01/22/21  0104   TROPONIN I ng/mL 7 37* 5 59* 1 58*     Results from last 7 days   Lab Units 01/24/21  0342 01/24/21  0019 01/23/21  0530 01/22/21  0622   WBC Thousand/uL 15 54*  --  14 93* 8 83   HEMOGLOBIN g/dL 9 6*  --  9 6* 10 0*   I STAT HEMOGLOBIN g/dl  --  10 5*  --   --    HEMATOCRIT % 30 8*  --  30 4* 34 0*   HEMATOCRIT, ISTAT %  --  31*  --   --    PLATELETS Thousands/uL 149  --  152 134*         Results from last 7 days   Lab Units 01/24/21  0335 01/24/21  0019 01/23/21  2254 01/23/21  0530  01/21/21  1812   POTASSIUM mmol/L 5 2  --  5 3 5 1   < > 4 1   CHLORIDE mmol/L 98*  --  97* 102   < > 104   CO2 mmol/L 18*  --  17* 19*   < > 20*   CO2, I-STAT mmol/L  --  19*  --   --   --   --    BUN mg/dL 122*  --  120* 97*   < > 70*   CREATININE mg/dL 4 05*  --  3 93* 2 89*   < > 2 35*   CALCIUM mg/dL 8 6  --  8 5 8 7   < > 8 7   ALK PHOS U/L  --   --   --  106  --  136*   ALT U/L  --   --   --  71  --  90*   AST U/L  --   --   --  53*  --  50*   GLUCOSE, ISTAT mg/dl  --  232*  --   --   --   --     < > = values in this interval not displayed  Results from last 7 days   Lab Units 01/22/21  0254   INR  1 24*         Counseling / Coordination of Care  Total floor / unit time spent today 25 minutes  Greater than 50% of total time was spent with the patient and / or family counseling and / or coordination of care  A description of the counseling / coordination of care: 15  Thank you for the opportunity to participate in the care of this patient    [address] [facility] PULMONARY HYPERTENSION  MEDICAL DIRECTOR OF [place]

## 2021-01-24 NOTE — PROGRESS NOTES
Notified SLIM regarding patient's increased SOB and O2 requirements  Lung sounds are audibly coarse and crackly  Respiratory placed pt on bipap  50 ml urine output since 1900  Bumex gtt infusing  Bladder scan volume of 0 ml  Will continue to closely monitor       Renan Balloon RN

## 2021-01-24 NOTE — ASSESSMENT & PLAN NOTE
Elevated anion gap had resolved  Previously with slight high anion gap metabolic acidosis  Possibly secondary to CKD  Previously on bicarb drip, this was discontinued  Still with metabolic acidosis and worsening kidney function  Nephrology on board  Continue Bumex drip

## 2021-01-24 NOTE — PROGRESS NOTES
NEPHROLOGY PROGRESS NOTE    Flo Burns [de-identified] y o  male MRN: [id number]  Unit/Bed#: S -01 Encounter: [id number]  Reason for Consult:  Acute on chronic kidney disease    The patient was seen he was comfortable says he was not short of breath but was complaining of pain all over his body in his skin and shoulders  He reports not having significant urine output overnight  Denied chest pain  ASSESSMENT/PLAN:  1  Renal    The patient has acute on chronic kidney disease with baseline creatinine of 2 5  The patient underwent cardiac catheterization and has developed oliguria and ATN as a result with increasing creatinine  He was started on loop diuretic infusion with Lasix and then this was changed to Bumex overnight and the rate was increased  Urine output is still low  Creatinine value continues to increase and over the last 24 hours has gone from 2 9 to 4  He has developed hyponatremia as well due to impairment of renal function and can excrete water efficiently  Nursing has bladder scan patient there is no significant urine retention  There is no urgent need for dialysis at the present time but I explained to the patient that if he does not improve his urine output and renal function continues to worsen he may require dialysis in the next day  Continue Bumex drip  Continue to monitor renal function and clinical state  May require dialysis and patient is aware  2  Cardiac    The patient has history of ischemic cardiomyopathy presented to [facility] with unstable angina increased troponin I  He subsequently was transferred here underwent cardiac catheterization there was no intervention performed  Medical therapy in cardiology is following  Their note was reviewed as well  SUBJECTIVE:  Review of Systems   Constitution: Positive for malaise/fatigue  Negative for chills and fever  I of pain all over my body   HENT: Negative  Eyes: Negative      Cardiovascular: Negative for chest pain and palpitations  Says that he feels his breathing is okay but gets a little out of breath with exertion  Respiratory: Negative for cough, hemoptysis, shortness of breath, sputum production and wheezing  Skin: Negative for itching and rash  Musculoskeletal:        Shoulder pain and pain all over body  Gastrointestinal: Negative for abdominal pain, diarrhea, nausea and vomiting  Genitourinary: Negative for dysuria, flank pain and hematuria  Not urinating much  Neurological: Positive for weakness  Negative for focal weakness, headaches and light-headedness  Psychiatric/Behavioral: Negative for altered mental status, depression, hallucinations and hypervigilance  OBJECTIVE:  Current Weight:    Vitals:Temp (24hrs), Av 2 °F (36 8 °C), Min:97 5 °F (36 4 °C), Max:98 9 °F (37 2 °C)  Current: Temperature: 98 3 °F (36 8 °C)   Blood pressure 146/73, pulse 77, temperature 98 3 °F (36 8 °C), temperature source Axillary, resp  rate 18, SpO2 93 %  , There is no height or weight on file to calculate BMI  Intake/Output Summary (Last 24 hours) at 2021 1136  Last data filed at 2021 9234  Gross per 24 hour   Intake 420 ml   Output 400 ml   Net 20 ml       Physical Exam: /73 (BP Location: Left arm)   Pulse 77   Temp 98 3 °F (36 8 °C) (Axillary)   Resp 18   SpO2 93%   Physical Exam  Constitutional:       General: He is not in acute distress  Appearance: He is ill-appearing  He is not toxic-appearing or diaphoretic  HENT:      Head: Normocephalic and atraumatic  Eyes:      General: No scleral icterus  Extraocular Movements: Extraocular movements intact  Neck:      Musculoskeletal: Normal range of motion and neck supple  Cardiovascular:      Rate and Rhythm: Normal rate and regular rhythm  Heart sounds: No friction rub  No gallop  Pulmonary:      Effort: Pulmonary effort is normal  No respiratory distress        Breath sounds: Normal breath sounds  No wheezing or rhonchi  Abdominal:      General: Bowel sounds are normal  There is no distension  Palpations: Abdomen is soft  Tenderness: There is no abdominal tenderness  There is no rebound  Neurological:      General: No focal deficit present  Mental Status: He is alert and oriented to person, place, and time  Psychiatric:         Mood and Affect: Mood normal          Behavior: Behavior normal          Thought Content:  Thought content normal          Judgment: Judgment normal          Medications:    Current Facility-Administered Medications:     acetaminophen (TYLENOL) tablet 650 mg, 650 mg, Oral, Q6H PRN, Saniya Lentz MD    amLODIPine (NORVASC) tablet 5 mg, 5 mg, Oral, BID, Swathi Schumacher MD, 5 mg at 01/24/21 0836    aspirin (ECOTRIN LOW STRENGTH) EC tablet 81 mg, 81 mg, Oral, Daily, Swathi Schumacher MD, 81 mg at 01/24/21 0836    atorvastatin (LIPITOR) tablet 40 mg, 40 mg, Oral, Daily With Daron Huynh MD, 40 mg at 01/23/21 1733    benzonatate (TESSALON PERLES) capsule 100 mg, 100 mg, Oral, TID PRN, TABITHA Alva    bumetanide (BUMEX) 12 5 mg infusion 50 mL, 4 mg/hr, Intravenous, Continuous, Barney Sibley PA-C, Last Rate: 16 mL/hr at 01/24/21 0926, 4 mg/hr at 01/24/21 0926    calcium carbonate (TUMS) chewable tablet 1,000 mg, 1,000 mg, Oral, Daily PRN, Saniya Lentz MD    cefepime (MAXIPIME) 2,000 mg in dextrose 5 % 50 mL IVPB, 2,000 mg, Intravenous, Q24H, Faye Correa MD    clopidogrel (PLAVIX) tablet 75 mg, 75 mg, Oral, Daily, Swathi Schumacher MD, 75 mg at 01/24/21 0836    escitalopram (LEXAPRO) tablet 10 mg, 10 mg, Oral, HS, Swathi Schumacher MD, 10 mg at 01/23/21 2149    guaiFENesin (MUCINEX) 12 hr tablet 1,200 mg, 1,200 mg, Oral, Q12H [address], TABITHA Alva, 1,200 mg at 01/24/21 0836    hydrALAZINE (APRESOLINE) tablet 50 mg, 50 mg, Oral, Q8H [address], Swathi Schumacher MD, 50 mg at 01/24/21 0619    HYDROmorphone (DILAUDID) injection 0 2 mg, 0 2 mg, Intravenous, Q3H PRN, Faye Correa MD    insulin detemir (LEVEMIR) subcutaneous injection 20 Units, 20 Units, Subcutaneous, Q12H [facility], Jordyn Akisn MD, 20 Units at 01/24/21 0851    insulin lispro (HumaLOG) 100 units/mL subcutaneous injection 1-6 Units, 1-6 Units, Subcutaneous, TID AC, 3 Units at 01/24/21 1133 **AND** Fingerstick Glucose (POCT), , , TID AC, Swathi Schumacher MD    insulin lispro (HumaLOG) 100 units/mL subcutaneous injection 1-6 Units, 1-6 Units, Subcutaneous, HS, Jordyn Akins MD, 4 Units at 01/23/21 2155    metoprolol tartrate (LOPRESSOR) tablet 25 mg, 25 mg, Oral, Q12H [facility], Jordyn Akins MD, 25 mg at 01/24/21 0836    nitroglycerin (NITROSTAT) SL tablet 0 4 mg, 0 4 mg, Sublingual, Q5 Min PRN, Jordyn Akins MD    nystatin (MYCOSTATIN) oral suspension 500,000 Units, 500,000 Units, Swish & Swallow, 4x Daily, Faye Correa MD    ondansetron (ZOFRAN) injection 4 mg, 4 mg, Intravenous, Q6H PRN, Jordyn Akins MD, 4 mg at 01/23/21 0036    oxyCODONE (ROXICODONE) IR tablet 2 5 mg, 2 5 mg, Oral, Q4H PRN, Faye Correa MD    phenol (CHLORASEPTIC) 1 4 % mucosal liquid 1 spray, 1 spray, Mouth/Throat, Q4H PRN, Faye Correa MD    pneumococcal 13-valent conjugate vaccine (PREVNAR-13) IM injection 0 5 mL, 0 5 mL, Intramuscular, Prior to discharge, Jordyn Akins MD    polyethylene glycol (MIRALAX) packet 17 g, 17 g, Oral, Daily PRN, Jordyn Akins MD    Laboratory Results:  Lab Results   Component Value Date    WBC 15 54 (H) 01/24/2021    HGB 9 6 (L) 01/24/2021    HCT 30 8 (L) 01/24/2021     (H) 01/24/2021     01/24/2021     Lab Results   Component Value Date    SODIUM 128 (L) 01/24/2021    K 5 2 01/24/2021    CL 98 (L) 01/24/2021    CO2 18 (L) 01/24/2021     (H) 01/24/2021    CREATININE 4 05 (H) 01/24/2021    GLUC 218 (H) 01/24/2021    CALCIUM 8 6 01/24/2021     Lab Results   Component Value Date CALCIUM 8 6 01/24/2021    PHOS 4 9 (H) 09/04/2019     No results found for: LABPROT

## 2021-01-24 NOTE — ASSESSMENT & PLAN NOTE
· Monitor  · No active bleeding    · For further workup and management if this worsens significantly

## 2021-01-25 ENCOUNTER — APPOINTMENT (INPATIENT)
Dept: DIALYSIS | Facility: HOSPITAL | Age: 81
DRG: 280 | End: 2021-01-25
Payer: COMMERCIAL

## 2021-01-25 ENCOUNTER — APPOINTMENT (INPATIENT)
Dept: RADIOLOGY | Facility: HOSPITAL | Age: 81
DRG: 280 | End: 2021-01-25
Attending: STUDENT IN AN ORGANIZED HEALTH CARE EDUCATION/TRAINING PROGRAM
Payer: COMMERCIAL

## 2021-01-25 LAB
ANION GAP SERPL CALCULATED.3IONS-SCNC: 22 MMOL/L (ref 4–13)
BASOPHILS # BLD AUTO: 0.02 THOUSANDS/ΜL (ref 0–0.1)
BASOPHILS NFR BLD AUTO: 0 % (ref 0–1)
BUN SERPL-MCNC: 150 MG/DL (ref 5–25)
CALCIUM SERPL-MCNC: 8.7 MG/DL (ref 8.3–10.1)
CHLORIDE SERPL-SCNC: 96 MMOL/L (ref 100–108)
CO2 SERPL-SCNC: 13 MMOL/L (ref 21–32)
CREAT SERPL-MCNC: 4.97 MG/DL (ref 0.6–1.3)
EOSINOPHIL # BLD AUTO: 0 THOUSAND/ΜL (ref 0–0.61)
EOSINOPHIL NFR BLD AUTO: 0 % (ref 0–6)
ERYTHROCYTE [DISTWIDTH] IN BLOOD BY AUTOMATED COUNT: 14.2 % (ref 11.6–15.1)
GFR SERPL CREATININE-BSD FRML MDRD: 10 ML/MIN/1.73SQ M
GLUCOSE SERPL-MCNC: 131 MG/DL (ref 65–140)
GLUCOSE SERPL-MCNC: 174 MG/DL (ref 65–140)
GLUCOSE SERPL-MCNC: 179 MG/DL (ref 65–140)
GLUCOSE SERPL-MCNC: 208 MG/DL (ref 65–140)
GLUCOSE SERPL-MCNC: 217 MG/DL (ref 65–140)
HBV CORE AB SER QL: NORMAL
HBV CORE IGM SER QL: NORMAL
HBV SURFACE AB SER-ACNC: <3.1 MIU/ML
HBV SURFACE AG SER QL: NORMAL
HCT VFR BLD AUTO: 30.1 % (ref 36.5–49.3)
HCV AB SER QL: NORMAL
HGB BLD-MCNC: 9.4 G/DL (ref 12–17)
IMM GRANULOCYTES # BLD AUTO: 0.15 THOUSAND/UL (ref 0–0.2)
IMM GRANULOCYTES NFR BLD AUTO: 1 % (ref 0–2)
LYMPHOCYTES # BLD AUTO: 0.57 THOUSANDS/ΜL (ref 0.6–4.47)
LYMPHOCYTES NFR BLD AUTO: 4 % (ref 14–44)
MCH RBC QN AUTO: 32.2 PG (ref 26.8–34.3)
MCHC RBC AUTO-ENTMCNC: 31.2 G/DL (ref 31.4–37.4)
MCV RBC AUTO: 103 FL (ref 82–98)
MONOCYTES # BLD AUTO: 0.93 THOUSAND/ΜL (ref 0.17–1.22)
MONOCYTES NFR BLD AUTO: 6 % (ref 4–12)
NEUTROPHILS # BLD AUTO: 13.89 THOUSANDS/ΜL (ref 1.85–7.62)
NEUTS SEG NFR BLD AUTO: 89 % (ref 43–75)
NRBC BLD AUTO-RTO: 0 /100 WBCS
PLATELET # BLD AUTO: 156 THOUSANDS/UL (ref 149–390)
PMV BLD AUTO: 10.5 FL (ref 8.9–12.7)
POTASSIUM SERPL-SCNC: 4.9 MMOL/L (ref 3.5–5.3)
PROCALCITONIN SERPL-MCNC: 0.8 NG/ML
RBC # BLD AUTO: 2.92 MILLION/UL (ref 3.88–5.62)
SODIUM SERPL-SCNC: 131 MMOL/L (ref 136–145)
WBC # BLD AUTO: 15.56 THOUSAND/UL (ref 4.31–10.16)

## 2021-01-25 PROCEDURE — 90935 HEMODIALYSIS ONE EVALUATION: CPT | Performed by: INTERNAL MEDICINE

## 2021-01-25 PROCEDURE — 86803 HEPATITIS C AB TEST: CPT | Performed by: PHYSICIAN ASSISTANT

## 2021-01-25 PROCEDURE — 77001 FLUOROGUIDE FOR VEIN DEVICE: CPT | Performed by: STUDENT IN AN ORGANIZED HEALTH CARE EDUCATION/TRAINING PROGRAM

## 2021-01-25 PROCEDURE — 02H633Z INSERTION OF INFUSION DEVICE INTO RIGHT ATRIUM, PERCUTANEOUS APPROACH: ICD-10-PCS | Performed by: STUDENT IN AN ORGANIZED HEALTH CARE EDUCATION/TRAINING PROGRAM

## 2021-01-25 PROCEDURE — 84145 PROCALCITONIN (PCT): CPT | Performed by: NURSE PRACTITIONER

## 2021-01-25 PROCEDURE — 94003 VENT MGMT INPAT SUBQ DAY: CPT

## 2021-01-25 PROCEDURE — 86704 HEP B CORE ANTIBODY TOTAL: CPT | Performed by: PHYSICIAN ASSISTANT

## 2021-01-25 PROCEDURE — 94762 N-INVAS EAR/PLS OXIMTRY CONT: CPT

## 2021-01-25 PROCEDURE — 36556 INSERT NON-TUNNEL CV CATH: CPT | Performed by: STUDENT IN AN ORGANIZED HEALTH CARE EDUCATION/TRAINING PROGRAM

## 2021-01-25 PROCEDURE — 94760 N-INVAS EAR/PLS OXIMETRY 1: CPT

## 2021-01-25 PROCEDURE — NC001 PR NO CHARGE: Performed by: INTERNAL MEDICINE

## 2021-01-25 PROCEDURE — 80048 BASIC METABOLIC PNL TOTAL CA: CPT | Performed by: INTERNAL MEDICINE

## 2021-01-25 PROCEDURE — 5A1D70Z PERFORMANCE OF URINARY FILTRATION, INTERMITTENT, LESS THAN 6 HOURS PER DAY: ICD-10-PCS | Performed by: INTERNAL MEDICINE

## 2021-01-25 PROCEDURE — C1752 CATH,HEMODIALYSIS,SHORT-TERM: HCPCS

## 2021-01-25 PROCEDURE — 86705 HEP B CORE ANTIBODY IGM: CPT | Performed by: PHYSICIAN ASSISTANT

## 2021-01-25 PROCEDURE — 87340 HEPATITIS B SURFACE AG IA: CPT | Performed by: PHYSICIAN ASSISTANT

## 2021-01-25 PROCEDURE — 99232 SBSQ HOSP IP/OBS MODERATE 35: CPT | Performed by: INTERNAL MEDICINE

## 2021-01-25 PROCEDURE — 85025 COMPLETE CBC W/AUTO DIFF WBC: CPT | Performed by: INTERNAL MEDICINE

## 2021-01-25 PROCEDURE — 86706 HEP B SURFACE ANTIBODY: CPT | Performed by: PHYSICIAN ASSISTANT

## 2021-01-25 PROCEDURE — NC001 PR NO CHARGE: Performed by: STUDENT IN AN ORGANIZED HEALTH CARE EDUCATION/TRAINING PROGRAM

## 2021-01-25 PROCEDURE — 93306 TTE W/DOPPLER COMPLETE: CPT | Performed by: INTERNAL MEDICINE

## 2021-01-25 PROCEDURE — 76937 US GUIDE VASCULAR ACCESS: CPT | Performed by: STUDENT IN AN ORGANIZED HEALTH CARE EDUCATION/TRAINING PROGRAM

## 2021-01-25 PROCEDURE — C1894 INTRO/SHEATH, NON-LASER: HCPCS

## 2021-01-25 PROCEDURE — 82948 REAGENT STRIP/BLOOD GLUCOSE: CPT

## 2021-01-25 PROCEDURE — 36556 INSERT NON-TUNNEL CV CATH: CPT

## 2021-01-25 PROCEDURE — 76937 US GUIDE VASCULAR ACCESS: CPT

## 2021-01-25 PROCEDURE — 94660 CPAP INITIATION&MGMT: CPT

## 2021-01-25 RX ORDER — HEPARIN SODIUM 1000 [USP'U]/ML
INJECTION, SOLUTION INTRAVENOUS; SUBCUTANEOUS CODE/TRAUMA/SEDATION MEDICATION
Status: COMPLETED | OUTPATIENT
Start: 2021-01-25 | End: 2021-01-25

## 2021-01-25 RX ORDER — LIDOCAINE WITH 8.4% SOD BICARB 0.9%(10ML)
SYRINGE (ML) INJECTION CODE/TRAUMA/SEDATION MEDICATION
Status: COMPLETED | OUTPATIENT
Start: 2021-01-25 | End: 2021-01-25

## 2021-01-25 RX ORDER — MAGNESIUM HYDROXIDE/ALUMINUM HYDROXICE/SIMETHICONE 120; 1200; 1200 MG/30ML; MG/30ML; MG/30ML
30 SUSPENSION ORAL EVERY 6 HOURS PRN
Status: DISCONTINUED | OUTPATIENT
Start: 2021-01-25 | End: 2021-02-12 | Stop reason: HOSPADM

## 2021-01-25 RX ADMIN — INSULIN LISPRO 4 UNITS: 100 INJECTION, SOLUTION INTRAVENOUS; SUBCUTANEOUS at 18:25

## 2021-01-25 RX ADMIN — Medication 4 MG/HR: at 15:53

## 2021-01-25 RX ADMIN — Medication 4 MG/HR: at 07:40

## 2021-01-25 RX ADMIN — NYSTATIN 500000 UNITS: 100000 SUSPENSION ORAL at 21:35

## 2021-01-25 RX ADMIN — Medication 4 MG/HR: at 19:29

## 2021-01-25 RX ADMIN — AMLODIPINE BESYLATE 5 MG: 5 TABLET ORAL at 17:29

## 2021-01-25 RX ADMIN — Medication 5 ML: at 12:57

## 2021-01-25 RX ADMIN — ASPIRIN 81 MG: 81 TABLET ORAL at 08:51

## 2021-01-25 RX ADMIN — HEPARIN SODIUM 5000 UNITS: 5000 INJECTION INTRAVENOUS; SUBCUTANEOUS at 05:28

## 2021-01-25 RX ADMIN — NYSTATIN 500000 UNITS: 100000 SUSPENSION ORAL at 08:55

## 2021-01-25 RX ADMIN — Medication 4 MG/HR: at 22:30

## 2021-01-25 RX ADMIN — ATORVASTATIN CALCIUM 40 MG: 40 TABLET, FILM COATED ORAL at 17:29

## 2021-01-25 RX ADMIN — INSULIN LISPRO 2 UNITS: 100 INJECTION, SOLUTION INTRAVENOUS; SUBCUTANEOUS at 08:52

## 2021-01-25 RX ADMIN — HYDRALAZINE HYDROCHLORIDE 50 MG: 25 TABLET, FILM COATED ORAL at 21:35

## 2021-01-25 RX ADMIN — CEFEPIME HYDROCHLORIDE 2000 MG: 2 INJECTION, POWDER, FOR SOLUTION INTRAVENOUS at 11:16

## 2021-01-25 RX ADMIN — METOPROLOL TARTRATE 25 MG: 25 TABLET, FILM COATED ORAL at 08:51

## 2021-01-25 RX ADMIN — GUAIFENESIN 1200 MG: 600 TABLET, EXTENDED RELEASE ORAL at 08:51

## 2021-01-25 RX ADMIN — HEPARIN SODIUM 1400 UNITS: 1000 INJECTION INTRAVENOUS; SUBCUTANEOUS at 13:01

## 2021-01-25 RX ADMIN — INSULIN DETEMIR 20 UNITS: 100 INJECTION, SOLUTION SUBCUTANEOUS at 21:36

## 2021-01-25 RX ADMIN — INSULIN DETEMIR 20 UNITS: 100 INJECTION, SOLUTION SUBCUTANEOUS at 08:51

## 2021-01-25 RX ADMIN — ESCITALOPRAM 10 MG: 10 TABLET, FILM COATED ORAL at 21:35

## 2021-01-25 RX ADMIN — CLOPIDOGREL BISULFATE 75 MG: 75 TABLET ORAL at 08:51

## 2021-01-25 RX ADMIN — INSULIN LISPRO 1 UNITS: 100 INJECTION, SOLUTION INTRAVENOUS; SUBCUTANEOUS at 11:17

## 2021-01-25 RX ADMIN — GUAIFENESIN 1200 MG: 600 TABLET, EXTENDED RELEASE ORAL at 21:35

## 2021-01-25 RX ADMIN — NYSTATIN 500000 UNITS: 100000 SUSPENSION ORAL at 11:17

## 2021-01-25 RX ADMIN — METOPROLOL TARTRATE 25 MG: 25 TABLET, FILM COATED ORAL at 21:35

## 2021-01-25 RX ADMIN — HEPARIN SODIUM 1100 UNITS: 1000 INJECTION INTRAVENOUS; SUBCUTANEOUS at 13:02

## 2021-01-25 RX ADMIN — HEPARIN SODIUM 5000 UNITS: 5000 INJECTION INTRAVENOUS; SUBCUTANEOUS at 21:35

## 2021-01-25 RX ADMIN — NYSTATIN 500000 UNITS: 100000 SUSPENSION ORAL at 17:56

## 2021-01-25 RX ADMIN — AMLODIPINE BESYLATE 5 MG: 5 TABLET ORAL at 08:51

## 2021-01-25 RX ADMIN — INSULIN LISPRO 1 UNITS: 100 INJECTION, SOLUTION INTRAVENOUS; SUBCUTANEOUS at 21:42

## 2021-01-25 NOTE — PLAN OF CARE
Post-Dialysis RN Treatment Note    Blood Pressure:  Pre 123/58 mm/Hg  Post 129/58 mmHg   EDW  first hemodialysis today kg    Weight:  Pre 103 3 kg   Post 101 8 kg   Mode of weight measurement: Bed Scale   Volume Removed  1500 ml    Treatment duration 120 minutes    NS given  No    Treatment shortened? No   Medications given during Rx Not Applicable   Estimated Kt/V  None Reported   Access type: Temporary HD catheter   Access Status: Yes, describe: maintained Marcus@[employer] as ordered    Report called to primary nurse   Yes Delgado Koenig RN      On O2 @ 15li with humidifier  Complains of SOB  First day of hemodialysis for 2 hours  Dr Sammie Proctor was at bedside during the initiation of hemodialysis  Accessed temporary HD cath with order    Problem: METABOLIC, FLUID AND ELECTROLYTES - ADULT  Goal: Electrolytes maintained within normal limits  Description: INTERVENTIONS:  - Monitor labs and assess patient for signs and symptoms of electrolyte imbalances  - Administer electrolyte replacement as ordered  - Monitor response to electrolyte replacements, including repeat lab results as appropriate  - Instruct patient on fluid and nutrition as appropriate  Outcome: Progressing  Goal: Fluid balance maintained  Description: INTERVENTIONS:  - Monitor labs   - Monitor I/O and WT  - Instruct patient on fluid and nutrition as appropriate  - Assess for signs & symptoms of volume excess or deficit  Outcome: Progressing

## 2021-01-25 NOTE — ASSESSMENT & PLAN NOTE
Previously with slight high anion gap metabolic acidosis  Possibly secondary to CKD  Previously on bicarb drip, this was discontinued  Still with metabolic acidosis and worsening kidney function  Nephrology on board  Continue Bumex drip

## 2021-01-25 NOTE — PROGRESS NOTES
Hemodialysis note  Patient seen and examined on hemodialysis  I was present at the start of hemodialysis  Patient being dialyzed with 200 blood flow and 1st treatment precautions to minimize risk of dialysis disequilibrium  Systolic blood pressure is 234 systolic  On exam, patient had a bed is at 45° there is conversational dyspnea  Pulmonary:  Rales bilateral bases  Cardiovascular:  S1-S2  Abdomen obese mild distention  Extremities there is leg leg edema noted    Hemodialysis orders:  2 hours  Two hundred blood flow  1 5 times blood flow rate 4 dialysate flow rate  UF goal 1 kg  One hundred thirty-eight sodium  Thirty bicarbonate  Dialyzer F 160  Fluid removal 1-1 5 kg    Assessment and Plan:      1  Acute kidney injury secondary to dye nephropathy:  Patient is requiring dialysis today plan for dialysis and ultrafiltration tomorrow as well  2  Stage 4 chronic kidney disease:  Baseline creatinine is 2-2 5    3  Acute on chronic heart failure with preserved ejection fraction:  Dialysis with ultrafiltration as above  4  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:  Hemoglobin is 9 4 some of this could be dilutional continue to follow  5  Low bicarbonate:  Likely secondary to worsening acute kidney injury with a high anion gap metabolic acidosis  Plan for dialysis  Summary:  Dialysis today with ultrafiltration with dialysis tomorrow as well

## 2021-01-25 NOTE — CONSULTS
INTERPROFESSIONAL (PHONE) CONSULTATION - Interventional Radiology  Frannie Garcia [de-identified] y o  male MRN: [id number]  Unit/Bed#: S -01 Encounter: [id number]    IR has been consulted to evaluate the patient, determine the appropriate procedure, and whether or not a procedure can and should be performed regarding the care of Frannie Garcia  We were consulted by nephrology concerning this patient, and to possibly perform a nTDC if medically appropriate for the patient  Consults  01/25/21      Assessment/Recommendation:   [de-identified] yom PMH BRIAN on CKD  IR consulted for nTDC placement  Will plan on nTDC later today  Total time spent in review of data, discussion with requesting provider and rendering advice was 15 minutes  Patient or appropriate family member was verbally informed by nephrology of this consultative service on their behalf to provide more timely access to specialty care in lieu of an in person consultation  They were informed it is a billable service unless the it was determined an in person follow up was medically necessary by me within the next 14 days at which time only the in person consult would be billed  Verbal consent was obtained  Thank you for allowing Interventional Radiology to participate in the care of Frannie Garcia  Please don't hesitate to call or TigerText us with any questions       Merly Fagan MD

## 2021-01-25 NOTE — SEDATION DOCUMENTATION
Procedure ended  Temp HD catheter placed without incident  Patient tolerated well and transferred to inpatient room via bed

## 2021-01-25 NOTE — PROGRESS NOTES
Interventional Radiology Preprocedure Note    History/Indication for procedure:   Rosa Esqueda is a [de-identified] y o  male with a PMH of BRIAN who presents for nTDC placement      BP (!) 180/77 (BP Location: Left arm)   Pulse 65   Temp 98 °F (36 7 °C) (Oral)   Resp (!) 23   SpO2 90%     Relevant past medical history:    Past Medical History:   Diagnosis Date    Acute on chronic diastolic CHF [facility]congestive heart failure) [facility] 7/4/2019    Arthritis     Back pain     Bladder [facility] ([facility] )     diagnosed  2/2016    Cancer [facility])     bladder; chemo; resection;     Coronary artery disease     has 2 coronary stents    Dental crowns present      5    Diabetes mellitus ([place] [address] )     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[place] [address] )     fluid behind right eye    Hematuria     hx of    Hypercholesteremia     Hypertension     Kidney stones     Wears glasses      Patient Active Problem List   Diagnosis    NSTEMI (non-ST elevated myocardial infarction) ([place] [address] )    Elevated troponin    Acute renal failure superimposed on stage 3 chronic kidney disease (HCC)    Bladder cancer ([place] [address] )    Hypercholesteremia    Diabetes mellitus ([place] [address] )    Benign hypertension with chronic kidney disease, stage III    Leukocytosis    Hx of CABG    Chronic kidney disease    Acute on chronic diastolic congestive heart failure (HCC)    High anion gap metabolic acidosis    Anemia    Hyponatremia    Oral candida    ATN (acute tubular necrosis) ([facility])       Medications:    Inpatient Medications:     Scheduled Medications:  Current Facility-Administered Medications   Medication Dose Route Frequency Provider Last Rate    acetaminophen  650 mg Oral Q6H PRN Willie Bansal MD      aluminum-magnesium hydroxide-simethicone  30 mL Oral Q6H PRN TABITHA Devi      amLODIPine  5 mg Oral BID Willie Bansal MD      aspirin  81 mg Oral Daily Willie Bansal MD      atorvastatin  40 mg Oral Daily With Chris Ruiz MD      benzonatate  100 mg Oral TID PRN TABITHA Harper      bumetanide  4 mg/hr Intravenous Continuous Barney Sibley PA-C 4 mg/hr (01/25/21 0740)    calcium carbonate  1,000 mg Oral Daily PRN Marielena Altman MD      cefepime  2,000 mg Intravenous Q24H Faye Correa MD 2,000 mg (01/25/21 1116)    clopidogrel  75 mg Oral Daily Marielena Altman MD      escitalopram  10 mg Oral HS Marielena Altman MD      guaiFENesin  1,200 mg Oral Q12H TABITHA Mcgregor      heparin (porcine)    Code/Trauma/Sedation Supa Hines MD      heparin (porcine)  5,000 Units Subcutaneous Q8H [facility] Faye Correa MD      hydrALAZINE  50 mg Oral Q8H [facility] Marielena Altman MD      HYDROmorphone  0 2 mg Intravenous Q3H PRN Faye Correa MD      insulin detemir  20 Units Subcutaneous Q12H [facility] Marielena Altman MD      insulin lispro  1-6 Units Subcutaneous TID AC Marielena Altman MD      insulin lispro  1-6 Units Subcutaneous HS Marielena Altman MD      insulin lispro  4 Units Subcutaneous TID With Meals Faye Correa MD      lidocaine 1% buffered   Infiltration Code/Trauma/Sedation Supa Hines MD      metoprolol tartrate  25 mg Oral Q12H [address], MD      nitroglycerin  0 4 mg Sublingual Q5 Min PRN Marielena Altman MD      nystatin  500,000 Units Swish & Swallow 4x Daily Faye Correa MD      ondansetron  4 mg Intravenous Q6H PRN Marielena Altman MD      oxyCODONE  2 5 mg Oral Q4H PRN Faye Correa MD      phenol  1 spray Mouth/Throat Q4H PRN Faye Correa MD      pneumococcal 13-valent conjugate vaccine  0 5 mL Intramuscular Prior to discharge Marielena Altman MD      polyethylene glycol  17 g Oral Daily PRN Marielena Altman MD         Infusions:  bumetanide, 4 mg/hr, Last Rate: 4 mg/hr (01/25/21 0740)        PRN:    acetaminophen    aluminum-magnesium hydroxide-simethicone    benzonatate    calcium carbonate    heparin (porcine)    HYDROmorphone   lidocaine 1% buffered    nitroglycerin    ondansetron    oxyCODONE    phenol    pneumococcal 13-valent conjugate vaccine    polyethylene glycol    Outpatient Medications:  No current facility-administered medications on file prior to encounter        Current Outpatient Medications on File Prior to Encounter   Medication Sig Dispense Refill    amLODIPine (NORVASC) 5 mg tablet Take 1 tablet (5 mg total) by mouth 2 (two) times a day 60 tablet 0    aspirin (ASPIR-LOW) 81 mg EC tablet Daily      atorvastatin (LIPITOR) 40 mg tablet atorvastatin 40 mg tablet   TAKE ONE TABLET DAILY      escitalopram (LEXAPRO) 10 mg tablet Take 1 tablet (10 mg total) by mouth daily at bedtime 30 tablet 0    furosemide (LASIX) 80 mg tablet Take 1 tablet (80 mg total) by mouth 2 (two) times a day (Patient taking differently: Take 80 mg by mouth 2 (two) times a day Pt takes 40 mg in AM and20 mg PM) 60 tablet 0    hydrALAZINE (APRESOLINE) 50 mg tablet Take 1 tablet (50 mg total) by mouth every 8 (eight) hours 90 tablet 0    insulin detemir (LEVEMIR) 100 units/mL subcutaneous injection Inject 20 Units under the skin every 12 (twelve) hours 20 mL 0    insulin lispro (HumaLOG) 100 units/mL injection Inject 1-6 Units under the skin daily at bedtime 20 mL 0    insulin lispro (HumaLOG) 100 units/mL injection Inject 2-12 Units under the skin 3 (three) times a day before meals 20 mL 0    isosorbide mononitrate (IMDUR) 30 mg 24 hr tablet Take 1 tablet (30 mg total) by mouth daily (Patient not taking: Reported on 1/21/2021) 60 tablet 0    metoprolol tartrate (LOPRESSOR) 50 mg tablet Take 0 5 tablets (25 mg total) by mouth every 12 (twelve) hours 60 tablet 0    nitroglycerin (NITROSTAT) 0 4 mg SL tablet Place 1 tablet (0 4 mg total) under the tongue every 5 (five) minutes as needed for chest pain 30 tablet 0    polyethylene glycol (MIRALAX) 17 g packet Take 17 g by mouth daily as needed (Constipation) 14 each 0       No Known Allergies    Anticoagulants: ASA    Labs:   CBC with diff:   Lab Results   Component Value Date    WBC 15 56 (H) 01/25/2021    HGB 9 4 (L) 01/25/2021    HCT 30 1 (L) 01/25/2021     (H) 01/25/2021     01/25/2021    ADJUSTEDWBC 6 60 07/06/2016    MCH 32 2 01/25/2021    MCHC 31 2 (L) 01/25/2021    RDW 14 2 01/25/2021    MPV 10 5 01/25/2021    NRBC 0 01/25/2021     BMP/CMP:  Lab Results   Component Value Date     01/05/2016    K 4 9 01/25/2021    K 4 5 01/05/2016    CL 96 (L) 01/25/2021     01/05/2016    CO2 13 (L) 01/25/2021    CO2 19 (L) 01/24/2021    ANIONGAP 14 2 01/05/2016     (H) 01/25/2021    BUN 35 (H) 01/05/2016    CREATININE 4 97 (H) 01/25/2021    CREATININE 1 5 (H) 01/05/2016    GLUCOSE 232 (H) 01/24/2021    GLUCOSE 131 (H) 01/05/2016    CALCIUM 8 7 01/25/2021    CALCIUM 9 0 01/05/2016    AST 53 (H) 01/23/2021    AST 22 01/05/2016    ALT 71 01/23/2021    ALT 53 01/05/2016    ALKPHOS 106 01/23/2021    ALKPHOS 66 01/05/2016    PROT 10 2 (H) 01/05/2016    BILITOT 0 9 01/05/2016    EGFR 10 01/25/2021   ,     Coags:   Lab Results   Component Value Date    PTT 85 (H) 01/22/2021    INR 1 24 (H) 01/22/2021   ,   Results from last 7 days   Lab Units 01/22/21  0937 01/22/21  0254   PTT seconds 85* 34   INR   --  1 24*        Relevant imaging studies:   Reviewed  Directed physical examination:  I agree with the physical exam performed on 1/24/21 and there are no additional changes  Assessment/Plan: For nTDC placement  Sedation/Anesthesia plan:  Local sedation will be used as needed for procedure      Consent with alternatives to the procedure, risks and benefits have been explained and discussed with the patient/patient's family: yes

## 2021-01-25 NOTE — PROGRESS NOTES
Progress Note - Nephrology   Tianna Clements [de-identified] y o  male MRN: [id number]  Unit/Bed#: S -01 Encounter: [id number]    Assessment:  1  Acute kidney injury/acute renal failure:  Likely secondary to dye nephropathy  At this point the patient's creatinine has worsened I believe he is becoming symptomatic  He is not responding to the loop diuretic in terms of diuretic infusion and we need to initiate dialysis  Obtain consent earlier this morning for hemodialysis to occur  IR has been consulted for placement of a temporary dialysis catheter is I do not feel that the patient is going to tolerate having a tunneled catheter placed this at all believe he would tolerate sedation at this time given his tenuous pulmonary metabolic status  I obtained informed consent for hemodialysis  I stated that potential side effects of hemodialysis could be infection, disequilibrium, dizziness, hypotension and while unlikely, death  Patient was amenable to initiating hemodialysis  The plan would be doing 2 hours today and increasing slowly again to avoid dialysis disequilibrium  2  Stage 4 chronic kidney disease:  He is stage IV secondary to diabetic nephropathy and hypertensive nephrosclerosis  His  baseline creatinine seems to have been anywhere from 2-2 5 range  He follows with [facility]; Dr Shona Souza  Note that he does have a history of acute kidney injury requiring dialysis which resolved in August 2019      3  Acute on chronic heart failure with preserved ejection fraction:  The patient has not had a significant diuresis despite being on a Bumex infusion 4 mg an hours several doses Zaroxolyn 10 mg     4  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:  Hemoglobin is 9 4 some of this could be dilutional as well  5  Low bicarbonate level: The bicarbonate level is 13 I suspect that the patient is developing a high anion gap metabolic acidosis secondary to worsening acute kidney injury    Again planning for dialysis as above  Plan:  IR consulted for placement of temporary dialysis catheter  Plan for dialysis today  Subjective:   Patient seen in follow-up earlier this morning secondary to acute kidney injury on chronic kidney disease  The patient's creatinine had dramatically increased up to 4 9 with BUN as high as 150  The patient was noted to be conversationally dyspneic  He is noted to be increased fatigue  His oxygen levels are decreasing when he talks  The patient had been on a Bumex infusion but urine output was only 525 cc  Systolic blood pressure was 180/77 the patient is currently on 15 L mid flow nasal cannula  Objective:     Vitals: Blood pressure (!) 180/77, pulse 65, temperature 98 °F (36 7 °C), temperature source Oral, resp  rate (!) 23, SpO2 90 %  ,There is no height or weight on file to calculate BMI  Weight (last 2 days)     None            Intake/Output Summary (Last 24 hours) at 1/25/2021 1252  Last data filed at 1/25/2021 0500  Gross per 24 hour   Intake 120 ml   Output 450 ml   Net -330 ml            Physical Exam: General: patient is in mild distress  Skin:  No rash or redness  Eyes:  No scleral icterus  Neck:  Supple no adenopathy  Chest:  There is rales present  CVS:  S1-S2 do not detect a rub  Abdomen:  Positive bowel sounds  Extremities:   There is lower extremity swelling present  Neuro:  I do believe there is tremor with mild asterixis  Psych:  Patient is able to answer questions appropriately              Medications Prior to Admission   Medication    amLODIPine (NORVASC) 5 mg tablet    aspirin (ASPIR-LOW) 81 mg EC tablet    atorvastatin (LIPITOR) 40 mg tablet    escitalopram (LEXAPRO) 10 mg tablet    furosemide (LASIX) 80 mg tablet    hydrALAZINE (APRESOLINE) 50 mg tablet    insulin detemir (LEVEMIR) 100 units/mL subcutaneous injection    insulin lispro (HumaLOG) 100 units/mL injection    insulin lispro (HumaLOG) 100 units/mL injection    isosorbide mononitrate (IMDUR) 30 mg 24 hr tablet    metoprolol tartrate (LOPRESSOR) 50 mg tablet    nitroglycerin (NITROSTAT) 0 4 mg SL tablet    polyethylene glycol (MIRALAX) 17 g packet       Current Facility-Administered Medications   Medication Dose Route Frequency    acetaminophen (TYLENOL) tablet 650 mg  650 mg Oral Q6H PRN    aluminum-magnesium hydroxide-simethicone (MYLANTA) oral suspension 30 mL  30 mL Oral Q6H PRN    amLODIPine (NORVASC) tablet 5 mg  5 mg Oral BID    aspirin (ECOTRIN LOW STRENGTH) EC tablet 81 mg  81 mg Oral Daily    atorvastatin (LIPITOR) tablet 40 mg  40 mg Oral Daily With Dinner    benzonatate (TESSALON PERLES) capsule 100 mg  100 mg Oral TID PRN    bumetanide (BUMEX) 12 5 mg infusion 50 mL  4 mg/hr Intravenous Continuous    calcium carbonate (TUMS) chewable tablet 1,000 mg  1,000 mg Oral Daily PRN    cefepime (MAXIPIME) 2,000 mg in dextrose 5 % 50 mL IVPB  2,000 mg Intravenous Q24H    clopidogrel (PLAVIX) tablet 75 mg  75 mg Oral Daily    escitalopram (LEXAPRO) tablet 10 mg  10 mg Oral HS    guaiFENesin (MUCINEX) 12 hr tablet 1,200 mg  1,200 mg Oral Q12H [place]    heparin (porcine) subcutaneous injection 5,000 Units  5,000 Units Subcutaneous Q8H [address]    hydrALAZINE (APRESOLINE) tablet 50 mg  50 mg Oral Q8H [address]    HYDROmorphone (DILAUDID) injection 0 2 mg  0 2 mg Intravenous Q3H PRN    insulin detemir (LEVEMIR) subcutaneous injection 20 Units  20 Units Subcutaneous Q12H [place]    insulin lispro (HumaLOG) 100 units/mL subcutaneous injection 1-6 Units  1-6 Units Subcutaneous TID AC    insulin lispro (HumaLOG) 100 units/mL subcutaneous injection 1-6 Units  1-6 Units Subcutaneous HS    insulin lispro (HumaLOG) 100 units/mL subcutaneous injection 4 Units  4 Units Subcutaneous TID With Meals    metoprolol tartrate (LOPRESSOR) tablet 25 mg  25 mg Oral Q12H [place]    nitroglycerin (NITROSTAT) SL tablet 0 4 mg  0 4 mg Sublingual Q5 Min PRN    nystatin (MYCOSTATIN) oral suspension 500,000 Units  500,000 Units Swish & Swallow 4x Daily    ondansetron (ZOFRAN) injection 4 mg  4 mg Intravenous Q6H PRN    oxyCODONE (ROXICODONE) IR tablet 2 5 mg  2 5 mg Oral Q4H PRN    phenol (CHLORASEPTIC) 1 4 % mucosal liquid 1 spray  1 spray Mouth/Throat Q4H PRN    pneumococcal 13-valent conjugate vaccine (PREVNAR-13) IM injection 0 5 mL  0 5 mL Intramuscular Prior to discharge    polyethylene glycol (MIRALAX) packet 17 g  17 g Oral Daily PRN        Lab, Imaging and other studies: I have personally reviewed pertinent labs    CBC:   Lab Results   Component Value Date    WBC 15 56 (H) 01/25/2021    WBC 8 8 01/05/2016    RBC 2 92 (L) 01/25/2021    RBC 4 61 (L) 01/05/2016     CMP:   Lab Results   Component Value Date     01/05/2016    CL 96 (L) 01/25/2021     01/05/2016    CO2 13 (L) 01/25/2021    CO2 19 (L) 01/24/2021    ANIONGAP 14 2 01/05/2016     (H) 01/25/2021    BUN 35 (H) 01/05/2016    CREATININE 4 97 (H) 01/25/2021    CREATININE 1 5 (H) 01/05/2016    GLUCOSE 232 (H) 01/24/2021    GLUCOSE 131 (H) 01/05/2016    CALCIUM 8 7 01/25/2021    CALCIUM 9 0 01/05/2016    AST 53 (H) 01/23/2021    AST 22 01/05/2016    ALT 71 01/23/2021    ALT 53 01/05/2016    ALKPHOS 106 01/23/2021    ALKPHOS 66 01/05/2016    PROT 10 2 (H) 01/05/2016    BILITOT 0 9 01/05/2016    EGFR 10 01/25/2021     Phosphorus:   Lab Results   Component Value Date    PHOS 4 9 (H) 09/04/2019     Magnesium:   Lab Results   Component Value Date    MG 2 9 (H) 07/10/2019     Urinalysis:   Lab Results   Component Value Date    COLORU Yellow 07/08/2019    CLARITYU Clear 07/08/2019    CLARITYU YELLOW 01/05/2016    CLARITYU SL CLOUDY 01/05/2016    SPECGRAV 1 025 07/08/2019    SPECGRAV 1 025 01/05/2016    PHUR 5 0 07/08/2019    PHUR 5 5 02/14/2017    PHUR 6 0 01/05/2016    LEUKOCYTESUR Negative 07/08/2019    LEUKOCYTESUR TRACE (A) 01/05/2016    NITRITE Negative 07/08/2019    NITRITE POSITIVE (A) 01/05/2016    PROTEINUA 100 (A) 01/05/2016    GLUCOSEU Negative 07/08/2019    GLUCOSEU >=1000 01/05/2016    KETONESU Negative 07/08/2019    KETONESU NEGATIVE 01/05/2016    BILIRUBINUR Negative 07/08/2019    BILIRUBINUR NEGATIVE 01/05/2016    BLOODU Negative 07/08/2019    BLOODU LARGE (A) 01/05/2016     BMP:   Lab Results   Component Value Date    GLUCOSE 232 (H) 01/24/2021    GLUCOSE 131 (H) 01/05/2016    SODIUM 131 (L) 01/25/2021    CO2 13 (L) 01/25/2021    CO2 19 (L) 01/24/2021     (H) 01/25/2021    BUN 35 (H) 01/05/2016    CREATININE 4 97 (H) 01/25/2021    CREATININE 1 5 (H) 01/05/2016    CALCIUM 8 7 01/25/2021    CALCIUM 9 0 01/05/2016     ABGs:   Lab Results   Component Value Date    PH 7 342 (L) 01/24/2021

## 2021-01-25 NOTE — ASSESSMENT & PLAN NOTE
Lab Results   Component Value Date    EGFR 10 01/25/2021    EGFR 13 01/24/2021    EGFR 14 01/23/2021    CREATININE 4 97 (H) 01/25/2021    CREATININE 4 05 (H) 01/24/2021    CREATININE 3 93 (H) 01/23/2021     Continue to increase 4 05  Baseline creatinine around 2 3-2 5  Previously on HD in 2019  Nephrology on board   Most likely ATN post contrast for Cath  Nephrology on board  Received metolazone 10mg overnight  Continue Bumex drip  IR consulted for Temp cath placement  Possible dialysis today  Monitor kidney function and input and output

## 2021-01-25 NOTE — QUICK NOTE
Notified at sign out that patient having increasing O2 requirements was on 6L NC but sats dropping down to 86%  Pt was placed on MidFlow 10L now having SpO2 in 88-90% prior to my arrival  On exam pt was in no acute distress, tolerating Mid-Flow well  He states he was feeling much less sob with it on, but still not able to take a deep breath  General: ill-appearing, not in acute distress  HENT: NC/AT  Eyes: EOM intact, PERRL bilat  Cardio: RRR, no murmurs, rubs, or gallops  Lungs: Decreased respiratory effort, slight rales at bases, no wheezing  Abdomen: Soft, non-tender, bowel sounds normal, no guarding or rebound  MSK: +1 Pitting L>R lower ext  Neuro: No focal deficit present, AO X 3     Pt still with minimal urine output over past 24 hours at about 200 cc  Pt still on Bumex drip at 4 mg/hr  Dialysis was held today  Discussed case and increasing O2 requirements with On call Nephrologist, Dr Nupur Quispe  Recommended additional 10 mg metolazone now  Will re-visit dialysis in AM if needed  - Discussed case with Dr Lissette Corcoran, PGY-2      Quan Ferreira,   Family Medicine, PGY-1

## 2021-01-25 NOTE — ASSESSMENT & PLAN NOTE
· Leukocytosis stable at around 15  · COVID-19, influenza, RSV tests were negative  · Chest x-ray officially read as multifocal pneumonia  · Thus will continue to treat as hospital-acquired pneumonia with IV cefepime  · Blood cultures x2 pending   · Monitor procalcitonin and CBC with differential count  · Cough medicine/antitussive, lozenges and Chloraseptic spray p r n  [facility]

## 2021-01-25 NOTE — BRIEF OP NOTE (RAD/CATH)
INTERVENTIONAL RADIOLOGY PROCEDURE NOTE    Date: 1/25/2021    Procedure: nTDC placement  Preoperative diagnosis:   1  Stage 3b chronic kidney disease         Postoperative diagnosis: Same  Surgeon: Bev Brown MD     Assistant: None  No qualified resident was available  Blood loss: 5 ml    Specimens: None  Findings: RIJ nTDC ready for immediate use  Complications: None immediate      Anesthesia: local

## 2021-01-25 NOTE — ASSESSMENT & PLAN NOTE
Lab Results   Component Value Date    HGBA1C 6 7 (H) 07/03/2019       Recent Labs     01/24/21  1505 01/24/21  2053 01/25/21  0711 01/25/21  1111   POCGLU 255* 273* 217* 179*       Blood Sugar Average: Last 72 hrs:  (P) 012 [id number]   Still poorly controlled  Continue Humalog sliding scale with Accu-Cheks q a c  And HS  Patient already on Levemir  We will add Humalog 3 times a day with meals  Adjust treatment accordingly

## 2021-01-25 NOTE — PHYSICAL THERAPY NOTE
PHYSICAL THERAPY CANCELLATION NOTE    Patient Name: Danish Nur  [id number] Date: 1/25/2021 01/25/21 0908   PT Last Visit   PT Visit Date 01/25/21   Note Type   Cancel Reasons Medical status  (Respiratory Status)   Assessment   Assessment PT orders received, chart review performed  Spoke w/ SEYMOUR Ayala who recommends that pt is not appropriate to paticipate in PT evaluation due to respiratory status and increasing O2 requirements  Pt is presently desating to mid [de-identified] on midflow while asleep in bed upon therapist's observation   PT will continue to follow as appropriate and as schedule allows to perform PT evaluation     Patrick Pepper, PT, DPT

## 2021-01-25 NOTE — ASSESSMENT & PLAN NOTE
· Cardiologist and nephrologist on board  · Continue Bumex drip  · Continue cardiovascular and heart failure medications  · Monitor input and output  · Daily weights    · Increasing oxygen demand, currently on High Flow

## 2021-01-25 NOTE — PROGRESS NOTES
Progress Note - Marky Ruiz 1940, [de-identified] y o  male MRN: [id number]    Unit/Bed#: S -01 Encounter: [id number]    Primary Care Provider: Laure Ahumada MD   Date and time admitted to hospital: 1/22/2021 12:28 PM        * NSTEMI (non-ST elevated myocardial infarction) [facility])  Assessment & Plan  Type 1 MI  Presented with chest pain at [facility]   Hx of CAD s/p stenting and CABG 2016  Received ASA 324mg at home and SL nitro x1 in the ER  Troponin peaked at 5 5  Continue cardiovascular medications with aspirin, Plavix, statin, beta-blocker and Imdur  Cardiac Cath Completed at [facility]   - three-vessel disease with 99% occluded proximal circumflex, 50% distal left main, mid % occluded, RCA proximally 100% occluded with collaterals from left circulation  Patient has his patent grafts of LIMA to LAD and SVG to OM2  Echo complete - reveals 60% EF      Oral candida  Assessment & Plan  · Nystatin mouthwash  Hyponatremia  Assessment & Plan  · Likely due to hypervolemia/CHF  · Nephrology on board  · Monitor  · Continue Bumex drip  Anemia  Assessment & Plan  · Monitor  · No active bleeding  · For further workup and management if this worsens significantly    High anion gap metabolic acidosis  Assessment & Plan    Previously with slight high anion gap metabolic acidosis  Possibly secondary to CKD  Previously on bicarb drip, this was discontinued  Still with metabolic acidosis and worsening kidney function  Nephrology on board  Continue Bumex drip  Acute on chronic diastolic congestive heart failure [facility])  Assessment & Plan  · Cardiologist and nephrologist on board  · Continue Bumex drip  · Continue cardiovascular and heart failure medications  · Monitor input and output  · Daily weights    · Increasing oxygen demand, currently on High Flow    Hx of CABG  Assessment & Plan  CAD s/p CABG in 2016; history of stenting prior to CABG (LIMA to LAD and SVG to OM)  Status post cardiac catheterization: significant triple vessel coronary artery disease  Patient has his patent grafts of LIMA to LAD and SVG to OM2  Continue cardiovascular meds  Leukocytosis  Assessment & Plan  · Leukocytosis stable at around 15  · COVID-19, influenza, RSV tests were negative  · Chest x-ray officially read as multifocal pneumonia  · Thus will continue to treat as hospital-acquired pneumonia with IV cefepime  · Blood cultures x2 pending   · Monitor procalcitonin and CBC with differential count  · Cough medicine/antitussive, lozenges and Chloraseptic spray p r n       Diabetes mellitus [facility])  Assessment & Plan  Lab Results   Component Value Date    HGBA1C 6 7 (H) 07/03/2019       Recent Labs     01/24/21  1505 01/24/21  2053 01/25/21  0711 01/25/21  1111   POCGLU 255* 273* 217* 179*       Blood Sugar Average: Last 72 hrs:  (P) 929 [id number]   Still poorly controlled  Continue Humalog sliding scale with Accu-Cheks q a c  And HS  Patient already on Levemir  We will add Humalog 3 times a day with meals  Adjust treatment accordingly  Acute renal failure superimposed on stage 3 chronic kidney disease [facility])  Assessment & Plan  Lab Results   Component Value Date    EGFR 10 01/25/2021    EGFR 13 01/24/2021    EGFR 14 01/23/2021    CREATININE 4 97 (H) 01/25/2021    CREATININE 4 05 (H) 01/24/2021    CREATININE 3 93 (H) 01/23/2021     Continue to increase 4 05  Baseline creatinine around 2 3-2 5  Previously on HD in 2019  Nephrology on board   Most likely ATN post contrast for Cath  Nephrology on board  Received metolazone 10mg overnight  Continue Bumex drip  IR consulted for Temp cath placement  Possible dialysis today  Monitor kidney function and input and output          VTE Pharmacologic Prophylaxis:   Pharmacologic: Heparin  Mechanical VTE Prophylaxis in Place: Yes    Discussions with Specialists or Other Care Team Provider: Hospitalist     Education and Discussions with Family / Patient: Patient    Current Length of Stay: 3 day(s)    Current Patient Status: Inpatient     Discharge Plan / Estimated Discharge Date: TBD    Code Status: Level 1 - Full Code      Subjective:   Patient was seen at bedside in respiratory distress  Patient was on mid flow 15L with saturations of 88%  States he does not feel short of breath  Patient to be transitioned to High Flow  We discussed need for dialysis  Objective:     Vitals:   Temp (24hrs), Av 1 °F (36 7 °C), Min:98 °F (36 7 °C), Max:98 2 °F (36 8 °C)    Temp:  [98 °F (36 7 °C)-98 2 °F (36 8 °C)] 98 °F (36 7 °C)  HR:  [65-78] 65  Resp:  [12-23] 23  BP: (130-180)/(62-77) 180/77  SpO2:  [87 %-94 %] 90 %  There is no height or weight on file to calculate BMI  Input and Output Summary (last 24 hours): Intake/Output Summary (Last 24 hours) at 2021 1300  Last data filed at 2021 0500  Gross per 24 hour   Intake 120 ml   Output 450 ml   Net -330 ml       Physical Exam:     Physical Exam  Vitals signs reviewed  Constitutional:       General: He is not in acute distress  Appearance: He is well-developed  He is not ill-appearing or toxic-appearing  HENT:      Head: Normocephalic and atraumatic  Eyes:      General: No scleral icterus  Right eye: No discharge  Left eye: No discharge  Conjunctiva/sclera: Conjunctivae normal    Cardiovascular:      Rate and Rhythm: Normal rate and regular rhythm  Heart sounds: Normal heart sounds  No murmur  Pulmonary:      Effort: Respiratory distress present  Breath sounds: Wheezing and rhonchi present  Abdominal:      General: Bowel sounds are normal  There is no distension  Palpations: Abdomen is soft  Tenderness: There is no abdominal tenderness  Musculoskeletal: Normal range of motion  General: No tenderness  Skin:     General: Skin is warm  Findings: No erythema or rash        Comments:      Neurological:      Mental Status: He is alert       Motor: No weakness  Psychiatric:         Behavior: Behavior normal              Additional Data:     Labs:    Results from last 7 days   Lab Units 01/25/21  0459   WBC Thousand/uL 15 56*   HEMOGLOBIN g/dL 9 4*   HEMATOCRIT % 30 1*   PLATELETS Thousands/uL 156   NEUTROS PCT % 89*   LYMPHS PCT % 4*   MONOS PCT % 6   EOS PCT % 0     Results from last 7 days   Lab Units 01/25/21  0459  01/24/21  0019  01/23/21  0530   POTASSIUM mmol/L 4 9   < >  --    < > 5 1   CHLORIDE mmol/L 96*   < >  --    < > 102   CO2 mmol/L 13*   < >  --    < > 19*   CO2, I-STAT mmol/L  --   --  19*  --   --    BUN mg/dL 150*   < >  --    < > 97*   CREATININE mg/dL 4 97*   < >  --    < > 2 89*   CALCIUM mg/dL 8 7   < >  --    < > 8 7   ALK PHOS U/L  --   --   --   --  106   ALT U/L  --   --   --   --  71   AST U/L  --   --   --   --  53*   GLUCOSE, ISTAT mg/dl  --   --  232*  --   --     < > = values in this interval not displayed  Results from last 7 days   Lab Units 01/22/21  0254   INR  1 24*       * I Have Reviewed All Lab Data Listed Above  * Additional Pertinent Lab Tests Reviewed: All Labs Within Last 24 Hours Reviewed    Imaging:    Imaging Reports Reviewed Today Include: Echo  Imaging Personally Reviewed by Myself Includes:  Echo    Recent Cultures (last 7 days):     Results from last 7 days   Lab Units 01/24/21  1050   BLOOD CULTURE  Received in Microbiology Lab  Culture in Progress  Received in Microbiology Lab  Culture in Progress         Last 24 Hours Medication List:   Current Facility-Administered Medications   Medication Dose Route Frequency Provider Last Rate    acetaminophen  650 mg Oral Q6H PRN Leodan Shah MD      aluminum-magnesium hydroxide-simethicone  30 mL Oral Q6H PRN TABITHA Jones      amLODIPine  5 mg Oral BID Leodan Shah MD      aspirin  81 mg Oral Daily Leodan Shah MD      atorvastatin  40 mg Oral Daily With Guy Olson MD      benzonatate  100 mg Oral TID PRN TABITHA Hughes      bumetanide  4 mg/hr Intravenous Continuous Barney Pasrosanne PA-C 4 mg/hr (01/25/21 0740)    calcium carbonate  1,000 mg Oral Daily PRN Dewayne Giron MD      cefepime  2,000 mg Intravenous Q24H Faye Correa MD 2,000 mg (01/25/21 1116)    clopidogrel  75 mg Oral Daily Dewayne Giron MD      escitalopram  10 mg Oral HS Dewayne Giron MD      guaiFENesin  1,200 mg Oral Q12H [facility] TABITHA Hughes      heparin (porcine)  5,000 Units Subcutaneous Q8H [facility] Faye Correa MD      hydrALAZINE  50 mg Oral Q8H [facility] Dewayne Giron MD      HYDROmorphone  0 2 mg Intravenous Q3H PRN Faye Correa MD      insulin detemir  20 Units Subcutaneous Q12H [facility] Dewayne Giron MD      insulin lispro  1-6 Units Subcutaneous TID AC Dewayne Giron MD      insulin lispro  1-6 Units Subcutaneous HS Dewayne Giron MD      insulin lispro  4 Units Subcutaneous TID With Meals Faye Correa MD      lidocaine 1% buffered   Infiltration Code/Trauma/Sedation Med Sterling Rosenbaum MD      metoprolol tartrate  25 mg Oral Q12H [address], MD      nitroglycerin  0 4 mg Sublingual Q5 Min PRN Dewayne Giron MD      nystatin  500,000 Units Swish & Swallow 4x Daily Faye Correa MD      ondansetron  4 mg Intravenous Q6H PRN Dewayne Giron MD      oxyCODONE  2 5 mg Oral Q4H PRN Faye Correa MD      phenol  1 spray Mouth/Throat Q4H PRN Faye Correa MD      pneumococcal 13-valent conjugate vaccine  0 5 mL Intramuscular Prior to discharge Dewayne Giron MD      polyethylene glycol  17 g Oral Daily PRN Dewayne Giron MD          Today, Patient Was Seen By: Samanta Potter MD    ** Please Note: This note has been constructed using a voice recognition system   **

## 2021-01-25 NOTE — ASSESSMENT & PLAN NOTE
Type 1 MI  Presented with chest pain at [facility]   Hx of CAD s/p stenting and CABG 2016  Received ASA 324mg at home and SL nitro x1 in the ER  Troponin peaked at 5 5  Continue cardiovascular medications with aspirin, Plavix, statin, beta-blocker and Imdur  Cardiac Cath Completed at [facility]   - three-vessel disease with 99% occluded proximal circumflex, 50% distal left main, mid % occluded, RCA proximally 100% occluded with collaterals from left circulation  Patient has his patent grafts of LIMA to LAD and SVG to OM2    Echo complete - reveals 60% EF

## 2021-01-25 NOTE — PROGRESS NOTES
General Cardiology   Progress Note -  Team One   Ammy Reinoso [de-identified] y o  male MRN: [id number]    Unit/Bed#: S -01 Encounter: [id number]    Assessment:    1  Type 2 MI/CAD: s/p CABG x2 with LIMA-LAD, SVG-OM1 in 2016  Presented with chest pain with peak troponin 1 5  · Underwent cardiac catheterization 1/22 with patent LIMA-LAD, 40% stenosis of SVG-OM1  Medical management recommended  · Currently on Aspirin, Plavix (added this admission), statin, beta-blocker   2  Acute on chronic HFpEF:  Currently on Bumex infusion currently at 4 mg/hr with multiple doses of Zaroxolyn 10 mg  Output 24 hours -405 mL  Net output -715 mL  3  Acute renal failure on CKD 3:  Baseline creatinine 2 3-2 5  Creatinine 4 05 post catheterization secondary to ATN  Nephrology following  Currently on Bumex infusion with multiple doses of Zaroxolyn with continued oliguria  Creatinine 4 05 yesterday worsened to 4 97 today  4  Essential hypertension:  Average /69 on amlodipine 5 mg BID, Lopressor 25 mg BID, hydralazine 50 mg TID and Bumex infusion  5  Hyperlipidemia: TC 92, , HDL 27, LDL 38 on atorvastatin 40 mg daily  6  Diabetes mellitus:  Hemoglobin A1c 6 7  Management per primary team   7  Oral Candida:  Ordered for Nystatin mouthwash  8  Hyponatremia:  Likely due to hypervolemia  Plan/Recommendations:  · Patient getting temp cath today for dialysis per discussion with Nephrology  · Volume management per Nephrology recommendations  · Continue current cardiac medications    _______________________________________________________________________    Subjective    Patient seen and examined  Last evening had increasing oxygen requirements was placed on 10 L mid flow  Per discussion with on-call nephrologist was given an additional 10 mg metolazone  Patient currently feels very tired with chills  He denies any chest pain  His breathing feels better on the mid-flow but still desatting with conversation      Review of Systems   Constitution: Positive for chills and malaise/fatigue  Cardiovascular: Positive for leg swelling  Negative for chest pain, dyspnea on exertion, near-syncope, orthopnea, palpitations, paroxysmal nocturnal dyspnea and syncope  Respiratory: Positive for shortness of breath  Negative for cough and wheezing  Endocrine: Negative  Hematologic/Lymphatic: Negative  Skin: Negative  Musculoskeletal: Negative  Gastrointestinal: Positive for bloating  Negative for diarrhea, nausea and vomiting  Genitourinary:        Oliguria   Neurological: Negative for dizziness, light-headedness and weakness  Psychiatric/Behavioral: Negative  Negative for altered mental status  All other systems reviewed and are negative  Objective:   Vitals: Blood pressure (!) 180/77, pulse 65, temperature 98 °F (36 7 °C), temperature source Oral, resp  rate (!) 23, SpO2 90 %  ,     Wt Readings from Last 3 Encounters:   21 99 6 kg (219 lb 9 3 oz)   07/15/19 99 5 kg (219 lb 5 7 oz)   16 99 4 kg (219 lb 2 2 oz)        Lab Results   Component Value Date    CREATININE 4 97 (H) 2021    CREATININE 4 05 (H) 2021    CREATININE 3 93 (H) 2021         There is no height or weight on file to calculate BMI ,     Systolic (30MKH), IOU:166 , Min:130 , MKN:118     Diastolic (59XCU), JD, Min:62, Max:77          Intake/Output Summary (Last 24 hours) at 2021 1107  Last data filed at 2021 0500  Gross per 24 hour   Intake 120 ml   Output 450 ml   Net -330 ml     Weight (last 2 days)     None        Telemetry Review: No significant arrhythmias seen on telemetry review  Physical Exam  Vitals signs and nursing note reviewed  Constitutional:       General: He is not in acute distress  Appearance: He is well-developed  He is obese  He is ill-appearing  Comments: Patient 12 L mid flow with visible chills no acute distress   HENT:      Head: Normocephalic and atraumatic     Neck: Musculoskeletal: Normal range of motion and neck supple  Vascular: JVD present  Cardiovascular:      Rate and Rhythm: Normal rate  Rhythm irregular  Heart sounds: Normal heart sounds  No murmur  No friction rub  No gallop  Pulmonary:      Effort: Pulmonary effort is normal  No respiratory distress  Breath sounds: Rales present  No wheezing  Chest:      Chest wall: No tenderness  Abdominal:      General: Bowel sounds are normal  There is distension  Palpations: Abdomen is soft  Tenderness: There is no abdominal tenderness  Musculoskeletal: Normal range of motion  General: No tenderness  Right lower leg: Edema present  Left lower leg: Edema present  Skin:     General: Skin is warm and dry  Coloration: Skin is not pale  Findings: No erythema  Neurological:      Mental Status: He is alert and oriented to person, place, and time  Psychiatric:         Behavior: Behavior normal          Thought Content:  Thought content normal          Judgment: Judgment normal          LABORATORY RESULTS  Results from last 7 days   Lab Units 01/22/21  0937 01/22/21  0623 01/22/21  0104   TROPONIN I ng/mL 7 37* 5 59* 1 58*     CBC with diff:   Results from last 7 days   Lab Units 01/25/21  0459 01/24/21  0342 01/24/21  0019 01/23/21  0530 01/22/21  0622 01/22/21  0254 01/21/21  2213 01/21/21  1812   WBC Thousand/uL 15 56* 15 54*  --  14 93* 8 83 9 99  --  14 01*   HEMOGLOBIN g/dL 9 4* 9 6*  --  9 6* 10 0* 10 1*  --  11 6*   I STAT HEMOGLOBIN g/dl  --   --  10 5*  --   --   --   --   --    HEMATOCRIT % 30 1* 30 8*  --  30 4* 34 0* 32 3*  --  35 9*   HEMATOCRIT, ISTAT %  --   --  31*  --   --   --   --   --    MCV fL 103* 102*  --  101* 107* 101*  --  97   PLATELETS Thousands/uL 156 149  --  152 134* 133* 137* 177   MCH pg 32 2 31 8  --  31 8 31 3 31 6  --  31 4   MCHC g/dL 31 2* 31 2*  --  31 6 29 4* 31 3*  --  32 3   RDW % 14 2 14 3  --  14 6 14 1 14 0  --  13 7   MPV fL 10 5 10 4  --  11 0 10 6 10 3 9 8 10 6   NRBC AUTO /100 WBCs 0 0  --   --  0  --   --  0       CMP:  Results from last 7 days   Lab Units 01/25/21 0459 01/24/21 0335 01/24/21 0019 01/23/21 2254 01/23/21  0530 01/22/21  0622 01/21/21  1812   POTASSIUM mmol/L 4 9 5 2  --  5 3 5 1 4 0 4 1   CHLORIDE mmol/L 96* 98*  --  97* 102 105 104   CO2 mmol/L 13* 18*  --  17* 19* 16* 20*   CO2, I-STAT mmol/L  --   --  19*  --   --   --   --    BUN mg/dL 150* 122*  --  120* 97* 76* 70*   CREATININE mg/dL 4 97* 4 05*  --  3 93* 2 89* 2 32* 2 35*   GLUCOSE, ISTAT mg/dl  --   --  232*  --   --   --   --    CALCIUM mg/dL 8 7 8 6  --  8 5 8 7 8 4 8 7   AST U/L  --   --   --   --  53*  --  50*   ALT U/L  --   --   --   --  71  --  90*   ALK PHOS U/L  --   --   --   --  106  --  136*   EGFR ml/min/1 73sq m 10 13  --  14 20 26 25       BMP:  Results from last 7 days   Lab Units 01/25/21 0459 01/24/21 0335 01/24/21 0019 01/23/21 2254 01/23/21  0530 01/22/21  0622 01/21/21  1812   POTASSIUM mmol/L 4 9 5 2  --  5 3 5 1 4 0 4 1   CHLORIDE mmol/L 96* 98*  --  97* 102 105 104   CO2 mmol/L 13* 18*  --  17* 19* 16* 20*   CO2, I-STAT mmol/L  --   --  19*  --   --   --   --    BUN mg/dL 150* 122*  --  120* 97* 76* 70*   CREATININE mg/dL 4 97* 4 05*  --  3 93* 2 89* 2 32* 2 35*   GLUCOSE, ISTAT mg/dl  --   --  232*  --   --   --   --    CALCIUM mg/dL 8 7 8 6  --  8 5 8 7 8 4 8 7       Lab Results   Component Value Date    NTBNP 25,215 (H) 01/23/2021    NTBNP 2,452 (H) 07/03/2019    NTBNP 1,637 (H) 12/04/2016                               Results from last 7 days   Lab Units 01/22/21  0254   INR  1 24*       Lipid Profile:   Lab Results   Component Value Date    CHOL 158 01/05/2016     Lab Results   Component Value Date    HDL 27 (L) 01/21/2021    HDL 34 (L) 02/14/2017    HDL 31 (L) 07/12/2016     Lab Results   Component Value Date    LDLCALC 38 01/21/2021    LDLCALC 57 02/14/2017    LDLCALC 113 (H) 07/12/2016     Lab Results   Component Value Date    TRIG 137 2021    TRIG 127 2017    TRIG 175 (H) 2016       Cardiac testing:   Results for orders placed during the hospital encounter of 21   Echo complete with contrast if indicated    Narrative [address], [address] [facility]  [phone]    Transthoracic Echocardiogram  2D, M-mode, Doppler, and Color Doppler    Study date:  2021    Patient: Terri Churchill  MR number: [id number]  Account number: [de-identified]  : [id number]  Age: [de-identified] years  Gender: Male  Status: Inpatient  Location: Bedside  Height: 73 in  Weight: 218 9 lb  BP: 146/ 73 mmHg    Indications: CAD    Diagnoses: I21 4 - Non-ST elevation (NSTEMI) myocardial infarction, I25 10 - Atherosclerotic heart disease of native coronary artery without angina pectoris    Sonographer:  ROHINI Thompson  Primary Physician:  Arvind Lentz MD  Referring Physician:  Radha Marte MD  Group:  [facility]  Interpreting Physician:  Sara Villa MD    SUMMARY    LEFT VENTRICLE:  Systolic function was normal  Ejection fraction was estimated to be 60 %  There was hypokinesis of the basal inferior wall(s)  Wall thickness was moderately increased  Features were consistent with a pseudonormal left ventricular filling pattern, with concomitant abnormal relaxation and increased filling pressure (grade 2 diastolic dysfunction)  LEFT ATRIUM:  The atrium was mildly dilated  MITRAL VALVE:  There was mild annular calcification  There was moderate regurgitation  AORTIC VALVE:  Transaortic velocity was increased due to valvular stenosis  There was mild stenosis  TRICUSPID VALVE:  There was mild regurgitation  Estimated peak PA pressure was 45 mmHg  PULMONIC VALVE:  There was mild regurgitation  HISTORY: PRIOR HISTORY: NSTEMI; Hypercholesterol; CKD; DM; Elevated troponin; CCAD; CABG; HTN    PROCEDURE: The procedure was performed at the bedside   This was a routine study  The transthoracic approach was used  The study included complete 2D imaging, M-mode, complete spectral Doppler, and color Doppler  The heart rate was 68 bpm,  at the start of the study  Images were obtained from the parasternal, apical, subcostal, and suprasternal notch acoustic windows  Echocardiographic views were limited due to restricted patient mobility and poor patient compliance  This was  a technically difficult study  LEFT VENTRICLE: Size was normal  Systolic function was normal  Ejection fraction was estimated to be 60 %  There was hypokinesis of the basal inferior wall(s)  Wall thickness was moderately increased  No evidence of apical thrombus  DOPPLER: Features were consistent with a pseudonormal left ventricular filling pattern, with concomitant abnormal relaxation and increased filling pressure (grade 2 diastolic dysfunction)  RIGHT VENTRICLE: The size was normal  Systolic function was normal  Wall thickness was normal     LEFT ATRIUM: The atrium was mildly dilated  RIGHT ATRIUM: Size was normal     MITRAL VALVE: There was mild annular calcification  Valve structure was normal  There was normal leaflet separation  DOPPLER: The transmitral velocity was within the normal range  There was no evidence for stenosis  There was moderate  regurgitation  AORTIC VALVE: The valve was trileaflet  Leaflets exhibited mild to moderate calcification and mildly reduced cuspal separation  DOPPLER: Transaortic velocity was increased due to valvular stenosis  There was mild stenosis  There was no  significant regurgitation  TRICUSPID VALVE: The valve structure was normal  There was normal leaflet separation  DOPPLER: The transtricuspid velocity was within the normal range  There was no evidence for stenosis  There was mild regurgitation  Estimated peak PA  pressure was 45 mmHg  PULMONIC VALVE: Leaflets exhibited normal thickness, no calcification, and normal cuspal separation   DOPPLER: The transpulmonic velocity was within the normal range  There was mild regurgitation  PERICARDIUM: There was no pericardial effusion  AORTA: The root exhibited normal size  SYSTEMIC VEINS: IVC: The inferior vena cava was normal in size  SYSTEM MEASUREMENT TABLES    2D  %FS: 39 29 %  Ao Diam: 3 53 cm  Ao asc: 3 86 cm  EDV(Teich): 93 32 ml  EF(Teich): 69 91 %  ESV(Teich): 28 08 ml  IVSd: 1 44 cm  LA Area: 20 85 cm2  LA Diam: 4 71 cm  LVEDV MOD A4C: 122 74 ml  LVEF MOD A4C: 77 19 %  LVESV MOD A4C: 28 ml  LVIDd: 4 52 cm  LVIDs: 2 74 cm  LVLd A4C: 9 07 cm  LVLs A4C: 7 39 cm  LVOT Diam: 2 02 cm  LVPWd: 1 32 cm  RA Area: 14 38 cm2  RVIDd: 3 78 cm  SV MOD A4C: 94 74 ml  SV(Teich): 65 24 ml    CW  AV Env  Ti: 249 29 ms  AV MaxP 56 mmHg  AV VTI: 32 94 cm  AV Vmax: 1 91 m/s  AV Vmean: 1 32 m/s  AV meanP 04 mmHg  TR MaxP 43 mmHg  TR Vmax: 3 22 m/s    MM  TAPSE: 1 39 cm    PW  VIRAL (VTI): 1 06 cm2  VIRAL Vmax: 1 45 cm2  E' Sept: 0 09 m/s  E/E' Sept: 13 84  LVOT Env  Ti: 230 26 ms  LVOT VTI: 10 81 cm  LVOT Vmax: 0 86 m/s  LVOT Vmean: 0 5 m/s  LVOT maxP 95 mmHg  LVOT meanP 26 mmHg  LVSV Dopp: 34 8 ml  MV A Darvin: 0 75 m/s  MV Dec Jerome: 5 1 m/s2  MV DecT: 233 34 ms  MV E Darvin: 1 19 m/s  MV E/A Ratio: 1 59  MV PHT: 67 67 ms  MVA By PHT: 3 25 cm2    [employer] Accredited Echocardiography Laboratory    Prepared and electronically signed by    Beena Peters MD  Signed 2021 07:58:05       No results found for this or any previous visit  No results found for this or any previous visit  No procedure found  No results found for this or any previous visit        Meds/Allergies   all current active meds have been reviewed, current meds:   Current Facility-Administered Medications   Medication Dose Route Frequency    acetaminophen (TYLENOL) tablet 650 mg  650 mg Oral Q6H PRN    aluminum-magnesium hydroxide-simethicone (MYLANTA) oral suspension 30 mL  30 mL Oral Q6H PRN    amLODIPine (NORVASC) tablet 5 mg  5 mg Oral BID    aspirin (ECOTRIN LOW STRENGTH) EC tablet 81 mg  81 mg Oral Daily    atorvastatin (LIPITOR) tablet 40 mg  40 mg Oral Daily With Dinner    benzonatate (TESSALON PERLES) capsule 100 mg  100 mg Oral TID PRN    bumetanide (BUMEX) 12 5 mg infusion 50 mL  4 mg/hr Intravenous Continuous    calcium carbonate (TUMS) chewable tablet 1,000 mg  1,000 mg Oral Daily PRN    cefepime (MAXIPIME) 2,000 mg in dextrose 5 % 50 mL IVPB  2,000 mg Intravenous Q24H    clopidogrel (PLAVIX) tablet 75 mg  75 mg Oral Daily    escitalopram (LEXAPRO) tablet 10 mg  10 mg Oral HS    guaiFENesin (MUCINEX) 12 hr tablet 1,200 mg  1,200 mg Oral Q12H [place]    heparin (porcine) subcutaneous injection 5,000 Units  5,000 Units Subcutaneous Q8H [facility]    hydrALAZINE (APRESOLINE) tablet 50 mg  50 mg Oral Q8H [facility]    HYDROmorphone (DILAUDID) injection 0 2 mg  0 2 mg Intravenous Q3H PRN    insulin detemir (LEVEMIR) subcutaneous injection 20 Units  20 Units Subcutaneous Q12H [place]    insulin lispro (HumaLOG) 100 units/mL subcutaneous injection 1-6 Units  1-6 Units Subcutaneous TID AC    insulin lispro (HumaLOG) 100 units/mL subcutaneous injection 1-6 Units  1-6 Units Subcutaneous HS    insulin lispro (HumaLOG) 100 units/mL subcutaneous injection 4 Units  4 Units Subcutaneous TID With Meals    metoprolol tartrate (LOPRESSOR) tablet 25 mg  25 mg Oral Q12H [place]    nitroglycerin (NITROSTAT) SL tablet 0 4 mg  0 4 mg Sublingual Q5 Min PRN    nystatin (MYCOSTATIN) oral suspension 500,000 Units  500,000 Units Swish & Swallow 4x Daily    ondansetron (ZOFRAN) injection 4 mg  4 mg Intravenous Q6H PRN    oxyCODONE (ROXICODONE) IR tablet 2 5 mg  2 5 mg Oral Q4H PRN    phenol (CHLORASEPTIC) 1 4 % mucosal liquid 1 spray  1 spray Mouth/Throat Q4H PRN    pneumococcal 13-valent conjugate vaccine (PREVNAR-13) IM injection 0 5 mL  0 5 mL Intramuscular Prior to discharge    polyethylene glycol (MIRALAX) packet 17 g  17 g Oral Daily PRN and PTA meds:   Prior to Admission Medications   Prescriptions Last Dose Informant Patient Reported? Taking?    amLODIPine (NORVASC) 5 mg tablet   No No   Sig: Take 1 tablet (5 mg total) by mouth 2 (two) times a day   aspirin (ASPIR-LOW) 81 mg EC tablet   Yes No   Sig: Daily   atorvastatin (LIPITOR) 40 mg tablet   Yes No   Sig: atorvastatin 40 mg tablet   TAKE ONE TABLET DAILY   escitalopram (LEXAPRO) 10 mg tablet   No No   Sig: Take 1 tablet (10 mg total) by mouth daily at bedtime   furosemide (LASIX) 80 mg tablet   No No   Sig: Take 1 tablet (80 mg total) by mouth 2 (two) times a day   Patient taking differently: Take 80 mg by mouth 2 (two) times a day Pt takes 40 mg in AM and20 mg PM   hydrALAZINE (APRESOLINE) 50 mg tablet   No No   Sig: Take 1 tablet (50 mg total) by mouth every 8 (eight) hours   insulin detemir (LEVEMIR) 100 units/mL subcutaneous injection   No No   Sig: Inject 20 Units under the skin every 12 (twelve) hours   insulin lispro (HumaLOG) 100 units/mL injection   No No   Sig: Inject 1-6 Units under the skin daily at bedtime   insulin lispro (HumaLOG) 100 units/mL injection   No No   Sig: Inject 2-12 Units under the skin 3 (three) times a day before meals   isosorbide mononitrate (IMDUR) 30 mg 24 hr tablet   No No   Sig: Take 1 tablet (30 mg total) by mouth daily   Patient not taking: Reported on 1/21/2021   metoprolol tartrate (LOPRESSOR) 50 mg tablet   No No   Sig: Take 0 5 tablets (25 mg total) by mouth every 12 (twelve) hours   nitroglycerin (NITROSTAT) 0 4 mg SL tablet   No No   Sig: Place 1 tablet (0 4 mg total) under the tongue every 5 (five) minutes as needed for chest pain   polyethylene glycol (MIRALAX) 17 g packet   No No   Sig: Take 17 g by mouth daily as needed (Constipation)      Facility-Administered Medications: None     Medications Prior to Admission   Medication    amLODIPine (NORVASC) 5 mg tablet    aspirin (ASPIR-LOW) 81 mg EC tablet    atorvastatin (LIPITOR) 40 mg tablet    escitalopram (LEXAPRO) 10 mg tablet    furosemide (LASIX) 80 mg tablet    hydrALAZINE (APRESOLINE) 50 mg tablet    insulin detemir (LEVEMIR) 100 units/mL subcutaneous injection    insulin lispro (HumaLOG) 100 units/mL injection    insulin lispro (HumaLOG) 100 units/mL injection    isosorbide mononitrate (IMDUR) 30 mg 24 hr tablet    metoprolol tartrate (LOPRESSOR) 50 mg tablet    nitroglycerin (NITROSTAT) 0 4 mg SL tablet    polyethylene glycol (MIRALAX) 17 g packet       bumetanide, 4 mg/hr, Last Rate: 4 mg/hr (01/25/21 3666)        Counseling / Coordination of Care  Total floor / unit time spent today 20 minutes  Greater than 50% of total time was spent with the patient and / or family counseling and / or coordination of care  ** Please Note: Dragon 360 Dictation voice to text software may have been used in the creation of this document   **

## 2021-01-26 ENCOUNTER — APPOINTMENT (INPATIENT)
Dept: DIALYSIS | Facility: HOSPITAL | Age: 81
DRG: 280 | End: 2021-01-26
Payer: COMMERCIAL

## 2021-01-26 LAB
ANION GAP SERPL CALCULATED.3IONS-SCNC: 18 MMOL/L (ref 4–13)
BASOPHILS # BLD AUTO: 0.01 THOUSANDS/ΜL (ref 0–0.1)
BASOPHILS NFR BLD AUTO: 0 % (ref 0–1)
BUN SERPL-MCNC: 136 MG/DL (ref 5–25)
CALCIUM SERPL-MCNC: 8.2 MG/DL (ref 8.3–10.1)
CHLORIDE SERPL-SCNC: 97 MMOL/L (ref 100–108)
CO2 SERPL-SCNC: 19 MMOL/L (ref 21–32)
CREAT SERPL-MCNC: 4.46 MG/DL (ref 0.6–1.3)
EOSINOPHIL # BLD AUTO: 0 THOUSAND/ΜL (ref 0–0.61)
EOSINOPHIL NFR BLD AUTO: 0 % (ref 0–6)
ERYTHROCYTE [DISTWIDTH] IN BLOOD BY AUTOMATED COUNT: 13.9 % (ref 11.6–15.1)
GFR SERPL CREATININE-BSD FRML MDRD: 12 ML/MIN/1.73SQ M
GLUCOSE SERPL-MCNC: 164 MG/DL (ref 65–140)
GLUCOSE SERPL-MCNC: 181 MG/DL (ref 65–140)
GLUCOSE SERPL-MCNC: 190 MG/DL (ref 65–140)
GLUCOSE SERPL-MCNC: 217 MG/DL (ref 65–140)
GLUCOSE SERPL-MCNC: 269 MG/DL (ref 65–140)
HCT VFR BLD AUTO: 25.9 % (ref 36.5–49.3)
HGB BLD-MCNC: 8.6 G/DL (ref 12–17)
IMM GRANULOCYTES # BLD AUTO: 0.15 THOUSAND/UL (ref 0–0.2)
IMM GRANULOCYTES NFR BLD AUTO: 1 % (ref 0–2)
LYMPHOCYTES # BLD AUTO: 0.69 THOUSANDS/ΜL (ref 0.6–4.47)
LYMPHOCYTES NFR BLD AUTO: 5 % (ref 14–44)
MCH RBC QN AUTO: 31.6 PG (ref 26.8–34.3)
MCHC RBC AUTO-ENTMCNC: 33.2 G/DL (ref 31.4–37.4)
MCV RBC AUTO: 95 FL (ref 82–98)
MONOCYTES # BLD AUTO: 1.12 THOUSAND/ΜL (ref 0.17–1.22)
MONOCYTES NFR BLD AUTO: 8 % (ref 4–12)
MRSA NOSE QL CULT: NORMAL
NEUTROPHILS # BLD AUTO: 11.6 THOUSANDS/ΜL (ref 1.85–7.62)
NEUTS SEG NFR BLD AUTO: 86 % (ref 43–75)
NRBC BLD AUTO-RTO: 0 /100 WBCS
PLATELET # BLD AUTO: 166 THOUSANDS/UL (ref 149–390)
PMV BLD AUTO: 10.8 FL (ref 8.9–12.7)
POTASSIUM SERPL-SCNC: 4.2 MMOL/L (ref 3.5–5.3)
PROCALCITONIN SERPL-MCNC: 1.05 NG/ML
RBC # BLD AUTO: 2.72 MILLION/UL (ref 3.88–5.62)
SODIUM SERPL-SCNC: 134 MMOL/L (ref 136–145)
WBC # BLD AUTO: 13.57 THOUSAND/UL (ref 4.31–10.16)

## 2021-01-26 PROCEDURE — 99232 SBSQ HOSP IP/OBS MODERATE 35: CPT | Performed by: INTERNAL MEDICINE

## 2021-01-26 PROCEDURE — 85025 COMPLETE CBC W/AUTO DIFF WBC: CPT | Performed by: FAMILY MEDICINE

## 2021-01-26 PROCEDURE — 82948 REAGENT STRIP/BLOOD GLUCOSE: CPT

## 2021-01-26 PROCEDURE — 84145 PROCALCITONIN (PCT): CPT | Performed by: INTERNAL MEDICINE

## 2021-01-26 PROCEDURE — 94760 N-INVAS EAR/PLS OXIMETRY 1: CPT

## 2021-01-26 PROCEDURE — 94762 N-INVAS EAR/PLS OXIMTRY CONT: CPT

## 2021-01-26 PROCEDURE — 94003 VENT MGMT INPAT SUBQ DAY: CPT

## 2021-01-26 PROCEDURE — 99232 SBSQ HOSP IP/OBS MODERATE 35: CPT | Performed by: HOSPITALIST

## 2021-01-26 PROCEDURE — 80048 BASIC METABOLIC PNL TOTAL CA: CPT | Performed by: PHYSICIAN ASSISTANT

## 2021-01-26 PROCEDURE — 5A1D70Z PERFORMANCE OF URINARY FILTRATION, INTERMITTENT, LESS THAN 6 HOURS PER DAY: ICD-10-PCS | Performed by: INTERNAL MEDICINE

## 2021-01-26 PROCEDURE — 90935 HEMODIALYSIS ONE EVALUATION: CPT | Performed by: INTERNAL MEDICINE

## 2021-01-26 RX ORDER — LANOLIN ALCOHOL/MO/W.PET/CERES
3 CREAM (GRAM) TOPICAL ONCE
Status: COMPLETED | OUTPATIENT
Start: 2021-01-26 | End: 2021-01-26

## 2021-01-26 RX ORDER — HYDRALAZINE HYDROCHLORIDE 25 MG/1
50 TABLET, FILM COATED ORAL EVERY 8 HOURS SCHEDULED
Status: DISCONTINUED | OUTPATIENT
Start: 2021-01-26 | End: 2021-02-09

## 2021-01-26 RX ORDER — AMLODIPINE BESYLATE 5 MG/1
5 TABLET ORAL 2 TIMES DAILY
Status: DISCONTINUED | OUTPATIENT
Start: 2021-01-26 | End: 2021-02-09

## 2021-01-26 RX ADMIN — METOPROLOL TARTRATE 25 MG: 25 TABLET, FILM COATED ORAL at 21:25

## 2021-01-26 RX ADMIN — ACETAMINOPHEN 650 MG: 325 TABLET, FILM COATED ORAL at 06:36

## 2021-01-26 RX ADMIN — INSULIN LISPRO 2 UNITS: 100 INJECTION, SOLUTION INTRAVENOUS; SUBCUTANEOUS at 21:26

## 2021-01-26 RX ADMIN — CLOPIDOGREL BISULFATE 75 MG: 75 TABLET ORAL at 07:57

## 2021-01-26 RX ADMIN — ESCITALOPRAM 10 MG: 10 TABLET, FILM COATED ORAL at 21:25

## 2021-01-26 RX ADMIN — Medication 4 MG/HR: at 09:11

## 2021-01-26 RX ADMIN — INSULIN LISPRO 3 UNITS: 100 INJECTION, SOLUTION INTRAVENOUS; SUBCUTANEOUS at 11:33

## 2021-01-26 RX ADMIN — HEPARIN SODIUM 5000 UNITS: 5000 INJECTION INTRAVENOUS; SUBCUTANEOUS at 13:28

## 2021-01-26 RX ADMIN — HEPARIN SODIUM 5000 UNITS: 5000 INJECTION INTRAVENOUS; SUBCUTANEOUS at 21:25

## 2021-01-26 RX ADMIN — Medication 4 MG/HR: at 13:29

## 2021-01-26 RX ADMIN — INSULIN LISPRO 4 UNITS: 100 INJECTION, SOLUTION INTRAVENOUS; SUBCUTANEOUS at 07:55

## 2021-01-26 RX ADMIN — ASPIRIN 81 MG: 81 TABLET ORAL at 07:57

## 2021-01-26 RX ADMIN — METOPROLOL TARTRATE 25 MG: 25 TABLET, FILM COATED ORAL at 07:56

## 2021-01-26 RX ADMIN — INSULIN DETEMIR 20 UNITS: 100 INJECTION, SOLUTION SUBCUTANEOUS at 21:32

## 2021-01-26 RX ADMIN — HEPARIN SODIUM 5000 UNITS: 5000 INJECTION INTRAVENOUS; SUBCUTANEOUS at 05:12

## 2021-01-26 RX ADMIN — HYDRALAZINE HYDROCHLORIDE 50 MG: 25 TABLET, FILM COATED ORAL at 21:25

## 2021-01-26 RX ADMIN — CEFEPIME HYDROCHLORIDE 2000 MG: 2 INJECTION, POWDER, FOR SOLUTION INTRAVENOUS at 13:28

## 2021-01-26 RX ADMIN — AMLODIPINE BESYLATE 5 MG: 5 TABLET ORAL at 07:57

## 2021-01-26 RX ADMIN — INSULIN LISPRO 4 UNITS: 100 INJECTION, SOLUTION INTRAVENOUS; SUBCUTANEOUS at 16:40

## 2021-01-26 RX ADMIN — ATORVASTATIN CALCIUM 40 MG: 40 TABLET, FILM COATED ORAL at 16:40

## 2021-01-26 RX ADMIN — INSULIN DETEMIR 20 UNITS: 100 INJECTION, SOLUTION SUBCUTANEOUS at 07:56

## 2021-01-26 RX ADMIN — NYSTATIN 500000 UNITS: 100000 SUSPENSION ORAL at 07:56

## 2021-01-26 RX ADMIN — NYSTATIN 500000 UNITS: 100000 SUSPENSION ORAL at 21:31

## 2021-01-26 RX ADMIN — NYSTATIN 500000 UNITS: 100000 SUSPENSION ORAL at 11:33

## 2021-01-26 RX ADMIN — INSULIN LISPRO 2 UNITS: 100 INJECTION, SOLUTION INTRAVENOUS; SUBCUTANEOUS at 07:55

## 2021-01-26 RX ADMIN — INSULIN LISPRO 4 UNITS: 100 INJECTION, SOLUTION INTRAVENOUS; SUBCUTANEOUS at 11:33

## 2021-01-26 RX ADMIN — GUAIFENESIN 1200 MG: 600 TABLET, EXTENDED RELEASE ORAL at 21:25

## 2021-01-26 RX ADMIN — NYSTATIN 500000 UNITS: 100000 SUSPENSION ORAL at 18:10

## 2021-01-26 RX ADMIN — INSULIN LISPRO 1 UNITS: 100 INJECTION, SOLUTION INTRAVENOUS; SUBCUTANEOUS at 16:40

## 2021-01-26 RX ADMIN — Medication 4 MG/HR: at 18:10

## 2021-01-26 RX ADMIN — HYDRALAZINE HYDROCHLORIDE 50 MG: 25 TABLET, FILM COATED ORAL at 05:12

## 2021-01-26 RX ADMIN — Medication 3 MG: at 02:51

## 2021-01-26 RX ADMIN — Medication 4 MG/HR: at 02:51

## 2021-01-26 RX ADMIN — Medication 4 MG/HR: at 06:00

## 2021-01-26 RX ADMIN — GUAIFENESIN 1200 MG: 600 TABLET, EXTENDED RELEASE ORAL at 07:56

## 2021-01-26 NOTE — PROGRESS NOTES
NEPHROLOGY PROGRESS NOTE   An Cardenas [de-identified] y o  male MRN: [id number]  Unit/Bed#: S -01 Encounter: [id number]  Reason for Consult: BRIAN/CKD    ASSESSMENT/PLAN:  1  Acute Kidney Injury- secondary to ATN from contrast induced nephropathy in the setting of cardiac catheterization  - creatinine & BUN continued to worsen with medical management and urine output was not significant with high dose bumex drip and therefore he was started on hemodialysis  - consent was obtained by Dr Rachel Solis on 1/25  - temporary dialysis catheter placed by IR on 1/25/21 (appreciate assistance)  - hemodialysis started on 1/25/21  - second treatment for today 1/26/21  - for third treatment tomorrow  - hepatitis panel nonreactive  - monitor for renal recovery  - PVR negative  2  Chronic Kidney Disease stage IV- Baseline creatinine is 2-2 5  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 due to diabetic nephropathy and hypertensive nephrosclerosis  Follows with [facility] [address] [facility], Dr Xaio Bloom  Of note, he required dialysis in 8/2019 for BRIAN which resolved  3  Acute on chronic CHF- cardiology on board  - preserved EF  - remains on bumex infusion  - would consider discontinuing now that dialysis is underway and patient still not responding adequately to medical therapy  4  Hypertension- BP acceptable  5  Anemia- hgb below goal  - likely secondary to chronic disease  - check iron studies  6  Low bicarbonate level- secondary to severe BRIAN  - improving s/p hemodialysis  - continue to monitor  7  Hyponatremia- secondary to volume overload  - improving with volume removal with ultrafiltration on dialysis    Disposition:  For dialysis this afternoon for additional UF  SUBJECTIVE:  Patient states his breathing is better today than yesterday and he feels he is doing better since having dialysis    He denies difficulty urinating and uses the urinal   I saw him earlier this morning and he was about to try to eat some toast   He is lethargic and states he did not sleep at all last night      OBJECTIVE:  Current Weight:    Vitals:    01/25/21 2310 01/26/21 0446 01/26/21 0512 01/26/21 0712   BP:   164/71 136/65   BP Location:    Left arm   Pulse:    71   Resp:    22   Temp:    98 °F (36 7 °C)   TempSrc:    Oral   SpO2: 92% 92%  94%       Intake/Output Summary (Last 24 hours) at 1/26/2021 1124  Last data filed at 1/26/2021 6650  Gross per 24 hour   Intake 500 ml   Output 2675 ml   Net -2175 ml     General: NAD  Skin: no rash  Eyes: anicteric  ENMT: mm moist  Neck: no masses  Respiratory: decreased breath sounds  Cardiac: RRR  Extremities: no edema  GI: soft nt nd  Neuro: alert awake  Psych: mood and affect appropriate, lethargic    Medications:    Current Facility-Administered Medications:     acetaminophen (TYLENOL) tablet 650 mg, 650 mg, Oral, Q6H PRN, Jessica Mccall MD, 650 mg at 01/26/21 0636    aluminum-magnesium hydroxide-simethicone (MYLANTA) oral suspension 30 mL, 30 mL, Oral, Q6H PRN, TABITHA Calabrese    amLODIPine (NORVASC) tablet 5 mg, 5 mg, Oral, BID, Estephania Villa PA-C    aspirin (ECOTRIN LOW STRENGTH) EC tablet 81 mg, 81 mg, Oral, Daily, Swathi Schumacher MD, 81 mg at 01/26/21 0757    atorvastatin (LIPITOR) tablet 40 mg, 40 mg, Oral, Daily With Shane Umana MD, 40 mg at 01/25/21 1729    benzonatate (TESSALON PERLES) capsule 100 mg, 100 mg, Oral, TID PRN, TABITHA Zhao    bumetanide (BUMEX) 12 5 mg infusion 50 mL, 4 mg/hr, Intravenous, Continuous, Barney Sibley PA-C, Last Rate: 16 mL/hr at 01/26/21 0911, 4 mg/hr at 01/26/21 0911    calcium carbonate (TUMS) chewable tablet 1,000 mg, 1,000 mg, Oral, Daily PRN, Jessica Mccall MD    cefepime (MAXIPIME) 2,000 mg in dextrose 5 % 50 mL IVPB, 2,000 mg, Intravenous, Q24H, Faye Correa MD, Last Rate: 100 mL/hr at 01/25/21 1116, 2,000 mg at 01/25/21 1116    clopidogrel (PLAVIX) tablet 75 mg, 75 mg, Oral, Daily, Swathi Schumacher MD, 75 mg at 01/26/21 0757    escitalopram (Dondra Senate) tablet 10 mg, 10 mg, Oral, HS, Anuj Allen MD, 10 mg at 01/25/21 2135    guaiFENesin (MUCINEX) 12 hr tablet 1,200 mg, 1,200 mg, Oral, Q12H [facility], Brittney Gary, ADALIDNP, 1,200 mg at 01/26/21 0756    heparin (porcine) subcutaneous injection 5,000 Units, 5,000 Units, Subcutaneous, Q8H [facility], Faye Correa MD, 5,000 Units at 01/26/21 0512    hydrALAZINE (APRESOLINE) tablet 50 mg, 50 mg, Oral, Q8H [facility], Estephania Villa PA-C    HYDROmorphone (DILAUDID) injection 0 2 mg, 0 2 mg, Intravenous, Q3H PRN, Faye Correa MD    insulin detemir (LEVEMIR) subcutaneous injection 20 Units, 20 Units, Subcutaneous, Q12H [facility], Swathi Schumacher MD, 20 Units at 01/26/21 0756    insulin lispro (HumaLOG) 100 units/mL subcutaneous injection 1-6 Units, 1-6 Units, Subcutaneous, TID AC, 2 Units at 01/26/21 0755 **AND** Fingerstick Glucose (POCT), , , TID AC, Swathi Schumacher MD    insulin lispro (HumaLOG) 100 units/mL subcutaneous injection 1-6 Units, 1-6 Units, Subcutaneous, HS, Swathi Schumacher MD, 1 Units at 01/25/21 2142    insulin lispro (HumaLOG) 100 units/mL subcutaneous injection 4 Units, 4 Units, Subcutaneous, TID With Meals, Faye Correa MD, 4 Units at 01/26/21 0755    metoprolol tartrate (LOPRESSOR) tablet 25 mg, 25 mg, Oral, Q12H [facility], Swathi Schumacher MD, 25 mg at 01/26/21 0756    nitroglycerin (NITROSTAT) SL tablet 0 4 mg, 0 4 mg, Sublingual, Q5 Min PRN, Anuj Allen MD    nystatin (MYCOSTATIN) oral suspension 500,000 Units, 500,000 Units, Swish & Swallow, 4x Daily, Faye Correa MD, 500,000 Units at 01/26/21 0756    ondansetron (ZOFRAN) injection 4 mg, 4 mg, Intravenous, Q6H PRN, Anuj Allen MD, 4 mg at 01/23/21 0036    oxyCODONE (ROXICODONE) IR tablet 2 5 mg, 2 5 mg, Oral, Q4H PRN, Faye Correa MD    phenol (CHLORASEPTIC) 1 4 % mucosal liquid 1 spray, 1 spray, Mouth/Throat, Q4H PRN, Faye Correa MD    pneumococcal 13-valent conjugate vaccine (PREVNAR-13) IM injection 0 5 mL, 0 5 mL, Intramuscular, Prior to discharge, Arnol Robertson MD    polyethylene glycol (MIRALAX) packet 17 g, 17 g, Oral, Daily PRN, Arnol Robertson MD    Laboratory Results:  Results from last 7 days   Lab Units 01/26/21  0639 01/25/21  0459 01/24/21  0342 01/24/21  0335 01/24/21  0019 01/23/21  2254 01/23/21  0530 01/22/21  0622 01/22/21  0254 01/21/21  2213 01/21/21  1812   WBC Thousand/uL 13 57* 15 56* 15 54*  --   --   --  14 93* 8 83 9 99  --  14 01*   HEMOGLOBIN g/dL 8 6* 9 4* 9 6*  --   --   --  9 6* 10 0* 10 1*  --  11 6*   I STAT HEMOGLOBIN g/dl  --   --   --   --  10 5*  --   --   --   --   --   --    HEMATOCRIT % 25 9* 30 1* 30 8*  --   --   --  30 4* 34 0* 32 3*  --  35 9*   HEMATOCRIT, ISTAT %  --   --   --   --  31*  --   --   --   --   --   --    PLATELETS Thousands/uL 166 156 149  --   --   --  152 134* 133* 137* 177   POTASSIUM mmol/L 4 2 4 9  --  5 2  --  5 3 5 1 4 0  --   --  4 1   CHLORIDE mmol/L 97* 96*  --  98*  --  97* 102 105  --   --  104   CO2 mmol/L 19* 13*  --  18*  --  17* 19* 16*  --   --  20*   CO2, I-STAT mmol/L  --   --   --   --  19*  --   --   --   --   --   --    BUN mg/dL 136* 150*  --  122*  --  120* 97* 76*  --   --  70*   CREATININE mg/dL 4 46* 4 97*  --  4 05*  --  3 93* 2 89* 2 32*  --   --  2 35*   CALCIUM mg/dL 8 2* 8 7  --  8 6  --  8 5 8 7 8 4  --   --  8 7   GLUCOSE, ISTAT mg/dl  --   --   --   --  232*  --   --   --   --   --   --

## 2021-01-26 NOTE — PHYSICAL THERAPY NOTE
PHYSICAL THERAPY CANCELLATION NOTE    Patient Name: Tim PEPPER Date: 1/26/2021 01/26/21 1117   PT Last Visit   PT Visit Date 01/26/21   Note Type   Cancel Reasons Other  (HD at bedside)   Assessment   Assessment PT orders received, chart review performed  Spoke to [employer], pt is receiving HD at bedside and unable to participate in PT evaluation at this time  PT will continue to follow as appropriate and as schedule allows        Yasmeen Pruitt, PT, DPT

## 2021-01-26 NOTE — PLAN OF CARE
Hemodialysis treatment planned for 150 minutes using 3 K+ bath for serum potassium 4 2 this morning  Fluid goal 2000 ml as ordered      Post-Dialysis RN Treatment Note    Blood Pressure:  Pre 125/59 mm/Hg  Post 124/58 mmHg   EDW:  TBD   Weight:  Pre 99 8 kg   Post 97 4 kg   Mode of weight measurement:  bed scale   Volume Removed  1800 ml    Treatment duration 150 minutes    NS given:  200 ml x 1 for blood pressure 105/55   Treatment shortened:  no   Medications given during Rx:  none   Estimated Kt/V:  none   Access type:  right temporary catheter   Access Status:   Maintains 250 bfr   Report called to primary nurse:  Stan Tidwell RN      Problem: METABOLIC, FLUID AND ELECTROLYTES - ADULT  Goal: Electrolytes maintained within normal limits  Description: INTERVENTIONS:  - Monitor labs and assess patient for signs and symptoms of electrolyte imbalances  - Administer electrolyte replacement as ordered  - Monitor response to electrolyte replacements, including repeat lab results as appropriate  - Instruct patient on fluid and nutrition as appropriate  Outcome: Progressing  Goal: Fluid balance maintained  Description: INTERVENTIONS:  - Monitor labs   - Monitor I/O and WT  - Instruct patient on fluid and nutrition as appropriate  - Assess for signs & symptoms of volume excess or deficit  Outcome: Progressing

## 2021-01-26 NOTE — ASSESSMENT & PLAN NOTE
Type 1 MI  Presented with chest pain at [facility]   Hx of CAD s/p stenting and CABG 2016  Received ASA 324mg at home and SL nitro x1 in the ER  Troponin peaked at 5 5  Continue cardiovascular medications with aspirin, Plavix, statin, beta-blocker and Imdur  Cardiac Cath Completed at [facility]   - three-vessel disease with 99% occluded proximal circumflex, 50% distal left main, mid % occluded, RCA proximally 100% occluded with collaterals from left circulation  Patient has his patent grafts of LIMA to LAD and SVG to OM2   Medical intervention recommend at this time  Echo complete - reveals 60% EF

## 2021-01-26 NOTE — ASSESSMENT & PLAN NOTE
Lab Results   Component Value Date    EGFR 12 01/26/2021    EGFR 10 01/25/2021    EGFR 13 01/24/2021    CREATININE 4 46 (H) 01/26/2021    CREATININE 4 97 (H) 01/25/2021    CREATININE 4 05 (H) 01/24/2021     Creatinine 4 46, down from 4 97   Baseline creatinine around 2 3-2 5  Previously on HD in 2019  Nephrology on board   Most likely ATN post contrast for Cath  Continue Bumex drip  IR placed HD Cath 1/25/2021  Dialyzed 1/25/2021  To be dialyzed again 1/26/2021  Monitor kidney function and input and output

## 2021-01-26 NOTE — PROGRESS NOTES
General Cardiology   Progress Note -  Team One   Carter Ayon [de-identified] y o  male MRN: [id number]    Unit/Bed#: S -01 Encounter: [id number]    Assessment:    1  Type 2 MI/CAD: s/p CABG x2 with LIMA-LAD, SVG-OM1 in 2016  Presented with chest pain with peak troponin 1 5  · Underwent cardiac catheterization 1/22 with patent LIMA-LAD, 40% stenosis of SVG-OM1  Medical management recommended  · Currently on Aspirin, Plavix (added this admission), statin, beta-blocker   2  Acute on chronic HFpEF:  Had no significant response to Bumex infusion and metolazone  Started on dialysis yesterday with plan for ultrafiltration today per Nephrology  Remains on Bumex infusion at 4 mg/hr per Nephrology  · Output -2 8 L  · Oxygen weaned down to 6 L Midflow  3  Acute renal failure on CKD 3:  Baseline creatinine 2 3-2 5  Creatinine 4 05 post catheterization secondary to ATN  Was started on Bumex infusion with metolazone with no significant output and further worsening of creatinine to 4 9  Started on dialysis 1/25 with plan for ultrafiltration today per Nephrology  Creatinine 4 46 today  4  Essential hypertension:  Average /62 on amlodipine 5 mg BID, Lopressor 25 mg BID, hydralazine 50 mg TID and Bumex infusion  5  Hyperlipidemia: TC 92, , HDL 27, LDL 38 on atorvastatin 40 mg daily  6  Diabetes mellitus:  Hemoglobin A1c 6 7  Management per primary team   7  Oral Candida:  Ordered for Nystatin mouthwash  8  Hyponatremia:  Likely due to hypervolemia  Improving after HD    9  Anemia:  Baseline hemoglobin 10  Hemoglobin 8 6 today     Plan/Recommendations:  · Remains on Bumex infusion at 4 mg/hr  · Nephrology managing volume status with plans for ultrafiltration today  · Continue current cardiac medications  _______________________________________________________________________    Subjective    Patient seen and examined  No acute events overnight  Denies any chest pain, palpitations or lightheadedness    Feels weak today  He continues with shortness of breath although it is better than yesterday  Review of Systems   Constitution: Positive for malaise/fatigue  Negative for chills  Tired, fatigued   Cardiovascular: Positive for leg swelling  Negative for chest pain, dyspnea on exertion, near-syncope, orthopnea, palpitations, paroxysmal nocturnal dyspnea and syncope  Respiratory: Positive for shortness of breath  Negative for cough and wheezing  Endocrine: Negative  Hematologic/Lymphatic: Negative  Skin: Negative  Musculoskeletal: Negative  Gastrointestinal: Negative  Negative for diarrhea, nausea and vomiting  Neurological: Negative for dizziness, light-headedness and weakness  Psychiatric/Behavioral: Negative  Negative for altered mental status  All other systems reviewed and are negative  Objective:   Vitals: Blood pressure 136/65, pulse 71, temperature 98 °F (36 7 °C), temperature source Oral, resp  rate 22, SpO2 94 %  ,     Wt Readings from Last 3 Encounters:   01/21/21 99 6 kg (219 lb 9 3 oz)   07/15/19 99 5 kg (219 lb 5 7 oz)   12/05/16 99 4 kg (219 lb 2 2 oz)        Lab Results   Component Value Date    CREATININE 4 46 (H) 01/26/2021    CREATININE 4 97 (H) 01/25/2021    CREATININE 4 05 (H) 01/24/2021         There is no height or weight on file to calculate BMI ,     Systolic (02DYI), LXP:246 , Min:118 , LAQ:503     Diastolic (54MUY), BHL:15, Min:53, Max:80          Intake/Output Summary (Last 24 hours) at 1/26/2021 0948  Last data filed at 1/26/2021 0514  Gross per 24 hour   Intake 500 ml   Output 2675 ml   Net -2175 ml     Weight (last 2 days)     None        Telemetry Review: No significant arrhythmias seen on telemetry review  Physical Exam  Vitals signs and nursing note reviewed  Constitutional:       General: He is not in acute distress  Appearance: He is well-developed  Comments: On 6 L mid flow in NAD   HENT:      Head: Normocephalic and atraumatic  Neck:      Musculoskeletal: Normal range of motion and neck supple  Vascular: JVD present  Cardiovascular:      Rate and Rhythm: Normal rate and regular rhythm  Heart sounds: Normal heart sounds  No murmur  No friction rub  No gallop  Pulmonary:      Effort: Pulmonary effort is normal  No respiratory distress  Breath sounds: No wheezing or rales  Comments: Diminished breath sounds at the bases bilateral  Chest:      Chest wall: No tenderness  Abdominal:      General: Bowel sounds are normal  There is distension  Palpations: Abdomen is soft  Tenderness: There is no abdominal tenderness  Musculoskeletal: Normal range of motion  General: No tenderness  Right lower leg: Edema present  Left lower leg: Edema present  Skin:     General: Skin is warm and dry  Coloration: Skin is not pale  Findings: No erythema  Neurological:      Mental Status: He is alert and oriented to person, place, and time  Psychiatric:         Mood and Affect: Mood normal          Behavior: Behavior normal          Thought Content:  Thought content normal          Judgment: Judgment normal          LABORATORY RESULTS  Results from last 7 days   Lab Units 01/22/21  0937 01/22/21  0623 01/22/21  0104   TROPONIN I ng/mL 7 37* 5 59* 1 58*     CBC with diff:   Results from last 7 days   Lab Units 01/26/21  0639 01/25/21  0459 01/24/21  0342 01/24/21  0019 01/23/21  0530 01/22/21  0622 01/22/21  0254 01/21/21  2213 01/21/21  1812   WBC Thousand/uL 13 57* 15 56* 15 54*  --  14 93* 8 83 9 99  --  14 01*   HEMOGLOBIN g/dL 8 6* 9 4* 9 6*  --  9 6* 10 0* 10 1*  --  11 6*   I STAT HEMOGLOBIN g/dl  --   --   --  10 5*  --   --   --   --   --    HEMATOCRIT % 25 9* 30 1* 30 8*  --  30 4* 34 0* 32 3*  --  35 9*   HEMATOCRIT, ISTAT %  --   --   --  31*  --   --   --   --   --    MCV fL 95 103* 102*  --  101* 107* 101*  --  97   PLATELETS Thousands/uL 166 156 149  --  152 134* 133* 137* 177 MCH pg 31 6 32 2 31 8  --  31 8 31 3 31 6  --  31 4   MCHC g/dL 33 2 31 2* 31 2*  --  31 6 29 4* 31 3*  --  32 3   RDW % 13 9 14 2 14 3  --  14 6 14 1 14 0  --  13 7   MPV fL 10 8 10 5 10 4  --  11 0 10 6 10 3 9 8 10 6   NRBC AUTO /100 WBCs 0 0 0  --   --  0  --   --  0       CMP:  Results from last 7 days   Lab Units 01/26/21  0639 01/25/21 0459 01/24/21 0335 01/24/21 0019 01/23/21 2254 01/23/21 0530 01/22/21 0622 01/21/21  1812   POTASSIUM mmol/L 4 2 4 9 5 2  --  5 3 5 1 4 0 4 1   CHLORIDE mmol/L 97* 96* 98*  --  97* 102 105 104   CO2 mmol/L 19* 13* 18*  --  17* 19* 16* 20*   CO2, I-STAT mmol/L  --   --   --  19*  --   --   --   --    BUN mg/dL 136* 150* 122*  --  120* 97* 76* 70*   CREATININE mg/dL 4 46* 4 97* 4 05*  --  3 93* 2 89* 2 32* 2 35*   GLUCOSE, ISTAT mg/dl  --   --   --  232*  --   --   --   --    CALCIUM mg/dL 8 2* 8 7 8 6  --  8 5 8 7 8 4 8 7   AST U/L  --   --   --   --   --  53*  --  50*   ALT U/L  --   --   --   --   --  71  --  90*   ALK PHOS U/L  --   --   --   --   --  106  --  136*   EGFR ml/min/1 73sq m 12 10 13  --  14 20 26 25       BMP:  Results from last 7 days   Lab Units 01/26/21  0639 01/25/21 0459 01/24/21 0335 01/24/21 0019 01/23/21 2254 01/23/21 0530 01/22/21 0622 01/21/21  1812   POTASSIUM mmol/L 4 2 4 9 5 2  --  5 3 5 1 4 0 4 1   CHLORIDE mmol/L 97* 96* 98*  --  97* 102 105 104   CO2 mmol/L 19* 13* 18*  --  17* 19* 16* 20*   CO2, I-STAT mmol/L  --   --   --  19*  --   --   --   --    BUN mg/dL 136* 150* 122*  --  120* 97* 76* 70*   CREATININE mg/dL 4 46* 4 97* 4 05*  --  3 93* 2 89* 2 32* 2 35*   GLUCOSE, ISTAT mg/dl  --   --   --  232*  --   --   --   --    CALCIUM mg/dL 8 2* 8 7 8 6  --  8 5 8 7 8 4 8 7       Lab Results   Component Value Date    NTBN 25,215 (H) 01/23/2021    NTBNP 2,452 (H) 07/03/2019    NTBNP 1,637 (H) 12/04/2016                               Results from last 7 days   Lab Units 01/22/21  0254   INR  1 24*       Lipid Profile:   Lab Results Component Value Date    CHOL 158 2016     Lab Results   Component Value Date    HDL 27 (L) 2021    HDL 34 (L) 2017    HDL 31 (L) 2016     Lab Results   Component Value Date    LDLCALC 38 2021    LDLCALC 57 2017    LDLCALC 113 (H) 2016     Lab Results   Component Value Date    TRIG 137 2021    TRIG 127 2017    TRIG 175 (H) 2016       Cardiac testing:   Results for orders placed during the hospital encounter of 21   Echo complete with contrast if indicated    Narrative [facility] [address], [address] [facility]  [phone]    Transthoracic Echocardiogram  2D, M-mode, Doppler, and Color Doppler    Study date:  2021    Patient: Kaushik Pro  MR number: [id number]  Account number: [de-identified]  : [id number]  Age: [de-identified] years  Gender: Male  Status: Inpatient  Location: Bedside  Height: 73 in  Weight: 218 9 lb  BP: 146/ 73 mmHg    Indications: CAD    Diagnoses: I21 4 - Non-ST elevation (NSTEMI) myocardial infarction, I25 10 - Atherosclerotic heart disease of native coronary artery without angina pectoris    Sonographer:  ROHINI Ackerman  Primary Physician:  Roverto Spencer MD  Referring Physician:  Ricardo Horn MD  Group:  [facility]  Interpreting Physician:  Eliseo Garrett MD    SUMMARY    LEFT VENTRICLE:  Systolic function was normal  Ejection fraction was estimated to be 60 %  There was hypokinesis of the basal inferior wall(s)  Wall thickness was moderately increased  Features were consistent with a pseudonormal left ventricular filling pattern, with concomitant abnormal relaxation and increased filling pressure (grade 2 diastolic dysfunction)  LEFT ATRIUM:  The atrium was mildly dilated  MITRAL VALVE:  There was mild annular calcification  There was moderate regurgitation  AORTIC VALVE:  Transaortic velocity was increased due to valvular stenosis    There was mild stenosis  TRICUSPID VALVE:  There was mild regurgitation  Estimated peak PA pressure was 45 mmHg  PULMONIC VALVE:  There was mild regurgitation  HISTORY: PRIOR HISTORY: NSTEMI; Hypercholesterol; CKD; DM; Elevated troponin; CCAD; CABG; HTN    PROCEDURE: The procedure was performed at the bedside  This was a routine study  The transthoracic approach was used  The study included complete 2D imaging, M-mode, complete spectral Doppler, and color Doppler  The heart rate was 68 bpm,  at the start of the study  Images were obtained from the parasternal, apical, subcostal, and suprasternal notch acoustic windows  Echocardiographic views were limited due to restricted patient mobility and poor patient compliance  This was  a technically difficult study  LEFT VENTRICLE: Size was normal  Systolic function was normal  Ejection fraction was estimated to be 60 %  There was hypokinesis of the basal inferior wall(s)  Wall thickness was moderately increased  No evidence of apical thrombus  DOPPLER: Features were consistent with a pseudonormal left ventricular filling pattern, with concomitant abnormal relaxation and increased filling pressure (grade 2 diastolic dysfunction)  RIGHT VENTRICLE: The size was normal  Systolic function was normal  Wall thickness was normal     LEFT ATRIUM: The atrium was mildly dilated  RIGHT ATRIUM: Size was normal     MITRAL VALVE: There was mild annular calcification  Valve structure was normal  There was normal leaflet separation  DOPPLER: The transmitral velocity was within the normal range  There was no evidence for stenosis  There was moderate  regurgitation  AORTIC VALVE: The valve was trileaflet  Leaflets exhibited mild to moderate calcification and mildly reduced cuspal separation  DOPPLER: Transaortic velocity was increased due to valvular stenosis  There was mild stenosis  There was no  significant regurgitation      TRICUSPID VALVE: The valve structure was normal  There was normal leaflet separation  DOPPLER: The transtricuspid velocity was within the normal range  There was no evidence for stenosis  There was mild regurgitation  Estimated peak PA  pressure was 45 mmHg  PULMONIC VALVE: Leaflets exhibited normal thickness, no calcification, and normal cuspal separation  DOPPLER: The transpulmonic velocity was within the normal range  There was mild regurgitation  PERICARDIUM: There was no pericardial effusion  AORTA: The root exhibited normal size  SYSTEMIC VEINS: IVC: The inferior vena cava was normal in size  SYSTEM MEASUREMENT TABLES    2D  %FS: 39 29 %  Ao Diam: 3 53 cm  Ao asc: 3 86 cm  EDV(Teich): 93 32 ml  EF(Teich): 69 91 %  ESV(Teich): 28 08 ml  IVSd: 1 44 cm  LA Area: 20 85 cm2  LA Diam: 4 71 cm  LVEDV MOD A4C: 122 74 ml  LVEF MOD A4C: 77 19 %  LVESV MOD A4C: 28 ml  LVIDd: 4 52 cm  LVIDs: 2 74 cm  LVLd A4C: 9 07 cm  LVLs A4C: 7 39 cm  LVOT Diam: 2 02 cm  LVPWd: 1 32 cm  RA Area: 14 38 cm2  RVIDd: 3 78 cm  SV MOD A4C: 94 74 ml  SV(Teich): 65 24 ml    CW  AV Env  Ti: 249 29 ms  AV MaxP 56 mmHg  AV VTI: 32 94 cm  AV Vmax: 1 91 m/s  AV Vmean: 1 32 m/s  AV meanP 04 mmHg  TR MaxP 43 mmHg  TR Vmax: 3 22 m/s    MM  TAPSE: 1 39 cm    PW  VIRAL (VTI): 1 06 cm2  VIRAL Vmax: 1 45 cm2  E' Sept: 0 09 m/s  E/E' Sept: 13 84  LVOT Env  Ti: 230 26 ms  LVOT VTI: 10 81 cm  LVOT Vmax: 0 86 m/s  LVOT Vmean: 0 5 m/s  LVOT maxP 95 mmHg  LVOT meanP 26 mmHg  LVSV Dopp: 34 8 ml  MV A Darvin: 0 75 m/s  MV Dec Caswell: 5 1 m/s2  MV DecT: 233 34 ms  MV E Darvin: 1 19 m/s  MV E/A Ratio: 1 59  MV PHT: 67 67 ms  MVA By PHT: 3 25 cm2    [facility] [place] [facility] Accredited Echocardiography Laboratory    Prepared and electronically signed by    Kim Ryder MD  Signed 2021 07:58:05       No results found for this or any previous visit  No results found for this or any previous visit  No procedure found  No results found for this or any previous visit        Meds/Allergies all current active meds have been reviewed, current meds:   Current Facility-Administered Medications   Medication Dose Route Frequency    acetaminophen (TYLENOL) tablet 650 mg  650 mg Oral Q6H PRN    aluminum-magnesium hydroxide-simethicone (MYLANTA) oral suspension 30 mL  30 mL Oral Q6H PRN    amLODIPine (NORVASC) tablet 5 mg  5 mg Oral BID    aspirin (ECOTRIN LOW STRENGTH) EC tablet 81 mg  81 mg Oral Daily    atorvastatin (LIPITOR) tablet 40 mg  40 mg Oral Daily With Dinner    benzonatate (TESSALON PERLES) capsule 100 mg  100 mg Oral TID PRN    bumetanide (BUMEX) 12 5 mg infusion 50 mL  4 mg/hr Intravenous Continuous    calcium carbonate (TUMS) chewable tablet 1,000 mg  1,000 mg Oral Daily PRN    cefepime (MAXIPIME) 2,000 mg in dextrose 5 % 50 mL IVPB  2,000 mg Intravenous Q24H    clopidogrel (PLAVIX) tablet 75 mg  75 mg Oral Daily    escitalopram (LEXAPRO) tablet 10 mg  10 mg Oral HS    guaiFENesin (MUCINEX) 12 hr tablet 1,200 mg  1,200 mg Oral Q12H [place]    heparin (porcine) subcutaneous injection 5,000 Units  5,000 Units Subcutaneous Q8H [address]    hydrALAZINE (APRESOLINE) tablet 50 mg  50 mg Oral Q8H [address]    HYDROmorphone (DILAUDID) injection 0 2 mg  0 2 mg Intravenous Q3H PRN    insulin detemir (LEVEMIR) subcutaneous injection 20 Units  20 Units Subcutaneous Q12H [address]    insulin lispro (HumaLOG) 100 units/mL subcutaneous injection 1-6 Units  1-6 Units Subcutaneous TID AC    insulin lispro (HumaLOG) 100 units/mL subcutaneous injection 1-6 Units  1-6 Units Subcutaneous HS    insulin lispro (HumaLOG) 100 units/mL subcutaneous injection 4 Units  4 Units Subcutaneous TID With Meals    metoprolol tartrate (LOPRESSOR) tablet 25 mg  25 mg Oral Q12H [place]    nitroglycerin (NITROSTAT) SL tablet 0 4 mg  0 4 mg Sublingual Q5 Min PRN    nystatin (MYCOSTATIN) oral suspension 500,000 Units  500,000 Units Swish & Swallow 4x Daily    ondansetron (ZOFRAN) injection 4 mg  4 mg Intravenous Q6H PRN    oxyCODONE (ROXICODONE) IR tablet 2 5 mg  2 5 mg Oral Q4H PRN    phenol (CHLORASEPTIC) 1 4 % mucosal liquid 1 spray  1 spray Mouth/Throat Q4H PRN    pneumococcal 13-valent conjugate vaccine (PREVNAR-13) IM injection 0 5 mL  0 5 mL Intramuscular Prior to discharge    polyethylene glycol (MIRALAX) packet 17 g  17 g Oral Daily PRN    and PTA meds:   Prior to Admission Medications   Prescriptions Last Dose Informant Patient Reported? Taking?    amLODIPine (NORVASC) 5 mg tablet   No No   Sig: Take 1 tablet (5 mg total) by mouth 2 (two) times a day   aspirin (ASPIR-LOW) 81 mg EC tablet   Yes No   Sig: Daily   atorvastatin (LIPITOR) 40 mg tablet   Yes No   Sig: atorvastatin 40 mg tablet   TAKE ONE TABLET DAILY   escitalopram (LEXAPRO) 10 mg tablet   No No   Sig: Take 1 tablet (10 mg total) by mouth daily at bedtime   furosemide (LASIX) 80 mg tablet   No No   Sig: Take 1 tablet (80 mg total) by mouth 2 (two) times a day   Patient taking differently: Take 80 mg by mouth 2 (two) times a day Pt takes 40 mg in AM and20 mg PM   hydrALAZINE (APRESOLINE) 50 mg tablet   No No   Sig: Take 1 tablet (50 mg total) by mouth every 8 (eight) hours   insulin detemir (LEVEMIR) 100 units/mL subcutaneous injection   No No   Sig: Inject 20 Units under the skin every 12 (twelve) hours   insulin lispro (HumaLOG) 100 units/mL injection   No No   Sig: Inject 1-6 Units under the skin daily at bedtime   insulin lispro (HumaLOG) 100 units/mL injection   No No   Sig: Inject 2-12 Units under the skin 3 (three) times a day before meals   isosorbide mononitrate (IMDUR) 30 mg 24 hr tablet   No No   Sig: Take 1 tablet (30 mg total) by mouth daily   Patient not taking: Reported on 1/21/2021   metoprolol tartrate (LOPRESSOR) 50 mg tablet   No No   Sig: Take 0 5 tablets (25 mg total) by mouth every 12 (twelve) hours   nitroglycerin (NITROSTAT) 0 4 mg SL tablet   No No   Sig: Place 1 tablet (0 4 mg total) under the tongue every 5 (five) minutes as needed for chest pain polyethylene glycol (MIRALAX) 17 g packet   No No   Sig: Take 17 g by mouth daily as needed (Constipation)      Facility-Administered Medications: None     Medications Prior to Admission   Medication    amLODIPine (NORVASC) 5 mg tablet    aspirin (ASPIR-LOW) 81 mg EC tablet    atorvastatin (LIPITOR) 40 mg tablet    escitalopram (LEXAPRO) 10 mg tablet    furosemide (LASIX) 80 mg tablet    hydrALAZINE (APRESOLINE) 50 mg tablet    insulin detemir (LEVEMIR) 100 units/mL subcutaneous injection    insulin lispro (HumaLOG) 100 units/mL injection    insulin lispro (HumaLOG) 100 units/mL injection    isosorbide mononitrate (IMDUR) 30 mg 24 hr tablet    metoprolol tartrate (LOPRESSOR) 50 mg tablet    nitroglycerin (NITROSTAT) 0 4 mg SL tablet    polyethylene glycol (MIRALAX) 17 g packet       bumetanide, 4 mg/hr, Last Rate: 4 mg/hr (01/26/21 0911)        Counseling / Coordination of Care  Total floor / unit time spent today 20 minutes  Greater than 50% of total time was spent with the patient and / or family counseling and / or coordination of care  ** Please Note: Dragon 360 Dictation voice to text software may have been used in the creation of this document   **

## 2021-01-26 NOTE — ASSESSMENT & PLAN NOTE
Lab Results   Component Value Date    HGBA1C 6 7 (H) 07/03/2019       Recent Labs     01/25/21  1111 01/25/21  1624 01/25/21  2140 01/26/21  0753   POCGLU 179* 131 174* 190*       Blood Sugar Average: Last 72 hrs:  (P) 196 [id number]   Still poorly controlled  Continue Humalog sliding scale with Accu-Cheks q a c  And HS  Patient already on Levemir  We will add Humalog 3 times a day with meals  Adjust treatment accordingly

## 2021-01-26 NOTE — CASE MANAGEMENT
PT IS NOT A READMISSION OR AN IDENTIFIED BUNDLE PT  CM met with Pt with an introduction and explanation of role  Pt reported residing alone in a 2 story home with the use of a roller walker, shower bench and 10 steps to enter the home from the garage  Pt reported being independent with ADLs, continues to drive, had [facility] in the past but cannot remember the name of provider agency  Pt reported being at both [address] [place] [address],[address] and [facility] in the past but no hx of mental health or drug/alcohol placements  Pt denied having a living will, uses [address] and has Monique Marrufo as a PCP  Pt has a hx of HD at [facility] which was on TTS at 11:30am and drove himself  CM reviewed d/c planning process including the following: identifying help at home, patient preference for d/c planning needs, Discharge Lounge, [facility] Meds to Bed program, availability of treatment team to discuss questions or concerns patient and/or family may have regarding understanding medications and recognizing signs and symptoms once discharged  CM also encouraged patient to follow up with all recommended appointments after discharge  Patient advised of importance for patient and family to participate in managing patients medical well being

## 2021-01-26 NOTE — PROGRESS NOTES
Progress Note - Gaetano Bermudez 1940, [de-identified] y o  male MRN: [id number]    Unit/Bed#: S -01 Encounter: [id number]    Primary Care Provider: Stew Yen MD   Date and time admitted to hospital: 1/22/2021 12:28 PM        * NSTEMI (non-ST elevated myocardial infarction) [facility])  Assessment & Plan  Type 1 MI  Presented with chest pain at [facility]   Hx of CAD s/p stenting and CABG 2016  Received ASA 324mg at home and SL nitro x1 in the ER  Troponin peaked at 5 5  Continue cardiovascular medications with aspirin, Plavix, statin, beta-blocker and Imdur  Cardiac Cath Completed at [email or web address]   - three-vessel disease with 99% occluded proximal circumflex, 50% distal left main, mid % occluded, RCA proximally 100% occluded with collaterals from left circulation  Patient has his patent grafts of LIMA to LAD and SVG to OM2  Medical intervention recommend at this time  Echo complete - reveals 60% EF      Oral candida  Assessment & Plan  · Nystatin mouthwash  Hyponatremia  Assessment & Plan  · Likely due to hypervolemia/CHF  · Nephrology on board  · Monitor  · Continue Bumex drip  Anemia  Assessment & Plan  · Monitor  · No active bleeding  · For further workup and management if this worsens significantly    High anion gap metabolic acidosis  Assessment & Plan    Previously with slight high anion gap metabolic acidosis  Possibly secondary to CKD  Previously on bicarb drip, this was discontinued  Still with metabolic acidosis however kidney function improving  Nephrology on board  Continue Bumex drip  Monitor BMP        Acute on chronic diastolic congestive heart failure [facility])  Assessment & Plan  · Cardiologist and nephrologist on board  · Continue Bumex drip  · Continue cardiovascular and heart failure medications  · Monitor input and output  · Daily weights    · Increasing oxygen demand, currently on Mid Flow 6L    Hx of CABG  Assessment & Plan  CAD s/p CABG in 2016; history of stenting prior to CABG (LIMA to LAD and SVG to OM)  Status post cardiac catheterization: significant triple vessel coronary artery disease  Patient has his patent grafts of LIMA to LAD and SVG to OM2  Continue cardiovascular meds  Leukocytosis  Assessment & Plan  · Leukocytosis down to 13 from 15  · COVID-19, influenza, RSV tests were negative  · Chest x-ray officially read as multifocal pneumonia  · Thus will continue to treat as hospital-acquired pneumonia with IV cefepime  · Blood cultures x2 pending final  · CBC with differential count  · Cough medicine/antitussive, lozenges and Chloraseptic spray p r n       Diabetes mellitus [facility])  Assessment & Plan  Lab Results   Component Value Date    HGBA1C 6 7 (H) 07/03/2019       Recent Labs     01/25/21  1111 01/25/21  1624 01/25/21  2140 01/26/21  0753   POCGLU 179* 131 174* 190*       Blood Sugar Average: Last 72 hrs:  (P) 588 [id number]   Still poorly controlled  Continue Humalog sliding scale with Accu-Cheks q a c  And HS  Patient already on Levemir  We will add Humalog 3 times a day with meals  Adjust treatment accordingly  Acute renal failure superimposed on stage 3 chronic kidney disease [facility])  Assessment & Plan  Lab Results   Component Value Date    EGFR 12 01/26/2021    EGFR 10 01/25/2021    EGFR 13 01/24/2021    CREATININE 4 46 (H) 01/26/2021    CREATININE 4 97 (H) 01/25/2021    CREATININE 4 05 (H) 01/24/2021     Creatinine 4 46, down from 4 97   Baseline creatinine around 2 3-2 5  Previously on HD in 2019  Nephrology on board   Most likely ATN post contrast for Cath  Continue Bumex drip  IR placed HD Cath 1/25/2021  Dialyzed 1/25/2021  To be dialyzed again 1/26/2021  Monitor kidney function and input and output            VTE Pharmacologic Prophylaxis:   Pharmacologic: Heparin  Mechanical VTE Prophylaxis in Place: Yes    Discussions with Specialists or Other Care Team Provider: Hospitalist    Education and Discussions with Family / Patient: Patient    Current Length of Stay: 4 day(s)    Current Patient Status: Inpatient     Discharge Plan / Estimated Discharge Date: TBD    Code Status: Level 1 - Full Code      Subjective:   Patient was seen at bedside resting comfortably  States that he feels much better today after having dialysis  His breathing is much improved and his oxygen demand has decreased  He has no new complaints today  Objective:     Vitals:   Temp (24hrs), Av 4 °F (36 3 °C), Min:96 5 °F (35 8 °C), Max:98 2 °F (36 8 °C)    Temp:  [96 5 °F (35 8 °C)-98 2 °F (36 8 °C)] 98 °F (36 7 °C)  HR:  [62-84] 71  Resp:  [22-24] 22  BP: (118-164)/(53-80) 136/65  SpO2:  [90 %-95 %] 94 %  There is no height or weight on file to calculate BMI  Input and Output Summary (last 24 hours): Intake/Output Summary (Last 24 hours) at 2021 1001  Last data filed at 2021 0514  Gross per 24 hour   Intake 500 ml   Output 2675 ml   Net -2175 ml       Physical Exam:     Physical Exam  Vitals signs reviewed  Constitutional:       General: He is not in acute distress  Appearance: He is well-developed  He is not ill-appearing or toxic-appearing  HENT:      Head: Normocephalic and atraumatic  Eyes:      General: No scleral icterus  Right eye: No discharge  Left eye: No discharge  Conjunctiva/sclera: Conjunctivae normal    Cardiovascular:      Rate and Rhythm: Normal rate and regular rhythm  Heart sounds: Normal heart sounds  No murmur  Pulmonary:      Effort: No respiratory distress  Breath sounds: Wheezing and rhonchi present  Abdominal:      General: Bowel sounds are normal  There is no distension  Palpations: Abdomen is soft  Tenderness: There is no abdominal tenderness  Musculoskeletal: Normal range of motion  General: No tenderness  Skin:     General: Skin is warm  Findings: No erythema or rash        Comments:      Neurological:      Mental Status: He is alert       Motor: No weakness  Psychiatric:         Behavior: Behavior normal            Additional Data:     Labs:    Results from last 7 days   Lab Units 01/26/21  0639   WBC Thousand/uL 13 57*   HEMOGLOBIN g/dL 8 6*   HEMATOCRIT % 25 9*   PLATELETS Thousands/uL 166   NEUTROS PCT % 86*   LYMPHS PCT % 5*   MONOS PCT % 8   EOS PCT % 0     Results from last 7 days   Lab Units 01/26/21  0639  01/24/21  0019  01/23/21  0530   POTASSIUM mmol/L 4 2   < >  --    < > 5 1   CHLORIDE mmol/L 97*   < >  --    < > 102   CO2 mmol/L 19*   < >  --    < > 19*   CO2, I-STAT mmol/L  --   --  19*  --   --    BUN mg/dL 136*   < >  --    < > 97*   CREATININE mg/dL 4 46*   < >  --    < > 2 89*   CALCIUM mg/dL 8 2*   < >  --    < > 8 7   ALK PHOS U/L  --   --   --   --  106   ALT U/L  --   --   --   --  71   AST U/L  --   --   --   --  53*   GLUCOSE, ISTAT mg/dl  --   --  232*  --   --     < > = values in this interval not displayed  Results from last 7 days   Lab Units 01/22/21  0254   INR  1 24*       * I Have Reviewed All Lab Data Listed Above  * Additional Pertinent Lab Tests Reviewed: All Labs Within Last 24 Hours Reviewed    Imaging:    Imaging Reports Reviewed Today Include: N/A  Imaging Personally Reviewed by Myself Includes:  N/A    Recent Cultures (last 7 days):     Results from last 7 days   Lab Units 01/24/21  1050   BLOOD CULTURE  No Growth at 24 hrs  No Growth at 24 hrs         Last 24 Hours Medication List:   Current Facility-Administered Medications   Medication Dose Route Frequency Provider Last Rate    acetaminophen  650 mg Oral Q6H PRN Kyle Pfeiffer MD      aluminum-magnesium hydroxide-simethicone  30 mL Oral Q6H PRN TABITHA Rojas      amLODIPine  5 mg Oral BID Kyle Pfeiffer MD      aspirin  81 mg Oral Daily Kyle Pfeiffer MD      atorvastatin  40 mg Oral Daily With Danii Mckinley MD      benzonatate  100 mg Oral TID PRN TABITHA Combs      bumetanide  4 mg/hr Intravenous Continuous Barney Sibley PA-C 4 mg/hr (01/26/21 0911)    calcium carbonate  1,000 mg Oral Daily PRN Kyle Pfeiffer MD      cefepime  2,000 mg Intravenous Q24H Faye Correa MD 2,000 mg (01/25/21 1116)    clopidogrel  75 mg Oral Daily Kyle Pfeiffer MD      escitalopram  10 mg Oral HS Kyle Pfeiffer MD      guaiFENesin  1,200 mg Oral Q12H [facility] TABITHA Combs      heparin (porcine)  5,000 Units Subcutaneous Q8H [facility] Faye Correa MD      hydrALAZINE  50 mg Oral Q8H [facility] Kyle Pfeiffer MD      HYDROmorphone  0 2 mg Intravenous Q3H PRN Faye Correa MD      insulin detemir  20 Units Subcutaneous Q12H [facility] Kyle Pfeiffer MD      insulin lispro  1-6 Units Subcutaneous TID AC Kyle Pfeiffer MD      insulin lispro  1-6 Units Subcutaneous HS Kyle Pfeiffer MD      insulin lispro  4 Units Subcutaneous TID With Meals Faye Correa MD      metoprolol tartrate  25 mg Oral Q12H [address], MD      nitroglycerin  0 4 mg Sublingual Q5 Min PRN Kyle Pfeiffer MD      nystatin  500,000 Units Swish & Swallow 4x Daily Faye Correa MD      ondansetron  4 mg Intravenous Q6H PRN Kyle Pfeiffer MD      oxyCODONE  2 5 mg Oral Q4H PRN Faye Correa MD      phenol  1 spray Mouth/Throat Q4H PRN Faye Correa MD      pneumococcal 13-valent conjugate vaccine  0 5 mL Intramuscular Prior to discharge Kyle Pfeiffer MD      polyethylene glycol  17 g Oral Daily PRN Kyle Pfeiffer MD          Today, Patient Was Seen By: Wendy Spencer MD    ** Please Note: This note has been constructed using a voice recognition system   **

## 2021-01-26 NOTE — PLAN OF CARE
Problem: Potential for Falls  Goal: Patient will remain free of falls  Description: INTERVENTIONS:  - Assess patient frequently for physical needs  -  Identify cognitive and physical deficits and behaviors that affect risk of falls    -  [place] fall precautions as indicated by assessment   - Educate patient/family on patient safety including physical limitations  - Instruct patient to call for assistance with activity based on assessment  - Modify environment to reduce risk of injury  - Consider OT/PT consult to assist with strengthening/mobility  Outcome: Progressing     Problem: CARDIOVASCULAR - ADULT  Goal: Maintains optimal cardiac output and hemodynamic stability  Description: INTERVENTIONS:  - Monitor I/O, vital signs and rhythm  - Monitor for S/S and trends of decreased cardiac output  - Administer and titrate ordered vasoactive medications to optimize hemodynamic stability  - Assess quality of pulses, skin color and temperature  - Assess for signs of decreased coronary artery perfusion  - Instruct patient to report change in severity of symptoms  Outcome: Progressing  Goal: Absence of cardiac dysrhythmias or at baseline rhythm  Description: INTERVENTIONS:  - Continuous cardiac monitoring, vital signs, obtain 12 lead EKG if ordered  - Administer antiarrhythmic and heart rate control medications as ordered  - Monitor electrolytes and administer replacement therapy as ordered  Outcome: Progressing     Problem: RESPIRATORY - ADULT  Goal: Achieves optimal ventilation and oxygenation  Description: INTERVENTIONS:  - Assess for changes in respiratory status  - Assess for changes in mentation and behavior  - Position to facilitate oxygenation and minimize respiratory effort  - Oxygen administered by appropriate delivery if ordered  - Initiate smoking cessation education as indicated  - Encourage broncho-pulmonary hygiene including cough, deep breathe, Incentive Spirometry  - Assess the need for suctioning and aspirate as needed  - Assess and instruct to report SOB or any respiratory difficulty  - Respiratory Therapy support as indicated  Outcome: Progressing     Problem: GASTROINTESTINAL - ADULT  Goal: Maintains adequate nutritional intake  Description: INTERVENTIONS:  - Monitor percentage of each meal consumed  - Identify factors contributing to decreased intake, treat as appropriate  - Assist with meals as needed  - Monitor I&O, weight, and lab values if indicated  - Obtain nutrition services referral as needed  Outcome: Progressing     Problem: GENITOURINARY - ADULT  Goal: Maintains or returns to baseline urinary function  Description: INTERVENTIONS:  - Assess urinary function  - Encourage oral fluids to ensure adequate hydration if ordered  - Administer IV fluids as ordered to ensure adequate hydration  - Administer ordered medications as needed  - Offer frequent toileting  - Follow urinary retention protocol if ordered  Outcome: Progressing  Goal: Absence of urinary retention  Description: INTERVENTIONS:  - Assess patients ability to void and empty bladder  - Monitor I/O  - Bladder scan as needed  - Discuss with physician/AP medications to alleviate retention as needed  - Discuss catheterization for long term situations as appropriate  Outcome: Progressing     Problem: METABOLIC, FLUID AND ELECTROLYTES - ADULT  Goal: Electrolytes maintained within normal limits  Description: INTERVENTIONS:  - Monitor labs and assess patient for signs and symptoms of electrolyte imbalances  - Administer electrolyte replacement as ordered  - Monitor response to electrolyte replacements, including repeat lab results as appropriate  - Instruct patient on fluid and nutrition as appropriate  Outcome: Progressing  Goal: Fluid balance maintained  Description: INTERVENTIONS:  - Monitor labs   - Monitor I/O and WT  - Instruct patient on fluid and nutrition as appropriate  - Assess for signs & symptoms of volume excess or deficit  Outcome: Progressing  Goal: Glucose maintained within target range  Description: INTERVENTIONS:  - Monitor Blood Glucose as ordered  - Assess for signs and symptoms of hyperglycemia and hypoglycemia  - Administer ordered medications to maintain glucose within target range  - Assess nutritional intake and initiate nutrition service referral as needed  Outcome: Progressing     Problem: SKIN/TISSUE INTEGRITY - ADULT  Goal: Skin integrity remains intact  Description: INTERVENTIONS  - Identify patients at risk for skin breakdown  - Assess and monitor skin integrity  - Assess and monitor nutrition and hydration status  - Monitor labs (i e  albumin)  - Assess for incontinence   - Turn and reposition patient  - Assist with mobility/ambulation  - Relieve pressure over bony prominences  - Avoid friction and shearing  - Provide appropriate hygiene as needed including keeping skin clean and dry  - Evaluate need for skin moisturizer/barrier cream  - Collaborate with interdisciplinary team (i e  Nutrition, Rehabilitation, etc )   - Patient/family teaching  Outcome: Progressing     Problem: HEMATOLOGIC - ADULT  Goal: Maintains hematologic stability  Description: INTERVENTIONS  - Assess for signs and symptoms of bleeding or hemorrhage  - Monitor labs  - Administer supportive blood products/factors as ordered and appropriate  Outcome: Progressing     Problem: MUSCULOSKELETAL - ADULT  Goal: Maintain or return mobility to safest level of function  Description: INTERVENTIONS:  - Assess patient's ability to carry out ADLs; assess patient's baseline for ADL function and identify physical deficits which impact ability to perform ADLs (bathing, care of mouth/teeth, toileting, grooming, dressing, etc )  - Assess/evaluate cause of self-care deficits   - Assess range of motion  - Assess patient's mobility  - Assess patient's need for assistive devices and provide as appropriate  - Encourage maximum independence but intervene and supervise when necessary  - Involve family in performance of ADLs  - Assess for home care needs following discharge   - Consider OT consult to assist with ADL evaluation and planning for discharge  - Provide patient education as appropriate  Outcome: Progressing     Problem: PAIN - ADULT  Goal: Verbalizes/displays adequate comfort level or baseline comfort level  Description: Interventions:  - Encourage patient to monitor pain and request assistance  - Assess pain using appropriate pain scale  - Administer analgesics based on type and severity of pain and evaluate response  - Implement non-pharmacological measures as appropriate and evaluate response  - Consider cultural and social influences on pain and pain management  - Notify physician/advanced practitioner if interventions unsuccessful or patient reports new pain  Outcome: Progressing     Problem: INFECTION - ADULT  Goal: Absence or prevention of progression during hospitalization  Description: INTERVENTIONS:  - Assess and monitor for signs and symptoms of infection  - Monitor lab/diagnostic results  - Monitor all insertion sites, i e  indwelling lines, tubes, and drains  - Monitor endotracheal if appropriate and nasal secretions for changes in amount and color  - Bath appropriate cooling/warming therapies per order  - Administer medications as ordered  - Instruct and encourage patient and family to use good hand hygiene technique  - Identify and instruct in appropriate isolation precautions for identified infection/condition  Outcome: Progressing  Goal: Absence of fever/infection during neutropenic period  Description: INTERVENTIONS:  - Monitor WBC    Outcome: Progressing     Problem: SAFETY ADULT  Goal: Patient will remain free of falls  Description: INTERVENTIONS:  - Assess patient frequently for physical needs  -  Identify cognitive and physical deficits and behaviors that affect risk of falls    -  [place] fall precautions as indicated by assessment   - Educate patient/family on patient safety including physical limitations  - Instruct patient to call for assistance with activity based on assessment  - Modify environment to reduce risk of injury  - Consider OT/PT consult to assist with strengthening/mobility  Outcome: Progressing  Goal: Maintain or return to baseline ADL function  Description: INTERVENTIONS:  -  Assess patient's ability to carry out ADLs; assess patient's baseline for ADL function and identify physical deficits which impact ability to perform ADLs (bathing, care of mouth/teeth, toileting, grooming, dressing, etc )  - Assess/evaluate cause of self-care deficits   - Assess range of motion  - Assess patient's mobility; develop plan if impaired  - Assess patient's need for assistive devices and provide as appropriate  - Encourage maximum independence but intervene and supervise when necessary  - Involve family in performance of ADLs  - Assess for home care needs following discharge   - Consider OT consult to assist with ADL evaluation and planning for discharge  - Provide patient education as appropriate  Outcome: Progressing  Goal: Maintain or return mobility status to optimal level  Description: INTERVENTIONS:  - Assess patient's baseline mobility status (ambulation, transfers, stairs, etc )    - Identify cognitive and physical deficits and behaviors that affect mobility  - Identify mobility aids required to assist with transfers and/or ambulation (gait belt, sit-to-stand, lift, walker, cane, etc )  - [place] fall precautions as indicated by assessment  - Record patient progress and toleration of activity level on Mobility SBAR; progress patient to next Phase/Stage  - Instruct patient to call for assistance with activity based on assessment  - Consider rehabilitation consult to assist with strengthening/weightbearing, etc   Outcome: Progressing     Problem: DISCHARGE PLANNING  Goal: Discharge to home or other facility with appropriate resources  Description: INTERVENTIONS:  - Identify barriers to discharge w/patient and caregiver  - Arrange for needed discharge resources and transportation as appropriate  - Identify discharge learning needs (meds, wound care, etc )  - Arrange for interpretive services to assist at discharge as needed  - Refer to Case Management Department for coordinating discharge planning if the patient needs post-hospital services based on physician/advanced practitioner order or complex needs related to functional status, cognitive ability, or social support system  Outcome: Progressing     Problem: Knowledge Deficit  Goal: Patient/family/caregiver demonstrates understanding of disease process, treatment plan, medications, and discharge instructions  Description: Complete learning assessment and assess knowledge base    Interventions:  - Provide teaching at level of understanding  - Provide teaching via preferred learning methods  Outcome: Progressing     Problem: Prexisting or High Potential for Compromised Skin Integrity  Goal: Skin integrity is maintained or improved  Description: INTERVENTIONS:  - Identify patients at risk for skin breakdown  - Assess and monitor skin integrity  - Assess and monitor nutrition and hydration status  - Monitor labs   - Assess for incontinence   - Turn and reposition patient  - Assist with mobility/ambulation  - Relieve pressure over bony prominences  - Avoid friction and shearing  - Provide appropriate hygiene as needed including keeping skin clean and dry  - Evaluate need for skin moisturizer/barrier cream  - Collaborate with interdisciplinary team   - Patient/family teaching  - Consider wound care consult   Outcome: Progressing

## 2021-01-26 NOTE — ASSESSMENT & PLAN NOTE
Previously with slight high anion gap metabolic acidosis  Possibly secondary to CKD  Previously on bicarb drip, this was discontinued  Still with metabolic acidosis however kidney function improving  Nephrology on board  Continue Bumex drip    Monitor BMP

## 2021-01-26 NOTE — ASSESSMENT & PLAN NOTE
· Leukocytosis down to 13 from 15  · COVID-19, influenza, RSV tests were negative  · Chest x-ray officially read as multifocal pneumonia  · Thus will continue to treat as hospital-acquired pneumonia with IV cefepime  · Blood cultures x2 pending final  · CBC with differential count  · Cough medicine/antitussive, lozenges and Chloraseptic spray liudmila Tariq

## 2021-01-26 NOTE — OCCUPATIONAL THERAPY NOTE
OT CANCEL NOTE:    OT orders received  Pt is currently on HD and unable to participate in skilled OT eval at this time  Will continue to follow and evaluate as able

## 2021-01-26 NOTE — ASSESSMENT & PLAN NOTE
· Cardiologist and nephrologist on board  · Continue Bumex drip  · Continue cardiovascular and heart failure medications  · Monitor input and output  · Daily weights    · Increasing oxygen demand, currently on Mid Flow 6L

## 2021-01-26 NOTE — PLAN OF CARE
Problem: Potential for Falls  Goal: Patient will remain free of falls  Description: INTERVENTIONS:  - Assess patient frequently for physical needs  -  Identify cognitive and physical deficits and behaviors that affect risk of falls    -  [place] fall precautions as indicated by assessment   - Educate patient/family on patient safety including physical limitations  - Instruct patient to call for assistance with activity based on assessment  - Modify environment to reduce risk of injury  - Consider OT/PT consult to assist with strengthening/mobility  Outcome: Progressing     Problem: CARDIOVASCULAR - ADULT  Goal: Maintains optimal cardiac output and hemodynamic stability  Description: INTERVENTIONS:  - Monitor I/O, vital signs and rhythm  - Monitor for S/S and trends of decreased cardiac output  - Administer and titrate ordered vasoactive medications to optimize hemodynamic stability  - Assess quality of pulses, skin color and temperature  - Assess for signs of decreased coronary artery perfusion  - Instruct patient to report change in severity of symptoms  Outcome: Progressing  Goal: Absence of cardiac dysrhythmias or at baseline rhythm  Description: INTERVENTIONS:  - Continuous cardiac monitoring, vital signs, obtain 12 lead EKG if ordered  - Administer antiarrhythmic and heart rate control medications as ordered  - Monitor electrolytes and administer replacement therapy as ordered  Outcome: Progressing     Problem: RESPIRATORY - ADULT  Goal: Achieves optimal ventilation and oxygenation  Description: INTERVENTIONS:  - Assess for changes in respiratory status  - Assess for changes in mentation and behavior  - Position to facilitate oxygenation and minimize respiratory effort  - Oxygen administered by appropriate delivery if ordered  - Initiate smoking cessation education as indicated  - Encourage broncho-pulmonary hygiene including cough, deep breathe, Incentive Spirometry  - Assess the need for suctioning and aspirate as needed  - Assess and instruct to report SOB or any respiratory difficulty  - Respiratory Therapy support as indicated  Outcome: Progressing     Problem: GASTROINTESTINAL - ADULT  Goal: Maintains adequate nutritional intake  Description: INTERVENTIONS:  - Monitor percentage of each meal consumed  - Identify factors contributing to decreased intake, treat as appropriate  - Assist with meals as needed  - Monitor I&O, weight, and lab values if indicated  - Obtain nutrition services referral as needed  Outcome: Progressing     Problem: GENITOURINARY - ADULT  Goal: Maintains or returns to baseline urinary function  Description: INTERVENTIONS:  - Assess urinary function  - Encourage oral fluids to ensure adequate hydration if ordered  - Administer IV fluids as ordered to ensure adequate hydration  - Administer ordered medications as needed  - Offer frequent toileting  - Follow urinary retention protocol if ordered  Outcome: Progressing  Goal: Absence of urinary retention  Description: INTERVENTIONS:  - Assess patients ability to void and empty bladder  - Monitor I/O  - Bladder scan as needed  - Discuss with physician/AP medications to alleviate retention as needed  - Discuss catheterization for long term situations as appropriate  Outcome: Progressing     Problem: METABOLIC, FLUID AND ELECTROLYTES - ADULT  Goal: Electrolytes maintained within normal limits  Description: INTERVENTIONS:  - Monitor labs and assess patient for signs and symptoms of electrolyte imbalances  - Administer electrolyte replacement as ordered  - Monitor response to electrolyte replacements, including repeat lab results as appropriate  - Instruct patient on fluid and nutrition as appropriate  Outcome: Progressing  Goal: Fluid balance maintained  Description: INTERVENTIONS:  - Monitor labs   - Monitor I/O and WT  - Instruct patient on fluid and nutrition as appropriate  - Assess for signs & symptoms of volume excess or deficit  Outcome: Progressing  Goal: Glucose maintained within target range  Description: INTERVENTIONS:  - Monitor Blood Glucose as ordered  - Assess for signs and symptoms of hyperglycemia and hypoglycemia  - Administer ordered medications to maintain glucose within target range  - Assess nutritional intake and initiate nutrition service referral as needed  Outcome: Progressing     Problem: SKIN/TISSUE INTEGRITY - ADULT  Goal: Skin integrity remains intact  Description: INTERVENTIONS  - Identify patients at risk for skin breakdown  - Assess and monitor skin integrity  - Assess and monitor nutrition and hydration status  - Monitor labs (i e  albumin)  - Assess for incontinence   - Turn and reposition patient  - Assist with mobility/ambulation  - Relieve pressure over bony prominences  - Avoid friction and shearing  - Provide appropriate hygiene as needed including keeping skin clean and dry  - Evaluate need for skin moisturizer/barrier cream  - Collaborate with interdisciplinary team (i e  Nutrition, Rehabilitation, etc )   - Patient/family teaching  Outcome: Progressing     Problem: HEMATOLOGIC - ADULT  Goal: Maintains hematologic stability  Description: INTERVENTIONS  - Assess for signs and symptoms of bleeding or hemorrhage  - Monitor labs  - Administer supportive blood products/factors as ordered and appropriate  Outcome: Progressing     Problem: MUSCULOSKELETAL - ADULT  Goal: Maintain or return mobility to safest level of function  Description: INTERVENTIONS:  - Assess patient's ability to carry out ADLs; assess patient's baseline for ADL function and identify physical deficits which impact ability to perform ADLs (bathing, care of mouth/teeth, toileting, grooming, dressing, etc )  - Assess/evaluate cause of self-care deficits   - Assess range of motion  - Assess patient's mobility  - Assess patient's need for assistive devices and provide as appropriate  - Encourage maximum independence but intervene and supervise when necessary  - Involve family in performance of ADLs  - Assess for home care needs following discharge   - Consider OT consult to assist with ADL evaluation and planning for discharge  - Provide patient education as appropriate  Outcome: Progressing     Problem: PAIN - ADULT  Goal: Verbalizes/displays adequate comfort level or baseline comfort level  Description: Interventions:  - Encourage patient to monitor pain and request assistance  - Assess pain using appropriate pain scale  - Administer analgesics based on type and severity of pain and evaluate response  - Implement non-pharmacological measures as appropriate and evaluate response  - Consider cultural and social influences on pain and pain management  - Notify physician/advanced practitioner if interventions unsuccessful or patient reports new pain  Outcome: Progressing     Problem: INFECTION - ADULT  Goal: Absence or prevention of progression during hospitalization  Description: INTERVENTIONS:  - Assess and monitor for signs and symptoms of infection  - Monitor lab/diagnostic results  - Monitor all insertion sites, i e  indwelling lines, tubes, and drains  - Monitor endotracheal if appropriate and nasal secretions for changes in amount and color  - [place] appropriate cooling/warming therapies per order  - Administer medications as ordered  - Instruct and encourage patient and family to use good hand hygiene technique  - Identify and instruct in appropriate isolation precautions for identified infection/condition  Outcome: Progressing  Goal: Absence of fever/infection during neutropenic period  Description: INTERVENTIONS:  - Monitor WBC    Outcome: Progressing     Problem: SAFETY ADULT  Goal: Patient will remain free of falls  Description: INTERVENTIONS:  - Assess patient frequently for physical needs  -  Identify cognitive and physical deficits and behaviors that affect risk of falls    -  [place] fall precautions as indicated by assessment   - Educate patient/family on patient safety including physical limitations  - Instruct patient to call for assistance with activity based on assessment  - Modify environment to reduce risk of injury  - Consider OT/PT consult to assist with strengthening/mobility  Outcome: Progressing  Goal: Maintain or return to baseline ADL function  Description: INTERVENTIONS:  -  Assess patient's ability to carry out ADLs; assess patient's baseline for ADL function and identify physical deficits which impact ability to perform ADLs (bathing, care of mouth/teeth, toileting, grooming, dressing, etc )  - Assess/evaluate cause of self-care deficits   - Assess range of motion  - Assess patient's mobility; develop plan if impaired  - Assess patient's need for assistive devices and provide as appropriate  - Encourage maximum independence but intervene and supervise when necessary  - Involve family in performance of ADLs  - Assess for home care needs following discharge   - Consider OT consult to assist with ADL evaluation and planning for discharge  - Provide patient education as appropriate  Outcome: Progressing  Goal: Maintain or return mobility status to optimal level  Description: INTERVENTIONS:  - Assess patient's baseline mobility status (ambulation, transfers, stairs, etc )    - Identify cognitive and physical deficits and behaviors that affect mobility  - Identify mobility aids required to assist with transfers and/or ambulation (gait belt, sit-to-stand, lift, walker, cane, etc )  - [place] fall precautions as indicated by assessment  - Record patient progress and toleration of activity level on Mobility SBAR; progress patient to next Phase/Stage  - Instruct patient to call for assistance with activity based on assessment  - Consider rehabilitation consult to assist with strengthening/weightbearing, etc   Outcome: Progressing     Problem: DISCHARGE PLANNING  Goal: Discharge to home or other facility with appropriate resources  Description: INTERVENTIONS:  - Identify barriers to discharge w/patient and caregiver  - Arrange for needed discharge resources and transportation as appropriate  - Identify discharge learning needs (meds, wound care, etc )  - Arrange for interpretive services to assist at discharge as needed  - Refer to Case Management Department for coordinating discharge planning if the patient needs post-hospital services based on physician/advanced practitioner order or complex needs related to functional status, cognitive ability, or social support system  Outcome: Progressing     Problem: Knowledge Deficit  Goal: Patient/family/caregiver demonstrates understanding of disease process, treatment plan, medications, and discharge instructions  Description: Complete learning assessment and assess knowledge base    Interventions:  - Provide teaching at level of understanding  - Provide teaching via preferred learning methods  Outcome: Progressing     Problem: Prexisting or High Potential for Compromised Skin Integrity  Goal: Skin integrity is maintained or improved  Description: INTERVENTIONS:  - Identify patients at risk for skin breakdown  - Assess and monitor skin integrity  - Assess and monitor nutrition and hydration status  - Monitor labs   - Assess for incontinence   - Turn and reposition patient  - Assist with mobility/ambulation  - Relieve pressure over bony prominences  - Avoid friction and shearing  - Provide appropriate hygiene as needed including keeping skin clean and dry  - Evaluate need for skin moisturizer/barrier cream  - Collaborate with interdisciplinary team   - Patient/family teaching  - Consider wound care consult   Outcome: Progressing

## 2021-01-27 LAB
ANION GAP SERPL CALCULATED.3IONS-SCNC: 15 MMOL/L (ref 4–13)
BUN SERPL-MCNC: 111 MG/DL (ref 5–25)
CALCIUM SERPL-MCNC: 8.3 MG/DL (ref 8.3–10.1)
CHLORIDE SERPL-SCNC: 99 MMOL/L (ref 100–108)
CO2 SERPL-SCNC: 21 MMOL/L (ref 21–32)
CREAT SERPL-MCNC: 3.54 MG/DL (ref 0.6–1.3)
ERYTHROCYTE [DISTWIDTH] IN BLOOD BY AUTOMATED COUNT: 13.8 % (ref 11.6–15.1)
FERRITIN SERPL-MCNC: 913 NG/ML (ref 8–388)
GFR SERPL CREATININE-BSD FRML MDRD: 15 ML/MIN/1.73SQ M
GLUCOSE SERPL-MCNC: 163 MG/DL (ref 65–140)
GLUCOSE SERPL-MCNC: 229 MG/DL (ref 65–140)
GLUCOSE SERPL-MCNC: 238 MG/DL (ref 65–140)
GLUCOSE SERPL-MCNC: 274 MG/DL (ref 65–140)
GLUCOSE SERPL-MCNC: 317 MG/DL (ref 65–140)
HCT VFR BLD AUTO: 24.9 % (ref 36.5–49.3)
HGB BLD-MCNC: 8.2 G/DL (ref 12–17)
IRON SATN MFR SERPL: 14 %
IRON SERPL-MCNC: 37 UG/DL (ref 65–175)
MCH RBC QN AUTO: 31.5 PG (ref 26.8–34.3)
MCHC RBC AUTO-ENTMCNC: 32.9 G/DL (ref 31.4–37.4)
MCV RBC AUTO: 96 FL (ref 82–98)
PHOSPHATE SERPL-MCNC: 5.1 MG/DL (ref 2.3–4.1)
PLATELET # BLD AUTO: 150 THOUSANDS/UL (ref 149–390)
PMV BLD AUTO: 10.3 FL (ref 8.9–12.7)
POTASSIUM SERPL-SCNC: 3.9 MMOL/L (ref 3.5–5.3)
PROCALCITONIN SERPL-MCNC: 0.9 NG/ML
RBC # BLD AUTO: 2.6 MILLION/UL (ref 3.88–5.62)
SODIUM SERPL-SCNC: 135 MMOL/L (ref 136–145)
TIBC SERPL-MCNC: 267 UG/DL (ref 250–450)
WBC # BLD AUTO: 11.96 THOUSAND/UL (ref 4.31–10.16)

## 2021-01-27 PROCEDURE — 99232 SBSQ HOSP IP/OBS MODERATE 35: CPT | Performed by: INTERNAL MEDICINE

## 2021-01-27 PROCEDURE — 83540 ASSAY OF IRON: CPT | Performed by: PHYSICIAN ASSISTANT

## 2021-01-27 PROCEDURE — 97163 PT EVAL HIGH COMPLEX 45 MIN: CPT

## 2021-01-27 PROCEDURE — 84100 ASSAY OF PHOSPHORUS: CPT | Performed by: PHYSICIAN ASSISTANT

## 2021-01-27 PROCEDURE — 82728 ASSAY OF FERRITIN: CPT | Performed by: PHYSICIAN ASSISTANT

## 2021-01-27 PROCEDURE — 84145 PROCALCITONIN (PCT): CPT | Performed by: INTERNAL MEDICINE

## 2021-01-27 PROCEDURE — 80048 BASIC METABOLIC PNL TOTAL CA: CPT | Performed by: PHYSICIAN ASSISTANT

## 2021-01-27 PROCEDURE — 94760 N-INVAS EAR/PLS OXIMETRY 1: CPT

## 2021-01-27 PROCEDURE — 97535 SELF CARE MNGMENT TRAINING: CPT

## 2021-01-27 PROCEDURE — 99232 SBSQ HOSP IP/OBS MODERATE 35: CPT | Performed by: HOSPITALIST

## 2021-01-27 PROCEDURE — 82948 REAGENT STRIP/BLOOD GLUCOSE: CPT

## 2021-01-27 PROCEDURE — 83550 IRON BINDING TEST: CPT | Performed by: PHYSICIAN ASSISTANT

## 2021-01-27 PROCEDURE — 97167 OT EVAL HIGH COMPLEX 60 MIN: CPT

## 2021-01-27 PROCEDURE — 94762 N-INVAS EAR/PLS OXIMTRY CONT: CPT

## 2021-01-27 PROCEDURE — 97530 THERAPEUTIC ACTIVITIES: CPT

## 2021-01-27 PROCEDURE — 85027 COMPLETE CBC AUTOMATED: CPT | Performed by: PHYSICIAN ASSISTANT

## 2021-01-27 RX ORDER — IRON POLYSACCHARIDE COMPLEX 150 MG
150 CAPSULE ORAL DAILY
Status: DISCONTINUED | OUTPATIENT
Start: 2021-01-27 | End: 2021-02-12 | Stop reason: HOSPADM

## 2021-01-27 RX ORDER — METOLAZONE 5 MG/1
5 TABLET ORAL ONCE
Status: COMPLETED | OUTPATIENT
Start: 2021-01-27 | End: 2021-01-27

## 2021-01-27 RX ADMIN — Medication 4 MG/HR: at 12:26

## 2021-01-27 RX ADMIN — HYDRALAZINE HYDROCHLORIDE 50 MG: 25 TABLET, FILM COATED ORAL at 14:12

## 2021-01-27 RX ADMIN — HEPARIN SODIUM 5000 UNITS: 5000 INJECTION INTRAVENOUS; SUBCUTANEOUS at 05:03

## 2021-01-27 RX ADMIN — POLYSACCHARIDE-IRON COMPLEX 150 MG: 150 CAPSULE ORAL at 11:53

## 2021-01-27 RX ADMIN — Medication 4 MG/HR: at 19:15

## 2021-01-27 RX ADMIN — AMLODIPINE BESYLATE 5 MG: 5 TABLET ORAL at 08:12

## 2021-01-27 RX ADMIN — CEFEPIME HYDROCHLORIDE 2000 MG: 2 INJECTION, POWDER, FOR SOLUTION INTRAVENOUS at 13:15

## 2021-01-27 RX ADMIN — INSULIN LISPRO 3 UNITS: 100 INJECTION, SOLUTION INTRAVENOUS; SUBCUTANEOUS at 21:28

## 2021-01-27 RX ADMIN — METOLAZONE 5 MG: 5 TABLET ORAL at 14:12

## 2021-01-27 RX ADMIN — INSULIN DETEMIR 20 UNITS: 100 INJECTION, SOLUTION SUBCUTANEOUS at 21:16

## 2021-01-27 RX ADMIN — METOPROLOL TARTRATE 25 MG: 25 TABLET, FILM COATED ORAL at 21:16

## 2021-01-27 RX ADMIN — ATORVASTATIN CALCIUM 40 MG: 40 TABLET, FILM COATED ORAL at 17:22

## 2021-01-27 RX ADMIN — INSULIN LISPRO 4 UNITS: 100 INJECTION, SOLUTION INTRAVENOUS; SUBCUTANEOUS at 11:53

## 2021-01-27 RX ADMIN — INSULIN LISPRO 4 UNITS: 100 INJECTION, SOLUTION INTRAVENOUS; SUBCUTANEOUS at 17:26

## 2021-01-27 RX ADMIN — AMLODIPINE BESYLATE 5 MG: 5 TABLET ORAL at 17:22

## 2021-01-27 RX ADMIN — NYSTATIN 500000 UNITS: 100000 SUSPENSION ORAL at 21:16

## 2021-01-27 RX ADMIN — HYDRALAZINE HYDROCHLORIDE 50 MG: 25 TABLET, FILM COATED ORAL at 05:03

## 2021-01-27 RX ADMIN — INSULIN LISPRO 2 UNITS: 100 INJECTION, SOLUTION INTRAVENOUS; SUBCUTANEOUS at 08:15

## 2021-01-27 RX ADMIN — ASPIRIN 81 MG: 81 TABLET ORAL at 08:12

## 2021-01-27 RX ADMIN — Medication 4 MG/HR: at 05:03

## 2021-01-27 RX ADMIN — INSULIN LISPRO 4 UNITS: 100 INJECTION, SOLUTION INTRAVENOUS; SUBCUTANEOUS at 08:16

## 2021-01-27 RX ADMIN — HYDRALAZINE HYDROCHLORIDE 50 MG: 25 TABLET, FILM COATED ORAL at 21:16

## 2021-01-27 RX ADMIN — GUAIFENESIN 1200 MG: 600 TABLET, EXTENDED RELEASE ORAL at 21:17

## 2021-01-27 RX ADMIN — Medication 4 MG/HR: at 21:56

## 2021-01-27 RX ADMIN — INSULIN DETEMIR 20 UNITS: 100 INJECTION, SOLUTION SUBCUTANEOUS at 08:12

## 2021-01-27 RX ADMIN — METOPROLOL TARTRATE 25 MG: 25 TABLET, FILM COATED ORAL at 08:12

## 2021-01-27 RX ADMIN — NYSTATIN 500000 UNITS: 100000 SUSPENSION ORAL at 08:12

## 2021-01-27 RX ADMIN — HEPARIN SODIUM 5000 UNITS: 5000 INJECTION INTRAVENOUS; SUBCUTANEOUS at 14:12

## 2021-01-27 RX ADMIN — HEPARIN SODIUM 5000 UNITS: 5000 INJECTION INTRAVENOUS; SUBCUTANEOUS at 21:16

## 2021-01-27 RX ADMIN — Medication 4 MG/HR: at 09:05

## 2021-01-27 RX ADMIN — CLOPIDOGREL BISULFATE 75 MG: 75 TABLET ORAL at 08:12

## 2021-01-27 RX ADMIN — NYSTATIN 500000 UNITS: 100000 SUSPENSION ORAL at 11:52

## 2021-01-27 RX ADMIN — GUAIFENESIN 1200 MG: 600 TABLET, EXTENDED RELEASE ORAL at 08:12

## 2021-01-27 RX ADMIN — INSULIN LISPRO 4 UNITS: 100 INJECTION, SOLUTION INTRAVENOUS; SUBCUTANEOUS at 11:52

## 2021-01-27 RX ADMIN — Medication 4 MG/HR: at 01:41

## 2021-01-27 RX ADMIN — INSULIN LISPRO 5 UNITS: 100 INJECTION, SOLUTION INTRAVENOUS; SUBCUTANEOUS at 17:25

## 2021-01-27 RX ADMIN — Medication 4 MG/HR: at 16:02

## 2021-01-27 RX ADMIN — ESCITALOPRAM 10 MG: 10 TABLET, FILM COATED ORAL at 21:17

## 2021-01-27 RX ADMIN — NYSTATIN 500000 UNITS: 100000 SUSPENSION ORAL at 17:22

## 2021-01-27 NOTE — PLAN OF CARE
Problem: PHYSICAL THERAPY ADULT  Goal: Performs mobility at highest level of function for planned discharge setting  See evaluation for individualized goals  Description: Treatment/Interventions: Functional transfer training, LE strengthening/ROM, Therapeutic exercise, Endurance training, Patient/family training, Equipment eval/education, Bed mobility(PT to see for gait when appropriate)  Equipment Recommended: (Therapy will trial Jorene Simmonds in upcoming sessions)       See flowsheet documentation for full assessment, interventions and recommendations  Note: Prognosis: Fair  Problem List: Decreased strength, Decreased range of motion, Decreased endurance, Impaired balance, Decreased mobility, Decreased coordination, Impaired vision, Pain  Assessment: Lance Argueta is a [de-identified] y o  Male who [facility]2021 from home, pt transferred from [facility] where he arrived w/ c/o chest pain and dx w/ NSTEMI, pt transferred to [facility] for cardiac catheterization which he underwent on 1/22/2021  Orders for PT eval and treat received, w/ activity orders of up w/ A and fall precautions  Pt presents w/ comorbidities of CABG x 2, CAD, CKD Stage 3, HTN, DMII,  and personal factors including: advanced age, living in raised 1 story house, mobilizing w/ assistive device, stair(s) to enter home and visual impairments  At baseline, pt mobilizes independently w/ SPC in home, RW in the community, and reports 0 falls in the last 6 months  Upon evaluation, pt presents w/ the following deficits: weakness, decreased ROM, edema of extremities and impaired balance, decreased endurance  Pt requires max A x 2 for bed mobility, max A x 2 to attempt STS (able to achieve ~50%), and able to sit EOB w/ supervision   Pt's clinical presentation is unstable/unpredictable due to poor blood pressure control, need for assist w/ all phases of mobility when usually mobilizing independently, need for supplemental oxygen in order to maintain oxygen saturation, need for input for task focus and mobility technique and recent drastic decline in mobility compared to baseline}  Given the above findings, discharge recommendation is for inpatient rehab  During this admission, pt would benefit from skilled acute inpatient PT in order to address the abovementioned deficits to maximize function and mobility before DC from acute care  PT Discharge Recommendation: Post-Acute Rehabilitation Services          See flowsheet documentation for full assessment

## 2021-01-27 NOTE — ASSESSMENT & PLAN NOTE
Type 1 MI  Presented with chest pain at [facility]   Hx of CAD s/p stenting and CABG 2016  Received ASA 324mg at home and SL nitro x1 in the ER  Troponin peaked at 5 5  Continue cardiovascular medications with aspirin, Plavix, statin, beta-blocker and Imdur  Cardiac Cath Completed at [facility]   - three-vessel disease with 99% occluded proximal circumflex, 50% distal left main, mid % occluded, RCA proximally 100% occluded with collaterals from left circulation  Patient has his patent grafts of LIMA to LAD and SVG to OM2   Medical intervention recommend at this time  Echo complete - reveals 60% EF

## 2021-01-27 NOTE — PLAN OF CARE
Problem: Potential for Falls  Goal: Patient will remain free of falls  Description: INTERVENTIONS:  - Assess patient frequently for physical needs  -  Identify cognitive and physical deficits and behaviors that affect risk of falls    -  [place] fall precautions as indicated by assessment   - Educate patient/family on patient safety including physical limitations  - Instruct patient to call for assistance with activity based on assessment  - Modify environment to reduce risk of injury  - Consider OT/PT consult to assist with strengthening/mobility  Outcome: Progressing     Problem: CARDIOVASCULAR - ADULT  Goal: Maintains optimal cardiac output and hemodynamic stability  Description: INTERVENTIONS:  - Monitor I/O, vital signs and rhythm  - Monitor for S/S and trends of decreased cardiac output  - Administer and titrate ordered vasoactive medications to optimize hemodynamic stability  - Assess quality of pulses, skin color and temperature  - Assess for signs of decreased coronary artery perfusion  - Instruct patient to report change in severity of symptoms  Outcome: Progressing  Goal: Absence of cardiac dysrhythmias or at baseline rhythm  Description: INTERVENTIONS:  - Continuous cardiac monitoring, vital signs, obtain 12 lead EKG if ordered  - Administer antiarrhythmic and heart rate control medications as ordered  - Monitor electrolytes and administer replacement therapy as ordered  Outcome: Progressing     Problem: RESPIRATORY - ADULT  Goal: Achieves optimal ventilation and oxygenation  Description: INTERVENTIONS:  - Assess for changes in respiratory status  - Assess for changes in mentation and behavior  - Position to facilitate oxygenation and minimize respiratory effort  - Oxygen administered by appropriate delivery if ordered  - Initiate smoking cessation education as indicated  - Encourage broncho-pulmonary hygiene including cough, deep breathe, Incentive Spirometry  - Assess the need for suctioning and aspirate as needed  - Assess and instruct to report SOB or any respiratory difficulty  - Respiratory Therapy support as indicated  Outcome: Progressing     Problem: GASTROINTESTINAL - ADULT  Goal: Maintains adequate nutritional intake  Description: INTERVENTIONS:  - Monitor percentage of each meal consumed  - Identify factors contributing to decreased intake, treat as appropriate  - Assist with meals as needed  - Monitor I&O, weight, and lab values if indicated  - Obtain nutrition services referral as needed  Outcome: Progressing     Problem: GENITOURINARY - ADULT  Goal: Maintains or returns to baseline urinary function  Description: INTERVENTIONS:  - Assess urinary function  - Encourage oral fluids to ensure adequate hydration if ordered  - Administer IV fluids as ordered to ensure adequate hydration  - Administer ordered medications as needed  - Offer frequent toileting  - Follow urinary retention protocol if ordered  Outcome: Progressing  Goal: Absence of urinary retention  Description: INTERVENTIONS:  - Assess patients ability to void and empty bladder  - Monitor I/O  - Bladder scan as needed  - Discuss with physician/AP medications to alleviate retention as needed  - Discuss catheterization for long term situations as appropriate  Outcome: Progressing     Problem: METABOLIC, FLUID AND ELECTROLYTES - ADULT  Goal: Electrolytes maintained within normal limits  Description: INTERVENTIONS:  - Monitor labs and assess patient for signs and symptoms of electrolyte imbalances  - Administer electrolyte replacement as ordered  - Monitor response to electrolyte replacements, including repeat lab results as appropriate  - Instruct patient on fluid and nutrition as appropriate  Outcome: Progressing  Goal: Fluid balance maintained  Description: INTERVENTIONS:  - Monitor labs   - Monitor I/O and WT  - Instruct patient on fluid and nutrition as appropriate  - Assess for signs & symptoms of volume excess or deficit  Outcome: Progressing  Goal: Glucose maintained within target range  Description: INTERVENTIONS:  - Monitor Blood Glucose as ordered  - Assess for signs and symptoms of hyperglycemia and hypoglycemia  - Administer ordered medications to maintain glucose within target range  - Assess nutritional intake and initiate nutrition service referral as needed  Outcome: Progressing     Problem: SKIN/TISSUE INTEGRITY - ADULT  Goal: Skin integrity remains intact  Description: INTERVENTIONS  - Identify patients at risk for skin breakdown  - Assess and monitor skin integrity  - Assess and monitor nutrition and hydration status  - Monitor labs (i e  albumin)  - Assess for incontinence   - Turn and reposition patient  - Assist with mobility/ambulation  - Relieve pressure over bony prominences  - Avoid friction and shearing  - Provide appropriate hygiene as needed including keeping skin clean and dry  - Evaluate need for skin moisturizer/barrier cream  - Collaborate with interdisciplinary team (i e  Nutrition, Rehabilitation, etc )   - Patient/family teaching  Outcome: Progressing     Problem: HEMATOLOGIC - ADULT  Goal: Maintains hematologic stability  Description: INTERVENTIONS  - Assess for signs and symptoms of bleeding or hemorrhage  - Monitor labs  - Administer supportive blood products/factors as ordered and appropriate  Outcome: Progressing     Problem: MUSCULOSKELETAL - ADULT  Goal: Maintain or return mobility to safest level of function  Description: INTERVENTIONS:  - Assess patient's ability to carry out ADLs; assess patient's baseline for ADL function and identify physical deficits which impact ability to perform ADLs (bathing, care of mouth/teeth, toileting, grooming, dressing, etc )  - Assess/evaluate cause of self-care deficits   - Assess range of motion  - Assess patient's mobility  - Assess patient's need for assistive devices and provide as appropriate  - Encourage maximum independence but intervene and supervise when necessary  - Involve family in performance of ADLs  - Assess for home care needs following discharge   - Consider OT consult to assist with ADL evaluation and planning for discharge  - Provide patient education as appropriate  Outcome: Progressing     Problem: PAIN - ADULT  Goal: Verbalizes/displays adequate comfort level or baseline comfort level  Description: Interventions:  - Encourage patient to monitor pain and request assistance  - Assess pain using appropriate pain scale  - Administer analgesics based on type and severity of pain and evaluate response  - Implement non-pharmacological measures as appropriate and evaluate response  - Consider cultural and social influences on pain and pain management  - Notify physician/advanced practitioner if interventions unsuccessful or patient reports new pain  Outcome: Progressing     Problem: INFECTION - ADULT  Goal: Absence or prevention of progression during hospitalization  Description: INTERVENTIONS:  - Assess and monitor for signs and symptoms of infection  - Monitor lab/diagnostic results  - Monitor all insertion sites, i e  indwelling lines, tubes, and drains  - Monitor endotracheal if appropriate and nasal secretions for changes in amount and color  - [place] appropriate cooling/warming therapies per order  - Administer medications as ordered  - Instruct and encourage patient and family to use good hand hygiene technique  - Identify and instruct in appropriate isolation precautions for identified infection/condition  Outcome: Progressing     Problem: SAFETY ADULT  Goal: Patient will remain free of falls  Description: INTERVENTIONS:  - Assess patient frequently for physical needs  -  Identify cognitive and physical deficits and behaviors that affect risk of falls    -  [place] fall precautions as indicated by assessment   - Educate patient/family on patient safety including physical limitations  - Instruct patient to call for assistance with activity based on assessment  - Modify environment to reduce risk of injury  - Consider OT/PT consult to assist with strengthening/mobility  Outcome: Progressing  Goal: Maintain or return to baseline ADL function  Description: INTERVENTIONS:  -  Assess patient's ability to carry out ADLs; assess patient's baseline for ADL function and identify physical deficits which impact ability to perform ADLs (bathing, care of mouth/teeth, toileting, grooming, dressing, etc )  - Assess/evaluate cause of self-care deficits   - Assess range of motion  - Assess patient's mobility; develop plan if impaired  - Assess patient's need for assistive devices and provide as appropriate  - Encourage maximum independence but intervene and supervise when necessary  - Involve family in performance of ADLs  - Assess for home care needs following discharge   - Consider OT consult to assist with ADL evaluation and planning for discharge  - Provide patient education as appropriate  Outcome: Progressing  Goal: Maintain or return mobility status to optimal level  Description: INTERVENTIONS:  - Assess patient's baseline mobility status (ambulation, transfers, stairs, etc )    - Identify cognitive and physical deficits and behaviors that affect mobility  - Identify mobility aids required to assist with transfers and/or ambulation (gait belt, sit-to-stand, lift, walker, cane, etc )  - [place] fall precautions as indicated by assessment  - Record patient progress and toleration of activity level on Mobility SBAR; progress patient to next Phase/Stage  - Instruct patient to call for assistance with activity based on assessment  - Consider rehabilitation consult to assist with strengthening/weightbearing, etc   Outcome: Progressing     Problem: DISCHARGE PLANNING  Goal: Discharge to home or other facility with appropriate resources  Description: INTERVENTIONS:  - Identify barriers to discharge w/patient and caregiver  - Arrange for needed discharge resources and transportation as appropriate  - Identify discharge learning needs (meds, wound care, etc )  - Arrange for interpretive services to assist at discharge as needed  - Refer to Case Management Department for coordinating discharge planning if the patient needs post-hospital services based on physician/advanced practitioner order or complex needs related to functional status, cognitive ability, or social support system  Outcome: Progressing     Problem: Knowledge Deficit  Goal: Patient/family/caregiver demonstrates understanding of disease process, treatment plan, medications, and discharge instructions  Description: Complete learning assessment and assess knowledge base    Interventions:  - Provide teaching at level of understanding  - Provide teaching via preferred learning methods  Outcome: Progressing     Problem: Prexisting or High Potential for Compromised Skin Integrity  Goal: Skin integrity is maintained or improved  Description: INTERVENTIONS:  - Identify patients at risk for skin breakdown  - Assess and monitor skin integrity  - Assess and monitor nutrition and hydration status  - Monitor labs   - Assess for incontinence   - Turn and reposition patient  - Assist with mobility/ambulation  - Relieve pressure over bony prominences  - Avoid friction and shearing  - Provide appropriate hygiene as needed including keeping skin clean and dry  - Evaluate need for skin moisturizer/barrier cream  - Collaborate with interdisciplinary team   - Patient/family teaching  - Consider wound care consult   Outcome: Progressing

## 2021-01-27 NOTE — PROGRESS NOTES
General Cardiology   Progress Note -  Team One   Luis De La Cruz [de-identified] y o  male MRN: [id number]    Unit/Bed#: S -01 Encounter: [id number]    Assessment:    1  Type 2 MI/CAD: s/p CABG x2 with LIMA-LAD, SVG-OM1 in 2016  Presented with chest pain with peak troponin 1 5     · Underwent cardiac catheterization 1/22 with patent LIMA-LAD, 40% stenosis of SVG-OM1   Medical management recommended  · Currently on Aspirin, Plavix (added this admission), statin, beta-blocker  · Denies any recurrent chest pain   2  Acute on chronic HFpEF:  Had no significant response to Bumex infusion and metolazone  Started on dialysis yesterday with plan for ultrafiltration today per Nephrology  Remains on Bumex infusion at 4 mg/hr per Nephrology  · Output -4 4 L  · Oxygen weaned down to 6 L NC  3  Acute renal failure on CKD 3:  Baseline creatinine 2 3-2 5   Creatinine 4 05 post catheterization secondary to ATN   Was started on Bumex infusion with metolazone with no significant output and further worsening of creatinine to 4 9  Started on dialysis 1/25 with UF on 01/26 per Nephrology  Creatinine improved to 3 54 today  4  Essential hypertension:  Average /58 on amlodipine 5 mg BID, Lopressor 25 mg BID, hydralazine 50 mg TID and Bumex infusion  5  Hyperlipidemia: TC 92, , HDL 27, LDL 38 on atorvastatin 40 mg daily  6  Diabetes mellitus:  Hemoglobin A1c 6 7   Management per primary team   7  Oral Candida:  Ordered for Nystatin mouthwash  8  Hyponatremia:  Likely due to hypervolemia  Improving after HD    9  Anemia:  Baseline hemoglobin 10  Hemoglobin 8 6 today     Plan/Recommendations:  · Remains on Bumex infusion at 4 mg/hr  · Nephrology managing volume status with plans for HD today  · Continue current cardiac medications  _______________________________________________________________________    Subjective    Patient seen and examined  No acute events overnight  Patient feels his breathing is better today    He denies any chest pain, lightheadedness  He continues with lower extremity    Review of Systems   Constitution: Negative  Negative for chills  Tired   Cardiovascular: Positive for leg swelling  Negative for chest pain, dyspnea on exertion, near-syncope, orthopnea, palpitations, paroxysmal nocturnal dyspnea and syncope  Respiratory: Positive for shortness of breath  Negative for cough and wheezing  Endocrine: Negative  Hematologic/Lymphatic: Negative  Skin: Negative  Musculoskeletal: Negative  Gastrointestinal: Negative  Negative for diarrhea, nausea and vomiting  Neurological: Negative for dizziness, light-headedness and weakness  Psychiatric/Behavioral: Negative  Negative for altered mental status  All other systems reviewed and are negative  Objective:   Vitals: Blood pressure 133/62, pulse 75, temperature 97 7 °F (36 5 °C), temperature source Oral, resp  rate 20, SpO2 95 %  ,     Wt Readings from Last 3 Encounters:   01/21/21 99 6 kg (219 lb 9 3 oz)   07/15/19 99 5 kg (219 lb 5 7 oz)   12/05/16 99 4 kg (219 lb 2 2 oz)        Lab Results   Component Value Date    CREATININE 3 54 (H) 01/27/2021    CREATININE 4 46 (H) 01/26/2021    CREATININE 4 97 (H) 01/25/2021         There is no height or weight on file to calculate BMI ,     Systolic (33DRZ), KFB:807 , Min:105 , BRITTANY:508     Diastolic (79JEF), INW:53, Min:54, Max:63          Intake/Output Summary (Last 24 hours) at 1/27/2021 0936  Last data filed at 1/27/2021 0513  Gross per 24 hour   Intake 1240 ml   Output 3070 ml   Net -1830 ml     Weight (last 2 days)     None        Telemetry Review: No significant arrhythmias seen on telemetry review  Physical Exam  Vitals signs and nursing note reviewed  Constitutional:       General: He is not in acute distress  Appearance: He is well-developed  Comments: On 6 L NC in NAD   HENT:      Head: Normocephalic and atraumatic     Neck:      Musculoskeletal: Normal range of motion and neck supple  Vascular: No JVD  Cardiovascular:      Rate and Rhythm: Normal rate and regular rhythm  Heart sounds: Normal heart sounds  No murmur  No friction rub  No gallop  Pulmonary:      Effort: Pulmonary effort is normal  No respiratory distress  Breath sounds: No wheezing or rales  Comments: Diminished breath sounds at the bases bilaterally  Chest:      Chest wall: No tenderness  Abdominal:      General: Bowel sounds are normal  There is distension  Palpations: Abdomen is soft  Tenderness: There is no abdominal tenderness  Musculoskeletal: Normal range of motion  General: No tenderness  Right lower leg: Edema present  Left lower leg: Edema present  Skin:     General: Skin is warm and dry  Coloration: Skin is not pale  Findings: No erythema  Neurological:      Mental Status: He is alert and oriented to person, place, and time  Psychiatric:         Behavior: Behavior normal          Thought Content:  Thought content normal          Judgment: Judgment normal          LABORATORY RESULTS  Results from last 7 days   Lab Units 01/22/21  0937 01/22/21  0623 01/22/21  0104   TROPONIN I ng/mL 7 37* 5 59* 1 58*     CBC with diff:   Results from last 7 days   Lab Units 01/27/21  0510 01/26/21  0639 01/25/21  0459 01/24/21  0342 01/24/21  0019 01/23/21  0530 01/22/21  0622 01/22/21  0254  01/21/21  1812   WBC Thousand/uL 11 96* 13 57* 15 56* 15 54*  --  14 93* 8 83 9 99  --  14 01*   HEMOGLOBIN g/dL 8 2* 8 6* 9 4* 9 6*  --  9 6* 10 0* 10 1*  --  11 6*   I STAT HEMOGLOBIN g/dl  --   --   --   --  10 5*  --   --   --   --   --    HEMATOCRIT % 24 9* 25 9* 30 1* 30 8*  --  30 4* 34 0* 32 3*  --  35 9*   HEMATOCRIT, ISTAT %  --   --   --   --  31*  --   --   --   --   --    MCV fL 96 95 103* 102*  --  101* 107* 101*  --  97   PLATELETS Thousands/uL 150 166 156 149  --  152 134* 133*   < > 177   MCH pg 31 5 31 6 32 2 31 8  --  31 8 31 3 31 6  --  31 4   MCHC g/dL 32 9 33 2 31 2* 31 2*  --  31 6 29 4* 31 3*  --  32 3   RDW % 13 8 13 9 14 2 14 3  --  14 6 14 1 14 0  --  13 7   MPV fL 10 3 10 8 10 5 10 4  --  11 0 10 6 10 3   < > 10 6   NRBC AUTO /100 WBCs  --  0 0 0  --   --  0  --   --  0    < > = values in this interval not displayed         CMP:  Results from last 7 days   Lab Units 01/27/21  0510 01/26/21  8248 01/25/21  0459 01/24/21  0335 01/24/21 0019 01/23/21 2254 01/23/21  0530 01/22/21  0622 01/21/21  1812   POTASSIUM mmol/L 3 9 4 2 4 9 5 2  --  5 3 5 1 4 0 4 1   CHLORIDE mmol/L 99* 97* 96* 98*  --  97* 102 105 104   CO2 mmol/L 21 19* 13* 18*  --  17* 19* 16* 20*   CO2, I-STAT mmol/L  --   --   --   --  19*  --   --   --   --    BUN mg/dL 111* 136* 150* 122*  --  120* 97* 76* 70*   CREATININE mg/dL 3 54* 4 46* 4 97* 4 05*  --  3 93* 2 89* 2 32* 2 35*   GLUCOSE, ISTAT mg/dl  --   --   --   --  232*  --   --   --   --    CALCIUM mg/dL 8 3 8 2* 8 7 8 6  --  8 5 8 7 8 4 8 7   AST U/L  --   --   --   --   --   --  53*  --  50*   ALT U/L  --   --   --   --   --   --  71  --  90*   ALK PHOS U/L  --   --   --   --   --   --  106  --  136*   EGFR ml/min/1 73sq m 15 12 10 13  --  14 20 26 25       BMP:  Results from last 7 days   Lab Units 01/27/21  0510 01/26/21  9691 01/25/21  0459 01/24/21  0335 01/24/21 0019 01/23/21 2254 01/23/21  0530 01/22/21  0622   POTASSIUM mmol/L 3 9 4 2 4 9 5 2  --  5 3 5 1 4 0   CHLORIDE mmol/L 99* 97* 96* 98*  --  97* 102 105   CO2 mmol/L 21 19* 13* 18*  --  17* 19* 16*   CO2, I-STAT mmol/L  --   --   --   --  19*  --   --   --    BUN mg/dL 111* 136* 150* 122*  --  120* 97* 76*   CREATININE mg/dL 3 54* 4 46* 4 97* 4 05*  --  3 93* 2 89* 2 32*   GLUCOSE, ISTAT mg/dl  --   --   --   --  232*  --   --   --    CALCIUM mg/dL 8 3 8 2* 8 7 8 6  --  8 5 8 7 8 4       Lab Results   Component Value Date    NTBN 25,215 (H) 01/23/2021    NTBN 2,452 (H) 07/03/2019    NTBN 1,637 (H) 12/04/2016                               Results from last 7 days Lab Units 21  0254   INR  1 24*       Lipid Profile:   Lab Results   Component Value Date    CHOL 158 2016     Lab Results   Component Value Date    HDL 27 (L) 2021    HDL 34 (L) 2017    HDL 31 (L) 2016     Lab Results   Component Value Date    LDLCALC 38 2021    LDLCALC 57 2017    LDLCALC 113 (H) 2016     Lab Results   Component Value Date    TRIG 137 2021    TRIG 127 2017    TRIG 175 (H) 2016       Cardiac testing:   Results for orders placed during the hospital encounter of 21   Echo complete with contrast if indicated    Narrative [place] [address], [address] [facility]  [phone]    Transthoracic Echocardiogram  2D, M-mode, Doppler, and Color Doppler    Study date:  2021    Patient: Lindsay Wong  MR number: [id number]  Account number: [de-identified]  : [id number]  Age: [de-identified] years  Gender: Male  Status: Inpatient  Location: Bedside  Height: 73 in  Weight: 218 9 lb  BP: 146/ 73 mmHg    Indications: CAD    Diagnoses: I21 4 - Non-ST elevation (NSTEMI) myocardial infarction, I25 10 - Atherosclerotic heart disease of native coronary artery without angina pectoris    Sonographer:  ROHINI Thomas  Primary Physician:  Alan Roberto MD  Referring Physician:  Marielena Altman MD  Group:  [facility]  Interpreting Physician:  Claire Kim MD    SUMMARY    LEFT VENTRICLE:  Systolic function was normal  Ejection fraction was estimated to be 60 %  There was hypokinesis of the basal inferior wall(s)  Wall thickness was moderately increased  Features were consistent with a pseudonormal left ventricular filling pattern, with concomitant abnormal relaxation and increased filling pressure (grade 2 diastolic dysfunction)  LEFT ATRIUM:  The atrium was mildly dilated  MITRAL VALVE:  There was mild annular calcification  There was moderate regurgitation      AORTIC VALVE:  Transaortic velocity was increased due to valvular stenosis  There was mild stenosis  TRICUSPID VALVE:  There was mild regurgitation  Estimated peak PA pressure was 45 mmHg  PULMONIC VALVE:  There was mild regurgitation  HISTORY: PRIOR HISTORY: NSTEMI; Hypercholesterol; CKD; DM; Elevated troponin; CCAD; CABG; HTN    PROCEDURE: The procedure was performed at the bedside  This was a routine study  The transthoracic approach was used  The study included complete 2D imaging, M-mode, complete spectral Doppler, and color Doppler  The heart rate was 68 bpm,  at the start of the study  Images were obtained from the parasternal, apical, subcostal, and suprasternal notch acoustic windows  Echocardiographic views were limited due to restricted patient mobility and poor patient compliance  This was  a technically difficult study  LEFT VENTRICLE: Size was normal  Systolic function was normal  Ejection fraction was estimated to be 60 %  There was hypokinesis of the basal inferior wall(s)  Wall thickness was moderately increased  No evidence of apical thrombus  DOPPLER: Features were consistent with a pseudonormal left ventricular filling pattern, with concomitant abnormal relaxation and increased filling pressure (grade 2 diastolic dysfunction)  RIGHT VENTRICLE: The size was normal  Systolic function was normal  Wall thickness was normal     LEFT ATRIUM: The atrium was mildly dilated  RIGHT ATRIUM: Size was normal     MITRAL VALVE: There was mild annular calcification  Valve structure was normal  There was normal leaflet separation  DOPPLER: The transmitral velocity was within the normal range  There was no evidence for stenosis  There was moderate  regurgitation  AORTIC VALVE: The valve was trileaflet  Leaflets exhibited mild to moderate calcification and mildly reduced cuspal separation  DOPPLER: Transaortic velocity was increased due to valvular stenosis  There was mild stenosis   There was no  significant regurgitation  TRICUSPID VALVE: The valve structure was normal  There was normal leaflet separation  DOPPLER: The transtricuspid velocity was within the normal range  There was no evidence for stenosis  There was mild regurgitation  Estimated peak PA  pressure was 45 mmHg  PULMONIC VALVE: Leaflets exhibited normal thickness, no calcification, and normal cuspal separation  DOPPLER: The transpulmonic velocity was within the normal range  There was mild regurgitation  PERICARDIUM: There was no pericardial effusion  AORTA: The root exhibited normal size  SYSTEMIC VEINS: IVC: The inferior vena cava was normal in size  SYSTEM MEASUREMENT TABLES    2D  %FS: 39 29 %  Ao Diam: 3 53 cm  Ao asc: 3 86 cm  EDV(Teich): 93 32 ml  EF(Teich): 69 91 %  ESV(Teich): 28 08 ml  IVSd: 1 44 cm  LA Area: 20 85 cm2  LA Diam: 4 71 cm  LVEDV MOD A4C: 122 74 ml  LVEF MOD A4C: 77 19 %  LVESV MOD A4C: 28 ml  LVIDd: 4 52 cm  LVIDs: 2 74 cm  LVLd A4C: 9 07 cm  LVLs A4C: 7 39 cm  LVOT Diam: 2 02 cm  LVPWd: 1 32 cm  RA Area: 14 38 cm2  RVIDd: 3 78 cm  SV MOD A4C: 94 74 ml  SV(Teich): 65 24 ml    CW  AV Env  Ti: 249 29 ms  AV MaxP 56 mmHg  AV VTI: 32 94 cm  AV Vmax: 1 91 m/s  AV Vmean: 1 32 m/s  AV meanP 04 mmHg  TR MaxP 43 mmHg  TR Vmax: 3 22 m/s    MM  TAPSE: 1 39 cm    PW  VIRAL (VTI): 1 06 cm2  VIRAL Vmax: 1 45 cm2  E' Sept: 0 09 m/s  E/E' Sept: 13 84  LVOT Env  Ti: 230 26 ms  LVOT VTI: 10 81 cm  LVOT Vmax: 0 86 m/s  LVOT Vmean: 0 5 m/s  LVOT maxP 95 mmHg  LVOT meanP 26 mmHg  LVSV Dopp: 34 8 ml  MV A Darvin: 0 75 m/s  MV Dec Cross: 5 1 m/s2  MV DecT: 233 34 ms  MV E Darvin: 1 19 m/s  MV E/A Ratio: 1 59  MV PHT: 67 67 ms  MVA By PHT: 3 25 cm2    [facility] Accredited Echocardiography Laboratory    Prepared and electronically signed by    Thuy Pink MD  Signed 2021 07:58:05       No results found for this or any previous visit  No results found for this or any previous visit    No procedure found  No results found for this or any previous visit        Meds/Allergies   all current active meds have been reviewed, current meds:   Current Facility-Administered Medications   Medication Dose Route Frequency    acetaminophen (TYLENOL) tablet 650 mg  650 mg Oral Q6H PRN    aluminum-magnesium hydroxide-simethicone (MYLANTA) oral suspension 30 mL  30 mL Oral Q6H PRN    amLODIPine (NORVASC) tablet 5 mg  5 mg Oral BID    aspirin (ECOTRIN LOW STRENGTH) EC tablet 81 mg  81 mg Oral Daily    atorvastatin (LIPITOR) tablet 40 mg  40 mg Oral Daily With Dinner    benzonatate (TESSALON PERLES) capsule 100 mg  100 mg Oral TID PRN    bumetanide (BUMEX) 12 5 mg infusion 50 mL  4 mg/hr Intravenous Continuous    calcium carbonate (TUMS) chewable tablet 1,000 mg  1,000 mg Oral Daily PRN    cefepime (MAXIPIME) 2,000 mg in dextrose 5 % 50 mL IVPB  2,000 mg Intravenous Q24H    clopidogrel (PLAVIX) tablet 75 mg  75 mg Oral Daily    escitalopram (LEXAPRO) tablet 10 mg  10 mg Oral HS    guaiFENesin (MUCINEX) 12 hr tablet 1,200 mg  1,200 mg Oral Q12H [place]    heparin (porcine) subcutaneous injection 5,000 Units  5,000 Units Subcutaneous Q8H [address]    hydrALAZINE (APRESOLINE) tablet 50 mg  50 mg Oral Q8H [address]    HYDROmorphone (DILAUDID) injection 0 2 mg  0 2 mg Intravenous Q3H PRN    insulin detemir (LEVEMIR) subcutaneous injection 20 Units  20 Units Subcutaneous Q12H [address]    insulin lispro (HumaLOG) 100 units/mL subcutaneous injection 1-6 Units  1-6 Units Subcutaneous TID AC    insulin lispro (HumaLOG) 100 units/mL subcutaneous injection 1-6 Units  1-6 Units Subcutaneous HS    insulin lispro (HumaLOG) 100 units/mL subcutaneous injection 4 Units  4 Units Subcutaneous TID With Meals    metoprolol tartrate (LOPRESSOR) tablet 25 mg  25 mg Oral Q12H [place]    nitroglycerin (NITROSTAT) SL tablet 0 4 mg  0 4 mg Sublingual Q5 Min PRN    nystatin (MYCOSTATIN) oral suspension 500,000 Units  500,000 Units Swish & Swallow 4x Daily    ondansetron (ZOFRAN) injection 4 mg  4 mg Intravenous Q6H PRN    oxyCODONE (ROXICODONE) IR tablet 2 5 mg  2 5 mg Oral Q4H PRN    phenol (CHLORASEPTIC) 1 4 % mucosal liquid 1 spray  1 spray Mouth/Throat Q4H PRN    pneumococcal 13-valent conjugate vaccine (PREVNAR-13) IM injection 0 5 mL  0 5 mL Intramuscular Prior to discharge    polyethylene glycol (MIRALAX) packet 17 g  17 g Oral Daily PRN    and PTA meds:   Prior to Admission Medications   Prescriptions Last Dose Informant Patient Reported? Taking?    amLODIPine (NORVASC) 5 mg tablet   No No   Sig: Take 1 tablet (5 mg total) by mouth 2 (two) times a day   aspirin (ASPIR-LOW) 81 mg EC tablet   Yes No   Sig: Daily   atorvastatin (LIPITOR) 40 mg tablet   Yes No   Sig: atorvastatin 40 mg tablet   TAKE ONE TABLET DAILY   escitalopram (LEXAPRO) 10 mg tablet   No No   Sig: Take 1 tablet (10 mg total) by mouth daily at bedtime   furosemide (LASIX) 80 mg tablet   No No   Sig: Take 1 tablet (80 mg total) by mouth 2 (two) times a day   Patient taking differently: Take 80 mg by mouth 2 (two) times a day Pt takes 40 mg in AM and20 mg PM   hydrALAZINE (APRESOLINE) 50 mg tablet   No No   Sig: Take 1 tablet (50 mg total) by mouth every 8 (eight) hours   insulin detemir (LEVEMIR) 100 units/mL subcutaneous injection   No No   Sig: Inject 20 Units under the skin every 12 (twelve) hours   insulin lispro (HumaLOG) 100 units/mL injection   No No   Sig: Inject 1-6 Units under the skin daily at bedtime   insulin lispro (HumaLOG) 100 units/mL injection   No No   Sig: Inject 2-12 Units under the skin 3 (three) times a day before meals   isosorbide mononitrate (IMDUR) 30 mg 24 hr tablet   No No   Sig: Take 1 tablet (30 mg total) by mouth daily   Patient not taking: Reported on 1/21/2021   metoprolol tartrate (LOPRESSOR) 50 mg tablet   No No   Sig: Take 0 5 tablets (25 mg total) by mouth every 12 (twelve) hours   nitroglycerin (NITROSTAT) 0 4 mg SL tablet   No No   Sig: Place 1 tablet (0 4 mg total) under the tongue every 5 (five) minutes as needed for chest pain   polyethylene glycol (MIRALAX) 17 g packet   No No   Sig: Take 17 g by mouth daily as needed (Constipation)      Facility-Administered Medications: None     Medications Prior to Admission   Medication    amLODIPine (NORVASC) 5 mg tablet    aspirin (ASPIR-LOW) 81 mg EC tablet    atorvastatin (LIPITOR) 40 mg tablet    escitalopram (LEXAPRO) 10 mg tablet    furosemide (LASIX) 80 mg tablet    hydrALAZINE (APRESOLINE) 50 mg tablet    insulin detemir (LEVEMIR) 100 units/mL subcutaneous injection    insulin lispro (HumaLOG) 100 units/mL injection    insulin lispro (HumaLOG) 100 units/mL injection    isosorbide mononitrate (IMDUR) 30 mg 24 hr tablet    metoprolol tartrate (LOPRESSOR) 50 mg tablet    nitroglycerin (NITROSTAT) 0 4 mg SL tablet    polyethylene glycol (MIRALAX) 17 g packet       bumetanide, 4 mg/hr, Last Rate: 4 mg/hr (01/27/21 0905)        Counseling / Coordination of Care  Total floor / unit time spent today 20 minutes  Greater than 50% of total time was spent with the patient and / or family counseling and / or coordination of care  ** Please Note: Dragon 360 Dictation voice to text software may have been used in the creation of this document   **

## 2021-01-27 NOTE — PLAN OF CARE
Problem: Potential for Falls  Goal: Patient will remain free of falls  Description: INTERVENTIONS:  - Assess patient frequently for physical needs  -  Identify cognitive and physical deficits and behaviors that affect risk of falls    -  [place] fall precautions as indicated by assessment   - Educate patient/family on patient safety including physical limitations  - Instruct patient to call for assistance with activity based on assessment  - Modify environment to reduce risk of injury  - Consider OT/PT consult to assist with strengthening/mobility  Outcome: Progressing     Problem: CARDIOVASCULAR - ADULT  Goal: Maintains optimal cardiac output and hemodynamic stability  Description: INTERVENTIONS:  - Monitor I/O, vital signs and rhythm  - Monitor for S/S and trends of decreased cardiac output  - Administer and titrate ordered vasoactive medications to optimize hemodynamic stability  - Assess quality of pulses, skin color and temperature  - Assess for signs of decreased coronary artery perfusion  - Instruct patient to report change in severity of symptoms  Outcome: Progressing  Goal: Absence of cardiac dysrhythmias or at baseline rhythm  Description: INTERVENTIONS:  - Continuous cardiac monitoring, vital signs, obtain 12 lead EKG if ordered  - Administer antiarrhythmic and heart rate control medications as ordered  - Monitor electrolytes and administer replacement therapy as ordered  Outcome: Progressing     Problem: RESPIRATORY - ADULT  Goal: Achieves optimal ventilation and oxygenation  Description: INTERVENTIONS:  - Assess for changes in respiratory status  - Assess for changes in mentation and behavior  - Position to facilitate oxygenation and minimize respiratory effort  - Oxygen administered by appropriate delivery if ordered  - Initiate smoking cessation education as indicated  - Encourage broncho-pulmonary hygiene including cough, deep breathe, Incentive Spirometry  - Assess the need for suctioning and aspirate as needed  - Assess and instruct to report SOB or any respiratory difficulty  - Respiratory Therapy support as indicated  Outcome: Progressing     Problem: GASTROINTESTINAL - ADULT  Goal: Maintains adequate nutritional intake  Description: INTERVENTIONS:  - Monitor percentage of each meal consumed  - Identify factors contributing to decreased intake, treat as appropriate  - Assist with meals as needed  - Monitor I&O, weight, and lab values if indicated  - Obtain nutrition services referral as needed  Outcome: Progressing     Problem: GENITOURINARY - ADULT  Goal: Maintains or returns to baseline urinary function  Description: INTERVENTIONS:  - Assess urinary function  - Encourage oral fluids to ensure adequate hydration if ordered  - Administer IV fluids as ordered to ensure adequate hydration  - Administer ordered medications as needed  - Offer frequent toileting  - Follow urinary retention protocol if ordered  Outcome: Progressing  Goal: Absence of urinary retention  Description: INTERVENTIONS:  - Assess patients ability to void and empty bladder  - Monitor I/O  - Bladder scan as needed  - Discuss with physician/AP medications to alleviate retention as needed  - Discuss catheterization for long term situations as appropriate  Outcome: Progressing     Problem: METABOLIC, FLUID AND ELECTROLYTES - ADULT  Goal: Electrolytes maintained within normal limits  Description: INTERVENTIONS:  - Monitor labs and assess patient for signs and symptoms of electrolyte imbalances  - Administer electrolyte replacement as ordered  - Monitor response to electrolyte replacements, including repeat lab results as appropriate  - Instruct patient on fluid and nutrition as appropriate  Outcome: Progressing  Goal: Fluid balance maintained  Description: INTERVENTIONS:  - Monitor labs   - Monitor I/O and WT  - Instruct patient on fluid and nutrition as appropriate  - Assess for signs & symptoms of volume excess or deficit  Outcome: Progressing  Goal: Glucose maintained within target range  Description: INTERVENTIONS:  - Monitor Blood Glucose as ordered  - Assess for signs and symptoms of hyperglycemia and hypoglycemia  - Administer ordered medications to maintain glucose within target range  - Assess nutritional intake and initiate nutrition service referral as needed  Outcome: Progressing     Problem: SKIN/TISSUE INTEGRITY - ADULT  Goal: Skin integrity remains intact  Description: INTERVENTIONS  - Identify patients at risk for skin breakdown  - Assess and monitor skin integrity  - Assess and monitor nutrition and hydration status  - Monitor labs (i e  albumin)  - Assess for incontinence   - Turn and reposition patient  - Assist with mobility/ambulation  - Relieve pressure over bony prominences  - Avoid friction and shearing  - Provide appropriate hygiene as needed including keeping skin clean and dry  - Evaluate need for skin moisturizer/barrier cream  - Collaborate with interdisciplinary team (i e  Nutrition, Rehabilitation, etc )   - Patient/family teaching  Outcome: Progressing     Problem: HEMATOLOGIC - ADULT  Goal: Maintains hematologic stability  Description: INTERVENTIONS  - Assess for signs and symptoms of bleeding or hemorrhage  - Monitor labs  - Administer supportive blood products/factors as ordered and appropriate  Outcome: Progressing     Problem: MUSCULOSKELETAL - ADULT  Goal: Maintain or return mobility to safest level of function  Description: INTERVENTIONS:  - Assess patient's ability to carry out ADLs; assess patient's baseline for ADL function and identify physical deficits which impact ability to perform ADLs (bathing, care of mouth/teeth, toileting, grooming, dressing, etc )  - Assess/evaluate cause of self-care deficits   - Assess range of motion  - Assess patient's mobility  - Assess patient's need for assistive devices and provide as appropriate  - Encourage maximum independence but intervene and supervise when necessary  - Involve family in performance of ADLs  - Assess for home care needs following discharge   - Consider OT consult to assist with ADL evaluation and planning for discharge  - Provide patient education as appropriate  Outcome: Progressing     Problem: PAIN - ADULT  Goal: Verbalizes/displays adequate comfort level or baseline comfort level  Description: Interventions:  - Encourage patient to monitor pain and request assistance  - Assess pain using appropriate pain scale  - Administer analgesics based on type and severity of pain and evaluate response  - Implement non-pharmacological measures as appropriate and evaluate response  - Consider cultural and social influences on pain and pain management  - Notify physician/advanced practitioner if interventions unsuccessful or patient reports new pain  Outcome: Progressing     Problem: INFECTION - ADULT  Goal: Absence or prevention of progression during hospitalization  Description: INTERVENTIONS:  - Assess and monitor for signs and symptoms of infection  - Monitor lab/diagnostic results  - Monitor all insertion sites, i e  indwelling lines, tubes, and drains  - Monitor endotracheal if appropriate and nasal secretions for changes in amount and color  - [place] appropriate cooling/warming therapies per order  - Administer medications as ordered  - Instruct and encourage patient and family to use good hand hygiene technique  - Identify and instruct in appropriate isolation precautions for identified infection/condition  Outcome: Progressing  Goal: Absence of fever/infection during neutropenic period  Description: INTERVENTIONS:  - Monitor WBC    Outcome: Progressing     Problem: SAFETY ADULT  Goal: Patient will remain free of falls  Description: INTERVENTIONS:  - Assess patient frequently for physical needs  -  Identify cognitive and physical deficits and behaviors that affect risk of falls    -  [place] fall precautions as indicated by assessment   - Educate patient/family on patient safety including physical limitations  - Instruct patient to call for assistance with activity based on assessment  - Modify environment to reduce risk of injury  - Consider OT/PT consult to assist with strengthening/mobility  Outcome: Progressing  Goal: Maintain or return to baseline ADL function  Description: INTERVENTIONS:  -  Assess patient's ability to carry out ADLs; assess patient's baseline for ADL function and identify physical deficits which impact ability to perform ADLs (bathing, care of mouth/teeth, toileting, grooming, dressing, etc )  - Assess/evaluate cause of self-care deficits   - Assess range of motion  - Assess patient's mobility; develop plan if impaired  - Assess patient's need for assistive devices and provide as appropriate  - Encourage maximum independence but intervene and supervise when necessary  - Involve family in performance of ADLs  - Assess for home care needs following discharge   - Consider OT consult to assist with ADL evaluation and planning for discharge  - Provide patient education as appropriate  Outcome: Progressing  Goal: Maintain or return mobility status to optimal level  Description: INTERVENTIONS:  - Assess patient's baseline mobility status (ambulation, transfers, stairs, etc )    - Identify cognitive and physical deficits and behaviors that affect mobility  - Identify mobility aids required to assist with transfers and/or ambulation (gait belt, sit-to-stand, lift, walker, cane, etc )  - [place] fall precautions as indicated by assessment  - Record patient progress and toleration of activity level on Mobility SBAR; progress patient to next Phase/Stage  - Instruct patient to call for assistance with activity based on assessment  - Consider rehabilitation consult to assist with strengthening/weightbearing, etc   Outcome: Progressing     Problem: DISCHARGE PLANNING  Goal: Discharge to home or other facility with appropriate resources  Description: INTERVENTIONS:  - Identify barriers to discharge w/patient and caregiver  - Arrange for needed discharge resources and transportation as appropriate  - Identify discharge learning needs (meds, wound care, etc )  - Arrange for interpretive services to assist at discharge as needed  - Refer to Case Management Department for coordinating discharge planning if the patient needs post-hospital services based on physician/advanced practitioner order or complex needs related to functional status, cognitive ability, or social support system  Outcome: Progressing     Problem: Knowledge Deficit  Goal: Patient/family/caregiver demonstrates understanding of disease process, treatment plan, medications, and discharge instructions  Description: Complete learning assessment and assess knowledge base    Interventions:  - Provide teaching at level of understanding  - Provide teaching via preferred learning methods  Outcome: Progressing     Problem: Prexisting or High Potential for Compromised Skin Integrity  Goal: Skin integrity is maintained or improved  Description: INTERVENTIONS:  - Identify patients at risk for skin breakdown  - Assess and monitor skin integrity  - Assess and monitor nutrition and hydration status  - Monitor labs   - Assess for incontinence   - Turn and reposition patient  - Assist with mobility/ambulation  - Relieve pressure over bony prominences  - Avoid friction and shearing  - Provide appropriate hygiene as needed including keeping skin clean and dry  - Evaluate need for skin moisturizer/barrier cream  - Collaborate with interdisciplinary team   - Patient/family teaching  - Consider wound care consult   Outcome: Progressing

## 2021-01-27 NOTE — PROGRESS NOTES
NEPHROLOGY PROGRESS NOTE   Obey Cook [de-identified] y o  male MRN: [id number]  Unit/Bed#: S -01 Encounter: [id number]  Reason for Consult: BRIAN/CKD    ASSESSMENT/PLAN:  1  Acute Kidney Injury- secondary to ATN from contrast induced nephropathy in the setting of cardiac catheterization  - creatinine & BUN continued to worsen with medical management and urine output was not significant with high dose bumex drip and therefore he was started on hemodialysis  - consent was obtained by Dr Emy Correa on 1/25/21  - temporary dialysis catheter placed by IR on 1/25/21 (appreciate assistance)  - hemodialysis started on 1/25/21  - second treatment for today 1/26/21  - for third treatment 1/27/21  - hepatitis panel nonreactive  - monitor for renal recovery  - PVR negative  2  Chronic Kidney Disease stage IV- Baseline creatinine is 2-2 5  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 due to diabetic nephropathy and hypertensive nephrosclerosis  Follows with [facility], Dr Nyla Laird  Of note, he required dialysis in 8/2019 for BRIAN which resolved  3  Acute on chronic CHF- cardiology on board  - preserved EF  - remains on high dose bumex infusion  - urine output 1 2L yesterday  - would consider discontinuing now that dialysis is underway and patient still not responding adequately to medical therapy, will discuss with attending  4  Hypertension- BP acceptable  5  Anemia- hgb below goal  - likely secondary to chronic disease  - iron saturation low but ferritin elevated  - will give oral iron but avoid IV iron at this time  6  Low bicarbonate level- secondary to severe BRIAN  - improving s/p hemodialysis  - continue to monitor  7   Hyponatremia- secondary to volume overload  - improving with volume removal with ultrafiltration on dialysis  - patient is not currently on a fluid restriction and is thirsty so will hold off implementing a restriction as sodium level is improving with volume removal    Disposition:  HD today with continued fluid removal    SUBJECTIVE:  Patient feeling well  SOB improving  Understands plan for HD today      OBJECTIVE:  Current Weight:    Vitals:    01/26/21 2100 01/26/21 2239 01/27/21 0003 01/27/21 0700   BP:  138/63  133/62   BP Location:  Right arm  Left arm   Pulse:  72  75   Resp:  14  20   Temp:  98 1 °F (36 7 °C)  97 7 °F (36 5 °C)   TempSrc:  Oral  Oral   SpO2: 94% 97% 97% 95%       Intake/Output Summary (Last 24 hours) at 1/27/2021 1133  Last data filed at 1/27/2021 0900  Gross per 24 hour   Intake 1040 ml   Output 3270 ml   Net -2230 ml     General: NAD  Skin: no rash  Eyes: anicteric  ENMT: mm moist  Neck: no masses  Respiratory: decreased breath sounds  Cardiac: RRR  Extremities: no edema  GI: soft nt nd  Neuro: alert awake  Psych: mood and affect appropriate    Medications:    Current Facility-Administered Medications:     acetaminophen (TYLENOL) tablet 650 mg, 650 mg, Oral, Q6H PRN, Radha Marte MD, 650 mg at 01/26/21 0636    aluminum-magnesium hydroxide-simethicone (MYLANTA) oral suspension 30 mL, 30 mL, Oral, Q6H PRN, TABITHA Lord    amLODIPine (NORVASC) tablet 5 mg, 5 mg, Oral, BID, [facility], RENETTA, 5 mg at 01/27/21 7959    aspirin (ECOTRIN LOW STRENGTH) EC tablet 81 mg, 81 mg, Oral, Daily, Swathi Schumacher MD, 81 mg at 01/27/21 5564    atorvastatin (LIPITOR) tablet 40 mg, 40 mg, Oral, Daily With Antonia Fox MD, 40 mg at 01/26/21 1640    benzonatate (TESSALON PERLES) capsule 100 mg, 100 mg, Oral, TID PRN, TABITHA Cash    bumetanide (BUMEX) 12 5 mg infusion 50 mL, 4 mg/hr, Intravenous, Continuous, Barney Sibley PA-C, Last Rate: 16 mL/hr at 01/27/21 0905, 4 mg/hr at 01/27/21 0905    calcium carbonate (TUMS) chewable tablet 1,000 mg, 1,000 mg, Oral, Daily PRN, Radha Marte MD    cefepime (MAXIPIME) 2,000 mg in dextrose 5 % 50 mL IVPB, 2,000 mg, Intravenous, Q24H, Faye Correa MD, Last Rate: 100 mL/hr at 01/26/21 1328, 2,000 mg at 01/26/21 1328   clopidogrel (PLAVIX) tablet 75 mg, 75 mg, Oral, Daily, Artemio Ellis MD, 75 mg at 01/27/21 0812    escitalopram (LEXAPRO) tablet 10 mg, 10 mg, Oral, HS, Artemio Ellis MD, 10 mg at 01/26/21 2125    guaiFENesin (MUCINEX) 12 hr tablet 1,200 mg, 1,200 mg, Oral, Q12H [address], Irma Celso, CRNP, 1,200 mg at 01/27/21 9441    heparin (porcine) subcutaneous injection 5,000 Units, 5,000 Units, Subcutaneous, Q8H [address], Faye Correa MD, 5,000 Units at 01/27/21 0503    hydrALAZINE (APRESOLINE) tablet 50 mg, 50 mg, Oral, Q8H [address], [facility], [facility], 50 mg at 01/27/21 0503    HYDROmorphone (DILAUDID) injection 0 2 mg, 0 2 mg, Intravenous, Q3H PRN, Faye Correa MD    insulin detemir (LEVEMIR) subcutaneous injection 20 Units, 20 Units, Subcutaneous, Q12H [address], Karena Phoenix Schumacher MD, 20 Units at 01/27/21 0812    insulin lispro (HumaLOG) 100 units/mL subcutaneous injection 1-6 Units, 1-6 Units, Subcutaneous, TID AC, 2 Units at 01/27/21 0815 **AND** Fingerstick Glucose (POCT), , , TID AC, Swathi Schumacher MD    insulin lispro (HumaLOG) 100 units/mL subcutaneous injection 1-6 Units, 1-6 Units, Subcutaneous, HS, Artemio Ellis MD, 2 Units at 01/26/21 2126    insulin lispro (HumaLOG) 100 units/mL subcutaneous injection 4 Units, 4 Units, Subcutaneous, TID With Meals, Faye Correa MD, 4 Units at 01/27/21 0816    iron polysaccharides (FERREX) capsule 150 mg, 150 mg, Oral, Daily, [facility], PAC    metoprolol tartrate (LOPRESSOR) tablet 25 mg, 25 mg, Oral, Q12H [address], Swathi Schumacher MD, 25 mg at 01/27/21 0812    nitroglycerin (NITROSTAT) SL tablet 0 4 mg, 0 4 mg, Sublingual, Q5 Min PRN, Artemio Ellis MD    nystatin (MYCOSTATIN) oral suspension 500,000 Units, 500,000 Units, Swish & Swallow, 4x Daily, Faye Correa MD, 500,000 Units at 01/27/21 0812    ondansetron (ZOFRAN) injection 4 mg, 4 mg, Intravenous, Q6H PRN, Artemio Ellis MD, 4 mg at 01/23/21 0036    oxyCODONE (ROXICODONE) IR tablet 2 5 mg, 2 5 mg, Oral, Q4H PRN, Faye Correa MD    phenol (CHLORASEPTIC) 1 4 % mucosal liquid 1 spray, 1 spray, Mouth/Throat, Q4H PRN, Faye Correa MD    pneumococcal 13-valent conjugate vaccine (PREVNAR-13) IM injection 0 5 mL, 0 5 mL, Intramuscular, Prior to discharge, Kerry Colin MD    polyethylene glycol (MIRALAX) packet 17 g, 17 g, Oral, Daily PRN, Kerry Colin MD    Laboratory Results:  Results from last 7 days   Lab Units 01/27/21  0510 01/26/21  0572 01/25/21  0459 01/24/21  0342 01/24/21  0335 01/24/21  0019 01/23/21  2254 01/23/21  0530 01/22/21  0622 01/22/21  0254   WBC Thousand/uL 11 96* 13 57* 15 56* 15 54*  --   --   --  14 93* 8 83 9 99   HEMOGLOBIN g/dL 8 2* 8 6* 9 4* 9 6*  --   --   --  9 6* 10 0* 10 1*   I STAT HEMOGLOBIN g/dl  --   --   --   --   --  10 5*  --   --   --   --    HEMATOCRIT % 24 9* 25 9* 30 1* 30 8*  --   --   --  30 4* 34 0* 32 3*   HEMATOCRIT, ISTAT %  --   --   --   --   --  31*  --   --   --   --    PLATELETS Thousands/uL 150 166 156 149  --   --   --  152 134* 133*   POTASSIUM mmol/L 3 9 4 2 4 9  --  5 2  --  5 3 5 1 4 0  --    CHLORIDE mmol/L 99* 97* 96*  --  98*  --  97* 102 105  --    CO2 mmol/L 21 19* 13*  --  18*  --  17* 19* 16*  --    CO2, I-STAT mmol/L  --   --   --   --   --  19*  --   --   --   --    BUN mg/dL 111* 136* 150*  --  122*  --  120* 97* 76*  --    CREATININE mg/dL 3 54* 4 46* 4 97*  --  4 05*  --  3 93* 2 89* 2 32*  --    CALCIUM mg/dL 8 3 8 2* 8 7  --  8 6  --  8 5 8 7 8 4  --    PHOSPHORUS mg/dL 5 1*  --   --   --   --   --   --   --   --   --    GLUCOSE, ISTAT mg/dl  --   --   --   --   --  232*  --   --   --   --

## 2021-01-27 NOTE — PHYSICAL THERAPY NOTE
PHYSICAL THERAPY EVALUATION NOTE    Patient Name: Kristi Corey  PBXSC'R Date: 2021     AGE:   [de-identified] y o    Mrn:   [id number]  ADMIT DX:  NSTEMI (non-ST elevated myocardial infarction) ([place] [address] ) [I21 4]    Past Medical History:   Diagnosis Date    Acute on chronic diastolic CHF (congestive heart failure) ([facility] ) 2019    Arthritis     Back pain     Bladder cancer ([place] [address] )     diagnosed  2016    Cancer 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[place] [address] )     fluid behind right eye    Hematuria     hx of    Hypercholesteremia     Hypertension     Kidney stones     Wears glasses      Length Of Stay: 5  PHYSICAL THERAPY EVALUATION :   Patient's identity confirmed via 2 patient identifiers (full name and ) at start of session       21 1403   PT Last Visit   PT Visit Date 21   Note Type   Note type Evaluation  (& treatment)   Pain Assessment   Pain Assessment Tool FLACC   Pain Score   (Achy)   Pain Location/Orientation Location: Generalized   Pain Onset/Description Onset: Ongoing   Patient's Stated Pain Goal No pain   Pain Rating: FLACC (Rest) - Face 0   Pain Rating: FLACC (Rest) - Legs 0   Pain Rating: FLACC (Rest) - Activity 0   Pain Rating: FLACC (Rest) - Cry 0   Pain Rating: FLACC (Rest) - Consolability 0   Score: FLACC (Rest) 0   Pain Rating: FLACC (Activity) - Face 1   Pain Rating: FLACC (Activity) - Legs 0   Pain Rating: FLACC (Activity) - Activity 1   Pain Rating: FLACC (Activity) - Cry 1   Pain Rating: FLACC (Activity) - Consolability 1   Score: FLACC (Activity) 4   Home Living   Type of Home House   Home Layout One level  (Entry through garage and basement, FF to enter)   Home Equipment Walker;Cane   Additional Comments 10 SILVINO through front or pt's usual access point through the garage  Pt uses a SPC in the home, RW in the community    Prior Function   Level of [place] Independent with ADLs and functional mobility   Lives With Alone  (lives w/ his cat)   ADL Assistance Independent   IADLs Independent   Falls in the last 6 months 0   Vocational Retired   Comments Pt reports being I w/ ADLs and IADLs PTA, (+) driving  Restrictions/Precautions   Weight Bearing Precautions Per Order No   Other Precautions Bed Alarm;Multiple lines;Telemetry;O2;Fall Risk;Pain;Visual impairment  (5L NC O2, Masimo, IV, telemetry)   General   Additional Pertinent History Pt transferred from [facility] for cardiac cath  S/p cardiac cath, pt has required HD and at one point, midflow NC  Cognition   Arousal/Participation Alert   Orientation Level Oriented X4  (Pt identified by full name and )   Following Commands Follows one step commands with increased time or repetition   Comments Pt supine in bed upon arrival  He states he is very fatigued and achy, but agreeable to participate in PT evaluation   RLE Assessment   RLE Assessment WFL   Strength RLE   RLE Overall Strength 3-/5   LLE Assessment   LLE Assessment WFL   Strength LLE   LLE Overall Strength 3-/5   Coordination   Movements are Fluid and Coordinated 0   Coordination and Movement Description Edema of B hands, R > L, R hand tremors   Bed Mobility   Supine to Sit 2  Maximal assistance   Additional items Assist x 2; Increased time required;Verbal cues;LE management   Additional Comments Pt is able to tolerate sitting EOB ~10 min w/ min A x 1 progressing to supervision w/ resolution of initial dizziness  Transfers   Sit to Stand 2  Maximal assistance   Additional items Assist x 2; Increased time required;Verbal cues  (to partial stand (~50%))   Stand to Sit 2  Maximal assistance   Additional items Assist x 2; Increased time required;Verbal cues  (to partial stand (~50%))   Additional Comments Pt is able to scoot towards [facility] w/ mod A x 1 w/ use of vaibhav pad   Ambulation/Elevation   Gait pattern Not appropriate  (limited standing tolerance)   Balance   Static Sitting   (Poor + --> Fair -)   Static Standing Zero  (Ax2)   Ambulatory   (NERI, limited standing tolerance)   Activity Tolerance   Activity Tolerance Patient limited by fatigue;Patient limited by pain   Medical Staff Fercho coordination w/ OT [place] Party to [address] [place] [address] Dr to SEYMOUR Koch who states pt is appropriate to participate in PT evaluation   Assessment   Prognosis Fair   Problem List Decreased strength;Decreased range of motion;Decreased endurance; Impaired balance;Decreased mobility; Decreased coordination; Impaired vision;Pain   Assessment Serena Montenegro is a [de-identified] y o  Male who [facility] to [facility] on 1/22/2021 from home, pt transferred from [facility] where he arrived w/ c/o chest pain and dx w/ NSTEMI, pt transferred to [facility] for cardiac catheterization which he underwent on 1/22/2021  Orders for PT eval and treat received, w/ activity orders of up w/ A and fall precautions  Pt presents w/ comorbidities of CABG x 2, CAD, CKD Stage 3, HTN, DMII,  and personal factors including: advanced age, living in raised 1 story house, mobilizing w/ assistive device, stair(s) to enter home and visual impairments  At baseline, pt mobilizes independently w/ SPC in home, RW in the community, and reports 0 falls in the last 6 months  Upon evaluation, pt presents w/ the following deficits: weakness, decreased ROM, edema of extremities and impaired balance, decreased endurance  Pt requires max A x 2 for bed mobility, max A x 2 to attempt STS (able to achieve ~50%), and able to sit EOB w/ supervision   Pt's clinical presentation is unstable/unpredictable due to poor blood pressure control, need for assist w/ all phases of mobility when usually mobilizing independently, need for supplemental oxygen in order to maintain oxygen saturation, need for input for task focus and mobility technique and recent drastic decline in mobility compared to baseline}  Given the above findings, discharge recommendation is for inpatient rehab  During this admission, pt would benefit from skilled acute inpatient PT in order to address the abovementioned deficits to maximize function and mobility before DC from acute care  Goals   Patient Goals to be able to walk   STG Expiration Date 02/06/21   Short Term Goal #1 Patient will: Increase bilateral LE strength 1/2 grade to facilitate independent mobility, Perform all bed mobility tasks w/ modx1 to decrease fall risk factors, Increase all balance 1/2 grade to decrease risk for falls, Complete exercise program independently, Tolerate 3 hr OOB to faciliate upright tolerance and Improve Barthel Index score to 50 or greater to facilitate independence, Pt will be able to perform STS transfers w/ mod A x 1 and trial Zohra Alt for OOB, pt will be able to tolerate standing for at least 1 minute in a full standing posture to demonstrate readiness for pivot transfers  PT to see for pivot transfers, gait when appropriate and set goals accordingly    PT Treatment Day 1  (see treatment note below)   Plan   Treatment/Interventions Functional transfer training;LE strengthening/ROM; Therapeutic exercise; Endurance training;Patient/family training;Equipment eval/education; Bed mobility  (PT to see for gait when appropriate)   PT Frequency 5x/wk   Recommendation   PT Discharge Recommendation Post-Acute Rehabilitation Services   Equipment Recommended   (Therapy will trial Zohra Alt in upcoming sessions)   Additional Comments Pt would require mechanical conveyance w/ RN staff for OOB at this time   Therapy will continue to work on use of QuickMove for OOB mobility   AM-PAC Basic Mobility Inpatient   Turning in Bed Without Bedrails 2   Lying on Back to Sitting on Edge of Flat Bed 1   Moving Bed to Chair 1   Standing Up From Chair 1   Walk in Room 1   Climb 3-5 Stairs 1   Basic Mobility Inpatient Raw Score 7   Turning Head Towards Sound 4   Follow Simple Instructions 3   Low Function Basic Mobility Raw Score 14   Low Function Basic Mobility Standardized Score 22 01   Barthel Index   Feeding 5   Bathing 0   Grooming Score 0   Dressing Score 5   Bladder Score 5   Bowels Score 5   Toilet Use Score 0   Transfers (Bed/Chair) Score 5   Mobility (Level Surface) Score 0   Stairs Score 0   Barthel Index Score 25             PHYSICAL THERAPY TREATMENT NOTE    Name: Kenadll Evans  : 1940  Time in: 7639  Time out: 1403  Total treat time: 10 min    S: Pt seated EOB, c/o "pain and fatigue everywhere", but is agreeable to continue to participate in PT intervention  O: Pt tolerates sitting EOB another ~8 min while using BUEs to eat his ice cream w/ supervision  Pt then requires max A x 2 for sit to supine  Pt c/o LEs feeling "like bricks"  Educated pt on performing ankle pumps, glute sets throughout the day and at least at mealtimes  Also encouraged pt to try to move LEs as much as he can during the day  Pt verbalized understanding  Pt supine in bed w/ bed alarm activated, call bell and room phone within reach w/ all needs met  A: Pt continues to be limited due to fatigue and stiffness of BLEs  He tolerates sitting EOB w/ supervision, and continues to require A x 2 for sit <> supine transfers  Recommend continued practice w/ sitting tolerance, standing trials as tolerated      P: Continue per evaluation above    Skilled inpatient PT is recommended during this admission in order to progress patient toward goals so patient is able to maximize independence    Balbir Virgen, PT, DPT

## 2021-01-27 NOTE — OCCUPATIONAL THERAPY NOTE
Occupational Therapy Evaluation     Patient Name: Obey Lopez  JJKDV'K Date: 1/27/2021  Problem List  Principal Problem:    Elevated troponin  Active Problems:    Acute renal failure superimposed on stage 3 chronic kidney disease (HCC)    Diabetes mellitus ([facility] )    Leukocytosis    Hx of CABG    Acute on chronic diastolic congestive heart failure (HCC)    High anion gap metabolic acidosis    Anemia    Hyponatremia    Oral candida    ATN (acute tubular necrosis) (HCC)    Past Medical History  Past Medical History:   Diagnosis Date    Acute on chronic diastolic CHF (congestive heart failure) ([facility] ) 7/4/2019    Arthritis     Back pain     Bladder cancer ([facility] )     diagnosed  2/2016    Cancer ([facility] )     bladder; chemo; resection;     Coronary artery disease     has 2 coronary stents    Dental crowns present      5    Diabetes mellitus ([facility] )     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[facility] )     fluid behind right eye    Hematuria     hx of    Hypercholesteremia     Hypertension     Kidney stones     Wears glasses      Past Surgical History  Past Surgical History:   Procedure Laterality Date    ABDOMINAL SURGERY      CARDIAC SURGERY      CHOLECYSTECTOMY      open    CORONARY ANGIOPLASTY WITH STENT PLACEMENT      2 stents  2009    CYSTECTOMY, PARTIAL N/A 11/30/2016    Procedure: PARTIAL CYSTECTOMY, LEFT LYMPHADENECTOMY;  Surgeon: Geremias Crouch MD;  Location: [address] [place] [address];  Service:     CYSTOSCOPY Left 10/20/2016    Procedure: CYSTOSCOPY, BILATERAL RETROGRADE PYLEOGRAM, LEFT SIDE BLADDER BIOPSY, TURBT;  Surgeon: Geremias Crouch MD;  Location: [address] [place] [address];  Service:     HERNIA REPAIR      repair [facility]    IR NON-TUNNELED CENTRAL LINE PLACEMENT  1/25/2021    IR TEMPORARY DIALYSIS CATHETER PLACEMENT  7/8/2019    IR TUNNELED DIALYSIS CATHETER PLACEMENT  7/12/2019    IR TUNNELED DIALYSIS CATHETER REMOVAL  9/9/2019    CT CYSTOURETHROSCOPY N/A 2/25/2016    Procedure: CYSTOSCOPY;  Surgeon: Geremias Crouch MD; Location: [facility];  Service: Urology    HI CYSTOURETHROSCOPY,FULGUR 2-5 CM LESN N/A 2/25/2016    Procedure: TRANSURETHRAL RESECTION OF BLADDER TUMOR (TURBT); Surgeon: Melvin Elizabeth MD;  Location: [address] [place] [address];  Service: Urology    TRANSURETHRAL RESECTION OF BLADDER TUMOR N/A 7/7/2016    Procedure: TRANSURETHRAL RESECTION OF BLADDER TUMOR (TURBT), Cystoscopy, deep biopsy;  Surgeon: Melvin Elizabeth MD;  Location: [address] [place] [address];  Service:     TRANSURETHRAL RESECTION OF BLADDER TUMOR Bilateral 6/9/2016    Procedure: TRANSURETHRAL RESECTION OF BLADDER TUMOR (TURBT), Cysto, retrograde, BLADDER BIOPSY;  Surgeon: Melvin Elizabeth MD;  Location: [address] [place] [address];  Service:         01/27/21 1402   OT Last Visit   OT Visit Date 01/27/21  (Wednesday)   Note Type   Note type Evaluation  (and tx note (0626-1707))   Restrictions/Precautions   Weight Bearing Precautions Per Order No   Other Precautions Bed Alarm;Multiple lines;O2;Telemetry; Fall Risk;Pain  (on O2 via NC; telemetry, Masimo, IV)   Pain Assessment   Pain Assessment Tool FLACC   Pain Location/Orientation Location: Generalized  (pt reports body aches)   Effect of Pain on Daily Activities limits activity tolerance, sitting tolerance, and active participation in ADL   Hospital Pain Intervention(s) Repositioned; Ambulation/increased activity; Emotional support   Pain Rating: FLACC (Rest) - Face 0   Pain Rating: FLACC (Rest) - Legs 0   Pain Rating: FLACC (Rest) - Activity 0   Pain Rating: FLACC (Rest) - Cry 1   Pain Rating: FLACC (Rest) - Consolability 1   Score: FLACC (Rest) 2   Pain Rating: FLACC (Activity) - Face 1   Pain Rating: FLACC (Activity) - Legs 1   Pain Rating: FLACC (Activity) - Activity 1   Pain Rating: FLACC (Activity) - Cry 2   Pain Rating: FLACC (Activity) - Consolability 1   Score: FLACC (Activity) 6   Home Living   Type of Home House   Home Layout One level; Laundry in basement; Other (Comment)  (enters from garage in basement, full flight to main level) Bathroom Shower/Tub Tub/shower unit   Bathroom Toilet Standard   Bathroom Equipment Shower chair   Bathroom Accessibility Accessible   Home Equipment Walker;Cane;Other (Comment)  (pt reports using cane inside, walker outside)   Additional Comments Pt reports I w/ ADL / IADL at baseline w/ out use of O2  Pt reports using cane w/ in house and walker for community mobility   Prior Function   Level of [place] Independent with ADLs and functional mobility   Lives With Alone; Other (Comment)  (cat, Derek)   ADL Assistance Independent   IADLs Independent  (+ drive)   Falls in the last 6 months 0   Vocational Retired   Comments Pt reports sister lives in [facility] Pt reports I w/ ADL/ IADL PTA using cane vs walker for functional mobility and no O2   Reciprocal Relationships Pt reports living alone  Supportive local friends that he used to work with  Pt added that he has a  watching [employer]   Service to Others 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 [address] Pt reports enjoying his cat   ADL   [address]  Modified independent   Eating Deficit   (supine HOB elevated; pt reports decreased appetite)   Grooming Assistance 4  Minimal Assistance   Grooming Deficit Setup;Supervision/safety;Verbal cueing; Increased time to complete   UB Bathing Assistance Unable to assess  (due to decreaed activity tolerance)   LB Bathing Assistance Unable to assess   UB Dressing Assistance 4  Minimal Assistance   UB Dressing Deficit Setup; Increased time to complete;Supervision/safety;Pull around back   LB Dressing Assistance 1  Total Assistance   LB Dressing Deficit Don/doff R sock; Don/doff L sock; Setup;Steadying; Increased time to complete;Verbal cueing   Toileting Assistance  Unable to assess   Toileting Deficit Use of bedpan/urinal setup  (used urinal prior to arrival supine HOB elevated)   Additional Comments on O2 via NC   O2 sats >90%   Bed Mobility Supine to Sit 2  Maximal assistance   Additional items Assist x 2;HOB elevated; Increased time required;Verbal cues;LE management; Bedrails   Sit to Supine Unable to assess   Additional Comments Pt seated at EOB post eval w/ PTTanya present  Pt tolerated sitting EOB ~ 10 minutes w/ fair - balance  Transfers   Sit to Stand   (max A x2 sit to partial stand)   Additional items Assist x 2; Increased time required;Verbal cues; Bedrails;Armrests   Stand to Sit   (max A x2 partial stand to sit)   Additional items Assist x 2; Increased time required;Verbal cues; Bedrails;Armrests   Stand pivot Unable to assess   Additional Comments Pt reported dizziness upon sitting at EOB  Resolved after few minutes  Pt able to initiate stand from elevated bed to clear buttocks but unable to complete sit <> stand   Functional Mobility   Additional Comments NT   Balance   Static Sitting Fair -  (initially poor+; progressed to fair -)   Static Standing Zero  (unable to complete despite max A x 2)   Activity Tolerance   Activity Tolerance Patient limited by fatigue;Patient limited by pain   Medical Staff Made Aware care coordination w/ PTFatou  Spoke to [address]   Nurse Made Aware Per Cecille AHUJA appropriate to see pt   RUE Assessment   RUE Assessment WFL   RUE Strength   RUE Overall Strength Other (Comment)  (generalized weakness / deconditioning, grossly 3+/5)   Edema   RUE Edema +1   LUE Assessment   LUE Assessment WFL   LUE Strength   LUE Overall Strength Deficits; Other (Comment)  (generalized weakness / deconidtioning, at least 3+/5)   Edema   LUE Edema +1   Hand Function   Gross Motor Coordination Functional   Fine Motor Coordination   (+ time due to edema, gen weakness)   Sensation   Light Touch No apparent deficits  (B UE to light touch)   Sharp/Dull Not tested   Vision-Basic Assessment   Current Vision Wears glasses all the time   Patient Visual Report   (+1 pitting edema lateral head from glasses)   Cognition   Overall Cognitive Status Impaired   Arousal/Participation Lethargic;Responsive; Cooperative   Attention Attends with cues to redirect   Orientation Level Oriented to person;Oriented to place;Oriented to situation   Memory Decreased recall of recent events   Following Commands Follows one step commands with increased time or repetition  (encouragement to challenge activity tolerance)   Comments Identified pt by full name and birthdate  Pt responsive and arousal improved  Flat affect  Assessment   Limitation Decreased ADL status; Decreased UE strength;Decreased endurance;Decreased self-care trans;Decreased high-level ADLs  (increased pain, increaed edema)   Assessment Pt is an [de-identified] male admitted [facility] on 1/22/2021  Pt presents w/ elevated troponin and significant PMH impacting his occupational performance including HTN, DM, CAD s/p CABG (2016), bladder ca s/p resection  Pt transferred [facility] on 1/22/2021 for cardiac cath  Pt placed on BiPAP on 1/23/2021 and transitioned to mid flow O2  Dialysis iniitiated 1/25/2021  Pt reports living alone in [address] [facility] w/ full flight from garage entrance in basement to main level  Pt reports living alone and I w/ ADL  Pt reports no O2 at baseline but used walker vs cane for functional mobility  Personal factors impacting performance include difficulty completing ADL, difficulty completing IADL, SILVINO, limited support / assistance, questionable insight into deficits/ med/health mgmt  Upon eval, pt alert and oriented to person, place  Limited recall details, time frame events  Pt reports R hand dominance and demonstrated UE AROM WFL but generalized weakness and increased edema impacting performance  Pt required max A X2 to complete bed mobility supine to sit  Pt required total A to complete LBD  Pt required min A to complete UBD  Pt required min A to complete grooming tasks w/+ time after set- up  O2 sats > 90% throughout on O2 via NC   Pt presents w/ increased pain, decreased activity tolerance, decreased endurance, decreased sitting tolerance, decreased standing tolerance, increased edema, decreased standing tolerance, decreased sitting balance, limited insight into deficits impacting his I w/ dressing, bathing, oral hygiene, functional mobility, functional transfers, activity engagement, clothing mgmt, community mobility, and pet care  Pt completing ADL below baseline level of I and would benefit from OT while in acute care to address deficits  Recommend post acute rehab when medically stable for discharge from acute care    Goals   Patient Goals Pt stated that he would like to return home and to baseline level of I   Plan   Treatment Interventions ADL retraining;Functional transfer training;UE strengthening/ROM; Endurance training;Patient/family training;Equipment evaluation/education;Continued evaluation; Energy conservation; Activityengagement   Goal Expiration Date 02/08/21   OT Frequency 3-5x/wk   Additional Treatment Session   Start Time 9273   End Time 1402   Treatment Assessment Pt seen for skilled OT tx session day 1 focusing on challenging activity tolerance and activity engagement  Pt agreeable to participate in session w/ encouragement  Pt seen w/ PT, Tanya and benefts from co -tx due to decreased activity tolerance, and physical assitsance required  Pt tolerated sitting at EOB 10 additional minutes w/ fair - balance  Pt engaged in grooming and feeding seated at EOB  Able to feed self ice cream and fruit after set- up  Pt engaged in grooming to don glasses w/ S after set- up but required assistance to wash / clean glasses  Therapist provided pt w/ foam tubing for O2 line and glasses to protect skin  Pt engaged in UB bathing to wash face w/ mod A and + time  Required 2 rest breaks due to fatigue  Pt required max A x2 to complete bed mobility sit to supine  Pt required total A x2 to reposition towards HOB   Continue to recommend post acute rehab when medically stable for discharge from acute care   Will continue to follow   Additional Treatment Day 1   Recommendation   OT Discharge Recommendation Post-Acute Rehabilitation Services   Barthel Index   Feeding 10   Bathing 0   Grooming Score 0   Dressing Score 5   Bladder Score 5   Bowels Score 5   Toilet Use Score 0   Transfers (Bed/Chair) Score 0   Mobility (Level Surface) Score 0   Stairs Score 0   Barthel Index Score 25   Modified Estuardo Scale   Modified Estuardo Scale 4      Pt goals to be met by 2/8/2021:  -Pt will complete bed mobility supine <> sit w/ mod A x 1 to max I and minimize burden of care to return to PLOF    -Pt will complete UBD w/ S after set- up seated unsupported at EOB    -Pt will complete LBD w/ mod A x1 using AE as needed to max I w/ ADL performance    -Pt will demonstrate increased activity tolerance to participate in ADL for at least 15 minutes w/ no more than 1 rest break or sign /symptoms of fatigue to max I     -Pt will demonstrate good attention and participation in continued evaluation to assess functional transfers and functional mobility using AD, DME as needed to assist in safe discharge planning    -Pt will demonstrate good attention and understanding EC tech to max I w/ ADL performance    -Pt will complete sit <> stand from EOB w/ mod A X1 using AD as needed to max I w/ LBD    -Pt will demonstrate good attention and participation in continued evaluation to assess functional cognitive skills / health literacy to assist in safe discharge planning    MALACHI Calixto/L

## 2021-01-27 NOTE — ASSESSMENT & PLAN NOTE
· Leukocytosis down to 11 9 from 13  · COVID-19, influenza, RSV tests were negative x2  · Chest x-ray officially read as multifocal pneumonia  · Thus will continue to treat as hospital-acquired pneumonia with IV cefepime  · Blood cultures x2 pending final  · CBC with differential count  · Cough medicine/antitussive, lozenges and Chloraseptic spray p r n  Tanisha Nelson

## 2021-01-27 NOTE — UTILIZATION REVIEW
Continued Stay Review    Date: 1/27/2021                         Current Patient Class: inpt   Current Level of Care: med surg      HPI:80 y o  male initially admitted on 1/22/2021 direct admit from  [address] for cardiac cath   Pt was noted to have a NSTEMI after presenting w/ CP and elevated trop   Found to have 3 vessel disease  Also noted to have [address] at [address]   Started on bicarb gtt as recommended by nephrology   Admitted IP status for  NSTEMI three-vessel disease with 99% occluded proximal circumflex, 50% distal left main, mid % occluded, RCA  proximally 100% occluded with collaterals from left circulation  Stent  Assessment/Plan:   1/27 Cardiology   ON 5 L NC, Type2 MI CAD Underwent cardiac catheterization 1/22 with patent LIMA-LAD, 40% stenosis of SVG-OM1   Medical management recommended  Currently on Aspirin, Plavix (added this admission), statin, beta-blocker Acute on chronic HF remains on Bumex drip at 4 mg/HR, nephrology managing status for HD today; cont cardiac meds; Improving creatinine w baseline 2 3-2 5    1/27/2021 Nephrology-   Required 2 HD sessions in a row; on NC 5 L; UO improved; 3rd dialysis today or tomorrow am then cont to follow kidney function over the next couple days; BRIAN likey secondary from ischemic ATN from dye nephropathy  Likely HD tomorrow   Give 1 dose of metolazone today     Pertinent Labs/Diagnostic Results:   Results from last 7 days   Lab Units 01/24/21  1002 01/22/21  1450   SARS-COV-2  Negative Negative     Results from last 7 days   Lab Units 01/27/21  0510 01/26/21  0639 01/25/21  0459 01/24/21  0342 01/24/21  0019   WBC Thousand/uL 11 96* 13 57* 15 56* 15 54*  --    HEMOGLOBIN g/dL 8 2* 8 6* 9 4* 9 6*  --    I STAT HEMOGLOBIN g/dl  --   --   --   --  10 5*   HEMATOCRIT % 24 9* 25 9* 30 1* 30 8*  --    HEMATOCRIT, ISTAT %  --   --   --   --  31*   PLATELETS Thousands/uL 150 166 156 149  --    NEUTROS ABS Thousands/µL  --  11 60* 13 89* 13 64*  -- Results from last 7 days   Lab Units 01/27/21  0510 01/26/21  6362 01/25/21  0459 01/24/21  0335 01/24/21  0019 01/23/21  2254   SODIUM mmol/L 135* 134* 131* 128*  --  128*   POTASSIUM mmol/L 3 9 4 2 4 9 5 2  --  5 3   CHLORIDE mmol/L 99* 97* 96* 98*  --  97*   CO2 mmol/L 21 19* 13* 18*  --  17*   CO2, I-STAT mmol/L  --   --   --   --  19*  --    ANION GAP mmol/L 15* 18* 22* 12  --  14*   BUN mg/dL 111* 136* 150* 122*  --  120*   CREATININE mg/dL 3 54* 4 46* 4 97* 4 05*  --  3 93*   EGFR ml/min/1 73sq m 15 12 10 13  --  14   CALCIUM mg/dL 8 3 8 2* 8 7 8 6  --  8 5   PHOSPHORUS mg/dL 5 1*  --   --   --   --   --      Results from last 7 days   Lab Units 01/23/21  0530 01/21/21  1812   AST U/L 53* 50*   ALT U/L 71 90*   ALK PHOS U/L 106 136*   TOTAL PROTEIN g/dL 7 4 8 7*   ALBUMIN g/dL 3 1* 3 6   TOTAL BILIRUBIN mg/dL 1 55* 1 10*     Results from last 7 days   Lab Units 01/27/21  1047 01/27/21  0709 01/26/21  2033 01/26/21  1512 01/26/21  1043 01/26/21  0753 01/25/21  2140 01/25/21  1624 01/25/21  1111 01/25/21  0711 01/24/21  2053 01/24/21  1505   POC GLUCOSE mg/dl 274* 229* 217* 164* 269* 190* 174* 131 179* 217* 273* 255*     Results from last 7 days   Lab Units 01/27/21  0510 01/26/21  0639 01/25/21  0459 01/24/21  0335 01/23/21  2254 01/23/21  0530 01/22/21  0622 01/21/21  1812   GLUCOSE RANDOM mg/dL 163* 181* 208* 218* 251* 224* 165* 171*             No results found for: BETA-HYDROXYBUTYRATE           Results from last 7 days   Lab Units 01/24/21  0019   I STAT BASE EXC mmol/L -7*   I STAT O2 SAT % 97*   ISTAT PH ART  7 342*   I STAT ART PCO2 mm HG 33 8*   I STAT ART PO2 mm HG 94 0   I STAT ART HCO3 mmol/L 18 3*         Results from last 7 days   Lab Units 01/22/21  0937 01/22/21  0623 01/22/21  0104 01/21/21  2213 01/21/21  1812   TROPONIN I ng/mL 7 37* 5 59* 1 58* 0 28* 0 03         Results from last 7 days   Lab Units 01/22/21  0937 01/22/21  0254   PROTIME seconds  --  15 5*   INR   --  1 24*   PTT seconds 85* 34         Results from last 7 days   Lab Units 01/27/21  0510 01/26/21  0639 01/25/21  0459 01/23/21  2255   PROCALCITONIN ng/ml 0 90* 1 05* 0 80* 0 59*                 Results from last 7 days   Lab Units 01/23/21  2254   NT-PRO BNP pg/mL 25,215*     Results from last 7 days   Lab Units 01/27/21  0510   FERRITIN ng/mL 913*     Results from last 7 days   Lab Units 01/25/21  1430   HEP B S AG  Non-reactive   HEP C AB  Non-reactive   HEP B C IGM  Non-reactive   HEP B C TOTAL AB  Non-reactive                     Results from last 7 days   Lab Units 01/24/21  1002 01/22/21  1450   INFLUENZA A PCR  Negative Negative   INFLUENZA B PCR  Negative Negative   RSV PCR  Negative Negative                             Results from last 7 days   Lab Units 01/24/21  1050   BLOOD CULTURE  No Growth at 48 hrs  No Growth at 48 hrs         Vital Signs:   Date/Time  Temp  Pulse  Resp  BP  MAP (mmHg)  SpO2  FiO2 (%)  Calculated FIO2 (%) - Nasal Cannula  O2 Flow Rate (L/min)  Nasal Cannula O2 Flow Rate (L/min)  O2 Device  O2 Interface Device  Patient Position - Orthostatic VS   01/27/21 1337  --  --  --  --  --  --  --  36  --  4 L/min  Nasal cannula  --  --   01/27/21 0815  --  --  --  --  --  --  --  40  --  5 L/min  Nasal cannula  --  --   01/27/21 0700  97 7 °F (36 5 °C)  75  20  133/62  89  95 %  --  44  --  6 L/min  Nasal cannula  --  Lying   01/27/21 0620  --  --  --  --  --  --  --  44  --  6 L/min  Nasal cannula  --  --   01/27/21 0003  --  --  --  --  --  97 %  --  --  6 L/min  --  Nasal cannula             Medications:   Scheduled Medications:  amLODIPine, 5 mg, Oral, BID  aspirin, 81 mg, Oral, Daily  atorvastatin, 40 mg, Oral, Daily With Dinner  cefepime, 2,000 mg, Intravenous, Q24H  clopidogrel, 75 mg, Oral, Daily  escitalopram, 10 mg, Oral, HS  guaiFENesin, 1,200 mg, Oral, Q12H [facility]  heparin (porcine), 5,000 Units, Subcutaneous, Q8H [facility]  hydrALAZINE, 50 mg, Oral, Q8H [facility]  insulin detemir, 20 Units, Subcutaneous, Q12H [facility]  insulin lispro, 1-6 Units, Subcutaneous, TID AC  insulin lispro, 1-6 Units, Subcutaneous, HS  insulin lispro, 4 Units, Subcutaneous, TID With Meals  iron polysaccharides, 150 mg, Oral, Daily  metoprolol tartrate, 25 mg, Oral, Q12H [facility]  nystatin, 500,000 Units, Swish & Swallow, 4x Daily    Continuous IV Infusions:  bumetanide, 4 mg/hr, Intravenous, Continuous    PRN Meds:  acetaminophen, 650 mg, Oral, Q6H PRN  aluminum-magnesium hydroxide-simethicone, 30 mL, Oral, Q6H PRN  benzonatate, 100 mg, Oral, TID PRN  calcium carbonate, 1,000 mg, Oral, Daily PRN  HYDROmorphone, 0 2 mg, Intravenous, Q3H PRN  nitroglycerin, 0 4 mg, Sublingual, Q5 Min PRN  ondansetron, 4 mg, Intravenous, Q6H PRN  oxyCODONE, 2 5 mg, Oral, Q4H PRN  phenol, 1 spray, Mouth/Throat, Q4H PRN  pneumococcal 13-valent conjugate vaccine, 0 5 mL, Intramuscular, Prior to discharge  polyethylene glycol, 17 g, Oral, Daily PRN      Discharge Plan: tbd  Network Utilization Review Department  ATTENTION: Please call with any questions or concerns to [phone] and carefully listen to the prompts so that you are directed to the right person  All voicemails are confidential   Linda Herman all requests for admission clinical reviews, approved or denied determinations and any other requests to dedicated fax number below belonging to the campus where the patient is receiving treatment   List of dedicated fax numbers for the Facilities:  [address] DENIALS (Administrative/Medical Necessity) [phone]   [address] (Maternity/NICU/Pediatrics) [phone]   [address] Trinity Sloan 4974 (Elizabeth Bledsoe "Felicia" 103) [address] [facility] [phone] 187 [facility] Anthony Charito Parsons 1481 [address] [place]) [address] [place], [place] [address] [phone]

## 2021-01-27 NOTE — PLAN OF CARE
Problem: OCCUPATIONAL THERAPY ADULT  Goal: Performs self-care activities at highest level of function for planned discharge setting  See evaluation for individualized goals  Description: Treatment Interventions: ADL retraining, Functional transfer training, UE strengthening/ROM, Endurance training, Patient/family training, Equipment evaluation/education, Continued evaluation, Energy conservation, Activityengagement          See flowsheet documentation for full assessment, interventions and recommendations  Note: Limitation: Decreased ADL status, Decreased UE strength, Decreased endurance, Decreased self-care trans, Decreased high-level ADLs(increased pain, increaed edema)     Assessment: Pt is an [de-identified] male [facility]21  Pt presents w/ elevated troponin and significant PMH impacting his occupational performance including HTN, DM, CAD s/p CABG (2016), bladder ca s/p resection  Pt transferred from [facility] on 1/22/2021 for cardiac cath  Pt placed on BiPAP on 1/23/2021 and transitioned to mid flow O2  Dialysis iniitiated 1/25/2021  Pt reports living alone in [address] [facility] w/ full flight from garage entrance in basement to main level  Pt reports living alone and I w/ ADL  Pt reports no O2 at baseline but used walker vs cane for functional mobility  Personal factors impacting performance include difficulty completing ADL, difficulty completing IADL, SILVINO, limited support / assistance, questionable insight into deficits/ med/health mgmt  Upon eval, pt alert and oriented to person, place  Limited recall details, time frame events  Pt reports R hand dominance and demonstrated UE AROM WFL but generalized weakness and increased edema impacting performance  Pt required max A X2 to complete bed mobility supine to sit  Pt required total A to complete LBD  Pt required min A to complete UBD  Pt required min A to complete grooming tasks w/+ time after set- up  O2 sats > 90% throughout on O2 via NC   Pt presents w/ increased pain, decreased activity tolerance, decreased endurance, decreased sitting tolerance, decreased standing tolerance, increased edema, decreased standing tolerance, decreased sitting balance, limited insight into deficits impacting his I w/ dressing, bathing, oral hygiene, functional mobility, functional transfers, activity engagement, clothing mgmt, community mobility, and pet care  Pt completing ADL below baseline level of I and would benefit from OT while in acute care to address deficits   Recommend post acute rehab when medically stable for discharge from acute care      OT Discharge Recommendation: [address]

## 2021-01-27 NOTE — ASSESSMENT & PLAN NOTE
Lab Results   Component Value Date    HGBA1C 6 7 (H) 07/03/2019       Recent Labs     01/26/21  1512 01/26/21  2033 01/27/21  0709 01/27/21  1047   POCGLU 164* 217* 229* 274*       Blood Sugar Average: Last 72 hrs:  (P) 217 5   Still poorly controlled  Continue Humalog sliding scale with Accu-Cheks q a c  And HS  Patient already on Levemir  We will add Humalog 3 times a day with meals  Adjust treatment accordingly

## 2021-01-27 NOTE — ASSESSMENT & PLAN NOTE
Lab Results   Component Value Date    EGFR 15 01/27/2021    EGFR 12 01/26/2021    EGFR 10 01/25/2021    CREATININE 3 54 (H) 01/27/2021    CREATININE 4 46 (H) 01/26/2021    CREATININE 4 97 (H) 01/25/2021     Creatinine 3 54, down from 4 46   Baseline creatinine around 2 3-2 5  Previously on HD in 2019  Nephrology on board   Most likely ATN post contrast for Cath  Continue Bumex drip  IR placed HD Cath 1/25/2021  Was to be dialyzed 1/27/2021, however it will happen 1/28/2021 instead  Monitor kidney function and input and output

## 2021-01-27 NOTE — ASSESSMENT & PLAN NOTE
· Monitor  · No active bleeding    · Iron 37, Ferritin 913  · Continue PO replacement as per nephrology

## 2021-01-27 NOTE — PROGRESS NOTES
Progress Note - Rama Paul 1940, [de-identified] y o  male MRN: [id number]    Unit/Bed#: S -01 Encounter: [id number]    Primary Care Provider: Marco Spencer MD   Date and time admitted to hospital: 1/22/2021 12:28 PM        * Elevated troponin  Assessment & Plan  Type 1 MI  Presented with chest pain at [facility]   Hx of CAD s/p stenting and CABG 2016  Received ASA 324mg at home and SL nitro x1 in the ER  Troponin peaked at 5 5  Continue cardiovascular medications with aspirin, Plavix, statin, beta-blocker and Imdur  Cardiac Cath Completed at [facility]   - three-vessel disease with 99% occluded proximal circumflex, 50% distal left main, mid % occluded, RCA proximally 100% occluded with collaterals from left circulation  Patient has his patent grafts of LIMA to LAD and SVG to OM2  Medical intervention recommend at this time  Echo complete - reveals 60% EF      Oral candida  Assessment & Plan  · Nystatin mouthwash  Hyponatremia  Assessment & Plan  · Likely due to hypervolemia/CHF  · Nephrology on board  · Monitor  · Continue Bumex drip  Anemia  Assessment & Plan  · Monitor  · No active bleeding  · Iron 37, Ferritin 913  · Continue PO replacement as per nephrology    High anion gap metabolic acidosis  Assessment & Plan    Previously with slight high anion gap metabolic acidosis  Possibly secondary to CKD  Currently Improving (15)  Previously on bicarb drip, this was discontinued  Nephrology on board  Continue Bumex drip  Monitor BMP        Acute on chronic diastolic congestive heart failure [facility])  Assessment & Plan  · Cardiologist and nephrologist on board  · Continue Bumex drip  · Continue cardiovascular and heart failure medications  · Monitor input and output  · Daily weights    · Increasing oxygen demand, currently on Mid Flow 6L    Hx of CABG  Assessment & Plan  CAD s/p CABG in 2016; history of stenting prior to CABG (LIMA to LAD and SVG to OM)  Status post cardiac catheterization: significant triple vessel coronary artery disease  Patient has his patent grafts of LIMA to LAD and SVG to OM2  Continue cardiovascular meds  Leukocytosis  Assessment & Plan  · Leukocytosis down to 11 9 from 13  · COVID-19, influenza, RSV tests were negative x2  · Chest x-ray officially read as multifocal pneumonia  · Thus will continue to treat as hospital-acquired pneumonia with IV cefepime  · Blood cultures x2 pending final  · CBC with differential count  · Cough medicine/antitussive, lozenges and Chloraseptic spray p r n       Diabetes mellitus [facility])  Assessment & Plan  Lab Results   Component Value Date    HGBA1C 6 7 (H) 07/03/2019       Recent Labs     01/26/21  1512 01/26/21  2033 01/27/21  0709 01/27/21  1047   POCGLU 164* 217* 229* 274*       Blood Sugar Average: Last 72 hrs:  (P) 217 5   Still poorly controlled  Continue Humalog sliding scale with Accu-Cheks q a c  And HS  Patient already on Levemir  We will add Humalog 3 times a day with meals  Adjust treatment accordingly  Acute renal failure superimposed on stage 3 chronic kidney disease [facility])  Assessment & Plan  Lab Results   Component Value Date    EGFR 15 01/27/2021    EGFR 12 01/26/2021    EGFR 10 01/25/2021    CREATININE 3 54 (H) 01/27/2021    CREATININE 4 46 (H) 01/26/2021    CREATININE 4 97 (H) 01/25/2021     Creatinine 3 54, down from 4 46   Baseline creatinine around 2 3-2 5  Previously on HD in 2019  Nephrology on board   Most likely ATN post contrast for Cath  Continue Bumex drip  IR placed HD Cath 1/25/2021  Was to be dialyzed 1/27/2021, however it will happen 1/28/2021 instead  Monitor kidney function and input and output          VTE Pharmacologic Prophylaxis:   Pharmacologic: Heparin  Mechanical VTE Prophylaxis in Place: Yes    Discussions with Specialists or Other Care Team Provider: Hospitalist  OT    Education and Discussions with Family / Patient: Patient    Current Length of Stay: 5 day(s)    Current Patient Status: Inpatient     Discharge Plan / Estimated Discharge Date: TBD    Code Status: Level 1 - Full Code      Subjective:   Patient was seen at bedside resting comfortably  States he does feel somewhat better, but continues to require 6L o2 supplementation  Appetite is currently suppressed  Objective:     Vitals:   Temp (24hrs), Av 9 °F (36 6 °C), Min:97 7 °F (36 5 °C), Max:98 1 °F (36 7 °C)    Temp:  [97 7 °F (36 5 °C)-98 1 °F (36 7 °C)] 97 8 °F (36 6 °C)  HR:  [60-75] 60  Resp:  [14-20] 19  BP: (130-138)/(60-63) 130/62  SpO2:  [94 %-97 %] 97 %  There is no height or weight on file to calculate BMI  Input and Output Summary (last 24 hours): Intake/Output Summary (Last 24 hours) at 2021 1511  Last data filed at 2021 1400  Gross per 24 hour   Intake 1020 ml   Output 1620 ml   Net -600 ml       Physical Exam:     Physical Exam  Vitals signs and nursing note reviewed  Constitutional:       General: He is not in acute distress  Appearance: He is well-developed  He is ill-appearing  He is not toxic-appearing  HENT:      Head: Normocephalic and atraumatic  Eyes:      General: No scleral icterus  Right eye: No discharge  Left eye: No discharge  Conjunctiva/sclera: Conjunctivae normal    Cardiovascular:      Rate and Rhythm: Normal rate and regular rhythm  Heart sounds: Normal heart sounds  No murmur  Pulmonary:      Effort: No respiratory distress  Breath sounds: Wheezing and rhonchi present  Abdominal:      General: Bowel sounds are normal  There is no distension  Palpations: Abdomen is soft  Tenderness: There is no abdominal tenderness  Musculoskeletal: Normal range of motion  General: No tenderness  Skin:     General: Skin is warm  Findings: No erythema or rash  Comments: HD cath in place  Intact   Neurological:      Mental Status: He is alert  Motor: No weakness     Psychiatric: Behavior: Behavior normal            Additional Data:     Labs:    Results from last 7 days   Lab Units 01/27/21  0510 01/26/21  0639   WBC Thousand/uL 11 96* 13 57*   HEMOGLOBIN g/dL 8 2* 8 6*   HEMATOCRIT % 24 9* 25 9*   PLATELETS Thousands/uL 150 166   NEUTROS PCT %  --  86*   LYMPHS PCT %  --  5*   MONOS PCT %  --  8   EOS PCT %  --  0     Results from last 7 days   Lab Units 01/27/21  0510  01/24/21  0019  01/23/21  0530   POTASSIUM mmol/L 3 9   < >  --    < > 5 1   CHLORIDE mmol/L 99*   < >  --    < > 102   CO2 mmol/L 21   < >  --    < > 19*   CO2, I-STAT mmol/L  --   --  19*  --   --    BUN mg/dL 111*   < >  --    < > 97*   CREATININE mg/dL 3 54*   < >  --    < > 2 89*   CALCIUM mg/dL 8 3   < >  --    < > 8 7   ALK PHOS U/L  --   --   --   --  106   ALT U/L  --   --   --   --  71   AST U/L  --   --   --   --  53*   GLUCOSE, ISTAT mg/dl  --   --  232*  --   --     < > = values in this interval not displayed  Results from last 7 days   Lab Units 01/22/21  0254   INR  1 24*       * I Have Reviewed All Lab Data Listed Above  * Additional Pertinent Lab Tests Reviewed: All Labs Within Last 24 Hours Reviewed    Imaging:    Imaging Reports Reviewed Today Include: N/A  Imaging Personally Reviewed by Myself Includes:  N/A    Recent Cultures (last 7 days):     Results from last 7 days   Lab Units 01/24/21  1050   BLOOD CULTURE  No Growth at 48 hrs  No Growth at 48 hrs         Last 24 Hours Medication List:   Current Facility-Administered Medications   Medication Dose Route Frequency Provider Last Rate    acetaminophen  650 mg Oral Q6H PRN Raisa Manjarrez MD      aluminum-magnesium hydroxide-simethicone  30 mL Oral Q6H PRN TABITHA Lee      amLODIPine  5 mg Oral BID RENETTA Chakraborty      aspirin  81 mg Oral Daily Raisa Manjarrez MD      atorvastatin  40 mg Oral Daily With Eder Guillory MD      benzonatate  100 mg Oral TID PRN TABITHA Ambrosio      bumetanide  4 mg/hr Intravenous Continuous Barney Sibley PA-C 4 mg/hr (01/27/21 1226)    calcium carbonate  1,000 mg Oral Daily PRN Dewayne Giron MD      cefepime  2,000 mg Intravenous Q24H Faye Correa MD 2,000 mg (01/27/21 1315)    clopidogrel  75 mg Oral Daily Dewayne Giron MD      escitalopram  10 mg Oral HS Dewayne Giron MD      guaiFENesin  1,200 mg Oral Q12H [address] TABITHA Hughes      heparin (porcine)  5,000 Units Subcutaneous Q8H [address] Faye Correa MD      hydrALAZINE  50 mg Oral [facility] RENETTA Chakraborty      HYDROmorphone  0 2 mg Intravenous Q3H PRN Faye Correa MD      insulin detemir  20 Units Subcutaneous Q12H [address] Dewayne Giron MD      insulin lispro  1-6 Units Subcutaneous TID AC Dewayne Giron MD      insulin lispro  1-6 Units Subcutaneous HS Dewayne Giron MD      insulin lispro  4 Units Subcutaneous TID With Meals Faye Correa MD      iron polysaccharides  150 mg Oral Daily RENETTA Chakraborty      metoprolol tartrate  25 mg Oral Q12H [address], MD      nitroglycerin  0 4 mg Sublingual Q5 Min PRN Dewayne Giron MD      nystatin  500,000 Units Swish & Swallow 4x Daily Faye Correa MD      ondansetron  4 mg Intravenous Q6H PRN Dewayne Giron MD      oxyCODONE  2 5 mg Oral Q4H PRN Faye Correa MD      phenol  1 spray Mouth/Throat Q4H PRN Faye Correa MD      pneumococcal 13-valent conjugate vaccine  0 5 mL Intramuscular Prior to discharge Dewayne Giron MD      polyethylene glycol  17 g Oral Daily PRN Dewayne Giron MD          Today, Patient Was Seen By: Samanta Potter MD    ** Please Note: This note has been constructed using a voice recognition system   **

## 2021-01-27 NOTE — ASSESSMENT & PLAN NOTE
Previously with slight high anion gap metabolic acidosis  Possibly secondary to CKD  Currently Improving (15)  Previously on bicarb drip, this was discontinued  Nephrology on board  Continue Bumex drip    Monitor BMP

## 2021-01-28 ENCOUNTER — APPOINTMENT (INPATIENT)
Dept: DIALYSIS | Facility: HOSPITAL | Age: 81
DRG: 280 | End: 2021-01-28
Attending: INTERNAL MEDICINE
Payer: COMMERCIAL

## 2021-01-28 PROBLEM — R33.9 URINARY RETENTION: Status: ACTIVE | Noted: 2021-01-28

## 2021-01-28 LAB
ANION GAP SERPL CALCULATED.3IONS-SCNC: 18 MMOL/L (ref 4–13)
BUN SERPL-MCNC: 136 MG/DL (ref 5–25)
CALCIUM SERPL-MCNC: 8.4 MG/DL (ref 8.3–10.1)
CHLORIDE SERPL-SCNC: 94 MMOL/L (ref 100–108)
CO2 SERPL-SCNC: 21 MMOL/L (ref 21–32)
CREAT SERPL-MCNC: 4.13 MG/DL (ref 0.6–1.3)
GFR SERPL CREATININE-BSD FRML MDRD: 13 ML/MIN/1.73SQ M
GLUCOSE SERPL-MCNC: 188 MG/DL (ref 65–140)
GLUCOSE SERPL-MCNC: 188 MG/DL (ref 65–140)
GLUCOSE SERPL-MCNC: 201 MG/DL (ref 65–140)
GLUCOSE SERPL-MCNC: 222 MG/DL (ref 65–140)
GLUCOSE SERPL-MCNC: 309 MG/DL (ref 65–140)
POTASSIUM SERPL-SCNC: 4 MMOL/L (ref 3.5–5.3)
SODIUM SERPL-SCNC: 133 MMOL/L (ref 136–145)

## 2021-01-28 PROCEDURE — 99232 SBSQ HOSP IP/OBS MODERATE 35: CPT | Performed by: INTERNAL MEDICINE

## 2021-01-28 PROCEDURE — 82948 REAGENT STRIP/BLOOD GLUCOSE: CPT

## 2021-01-28 PROCEDURE — 80048 BASIC METABOLIC PNL TOTAL CA: CPT | Performed by: HOSPITALIST

## 2021-01-28 PROCEDURE — 99232 SBSQ HOSP IP/OBS MODERATE 35: CPT | Performed by: HOSPITALIST

## 2021-01-28 RX ADMIN — CEFEPIME HYDROCHLORIDE 2000 MG: 2 INJECTION, POWDER, FOR SOLUTION INTRAVENOUS at 11:48

## 2021-01-28 RX ADMIN — INSULIN LISPRO 4 UNITS: 100 INJECTION, SOLUTION INTRAVENOUS; SUBCUTANEOUS at 18:04

## 2021-01-28 RX ADMIN — INSULIN LISPRO 1 UNITS: 100 INJECTION, SOLUTION INTRAVENOUS; SUBCUTANEOUS at 18:04

## 2021-01-28 RX ADMIN — Medication 4 MG/HR: at 22:23

## 2021-01-28 RX ADMIN — NYSTATIN 500000 UNITS: 100000 SUSPENSION ORAL at 11:49

## 2021-01-28 RX ADMIN — AMLODIPINE BESYLATE 5 MG: 5 TABLET ORAL at 08:23

## 2021-01-28 RX ADMIN — Medication 4 MG/HR: at 08:23

## 2021-01-28 RX ADMIN — HEPARIN SODIUM 5000 UNITS: 5000 INJECTION INTRAVENOUS; SUBCUTANEOUS at 05:03

## 2021-01-28 RX ADMIN — ESCITALOPRAM 10 MG: 10 TABLET, FILM COATED ORAL at 21:33

## 2021-01-28 RX ADMIN — Medication 4 MG/HR: at 00:52

## 2021-01-28 RX ADMIN — HEPARIN SODIUM 5000 UNITS: 5000 INJECTION INTRAVENOUS; SUBCUTANEOUS at 17:41

## 2021-01-28 RX ADMIN — NYSTATIN 500000 UNITS: 100000 SUSPENSION ORAL at 08:23

## 2021-01-28 RX ADMIN — HEPARIN SODIUM 5000 UNITS: 5000 INJECTION INTRAVENOUS; SUBCUTANEOUS at 21:33

## 2021-01-28 RX ADMIN — NYSTATIN 500000 UNITS: 100000 SUSPENSION ORAL at 17:40

## 2021-01-28 RX ADMIN — GUAIFENESIN 1200 MG: 600 TABLET, EXTENDED RELEASE ORAL at 21:32

## 2021-01-28 RX ADMIN — Medication 4 MG/HR: at 17:03

## 2021-01-28 RX ADMIN — ATORVASTATIN CALCIUM 40 MG: 40 TABLET, FILM COATED ORAL at 17:41

## 2021-01-28 RX ADMIN — INSULIN DETEMIR 20 UNITS: 100 INJECTION, SOLUTION SUBCUTANEOUS at 08:39

## 2021-01-28 RX ADMIN — INSULIN LISPRO 4 UNITS: 100 INJECTION, SOLUTION INTRAVENOUS; SUBCUTANEOUS at 08:40

## 2021-01-28 RX ADMIN — METOPROLOL TARTRATE 25 MG: 25 TABLET, FILM COATED ORAL at 21:32

## 2021-01-28 RX ADMIN — INSULIN LISPRO 1 UNITS: 100 INJECTION, SOLUTION INTRAVENOUS; SUBCUTANEOUS at 12:19

## 2021-01-28 RX ADMIN — ASPIRIN 81 MG: 81 TABLET ORAL at 08:23

## 2021-01-28 RX ADMIN — INSULIN DETEMIR 20 UNITS: 100 INJECTION, SOLUTION SUBCUTANEOUS at 21:33

## 2021-01-28 RX ADMIN — Medication 4 MG/HR: at 19:40

## 2021-01-28 RX ADMIN — HYDRALAZINE HYDROCHLORIDE 50 MG: 25 TABLET, FILM COATED ORAL at 17:41

## 2021-01-28 RX ADMIN — INSULIN LISPRO 2 UNITS: 100 INJECTION, SOLUTION INTRAVENOUS; SUBCUTANEOUS at 08:40

## 2021-01-28 RX ADMIN — POLYSACCHARIDE-IRON COMPLEX 150 MG: 150 CAPSULE ORAL at 08:23

## 2021-01-28 RX ADMIN — GUAIFENESIN 1200 MG: 600 TABLET, EXTENDED RELEASE ORAL at 08:23

## 2021-01-28 RX ADMIN — Medication 4 MG/HR: at 04:51

## 2021-01-28 RX ADMIN — HYDRALAZINE HYDROCHLORIDE 50 MG: 25 TABLET, FILM COATED ORAL at 05:03

## 2021-01-28 RX ADMIN — Medication 4 MG/HR: at 11:31

## 2021-01-28 RX ADMIN — Medication 4 MG/HR: at 14:15

## 2021-01-28 RX ADMIN — AMLODIPINE BESYLATE 5 MG: 5 TABLET ORAL at 17:41

## 2021-01-28 RX ADMIN — INSULIN LISPRO 4 UNITS: 100 INJECTION, SOLUTION INTRAVENOUS; SUBCUTANEOUS at 12:19

## 2021-01-28 RX ADMIN — INSULIN LISPRO 4 UNITS: 100 INJECTION, SOLUTION INTRAVENOUS; SUBCUTANEOUS at 21:33

## 2021-01-28 RX ADMIN — CLOPIDOGREL BISULFATE 75 MG: 75 TABLET ORAL at 08:22

## 2021-01-28 RX ADMIN — METOPROLOL TARTRATE 25 MG: 25 TABLET, FILM COATED ORAL at 08:23

## 2021-01-28 NOTE — ASSESSMENT & PLAN NOTE
· Cardiologist and nephrologist on board  · Continue Bumex drip  · Continue cardiovascular and heart failure medications  · Monitor input and output  · Daily weights  · Currently on 3L o2 supplementation   Improving

## 2021-01-28 NOTE — PROGRESS NOTES
Progress Note - Danish Nur 1940, [de-identified] y o  male MRN: [id number]    Unit/Bed#: S -01 Encounter: [id number]    Primary Care Provider: Clayton Baker MD   Date and time admitted to hospital: 1/22/2021 12:28 PM        * Elevated troponin  Assessment & Plan  Type 1 MI  Presented with chest pain at [facility]   Hx of CAD s/p stenting and CABG 2016  Received ASA 324mg at home and SL nitro x1 in the ER  Troponin peaked at 5 5  Continue cardiovascular medications with aspirin, Plavix, statin, beta-blocker and Imdur  Cardiac Cath Completed at [facility]   - three-vessel disease with 99% occluded proximal circumflex, 50% distal left main, mid % occluded, RCA proximally 100% occluded with collaterals from left circulation  Patient has his patent grafts of LIMA to LAD and SVG to OM2  Medical intervention recommend at this time  Echo complete - reveals 60% EF      Urinary retention  Assessment & Plan  Overnight 1/27/2021 nursing reported no urine output  Bladder scan revealed >800cc urine  Patient was straight catheterized  - Continue urinary retention protocol    Oral candida  Assessment & Plan  · Nystatin mouthwash  Hyponatremia  Assessment & Plan  · Likely due to hypervolemia/CHF  133 today  · Nephrology on board  · Monitor  · Continue Bumex drip  Anemia  Assessment & Plan  · Monitor  · No active bleeding  · Iron 37, Ferritin 913  · Continue PO iron replacement as per nephrology    High anion gap metabolic acidosis  Assessment & Plan    Previously with slight high anion gap metabolic acidosis  Possibly secondary to CKD  Currently 18  Previously on bicarb drip, this was discontinued  Nephrology on board  Continue Bumex drip  Monitor BMP        Acute on chronic diastolic congestive heart failure [facility])  Assessment & Plan  · Cardiologist and nephrologist on board  · Continue Bumex drip  · Continue cardiovascular and heart failure medications    · Monitor input and output  · Daily weights  · Currently on 3L o2 supplementation  Improving    Hx of CABG  Assessment & Plan  CAD s/p CABG in 2016; history of stenting prior to CABG (LIMA to LAD and SVG to OM)  Status post cardiac catheterization: significant triple vessel coronary artery disease  Patient has his patent grafts of LIMA to LAD and SVG to OM2  Continue cardiovascular meds  Leukocytosis  Assessment & Plan  · Leukocytosis down to 11  · COVID-19, influenza, RSV tests were negative x2  · Chest x-ray officially read as multifocal pneumonia  · Thus will continue to treat as hospital-acquired pneumonia with IV cefepime  · Blood cultures x2 no growth pending final  · Cough medicine/antitussive, lozenges and Chloraseptic spray p r n       Diabetes mellitus [facility])  Assessment & Plan  Lab Results   Component Value Date    HGBA1C 6 7 (H) 07/03/2019       Recent Labs     01/27/21  1047 01/27/21  1459 01/27/21  2048 01/28/21  0726   POCGLU 274* 317* 238* 222*       Blood Sugar Average: Last 72 hrs:  (P) 217   Still poorly controlled  Continue Humalog sliding scale with Accu-Cheks q a c  And HS  Patient already on Levemir  We will add Humalog 3 times a day with meals  Adjust treatment accordingly  Acute renal failure superimposed on stage 3 chronic kidney disease [facility])  Assessment & Plan  Lab Results   Component Value Date    EGFR 13 01/28/2021    EGFR 15 01/27/2021    EGFR 12 01/26/2021    CREATININE 4 13 (H) 01/28/2021    CREATININE 3 54 (H) 01/27/2021    CREATININE 4 46 (H) 01/26/2021     Creatinine 4 13, up from 3 54   Baseline creatinine around 2 3-2 5  Previously on HD in 2019  Nephrology on board   Most likely ATN post contrast for Cath  Continue Bumex drip  IR placed HD Cath 1/25/2021  Was to be dialyzed 1/27/2021, however it will happen 1/28/2021 instead  Monitor kidney function and input and output          VTE Pharmacologic Prophylaxis:   Pharmacologic: Heparin  Mechanical VTE Prophylaxis in Place: Yes    Discussions with Specialists or Other Care Team Provider: Hospitalist    Education and Discussions with Family / Patient: Patient    Current Length of Stay: 6 day(s)    Current Patient Status: Inpatient     Discharge Plan / Estimated Discharge Date: TBD    Code Status: Level 1 - Full Code      Subjective:   Patient was seen at bedside resting comfortably  Answers all questions without difficulty, however falls back asleep after each question  He states his breathing is better today  No new complaints  Objective:     Vitals:   Temp (24hrs), Av 8 °F (36 6 °C), Min:97 6 °F (36 4 °C), Max:98 °F (36 7 °C)    Temp:  [97 6 °F (36 4 °C)-98 °F (36 7 °C)] 98 °F (36 7 °C)  HR:  [60-71] 64  Resp:  [16-22] 16  BP: (130-161)/(61-67) 130/61  SpO2:  [97 %-98 %] 98 %  There is no height or weight on file to calculate BMI  Input and Output Summary (last 24 hours): Intake/Output Summary (Last 24 hours) at 2021 0934  Last data filed at 2021 0310  Gross per 24 hour   Intake 580 ml   Output 2389 ml   Net -1809 ml       Physical Exam:     Physical Exam  Vitals signs and nursing note reviewed  Constitutional:       General: He is not in acute distress  Appearance: He is well-developed  He is ill-appearing  He is not toxic-appearing  Comments: Waxing and waning consciousness    HENT:      Head: Normocephalic and atraumatic  Eyes:      General: No scleral icterus  Right eye: No discharge  Left eye: No discharge  Conjunctiva/sclera: Conjunctivae normal    Cardiovascular:      Rate and Rhythm: Normal rate and regular rhythm  Heart sounds: Normal heart sounds  No murmur  Pulmonary:      Effort: No respiratory distress  Breath sounds: Wheezing and rhonchi present  Abdominal:      General: Bowel sounds are normal  There is no distension  Palpations: Abdomen is soft  Tenderness: There is no abdominal tenderness  Musculoskeletal: Normal range of motion  General: No tenderness  Skin:     General: Skin is warm  Findings: No erythema or rash  Comments: HD cath in place  Intact   Neurological:      Motor: No weakness  Psychiatric:         Behavior: Behavior normal            Additional Data:     Labs:    Results from last 7 days   Lab Units 01/27/21  0510 01/26/21  0639   WBC Thousand/uL 11 96* 13 57*   HEMOGLOBIN g/dL 8 2* 8 6*   HEMATOCRIT % 24 9* 25 9*   PLATELETS Thousands/uL 150 166   NEUTROS PCT %  --  86*   LYMPHS PCT %  --  5*   MONOS PCT %  --  8   EOS PCT %  --  0     Results from last 7 days   Lab Units 01/28/21  0343  01/24/21  0019  01/23/21  0530   POTASSIUM mmol/L 4 0   < >  --    < > 5 1   CHLORIDE mmol/L 94*   < >  --    < > 102   CO2 mmol/L 21   < >  --    < > 19*   CO2, I-STAT mmol/L  --   --  19*  --   --    BUN mg/dL 136*   < >  --    < > 97*   CREATININE mg/dL 4 13*   < >  --    < > 2 89*   CALCIUM mg/dL 8 4   < >  --    < > 8 7   ALK PHOS U/L  --   --   --   --  106   ALT U/L  --   --   --   --  71   AST U/L  --   --   --   --  53*   GLUCOSE, ISTAT mg/dl  --   --  232*  --   --     < > = values in this interval not displayed  Results from last 7 days   Lab Units 01/22/21  0254   INR  1 24*       * I Have Reviewed All Lab Data Listed Above  * Additional Pertinent Lab Tests Reviewed: All Labs Within Last 24 Hours Reviewed    Imaging:    Imaging Reports Reviewed Today Include: N/A  Imaging Personally Reviewed by Myself Includes:  N/A    Recent Cultures (last 7 days):     Results from last 7 days   Lab Units 01/24/21  1050   BLOOD CULTURE  No Growth at 72 hrs  No Growth at 72 hrs         Last 24 Hours Medication List:   Current Facility-Administered Medications   Medication Dose Route Frequency Provider Last Rate    acetaminophen  650 mg Oral Q6H PRN Zane Yin MD      aluminum-magnesium hydroxide-simethicone  30 mL Oral Q6H PRN TABITHA Walsh      amLODIPine  5 mg Oral BID [facility] [place]      aspirin 81 mg Oral Daily Raisa Manjarrez MD      atorvastatin  40 mg Oral Daily With Eder Guillory MD      benzonatate  100 mg Oral TID PRN TABITHA Ambrosio      bumetanide  4 mg/hr Intravenous Continuous Barney Sibley PA-C 4 mg/hr (01/28/21 6697)    calcium carbonate  1,000 mg Oral Daily PRN Raisa Manjarrez MD      cefepime  2,000 mg Intravenous Q24H Faye Correa MD 2,000 mg (01/27/21 1315)    clopidogrel  75 mg Oral Daily Raisa Manjarrez MD      escitalopram  10 mg Oral HS Raisa Manjarrez MD      guaiFENesin  1,200 mg Oral Q12H [facility] TABITHA Ambrosio      heparin (porcine)  5,000 Units Subcutaneous Q8H [facility] Faye Correa MD      hydrALAZINE  50 mg Oral [facility] 473, PA-C      HYDROmorphone  0 2 mg Intravenous Q3H PRN Faye Correa MD      insulin detemir  20 Units Subcutaneous Q12H [facility] Raisa Manjarrez MD      insulin lispro  1-6 Units Subcutaneous TID AC Raisa Manjarrez MD      insulin lispro  1-6 Units Subcutaneous HS Raisa Manjarrez MD      insulin lispro  4 Units Subcutaneous TID With Meals Faye Correa MD      iron polysaccharides  150 mg Oral Daily [facility] 473, PA-C      metoprolol tartrate  25 mg Oral Q12H Charlie 47-7, MD      nitroglycerin  0 4 mg Sublingual Q5 Min PRN Raisa Manjarrez MD      nystatin  500,000 Units Swish & Swallow 4x Daily Faye Correa MD      ondansetron  4 mg Intravenous Q6H PRN Raisa Manjarrez MD      oxyCODONE  2 5 mg Oral Q4H PRN Faye Correa MD      phenol  1 spray Mouth/Throat Q4H PRN Faye Correa MD      pneumococcal 13-valent conjugate vaccine  0 5 mL Intramuscular Prior to discharge Raisa Manjarrez MD      polyethylene glycol  17 g Oral Daily PRN Raisa Manjarrez MD          Today, Patient Was Seen By: Courtney Daniels MD    ** Please Note: This note has been constructed using a voice recognition system   **

## 2021-01-28 NOTE — ASSESSMENT & PLAN NOTE
· Leukocytosis down to 11 latest  · COVID-19, influenza, RSV tests were negative x2  · Chest x-ray officially read as multifocal pneumonia  · Thus will continue to treat as hospital-acquired pneumonia with IV cefepime  · Blood cultures x2 no growth pending final  · Cough medicine/antitussive, lozenges and Chloraseptic spray p keri Pino

## 2021-01-28 NOTE — PLAN OF CARE
Problem: Potential for Falls  Goal: Patient will remain free of falls  Description: INTERVENTIONS:  - Assess patient frequently for physical needs  -  Identify cognitive and physical deficits and behaviors that affect risk of falls    -  [place] fall precautions as indicated by assessment   - Educate patient/family on patient safety including physical limitations  - Instruct patient to call for assistance with activity based on assessment  - Modify environment to reduce risk of injury  - Consider OT/PT consult to assist with strengthening/mobility  Outcome: Progressing     Problem: CARDIOVASCULAR - ADULT  Goal: Maintains optimal cardiac output and hemodynamic stability  Description: INTERVENTIONS:  - Monitor I/O, vital signs and rhythm  - Monitor for S/S and trends of decreased cardiac output  - Administer and titrate ordered vasoactive medications to optimize hemodynamic stability  - Assess quality of pulses, skin color and temperature  - Assess for signs of decreased coronary artery perfusion  - Instruct patient to report change in severity of symptoms  Outcome: Progressing  Goal: Absence of cardiac dysrhythmias or at baseline rhythm  Description: INTERVENTIONS:  - Continuous cardiac monitoring, vital signs, obtain 12 lead EKG if ordered  - Administer antiarrhythmic and heart rate control medications as ordered  - Monitor electrolytes and administer replacement therapy as ordered  Outcome: Progressing     Problem: RESPIRATORY - ADULT  Goal: Achieves optimal ventilation and oxygenation  Description: INTERVENTIONS:  - Assess for changes in respiratory status  - Assess for changes in mentation and behavior  - Position to facilitate oxygenation and minimize respiratory effort  - Oxygen administered by appropriate delivery if ordered  - Initiate smoking cessation education as indicated  - Encourage broncho-pulmonary hygiene including cough, deep breathe, Incentive Spirometry  - Assess the need for suctioning and aspirate as needed  - Assess and instruct to report SOB or any respiratory difficulty  - Respiratory Therapy support as indicated  Outcome: Progressing     Problem: GASTROINTESTINAL - ADULT  Goal: Maintains adequate nutritional intake  Description: INTERVENTIONS:  - Monitor percentage of each meal consumed  - Identify factors contributing to decreased intake, treat as appropriate  - Assist with meals as needed  - Monitor I&O, weight, and lab values if indicated  - Obtain nutrition services referral as needed  Outcome: Progressing     Problem: GENITOURINARY - ADULT  Goal: Maintains or returns to baseline urinary function  Description: INTERVENTIONS:  - Assess urinary function  - Encourage oral fluids to ensure adequate hydration if ordered  - Administer IV fluids as ordered to ensure adequate hydration  - Administer ordered medications as needed  - Offer frequent toileting  - Follow urinary retention protocol if ordered  Outcome: Progressing  Goal: Absence of urinary retention  Description: INTERVENTIONS:  - Assess patients ability to void and empty bladder  - Monitor I/O  - Bladder scan as needed  - Discuss with physician/AP medications to alleviate retention as needed  - Discuss catheterization for long term situations as appropriate  Outcome: Progressing     Problem: METABOLIC, FLUID AND ELECTROLYTES - ADULT  Goal: Electrolytes maintained within normal limits  Description: INTERVENTIONS:  - Monitor labs and assess patient for signs and symptoms of electrolyte imbalances  - Administer electrolyte replacement as ordered  - Monitor response to electrolyte replacements, including repeat lab results as appropriate  - Instruct patient on fluid and nutrition as appropriate  Outcome: Progressing  Goal: Fluid balance maintained  Description: INTERVENTIONS:  - Monitor labs   - Monitor I/O and WT  - Instruct patient on fluid and nutrition as appropriate  - Assess for signs & symptoms of volume excess or deficit  Outcome: Progressing  Goal: Glucose maintained within target range  Description: INTERVENTIONS:  - Monitor Blood Glucose as ordered  - Assess for signs and symptoms of hyperglycemia and hypoglycemia  - Administer ordered medications to maintain glucose within target range  - Assess nutritional intake and initiate nutrition service referral as needed  Outcome: Progressing     Problem: SKIN/TISSUE INTEGRITY - ADULT  Goal: Skin integrity remains intact  Description: INTERVENTIONS  - Identify patients at risk for skin breakdown  - Assess and monitor skin integrity  - Assess and monitor nutrition and hydration status  - Monitor labs (i e  albumin)  - Assess for incontinence   - Turn and reposition patient  - Assist with mobility/ambulation  - Relieve pressure over bony prominences  - Avoid friction and shearing  - Provide appropriate hygiene as needed including keeping skin clean and dry  - Evaluate need for skin moisturizer/barrier cream  - Collaborate with interdisciplinary team (i e  Nutrition, Rehabilitation, etc )   - Patient/family teaching  Outcome: Progressing     Problem: HEMATOLOGIC - ADULT  Goal: Maintains hematologic stability  Description: INTERVENTIONS  - Assess for signs and symptoms of bleeding or hemorrhage  - Monitor labs  - Administer supportive blood products/factors as ordered and appropriate  Outcome: Progressing     Problem: MUSCULOSKELETAL - ADULT  Goal: Maintain or return mobility to safest level of function  Description: INTERVENTIONS:  - Assess patient's ability to carry out ADLs; assess patient's baseline for ADL function and identify physical deficits which impact ability to perform ADLs (bathing, care of mouth/teeth, toileting, grooming, dressing, etc )  - Assess/evaluate cause of self-care deficits   - Assess range of motion  - Assess patient's mobility  - Assess patient's need for assistive devices and provide as appropriate  - Encourage maximum independence but intervene and supervise when necessary  - Involve family in performance of ADLs  - Assess for home care needs following discharge   - Consider OT consult to assist with ADL evaluation and planning for discharge  - Provide patient education as appropriate  Outcome: Progressing     Problem: PAIN - ADULT  Goal: Verbalizes/displays adequate comfort level or baseline comfort level  Description: Interventions:  - Encourage patient to monitor pain and request assistance  - Assess pain using appropriate pain scale  - Administer analgesics based on type and severity of pain and evaluate response  - Implement non-pharmacological measures as appropriate and evaluate response  - Consider cultural and social influences on pain and pain management  - Notify physician/advanced practitioner if interventions unsuccessful or patient reports new pain  Outcome: Progressing     Problem: INFECTION - ADULT  Goal: Absence or prevention of progression during hospitalization  Description: INTERVENTIONS:  - Assess and monitor for signs and symptoms of infection  - Monitor lab/diagnostic results  - Monitor all insertion sites, i e  indwelling lines, tubes, and drains  - Monitor endotracheal if appropriate and nasal secretions for changes in amount and color  - [place] appropriate cooling/warming therapies per order  - Administer medications as ordered  - Instruct and encourage patient and family to use good hand hygiene technique  - Identify and instruct in appropriate isolation precautions for identified infection/condition  Outcome: Progressing     Problem: SAFETY ADULT  Goal: Patient will remain free of falls  Description: INTERVENTIONS:  - Assess patient frequently for physical needs  -  Identify cognitive and physical deficits and behaviors that affect risk of falls    -  [place] fall precautions as indicated by assessment   - Educate patient/family on patient safety including physical limitations  - Instruct patient to call for assistance with activity based on assessment  - Modify environment to reduce risk of injury  - Consider OT/PT consult to assist with strengthening/mobility  Outcome: Progressing  Goal: Maintain or return to baseline ADL function  Description: INTERVENTIONS:  -  Assess patient's ability to carry out ADLs; assess patient's baseline for ADL function and identify physical deficits which impact ability to perform ADLs (bathing, care of mouth/teeth, toileting, grooming, dressing, etc )  - Assess/evaluate cause of self-care deficits   - Assess range of motion  - Assess patient's mobility; develop plan if impaired  - Assess patient's need for assistive devices and provide as appropriate  - Encourage maximum independence but intervene and supervise when necessary  - Involve family in performance of ADLs  - Assess for home care needs following discharge   - Consider OT consult to assist with ADL evaluation and planning for discharge  - Provide patient education as appropriate  Outcome: Progressing  Goal: Maintain or return mobility status to optimal level  Description: INTERVENTIONS:  - Assess patient's baseline mobility status (ambulation, transfers, stairs, etc )    - Identify cognitive and physical deficits and behaviors that affect mobility  - Identify mobility aids required to assist with transfers and/or ambulation (gait belt, sit-to-stand, lift, walker, cane, etc )  - [place] fall precautions as indicated by assessment  - Record patient progress and toleration of activity level on Mobility SBAR; progress patient to next Phase/Stage  - Instruct patient to call for assistance with activity based on assessment  - Consider rehabilitation consult to assist with strengthening/weightbearing, etc   Outcome: Progressing     Problem: DISCHARGE PLANNING  Goal: Discharge to home or other facility with appropriate resources  Description: INTERVENTIONS:  - Identify barriers to discharge w/patient and caregiver  - Arrange for needed discharge resources and transportation as appropriate  - Identify discharge learning needs (meds, wound care, etc )  - Arrange for interpretive services to assist at discharge as needed  - Refer to Case Management Department for coordinating discharge planning if the patient needs post-hospital services based on physician/advanced practitioner order or complex needs related to functional status, cognitive ability, or social support system  Outcome: Progressing     Problem: Knowledge Deficit  Goal: Patient/family/caregiver demonstrates understanding of disease process, treatment plan, medications, and discharge instructions  Description: Complete learning assessment and assess knowledge base    Interventions:  - Provide teaching at level of understanding  - Provide teaching via preferred learning methods  Outcome: Progressing     Problem: Prexisting or High Potential for Compromised Skin Integrity  Goal: Skin integrity is maintained or improved  Description: INTERVENTIONS:  - Identify patients at risk for skin breakdown  - Assess and monitor skin integrity  - Assess and monitor nutrition and hydration status  - Monitor labs   - Assess for incontinence   - Turn and reposition patient  - Assist with mobility/ambulation  - Relieve pressure over bony prominences  - Avoid friction and shearing  - Provide appropriate hygiene as needed including keeping skin clean and dry  - Evaluate need for skin moisturizer/barrier cream  - Collaborate with interdisciplinary team   - Patient/family teaching  - Consider wound care consult   Outcome: Progressing

## 2021-01-28 NOTE — ASSESSMENT & PLAN NOTE
Lab Results   Component Value Date    EGFR 13 01/28/2021    EGFR 15 01/27/2021    EGFR 12 01/26/2021    CREATININE 4 13 (H) 01/28/2021    CREATININE 3 54 (H) 01/27/2021    CREATININE 4 46 (H) 01/26/2021     Creatinine 4 13, up from 3 54   Baseline creatinine around 2 3-2 5  Previously on HD in 2019  Nephrology on board   Most likely ATN post contrast for Cath  Continue Bumex drip  IR placed HD Cath 1/25/2021  Was to be dialyzed 1/27/2021, however it will happen 1/28/2021 instead  Monitor kidney function and input and output

## 2021-01-28 NOTE — ASSESSMENT & PLAN NOTE
· Leukocytosis down to 11  · COVID-19, influenza, RSV tests were negative x2  · Chest x-ray officially read as multifocal pneumonia  · Thus will continue to treat as hospital-acquired pneumonia with IV cefepime  · Blood cultures x2 no growth pending final  · Cough medicine/antitussive, lozenges and Chloraseptic spray p r n  [place]ash [place]

## 2021-01-28 NOTE — ASSESSMENT & PLAN NOTE
Previously with slight high anion gap metabolic acidosis  Possibly secondary to CKD  Currently 18  Previously on bicarb drip, this was discontinued  Nephrology on board  Continue Bumex drip    Monitor BMP

## 2021-01-28 NOTE — PROGRESS NOTES
Progress Note - Nephrology   Rama Paul [de-identified] y o  male MRN: [id number]  Unit/Bed#: S -01 Encounter: [id number]    Assessment:  1  Acute kidney injury:  Likely secondary to dye induced nephropathy in the setting of cardiac catheterization  Patient had dialysis on January 25th and January 26  While patient is nonoliguric is still requiring dialysis will need dialysis today  Orders will be placed for dialysis  I suspect he will recover but may need further dialysis as of his creatinine increasing from 3 5-4 1 over the past 24 hours  2  Stage 4 chronic kidney disease:  Baseline creatinine is 2-2 5  This is secondary to or felt to be secondary to diabetic nephropathy and hypertensive nephrosclerosis  He follows with [address] [facility] [address] Nayely Dukes  The patient did require dialysis in August 2019 for BRIAN which resolved  3  Acute on chronic heart failure with preserved ejection fraction:  Patient again was refractory to a Bumex infusion plan on doing dialysis again today  4  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:  Hemoglobin is 8 2 some of this may be dilutional continue to monitor  Iron saturation is 14 ferritin is 913  Patient being treated for pneumonia hold off on IV iron  5  Urinary retention:  Continue to follow patient placed on urinary retention protocol    Plan:  As above plan for dialysis today  Subjective:   Patient seen in follow-up for acute kidney injury on chronic kidney disease stage 4  Patient feels better from a breathing perspective  Chart notes reviewed  Patient had a straight cath x1 secondary to urinary retention was placed on urinary retention protocol  Systolic blood pressure has been anywhere from 254-554 systolic  Patient is -1 3 L over the past 24 hours  He feels that he is improving  Objective:     Vitals: Blood pressure 130/61, pulse 64, temperature 98 °F (36 7 °C), temperature source Oral, resp  rate 16, SpO2 98 %  ,There is no height or weight on file to calculate BMI  Weight (last 2 days)     None            Intake/Output Summary (Last 24 hours) at 1/28/2021 1014  Last data filed at 1/28/2021 0310  Gross per 24 hour   Intake 580 ml   Output 2389 ml   Net -1809 ml       HD Temporary Double Catheter (Active)   Reasons to continue HD Cath Treatment Therapy 01/28/21 0000   Goal for Removal Other (comment) 01/27/21 0623   Line Necessity Reviewed Yes, reviewed with provider 01/28/21 0000   Site Assessment Clean;Dry; Intact 01/28/21 0000   Proximal Lumen Status Capped 01/28/21 0000   Distal Lumen Status Capped 01/28/21 0000   Line Care Connections checked and tightened 01/28/21 0000   Dressing Type Chlorhexidine dressing 01/28/21 0000   Dressing Status Old drainage 01/28/21 0000   Dressing Change Due 02/01/21 01/28/21 0000       Physical Exam: General: patient is in NAD; he does appear mildly toxic    Skin:  No new rash  Eyes:  No scleral icterus  Neck:  Supple  Chest:  Coarse breath sounds  CVS:  S1-S2 I do not appreciate a rub  Abdomen:  Soft positive bowel sounds  Extremities:  No significant edema patient does have a temporary IJ catheter without any erythema or discharge  Neuro:  Nonfocal  Psych:  Patient answers questions appropriately              Medications Prior to Admission   Medication    amLODIPine (NORVASC) 5 mg tablet    aspirin (ASPIR-LOW) 81 mg EC tablet    atorvastatin (LIPITOR) 40 mg tablet    escitalopram (LEXAPRO) 10 mg tablet    furosemide (LASIX) 80 mg tablet    hydrALAZINE (APRESOLINE) 50 mg tablet    insulin detemir (LEVEMIR) 100 units/mL subcutaneous injection    insulin lispro (HumaLOG) 100 units/mL injection    insulin lispro (HumaLOG) 100 units/mL injection    isosorbide mononitrate (IMDUR) 30 mg 24 hr tablet    metoprolol tartrate (LOPRESSOR) 50 mg tablet    nitroglycerin (NITROSTAT) 0 4 mg SL tablet    polyethylene glycol (MIRALAX) 17 g packet       Current Facility-Administered Medications   Medication Dose Route Frequency  acetaminophen (TYLENOL) tablet 650 mg  650 mg Oral Q6H PRN    aluminum-magnesium hydroxide-simethicone (MYLANTA) oral suspension 30 mL  30 mL Oral Q6H PRN    amLODIPine (NORVASC) tablet 5 mg  5 mg Oral BID    aspirin (ECOTRIN LOW STRENGTH) EC tablet 81 mg  81 mg Oral Daily    atorvastatin (LIPITOR) tablet 40 mg  40 mg Oral Daily With Dinner    benzonatate (TESSALON PERLES) capsule 100 mg  100 mg Oral TID PRN    bumetanide (BUMEX) 12 5 mg infusion 50 mL  4 mg/hr Intravenous Continuous    calcium carbonate (TUMS) chewable tablet 1,000 mg  1,000 mg Oral Daily PRN    cefepime (MAXIPIME) 2,000 mg in dextrose 5 % 50 mL IVPB  2,000 mg Intravenous Q24H    clopidogrel (PLAVIX) tablet 75 mg  75 mg Oral Daily    escitalopram (LEXAPRO) tablet 10 mg  10 mg Oral HS    guaiFENesin (MUCINEX) 12 hr tablet 1,200 mg  1,200 mg Oral Q12H [facility]    heparin (porcine) subcutaneous injection 5,000 Units  5,000 Units Subcutaneous Q8H [facility]    hydrALAZINE (APRESOLINE) tablet 50 mg  50 mg Oral Q8H [facility]    HYDROmorphone (DILAUDID) injection 0 2 mg  0 2 mg Intravenous Q3H PRN    insulin detemir (LEVEMIR) subcutaneous injection 20 Units  20 Units Subcutaneous Q12H [place]    insulin lispro (HumaLOG) 100 units/mL subcutaneous injection 1-6 Units  1-6 Units Subcutaneous TID AC    insulin lispro (HumaLOG) 100 units/mL subcutaneous injection 1-6 Units  1-6 Units Subcutaneous HS    insulin lispro (HumaLOG) 100 units/mL subcutaneous injection 4 Units  4 Units Subcutaneous TID With Meals    iron polysaccharides (FERREX) capsule 150 mg  150 mg Oral Daily    metoprolol tartrate (LOPRESSOR) tablet 25 mg  25 mg Oral Q12H [place]    nitroglycerin (NITROSTAT) SL tablet 0 4 mg  0 4 mg Sublingual Q5 Min PRN    nystatin (MYCOSTATIN) oral suspension 500,000 Units  500,000 Units Swish & Swallow 4x Daily    ondansetron (ZOFRAN) injection 4 mg  4 mg Intravenous Q6H PRN    oxyCODONE (ROXICODONE) IR tablet 2 5 mg  2 5 mg Oral Q4H PRN    phenol (CHLORASEPTIC) 1 4 % mucosal liquid 1 spray  1 spray Mouth/Throat Q4H PRN    pneumococcal 13-valent conjugate vaccine (PREVNAR-13) IM injection 0 5 mL  0 5 mL Intramuscular Prior to discharge    polyethylene glycol (MIRALAX) packet 17 g  17 g Oral Daily PRN        Lab, Imaging and other studies: I have personally reviewed pertinent labs    CBC:   Lab Results   Component Value Date    WBC 11 96 (H) 01/27/2021    WBC 8 8 01/05/2016    RBC 2 60 (L) 01/27/2021    RBC 4 61 (L) 01/05/2016     CMP:   Lab Results   Component Value Date     01/05/2016    CL 94 (L) 01/28/2021     01/05/2016    CO2 21 01/28/2021    CO2 19 (L) 01/24/2021    ANIONGAP 14 2 01/05/2016     (H) 01/28/2021    BUN 35 (H) 01/05/2016    CREATININE 4 13 (H) 01/28/2021    CREATININE 1 5 (H) 01/05/2016    GLUCOSE 232 (H) 01/24/2021    GLUCOSE 131 (H) 01/05/2016    CALCIUM 8 4 01/28/2021    CALCIUM 9 0 01/05/2016    AST 53 (H) 01/23/2021    AST 22 01/05/2016    ALT 71 01/23/2021    ALT 53 01/05/2016    ALKPHOS 106 01/23/2021    ALKPHOS 66 01/05/2016    PROT 10 2 (H) 01/05/2016    BILITOT 0 9 01/05/2016    EGFR 13 01/28/2021     Phosphorus:   Lab Results   Component Value Date    PHOS 5 1 (H) 01/27/2021     Magnesium:   Lab Results   Component Value Date    MG 2 9 (H) 07/10/2019     Urinalysis:   Lab Results   Component Value Date    COLORU Yellow 07/08/2019    CLARITYU Clear 07/08/2019    CLARITYU YELLOW 01/05/2016    CLARITYU SL CLOUDY 01/05/2016    SPECGRAV 1 025 07/08/2019    SPECGRAV 1 025 01/05/2016    PHUR 5 0 07/08/2019    PHUR 5 5 02/14/2017    PHUR 6 0 01/05/2016    LEUKOCYTESUR Negative 07/08/2019    LEUKOCYTESUR TRACE (A) 01/05/2016    NITRITE Negative 07/08/2019    NITRITE POSITIVE (A) 01/05/2016    PROTEINUA 100 (A) 01/05/2016    GLUCOSEU Negative 07/08/2019    GLUCOSEU >=1000 01/05/2016    KETONESU Negative 07/08/2019    KETONESU NEGATIVE 01/05/2016    BILIRUBINUR Negative 07/08/2019    BILIRUBINUR NEGATIVE 01/05/2016 BLOODU Negative 07/08/2019    BLOODU LARGE (A) 01/05/2016     BMP:   Lab Results   Component Value Date    GLUCOSE 232 (H) 01/24/2021    GLUCOSE 131 (H) 01/05/2016    SODIUM 133 (L) 01/28/2021    CO2 21 01/28/2021    CO2 19 (L) 01/24/2021     (H) 01/28/2021    BUN 35 (H) 01/05/2016    CREATININE 4 13 (H) 01/28/2021    CREATININE 1 5 (H) 01/05/2016    CALCIUM 8 4 01/28/2021    CALCIUM 9 0 01/05/2016     ABGs:   Lab Results   Component Value Date    PH 7 342 (L) 01/24/2021

## 2021-01-28 NOTE — ASSESSMENT & PLAN NOTE
Overnight 1/27/2021 nursing reported no urine output  Bladder scan revealed >800cc urine  Patient was straight catheterized      - Continue urinary retention protocol

## 2021-01-28 NOTE — PLAN OF CARE
Problem: Potential for Falls  Goal: Patient will remain free of falls  Description: INTERVENTIONS:  - Assess patient frequently for physical needs  -  Identify cognitive and physical deficits and behaviors that affect risk of falls    -  [place] fall precautions as indicated by assessment   - Educate patient/family on patient safety including physical limitations  - Instruct patient to call for assistance with activity based on assessment  - Modify environment to reduce risk of injury  - Consider OT/PT consult to assist with strengthening/mobility  Outcome: Progressing     Problem: CARDIOVASCULAR - ADULT  Goal: Maintains optimal cardiac output and hemodynamic stability  Description: INTERVENTIONS:  - Monitor I/O, vital signs and rhythm  - Monitor for S/S and trends of decreased cardiac output  - Administer and titrate ordered vasoactive medications to optimize hemodynamic stability  - Assess quality of pulses, skin color and temperature  - Assess for signs of decreased coronary artery perfusion  - Instruct patient to report change in severity of symptoms  Outcome: Progressing  Goal: Absence of cardiac dysrhythmias or at baseline rhythm  Description: INTERVENTIONS:  - Continuous cardiac monitoring, vital signs, obtain 12 lead EKG if ordered  - Administer antiarrhythmic and heart rate control medications as ordered  - Monitor electrolytes and administer replacement therapy as ordered  Outcome: Progressing     Problem: RESPIRATORY - ADULT  Goal: Achieves optimal ventilation and oxygenation  Description: INTERVENTIONS:  - Assess for changes in respiratory status  - Assess for changes in mentation and behavior  - Position to facilitate oxygenation and minimize respiratory effort  - Oxygen administered by appropriate delivery if ordered  - Initiate smoking cessation education as indicated  - Encourage broncho-pulmonary hygiene including cough, deep breathe, Incentive Spirometry  - Assess the need for suctioning and aspirate as needed  - Assess and instruct to report SOB or any respiratory difficulty  - Respiratory Therapy support as indicated  Outcome: Progressing     Problem: GASTROINTESTINAL - ADULT  Goal: Maintains adequate nutritional intake  Description: INTERVENTIONS:  - Monitor percentage of each meal consumed  - Identify factors contributing to decreased intake, treat as appropriate  - Assist with meals as needed  - Monitor I&O, weight, and lab values if indicated  - Obtain nutrition services referral as needed  Outcome: Progressing     Problem: GENITOURINARY - ADULT  Goal: Maintains or returns to baseline urinary function  Description: INTERVENTIONS:  - Assess urinary function  - Encourage oral fluids to ensure adequate hydration if ordered  - Administer IV fluids as ordered to ensure adequate hydration  - Administer ordered medications as needed  - Offer frequent toileting  - Follow urinary retention protocol if ordered  Outcome: Progressing  Goal: Absence of urinary retention  Description: INTERVENTIONS:  - Assess patients ability to void and empty bladder  - Monitor I/O  - Bladder scan as needed  - Discuss with physician/AP medications to alleviate retention as needed  - Discuss catheterization for long term situations as appropriate  Outcome: Progressing     Problem: METABOLIC, FLUID AND ELECTROLYTES - ADULT  Goal: Electrolytes maintained within normal limits  Description: INTERVENTIONS:  - Monitor labs and assess patient for signs and symptoms of electrolyte imbalances  - Administer electrolyte replacement as ordered  - Monitor response to electrolyte replacements, including repeat lab results as appropriate  - Instruct patient on fluid and nutrition as appropriate  Outcome: Progressing  Goal: Fluid balance maintained  Description: INTERVENTIONS:  - Monitor labs   - Monitor I/O and WT  - Instruct patient on fluid and nutrition as appropriate  - Assess for signs & symptoms of volume excess or deficit  Outcome: Progressing  Goal: Glucose maintained within target range  Description: INTERVENTIONS:  - Monitor Blood Glucose as ordered  - Assess for signs and symptoms of hyperglycemia and hypoglycemia  - Administer ordered medications to maintain glucose within target range  - Assess nutritional intake and initiate nutrition service referral as needed  Outcome: Progressing     Problem: SKIN/TISSUE INTEGRITY - ADULT  Goal: Skin integrity remains intact  Description: INTERVENTIONS  - Identify patients at risk for skin breakdown  - Assess and monitor skin integrity  - Assess and monitor nutrition and hydration status  - Monitor labs (i e  albumin)  - Assess for incontinence   - Turn and reposition patient  - Assist with mobility/ambulation  - Relieve pressure over bony prominences  - Avoid friction and shearing  - Provide appropriate hygiene as needed including keeping skin clean and dry  - Evaluate need for skin moisturizer/barrier cream  - Collaborate with interdisciplinary team (i e  Nutrition, Rehabilitation, etc )   - Patient/family teaching  Outcome: Progressing     Problem: HEMATOLOGIC - ADULT  Goal: Maintains hematologic stability  Description: INTERVENTIONS  - Assess for signs and symptoms of bleeding or hemorrhage  - Monitor labs  - Administer supportive blood products/factors as ordered and appropriate  Outcome: Progressing     Problem: MUSCULOSKELETAL - ADULT  Goal: Maintain or return mobility to safest level of function  Description: INTERVENTIONS:  - Assess patient's ability to carry out ADLs; assess patient's baseline for ADL function and identify physical deficits which impact ability to perform ADLs (bathing, care of mouth/teeth, toileting, grooming, dressing, etc )  - Assess/evaluate cause of self-care deficits   - Assess range of motion  - Assess patient's mobility  - Assess patient's need for assistive devices and provide as appropriate  - Encourage maximum independence but intervene and supervise when necessary  - Involve family in performance of ADLs  - Assess for home care needs following discharge   - Consider OT consult to assist with ADL evaluation and planning for discharge  - Provide patient education as appropriate  Outcome: Progressing     Problem: PAIN - ADULT  Goal: Verbalizes/displays adequate comfort level or baseline comfort level  Description: Interventions:  - Encourage patient to monitor pain and request assistance  - Assess pain using appropriate pain scale  - Administer analgesics based on type and severity of pain and evaluate response  - Implement non-pharmacological measures as appropriate and evaluate response  - Consider cultural and social influences on pain and pain management  - Notify physician/advanced practitioner if interventions unsuccessful or patient reports new pain  Outcome: Progressing     Problem: INFECTION - ADULT  Goal: Absence or prevention of progression during hospitalization  Description: INTERVENTIONS:  - Assess and monitor for signs and symptoms of infection  - Monitor lab/diagnostic results  - Monitor all insertion sites, i e  indwelling lines, tubes, and drains  - Monitor endotracheal if appropriate and nasal secretions for changes in amount and color  - [place] appropriate cooling/warming therapies per order  - Administer medications as ordered  - Instruct and encourage patient and family to use good hand hygiene technique  - Identify and instruct in appropriate isolation precautions for identified infection/condition  Outcome: Progressing     Problem: SAFETY ADULT  Goal: Patient will remain free of falls  Description: INTERVENTIONS:  - Assess patient frequently for physical needs  -  Identify cognitive and physical deficits and behaviors that affect risk of falls    -  [place] fall precautions as indicated by assessment   - Educate patient/family on patient safety including physical limitations  - Instruct patient to call for assistance with activity based on assessment  - Modify environment to reduce risk of injury  - Consider OT/PT consult to assist with strengthening/mobility  Outcome: Progressing  Goal: Maintain or return to baseline ADL function  Description: INTERVENTIONS:  -  Assess patient's ability to carry out ADLs; assess patient's baseline for ADL function and identify physical deficits which impact ability to perform ADLs (bathing, care of mouth/teeth, toileting, grooming, dressing, etc )  - Assess/evaluate cause of self-care deficits   - Assess range of motion  - Assess patient's mobility; develop plan if impaired  - Assess patient's need for assistive devices and provide as appropriate  - Encourage maximum independence but intervene and supervise when necessary  - Involve family in performance of ADLs  - Assess for home care needs following discharge   - Consider OT consult to assist with ADL evaluation and planning for discharge  - Provide patient education as appropriate  Outcome: Progressing  Goal: Maintain or return mobility status to optimal level  Description: INTERVENTIONS:  - Assess patient's baseline mobility status (ambulation, transfers, stairs, etc )    - Identify cognitive and physical deficits and behaviors that affect mobility  - Identify mobility aids required to assist with transfers and/or ambulation (gait belt, sit-to-stand, lift, walker, cane, etc )  - [place] fall precautions as indicated by assessment  - Record patient progress and toleration of activity level on Mobility SBAR; progress patient to next Phase/Stage  - Instruct patient to call for assistance with activity based on assessment  - Consider rehabilitation consult to assist with strengthening/weightbearing, etc   Outcome: Progressing     Problem: DISCHARGE PLANNING  Goal: Discharge to home or other facility with appropriate resources  Description: INTERVENTIONS:  - Identify barriers to discharge w/patient and caregiver  - Arrange for needed discharge resources and transportation as appropriate  - Identify discharge learning needs (meds, wound care, etc )  - Arrange for interpretive services to assist at discharge as needed  - Refer to Case Management Department for coordinating discharge planning if the patient needs post-hospital services based on physician/advanced practitioner order or complex needs related to functional status, cognitive ability, or social support system  Outcome: Progressing     Problem: Knowledge Deficit  Goal: Patient/family/caregiver demonstrates understanding of disease process, treatment plan, medications, and discharge instructions  Description: Complete learning assessment and assess knowledge base    Interventions:  - Provide teaching at level of understanding  - Provide teaching via preferred learning methods  Outcome: Progressing     Problem: Prexisting or High Potential for Compromised Skin Integrity  Goal: Skin integrity is maintained or improved  Description: INTERVENTIONS:  - Identify patients at risk for skin breakdown  - Assess and monitor skin integrity  - Assess and monitor nutrition and hydration status  - Monitor labs   - Assess for incontinence   - Turn and reposition patient  - Assist with mobility/ambulation  - Relieve pressure over bony prominences  - Avoid friction and shearing  - Provide appropriate hygiene as needed including keeping skin clean and dry  - Evaluate need for skin moisturizer/barrier cream  - Collaborate with interdisciplinary team   - Patient/family teaching  - Consider wound care consult   Outcome: Progressing

## 2021-01-28 NOTE — ASSESSMENT & PLAN NOTE
· Monitor  · No active bleeding    · Iron 37, Ferritin 913  · Continue PO iron replacement as per nephrology

## 2021-01-28 NOTE — PLAN OF CARE
Problem: Potential for Falls  Goal: Patient will remain free of falls  Description: INTERVENTIONS:  - Assess patient frequently for physical needs  -  Identify cognitive and physical deficits and behaviors that affect risk of falls    -  [place] fall precautions as indicated by assessment   - Educate patient/family on patient safety including physical limitations  - Instruct patient to call for assistance with activity based on assessment  - Modify environment to reduce risk of injury  - Consider OT/PT consult to assist with strengthening/mobility  Outcome: Progressing     Problem: CARDIOVASCULAR - ADULT  Goal: Maintains optimal cardiac output and hemodynamic stability  Description: INTERVENTIONS:  - Monitor I/O, vital signs and rhythm  - Monitor for S/S and trends of decreased cardiac output  - Administer and titrate ordered vasoactive medications to optimize hemodynamic stability  - Assess quality of pulses, skin color and temperature  - Assess for signs of decreased coronary artery perfusion  - Instruct patient to report change in severity of symptoms  Outcome: Progressing  Goal: Absence of cardiac dysrhythmias or at baseline rhythm  Description: INTERVENTIONS:  - Continuous cardiac monitoring, vital signs, obtain 12 lead EKG if ordered  - Administer antiarrhythmic and heart rate control medications as ordered  - Monitor electrolytes and administer replacement therapy as ordered  Outcome: Progressing     Problem: RESPIRATORY - ADULT  Goal: Achieves optimal ventilation and oxygenation  Description: INTERVENTIONS:  - Assess for changes in respiratory status  - Assess for changes in mentation and behavior  - Position to facilitate oxygenation and minimize respiratory effort  - Oxygen administered by appropriate delivery if ordered  - Initiate smoking cessation education as indicated  - Encourage broncho-pulmonary hygiene including cough, deep breathe, Incentive Spirometry  - Assess the need for suctioning and aspirate as needed  - Assess and instruct to report SOB or any respiratory difficulty  - Respiratory Therapy support as indicated  Outcome: Progressing     Problem: GENITOURINARY - ADULT  Goal: Maintains or returns to baseline urinary function  Description: INTERVENTIONS:  - Assess urinary function  - Encourage oral fluids to ensure adequate hydration if ordered  - Administer IV fluids as ordered to ensure adequate hydration  - Administer ordered medications as needed  - Offer frequent toileting  - Follow urinary retention protocol if ordered  Outcome: Progressing  Goal: Absence of urinary retention  Description: INTERVENTIONS:  - Assess patients ability to void and empty bladder  - Monitor I/O  - Bladder scan as needed  - Discuss with physician/AP medications to alleviate retention as needed  - Discuss catheterization for long term situations as appropriate  Outcome: Progressing     Problem: METABOLIC, FLUID AND ELECTROLYTES - ADULT  Goal: Electrolytes maintained within normal limits  Description: INTERVENTIONS:  - Monitor labs and assess patient for signs and symptoms of electrolyte imbalances  - Administer electrolyte replacement as ordered  - Monitor response to electrolyte replacements, including repeat lab results as appropriate  - Instruct patient on fluid and nutrition as appropriate  Outcome: Progressing  Goal: Fluid balance maintained  Description: INTERVENTIONS:  - Monitor labs   - Monitor I/O and WT  - Instruct patient on fluid and nutrition as appropriate  - Assess for signs & symptoms of volume excess or deficit  Outcome: Progressing  Goal: Glucose maintained within target range  Description: INTERVENTIONS:  - Monitor Blood Glucose as ordered  - Assess for signs and symptoms of hyperglycemia and hypoglycemia  - Administer ordered medications to maintain glucose within target range  - Assess nutritional intake and initiate nutrition service referral as needed  Outcome: Progressing     Problem: SKIN/TISSUE INTEGRITY - ADULT  Goal: Skin integrity remains intact  Description: INTERVENTIONS  - Identify patients at risk for skin breakdown  - Assess and monitor skin integrity  - Assess and monitor nutrition and hydration status  - Monitor labs (i e  albumin)  - Assess for incontinence   - Turn and reposition patient  - Assist with mobility/ambulation  - Relieve pressure over bony prominences  - Avoid friction and shearing  - Provide appropriate hygiene as needed including keeping skin clean and dry  - Evaluate need for skin moisturizer/barrier cream  - Collaborate with interdisciplinary team (i e  Nutrition, Rehabilitation, etc )   - Patient/family teaching  Outcome: Progressing     Problem: MUSCULOSKELETAL - ADULT  Goal: Maintain or return mobility to safest level of function  Description: INTERVENTIONS:  - Assess patient's ability to carry out ADLs; assess patient's baseline for ADL function and identify physical deficits which impact ability to perform ADLs (bathing, care of mouth/teeth, toileting, grooming, dressing, etc )  - Assess/evaluate cause of self-care deficits   - Assess range of motion  - Assess patient's mobility  - Assess patient's need for assistive devices and provide as appropriate  - Encourage maximum independence but intervene and supervise when necessary  - Involve family in performance of ADLs  - Assess for home care needs following discharge   - Consider OT consult to assist with ADL evaluation and planning for discharge  - Provide patient education as appropriate  Outcome: Progressing     Problem: PAIN - ADULT  Goal: Verbalizes/displays adequate comfort level or baseline comfort level  Description: Interventions:  - Encourage patient to monitor pain and request assistance  - Assess pain using appropriate pain scale  - Administer analgesics based on type and severity of pain and evaluate response  - Implement non-pharmacological measures as appropriate and evaluate response  - Consider cultural and social influences on pain and pain management  - Notify physician/advanced practitioner if interventions unsuccessful or patient reports new pain  Outcome: Progressing     Problem: INFECTION - ADULT  Goal: Absence or prevention of progression during hospitalization  Description: INTERVENTIONS:  - Assess and monitor for signs and symptoms of infection  - Monitor lab/diagnostic results  - Monitor all insertion sites, i e  indwelling lines, tubes, and drains  - Monitor endotracheal if appropriate and nasal secretions for changes in amount and color  - [place] appropriate cooling/warming therapies per order  - Administer medications as ordered  - Instruct and encourage patient and family to use good hand hygiene technique  - Identify and instruct in appropriate isolation precautions for identified infection/condition  Outcome: Progressing     Problem: SAFETY ADULT  Goal: Patient will remain free of falls  Description: INTERVENTIONS:  - Assess patient frequently for physical needs  -  Identify cognitive and physical deficits and behaviors that affect risk of falls    -  [place] fall precautions as indicated by assessment   - Educate patient/family on patient safety including physical limitations  - Instruct patient to call for assistance with activity based on assessment  - Modify environment to reduce risk of injury  - Consider OT/PT consult to assist with strengthening/mobility  Outcome: Progressing  Goal: Maintain or return to baseline ADL function  Description: INTERVENTIONS:  -  Assess patient's ability to carry out ADLs; assess patient's baseline for ADL function and identify physical deficits which impact ability to perform ADLs (bathing, care of mouth/teeth, toileting, grooming, dressing, etc )  - Assess/evaluate cause of self-care deficits   - Assess range of motion  - Assess patient's mobility; develop plan if impaired  - Assess patient's need for assistive devices and provide as appropriate  - Encourage maximum independence but intervene and supervise when necessary  - Involve family in performance of ADLs  - Assess for home care needs following discharge   - Consider OT consult to assist with ADL evaluation and planning for discharge  - Provide patient education as appropriate  Outcome: Progressing  Goal: Maintain or return mobility status to optimal level  Description: INTERVENTIONS:  - Assess patient's baseline mobility status (ambulation, transfers, stairs, etc )    - Identify cognitive and physical deficits and behaviors that affect mobility  - Identify mobility aids required to assist with transfers and/or ambulation (gait belt, sit-to-stand, lift, walker, cane, etc )  - [place] fall precautions as indicated by assessment  - Record patient progress and toleration of activity level on Mobility SBAR; progress patient to next Phase/Stage  - Instruct patient to call for assistance with activity based on assessment  - Consider rehabilitation consult to assist with strengthening/weightbearing, etc   Outcome: Progressing     Problem: DISCHARGE PLANNING  Goal: Discharge to home or other facility with appropriate resources  Description: INTERVENTIONS:  - Identify barriers to discharge w/patient and caregiver  - Arrange for needed discharge resources and transportation as appropriate  - Identify discharge learning needs (meds, wound care, etc )  - Arrange for interpretive services to assist at discharge as needed  - Refer to Case Management Department for coordinating discharge planning if the patient needs post-hospital services based on physician/advanced practitioner order or complex needs related to functional status, cognitive ability, or social support system  Outcome: Progressing     Problem: Knowledge Deficit  Goal: Patient/family/caregiver demonstrates understanding of disease process, treatment plan, medications, and discharge instructions  Description: Complete learning assessment and assess knowledge base    Interventions:  - Provide teaching at level of understanding  - Provide teaching via preferred learning methods  Outcome: Progressing     Problem: Prexisting or High Potential for Compromised Skin Integrity  Goal: Skin integrity is maintained or improved  Description: INTERVENTIONS:  - Identify patients at risk for skin breakdown  - Assess and monitor skin integrity  - Assess and monitor nutrition and hydration status  - Monitor labs   - Assess for incontinence   - Turn and reposition patient  - Assist with mobility/ambulation  - Relieve pressure over bony prominences  - Avoid friction and shearing  - Provide appropriate hygiene as needed including keeping skin clean and dry  - Evaluate need for skin moisturizer/barrier cream  - Collaborate with interdisciplinary team   - Patient/family teaching  - Consider wound care consult   Outcome: Progressing

## 2021-01-28 NOTE — ASSESSMENT & PLAN NOTE
· Likely due to hypervolemia/CHF  131 today  · Nephrology on board  · Monitor  · Continue Bumex drip

## 2021-01-28 NOTE — ASSESSMENT & PLAN NOTE
Previously with slight high anion gap metabolic acidosis  Possibly secondary to CKD  Currently 18  Previously on bicarb drip, this was discontinued  Nephrology on board  Continue Bumex drip    Monitor BMP Detail Level: Zone Note Text (......Xxx Chief Complaint.): This diagnosis correlates with the Other (Free Text): Sample was used Other (Free Text): Pt will alternate her injections. We will see her every other injection starting in 24 weeks.

## 2021-01-28 NOTE — ASSESSMENT & PLAN NOTE
· Cardiologist and nephrologist on board  · Continue Bumex drip  · Continue cardiovascular and heart failure medications  · Monitor input and output  · Daily weights  · Currently on 2L o2 supplementation   Improving

## 2021-01-28 NOTE — ASSESSMENT & PLAN NOTE
Lab Results   Component Value Date    HGBA1C 6 7 (H) 07/03/2019       Recent Labs     01/27/21  1047 01/27/21  1459 01/27/21 2048 01/28/21  0726   POCGLU 274* 317* 238* 222*       Blood Sugar Average: Last 72 hrs:  (P) 217   Still poorly controlled  Continue Humalog sliding scale with Accu-Cheks q a c  And HS  Patient already on Levemir  We will add Humalog 3 times a day with meals  Adjust treatment accordingly

## 2021-01-28 NOTE — ASSESSMENT & PLAN NOTE
Lab Results   Component Value Date    EGFR 13 01/28/2021    EGFR 15 01/27/2021    EGFR 12 01/26/2021    CREATININE 4 13 (H) 01/28/2021    CREATININE 3 54 (H) 01/27/2021    CREATININE 4 46 (H) 01/26/2021     Creatinine 3 13, down from 4 13   Baseline creatinine around 2 3-2 5  Previously on HD in 2019  Nephrology on board   Most likely ATN post contrast for Cath  Continue Bumex drip  IR placed HD Cath 1/25/2021  Dialyzed 1/28/2021, 1/29/2021  Monitor kidney function and input and output

## 2021-01-28 NOTE — PLAN OF CARE
Post-Dialysis RN Treatment Note    Blood Pressure:  Pre 123/49 mm/Hg  Post 112/66   mmHg   EDW  TBD kg    Weight:  Pre 97 0 kg   Post 95 2 kg   Mode of weight measurement: Bed Scale   Volume Removed  1800 ml    Treatment duration 180 minutes    NS given  No    Treatment shortened?  No   Medications given during Rx Not Applicable   Estimated Kt/V  Not Applicable   Access type: Temporary HD catheter   Access Status: Yes, describe: maintains     Report called to primary nurse   Yes / Gisel Kemp RN      2000 ml as tolerated, 3 hrs, 3K bath  Problem: METABOLIC, FLUID AND ELECTROLYTES - ADULT  Goal: Electrolytes maintained within normal limits  Description: INTERVENTIONS:  - Monitor labs and assess patient for signs and symptoms of electrolyte imbalances  - Administer electrolyte replacement as ordered  - Monitor response to electrolyte replacements, including repeat lab results as appropriate  - Instruct patient on fluid and nutrition as appropriate  Outcome: Progressing  Goal: Fluid balance maintained  Description: INTERVENTIONS:  - Monitor labs   - Monitor I/O and WT  - Instruct patient on fluid and nutrition as appropriate  - Assess for signs & symptoms of volume excess or deficit  Outcome: Progressing

## 2021-01-28 NOTE — ASSESSMENT & PLAN NOTE
Type 1 MI  Presented with chest pain at [facility]   Hx of CAD s/p stenting and CABG 2016  Received ASA 324mg at home and SL nitro x1 in the ER  Troponin peaked at 5 5  Continue cardiovascular medications with aspirin, Plavix, statin, beta-blocker and Imdur  Cardiac Cath Completed at [facility]   - three-vessel disease with 99% occluded proximal circumflex, 50% distal left main, mid % occluded, RCA proximally 100% occluded with collaterals from left circulation  Patient has his patent grafts of LIMA to LAD and SVG to OM2   Medical intervention recommend at this time  Echo complete - reveals 60% EF

## 2021-01-28 NOTE — ASSESSMENT & PLAN NOTE
· Likely due to hypervolemia/CHF  133 today  · Nephrology on board  · Monitor  · Continue Bumex drip

## 2021-01-28 NOTE — ASSESSMENT & PLAN NOTE
Type 1 MI  Presented with chest pain at [facility]   Hx of CAD s/p stenting and CABG 2016  Received ASA 324mg at home and SL nitro x1 in the ER  Troponin peaked at 5 5  Continue cardiovascular medications with aspirin, Plavix, statin, beta-blocker and Imdur  Cardiac Cath Completed at [facility]   - three-vessel disease with 99% occluded proximal circumflex, 50% distal left main, mid % occluded, RCA proximally 100% occluded with collaterals from left circulation  Patient has his patent grafts of LIMA to LAD and SVG to OM2   Medical intervention recommend at this time  Echo complete - reveals 60% EF

## 2021-01-29 ENCOUNTER — APPOINTMENT (INPATIENT)
Dept: DIALYSIS | Facility: HOSPITAL | Age: 81
DRG: 280 | End: 2021-01-29
Payer: COMMERCIAL

## 2021-01-29 PROBLEM — E87.2 HIGH ANION GAP METABOLIC ACIDOSIS: Status: RESOLVED | Noted: 2021-01-22 | Resolved: 2021-01-29

## 2021-01-29 LAB
ANION GAP SERPL CALCULATED.3IONS-SCNC: 12 MMOL/L (ref 4–13)
BACTERIA BLD CULT: NORMAL
BACTERIA BLD CULT: NORMAL
BUN SERPL-MCNC: 93 MG/DL (ref 5–25)
CALCIUM SERPL-MCNC: 8.1 MG/DL (ref 8.3–10.1)
CHLORIDE SERPL-SCNC: 95 MMOL/L (ref 100–108)
CO2 SERPL-SCNC: 24 MMOL/L (ref 21–32)
CREAT SERPL-MCNC: 3.13 MG/DL (ref 0.6–1.3)
GFR SERPL CREATININE-BSD FRML MDRD: 18 ML/MIN/1.73SQ M
GLUCOSE SERPL-MCNC: 198 MG/DL (ref 65–140)
GLUCOSE SERPL-MCNC: 219 MG/DL (ref 65–140)
GLUCOSE SERPL-MCNC: 278 MG/DL (ref 65–140)
GLUCOSE SERPL-MCNC: 292 MG/DL (ref 65–140)
GLUCOSE SERPL-MCNC: 301 MG/DL (ref 65–140)
GLUCOSE SERPL-MCNC: 337 MG/DL (ref 65–140)
HCT VFR BLD AUTO: 25.9 % (ref 36.5–49.3)
HGB BLD-MCNC: 8.6 G/DL (ref 12–17)
POTASSIUM SERPL-SCNC: 3.5 MMOL/L (ref 3.5–5.3)
SODIUM SERPL-SCNC: 131 MMOL/L (ref 136–145)

## 2021-01-29 PROCEDURE — C9113 INJ PANTOPRAZOLE SODIUM, VIA: HCPCS | Performed by: FAMILY MEDICINE

## 2021-01-29 PROCEDURE — 80048 BASIC METABOLIC PNL TOTAL CA: CPT | Performed by: FAMILY MEDICINE

## 2021-01-29 PROCEDURE — 82948 REAGENT STRIP/BLOOD GLUCOSE: CPT

## 2021-01-29 PROCEDURE — 99232 SBSQ HOSP IP/OBS MODERATE 35: CPT | Performed by: HOSPITALIST

## 2021-01-29 PROCEDURE — 90935 HEMODIALYSIS ONE EVALUATION: CPT | Performed by: INTERNAL MEDICINE

## 2021-01-29 PROCEDURE — 85018 HEMOGLOBIN: CPT | Performed by: FAMILY MEDICINE

## 2021-01-29 PROCEDURE — 85014 HEMATOCRIT: CPT | Performed by: FAMILY MEDICINE

## 2021-01-29 RX ORDER — LANOLIN ALCOHOL/MO/W.PET/CERES
3 CREAM (GRAM) TOPICAL
Status: DISCONTINUED | OUTPATIENT
Start: 2021-01-29 | End: 2021-02-12 | Stop reason: HOSPADM

## 2021-01-29 RX ORDER — PANTOPRAZOLE SODIUM 40 MG/1
40 INJECTION, POWDER, FOR SOLUTION INTRAVENOUS EVERY 12 HOURS SCHEDULED
Status: DISCONTINUED | OUTPATIENT
Start: 2021-01-29 | End: 2021-02-04

## 2021-01-29 RX ADMIN — POLYSACCHARIDE-IRON COMPLEX 150 MG: 150 CAPSULE ORAL at 13:53

## 2021-01-29 RX ADMIN — METOPROLOL TARTRATE 25 MG: 25 TABLET, FILM COATED ORAL at 13:53

## 2021-01-29 RX ADMIN — ESCITALOPRAM 10 MG: 10 TABLET, FILM COATED ORAL at 21:37

## 2021-01-29 RX ADMIN — HEPARIN SODIUM 5000 UNITS: 5000 INJECTION INTRAVENOUS; SUBCUTANEOUS at 06:18

## 2021-01-29 RX ADMIN — NYSTATIN 500000 UNITS: 100000 SUSPENSION ORAL at 00:05

## 2021-01-29 RX ADMIN — ONDANSETRON 4 MG: 2 INJECTION INTRAMUSCULAR; INTRAVENOUS at 13:52

## 2021-01-29 RX ADMIN — INSULIN DETEMIR 20 UNITS: 100 INJECTION, SOLUTION SUBCUTANEOUS at 21:36

## 2021-01-29 RX ADMIN — INSULIN LISPRO 5 UNITS: 100 INJECTION, SOLUTION INTRAVENOUS; SUBCUTANEOUS at 21:48

## 2021-01-29 RX ADMIN — Medication 4 MG/HR: at 14:03

## 2021-01-29 RX ADMIN — GUAIFENESIN 1200 MG: 600 TABLET, EXTENDED RELEASE ORAL at 21:37

## 2021-01-29 RX ADMIN — PANTOPRAZOLE SODIUM 40 MG: 40 INJECTION, POWDER, FOR SOLUTION INTRAVENOUS at 18:50

## 2021-01-29 RX ADMIN — INSULIN LISPRO 4 UNITS: 100 INJECTION, SOLUTION INTRAVENOUS; SUBCUTANEOUS at 14:08

## 2021-01-29 RX ADMIN — CEFEPIME HYDROCHLORIDE 2000 MG: 2 INJECTION, POWDER, FOR SOLUTION INTRAVENOUS at 14:14

## 2021-01-29 RX ADMIN — CLOPIDOGREL BISULFATE 75 MG: 75 TABLET ORAL at 13:53

## 2021-01-29 RX ADMIN — HEPARIN SODIUM 5000 UNITS: 5000 INJECTION INTRAVENOUS; SUBCUTANEOUS at 13:52

## 2021-01-29 RX ADMIN — ASPIRIN 81 MG: 81 TABLET ORAL at 13:54

## 2021-01-29 RX ADMIN — INSULIN DETEMIR 20 UNITS: 100 INJECTION, SOLUTION SUBCUTANEOUS at 14:10

## 2021-01-29 RX ADMIN — METOPROLOL TARTRATE 25 MG: 25 TABLET, FILM COATED ORAL at 21:38

## 2021-01-29 RX ADMIN — NYSTATIN 500000 UNITS: 100000 SUSPENSION ORAL at 21:38

## 2021-01-29 RX ADMIN — Medication 4 MG/HR: at 01:17

## 2021-01-29 RX ADMIN — HEPARIN SODIUM 5000 UNITS: 5000 INJECTION INTRAVENOUS; SUBCUTANEOUS at 21:36

## 2021-01-29 RX ADMIN — NYSTATIN 500000 UNITS: 100000 SUSPENSION ORAL at 13:52

## 2021-01-29 RX ADMIN — INSULIN LISPRO 2 UNITS: 100 INJECTION, SOLUTION INTRAVENOUS; SUBCUTANEOUS at 14:08

## 2021-01-29 RX ADMIN — Medication 3 MG: at 21:38

## 2021-01-29 RX ADMIN — GUAIFENESIN 1200 MG: 600 TABLET, EXTENDED RELEASE ORAL at 13:53

## 2021-01-29 RX ADMIN — HYDRALAZINE HYDROCHLORIDE 50 MG: 25 TABLET, FILM COATED ORAL at 21:38

## 2021-01-29 RX ADMIN — HYDRALAZINE HYDROCHLORIDE 50 MG: 25 TABLET, FILM COATED ORAL at 15:24

## 2021-01-29 RX ADMIN — NYSTATIN 500000 UNITS: 100000 SUSPENSION ORAL at 18:42

## 2021-01-29 RX ADMIN — ATORVASTATIN CALCIUM 40 MG: 40 TABLET, FILM COATED ORAL at 18:42

## 2021-01-29 RX ADMIN — Medication 4 MG/HR: at 03:54

## 2021-01-29 RX ADMIN — POLYETHYLENE GLYCOL 3350 17 G: 17 POWDER, FOR SOLUTION ORAL at 16:34

## 2021-01-29 RX ADMIN — AMLODIPINE BESYLATE 5 MG: 5 TABLET ORAL at 13:53

## 2021-01-29 NOTE — QUICK NOTE
QUICK NOTE - Deterioration Index  Tianna Clements [de-identified] y o  male MRN: [id number]  Unit/Bed#: S -01 Encounter: [id number]    Date Paged: 21  Time Paged: [phone]  Room #: 329  Deterioration index score at time of page: 45  %  Spoke with Yoan Mojica RN   Need to escalate level of care: no     PROBLEMS resulting in high DI score:   28% Age is 80   24% Supplemental oxygen is Nasal cannula   45% Systolic is 96   9% Cardiac rhythm is Sinus bradycardia   7% WBC count is 11 96 Thousand/uL   6% Sodium is 131 mmol/L   6% Hematocrit is 24 9 %   5% Respiratory rate is 18   2% BUN is 93 mg/dL   1% Potassium is 3 5 mmol/L   1% Pulse oximetry is 99 %   <1% Temperature is 98 2 °F (36 8 °C)   <1% Pulse is 71       PLAN:     Patient originally triggered a DI alert due to respiratory rate documented at 180  I spoke directly with patient's nurse to verify  Respiratory rate is now correctly documented at 18  HR is currently 73 and SpO2 is 99%  Please contact critical care via [employer] with any questions or concerns       Vitals:   Vitals:    21 1100 21 1130 21 1200 21 1230   BP: 105/53 104/56 99/58 96/52   BP Location:       Pulse: 74 80 82 71   Resp: 18 18 18 18   Temp:       TempSrc:       SpO2:           Respiratory:  SpO2: SpO2: 99 %, SpO2 Activity: SpO2 Activity: At Rest, SpO2 Device: O2 Device: Nasal cannula  Nasal Cannula O2 Flow Rate (L/min): 2 L/min    Temperature: Temp (24hrs), Av 1 °F (36 7 °C), Min:97 4 °F (36 3 °C), Max:98 5 °F (36 9 °C)  Current: Temperature: 98 2 °F (36 8 °C)    Labs:   Results from last 7 days   Lab Units 21  0510 21  0639 21  0459 21  0342   WBC Thousand/uL 11 96* 13 57* 15 56* 15 54*   HEMOGLOBIN g/dL 8 2* 8 6* 9 4* 9 6*   HEMATOCRIT % 24 9* 25 9* 30 1* 30 8*   PLATELETS Thousands/uL 150 166 156 149   NEUTROS PCT %  --  86* 89* 88*   MONOS PCT %  --  8 6 7     Results from last 7 days   Lab Units 21  0915 21  0343 01/27/21  0510  01/24/21  0019  01/23/21  0530   SODIUM mmol/L 131* 133* 135*   < >  --    < > 134*   POTASSIUM mmol/L 3 5 4 0 3 9   < >  --    < > 5 1   CHLORIDE mmol/L 95* 94* 99*   < >  --    < > 102   CO2 mmol/L 24 21 21   < >  --    < > 19*   CO2, I-STAT mmol/L  --   --   --   --  19*  --   --    BUN mg/dL 93* 136* 111*   < >  --    < > 97*   CREATININE mg/dL 3 13* 4 13* 3 54*   < >  --    < > 2 89*   CALCIUM mg/dL 8 1* 8 4 8 3   < >  --    < > 8 7   ALK PHOS U/L  --   --   --   --   --   --  106   ALT U/L  --   --   --   --   --   --  71   AST U/L  --   --   --   --   --   --  53*   GLUCOSE, ISTAT mg/dl  --   --   --   --  232*  --   --     < > = values in this interval not displayed                   Results from last 7 days   Lab Units 01/27/21  0510 01/26/21  0639 01/25/21  0459 01/23/21  2255   PROCALCITONIN ng/ml 0 90* 1 05* 0 80* 0 59*       Code Status: Level 1 - Full Code

## 2021-01-29 NOTE — PLAN OF CARE
Post-Dialysis RN Treatment Note    Blood Pressure:  Pre-113/51 mm/Hg  Post-108/60 mmHg   EDW- TBD   Weight:  Pre-92 6 kg   Post-kg   Mode of weight measurement: bed scale    Volume Removed-1600 ml    Treatment duration- 240 minutes    NS given- YES   Treatment shortened? NO   Medications given during Rx- NO   Estimated Kt/V- NA   Access type: R IJ catheter   Access Status: YES   Report called to primary nurse - Maribell Paz RN     Pt became dizzy/nauseus mid tx, followed by vomiting  200 ml saline given, Goal decreased and eventually ran pt even towards end of tx  Pt stable post tx                     Goal- remove 3 L as tolerated      Problem: METABOLIC, FLUID AND ELECTROLYTES - ADULT  Goal: Electrolytes maintained within normal limits  Description: INTERVENTIONS:  - Monitor labs and assess patient for signs and symptoms of electrolyte imbalances  - Administer electrolyte replacement as ordered  - Monitor response to electrolyte replacements, including repeat lab results as appropriate  - Instruct patient on fluid and nutrition as appropriate  Outcome: Progressing

## 2021-01-29 NOTE — PROGRESS NOTES
NEPHROLOGY PROGRESS NOTE   Milderd Libby [de-identified] y o  male MRN: [id number]  Unit/Bed#: S -01 Encounter: [id number]  Reason for Consult: BRIAN/CKD    ASSESSMENT/PLAN:  1  Acute Kidney Injury- secondary to ATN from contrast induced nephropathy in the setting of cardiac catheterization  - creatinine & BUN continued to worsen with medical management and urine output was not significant with high dose bumex drip and therefore he was started on hemodialysis  - consent was obtained by Dr Taryn Pittman on 1/25/21  - temporary dialysis catheter placed by IR on 1/25/21 (appreciate assistance)  - hemodialysis started on 1/25/21  - third treatment 1/28/21  - HD today and will monitor for recovery without HD over the weekend  - hepatitis panel nonreactive  - PVR negative  2  Chronic Kidney Disease stage IV- Baseline creatinine is 2-2  5   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 due to diabetic nephropathy and hypertensive nephrosclerosis   Follows with [facility], Dr Alex Medicus   Of note, he required dialysis in 8/2019 for BRIAN which resolved     3  Acute on chronic CHF- cardiology on board  - preserved EF  - he remains on the bumex infusion, consider changing to intermittent dosing  - volume status improved from a few days ago  4  Hypertension- BP acceptable  5  Anemia- hgb below goal  - likely secondary to chronic disease  - iron saturation low but ferritin elevated  - continue oral iron but avoid IV iron at this time  6  Low bicarbonate level- secondary to severe BRIAN  - improving s/p hemodialysis  - continue to monitor  7  Hyponatremia- secondary to volume overload  - monitor with volume removal with ultrafiltration on dialysis  - mild fluid restriction    Disposition:  HD today    HEMODIALYSIS PROCEDURE NOTE  The patient was seen and examined on hemodialysis  Lowered goal to 2 5L due to soft BP    Time: 4 hours  Sodium: 138 Blood flow: 350   Dialyzer: F160 Potassium: per protocol Dialysate flow: 1 5x   Access: temp cath Bicarbonate: 32 Ultrafiltration goal: 3L   Medications on HD: none       SUBJECTIVE:  Patient feeling well now but just prior to my examination, he did vomit and his SBP dropped into the 90s  UF was stopped and he is feeling better with  systolic  Discussed with dialysis nurse at bedside      OBJECTIVE:  Current Weight:    Vitals:    01/29/21 0930 01/29/21 1000 01/29/21 1030 01/29/21 1100   BP: (!) 97/46 123/58 107/53 105/53   BP Location:       Pulse: 78 94 75 74   Resp: 18 18 18 18   Temp:       TempSrc:       SpO2:           Intake/Output Summary (Last 24 hours) at 1/29/2021 1139  Last data filed at 1/29/2021 0845  Gross per 24 hour   Intake 820 ml   Output 3214 ml   Net -2394 ml     General: NAD  Skin: no rash  Eyes: anicteric  ENMT: mm moist  Neck: no masses  Respiratory: CTAB  Cardiac: RRR  Extremities: no edema  GI: soft nt nd  Neuro: alert awake  Psych: mood and affect appropriate    Medications:    Current Facility-Administered Medications:     acetaminophen (TYLENOL) tablet 650 mg, 650 mg, Oral, Q6H PRN, Carleen De La Cruz MD, 650 mg at 01/26/21 0636    aluminum-magnesium hydroxide-simethicone (MYLANTA) oral suspension 30 mL, 30 mL, Oral, Q6H PRN, TABITHA Rowland    amLODIPine (NORVASC) tablet 5 mg, 5 mg, Oral, BID, Probiodrug, PA-C, 5 mg at 01/28/21 1741    aspirin (ECOTRIN LOW STRENGTH) EC tablet 81 mg, 81 mg, Oral, Daily, Swathi Schumacher MD, 81 mg at 01/28/21 2859    atorvastatin (LIPITOR) tablet 40 mg, 40 mg, Oral, Daily With Estephania Feliciano MD, 40 mg at 01/28/21 1741    benzonatate (TESSALON PERLES) capsule 100 mg, 100 mg, Oral, TID PRN, TABITHA Denis    bumetanide (BUMEX) 12 5 mg infusion 50 mL, 4 mg/hr, Intravenous, Continuous, Barney Sibley PA-C, Last Rate: 16 mL/hr at 01/29/21 0354, 4 mg/hr at 01/29/21 0354    calcium carbonate (TUMS) chewable tablet 1,000 mg, 1,000 mg, Oral, Daily PRN, Carleen De La Cruz MD    cefepime (MAXIPIME) 2,000 mg in dextrose 5 % 50 mL IVPB, 2,000 mg, Intravenous, Q24H, Faye Correa MD, Last Rate: 100 mL/hr at 01/28/21 1148, 2,000 mg at 01/28/21 1148    clopidogrel (PLAVIX) tablet 75 mg, 75 mg, Oral, Daily, Swathi Schumacher MD, 75 mg at 01/28/21 6081    escitalopram (LEXAPRO) tablet 10 mg, 10 mg, Oral, HS, James Mark MD, 10 mg at 01/28/21 2133    guaiFENesin (MUCINEX) 12 hr tablet 1,200 mg, 1,200 mg, Oral, Q12H [address], Mara Phipps, TABITHA, 1,200 mg at 01/28/21 2132    heparin (porcine) subcutaneous injection 5,000 Units, 5,000 Units, Subcutaneous, Q8H [address], Faye Correa MD, 5,000 Units at 01/29/21 0618    hydrALAZINE (APRESOLINE) tablet 50 mg, 50 mg, Oral, Q8H [address], [facility], 50 mg at 01/28/21 1741    HYDROmorphone (DILAUDID) injection 0 2 mg, 0 2 mg, Intravenous, Q3H PRN, Faye Correa MD    insulin detemir (LEVEMIR) subcutaneous injection 20 Units, 20 Units, Subcutaneous, Q12H [address], Swathi Schumacher MD, 20 Units at 01/28/21 2133    insulin lispro (HumaLOG) 100 units/mL subcutaneous injection 1-6 Units, 1-6 Units, Subcutaneous, TID AC, 1 Units at 01/28/21 1804 **AND** Fingerstick Glucose (POCT), , , TID AC, Swathi Schumacher MD    insulin lispro (HumaLOG) 100 units/mL subcutaneous injection 1-6 Units, 1-6 Units, Subcutaneous, HS, Swathi Schumacher MD, 4 Units at 01/28/21 2133    insulin lispro (HumaLOG) 100 units/mL subcutaneous injection 4 Units, 4 Units, Subcutaneous, TID With Meals, Faye Correa MD, 4 Units at 01/28/21 1804    iron polysaccharides (FERREX) capsule 150 mg, 150 mg, Oral, Daily, [facility], PA-C, 150 mg at 01/28/21 2715    melatonin tablet 3 mg, 3 mg, Oral, HS, TABITHA Cr    metoprolol tartrate (LOPRESSOR) tablet 25 mg, 25 mg, Oral, Q12H [address], Hamiltonray Dancer Chichili, MD, 25 mg at 01/28/21 2132    nitroglycerin (NITROSTAT) SL tablet 0 4 mg, 0 4 mg, Sublingual, Q5 Min PRN, Swathi Schumacher MD    nystatin (MYCOSTATIN) oral suspension 500,000 Units, 500,000 Units, Swish & Swallow, 4x Daily, Faye Correa MD, 500,000 Units at 01/29/21 0005    ondansetron (ZOFRAN) injection 4 mg, 4 mg, Intravenous, Q6H PRN, Yanelis Schumacher MD, 4 mg at 01/23/21 0036    oxyCODONE (ROXICODONE) IR tablet 2 5 mg, 2 5 mg, Oral, Q4H PRN, Faye Correa MD    phenol (CHLORASEPTIC) 1 4 % mucosal liquid 1 spray, 1 spray, Mouth/Throat, Q4H PRN, Fyae Correa MD    pneumococcal 13-valent conjugate vaccine (PREVNAR-13) IM injection 0 5 mL, 0 5 mL, Intramuscular, Prior to discharge, Chad Ordonez MD    polyethylene glycol (MIRALAX) packet 17 g, 17 g, Oral, Daily PRN, Chad Ordonez MD    Laboratory Results:  Results from last 7 days   Lab Units 01/29/21  0915 01/28/21  0343 01/27/21  0510 01/26/21  8366 01/25/21  0459 01/24/21  0342 01/24/21  0335 01/24/21  0019 01/23/21  2254 01/23/21  0530   WBC Thousand/uL  --   --  11 96* 13 57* 15 56* 15 54*  --   --   --  14 93*   HEMOGLOBIN g/dL  --   --  8 2* 8 6* 9 4* 9 6*  --   --   --  9 6*   I STAT HEMOGLOBIN g/dl  --   --   --   --   --   --   --  10 5*  --   --    HEMATOCRIT %  --   --  24 9* 25 9* 30 1* 30 8*  --   --   --  30 4*   HEMATOCRIT, ISTAT %  --   --   --   --   --   --   --  31*  --   --    PLATELETS Thousands/uL  --   --  150 166 156 149  --   --   --  152   POTASSIUM mmol/L 3 5 4 0 3 9 4 2 4 9  --  5 2  --  5 3 5 1   CHLORIDE mmol/L 95* 94* 99* 97* 96*  --  98*  --  97* 102   CO2 mmol/L 24 21 21 19* 13*  --  18*  --  17* 19*   CO2, I-STAT mmol/L  --   --   --   --   --   --   --  19*  --   --    BUN mg/dL 93* 136* 111* 136* 150*  --  122*  --  120* 97*   CREATININE mg/dL 3 13* 4 13* 3 54* 4 46* 4 97*  --  4 05*  --  3 93* 2 89*   CALCIUM mg/dL 8 1* 8 4 8 3 8 2* 8 7  --  8 6  --  8 5 8 7   PHOSPHORUS mg/dL  --   --  5 1*  --   --   --   --   --   --   --    GLUCOSE, ISTAT mg/dl  --   --   --   --   --   --   --  232*  --   --

## 2021-01-29 NOTE — PROGRESS NOTES
Pt resting comfortably in bed  Denies pain, although does mention he has not had a BM in 3 days  Will offer pt miralax which he has PRN  Call bell within reach  Will continue to monitor    Lea Mendoza RN

## 2021-01-29 NOTE — OCCUPATIONAL THERAPY NOTE
OccupationalTherapy Progress Note     Patient Name: Tim Navarro  TQBSH'Z Date: 1/29/2021  Problem List  Principal Problem:    Elevated troponin  Active Problems:    Acute renal failure superimposed on stage 3 chronic kidney disease (HCC)    Diabetes mellitus (HCC)    Leukocytosis    Hx of CABG    Acute on chronic diastolic congestive heart failure (HCC)    High anion gap metabolic acidosis    Anemia    Hyponatremia    Oral candida    ATN (acute tubular necrosis) (HCC)    Urinary retention          01/29/21 0846   OT Last Visit   OT Visit Date 01/29/21  (Friday)   Note Type   Note Type Treatment   Cancel Reasons Other  (HD at bedside)   Activity Tolerance   Medical Staff Made Aware spoke to PTTanya   Assessment   Assessment Chart review completed  Attempted to see pt for OT tx session  Pt currently unavailable due to HD at bedside   Will continue to follow as appropriate and schedule allows     [facility] OTR/L Vaccine Information Statement(s) was given today. This has been reviewed, questions answered, and verbal consent given by Parent for injection(s) and administration of Influenza (Inactivated).    1. Does the patient have a moderate to severe fever?  No  2. Has the patient had a serious reaction to a flu shot before?   No  3. Has the patient ever had [place] Syndrome within 6 weeks of a previous flu shot?  No  4. Is the patient less than 6 months of age?  No    Patient is eligible to receive the vaccine based on all questions being answered as 'No'.    Patient tolerated without incident. See immunization grid for documentation.  Vijaya Emery RN 9/18/2020 2:18 PM

## 2021-01-29 NOTE — PROGRESS NOTES
Progress Note - Frannie Garcia 1940, [de-identified] y o  male MRN: [id number]    Unit/Bed#: S -01 Encounter: [id number]    Primary Care Provider: Silvia Joseph MD   Date and time admitted to hospital: 1/22/2021 12:28 PM        * Elevated troponin  Assessment & Plan  Type 1 MI  Presented with chest pain at [facility]   Hx of CAD s/p stenting and CABG 2016  Received ASA 324mg at home and SL nitro x1 in the ER  Troponin peaked at 5 5  Continue cardiovascular medications with aspirin, Plavix, statin, beta-blocker and Imdur  Cardiac Cath Completed at [facility]   - three-vessel disease with 99% occluded proximal circumflex, 50% distal left main, mid % occluded, RCA proximally 100% occluded with collaterals from left circulation  Patient has his patent grafts of LIMA to LAD and SVG to OM2  Medical intervention recommend at this time  Echo complete - reveals 60% EF      Urinary retention  Assessment & Plan  Overnight 1/27/2021 nursing reported no urine output  Bladder scan revealed >800cc urine  Patient was straight catheterized  - Continue urinary retention protocol    Oral candida  Assessment & Plan  · Nystatin mouthwash  Hyponatremia  Assessment & Plan  · Likely due to hypervolemia/CHF  131 today  · Nephrology on board  · Monitor  · Continue Bumex drip  Anemia  Assessment & Plan  · Monitor  · No active bleeding  · Iron 37, Ferritin 913  · Continue PO iron replacement as per nephrology    Acute on chronic diastolic congestive heart failure [facility])  Assessment & Plan  · Cardiologist and nephrologist on board  · Continue Bumex drip  · Continue cardiovascular and heart failure medications  · Monitor input and output  · Daily weights  · Currently on 2L o2 supplementation   Improving    Hx of CABG  Assessment & Plan  CAD s/p CABG in 2016; history of stenting prior to CABG (LIMA to LAD and SVG to OM)  Status post cardiac catheterization: significant triple vessel coronary artery disease  Patient has his patent grafts of LIMA to LAD and SVG to OM2  Continue cardiovascular meds  Leukocytosis  Assessment & Plan  · Leukocytosis down to 11 latest  · COVID-19, influenza, RSV tests were negative x2  · Chest x-ray officially read as multifocal pneumonia  · Thus will continue to treat as hospital-acquired pneumonia with IV cefepime  · Blood cultures x2 no growth pending final  · Cough medicine/antitussive, lozenges and Chloraseptic spray p r n       Diabetes mellitus [facility])  Assessment & Plan  Lab Results   Component Value Date    HGBA1C 6 7 (H) 07/03/2019       Recent Labs     01/27/21  1047 01/27/21  1459 01/27/21  2048 01/28/21  0726   POCGLU 274* 317* 238* 222*       Blood Sugar Average: Last 72 hrs:  (P) 217   Still poorly controlled  Continue Humalog sliding scale with Accu-Cheks q a c  And HS  Patient already on Levemir  We will add Humalog 3 times a day with meals  Adjust treatment accordingly  Acute renal failure superimposed on stage 3 chronic kidney disease [facility])  Assessment & Plan  Lab Results   Component Value Date    EGFR 13 01/28/2021    EGFR 15 01/27/2021    EGFR 12 01/26/2021    CREATININE 4 13 (H) 01/28/2021    CREATININE 3 54 (H) 01/27/2021    CREATININE 4 46 (H) 01/26/2021     Creatinine 3 13, down from 4 13   Baseline creatinine around 2 3-2 5  Previously on HD in 2019  Nephrology on board   Most likely ATN post contrast for Cath  Continue Bumex drip  IR placed HD Cath 1/25/2021  Dialyzed 1/28/2021, 1/29/2021  Monitor kidney function and input and output  High anion gap metabolic acidosis-resolved as of 1/29/2021  Assessment & Plan    Previously with slight high anion gap metabolic acidosis  Possibly secondary to CKD  Currently 18  Previously on bicarb drip, this was discontinued  Nephrology on board  Continue Bumex drip    Monitor BMP            VTE Pharmacologic Prophylaxis:   Pharmacologic: Heparin  Mechanical VTE Prophylaxis in Place: Yes    Discussions with Specialists or Other Care Team Provider: Hospitalist    Education and Discussions with Family / Patient: Patient    Current Length of Stay: 7 day(s)    Current Patient Status: Inpatient     Discharge Plan / Estimated Discharge Date: TBD    Code Status: Level 1 - Full Code      Subjective:   Patient was seen at bedside resting comfortably  He was receiving dialysis during our encounter  He is much more alert today and is answering questions clearly  He states that he is feeling better then he did yesterday  Objective:     Vitals:   Temp (24hrs), Av 1 °F (36 7 °C), Min:97 4 °F (36 3 °C), Max:98 5 °F (36 9 °C)    Temp:  [97 4 °F (36 3 °C)-98 5 °F (36 9 °C)] 98 2 °F (36 8 °C)  HR:  [60-94] 71  Resp:  [14-22] 18  BP: ()/(46-66) 96/52  SpO2:  [94 %-99 %] 99 %  There is no height or weight on file to calculate BMI  Input and Output Summary (last 24 hours): Intake/Output Summary (Last 24 hours) at 2021 1336  Last data filed at 2021 0845  Gross per 24 hour   Intake 620 ml   Output 3214 ml   Net -2594 ml       Physical Exam:     Physical Exam  Vitals signs and nursing note reviewed  Constitutional:       General: He is not in acute distress  Appearance: He is well-developed  He is ill-appearing  He is not toxic-appearing  HENT:      Head: Normocephalic and atraumatic  Eyes:      General: No scleral icterus  Right eye: No discharge  Left eye: No discharge  Conjunctiva/sclera: Conjunctivae normal    Cardiovascular:      Rate and Rhythm: Normal rate and regular rhythm  Heart sounds: Normal heart sounds  No murmur  Pulmonary:      Effort: No respiratory distress  Breath sounds: Wheezing and rhonchi present  Abdominal:      General: Bowel sounds are normal  There is no distension  Palpations: Abdomen is soft  Tenderness: There is no abdominal tenderness  Musculoskeletal: Normal range of motion           General: No tenderness  Skin:     General: Skin is warm  Findings: No erythema or rash  Comments: HD cath in place  Intact   Neurological:      Mental Status: He is alert  Motor: No weakness  Psychiatric:         Behavior: Behavior normal          Additional Data:     Labs:    Results from last 7 days   Lab Units 01/27/21  0510 01/26/21  0639   WBC Thousand/uL 11 96* 13 57*   HEMOGLOBIN g/dL 8 2* 8 6*   HEMATOCRIT % 24 9* 25 9*   PLATELETS Thousands/uL 150 166   NEUTROS PCT %  --  86*   LYMPHS PCT %  --  5*   MONOS PCT %  --  8   EOS PCT %  --  0     Results from last 7 days   Lab Units 01/29/21  0915  01/24/21  0019  01/23/21  0530   POTASSIUM mmol/L 3 5   < >  --    < > 5 1   CHLORIDE mmol/L 95*   < >  --    < > 102   CO2 mmol/L 24   < >  --    < > 19*   CO2, I-STAT mmol/L  --   --  19*  --   --    BUN mg/dL 93*   < >  --    < > 97*   CREATININE mg/dL 3 13*   < >  --    < > 2 89*   CALCIUM mg/dL 8 1*   < >  --    < > 8 7   ALK PHOS U/L  --   --   --   --  106   ALT U/L  --   --   --   --  71   AST U/L  --   --   --   --  53*   GLUCOSE, ISTAT mg/dl  --   --  232*  --   --     < > = values in this interval not displayed  * I Have Reviewed All Lab Data Listed Above  * Additional Pertinent Lab Tests Reviewed: All Labs Within Last 24 Hours Reviewed    Imaging:    Imaging Reports Reviewed Today Include: N/A  Imaging Personally Reviewed by Myself Includes:  N/A    Recent Cultures (last 7 days):     Results from last 7 days   Lab Units 01/24/21  1050   BLOOD CULTURE  No Growth After 4 Days  No Growth After 4 Days         Last 24 Hours Medication List:   Current Facility-Administered Medications   Medication Dose Route Frequency Provider Last Rate    acetaminophen  650 mg Oral Q6H PRN Radha Marte MD      aluminum-magnesium hydroxide-simethicone  30 mL Oral Q6H PRN TABITHA Lord      amLODIPine  5 mg Oral BID RENETTA Chakraborty      aspirin  81 mg Oral Daily Radha Marte MD  atorvastatin  40 mg Oral Daily With Colon MD Yasmin      benzonatate  100 mg Oral TID PRN TABITHA Tierney      bumetanide  4 mg/hr Intravenous Continuous Barney Sibley PA-C 4 mg/hr (01/29/21 0354)    calcium carbonate  1,000 mg Oral Daily PRN Mayra Salas MD      cefepime  2,000 mg Intravenous Q24H Faye Correa MD 2,000 mg (01/28/21 1148)    clopidogrel  75 mg Oral Daily Mayra Salas MD      escitalopram  10 mg Oral HS Mayra Salas MD      guaiFENesin  1,200 mg Oral Q12H [facility] TABITHA Tierney      heparin (porcine)  5,000 Units Subcutaneous Q8H [facility] Faye Correa MD      hydrALAZINE  50 mg Oral [facility] RENETTA Chakraborty      HYDROmorphone  0 2 mg Intravenous Q3H PRN Faye Correa MD      insulin detemir  20 Units Subcutaneous Q12H [facility] Mayra Salas MD      insulin lispro  1-6 Units Subcutaneous TID AC Mayra Salas MD      insulin lispro  1-6 Units Subcutaneous HS Mayra Salas MD      insulin lispro  4 Units Subcutaneous TID With Meals Faye Correa MD      iron polysaccharides  150 mg Oral Daily RENETTA Chakraborty      melatonin  3 mg Oral HS TABITHA Cr      metoprolol tartrate  25 mg Oral Q12H [facility] Mayra Salas MD      nitroglycerin  0 4 mg Sublingual Q5 Min PRN Mayra Salas MD      nystatin  500,000 Units Swish & Swallow 4x Daily Faye Correa MD      ondansetron  4 mg Intravenous Q6H PRN Mayra Salas MD      oxyCODONE  2 5 mg Oral Q4H PRN Faye Correa MD      phenol  1 spray Mouth/Throat Q4H PRN Faye Correa MD      pneumococcal 13-valent conjugate vaccine  0 5 mL Intramuscular Prior to discharge Mayra Salas MD      polyethylene glycol  17 g Oral Daily PRN Mayra Salas MD          Today, Patient Was Seen By: Jon Bucio MD    ** Please Note: This note has been constructed using a voice recognition system   **

## 2021-01-29 NOTE — PHYSICAL THERAPY NOTE
PHYSICAL THERAPY CANCELLATION NOTE    Patient Name: Kendall Evans   EICHALOC'F Date: 1/29/2021 01/29/21 0841   PT Last Visit   PT Visit Date 01/29/21   Note Type   Cancel Reasons Other  (HD)   Assessment   Assessment Pt presently receiving HD at bedside, unable to participate in PT intervention at this time   PT will continue to follow as approrpiate and schedule allows to continue w/ PT interventions     Balbir Virgen, PT, DPT

## 2021-01-30 LAB
ANION GAP SERPL CALCULATED.3IONS-SCNC: 12 MMOL/L (ref 4–13)
BUN SERPL-MCNC: 60 MG/DL (ref 5–25)
CALCIUM SERPL-MCNC: 8.2 MG/DL (ref 8.3–10.1)
CHLORIDE SERPL-SCNC: 96 MMOL/L (ref 100–108)
CO2 SERPL-SCNC: 25 MMOL/L (ref 21–32)
CREAT SERPL-MCNC: 2.88 MG/DL (ref 0.6–1.3)
GFR SERPL CREATININE-BSD FRML MDRD: 20 ML/MIN/1.73SQ M
GLUCOSE SERPL-MCNC: 157 MG/DL (ref 65–140)
GLUCOSE SERPL-MCNC: 270 MG/DL (ref 65–140)
GLUCOSE SERPL-MCNC: 291 MG/DL (ref 65–140)
GLUCOSE SERPL-MCNC: 301 MG/DL (ref 65–140)
GLUCOSE SERPL-MCNC: 318 MG/DL (ref 65–140)
POTASSIUM SERPL-SCNC: 3.9 MMOL/L (ref 3.5–5.3)
SODIUM SERPL-SCNC: 133 MMOL/L (ref 136–145)

## 2021-01-30 PROCEDURE — 80048 BASIC METABOLIC PNL TOTAL CA: CPT | Performed by: PHYSICIAN ASSISTANT

## 2021-01-30 PROCEDURE — 99232 SBSQ HOSP IP/OBS MODERATE 35: CPT | Performed by: INTERNAL MEDICINE

## 2021-01-30 PROCEDURE — C9113 INJ PANTOPRAZOLE SODIUM, VIA: HCPCS | Performed by: FAMILY MEDICINE

## 2021-01-30 PROCEDURE — 82948 REAGENT STRIP/BLOOD GLUCOSE: CPT

## 2021-01-30 PROCEDURE — 99232 SBSQ HOSP IP/OBS MODERATE 35: CPT | Performed by: HOSPITALIST

## 2021-01-30 RX ORDER — SODIUM CHLORIDE, SODIUM LACTATE, POTASSIUM CHLORIDE, CALCIUM CHLORIDE 600; 310; 30; 20 MG/100ML; MG/100ML; MG/100ML; MG/100ML
50 INJECTION, SOLUTION INTRAVENOUS CONTINUOUS
Status: DISPENSED | OUTPATIENT
Start: 2021-01-30 | End: 2021-01-30

## 2021-01-30 RX ADMIN — Medication 4 MG/HR: at 01:36

## 2021-01-30 RX ADMIN — NYSTATIN 500000 UNITS: 100000 SUSPENSION ORAL at 21:15

## 2021-01-30 RX ADMIN — NYSTATIN 500000 UNITS: 100000 SUSPENSION ORAL at 12:16

## 2021-01-30 RX ADMIN — NYSTATIN 500000 UNITS: 100000 SUSPENSION ORAL at 18:17

## 2021-01-30 RX ADMIN — HEPARIN SODIUM 5000 UNITS: 5000 INJECTION INTRAVENOUS; SUBCUTANEOUS at 14:59

## 2021-01-30 RX ADMIN — ASPIRIN 81 MG: 81 TABLET ORAL at 08:42

## 2021-01-30 RX ADMIN — INSULIN LISPRO 5 UNITS: 100 INJECTION, SOLUTION INTRAVENOUS; SUBCUTANEOUS at 21:19

## 2021-01-30 RX ADMIN — INSULIN LISPRO 4 UNITS: 100 INJECTION, SOLUTION INTRAVENOUS; SUBCUTANEOUS at 12:16

## 2021-01-30 RX ADMIN — ATORVASTATIN CALCIUM 40 MG: 40 TABLET, FILM COATED ORAL at 17:04

## 2021-01-30 RX ADMIN — CEFEPIME HYDROCHLORIDE 2000 MG: 2 INJECTION, POWDER, FOR SOLUTION INTRAVENOUS at 12:15

## 2021-01-30 RX ADMIN — GUAIFENESIN 1200 MG: 600 TABLET, EXTENDED RELEASE ORAL at 21:09

## 2021-01-30 RX ADMIN — INSULIN LISPRO 4 UNITS: 100 INJECTION, SOLUTION INTRAVENOUS; SUBCUTANEOUS at 12:15

## 2021-01-30 RX ADMIN — Medication 3 MG: at 21:10

## 2021-01-30 RX ADMIN — POLYSACCHARIDE-IRON COMPLEX 150 MG: 150 CAPSULE ORAL at 08:42

## 2021-01-30 RX ADMIN — INSULIN DETEMIR 20 UNITS: 100 INJECTION, SOLUTION SUBCUTANEOUS at 08:51

## 2021-01-30 RX ADMIN — INSULIN LISPRO 4 UNITS: 100 INJECTION, SOLUTION INTRAVENOUS; SUBCUTANEOUS at 18:14

## 2021-01-30 RX ADMIN — Medication 4 MG/HR: at 05:22

## 2021-01-30 RX ADMIN — INSULIN LISPRO 4 UNITS: 100 INJECTION, SOLUTION INTRAVENOUS; SUBCUTANEOUS at 08:51

## 2021-01-30 RX ADMIN — PANTOPRAZOLE SODIUM 40 MG: 40 INJECTION, POWDER, FOR SOLUTION INTRAVENOUS at 21:10

## 2021-01-30 RX ADMIN — HYDRALAZINE HYDROCHLORIDE 50 MG: 25 TABLET, FILM COATED ORAL at 14:59

## 2021-01-30 RX ADMIN — ESCITALOPRAM 10 MG: 10 TABLET, FILM COATED ORAL at 21:09

## 2021-01-30 RX ADMIN — HEPARIN SODIUM 5000 UNITS: 5000 INJECTION INTRAVENOUS; SUBCUTANEOUS at 21:10

## 2021-01-30 RX ADMIN — INSULIN DETEMIR 20 UNITS: 100 INJECTION, SOLUTION SUBCUTANEOUS at 21:10

## 2021-01-30 RX ADMIN — INSULIN LISPRO 1 UNITS: 100 INJECTION, SOLUTION INTRAVENOUS; SUBCUTANEOUS at 18:14

## 2021-01-30 RX ADMIN — INSULIN LISPRO 4 UNITS: 100 INJECTION, SOLUTION INTRAVENOUS; SUBCUTANEOUS at 08:50

## 2021-01-30 RX ADMIN — METOPROLOL TARTRATE 25 MG: 25 TABLET, FILM COATED ORAL at 21:09

## 2021-01-30 RX ADMIN — SODIUM CHLORIDE, SODIUM LACTATE, POTASSIUM CHLORIDE, AND CALCIUM CHLORIDE 50 ML/HR: .6; .31; .03; .02 INJECTION, SOLUTION INTRAVENOUS at 08:57

## 2021-01-30 RX ADMIN — CLOPIDOGREL BISULFATE 75 MG: 75 TABLET ORAL at 08:42

## 2021-01-30 RX ADMIN — GUAIFENESIN 1200 MG: 600 TABLET, EXTENDED RELEASE ORAL at 08:49

## 2021-01-30 RX ADMIN — PANTOPRAZOLE SODIUM 40 MG: 40 INJECTION, POWDER, FOR SOLUTION INTRAVENOUS at 08:42

## 2021-01-30 RX ADMIN — HEPARIN SODIUM 5000 UNITS: 5000 INJECTION INTRAVENOUS; SUBCUTANEOUS at 05:11

## 2021-01-30 RX ADMIN — AMLODIPINE BESYLATE 5 MG: 5 TABLET ORAL at 08:42

## 2021-01-30 RX ADMIN — METOPROLOL TARTRATE 25 MG: 25 TABLET, FILM COATED ORAL at 08:42

## 2021-01-30 RX ADMIN — NYSTATIN 500000 UNITS: 100000 SUSPENSION ORAL at 08:42

## 2021-01-30 NOTE — PROGRESS NOTES
Progress Note - Marky Ruiz 1940, [de-identified] y o  male MRN: [id number]    Unit/Bed#: S -01 Encounter: [id number]    Primary Care Provider: Laure Ahumada MD   Date and time admitted to hospital: 1/22/2021 12:28 PM        * Elevated troponin  Assessment & Plan  Type 1 MI  Presented with chest pain at [facility]   Hx of CAD s/p stenting and CABG 2016  Received ASA 324mg at home and SL nitro x1 in the ER  Troponin peaked at 5 5  Continue cardiovascular medications with aspirin, Plavix, statin, beta-blocker and Imdur  Cardiac Cath Completed at [facility]   - three-vessel disease with 99% occluded proximal circumflex, 50% distal left main, mid % occluded, RCA proximally 100% occluded with collaterals from left circulation  Patient has his patent grafts of LIMA to LAD and SVG to OM2  Medical intervention recommend at this time  Echo complete - reveals 60% EF      Urinary retention  Assessment & Plan  Overnight 1/27/2021 nursing reported no urine output  Bladder scan revealed >800cc urine  Patient was straight catheterized  - Continue urinary retention protocol    Oral candida  Assessment & Plan  · Nystatin mouthwash  Hyponatremia  Assessment & Plan  · Likely due to hypervolemia/CHF  133 1/30/2021  Improving  · Nephrology on board  · Monitor  Anemia  Assessment & Plan  · Monitor  · No active bleeding  · Iron 37, Ferritin 913  · Continue PO iron replacement as per nephrology    Acute on chronic diastolic congestive heart failure [facility])  Assessment & Plan  · Cardiologist and nephrologist on board  · D/c Bumex drip as per nephrology  · Continue cardiovascular and heart failure medications  · Monitor input and output  · Daily weights  · Currently on 2L o2 supplementation   Improving    Hx of CABG  Assessment & Plan  CAD s/p CABG in 2016; history of stenting prior to CABG (LIMA to LAD and SVG to OM)  Status post cardiac catheterization: significant triple vessel coronary artery disease  Patient has his patent grafts of LIMA to LAD and SVG to OM2  Continue cardiovascular meds  Leukocytosis  Assessment & Plan  · Leukocytosis down to 11 latest  · COVID-19, influenza, RSV tests were negative x2  · Chest x-ray officially read as multifocal pneumonia  · Thus will continue to treat as hospital-acquired pneumonia with IV cefepime  · Blood cultures x2 no growth  · Cough medicine/antitussive, lozenges and Chloraseptic spray p r n       Diabetes mellitus [facility])  Assessment & Plan  Lab Results   Component Value Date    HGBA1C 6 7 (H) 07/03/2019       Recent Labs     01/29/21  1206 01/29/21  1536 01/29/21  2130 01/30/21  0716   POCGLU 198* 301* 337* 291*       Blood Sugar Average: Last 72 hrs:  (P) 256 [id number]   Still poorly controlled  Continue Humalog sliding scale with Accu-Cheks q a c  And HS  Patient already on Levemir  We will add Humalog 3 times a day with meals  Adjust treatment accordingly  Acute renal failure superimposed on stage 3 chronic kidney disease [facility])  Assessment & Plan  Lab Results   Component Value Date    EGFR 20 01/30/2021    EGFR 18 01/29/2021    EGFR 13 01/28/2021    CREATININE 2 88 (H) 01/30/2021    CREATININE 3 13 (H) 01/29/2021    CREATININE 4 13 (H) 01/28/2021     Creatinine 2 88, down from 3 13   Baseline creatinine around 2 3-2 5  Previously on HD in 2019  Nephrology on board   Most likely ATN post contrast for Cath  D/cBumex drip as per nephrology  IR placed HD Cath 1/25/2021  Dialyzed x3 during inpatient stay  Continue to Monitor kidney function and input and output              VTE Pharmacologic Prophylaxis:   Pharmacologic: Heparin  Mechanical VTE Prophylaxis in Place: Yes    Discussions with Specialists or Other Care Team Provider: Hospitalist    Education and Discussions with Family / Patient: Patient    Current Length of Stay: 8 day(s)    Current Patient Status: Inpatient     Discharge Plan / Estimated Discharge Date: TBD    Code Status: Level 1 - Full Code      Subjective:   Patient was seen at bedside resting comfortably  He is alert and answering questions appropriately  He states that he has an appetite and is starting to feel a little better  Objective:     Vitals:   Temp (24hrs), Av 2 °F (36 8 °C), Min:98 °F (36 7 °C), Max:98 4 °F (36 9 °C)    Temp:  [98 °F (36 7 °C)-98 4 °F (36 9 °C)] 98 °F (36 7 °C)  HR:  [53-82] 53  Resp:  [16-20] 18  BP: ()/(52-63) 136/63  SpO2:  [94 %-100 %] 100 %  There is no height or weight on file to calculate BMI  Input and Output Summary (last 24 hours): Intake/Output Summary (Last 24 hours) at 2021 1020  Last data filed at 2021 2100  Gross per 24 hour   Intake 300 ml   Output 2699 ml   Net -2399 ml       Physical Exam:     Physical Exam  Vitals signs and nursing note reviewed  Constitutional:       General: He is not in acute distress  Appearance: He is well-developed  He is not ill-appearing or toxic-appearing  HENT:      Head: Normocephalic and atraumatic  Eyes:      General: No scleral icterus  Right eye: No discharge  Left eye: No discharge  Conjunctiva/sclera: Conjunctivae normal    Cardiovascular:      Rate and Rhythm: Normal rate and regular rhythm  Heart sounds: Normal heart sounds  No murmur  Pulmonary:      Effort: No respiratory distress  Breath sounds: Wheezing and rhonchi present  Abdominal:      General: Bowel sounds are normal  There is no distension  Palpations: Abdomen is soft  Tenderness: There is no abdominal tenderness  Musculoskeletal: Normal range of motion  General: No tenderness  Skin:     General: Skin is warm  Findings: No erythema or rash  Comments: HD cath in place  Intact   Neurological:      Mental Status: He is alert  Motor: No weakness     Psychiatric:         Behavior: Behavior normal              Additional Data:     Labs:    Results from last 7 days   Lab Units 01/29/21  1756 01/27/21  0510 01/26/21  0639   WBC Thousand/uL  --  11 96* 13 57*   HEMOGLOBIN g/dL 8 6* 8 2* 8 6*   HEMATOCRIT % 25 9* 24 9* 25 9*   PLATELETS Thousands/uL  --  150 166   NEUTROS PCT %  --   --  86*   LYMPHS PCT %  --   --  5*   MONOS PCT %  --   --  8   EOS PCT %  --   --  0     Results from last 7 days   Lab Units 01/30/21  0624  01/24/21  0019   POTASSIUM mmol/L 3 9   < >  --    CHLORIDE mmol/L 96*   < >  --    CO2 mmol/L 25   < >  --    CO2, I-STAT mmol/L  --   --  19*   BUN mg/dL 60*   < >  --    CREATININE mg/dL 2 88*   < >  --    CALCIUM mg/dL 8 2*   < >  --    GLUCOSE, ISTAT mg/dl  --   --  232*    < > = values in this interval not displayed  * I Have Reviewed All Lab Data Listed Above  * Additional Pertinent Lab Tests Reviewed: All Labs Within Last 24 Hours Reviewed    Imaging:    Imaging Reports Reviewed Today Include: N/A  Imaging Personally Reviewed by Myself Includes:  N/A    Recent Cultures (last 7 days):     Results from last 7 days   Lab Units 01/24/21  1050   BLOOD CULTURE  No Growth After 5 Days  No Growth After 5 Days         Last 24 Hours Medication List:   Current Facility-Administered Medications   Medication Dose Route Frequency Provider Last Rate    acetaminophen  650 mg Oral Q6H PRN Sam Moore MD      aluminum-magnesium hydroxide-simethicone  30 mL Oral Q6H PRN TABITHA German      amLODIPine  5 mg Oral BID [facility], PA-C      aspirin  81 mg Oral Daily Sam Moore MD      atorvastatin  40 mg Oral Daily With David Mcintyre MD      benzonatate  100 mg Oral TID PRN TABITHA Owens      calcium carbonate  1,000 mg Oral Daily PRN Sam Moore MD      cefepime  2,000 mg Intravenous Q24H Faye Correa MD 2,000 mg (01/29/21 1414)    clopidogrel  75 mg Oral Daily Sam Moore MD      escitalopram  10 mg Oral HS Sam Moore MD      guaiFENesin  1,200 mg Oral Q12H [address] Mauricio Raring, CRNP      heparin (porcine)  5,000 Units Subcutaneous Q8H [facility] Faye Correa MD      hydrALAZINE  50 mg Oral [facility] RENETTA Chakraborty      HYDROmorphone  0 2 mg Intravenous Q3H PRN Faye Correa MD      insulin detemir  20 Units Subcutaneous Q12H [facility] Swathi Schumacher MD      insulin lispro  1-6 Units Subcutaneous TID AC Swathi Schumacher MD      insulin lispro  1-6 Units Subcutaneous HS Swathi Schumacher MD      insulin lispro  4 Units Subcutaneous TID With Meals Faye Correa MD      iron polysaccharides  150 mg Oral Daily [place], [place]      lactated ringers  50 mL/hr Intravenous Continuous Tish Chalk, DO 50 mL/hr (01/30/21 0857)    melatonin  3 mg Oral HS TABITHA Cr      metoprolol tartrate  25 mg Oral Q12H [facility] Swathi Schumacher MD      nitroglycerin  0 4 mg Sublingual Q5 Min PRN Rossi Ward MD      nystatin  500,000 Units Swish & Swallow 4x Daily Faye Correa MD      ondansetron  4 mg Intravenous Q6H PRN Rossi Ward MD      oxyCODONE  2 5 mg Oral Q4H PRN Faye Correa MD      pantoprazole  40 mg Intravenous Q12H [facility] HOME Zeny Chang MD      phenol  1 spray Mouth/Throat Q4H PRN Faye Correa MD      pneumococcal 13-valent conjugate vaccine  0 5 mL Intramuscular Prior to discharge Rossi Ward MD      polyethylene glycol  17 g Oral Daily PRN Rossi Ward MD          Today, Patient Was Seen By: Zeny Chang MD    ** Please Note: This note has been constructed using a voice recognition system   **

## 2021-01-30 NOTE — ASSESSMENT & PLAN NOTE
· Leukocytosis down to 11 latest  · COVID-19, influenza, RSV tests were negative x2  · Chest x-ray officially read as multifocal pneumonia  · Thus will continue to treat as hospital-acquired pneumonia with IV cefepime  · Blood cultures x2 no growth  · Cough medicine/antitussive, lozenges and Chloraseptic spray p r n  Greg Leal

## 2021-01-30 NOTE — QUICK NOTE
Notified by nursing staff that patient had episode of vomiting that resembled coffee ground  Upon examination, small amount of coffee ground emesis noted  Patient states he feels well after vomiting   Patient is stable     Ordered H&H and Protonix 40mg IV BID    Will continue to monitor

## 2021-01-30 NOTE — ASSESSMENT & PLAN NOTE
Lab Results   Component Value Date    HGBA1C 6 7 (H) 07/03/2019       Recent Labs     01/29/21  1206 01/29/21  1536 01/29/21  2130 01/30/21  0716   POCGLU 198* 301* 337* 291*       Blood Sugar Average: Last 72 hrs:  (P) 239 [id number]   Still poorly controlled  Continue Humalog sliding scale with Accu-Cheks q a c  And HS  Patient already on Levemir  We will add Humalog 3 times a day with meals  Adjust treatment accordingly

## 2021-01-30 NOTE — ASSESSMENT & PLAN NOTE
Lab Results   Component Value Date    EGFR 20 01/30/2021    EGFR 18 01/29/2021    EGFR 13 01/28/2021    CREATININE 2 88 (H) 01/30/2021    CREATININE 3 13 (H) 01/29/2021    CREATININE 4 13 (H) 01/28/2021     Creatinine 2 88, down from 3 13   Baseline creatinine around 2 3-2 5  Previously on HD in 2019  Nephrology on board   Most likely ATN post contrast for Cath  D/cBumex drip as per nephrology  IR placed HD Cath 1/25/2021  Dialyzed x3 during inpatient stay  Continue to Monitor kidney function and input and output

## 2021-01-30 NOTE — ASSESSMENT & PLAN NOTE
Type 1 MI  Presented with chest pain at [facility]   Hx of CAD s/p stenting and CABG 2016  Received ASA 324mg at home and SL nitro x1 in the ER  Troponin peaked at 5 5  Continue cardiovascular medications with aspirin, Plavix, statin, beta-blocker and Imdur  Cardiac Cath Completed at [facility]   - three-vessel disease with 99% occluded proximal circumflex, 50% distal left main, mid % occluded, RCA proximally 100% occluded with collaterals from left circulation  Patient has his patent grafts of LIMA to LAD and SVG to OM2   Medical intervention recommend at this time  Echo complete - reveals 60% EF

## 2021-01-30 NOTE — PROGRESS NOTES
Progress Note - Nephrology   Terry Lauren [de-identified] y o  male MRN: [id number]  Unit/Bed#: S -01 Encounter: [id number]    Assessment:  1  Acute kidney injury:  Likely secondary to ATN dye induced nephropathy in the setting cardiac catheterization January 22nd  He had a temporary dialysis catheter placed on January 25th was started on dialysis on January 25th  He had a 3rd dialysis on January 29th  Hold off on dialysis over the weekend  Patient examines to be clinically dry  Stop diuretic infusion give fixed and timed IV fluids  Follow over the weekend no acute indications for dialysis today  2  Stage chronic kidney disease:  Baseline creatinine is 2-2 5  Patient follows with [address] [facility] [address] Shreya Bills  Etiologies are felt to be secondary to diabetic nephropathy and hypertensive nephrosclerosis  3  Acute on chronic congestive heart failure with preserved ejection fraction:  Again patient examines to be clinically dry we will give fixed and timed IV fluid  4  Acute on chronic anemia:  Hemoglobin is been in the mid 8 range continue to monitor  Iron saturation is 14 ferritin was 913  On oral iron replacement    Plan:  Discontinue diuretic infusion give fixed and timed IV fluids        Subjective:   Patient seen in follow-up for acute kidney injury  Chart notes reviewed  Patient had episode nausea and vomiting yesterday with questionable coffee-ground emesis  He denies any abdominal or back pain  He had last dialysis yesterday he feels Larance Basket out    Systolic blood pressure has been anywhere from 623-513 systolic  Patient is nonoliguric urine output approximately 1 L      Objective:     Vitals: Blood pressure 136/63, pulse (!) 53, temperature 98 °F (36 7 °C), temperature source Oral, resp  rate 18, SpO2 100 %  ,There is no height or weight on file to calculate BMI      Weight (last 2 days)     None            Intake/Output Summary (Last 24 hours) at 1/30/2021 0815  Last data filed at 1/29/2021 2100  Gross per 24 hour   Intake 700 ml   Output 3199 ml   Net -2499 ml       HD Temporary Double Catheter (Active)   Reasons to continue HD Cath Treatment Therapy 01/29/21 0845   Goal for Removal Other (comment) 01/28/21 1630   Line Necessity Reviewed Yes, reviewed with provider 01/29/21 0845   Site Assessment Clean;Dry; Intact 01/29/21 0845   Proximal Lumen Status Capped 01/29/21 1300   Distal Lumen Status Capped 01/29/21 1300   Line Care Connections checked and tightened 01/29/21 0845   Dressing Type Chlorhexidine dressing 01/29/21 0845   Dressing Status Intact; Old drainage 01/29/21 0845   Dressing Intervention Dressing changed 01/28/21 1630   Dressing Change Due 02/04/21 01/29/21 0845       Physical Exam: General: patient is in NAD  Skin:  Warm and dry  Eyes:  No scleral icterus  ENT:  Dry mucous membranes  Neck:  Supple  Chest:  Coarse breath sounds  CVS:  S1-S2  Abdomen:  Soft positive bowel sounds  Extremities:  No significant edema there is a right IJ temporary dialysis catheter  Neuro:  Nonfocal              Medications Prior to Admission   Medication    amLODIPine (NORVASC) 5 mg tablet    aspirin (ASPIR-LOW) 81 mg EC tablet    atorvastatin (LIPITOR) 40 mg tablet    escitalopram (LEXAPRO) 10 mg tablet    furosemide (LASIX) 80 mg tablet    hydrALAZINE (APRESOLINE) 50 mg tablet    insulin detemir (LEVEMIR) 100 units/mL subcutaneous injection    insulin lispro (HumaLOG) 100 units/mL injection    insulin lispro (HumaLOG) 100 units/mL injection    isosorbide mononitrate (IMDUR) 30 mg 24 hr tablet    metoprolol tartrate (LOPRESSOR) 50 mg tablet    nitroglycerin (NITROSTAT) 0 4 mg SL tablet    polyethylene glycol (MIRALAX) 17 g packet       Current Facility-Administered Medications   Medication Dose Route Frequency    acetaminophen (TYLENOL) tablet 650 mg  650 mg Oral Q6H PRN    aluminum-magnesium hydroxide-simethicone (MYLANTA) oral suspension 30 mL  30 mL Oral Q6H PRN    amLODIPine (NORVASC) tablet 5 mg  5 mg Oral BID    aspirin (ECOTRIN LOW STRENGTH) EC tablet 81 mg  81 mg Oral Daily    atorvastatin (LIPITOR) tablet 40 mg  40 mg Oral Daily With [place]    benzonatate (TESSALON PERLES) capsule 100 mg  100 mg Oral TID PRN    bumetanide (BUMEX) 12 5 mg infusion 50 mL  4 mg/hr Intravenous Continuous    calcium carbonate (TUMS) chewable tablet 1,000 mg  1,000 mg Oral Daily PRN    cefepime (MAXIPIME) 2,000 mg in dextrose 5 % 50 mL IVPB  2,000 mg Intravenous Q24H    clopidogrel (PLAVIX) tablet 75 mg  75 mg Oral Daily    escitalopram (LEXAPRO) tablet 10 mg  10 mg Oral HS    guaiFENesin (MUCINEX) 12 hr tablet 1,200 mg  1,200 mg Oral Q12H [facility]    heparin (porcine) subcutaneous injection 5,000 Units  5,000 Units Subcutaneous Q8H [facility]    hydrALAZINE (APRESOLINE) tablet 50 mg  50 mg Oral Q8H [facility]    HYDROmorphone (DILAUDID) injection 0 2 mg  0 2 mg Intravenous Q3H PRN    insulin detemir (LEVEMIR) subcutaneous injection 20 Units  20 Units Subcutaneous Q12H [place]    insulin lispro (HumaLOG) 100 units/mL subcutaneous injection 1-6 Units  1-6 Units Subcutaneous TID AC    insulin lispro (HumaLOG) 100 units/mL subcutaneous injection 1-6 Units  1-6 Units Subcutaneous HS    insulin lispro (HumaLOG) 100 units/mL subcutaneous injection 4 Units  4 Units Subcutaneous TID With Meals    iron polysaccharides (FERREX) capsule 150 mg  150 mg Oral Daily    melatonin tablet 3 mg  3 mg Oral HS    metoprolol tartrate (LOPRESSOR) tablet 25 mg  25 mg Oral Q12H [facility]    nitroglycerin (NITROSTAT) SL tablet 0 4 mg  0 4 mg Sublingual Q5 Min PRN    nystatin (MYCOSTATIN) oral suspension 500,000 Units  500,000 Units Swish & Swallow 4x Daily    ondansetron (ZOFRAN) injection 4 mg  4 mg Intravenous Q6H PRN    oxyCODONE (ROXICODONE) IR tablet 2 5 mg  2 5 mg Oral Q4H PRN    pantoprazole (PROTONIX) injection 40 mg  40 mg Intravenous Q12H [facility]    phenol (CHLORASEPTIC) 1 4 % mucosal liquid 1 spray  1 spray Mouth/Throat Q4H PRN    pneumococcal 13-valent conjugate vaccine (PREVNAR-13) IM injection 0 5 mL  0 5 mL Intramuscular Prior to discharge    polyethylene glycol (MIRALAX) packet 17 g  17 g Oral Daily PRN        Lab, Imaging and other studies: I have personally reviewed pertinent labs    CBC:   Lab Results   Component Value Date    WBC 11 96 (H) 01/27/2021    WBC 8 8 01/05/2016    RBC 2 60 (L) 01/27/2021    RBC 4 61 (L) 01/05/2016     CMP:   Lab Results   Component Value Date     01/05/2016    CL 96 (L) 01/30/2021     01/05/2016    CO2 25 01/30/2021    CO2 19 (L) 01/24/2021    ANIONGAP 14 2 01/05/2016    BUN 60 (H) 01/30/2021    BUN 35 (H) 01/05/2016    CREATININE 2 88 (H) 01/30/2021    CREATININE 1 5 (H) 01/05/2016    GLUCOSE 232 (H) 01/24/2021    GLUCOSE 131 (H) 01/05/2016    CALCIUM 8 2 (L) 01/30/2021    CALCIUM 9 0 01/05/2016    AST 53 (H) 01/23/2021    AST 22 01/05/2016    ALT 71 01/23/2021    ALT 53 01/05/2016    ALKPHOS 106 01/23/2021    ALKPHOS 66 01/05/2016    PROT 10 2 (H) 01/05/2016    BILITOT 0 9 01/05/2016    EGFR 20 01/30/2021     Phosphorus:   Lab Results   Component Value Date    PHOS 5 1 (H) 01/27/2021     Magnesium:   Lab Results   Component Value Date    MG 2 9 (H) 07/10/2019     Urinalysis:   Lab Results   Component Value Date    COLORU Yellow 07/08/2019    CLARITYU Clear 07/08/2019    CLARITYU YELLOW 01/05/2016    CLARITYU SL CLOUDY 01/05/2016    SPECGRAV 1 025 07/08/2019    SPECGRAV 1 025 01/05/2016    PHUR 5 0 07/08/2019    PHUR 5 5 02/14/2017    PHUR 6 0 01/05/2016    LEUKOCYTESUR Negative 07/08/2019    LEUKOCYTESUR TRACE (A) 01/05/2016    NITRITE Negative 07/08/2019    NITRITE POSITIVE (A) 01/05/2016    PROTEINUA 100 (A) 01/05/2016    GLUCOSEU Negative 07/08/2019    GLUCOSEU >=1000 01/05/2016    KETONESU Negative 07/08/2019    KETONESU NEGATIVE 01/05/2016    BILIRUBINUR Negative 07/08/2019    BILIRUBINUR NEGATIVE 01/05/2016    BLOODU Negative 07/08/2019    BLOODU LARGE (A) 01/05/2016     BMP:   Lab Results   Component Value Date    GLUCOSE 232 (H) 01/24/2021    GLUCOSE 131 (H) 01/05/2016    SODIUM 133 (L) 01/30/2021    CO2 25 01/30/2021    CO2 19 (L) 01/24/2021    BUN 60 (H) 01/30/2021    BUN 35 (H) 01/05/2016    CREATININE 2 88 (H) 01/30/2021    CREATININE 1 5 (H) 01/05/2016    CALCIUM 8 2 (L) 01/30/2021    CALCIUM 9 0 01/05/2016     ABGs:   Lab Results   Component Value Date    PH 7 342 (L) 01/24/2021

## 2021-01-30 NOTE — ASSESSMENT & PLAN NOTE
· Cardiologist and nephrologist on board  · D/c Bumex drip as per nephrology  · Continue cardiovascular and heart failure medications  · Monitor input and output  · Daily weights  · Currently on 2L o2 supplementation   Improving

## 2021-01-31 LAB
ABO GROUP BLD: NORMAL
ABO GROUP BLD: NORMAL
ANION GAP SERPL CALCULATED.3IONS-SCNC: 12 MMOL/L (ref 4–13)
ANISOCYTOSIS BLD QL SMEAR: PRESENT
APTT PPP: 149 SECONDS (ref 23–37)
BASOPHILS # BLD MANUAL: 0.14 THOUSAND/UL (ref 0–0.1)
BASOPHILS NFR MAR MANUAL: 1 % (ref 0–1)
BLD GP AB SCN SERPL QL: NEGATIVE
BUN SERPL-MCNC: 92 MG/DL (ref 5–25)
CALCIUM SERPL-MCNC: 8.3 MG/DL (ref 8.3–10.1)
CHLORIDE SERPL-SCNC: 95 MMOL/L (ref 100–108)
CO2 SERPL-SCNC: 24 MMOL/L (ref 21–32)
CREAT SERPL-MCNC: 3.88 MG/DL (ref 0.6–1.3)
EOSINOPHIL # BLD MANUAL: 0.27 THOUSAND/UL (ref 0–0.4)
EOSINOPHIL NFR BLD MANUAL: 2 % (ref 0–6)
ERYTHROCYTE [DISTWIDTH] IN BLOOD BY AUTOMATED COUNT: 13.2 % (ref 11.6–15.1)
GFR SERPL CREATININE-BSD FRML MDRD: 14 ML/MIN/1.73SQ M
GLUCOSE SERPL-MCNC: 250 MG/DL (ref 65–140)
GLUCOSE SERPL-MCNC: 266 MG/DL (ref 65–140)
GLUCOSE SERPL-MCNC: 282 MG/DL (ref 65–140)
GLUCOSE SERPL-MCNC: 290 MG/DL (ref 65–140)
GLUCOSE SERPL-MCNC: 469 MG/DL (ref 65–140)
HCT VFR BLD AUTO: 20.2 % (ref 36.5–49.3)
HCT VFR BLD AUTO: 23.1 % (ref 36.5–49.3)
HGB BLD-MCNC: 6.7 G/DL (ref 12–17)
HGB BLD-MCNC: 7.6 G/DL (ref 12–17)
HYPERCHROMIA BLD QL SMEAR: PRESENT
INR PPP: 1.18 (ref 0.84–1.19)
INR PPP: 1.23 (ref 0.84–1.19)
LYMPHOCYTES # BLD AUTO: 1.78 THOUSAND/UL (ref 0.6–4.47)
LYMPHOCYTES # BLD AUTO: 13 % (ref 14–44)
MCH RBC QN AUTO: 31.7 PG (ref 26.8–34.3)
MCHC RBC AUTO-ENTMCNC: 32.9 G/DL (ref 31.4–37.4)
MCV RBC AUTO: 96 FL (ref 82–98)
METAMYELOCYTES NFR BLD MANUAL: 2 % (ref 0–1)
MONOCYTES # BLD AUTO: 1.23 THOUSAND/UL (ref 0–1.22)
MONOCYTES NFR BLD: 9 % (ref 4–12)
NEUTROPHILS # BLD MANUAL: 9.98 THOUSAND/UL (ref 1.85–7.62)
NEUTS BAND NFR BLD MANUAL: 3 % (ref 0–8)
NEUTS SEG NFR BLD AUTO: 70 % (ref 43–75)
NRBC BLD AUTO-RTO: 0 /100 WBCS
NRBC BLD AUTO-RTO: 1 /100 WBC (ref 0–2)
PLATELET # BLD AUTO: 151 THOUSANDS/UL (ref 149–390)
PLATELET BLD QL SMEAR: ADEQUATE
PMV BLD AUTO: 10.7 FL (ref 8.9–12.7)
POTASSIUM SERPL-SCNC: 3.9 MMOL/L (ref 3.5–5.3)
PROTHROMBIN TIME: 15.1 SECONDS (ref 11.6–14.5)
PROTHROMBIN TIME: 15.6 SECONDS (ref 11.6–14.5)
RBC # BLD AUTO: 2.4 MILLION/UL (ref 3.88–5.62)
RH BLD: POSITIVE
RH BLD: POSITIVE
SODIUM SERPL-SCNC: 131 MMOL/L (ref 136–145)
SPECIMEN EXPIRATION DATE: NORMAL
TOTAL CELLS COUNTED SPEC: 100
WBC # BLD AUTO: 13.67 THOUSAND/UL (ref 4.31–10.16)

## 2021-01-31 PROCEDURE — C9113 INJ PANTOPRAZOLE SODIUM, VIA: HCPCS | Performed by: FAMILY MEDICINE

## 2021-01-31 PROCEDURE — 86920 COMPATIBILITY TEST SPIN: CPT

## 2021-01-31 PROCEDURE — 85610 PROTHROMBIN TIME: CPT | Performed by: HOSPITALIST

## 2021-01-31 PROCEDURE — 80048 BASIC METABOLIC PNL TOTAL CA: CPT | Performed by: FAMILY MEDICINE

## 2021-01-31 PROCEDURE — 85027 COMPLETE CBC AUTOMATED: CPT | Performed by: HOSPITALIST

## 2021-01-31 PROCEDURE — 85014 HEMATOCRIT: CPT | Performed by: HOSPITALIST

## 2021-01-31 PROCEDURE — 86901 BLOOD TYPING SEROLOGIC RH(D): CPT | Performed by: FAMILY MEDICINE

## 2021-01-31 PROCEDURE — 85610 PROTHROMBIN TIME: CPT | Performed by: FAMILY MEDICINE

## 2021-01-31 PROCEDURE — 86850 RBC ANTIBODY SCREEN: CPT | Performed by: FAMILY MEDICINE

## 2021-01-31 PROCEDURE — 99232 SBSQ HOSP IP/OBS MODERATE 35: CPT | Performed by: HOSPITALIST

## 2021-01-31 PROCEDURE — 86900 BLOOD TYPING SEROLOGIC ABO: CPT | Performed by: FAMILY MEDICINE

## 2021-01-31 PROCEDURE — 99232 SBSQ HOSP IP/OBS MODERATE 35: CPT | Performed by: INTERNAL MEDICINE

## 2021-01-31 PROCEDURE — 85007 BL SMEAR W/DIFF WBC COUNT: CPT | Performed by: HOSPITALIST

## 2021-01-31 PROCEDURE — 85730 THROMBOPLASTIN TIME PARTIAL: CPT | Performed by: FAMILY MEDICINE

## 2021-01-31 PROCEDURE — 30233N1 TRANSFUSION OF NONAUTOLOGOUS RED BLOOD CELLS INTO PERIPHERAL VEIN, PERCUTANEOUS APPROACH: ICD-10-PCS | Performed by: FAMILY MEDICINE

## 2021-01-31 PROCEDURE — 85018 HEMOGLOBIN: CPT | Performed by: HOSPITALIST

## 2021-01-31 PROCEDURE — P9016 RBC LEUKOCYTES REDUCED: HCPCS

## 2021-01-31 PROCEDURE — 82948 REAGENT STRIP/BLOOD GLUCOSE: CPT

## 2021-01-31 RX ORDER — SODIUM CHLORIDE 9 MG/ML
50 INJECTION, SOLUTION INTRAVENOUS CONTINUOUS
Status: DISCONTINUED | OUTPATIENT
Start: 2021-01-31 | End: 2021-01-31

## 2021-01-31 RX ORDER — ECHINACEA PURPUREA EXTRACT 125 MG
1 TABLET ORAL
Status: DISCONTINUED | OUTPATIENT
Start: 2021-01-31 | End: 2021-02-12 | Stop reason: HOSPADM

## 2021-01-31 RX ADMIN — PANTOPRAZOLE SODIUM 40 MG: 40 INJECTION, POWDER, FOR SOLUTION INTRAVENOUS at 21:11

## 2021-01-31 RX ADMIN — ASPIRIN 81 MG: 81 TABLET ORAL at 08:43

## 2021-01-31 RX ADMIN — SALINE NASAL SPRAY 1 SPRAY: 1.5 SOLUTION NASAL at 16:59

## 2021-01-31 RX ADMIN — INSULIN DETEMIR 30 UNITS: 100 INJECTION, SOLUTION SUBCUTANEOUS at 09:03

## 2021-01-31 RX ADMIN — PANTOPRAZOLE SODIUM 40 MG: 40 INJECTION, POWDER, FOR SOLUTION INTRAVENOUS at 08:44

## 2021-01-31 RX ADMIN — ESCITALOPRAM 10 MG: 10 TABLET, FILM COATED ORAL at 21:15

## 2021-01-31 RX ADMIN — CEFEPIME HYDROCHLORIDE 2000 MG: 2 INJECTION, POWDER, FOR SOLUTION INTRAVENOUS at 12:17

## 2021-01-31 RX ADMIN — Medication 3 MG: at 21:10

## 2021-01-31 RX ADMIN — INSULIN LISPRO 4 UNITS: 100 INJECTION, SOLUTION INTRAVENOUS; SUBCUTANEOUS at 21:19

## 2021-01-31 RX ADMIN — NYSTATIN 500000 UNITS: 100000 SUSPENSION ORAL at 21:17

## 2021-01-31 RX ADMIN — SODIUM CHLORIDE 50 ML/HR: 0.9 INJECTION, SOLUTION INTRAVENOUS at 09:09

## 2021-01-31 RX ADMIN — NYSTATIN 500000 UNITS: 100000 SUSPENSION ORAL at 16:58

## 2021-01-31 RX ADMIN — METOPROLOL TARTRATE 25 MG: 25 TABLET, FILM COATED ORAL at 08:43

## 2021-01-31 RX ADMIN — GUAIFENESIN 1200 MG: 600 TABLET, EXTENDED RELEASE ORAL at 08:43

## 2021-01-31 RX ADMIN — INSULIN DETEMIR 30 UNITS: 100 INJECTION, SOLUTION SUBCUTANEOUS at 21:18

## 2021-01-31 RX ADMIN — AMLODIPINE BESYLATE 5 MG: 5 TABLET ORAL at 08:43

## 2021-01-31 RX ADMIN — ATORVASTATIN CALCIUM 40 MG: 40 TABLET, FILM COATED ORAL at 16:57

## 2021-01-31 RX ADMIN — POLYSACCHARIDE-IRON COMPLEX 150 MG: 150 CAPSULE ORAL at 08:43

## 2021-01-31 RX ADMIN — INSULIN LISPRO 3 UNITS: 100 INJECTION, SOLUTION INTRAVENOUS; SUBCUTANEOUS at 17:00

## 2021-01-31 RX ADMIN — NYSTATIN 500000 UNITS: 100000 SUSPENSION ORAL at 08:42

## 2021-01-31 RX ADMIN — GUAIFENESIN 1200 MG: 600 TABLET, EXTENDED RELEASE ORAL at 21:10

## 2021-01-31 RX ADMIN — HEPARIN SODIUM 5000 UNITS: 5000 INJECTION INTRAVENOUS; SUBCUTANEOUS at 14:18

## 2021-01-31 RX ADMIN — INSULIN LISPRO 4 UNITS: 100 INJECTION, SOLUTION INTRAVENOUS; SUBCUTANEOUS at 09:06

## 2021-01-31 RX ADMIN — CLOPIDOGREL BISULFATE 75 MG: 75 TABLET ORAL at 08:44

## 2021-01-31 RX ADMIN — INSULIN LISPRO 6 UNITS: 100 INJECTION, SOLUTION INTRAVENOUS; SUBCUTANEOUS at 12:15

## 2021-01-31 RX ADMIN — NYSTATIN 500000 UNITS: 100000 SUSPENSION ORAL at 12:16

## 2021-01-31 RX ADMIN — INSULIN LISPRO 4 UNITS: 100 INJECTION, SOLUTION INTRAVENOUS; SUBCUTANEOUS at 09:05

## 2021-01-31 RX ADMIN — HEPARIN SODIUM 5000 UNITS: 5000 INJECTION INTRAVENOUS; SUBCUTANEOUS at 06:14

## 2021-01-31 NOTE — PLAN OF CARE
Problem: Potential for Falls  Goal: Patient will remain free of falls  Description: INTERVENTIONS:  - Assess patient frequently for physical needs  -  Identify cognitive and physical deficits and behaviors that affect risk of falls    -  [place] fall precautions as indicated by assessment   - Educate patient/family on patient safety including physical limitations  - Instruct patient to call for assistance with activity based on assessment  - Modify environment to reduce risk of injury  - Consider OT/PT consult to assist with strengthening/mobility  Outcome: Progressing     Problem: CARDIOVASCULAR - ADULT  Goal: Maintains optimal cardiac output and hemodynamic stability  Description: INTERVENTIONS:  - Monitor I/O, vital signs and rhythm  - Monitor for S/S and trends of decreased cardiac output  - Administer and titrate ordered vasoactive medications to optimize hemodynamic stability  - Assess quality of pulses, skin color and temperature  - Assess for signs of decreased coronary artery perfusion  - Instruct patient to report change in severity of symptoms  Outcome: Progressing  Goal: Absence of cardiac dysrhythmias or at baseline rhythm  Description: INTERVENTIONS:  - Continuous cardiac monitoring, vital signs, obtain 12 lead EKG if ordered  - Administer antiarrhythmic and heart rate control medications as ordered  - Monitor electrolytes and administer replacement therapy as ordered  Outcome: Progressing     Problem: CARDIOVASCULAR - ADULT  Goal: Absence of cardiac dysrhythmias or at baseline rhythm  Description: INTERVENTIONS:  - Continuous cardiac monitoring, vital signs, obtain 12 lead EKG if ordered  - Administer antiarrhythmic and heart rate control medications as ordered  - Monitor electrolytes and administer replacement therapy as ordered  Outcome: Progressing     Problem: RESPIRATORY - ADULT  Goal: Achieves optimal ventilation and oxygenation  Description: INTERVENTIONS:  - Assess for changes in respiratory status  - Assess for changes in mentation and behavior  - Position to facilitate oxygenation and minimize respiratory effort  - Oxygen administered by appropriate delivery if ordered  - Initiate smoking cessation education as indicated  - Encourage broncho-pulmonary hygiene including cough, deep breathe, Incentive Spirometry  - Assess the need for suctioning and aspirate as needed  - Assess and instruct to report SOB or any respiratory difficulty  - Respiratory Therapy support as indicated  Outcome: Progressing     Problem: GASTROINTESTINAL - ADULT  Goal: Maintains adequate nutritional intake  Description: INTERVENTIONS:  - Monitor percentage of each meal consumed  - Identify factors contributing to decreased intake, treat as appropriate  - Assist with meals as needed  - Monitor I&O, weight, and lab values if indicated  - Obtain nutrition services referral as needed  Outcome: Progressing     Problem: GENITOURINARY - ADULT  Goal: Maintains or returns to baseline urinary function  Description: INTERVENTIONS:  - Assess urinary function  - Encourage oral fluids to ensure adequate hydration if ordered  - Administer IV fluids as ordered to ensure adequate hydration  - Administer ordered medications as needed  - Offer frequent toileting  - Follow urinary retention protocol if ordered  Outcome: Progressing  Goal: Absence of urinary retention  Description: INTERVENTIONS:  - Assess patients ability to void and empty bladder  - Monitor I/O  - Bladder scan as needed  - Discuss with physician/AP medications to alleviate retention as needed  - Discuss catheterization for long term situations as appropriate  Outcome: Progressing

## 2021-01-31 NOTE — PROGRESS NOTES
Progress Note - Nephrology   Naima Mckinley [de-identified] y o  male MRN: [id number]  Unit/Bed#: S -01 Encounter: [id number]    Assessment:  1  Acute kidney injury:  Secondary to ATN and dye induced nephropathy in the setting of cardiac catheterization on January 22nd  He had a temporary dialysis catheter placed on January 25th was started on dialysis on January 25th  He had a 3rd dialysis on January 29th  Holding off on dialysis this weekend  Yesterday January 30th the give fixed and timed IV fluids  We will do the same today  Evaluate need tomorrow for dialysis the creatinine is still increasing I still think he is going to need dialysis although the fact that he is nonoliguric I do think he will recover but it may take some time  2  Stage chronic kidney disease:  Baseline creatinine is 2-2 5  Patient follows with [address] [facility] [address] [place]  Etiologies are felt to be secondary to diabetic nephropathy and hypertensive nephrosclerosis  3  Acute on chronic congestive heart failure with preserved ejection fraction:  Will give fixed and timed IV fluids today  4  Acute on chronic anemia:  Hemoglobin is been in the mid 8 range continue to monitor iron saturation is 14 ferritin was 913 continue with oral iron replacement  5  Hyponatremia:  Sodium is 131 oral fluid restrict given fixed and timed IV fluids evaluate for dialysis tomorrow    Plan:  As above        Subjective:   Patient seen in follow-up for acute kidney injury on chronic kidney disease  We given the patient fixed and timed IV fluids yesterday  He states he feels a lot better he feels less washed out  His color is better he denies any chest pressure shortness of breath  His last dialysis was on Friday  Systolic blood pressure 214-215 systolic  Patient is net -1 L over the past 24 hours in terms of urine output  Objective:     Vitals: Blood pressure 115/56, pulse (!) 50, temperature 98 °F (36 7 °C), temperature source Oral, resp   rate 18, SpO2 100 %  ,There is no height or weight on file to calculate BMI  Weight (last 2 days)     None            Intake/Output Summary (Last 24 hours) at 1/31/2021 0817  Last data filed at 1/31/2021 0250  Gross per 24 hour   Intake 240 ml   Output 1250 ml   Net -1010 ml       HD Temporary Double Catheter (Active)   Reasons to continue HD Cath Treatment Therapy 01/30/21 1500   Goal for Removal Other (comment) 01/28/21 1630   Line Necessity Reviewed Yes, reviewed with provider 01/30/21 1500   Site Assessment Intact 01/30/21 1500   Proximal Lumen Status Capped 01/30/21 1500   Distal Lumen Status Capped 01/30/21 1500   Line Care Connections checked and tightened 01/29/21 0845   Dressing Type Chlorhexidine dressing 01/30/21 1500   Dressing Status New drainage; Old drainage 01/31/21 0710   Dressing Intervention Dressing changed 01/28/21 1630   Dressing Change Due 02/01/21 01/31/21 0710       Physical Exam: General: patient is in NAD  Skin:  No new rash, patient with right IJ catheter temporary dialysis catheter there was some mild bleeding around the area which seems to be contained within the dressing    This may need to be changed  Eyes:  No scleral icterus  ENT:  Moist mucous membranes  Neck:  Supple  Chest:  Coarse breath sounds scattered wheeze  CVS:  S1-S2  Abdomen:  Soft positive bowel sounds  Extremities:  No significant edema  Neuro:  Nonfocal                Medications Prior to Admission   Medication    amLODIPine (NORVASC) 5 mg tablet    aspirin (ASPIR-LOW) 81 mg EC tablet    atorvastatin (LIPITOR) 40 mg tablet    escitalopram (LEXAPRO) 10 mg tablet    furosemide (LASIX) 80 mg tablet    hydrALAZINE (APRESOLINE) 50 mg tablet    insulin detemir (LEVEMIR) 100 units/mL subcutaneous injection    insulin lispro (HumaLOG) 100 units/mL injection    insulin lispro (HumaLOG) 100 units/mL injection    isosorbide mononitrate (IMDUR) 30 mg 24 hr tablet    metoprolol tartrate (LOPRESSOR) 50 mg tablet    nitroglycerin (NITROSTAT) 0 4 mg SL tablet    polyethylene glycol (MIRALAX) 17 g packet       Current Facility-Administered Medications   Medication Dose Route Frequency    acetaminophen (TYLENOL) tablet 650 mg  650 mg Oral Q6H PRN    aluminum-magnesium hydroxide-simethicone (MYLANTA) oral suspension 30 mL  30 mL Oral Q6H PRN    amLODIPine (NORVASC) tablet 5 mg  5 mg Oral BID    aspirin (ECOTRIN LOW STRENGTH) EC tablet 81 mg  81 mg Oral Daily    atorvastatin (LIPITOR) tablet 40 mg  40 mg Oral Daily With Dinner    benzonatate (TESSALON PERLES) capsule 100 mg  100 mg Oral TID PRN    calcium carbonate (TUMS) chewable tablet 1,000 mg  1,000 mg Oral Daily PRN    cefepime (MAXIPIME) 2,000 mg in dextrose 5 % 50 mL IVPB  2,000 mg Intravenous Q24H    clopidogrel (PLAVIX) tablet 75 mg  75 mg Oral Daily    escitalopram (LEXAPRO) tablet 10 mg  10 mg Oral HS    guaiFENesin (MUCINEX) 12 hr tablet 1,200 mg  1,200 mg Oral Q12H Count includes the [facility]    heparin (porcine) subcutaneous injection 5,000 Units  5,000 Units Subcutaneous Q8H [facility]    hydrALAZINE (APRESOLINE) tablet 50 mg  50 mg Oral Q8H [facility]    HYDROmorphone (DILAUDID) injection 0 2 mg  0 2 mg Intravenous Q3H PRN    insulin detemir (LEVEMIR) subcutaneous injection 20 Units  20 Units Subcutaneous Q12H [facility]    insulin lispro (HumaLOG) 100 units/mL subcutaneous injection 1-6 Units  1-6 Units Subcutaneous TID AC    insulin lispro (HumaLOG) 100 units/mL subcutaneous injection 1-6 Units  1-6 Units Subcutaneous HS    insulin lispro (HumaLOG) 100 units/mL subcutaneous injection 4 Units  4 Units Subcutaneous TID With Meals    iron polysaccharides (FERREX) capsule 150 mg  150 mg Oral Daily    melatonin tablet 3 mg  3 mg Oral HS    metoprolol tartrate (LOPRESSOR) tablet 25 mg  25 mg Oral Q12H [facility]    nitroglycerin (NITROSTAT) SL tablet 0 4 mg  0 4 mg Sublingual Q5 Min PRN    nystatin (MYCOSTATIN) oral suspension 500,000 Units  500,000 Units Swish & Swallow 4x Daily    ondansetron (ZOFRAN) injection 4 mg  4 mg Intravenous Q6H PRN    oxyCODONE (ROXICODONE) IR tablet 2 5 mg  2 5 mg Oral Q4H PRN    pantoprazole (PROTONIX) injection 40 mg  40 mg Intravenous Q12H [facility]    phenol (CHLORASEPTIC) 1 4 % mucosal liquid 1 spray  1 spray Mouth/Throat Q4H PRN    pneumococcal 13-valent conjugate vaccine (PREVNAR-13) IM injection 0 5 mL  0 5 mL Intramuscular Prior to discharge    polyethylene glycol (MIRALAX) packet 17 g  17 g Oral Daily PRN        Lab, Imaging and other studies: I have personally reviewed pertinent labs    CBC:   Lab Results   Component Value Date    WBC 11 96 (H) 01/27/2021    WBC 8 8 01/05/2016    RBC 2 60 (L) 01/27/2021    RBC 4 61 (L) 01/05/2016     CMP:   Lab Results   Component Value Date     01/05/2016    CL 95 (L) 01/31/2021     01/05/2016    CO2 24 01/31/2021    CO2 19 (L) 01/24/2021    ANIONGAP 14 2 01/05/2016    BUN 92 (H) 01/31/2021    BUN 35 (H) 01/05/2016    CREATININE 3 88 (H) 01/31/2021    CREATININE 1 5 (H) 01/05/2016    GLUCOSE 232 (H) 01/24/2021    GLUCOSE 131 (H) 01/05/2016    CALCIUM 8 3 01/31/2021    CALCIUM 9 0 01/05/2016    AST 53 (H) 01/23/2021    AST 22 01/05/2016    ALT 71 01/23/2021    ALT 53 01/05/2016    ALKPHOS 106 01/23/2021    ALKPHOS 66 01/05/2016    PROT 10 2 (H) 01/05/2016    BILITOT 0 9 01/05/2016    EGFR 14 01/31/2021     Phosphorus:   Lab Results   Component Value Date    PHOS 5 1 (H) 01/27/2021     Magnesium:   Lab Results   Component Value Date    MG 2 9 (H) 07/10/2019     Urinalysis:   Lab Results   Component Value Date    COLORU Yellow 07/08/2019    CLARITYU Clear 07/08/2019    CLARITYU YELLOW 01/05/2016    CLARITYU SL CLOUDY 01/05/2016    SPECGRAV 1 025 07/08/2019    SPECGRAV 1 025 01/05/2016    PHUR 5 0 07/08/2019    PHUR 5 5 02/14/2017    PHUR 6 0 01/05/2016    LEUKOCYTESUR Negative 07/08/2019    LEUKOCYTESUR TRACE (A) 01/05/2016    NITRITE Negative 07/08/2019    NITRITE POSITIVE (A) 01/05/2016    PROTEINUA 100 (A) 01/05/2016    GLUCOSEU Negative 07/08/2019    GLUCOSEU >=1000 01/05/2016    KETONESU Negative 07/08/2019    KETONESU NEGATIVE 01/05/2016    BILIRUBINUR Negative 07/08/2019    BILIRUBINUR NEGATIVE 01/05/2016    BLOODU Negative 07/08/2019    BLOODU LARGE (A) 01/05/2016     BMP:   Lab Results   Component Value Date    GLUCOSE 232 (H) 01/24/2021    GLUCOSE 131 (H) 01/05/2016    SODIUM 131 (L) 01/31/2021    CO2 24 01/31/2021    CO2 19 (L) 01/24/2021    BUN 92 (H) 01/31/2021    BUN 35 (H) 01/05/2016    CREATININE 3 88 (H) 01/31/2021    CREATININE 1 5 (H) 01/05/2016    CALCIUM 8 3 01/31/2021    CALCIUM 9 0 01/05/2016     ABGs:   Lab Results   Component Value Date    PH 7 342 (L) 01/24/2021 yes

## 2021-01-31 NOTE — PHYSICAL THERAPY NOTE
Physical Therapy Cancellation Note      Attempted to see pt for PT intervention  Rian VELARDE reports pt is not appropriate for PT due to anemia  Will follow and continue PT as appropriate      Rafita Sampson, PT

## 2021-01-31 NOTE — ASSESSMENT & PLAN NOTE
· Leukocytosis down to 11 latest  · COVID-19, influenza, RSV tests were negative x2  · Chest x-ray officially read as multifocal pneumonia  · Thus will continue to treat as hospital-acquired pneumonia with IV cefepime 8 of 10  · Blood cultures x2 no growth  · Cough medicine/antitussive, lozenges and Chloraseptic spray p r n  Gege Given

## 2021-01-31 NOTE — ASSESSMENT & PLAN NOTE
Lab Results   Component Value Date    EGFR 14 01/31/2021    EGFR 20 01/30/2021    EGFR 18 01/29/2021    CREATININE 3 88 (H) 01/31/2021    CREATININE 2 88 (H) 01/30/2021    CREATININE 3 13 (H) 01/29/2021     Creatinine 2 88, down from 3 13    Baseline creatinine around 2 3-2 5  Previously on HD in 2019;   IR placed HD Cath 1/25/202   Nephrology on board   Most likely ATN post contrast for Cath  No longer on IV diuresis  Plan to readdress need for HD on Monday

## 2021-01-31 NOTE — PROGRESS NOTES
Progress Note - Darion Yeung 1940, [de-identified] y o  male MRN: [id number]    Unit/Bed#: S -01 Encounter: [id number]    Primary Care Provider: Xiomara Alaniz MD   Date and time admitted to hospital: 1/22/2021 12:28 PM    * Elevated troponin  Assessment & Plan  Type 1 MI  Presented with chest pain at [facility]   Hx of CAD s/p stenting and CABG 2016  Received ASA 324mg at home and SL nitro x1 in the ER  Troponin peaked at 5 5  Continue cardiovascular medications with aspirin, Plavix, statin, beta-blocker and Imdur  Cardiac Cath Completed at [facility]   - three-vessel disease with 99% occluded proximal circumflex, 50% distal left main, mid % occluded, RCA proximally 100% occluded with collaterals from left circulation  Patient has his patent grafts of LIMA to LAD and SVG to OM2  Medical intervention recommend at this time  Echo complete - reveals 60% EF      Acute renal failure superimposed on stage 3 chronic kidney disease [facility])  Assessment & Plan  Lab Results   Component Value Date    EGFR 14 01/31/2021    EGFR 20 01/30/2021    EGFR 18 01/29/2021    CREATININE 3 88 (H) 01/31/2021    CREATININE 2 88 (H) 01/30/2021    CREATININE 3 13 (H) 01/29/2021     Creatinine 2 88, down from 3 13   Baseline creatinine around 2 3-2 5  Previously on HD in 2019;   IR placed HD Cath 1/25/202   Nephrology on board   Most likely ATN post contrast for Cath  No longer on IV diuresis  Plan to readdress need for HD on Monday       Hx of CABG  Assessment & Plan  CAD s/p CABG in 2016; history of stenting prior to CABG (LIMA to LAD and SVG to OM)  Status post cardiac catheterization: significant triple vessel coronary artery disease  Patient has his patent grafts of LIMA to LAD and SVG to OM2  Continue cardiovascular meds      Diabetes mellitus [facility])  Assessment & Plan  Lab Results   Component Value Date    HGBA1C 6 7 (H) 07/03/2019       Recent Labs     01/30/21  1538 01/30/21  2100 01/31/21  3201 01/31/21  1035   POCGLU 157* 318* 282* 469*       Blood Sugar Average: Last 72 hrs:  (P) 737 [id number]   Still poorly controlled  Continue Humalog sliding scale with Accu-Cheks q a c  And HS  Will adjust levemir  We will add Humalog 3 times a day with meals  Adjust treatment accordingly  Hyponatremia  Assessment & Plan  · Likely due to hypervolemia/CHF  133 1/30/2021  Improving  · Nephrology on board  · Monitor  Oral candida  Assessment & Plan  · Nystatin mouthwash  Acute on chronic diastolic congestive heart failure [facility])  Assessment & Plan  · Cardiologist and nephrologist on board  · D/c Bumex drip as per nephrology  · Continue cardiovascular and heart failure medications  · Monitor input and output  · Daily weights  · Currently on 2L o2 supplementation  Improving    Anemia  Assessment & Plan  · Monitor closely  · Pt now has oozing around HD cath site  · Have consented for blood if needed  · hgb q12     Urinary retention  Assessment & Plan  Overnight 1/27/2021 nursing reported no urine output  Bladder scan revealed >800cc urine  Patient was straight catheterized  - Continue urinary retention protocol    Leukocytosis  Assessment & Plan  · Leukocytosis down to 11 latest  · COVID-19, influenza, RSV tests were negative x2  · Chest x-ray officially read as multifocal pneumonia  · Thus will continue to treat as hospital-acquired pneumonia with IV cefepime 8 of 10  · Blood cultures x2 no growth  · Cough medicine/antitussive, lozenges and Chloraseptic spray p r n  VTE Pharmacologic Prophylaxis:   Pharmacologic: Heparin  Mechanical VTE Prophylaxis in Place: Yes    Patient Centered Rounds: I have performed bedside rounds with nursing staff today  Discussions with Specialists or Other Care Team Provider: nephro     Education and Discussions with Family / Patient: patient     Time Spent for Care: 30 minutes    More than 50% of total time spent on counseling and coordination of care as described above  Current Length of Stay: 9 day(s)    Current Patient Status: Inpatient   Certification Statement: The patient will continue to require additional inpatient hospital stay due to BRIAN requiring intermittent HD  Discharge Plan / Estimated Discharge Date: TBD based on clinical course    Code Status: Level 1 - Full Code    Subjective:   Pt is more tired today  Did not sleep well  Having oozing at HD cath site and some epistaxis     Objective:     Vitals:   Temp (24hrs), Av °F (36 7 °C), Min:97 9 °F (36 6 °C), Max:98 °F (36 7 °C)    Temp:  [97 9 °F (36 6 °C)-98 °F (36 7 °C)] 98 °F (36 7 °C)  HR:  [50-58] 50  Resp:  [18] 18  BP: (110-119)/(54-59) 110/54  SpO2:  [97 %-100 %] 100 %  There is no height or weight on file to calculate BMI  Input and Output Summary (last 24 hours): Intake/Output Summary (Last 24 hours) at 2021 1503  Last data filed at 2021 1401  Gross per 24 hour   Intake 240 ml   Output 1250 ml   Net -1010 ml       Physical Exam:     Physical Exam  Vitals signs and nursing note reviewed  HENT:      Head: Normocephalic and atraumatic  Cardiovascular:      Rate and Rhythm: Normal rate and regular rhythm  Pulmonary:      Effort: Pulmonary effort is normal  No respiratory distress  Breath sounds: Normal breath sounds  Abdominal:      General: Abdomen is flat  Palpations: Abdomen is soft  Neurological:      General: No focal deficit present  Mental Status: Mental status is at baseline  Additional Data:     Labs:    Results from last 7 days   Lab Units 21  0906  21  0639   WBC Thousand/uL 13 67*   < > 13 57*   HEMOGLOBIN g/dL 7 6*   < > 8 6*   HEMATOCRIT % 23 1*   < > 25 9*   PLATELETS Thousands/uL 151   < > 166   NEUTROS PCT %  --   --  86*   LYMPHS PCT %  --   --  5*   LYMPHO PCT % 13*  --   --    MONOS PCT %  --   --  8   MONO PCT % 9  --   --    EOS PCT % 2  --  0    < > = values in this interval not displayed       Results from last 7 days   Lab Units 01/31/21  0609   POTASSIUM mmol/L 3 9   CHLORIDE mmol/L 95*   CO2 mmol/L 24   BUN mg/dL 92*   CREATININE mg/dL 3 88*   CALCIUM mg/dL 8 3     Results from last 7 days   Lab Units 01/31/21  0906   INR  1 23*       * I Have Reviewed All Lab Data Listed Above  * Additional Pertinent Lab Tests Reviewed:  All Labs Within Last 24 Hours Reviewed    Imaging:    Imaging Reports Reviewed Today Include:   Imaging Personally Reviewed by Myself Includes:      Recent Cultures (last 7 days):           Last 24 Hours Medication List:   Current Facility-Administered Medications   Medication Dose Route Frequency Provider Last Rate    acetaminophen  650 mg Oral Q6H PRN Gricelda Hanks MD      aluminum-magnesium hydroxide-simethicone  30 mL Oral Q6H PRN TABITHA Gore      amLODIPine  5 mg Oral BID Rafael Anthony PA-C      aspirin  81 mg Oral Daily Gricelda Hanks MD      atorvastatin  40 mg Oral Daily With Dary Diaz MD      benzonatate  100 mg Oral TID PRN TABITHA Mukherjee      calcium carbonate  1,000 mg Oral Daily PRN Gricelda Hanks MD      cefepime  2,000 mg Intravenous Q24H Faye Correa MD 2,000 mg (01/31/21 1217)    clopidogrel  75 mg Oral Daily Gricelda Hanks MD      escitalopram  10 mg Oral HS Gricelda Hanks MD      guaiFENesin  1,200 mg Oral Q12H [address] TABITHA Mukherjee      heparin (porcine)  5,000 Units Subcutaneous Q8H [address] Faye Correa MD      hydrALAZINE  50 mg Oral [facility] Rafael Anthony PA-C      HYDROmorphone  0 2 mg Intravenous Q3H PRN Faye Correa MD      insulin detemir  30 Units Subcutaneous Q12H Arie Keller MD      insulin lispro  1-6 Units Subcutaneous TID AC Gricelda Hanks MD      insulin lispro  1-6 Units Subcutaneous HS Gricelda Hanks MD      insulin lispro  5 Units Subcutaneous TID With Meals Rosa M Pittman MD      iron polysaccharides  150 mg Oral Daily Rafael Anthony PA-C      melatonin 3 mg Oral HS TABITHA Cr      metoprolol tartrate  25 mg Oral Q12H [address], MD      nitroglycerin  0 4 mg Sublingual Q5 Min PRN Ricardo Horn MD      nystatin  500,000 Units Swish & Swallow 4x Daily Faye Correa MD      ondansetron  4 mg Intravenous Q6H PRN Ricardo Horn MD      oxyCODONE  2 5 mg Oral Q4H PRN Faye Correa MD      pantoprazole  40 mg Intravenous Q12H [address] Vercruz Alex MD      phenol  1 spray Mouth/Throat Q4H PRN Faye Correa MD      pneumococcal 13-valent conjugate vaccine  0 5 mL Intramuscular Prior to discharge Ricardo Horn MD      polyethylene glycol  17 g Oral Daily PRN Ricardo Horn MD      sodium chloride  1 spray Each Nare Q1H PRN Marjorie Sadler MD          Today, Patient Was Seen By: Marjorie Sadler MD    ** Please Note: This note has been constructed using a voice recognition system   **

## 2021-01-31 NOTE — ASSESSMENT & PLAN NOTE
Lab Results   Component Value Date    HGBA1C 6 7 (H) 07/03/2019       Recent Labs     01/30/21  1538 01/30/21  2100 01/31/21  0721 01/31/21  1035   POCGLU 157* 318* 282* 469*       Blood Sugar Average: Last 72 hrs:  (P) 517 [id number]   Still poorly controlled  Continue Humalog sliding scale with Accu-Cheks q a c  And HS  Will adjust levemir  We will add Humalog 3 times a day with meals  Adjust treatment accordingly

## 2021-01-31 NOTE — ASSESSMENT & PLAN NOTE
Type 1 MI  Presented with chest pain at [facility]   Hx of CAD s/p stenting and CABG 2016  Received ASA 324mg at home and SL nitro x1 in the ER  Troponin peaked at 5 5  Continue cardiovascular medications with aspirin, Plavix, statin, beta-blocker and Imdur  Cardiac Cath Completed at [facility]   - three-vessel disease with 99% occluded proximal circumflex, 50% distal left main, mid % occluded, RCA proximally 100% occluded with collaterals from left circulation  Patient has his patent grafts of LIMA to LAD and SVG to OM2   Medical intervention recommend at this time  Echo complete - reveals 60% EF

## 2021-01-31 NOTE — ASSESSMENT & PLAN NOTE
· Monitor closely  · Pt now has oozing around HD cath site  · Have consented for blood if needed  · hgb q12

## 2021-02-01 ENCOUNTER — APPOINTMENT (INPATIENT)
Dept: DIALYSIS | Facility: HOSPITAL | Age: 81
DRG: 280 | End: 2021-02-01
Payer: COMMERCIAL

## 2021-02-01 PROBLEM — K59.00 CONSTIPATION: Status: ACTIVE | Noted: 2021-02-01

## 2021-02-01 LAB
ABO GROUP BLD BPU: NORMAL
ANION GAP SERPL CALCULATED.3IONS-SCNC: 14 MMOL/L (ref 4–13)
BASOPHILS # BLD AUTO: 0.04 THOUSANDS/ΜL (ref 0–0.1)
BASOPHILS NFR BLD AUTO: 0 % (ref 0–1)
BPU ID: NORMAL
BUN SERPL-MCNC: 113 MG/DL (ref 5–25)
CALCIUM SERPL-MCNC: 8.1 MG/DL (ref 8.3–10.1)
CHLORIDE SERPL-SCNC: 93 MMOL/L (ref 100–108)
CO2 SERPL-SCNC: 22 MMOL/L (ref 21–32)
CREAT SERPL-MCNC: 4.41 MG/DL (ref 0.6–1.3)
CROSSMATCH: NORMAL
EOSINOPHIL # BLD AUTO: 0.39 THOUSAND/ΜL (ref 0–0.61)
EOSINOPHIL NFR BLD AUTO: 3 % (ref 0–6)
ERYTHROCYTE [DISTWIDTH] IN BLOOD BY AUTOMATED COUNT: 12.8 % (ref 11.6–15.1)
GFR SERPL CREATININE-BSD FRML MDRD: 12 ML/MIN/1.73SQ M
GLUCOSE SERPL-MCNC: 181 MG/DL (ref 65–140)
GLUCOSE SERPL-MCNC: 275 MG/DL (ref 65–140)
GLUCOSE SERPL-MCNC: 302 MG/DL (ref 65–140)
GLUCOSE SERPL-MCNC: 393 MG/DL (ref 65–140)
GLUCOSE SERPL-MCNC: 409 MG/DL (ref 65–140)
GLUCOSE SERPL-MCNC: 439 MG/DL (ref 65–140)
HCT VFR BLD AUTO: 17.1 % (ref 36.5–49.3)
HCT VFR BLD AUTO: 22.1 % (ref 36.5–49.3)
HCT VFR BLD AUTO: 23.5 % (ref 36.5–49.3)
HGB BLD-MCNC: 5.6 G/DL (ref 12–17)
HGB BLD-MCNC: 7.4 G/DL (ref 12–17)
HGB BLD-MCNC: 7.8 G/DL (ref 12–17)
IMM GRANULOCYTES # BLD AUTO: 0.36 THOUSAND/UL (ref 0–0.2)
IMM GRANULOCYTES NFR BLD AUTO: 3 % (ref 0–2)
LYMPHOCYTES # BLD AUTO: 1.87 THOUSANDS/ΜL (ref 0.6–4.47)
LYMPHOCYTES NFR BLD AUTO: 13 % (ref 14–44)
MCH RBC QN AUTO: 31.1 PG (ref 26.8–34.3)
MCHC RBC AUTO-ENTMCNC: 33.2 G/DL (ref 31.4–37.4)
MCV RBC AUTO: 94 FL (ref 82–98)
MONOCYTES # BLD AUTO: 1.44 THOUSAND/ΜL (ref 0.17–1.22)
MONOCYTES NFR BLD AUTO: 10 % (ref 4–12)
NEUTROPHILS # BLD AUTO: 10.56 THOUSANDS/ΜL (ref 1.85–7.62)
NEUTS SEG NFR BLD AUTO: 71 % (ref 43–75)
NRBC BLD AUTO-RTO: 0 /100 WBCS
PLATELET # BLD AUTO: 135 THOUSANDS/UL (ref 149–390)
PMV BLD AUTO: 10.6 FL (ref 8.9–12.7)
POTASSIUM SERPL-SCNC: 3.9 MMOL/L (ref 3.5–5.3)
RBC # BLD AUTO: 2.51 MILLION/UL (ref 3.88–5.62)
SODIUM SERPL-SCNC: 129 MMOL/L (ref 136–145)
UNIT DISPENSE STATUS: NORMAL
UNIT PRODUCT CODE: NORMAL
UNIT RH: NORMAL
WBC # BLD AUTO: 14.66 THOUSAND/UL (ref 4.31–10.16)

## 2021-02-01 PROCEDURE — 97110 THERAPEUTIC EXERCISES: CPT

## 2021-02-01 PROCEDURE — 85027 COMPLETE CBC AUTOMATED: CPT | Performed by: INTERNAL MEDICINE

## 2021-02-01 PROCEDURE — 82948 REAGENT STRIP/BLOOD GLUCOSE: CPT

## 2021-02-01 PROCEDURE — 30233N1 TRANSFUSION OF NONAUTOLOGOUS RED BLOOD CELLS INTO PERIPHERAL VEIN, PERCUTANEOUS APPROACH: ICD-10-PCS | Performed by: FAMILY MEDICINE

## 2021-02-01 PROCEDURE — 99233 SBSQ HOSP IP/OBS HIGH 50: CPT | Performed by: INTERNAL MEDICINE

## 2021-02-01 PROCEDURE — 99232 SBSQ HOSP IP/OBS MODERATE 35: CPT | Performed by: HOSPITALIST

## 2021-02-01 PROCEDURE — P9016 RBC LEUKOCYTES REDUCED: HCPCS

## 2021-02-01 PROCEDURE — 80048 BASIC METABOLIC PNL TOTAL CA: CPT | Performed by: INTERNAL MEDICINE

## 2021-02-01 PROCEDURE — C9113 INJ PANTOPRAZOLE SODIUM, VIA: HCPCS | Performed by: FAMILY MEDICINE

## 2021-02-01 PROCEDURE — 85018 HEMOGLOBIN: CPT | Performed by: HOSPITALIST

## 2021-02-01 PROCEDURE — 85014 HEMATOCRIT: CPT | Performed by: HOSPITALIST

## 2021-02-01 PROCEDURE — 97530 THERAPEUTIC ACTIVITIES: CPT

## 2021-02-01 RX ORDER — AMOXICILLIN 250 MG
1 CAPSULE ORAL
Status: DISCONTINUED | OUTPATIENT
Start: 2021-02-01 | End: 2021-02-12 | Stop reason: HOSPADM

## 2021-02-01 RX ORDER — POLYETHYLENE GLYCOL 3350 17 G/17G
17 POWDER, FOR SOLUTION ORAL DAILY
Status: DISCONTINUED | OUTPATIENT
Start: 2021-02-01 | End: 2021-02-05

## 2021-02-01 RX ADMIN — PANTOPRAZOLE SODIUM 40 MG: 40 INJECTION, POWDER, FOR SOLUTION INTRAVENOUS at 09:01

## 2021-02-01 RX ADMIN — ATORVASTATIN CALCIUM 40 MG: 40 TABLET, FILM COATED ORAL at 17:16

## 2021-02-01 RX ADMIN — CEFEPIME HYDROCHLORIDE 2000 MG: 2 INJECTION, POWDER, FOR SOLUTION INTRAVENOUS at 11:38

## 2021-02-01 RX ADMIN — HYDRALAZINE HYDROCHLORIDE 50 MG: 25 TABLET, FILM COATED ORAL at 05:45

## 2021-02-01 RX ADMIN — ESCITALOPRAM 10 MG: 10 TABLET, FILM COATED ORAL at 21:53

## 2021-02-01 RX ADMIN — INSULIN DETEMIR 30 UNITS: 100 INJECTION, SOLUTION SUBCUTANEOUS at 08:59

## 2021-02-01 RX ADMIN — POLYSACCHARIDE-IRON COMPLEX 150 MG: 150 CAPSULE ORAL at 08:59

## 2021-02-01 RX ADMIN — PANTOPRAZOLE SODIUM 40 MG: 40 INJECTION, POWDER, FOR SOLUTION INTRAVENOUS at 21:54

## 2021-02-01 RX ADMIN — POLYETHYLENE GLYCOL 3350 17 G: 17 POWDER, FOR SOLUTION ORAL at 09:19

## 2021-02-01 RX ADMIN — ASPIRIN 81 MG: 81 TABLET ORAL at 08:58

## 2021-02-01 RX ADMIN — GUAIFENESIN 1200 MG: 600 TABLET, EXTENDED RELEASE ORAL at 21:54

## 2021-02-01 RX ADMIN — NYSTATIN 500000 UNITS: 100000 SUSPENSION ORAL at 17:16

## 2021-02-01 RX ADMIN — NYSTATIN 500000 UNITS: 100000 SUSPENSION ORAL at 08:58

## 2021-02-01 RX ADMIN — GUAIFENESIN 1200 MG: 600 TABLET, EXTENDED RELEASE ORAL at 08:58

## 2021-02-01 RX ADMIN — NYSTATIN 500000 UNITS: 100000 SUSPENSION ORAL at 21:53

## 2021-02-01 RX ADMIN — NYSTATIN 500000 UNITS: 100000 SUSPENSION ORAL at 11:57

## 2021-02-01 RX ADMIN — INSULIN DETEMIR 30 UNITS: 100 INJECTION, SOLUTION SUBCUTANEOUS at 21:53

## 2021-02-01 RX ADMIN — INSULIN LISPRO 1 UNITS: 100 INJECTION, SOLUTION INTRAVENOUS; SUBCUTANEOUS at 17:17

## 2021-02-01 RX ADMIN — HEPARIN SODIUM 5000 UNITS: 5000 INJECTION INTRAVENOUS; SUBCUTANEOUS at 13:03

## 2021-02-01 RX ADMIN — METOPROLOL TARTRATE 25 MG: 25 TABLET, FILM COATED ORAL at 08:59

## 2021-02-01 RX ADMIN — DOCUSATE SODIUM AND SENNOSIDES 1 TABLET: 8.6; 5 TABLET ORAL at 21:53

## 2021-02-01 RX ADMIN — INSULIN LISPRO 6 UNITS: 100 INJECTION, SOLUTION INTRAVENOUS; SUBCUTANEOUS at 11:38

## 2021-02-01 RX ADMIN — INSULIN LISPRO 6 UNITS: 100 INJECTION, SOLUTION INTRAVENOUS; SUBCUTANEOUS at 22:04

## 2021-02-01 RX ADMIN — INSULIN LISPRO 4 UNITS: 100 INJECTION, SOLUTION INTRAVENOUS; SUBCUTANEOUS at 09:00

## 2021-02-01 RX ADMIN — Medication 3 MG: at 21:54

## 2021-02-01 RX ADMIN — CLOPIDOGREL BISULFATE 75 MG: 75 TABLET ORAL at 09:07

## 2021-02-01 RX ADMIN — METOPROLOL TARTRATE 25 MG: 25 TABLET, FILM COATED ORAL at 21:54

## 2021-02-01 RX ADMIN — DOCUSATE SODIUM AND SENNOSIDES 1 TABLET: 8.6; 5 TABLET ORAL at 09:07

## 2021-02-01 NOTE — ASSESSMENT & PLAN NOTE
Type 1 MI  Presented with chest pain at [facility]   Hx of CAD s/p stenting and CABG 2016  Received ASA 324mg at home and SL nitro x1 in the ER  Troponin peaked at 5 5  Continue cardiovascular medications with aspirin, Plavix, statin, beta-blocker and Imdur  Cardiac Cath Completed at [facility]   - three-vessel disease with 99% occluded proximal circumflex, 50% distal left main, mid % occluded, RCA proximally 100% occluded with collaterals from left circulation  Patient has his patent grafts of LIMA to LAD and SVG to OM2   Medical intervention recommend at this time  Echo complete - reveals 60% EF

## 2021-02-01 NOTE — ASSESSMENT & PLAN NOTE
Lab Results   Component Value Date    HGBA1C 6 7 (H) 07/03/2019       Recent Labs     01/31/21  1035 01/31/21  1536 01/31/21 2039 02/01/21  0814   POCGLU 469* 266* 290* 275*       Blood Sugar Average: Last 72 hrs:  (P) 284 [id number]   Still poorly controlled  Continue Humalog sliding scale with Accu-Cheks q a c  And HS  Will adjust levemir  We will add Humalog 3 times a day with meals  Adjust treatment accordingly

## 2021-02-01 NOTE — ASSESSMENT & PLAN NOTE
· Leukocytosis up to 14 2/1/2021  · COVID-19, influenza, RSV tests were negative x2  · Chest x-ray officially read as multifocal pneumonia  · Thus will continue to treat as hospital-acquired pneumonia with IV cefepime 9 of 10  · Blood cultures x2 no growth  · Cough medicine/antitussive, lozenges and Chloraseptic spray p r n  Gege Given

## 2021-02-01 NOTE — ASSESSMENT & PLAN NOTE
Patient reports >1 week since last bowel movement      - Schedule miralax  - Schedule senna/docusate  - Encourage PO hydration

## 2021-02-01 NOTE — PLAN OF CARE
Problem: PHYSICAL THERAPY ADULT  Goal: Performs mobility at highest level of function for planned discharge setting  See evaluation for individualized goals  Description: Treatment/Interventions: Functional transfer training, LE strengthening/ROM, Therapeutic exercise, Endurance training, Patient/family training, Equipment eval/education, Bed mobility(PT to see for gait when appropriate)  Equipment Recommended: (Therapy will trial Yetta Brunner in upcoming sessions)       See flowsheet documentation for full assessment, interventions and recommendations  Outcome: Progressing  Note: Prognosis: Fair  Problem List: Decreased strength, Decreased range of motion, Decreased endurance, Impaired balance, Decreased mobility, Decreased coordination, Impaired vision, Pain  Assessment: Patient agreeable and motivated to participate in therapy session  Patient able to transfer supine to sit with min ax1 and supervision to return with verbal instruction for body positioning and technique  Tolerated sitting EOB x 8 minutes with periods of B LE exercises with close supervision  STS from EOB with mod ax2 to rise and min ax2 to descend  Standing tolerance x20 seconds with AX2 for balance and B UE support, pt able to obtain fully erect posture and maintain  PT to see next session for mobility reassessment as appropriate  PT Discharge Recommendation: Post-Acute Rehabilitation Services          See flowsheet documentation for full assessment

## 2021-02-01 NOTE — PLAN OF CARE
Problem: Potential for Falls  Goal: Patient will remain free of falls  Description: INTERVENTIONS:  - Assess patient frequently for physical needs  -  Identify cognitive and physical deficits and behaviors that affect risk of falls    -  [place] fall precautions as indicated by assessment   - Educate patient/family on patient safety including physical limitations  - Instruct patient to call for assistance with activity based on assessment  - Modify environment to reduce risk of injury  - Consider OT/PT consult to assist with strengthening/mobility  Outcome: Progressing     Problem: CARDIOVASCULAR - ADULT  Goal: Maintains optimal cardiac output and hemodynamic stability  Description: INTERVENTIONS:  - Monitor I/O, vital signs and rhythm  - Monitor for S/S and trends of decreased cardiac output  - Administer and titrate ordered vasoactive medications to optimize hemodynamic stability  - Assess quality of pulses, skin color and temperature  - Assess for signs of decreased coronary artery perfusion  - Instruct patient to report change in severity of symptoms  Outcome: Progressing  Goal: Absence of cardiac dysrhythmias or at baseline rhythm  Description: INTERVENTIONS:  - Continuous cardiac monitoring, vital signs, obtain 12 lead EKG if ordered  - Administer antiarrhythmic and heart rate control medications as ordered  - Monitor electrolytes and administer replacement therapy as ordered  Outcome: Progressing     Problem: RESPIRATORY - ADULT  Goal: Achieves optimal ventilation and oxygenation  Description: INTERVENTIONS:  - Assess for changes in respiratory status  - Assess for changes in mentation and behavior  - Position to facilitate oxygenation and minimize respiratory effort  - Oxygen administered by appropriate delivery if ordered  - Initiate smoking cessation education as indicated  - Encourage broncho-pulmonary hygiene including cough, deep breathe, Incentive Spirometry  - Assess the need for suctioning and aspirate as needed  - Assess and instruct to report SOB or any respiratory difficulty  - Respiratory Therapy support as indicated  Outcome: Progressing     Problem: GASTROINTESTINAL - ADULT  Goal: Maintains adequate nutritional intake  Description: INTERVENTIONS:  - Monitor percentage of each meal consumed  - Identify factors contributing to decreased intake, treat as appropriate  - Assist with meals as needed  - Monitor I&O, weight, and lab values if indicated  - Obtain nutrition services referral as needed  Outcome: Progressing     Problem: GENITOURINARY - ADULT  Goal: Maintains or returns to baseline urinary function  Description: INTERVENTIONS:  - Assess urinary function  - Encourage oral fluids to ensure adequate hydration if ordered  - Administer IV fluids as ordered to ensure adequate hydration  - Administer ordered medications as needed  - Offer frequent toileting  - Follow urinary retention protocol if ordered  Outcome: Progressing  Goal: Absence of urinary retention  Description: INTERVENTIONS:  - Assess patients ability to void and empty bladder  - Monitor I/O  - Bladder scan as needed  - Discuss with physician/AP medications to alleviate retention as needed  - Discuss catheterization for long term situations as appropriate  Outcome: Progressing     Problem: METABOLIC, FLUID AND ELECTROLYTES - ADULT  Goal: Electrolytes maintained within normal limits  Description: INTERVENTIONS:  - Monitor labs and assess patient for signs and symptoms of electrolyte imbalances  - Administer electrolyte replacement as ordered  - Monitor response to electrolyte replacements, including repeat lab results as appropriate  - Instruct patient on fluid and nutrition as appropriate  Outcome: Progressing  Goal: Fluid balance maintained  Description: INTERVENTIONS:  - Monitor labs   - Monitor I/O and WT  - Instruct patient on fluid and nutrition as appropriate  - Assess for signs & symptoms of volume excess or deficit  Outcome: Progressing  Goal: Glucose maintained within target range  Description: INTERVENTIONS:  - Monitor Blood Glucose as ordered  - Assess for signs and symptoms of hyperglycemia and hypoglycemia  - Administer ordered medications to maintain glucose within target range  - Assess nutritional intake and initiate nutrition service referral as needed  Outcome: Progressing     Problem: SKIN/TISSUE INTEGRITY - ADULT  Goal: Skin integrity remains intact  Description: INTERVENTIONS  - Identify patients at risk for skin breakdown  - Assess and monitor skin integrity  - Assess and monitor nutrition and hydration status  - Monitor labs (i e  albumin)  - Assess for incontinence   - Turn and reposition patient  - Assist with mobility/ambulation  - Relieve pressure over bony prominences  - Avoid friction and shearing  - Provide appropriate hygiene as needed including keeping skin clean and dry  - Evaluate need for skin moisturizer/barrier cream  - Collaborate with interdisciplinary team (i e  Nutrition, Rehabilitation, etc )   - Patient/family teaching  Outcome: Progressing     Problem: HEMATOLOGIC - ADULT  Goal: Maintains hematologic stability  Description: INTERVENTIONS  - Assess for signs and symptoms of bleeding or hemorrhage  - Monitor labs  - Administer supportive blood products/factors as ordered and appropriate  Outcome: Progressing     Problem: MUSCULOSKELETAL - ADULT  Goal: Maintain or return mobility to safest level of function  Description: INTERVENTIONS:  - Assess patient's ability to carry out ADLs; assess patient's baseline for ADL function and identify physical deficits which impact ability to perform ADLs (bathing, care of mouth/teeth, toileting, grooming, dressing, etc )  - Assess/evaluate cause of self-care deficits   - Assess range of motion  - Assess patient's mobility  - Assess patient's need for assistive devices and provide as appropriate  - Encourage maximum independence but intervene and supervise when necessary  - Involve family in performance of ADLs  - Assess for home care needs following discharge   - Consider OT consult to assist with ADL evaluation and planning for discharge  - Provide patient education as appropriate  Outcome: Progressing

## 2021-02-01 NOTE — PHYSICAL THERAPY NOTE
PHYSICAL THERAPY NOTE    Patient Name: Laila JENNINGS Date: 21 1102   PT Last Visit   PT Visit Date 21   Note Type   Note Type Treatment   Pain Assessment   Pain Assessment Tool Pain Assessment not indicated - pt denies pain   Pain Score No Pain   Restrictions/Precautions   Weight Bearing Precautions Per Order No   Other Precautions Bed Alarm;Multiple lines;Telemetry;O2;Fall Risk;Pain;Visual impairment  (2L O2 via NC)   General   Family/Caregiver Present No   Subjective   Subjective Patient supine in bed and is agreeable to participate in therapy session  Patient identifers obtained from name &   Bed Mobility   Supine to Sit 4  Minimal assistance   Additional items Assist x 1;HOB elevated; Bedrails; Increased time required;Verbal cues;LE management   Sit to Supine 5  Supervision   Additional items Assist x 1; Increased time required;Verbal cues   Additional Comments Pt supine in bed post session with bed alarm engaged, call bell and belongings in reach  Transfers   Sit to Stand 3  Moderate assistance   Additional items Assist x 2;Armrests; Increased time required;Verbal cues   Stand to Sit 4  Minimal assistance   Additional items Assist x 2;Armrests; Increased time required;Verbal cues   Additional Comments Standing tolerance x 20 seconds with B UE support and min ax2 for steadying   Activity Tolerance   Activity Tolerance Patient limited by fatigue;Patient limited by pain   Medical Staff Made Aware Spoke to [facility], PT   Nurse Made Aware Spoke to [facility] SEYMOUR [place]    Exercises   Knee AROM Long Arc Quad Sitting;20 reps;AROM; Bilateral  (sitting EOB)   Ankle Pumps Sitting;15 reps;AROM; Bilateral   Marching Sitting;10 reps;AROM; Bilateral   Assessment   Prognosis Fair   Problem List Decreased strength;Decreased range of motion;Decreased endurance; Impaired balance;Decreased mobility; Decreased coordination; Impaired vision;Pain   Assessment Patient agreeable and motivated to participate in therapy session  Patient able to transfer supine to sit with min ax1 and supervision to return with verbal instruction for body positioning and technique  Tolerated sitting EOB x 8 minutes with periods of B LE exercises with close supervision  STS from EOB with mod ax2 to rise and min ax2 to descend  Standing tolerance x20 seconds with AX2 for balance and B UE support, pt able to obtain fully erect posture and maintain  PT to see next session for mobility reassessment as appropriate  Goals   STG Expiration Date 02/01/21   PT Treatment Day 2   Plan   Treatment/Interventions Functional transfer training;LE strengthening/ROM; Therapeutic exercise; Endurance training;Patient/family training;Equipment eval/education; Bed mobility  (PT to see for gait when appropriate)   PT Frequency 5x/wk   Recommendation   PT Discharge Recommendation Post-Acute Rehabilitation Services       [place], [place]

## 2021-02-01 NOTE — PLAN OF CARE
Post-Dialysis RN Treatment Note    Blood Pressure:  Pre 107/47 mm/Hg  Post 100/44 mmHg   EDW  TBD    Weight:  Pre 95 2kg kg   Post 94 9 kg   Mode of weight measurement: Bed Scale   Volume Removed  300 ml    Treatment duration 120 minutes    NS given  No    Treatment shortened? No   Medications given during Rx None Reported   Estimated Kt/V  None Reported   Access type: Permacath/TDC   Access Status: Yes, describe: BFR maintained at 300 throughout treatment as prescribed     Report called to primary nurse   Yes Anthony Sosa RN       Patient receiving dialysis treatment today as ordered  Attempting 0 5 liter fluid removal  4K dialysate being used, K+ 3 9 as of 02/01/2021      Problem: METABOLIC, FLUID AND ELECTROLYTES - ADULT  Goal: Electrolytes maintained within normal limits  Description: INTERVENTIONS:  - Monitor labs and assess patient for signs and symptoms of electrolyte imbalances  - Administer electrolyte replacement as ordered  - Monitor response to electrolyte replacements, including repeat lab results as appropriate  - Instruct patient on fluid and nutrition as appropriate  Outcome: Progressing  Goal: Fluid balance maintained  Description: INTERVENTIONS:  - Monitor labs   - Monitor I/O and WT  - Instruct patient on fluid and nutrition as appropriate  - Assess for signs & symptoms of volume excess or deficit  Outcome: Progressing

## 2021-02-01 NOTE — ASSESSMENT & PLAN NOTE
· Monitor closely  · Pt now has oozing around HD cath site  · Have consented for blood if needed  · Required 1 unit PRBC overnight 1/30/2021 after hemoglobin found to be 6 7  · Currently 7 8  · Continue to monitor hemoglobin

## 2021-02-01 NOTE — QUICK NOTE
Notified pt Hgb returned at 6 7 down from 7 6 this AM and he is still having oozing from HD site  Upon examination pt is AOx3, sitting up right, in NAD  Pt states he continues to feel tired but no other complaints  Pt was having epistaxis earlier which has dried up  No current epistaxis  Pt denies any F/C/N/V/D  He has not had any hemoptysis, nor bloody BMs  Last BM was 5 days ago  Pt denies any chest pain, palpitations, increasing sob, headaches  PE:  General: AOx3, NAD  Cardiac:   RRR, +s1, +s2  Lungs: Coarse BS bilat  Abdomen: BS present, Soft, non-tender, no distension, no guarding  MSK: Lower ext - Neg edema bilat  HD Site: Positive blood under tegaderm  Non-tender to touch  Plan:  - Pt has consent to transfuse blood completed  - Will transfuse 1 unit Leukoreduced RBCs  - Will check PT/PTT and consider checking fibrinogen and d-dimer   - Type and screen ordered  - Will repeat H/H for 4 hours post transfusion    - Monitor vitals closely, and continue to monitor HD site

## 2021-02-01 NOTE — PROGRESS NOTES
NEPHROLOGY PROGRESS NOTE   Carter Ayon [de-identified] y o  male MRN: [id number]  Unit/Bed#: S -01 Encounter: [id number]  Reason for Consult: BRIAN        ASSESSMENT and PLAN:     Acute renal failure/acute tubular necrosis  -- currently dialysis dependent, secondary to contrast associated nephropathy with underlying advanced chronic kidney disease stage IV  -- started on dialysis on January 25th  -- creatinine trending up off of dialysis but his urine output  -- is improving and is nonoliguric  Plan for dialysis today we can try a trial of Lasix tomorrow    Chronic kidney disease stage IV  -- outpatient nephrologist Dr Candy Jenikns, [address] [facility]  -- baseline creatinine 2-2 5 mg/dL  -- presumed to be in the setting of hypertension and diabetes  Acute on chronic congestive heart failure  -- gentle ultrafiltration on dialysis  --trial of IV Lasix tomorrow    Hyponatremia  -- elevated serum glucose of greater than 300, ARF, high BUN    SUBJECTIVE / INTERVAL HISTORY:    No chest pain or SOB    OBJECTIVE:  Current Weight:    Vitals:    02/01/21 0316 02/01/21 0542 02/01/21 0700 02/01/21 1248   BP: 120/58 136/80 121/59 113/57   BP Location:   Left arm    Pulse: 55 80 69    Resp: 15 18 20    Temp: 98 °F (36 7 °C) 98 9 °F (37 2 °C) 98 2 °F (36 8 °C)    TempSrc:  Oral Oral    SpO2:  99% 100%        Intake/Output Summary (Last 24 hours) at 2/1/2021 1308  Last data filed at 2/1/2021 9379  Gross per 24 hour   Intake 830 ml   Output 1100 ml   Net -270 ml       Review of Systems:    12 point ROS has been reviewed  Physical Exam  Vitals signs and nursing note reviewed  Constitutional:       General: He is not in acute distress  Appearance: He is well-developed  HENT:      Head: Normocephalic and atraumatic  Eyes:      General: No scleral icterus  Conjunctiva/sclera: Conjunctivae normal       Pupils: Pupils are equal, round, and reactive to light  Neck:      Musculoskeletal: Normal range of motion and neck supple  Cardiovascular:      Rate and Rhythm: Normal rate and regular rhythm  Heart sounds: S1 normal and S2 normal  No murmur  No friction rub  No gallop  Pulmonary:      Effort: Pulmonary effort is normal  No respiratory distress  Breath sounds: Normal breath sounds  No wheezing or rales  Abdominal:      General: Bowel sounds are normal       Palpations: Abdomen is soft  Tenderness: There is no abdominal tenderness  There is no rebound  Musculoskeletal: Normal range of motion  Right lower leg: Edema present  Left lower leg: Edema present  Skin:     Findings: No rash  Neurological:      Mental Status: He is alert and oriented to person, place, and time     Psychiatric:         Behavior: Behavior normal          Medications:    Current Facility-Administered Medications:     acetaminophen (TYLENOL) tablet 650 mg, 650 mg, Oral, Q6H PRN, Mayra Salas MD, 650 mg at 01/26/21 0636    aluminum-magnesium hydroxide-simethicone (MYLANTA) oral suspension 30 mL, 30 mL, Oral, Q6H PRN, TABITHA Pabon    amLODIPine (NORVASC) tablet 5 mg, 5 mg, Oral, BID, [facility], 5 mg at 01/31/21 7978    aspirin (ECOTRIN LOW STRENGTH) EC tablet 81 mg, 81 mg, Oral, Daily, Swathi Schumacher MD, 81 mg at 02/01/21 0858    atorvastatin (LIPITOR) tablet 40 mg, 40 mg, Oral, Daily With Daren Zeng MD, 40 mg at 01/31/21 1657    benzonatate (TESSALON PERLES) capsule 100 mg, 100 mg, Oral, TID PRN, TABITHA Tierney    calcium carbonate (TUMS) chewable tablet 1,000 mg, 1,000 mg, Oral, Daily PRN, Mayra Salas MD    cefepime (MAXIPIME) 2,000 mg in dextrose 5 % 50 mL IVPB, 2,000 mg, Intravenous, Q24H, Faye Correa MD, Last Rate: 100 mL/hr at 02/01/21 1138, 2,000 mg at 02/01/21 1138    clopidogrel (PLAVIX) tablet 75 mg, 75 mg, Oral, Daily, Mayra Salas MD, 75 mg at 02/01/21 0907    escitalopram (LEXAPRO) tablet 10 mg, 10 mg, Oral, HS, Mayra Salas MD, 10 mg at 01/31/21 2115    guaiFENesin (MUCINEX) 12 hr tablet 1,200 mg, 1,200 mg, Oral, Q12H [facility], TABITHA Viveros, 1,200 mg at 02/01/21 0858    heparin (porcine) subcutaneous injection 5,000 Units, 5,000 Units, Subcutaneous, Q8H [facility], Faye Correa MD, 5,000 Units at 02/01/21 1303    hydrALAZINE (APRESOLINE) tablet 50 mg, 50 mg, Oral, Q8H [facility], [facility], PA-C, 50 mg at 02/01/21 0545    HYDROmorphone (DILAUDID) injection 0 2 mg, 0 2 mg, Intravenous, Q3H PRN, Faye Correa MD    insulin detemir (LEVEMIR) subcutaneous injection 30 Units, 30 Units, Subcutaneous, Q12H [facility], Brian Henderson MD, 30 Units at 02/01/21 0859    insulin lispro (HumaLOG) 100 units/mL subcutaneous injection 1-6 Units, 1-6 Units, Subcutaneous, TID AC, 6 Units at 02/01/21 1138 **AND** Fingerstick Glucose (POCT), , , TID AC, Swathi Schumacher MD    insulin lispro (HumaLOG) 100 units/mL subcutaneous injection 1-6 Units, 1-6 Units, Subcutaneous, HS, Swathi Schumacher MD, 4 Units at 01/31/21 2119    insulin lispro (HumaLOG) 100 units/mL subcutaneous injection 5 Units, 5 Units, Subcutaneous, TID With Meals, Brian Henderson MD, 5 Units at 02/01/21 1138    iron polysaccharides (FERREX) capsule 150 mg, 150 mg, Oral, Daily, [facility], PA-C, 150 mg at 02/01/21 0859    melatonin tablet 3 mg, 3 mg, Oral, HS, TABITHA Cr, 3 mg at 01/31/21 2110    metoprolol tartrate (LOPRESSOR) tablet 25 mg, 25 mg, Oral, Q12H [facility], Swathi Schumacher MD, 25 mg at 02/01/21 0859    nitroglycerin (NITROSTAT) SL tablet 0 4 mg, 0 4 mg, Sublingual, Q5 Min PRN, Arnol Robertson MD    nystatin (MYCOSTATIN) oral suspension 500,000 Units, 500,000 Units, Swish & Swallow, 4x Daily, Faye Correa MD, 500,000 Units at 02/01/21 1157    ondansetron (ZOFRAN) injection 4 mg, 4 mg, Intravenous, Q6H PRN, Arnol Robertson MD, 4 mg at 01/29/21 1352    oxyCODONE (ROXICODONE) IR tablet 2 5 mg, 2 5 mg, Oral, Q4H PRN, Faye Correa MD    pantoprazole (PROTONIX) injection 40 mg, 40 mg, Intravenous, Q12H [address], Stefania Barnard MD, 40 mg at 02/01/21 0901    phenol (CHLORASEPTIC) 1 4 % mucosal liquid 1 spray, 1 spray, Mouth/Throat, Q4H PRN, Faye Correa MD    pneumococcal 13-valent conjugate vaccine (PREVNAR-13) IM injection 0 5 mL, 0 5 mL, Intramuscular, Prior to discharge, Marielena Altman MD    polyethylene glycol (MIRALAX) packet 17 g, 17 g, Oral, Daily, Stefania Barnard MD, 17 g at 02/01/21 0919    senna-docusate sodium (SENOKOT S) 8 6-50 mg per tablet 1 tablet, 1 tablet, Oral, HS, Stefania Barnard MD, 1 tablet at 02/01/21 0907    sodium chloride (OCEAN) 0 65 % nasal spray 1 spray, 1 spray, Each Nare, Q1H PRN, Abdullahi Herman MD, 1 spray at 01/31/21 1659    Laboratory Results:  Results from last 7 days   Lab Units 02/01/21  1150 02/01/21  0333 01/31/21  1910 01/31/21  0906 01/31/21  0609 01/30/21  0624 01/29/21  1756 01/29/21  0915 01/28/21  0343 01/27/21  0510 01/26/21  0639   WBC Thousand/uL  --  14 66*  --  13 67*  --   --   --   --   --  11 96* 13 57*   HEMOGLOBIN g/dL 7 4* 7 8* 6 7* 7 6*  --   --  8 6*  --   --  8 2* 8 6*   HEMATOCRIT % 22 1* 23 5* 20 2* 23 1*  --   --  25 9*  --   --  24 9* 25 9*   PLATELETS Thousands/uL  --  135*  --  151  --   --   --   --   --  150 166   POTASSIUM mmol/L  --  3 9  --   --  3 9 3 9  --  3 5 4 0 3 9 4 2   CHLORIDE mmol/L  --  93*  --   --  95* 96*  --  95* 94* 99* 97*   CO2 mmol/L  --  22  --   --  24 25  --  24 21 21 19*   BUN mg/dL  --  113*  --   --  92* 60*  --  93* 136* 111* 136*   CREATININE mg/dL  --  4 41*  --   --  3 88* 2 88*  --  3 13* 4 13* 3 54* 4 46*   CALCIUM mg/dL  --  8 1*  --   --  8 3 8 2*  --  8 1* 8 4 8 3 8 2*   PHOSPHORUS mg/dL  --   --   --   --   --   --   --   --   --  5 1*  --

## 2021-02-01 NOTE — UTILIZATION REVIEW
Continued Stay Review    Date: 2/1/2021                         Current Patient Class: inpatient  Current Level of Care: med surg     HPI:80 y o  male initially admitted on 1/22/2021 direct admit from  [address] for cardiac cath   Pt was noted to have a NSTEMI after presenting w/ CP and elevated trop   Found to have 3 vessel disease  Also noted to have [address] at [address]   Started on bicarb gtt as recommended by nephrology   Admitted IP status for  NSTEMI three-vessel disease with 99% occluded proximal circumflex, 50% distal left main, mid % occluded, RCA  proximally 100% occluded with collaterals from left circulation  Stent    Assessment/Plan: 2/1 attending MD denying SOB  Did require transfusion yesterday and felt better afterwards  Oozing around cath site appears to have stopped  Awaiting decision from nephro re: ongoing HD needs  Previously on HD in 2019; IR placed HD Cath 1/25/202 Nephrology : Most likely ATN post contrast for Cath No longer on IV diuresis May require dialysis again 2/1/2021, awaiting nephrology recommendations   2/1/2021 Nephrology Acute renal failure/acute tubular necrosis creatinine trending up off of dialysis but his urine output- is improving and is nonoliguric    Plan for dialysis today we can try a trial of Lasix tomorrow       Pertinent Labs/Diagnostic Results:       Results from last 7 days   Lab Units 02/01/21  1150 02/01/21  0333 01/31/21  1910 01/31/21  0906 01/29/21  1756 01/27/21  0510 01/26/21  0639   WBC Thousand/uL  --  14 66*  --  13 67*  --  11 96* 13 57*   HEMOGLOBIN g/dL 7 4* 7 8* 6 7* 7 6* 8 6* 8 2* 8 6*   HEMATOCRIT % 22 1* 23 5* 20 2* 23 1* 25 9* 24 9* 25 9*   PLATELETS Thousands/uL  --  135*  --  151  --  150 166   NEUTROS ABS Thousands/µL  --  10 56*  --   --   --   --  11 60*   BANDS PCT %  --   --   --  3  --   --   --          Results from last 7 days   Lab Units 02/01/21  0333 01/31/21  1961 01/30/21  7827 01/29/21  0915 01/28/21  0343 01/27/21  0510   SODIUM mmol/L 129* 131* 133* 131* 133* 135*   POTASSIUM mmol/L 3 9 3 9 3 9 3 5 4 0 3 9   CHLORIDE mmol/L 93* 95* 96* 95* 94* 99*   CO2 mmol/L 22 24 25 24 21 21   ANION GAP mmol/L 14* 12 12 12 18* 15*   BUN mg/dL 113* 92* 60* 93* 136* 111*   CREATININE mg/dL 4 41* 3 88* 2 88* 3 13* 4 13* 3 54*   EGFR ml/min/1 73sq m 12 14 20 18 13 15   CALCIUM mg/dL 8 1* 8 3 8 2* 8 1* 8 4 8 3   PHOSPHORUS mg/dL  --   --   --   --   --  5 1*         Results from last 7 days   Lab Units 02/01/21  1617 02/01/21  1137 02/01/21  0814 01/31/21  2039 01/31/21  1536 01/31/21  1035 01/31/21  0721 01/30/21  2100 01/30/21  1538 01/30/21  1052 01/30/21  0716 01/29/21  2130   POC GLUCOSE mg/dl 181* 409* 275* 290* 266* 469* 282* 318* 157* 301* 291* 337*     Results from last 7 days   Lab Units 02/01/21  0333 01/31/21  0609 01/30/21  0624 01/29/21  0915 01/28/21  0343 01/27/21  0510 01/26/21  0639   GLUCOSE RANDOM mg/dL 302* 250* 270* 292* 201* 163* 181*     Results from last 7 days   Lab Units 01/31/21  2100 01/31/21  0906   PROTIME seconds 15 1* 15 6*   INR  1 18 1 23*   PTT seconds 149*  --          Results from last 7 days   Lab Units 01/27/21  0510 01/26/21  0639   PROCALCITONIN ng/ml 0 90* 1 05*     Results from last 7 days   Lab Units 01/27/21  0510   FERRITIN ng/mL 913*       Results from last 7 days   Lab Units 01/31/21  0906   TOTAL COUNTED  100           Vital Signs:   Date/Time  Temp  Pulse  Resp  BP  MAP (mmHg)  SpO2  Calculated FIO2 (%) - Nasal Cannula  Nasal Cannula O2 Flow Rate (L/min)  O2 Device  Patient Position - Orthostatic VS   02/01/21 1600  98 2 °F (36 8 °C)  57  20  100/44Abnormal   --  --  --  --  --  Lying   02/01/21 1530  --  65  20  84/41Abnormal   --  --  --  --  --  --   02/01/21 1500  --  54Abnormal   20  112/46Abnormal   --  --  --  --  --  --   02/01/21 1430  --  64  20  107/39Abnormal   --  --  --  --  --  --   02/01/21 1400  98 2 °F (36 8 °C)  57  20  107/47Abnormal   --  --  --  --  --  Lying 02/01/21 1248  --  --  --  113/57  --  --  --  --  --  --   02/01/21 0700  98 2 °F (36 8 °C)  69  20  121/59  85  100 %  28  2 L/min  Nasal cannula  Lying   02/01/21 0542  98 9 °F (37 2 °C)  80  18  136/80  --  99 %  28  2 L/min  Nasal cannula  --   02/01/21 0316  98 °F (36 7 °C)  55  15  120/58  --  --  --  --  --           Medications:   Scheduled Medications:  amLODIPine, 5 mg, Oral, BID  aspirin, 81 mg, Oral, Daily  atorvastatin, 40 mg, Oral, Daily With Dinner  cefepime, 2,000 mg, Intravenous, Q24H  clopidogrel, 75 mg, Oral, Daily  escitalopram, 10 mg, Oral, HS  guaiFENesin, 1,200 mg, Oral, Q12H FRANKLIN  heparin (porcine), 5,000 Units, Subcutaneous, Q8H FRANKLIN  hydrALAZINE, 50 mg, Oral, Q8H FRANKLIN  insulin detemir, 30 Units, Subcutaneous, Q12H FRANKLIN  insulin lispro, 1-6 Units, Subcutaneous, TID AC  insulin lispro, 1-6 Units, Subcutaneous, HS  insulin lispro, 5 Units, Subcutaneous, TID With Meals  iron polysaccharides, 150 mg, Oral, Daily  melatonin, 3 mg, Oral, HS  metoprolol tartrate, 25 mg, Oral, Q12H FRANKLIN  nystatin, 500,000 Units, Swish & Swallow, 4x Daily  pantoprazole, 40 mg, Intravenous, Q12H FRANKLIN  polyethylene glycol, 17 g, Oral, Daily  senna-docusate sodium, 1 tablet, Oral, HS    Continuous IV Infusions:     PRN Meds:  acetaminophen, 650 mg, Oral, Q6H PRN  aluminum-magnesium hydroxide-simethicone, 30 mL, Oral, Q6H PRN  benzonatate, 100 mg, Oral, TID PRN  calcium carbonate, 1,000 mg, Oral, Daily PRN  HYDROmorphone, 0 2 mg, Intravenous, Q3H PRN  nitroglycerin, 0 4 mg, Sublingual, Q5 Min PRN  ondansetron, 4 mg, Intravenous, Q6H PRN  oxyCODONE, 2 5 mg, Oral, Q4H PRN  phenol, 1 spray, Mouth/Throat, Q4H PRN  pneumococcal 13-valent conjugate vaccine, 0 5 mL, Intramuscular, Prior to discharge  sodium chloride, 1 spray, Each Nare, Q1H PRN    Discharge Plan: tbd  Network Utilization Review Department  ATTENTION: Please call with any questions or concerns to [phone] and carefully listen to the prompts so that you are directed to the right person  All voicemails are confidential   Johnna Blair all requests for admission clinical reviews, approved or denied determinations and any other requests to dedicated fax number below belonging to the campus where the patient is receiving treatment   List of dedicated fax numbers for the Facilities:  [address] DENIALS (Administrative/Medical Necessity) [phone]   [address] (Maternity/NICU/Pediatrics) [phone] [address] [place] [address] (Cosimo Flax) [address] [address] Charito Parsons 1481 [address] [place], [place] [address] [phone]

## 2021-02-01 NOTE — PROGRESS NOTES
Progress Note - Marky Ruiz 1940, [de-identified] y o  male MRN: [id number]    Unit/Bed#: S -01 Encounter: [id number]    Primary Care Provider: Laure Ahumada MD   Date and time admitted to hospital: 1/22/2021 12:28 PM        * Elevated troponin  Assessment & Plan  Type 1 MI  Presented with chest pain at [facility]   Hx of CAD s/p stenting and CABG 2016  Received ASA 324mg at home and SL nitro x1 in the ER  Troponin peaked at 5 5  Continue cardiovascular medications with aspirin, Plavix, statin, beta-blocker and Imdur  Cardiac Cath Completed at [facility]   - three-vessel disease with 99% occluded proximal circumflex, 50% distal left main, mid % occluded, RCA proximally 100% occluded with collaterals from left circulation  Patient has his patent grafts of LIMA to LAD and SVG to OM2  Medical intervention recommend at this time  Echo complete - reveals 60% EF      Constipation  Assessment & Plan  Patient reports >1 week since last bowel movement  - Schedule miralax  - Schedule senna/docusate  - Encourage PO hydration    Urinary retention  Assessment & Plan  Overnight 1/27/2021 nursing reported no urine output  Bladder scan revealed >800cc urine  Patient was straight catheterized  - Continue urinary retention protocol    Oral candida  Assessment & Plan  · Nystatin mouthwash  Hyponatremia  Assessment & Plan  · Likely due to hypervolemia/CHF  129  2/1/2021  Asymptomatic  · Nephrology on board  · Monitor  Anemia  Assessment & Plan  · Monitor closely  · Pt now has oozing around HD cath site  · Have consented for blood if needed  · Required 1 unit PRBC overnight 1/30/2021 after hemoglobin found to be 6 7  · Currently 7 8  · Continue to monitor hemoglobin    Acute on chronic diastolic congestive heart failure [facility])  Assessment & Plan  · Cardiologist and nephrologist on board    · D/c Bumex drip as per nephrology  · Continue cardiovascular and heart failure medications  · Monitor input and output  · Daily weights  · Currently on 2L o2 supplementation  Improving    Hx of CABG  Assessment & Plan  CAD s/p CABG in 2016; history of stenting prior to CABG (LIMA to LAD and SVG to OM)  Status post cardiac catheterization: significant triple vessel coronary artery disease  Patient has his patent grafts of LIMA to LAD and SVG to OM2  Continue cardiovascular meds  Leukocytosis  Assessment & Plan  · Leukocytosis up to 14 2/1/2021  · COVID-19, influenza, RSV tests were negative x2  · Chest x-ray officially read as multifocal pneumonia  · Thus will continue to treat as hospital-acquired pneumonia with IV cefepime 9 of 10  · Blood cultures x2 no growth  · Cough medicine/antitussive, lozenges and Chloraseptic spray p r n       Diabetes mellitus [facility])  Assessment & Plan  Lab Results   Component Value Date    HGBA1C 6 7 (H) 07/03/2019       Recent Labs     01/31/21  1035 01/31/21  1536 01/31/21  2039 02/01/21  0814   POCGLU 469* 266* 290* 275*       Blood Sugar Average: Last 72 hrs:  (P) 284 [id number]   Still poorly controlled  Continue Humalog sliding scale with Accu-Cheks q a c  And HS  Will adjust levemir  We will add Humalog 3 times a day with meals  Adjust treatment accordingly  Acute renal failure superimposed on stage 3 chronic kidney disease [facility])  Assessment & Plan  Lab Results   Component Value Date    EGFR 12 02/01/2021    EGFR 14 01/31/2021    EGFR 20 01/30/2021    CREATININE 4 41 (H) 02/01/2021    CREATININE 3 88 (H) 01/31/2021    CREATININE 2 88 (H) 01/30/2021     Creatinine 4 41, up from 3  88Baseline creatinine around 2 3-2 5  Previously on HD in 2019;   IR placed HD Cath 1/25/202   Nephrology on board   Most likely ATN post contrast for Cath  No longer on IV diuresis  May require dialysis again 2/1/2021, awaiting nephrology recommendations          VTE Pharmacologic Prophylaxis:   Pharmacologic: Heparin  Mechanical VTE Prophylaxis in Place: Yes    Discussions with Specialists or Other Care Team Provider: Hospitalist    Education and Discussions with Family / Patient: Patient    Current Length of Stay: 10 day(s)    Current Patient Status: Inpatient     Discharge Plan / Estimated Discharge Date: TBD    Code Status: Level 1 - Full Code      Subjective:   Patient seen at bedside resting comfortably  States he has no new complaints today and is feeling well overall  Denies any dark or blood stools  He does have some oozing around HD Cath site  Objective:     Vitals:   Temp (24hrs), Av 1 °F (36 7 °C), Min:97 7 °F (36 5 °C), Max:98 9 °F (37 2 °C)    Temp:  [97 7 °F (36 5 °C)-98 9 °F (37 2 °C)] 98 2 °F (36 8 °C)  HR:  [38-80] 69  Resp:  [14-24] 20  BP: (109-136)/(53-80) 121/59  SpO2:  [99 %-100 %] 100 %  There is no height or weight on file to calculate BMI  Input and Output Summary (last 24 hours): Intake/Output Summary (Last 24 hours) at 2021 0944  Last data filed at 2021 7781  Gross per 24 hour   Intake 830 ml   Output 1500 ml   Net -670 ml       Physical Exam:     Physical Exam  Vitals signs and nursing note reviewed  Constitutional:       General: He is not in acute distress  Appearance: He is well-developed  He is not ill-appearing or toxic-appearing  HENT:      Head: Normocephalic and atraumatic  Eyes:      General: No scleral icterus  Right eye: No discharge  Left eye: No discharge  Conjunctiva/sclera: Conjunctivae normal    Cardiovascular:      Rate and Rhythm: Normal rate and regular rhythm  Heart sounds: Normal heart sounds  No murmur  Pulmonary:      Effort: No respiratory distress  Breath sounds: Wheezing and rhonchi present  Abdominal:      General: Bowel sounds are normal  There is no distension  Palpations: Abdomen is soft  Tenderness: There is no abdominal tenderness  Musculoskeletal: Normal range of motion  General: No tenderness     Skin:     General: Skin is warm  Findings: No erythema or rash  Comments: HD cath in place  Blood oozing around site   Neurological:      Mental Status: He is alert  Motor: No weakness  Psychiatric:         Behavior: Behavior normal            Additional Data:     Labs:    Results from last 7 days   Lab Units 02/01/21  0333   WBC Thousand/uL 14 66*   HEMOGLOBIN g/dL 7 8*   HEMATOCRIT % 23 5*   PLATELETS Thousands/uL 135*   NEUTROS PCT % 71   LYMPHS PCT % 13*   MONOS PCT % 10   EOS PCT % 3     Results from last 7 days   Lab Units 02/01/21  0333   POTASSIUM mmol/L 3 9   CHLORIDE mmol/L 93*   CO2 mmol/L 22   BUN mg/dL 113*   CREATININE mg/dL 4 41*   CALCIUM mg/dL 8 1*     Results from last 7 days   Lab Units 01/31/21  2100   INR  1 18       * I Have Reviewed All Lab Data Listed Above  * Additional Pertinent Lab Tests Reviewed:  All Labs Within Last 24 Hours Reviewed    Imaging:    Imaging Reports Reviewed Today Include: N/A  Imaging Personally Reviewed by Myself Includes:  N/A    Recent Cultures (last 7 days):           Last 24 Hours Medication List:   Current Facility-Administered Medications   Medication Dose Route Frequency Provider Last Rate    acetaminophen  650 mg Oral Q6H PRN Swathi Schumacher MD      aluminum-magnesium hydroxide-simethicone  30 mL Oral Q6H PRN TABITHA Nice      amLODIPine  5 mg Oral BID RENETTA Chakraborty      aspirin  81 mg Oral Daily Ricardo Horn MD      atorvastatin  40 mg Oral Daily With Derek Blair MD      benzonatate  100 mg Oral TID PRN TABITHA Nolan      calcium carbonate  1,000 mg Oral Daily PRN Ricardo Horn MD      cefepime  2,000 mg Intravenous Q24H Faye Correa MD 2,000 mg (01/31/21 1217)    clopidogrel  75 mg Oral Daily Ricardo Horn MD      escitalopram  10 mg Oral HS Ricardo Hron MD      guaiFENesin  1,200 mg Oral Q12H [facility] TABITHA Nolan      heparin (porcine)  5,000 Units Subcutaneous Q8H [facility] Faye CONNOR MD Madeline      hydrALAZINE  50 mg Oral [place]      HYDROmorphone  0 2 mg Intravenous Q3H PRN Faye Correa MD      insulin detemir  30 Units Subcutaneous Q12H [address] Gillian Morin MD      insulin lispro  1-6 Units Subcutaneous TID AC Swathi Schumacher MD      insulin lispro  1-6 Units Subcutaneous HS Zahra Costa MD      insulin lispro  5 Units Subcutaneous TID With Meals Gillian Morin MD      iron polysaccharides  150 mg Oral Daily RENETTA Chakraborty      melatonin  3 mg Oral HS TABITHA Cr      metoprolol tartrate  25 mg Oral Q12H [address] Zahra Costa MD      nitroglycerin  0 4 mg Sublingual Q5 Min PRN Zahra Costa MD      nystatin  500,000 Units Swish & Swallow 4x Daily Faye Correa MD      ondansetron  4 mg Intravenous Q6H PRN Zahra Costa MD      oxyCODONE  2 5 mg Oral Q4H PRN Faye Correa MD      pantoprazole  40 mg Intravenous Q12H Nina Almonte MD      phenol  1 spray Mouth/Throat Q4H PRN Faye oCrrea MD      pneumococcal 13-valent conjugate vaccine  0 5 mL Intramuscular Prior to discharge Zahra Costa MD      polyethylene glycol  17 g Oral Daily Lola Coto MD      senna-docusate sodium  1 tablet Oral HS Lola Coto MD      sodium chloride  1 spray Each Nare Q1H PRN Gillian Morin MD          Today, Patient Was Seen By: Lola Coto MD    ** Please Note: This note has been constructed using a voice recognition system   **

## 2021-02-01 NOTE — ASSESSMENT & PLAN NOTE
Lab Results   Component Value Date    EGFR 12 02/01/2021    EGFR 14 01/31/2021    EGFR 20 01/30/2021    CREATININE 4 41 (H) 02/01/2021    CREATININE 3 88 (H) 01/31/2021    CREATININE 2 88 (H) 01/30/2021     Creatinine 4 41, up from 3  88Baseline creatinine around 2 3-2 5  Previously on HD in 2019;   IR placed HD Cath 1/25/202   Nephrology on board   Most likely ATN post contrast for Cath  No longer on IV diuresis  May require dialysis again 2/1/2021, awaiting nephrology recommendations

## 2021-02-02 ENCOUNTER — APPOINTMENT (INPATIENT)
Dept: CT IMAGING | Facility: HOSPITAL | Age: 81
DRG: 280 | End: 2021-02-02
Payer: COMMERCIAL

## 2021-02-02 ENCOUNTER — APPOINTMENT (INPATIENT)
Dept: RADIOLOGY | Facility: HOSPITAL | Age: 81
DRG: 280 | End: 2021-02-02
Payer: COMMERCIAL

## 2021-02-02 LAB
ABO GROUP BLD BPU: NORMAL
ANION GAP SERPL CALCULATED.3IONS-SCNC: 12 MMOL/L (ref 4–13)
BPU ID: NORMAL
BUN SERPL-MCNC: 94 MG/DL (ref 5–25)
CALCIUM SERPL-MCNC: 7.9 MG/DL (ref 8.3–10.1)
CHLORIDE SERPL-SCNC: 93 MMOL/L (ref 100–108)
CO2 SERPL-SCNC: 23 MMOL/L (ref 21–32)
CREAT SERPL-MCNC: 4.09 MG/DL (ref 0.6–1.3)
CROSSMATCH: NORMAL
GFR SERPL CREATININE-BSD FRML MDRD: 13 ML/MIN/1.73SQ M
GLUCOSE SERPL-MCNC: 315 MG/DL (ref 65–140)
GLUCOSE SERPL-MCNC: 352 MG/DL (ref 65–140)
GLUCOSE SERPL-MCNC: 358 MG/DL (ref 65–140)
GLUCOSE SERPL-MCNC: 379 MG/DL (ref 65–140)
GLUCOSE SERPL-MCNC: 422 MG/DL (ref 65–140)
HCT VFR BLD AUTO: 16.7 % (ref 36.5–49.3)
HCT VFR BLD AUTO: 23.3 % (ref 36.5–49.3)
HCT VFR BLD AUTO: 25.6 % (ref 36.5–49.3)
HGB BLD-MCNC: 5.5 G/DL (ref 12–17)
HGB BLD-MCNC: 7.9 G/DL (ref 12–17)
HGB BLD-MCNC: 8.7 G/DL (ref 12–17)
POTASSIUM SERPL-SCNC: 4.5 MMOL/L (ref 3.5–5.3)
SODIUM SERPL-SCNC: 128 MMOL/L (ref 136–145)
UNIT DISPENSE STATUS: NORMAL
UNIT PRODUCT CODE: NORMAL
UNIT RH: NORMAL

## 2021-02-02 PROCEDURE — 99232 SBSQ HOSP IP/OBS MODERATE 35: CPT | Performed by: INTERNAL MEDICINE

## 2021-02-02 PROCEDURE — 85014 HEMATOCRIT: CPT | Performed by: HOSPITALIST

## 2021-02-02 PROCEDURE — 74022 RADEX COMPL AQT ABD SERIES: CPT

## 2021-02-02 PROCEDURE — 97530 THERAPEUTIC ACTIVITIES: CPT

## 2021-02-02 PROCEDURE — 80048 BASIC METABOLIC PNL TOTAL CA: CPT | Performed by: INTERNAL MEDICINE

## 2021-02-02 PROCEDURE — G1004 CDSM NDSC: HCPCS

## 2021-02-02 PROCEDURE — 74176 CT ABD & PELVIS W/O CONTRAST: CPT

## 2021-02-02 PROCEDURE — 82948 REAGENT STRIP/BLOOD GLUCOSE: CPT

## 2021-02-02 PROCEDURE — C9113 INJ PANTOPRAZOLE SODIUM, VIA: HCPCS | Performed by: FAMILY MEDICINE

## 2021-02-02 PROCEDURE — 85018 HEMOGLOBIN: CPT | Performed by: HOSPITALIST

## 2021-02-02 PROCEDURE — 85018 HEMOGLOBIN: CPT | Performed by: FAMILY MEDICINE

## 2021-02-02 PROCEDURE — 85014 HEMATOCRIT: CPT | Performed by: FAMILY MEDICINE

## 2021-02-02 PROCEDURE — 99233 SBSQ HOSP IP/OBS HIGH 50: CPT | Performed by: FAMILY MEDICINE

## 2021-02-02 RX ORDER — POLYETHYLENE GLYCOL 3350 17 G/17G
17 POWDER, FOR SOLUTION ORAL DAILY PRN
Status: DISCONTINUED | OUTPATIENT
Start: 2021-02-02 | End: 2021-02-12 | Stop reason: HOSPADM

## 2021-02-02 RX ORDER — BISACODYL 10 MG
10 SUPPOSITORY, RECTAL RECTAL DAILY PRN
Status: DISCONTINUED | OUTPATIENT
Start: 2021-02-02 | End: 2021-02-12 | Stop reason: HOSPADM

## 2021-02-02 RX ADMIN — CLOPIDOGREL BISULFATE 75 MG: 75 TABLET ORAL at 08:14

## 2021-02-02 RX ADMIN — GUAIFENESIN 1200 MG: 600 TABLET, EXTENDED RELEASE ORAL at 08:14

## 2021-02-02 RX ADMIN — INSULIN LISPRO 6 UNITS: 100 INJECTION, SOLUTION INTRAVENOUS; SUBCUTANEOUS at 21:58

## 2021-02-02 RX ADMIN — CEFEPIME HYDROCHLORIDE 2000 MG: 2 INJECTION, POWDER, FOR SOLUTION INTRAVENOUS at 11:28

## 2021-02-02 RX ADMIN — METOPROLOL TARTRATE 25 MG: 25 TABLET, FILM COATED ORAL at 08:14

## 2021-02-02 RX ADMIN — POLYETHYLENE GLYCOL 3350 17 G: 17 POWDER, FOR SOLUTION ORAL at 08:13

## 2021-02-02 RX ADMIN — HYDROMORPHONE HYDROCHLORIDE 0.2 MG: 1 INJECTION, SOLUTION INTRAMUSCULAR; INTRAVENOUS; SUBCUTANEOUS at 08:15

## 2021-02-02 RX ADMIN — INSULIN LISPRO 6 UNITS: 100 INJECTION, SOLUTION INTRAVENOUS; SUBCUTANEOUS at 11:28

## 2021-02-02 RX ADMIN — ASPIRIN 81 MG: 81 TABLET ORAL at 08:14

## 2021-02-02 RX ADMIN — NYSTATIN 500000 UNITS: 100000 SUSPENSION ORAL at 08:14

## 2021-02-02 RX ADMIN — GUAIFENESIN 1200 MG: 600 TABLET, EXTENDED RELEASE ORAL at 21:53

## 2021-02-02 RX ADMIN — NYSTATIN 500000 UNITS: 100000 SUSPENSION ORAL at 21:53

## 2021-02-02 RX ADMIN — INSULIN DETEMIR 30 UNITS: 100 INJECTION, SOLUTION SUBCUTANEOUS at 21:53

## 2021-02-02 RX ADMIN — PANTOPRAZOLE SODIUM 40 MG: 40 INJECTION, POWDER, FOR SOLUTION INTRAVENOUS at 21:53

## 2021-02-02 RX ADMIN — INSULIN LISPRO 6 UNITS: 100 INJECTION, SOLUTION INTRAVENOUS; SUBCUTANEOUS at 17:52

## 2021-02-02 RX ADMIN — ESCITALOPRAM 10 MG: 10 TABLET, FILM COATED ORAL at 21:53

## 2021-02-02 RX ADMIN — POLYSACCHARIDE-IRON COMPLEX 150 MG: 150 CAPSULE ORAL at 08:14

## 2021-02-02 RX ADMIN — HYDRALAZINE HYDROCHLORIDE 50 MG: 25 TABLET, FILM COATED ORAL at 21:53

## 2021-02-02 RX ADMIN — INSULIN DETEMIR 30 UNITS: 100 INJECTION, SOLUTION SUBCUTANEOUS at 08:14

## 2021-02-02 RX ADMIN — PANTOPRAZOLE SODIUM 40 MG: 40 INJECTION, POWDER, FOR SOLUTION INTRAVENOUS at 08:14

## 2021-02-02 RX ADMIN — NYSTATIN 500000 UNITS: 100000 SUSPENSION ORAL at 17:47

## 2021-02-02 RX ADMIN — DOCUSATE SODIUM AND SENNOSIDES 1 TABLET: 8.6; 5 TABLET ORAL at 21:53

## 2021-02-02 RX ADMIN — Medication 3 MG: at 21:53

## 2021-02-02 RX ADMIN — BISACODYL 10 MG: 10 SUPPOSITORY RECTAL at 00:53

## 2021-02-02 RX ADMIN — INSULIN LISPRO 5 UNITS: 100 INJECTION, SOLUTION INTRAVENOUS; SUBCUTANEOUS at 08:12

## 2021-02-02 RX ADMIN — HYDROMORPHONE HYDROCHLORIDE 0.2 MG: 1 INJECTION, SOLUTION INTRAMUSCULAR; INTRAVENOUS; SUBCUTANEOUS at 04:41

## 2021-02-02 RX ADMIN — METOPROLOL TARTRATE 25 MG: 25 TABLET, FILM COATED ORAL at 21:53

## 2021-02-02 RX ADMIN — ATORVASTATIN CALCIUM 40 MG: 40 TABLET, FILM COATED ORAL at 17:47

## 2021-02-02 NOTE — PROGRESS NOTES
NEPHROLOGY PROGRESS NOTE    Gaetano Bermudez [de-identified] y o  male MRN: [id number]  Unit/Bed#: S -01 Encounter: [id number]  Reason for Consult:  Acute on chronic kidney disease    The patient is awake and alert is base complaint is that he is constipated in the medication does not seem to help  Other than that some abdominal discomfort which is mild  He has no nausea or vomiting  Tolerated dialysis yesterday with no complications  Urine output seems to be increasing over yesterday  ASSESSMENT/PLAN:  1  Renal    The patient has acute on chronic kidney disease due to ATN  Baseline creatinine is around 2 5  ATN occurred after cardiac catheterization and unfortunately he did require hemodialysis  His last treatment was yesterday  I am going to check labs today and then check labs tomorrow to assess the trend of the creatinine as his urine output seems to be picking up as the bag was full when I went into the room  BMP today and tomorrow  Monitor for signs of renal recovery  Hemodialysis tomorrow if creatinine increased significantly    2  Cardiac    History of ischemic heart disease status post cardiac catheterization with no specific intervention performed  SUBJECTIVE:  Review of Systems   Constitution: Positive for malaise/fatigue  Negative for chills, fever and night sweats  HENT: Negative  Eyes: Negative  Cardiovascular: Negative for chest pain, leg swelling, orthopnea and palpitations  Respiratory: Negative  Negative for cough, shortness of breath, sputum production and wheezing  Wearing oxygen   Gastrointestinal: Positive for constipation  Negative for abdominal pain, diarrhea, nausea and vomiting  Genitourinary:        Catheter in place no hematuria  Neurological: Positive for weakness  Negative for dizziness, focal weakness and headaches  Psychiatric/Behavioral: Negative for altered mental status, depression, hallucinations and hypervigilance         OBJECTIVE:  Current Weight:    Vitals:Temp (24hrs), Av 3 °F (36 8 °C), Min:97 9 °F (36 6 °C), Max:98 5 °F (36 9 °C)  Current: Temperature: 98 3 °F (36 8 °C)   Blood pressure 129/59, pulse 65, temperature 98 3 °F (36 8 °C), resp  rate 16, SpO2 100 %  , There is no height or weight on file to calculate BMI  Intake/Output Summary (Last 24 hours) at 2021 1014  Last data filed at 2021 0328  Gross per 24 hour   Intake 1601 ml   Output 600 ml   Net 1001 ml       Physical Exam: /59   Pulse 65   Temp 98 3 °F (36 8 °C)   Resp 16   SpO2 100%   Physical Exam  Constitutional:       General: He is not in acute distress  Appearance: He is ill-appearing  He is not toxic-appearing or diaphoretic  HENT:      Head: Normocephalic and atraumatic  Mouth/Throat:      Mouth: Mucous membranes are dry  Eyes:      General: No scleral icterus  Extraocular Movements: Extraocular movements intact  Neck:      Musculoskeletal: Normal range of motion and neck supple  Cardiovascular:      Rate and Rhythm: Normal rate and regular rhythm  Heart sounds: No friction rub  No gallop  Pulmonary:      Effort: No respiratory distress  Breath sounds: Normal breath sounds  No wheezing, rhonchi or rales  Abdominal:      General: Bowel sounds are normal  There is no distension  Palpations: Abdomen is soft  Tenderness: There is no abdominal tenderness  There is no rebound  Neurological:      General: No focal deficit present  Mental Status: He is alert and oriented to person, place, and time  Mental status is at baseline  Psychiatric:         Mood and Affect: Mood normal          Behavior: Behavior normal          Thought Content:  Thought content normal          Judgment: Judgment normal          Medications:    Current Facility-Administered Medications:     acetaminophen (TYLENOL) tablet 650 mg, 650 mg, Oral, Q6H PRN, Dewayne Giron MD, 650 mg at 21 0636    aluminum-magnesium hydroxide-simethicone (MYLANTA) oral suspension 30 mL, 30 mL, Oral, Q6H PRN, TABITHA Hazel    amLODIPine (NORVASC) tablet 5 mg, 5 mg, Oral, BID, [place], [place], Stopped at 02/01/21 1716    aspirin (ECOTRIN LOW STRENGTH) EC tablet 81 mg, 81 mg, Oral, Daily, Swathi Schumacher MD, 81 mg at 02/02/21 0814    atorvastatin (LIPITOR) tablet 40 mg, 40 mg, Oral, Daily With Destin Prado MD, 40 mg at 02/01/21 1716    benzonatate (TESSALON PERLES) capsule 100 mg, 100 mg, Oral, TID PRN, TABITHA Crews    bisacodyl (DULCOLAX) rectal suppository 10 mg, 10 mg, Rectal, Daily PRN, Kassy Stephenson DO, 10 mg at 02/02/21 0053    calcium carbonate (TUMS) chewable tablet 1,000 mg, 1,000 mg, Oral, Daily PRN, Eder Rose MD    cefepime (MAXIPIME) 2,000 mg in dextrose 5 % 50 mL IVPB, 2,000 mg, Intravenous, Q24H, Faye Correa MD, Last Rate: 100 mL/hr at 02/01/21 1138, 2,000 mg at 02/01/21 1138    clopidogrel (PLAVIX) tablet 75 mg, 75 mg, Oral, Daily, Eder Rose MD, 75 mg at 02/02/21 0814    escitalopram (LEXAPRO) tablet 10 mg, 10 mg, Oral, HS, Eder Rose MD, 10 mg at 02/01/21 2153    guaiFENesin (MUCINEX) 12 hr tablet 1,200 mg, 1,200 mg, Oral, Q12H [facility], TABITHA Crews, 1,200 mg at 02/02/21 4884    heparin (porcine) subcutaneous injection 5,000 Units, 5,000 Units, Subcutaneous, Q8H [facility], Faye Correa MD, 5,000 Units at 02/01/21 1303    hydrALAZINE (APRESOLINE) tablet 50 mg, 50 mg, Oral, Q8H [facility], RENETTA Chakraborty, 50 mg at 02/01/21 0545    HYDROmorphone (DILAUDID) injection 0 2 mg, 0 2 mg, Intravenous, Q3H PRN, Faye Correa MD, 0 2 mg at 02/02/21 0815    insulin detemir (LEVEMIR) subcutaneous injection 30 Units, 30 Units, Subcutaneous, Q12H [facility], April Giordano MD, 30 Units at 02/02/21 0814    insulin lispro (HumaLOG) 100 units/mL subcutaneous injection 1-6 Units, 1-6 Units, Subcutaneous, TID AC, 5 Units at 02/02/21 0812 **AND** Fingerstick Glucose (POCT), , , TID AC, Ricardo Horn MD    insulin lispro (HumaLOG) 100 units/mL subcutaneous injection 1-6 Units, 1-6 Units, Subcutaneous, HS, Ricardo Horn MD, 6 Units at 02/01/21 2204    insulin lispro (HumaLOG) 100 units/mL subcutaneous injection 5 Units, 5 Units, Subcutaneous, TID With Meals, Marjorie Sadler MD, 5 Units at 02/02/21 0813    iron polysaccharides (FERREX) capsule 150 mg, 150 mg, Oral, Daily, Estephania Sims 473, PA-C, 150 mg at 02/02/21 5303    melatonin tablet 3 mg, 3 mg, Oral, HS, Pina Michael, TABITHA, 3 mg at 02/01/21 2154    metoprolol tartrate (LOPRESSOR) tablet 25 mg, 25 mg, Oral, Q12H [facility], Ricardo Horn MD, 25 mg at 02/02/21 0814    nitroglycerin (NITROSTAT) SL tablet 0 4 mg, 0 4 mg, Sublingual, Q5 Min PRN, Ricardo Horn MD    nystatin (MYCOSTATIN) oral suspension 500,000 Units, 500,000 Units, Swish & Swallow, 4x Daily, Faye Correa MD, 500,000 Units at 02/02/21 0814    ondansetron (ZOFRAN) injection 4 mg, 4 mg, Intravenous, Q6H PRN, Ricardo Horn MD, 4 mg at 01/29/21 1352    oxyCODONE (ROXICODONE) IR tablet 2 5 mg, 2 5 mg, Oral, Q4H PRN, Faye Correa MD    pantoprazole (PROTONIX) injection 40 mg, 40 mg, Intravenous, Q12H [facility], Fausto Alex MD, 40 mg at 02/02/21 0814    phenol (CHLORASEPTIC) 1 4 % mucosal liquid 1 spray, 1 spray, Mouth/Throat, Q4H PRN, Faye Correa MD    pneumococcal 13-valent conjugate vaccine (PREVNAR-13) IM injection 0 5 mL, 0 5 mL, Intramuscular, Prior to discharge, Ricardo Horn MD    polyethylene glycol (MIRALAX) packet 17 g, 17 g, Oral, Daily, Fausto Alex MD, 17 g at 02/02/21 0813    senna-docusate sodium (SENOKOT S) 8 6-50 mg per tablet 1 tablet, 1 tablet, Oral, HS, Fausto Alex MD, 1 tablet at 02/01/21 1763    sodium chloride (OCEAN) 0 65 % nasal spray 1 spray, 1 spray, Each Nare, Q1H PRN, Marjorie Sadler MD, 1 spray at 01/31/21 1659    Laboratory Results:  Lab Results   Component Value Date WBC 14 66 (H) 02/01/2021    HGB 8 7 (L) 02/02/2021    HCT 25 6 (L) 02/02/2021    MCV 94 02/01/2021     (L) 02/01/2021     Lab Results   Component Value Date    SODIUM 129 (L) 02/01/2021    K 3 9 02/01/2021    CL 93 (L) 02/01/2021    CO2 22 02/01/2021     (H) 02/01/2021    CREATININE 4 41 (H) 02/01/2021    GLUC 302 (H) 02/01/2021    CALCIUM 8 1 (L) 02/01/2021     Lab Results   Component Value Date    CALCIUM 8 1 (L) 02/01/2021    PHOS 5 1 (H) 01/27/2021     No results found for: LABPROT

## 2021-02-02 NOTE — ASSESSMENT & PLAN NOTE
Lab Results   Component Value Date    HGBA1C 6 7 (H) 07/03/2019       Recent Labs     02/01/21  1303 02/01/21  1617 02/01/21  2137 02/02/21  0742   POCGLU 393* 181* 439* 315*       Blood Sugar Average: Last 72 hrs:  (P) 363 [id number]   Still poorly controlled  Continue Humalog sliding scale with Accu-Cheks q a c  And HS  Will adjust levemir  We will add Humalog 3 times a day with meals  Adjust treatment accordingly

## 2021-02-02 NOTE — PLAN OF CARE
Problem: Potential for Falls  Goal: Patient will remain free of falls  Description: INTERVENTIONS:  - Assess patient frequently for physical needs  -  Identify cognitive and physical deficits and behaviors that affect risk of falls    -  [place] fall precautions as indicated by assessment   - Educate patient/family on patient safety including physical limitations  - Instruct patient to call for assistance with activity based on assessment  - Modify environment to reduce risk of injury  - Consider OT/PT consult to assist with strengthening/mobility  Outcome: Progressing     Problem: CARDIOVASCULAR - ADULT  Goal: Maintains optimal cardiac output and hemodynamic stability  Description: INTERVENTIONS:  - Monitor I/O, vital signs and rhythm  - Monitor for S/S and trends of decreased cardiac output  - Administer and titrate ordered vasoactive medications to optimize hemodynamic stability  - Assess quality of pulses, skin color and temperature  - Assess for signs of decreased coronary artery perfusion  - Instruct patient to report change in severity of symptoms  Outcome: Progressing  Goal: Absence of cardiac dysrhythmias or at baseline rhythm  Description: INTERVENTIONS:  - Continuous cardiac monitoring, vital signs, obtain 12 lead EKG if ordered  - Administer antiarrhythmic and heart rate control medications as ordered  - Monitor electrolytes and administer replacement therapy as ordered  Outcome: Progressing     Problem: RESPIRATORY - ADULT  Goal: Achieves optimal ventilation and oxygenation  Description: INTERVENTIONS:  - Assess for changes in respiratory status  - Assess for changes in mentation and behavior  - Position to facilitate oxygenation and minimize respiratory effort  - Oxygen administered by appropriate delivery if ordered  - Initiate smoking cessation education as indicated  - Encourage broncho-pulmonary hygiene including cough, deep breathe, Incentive Spirometry  - Assess the need for suctioning and aspirate as needed  - Assess and instruct to report SOB or any respiratory difficulty  - Respiratory Therapy support as indicated  Outcome: Progressing     Problem: GASTROINTESTINAL - ADULT  Goal: Maintains adequate nutritional intake  Description: INTERVENTIONS:  - Monitor percentage of each meal consumed  - Identify factors contributing to decreased intake, treat as appropriate  - Assist with meals as needed  - Monitor I&O, weight, and lab values if indicated  - Obtain nutrition services referral as needed  Outcome: Progressing     Problem: GENITOURINARY - ADULT  Goal: Maintains or returns to baseline urinary function  Description: INTERVENTIONS:  - Assess urinary function  - Encourage oral fluids to ensure adequate hydration if ordered  - Administer IV fluids as ordered to ensure adequate hydration  - Administer ordered medications as needed  - Offer frequent toileting  - Follow urinary retention protocol if ordered  Outcome: Progressing  Goal: Absence of urinary retention  Description: INTERVENTIONS:  - Assess patients ability to void and empty bladder  - Monitor I/O  - Bladder scan as needed  - Discuss with physician/AP medications to alleviate retention as needed  - Discuss catheterization for long term situations as appropriate  Outcome: Progressing     Problem: METABOLIC, FLUID AND ELECTROLYTES - ADULT  Goal: Electrolytes maintained within normal limits  Description: INTERVENTIONS:  - Monitor labs and assess patient for signs and symptoms of electrolyte imbalances  - Administer electrolyte replacement as ordered  - Monitor response to electrolyte replacements, including repeat lab results as appropriate  - Instruct patient on fluid and nutrition as appropriate  Outcome: Progressing  Goal: Fluid balance maintained  Description: INTERVENTIONS:  - Monitor labs   - Monitor I/O and WT  - Instruct patient on fluid and nutrition as appropriate  - Assess for signs & symptoms of volume excess or deficit  Outcome: Progressing  Goal: Glucose maintained within target range  Description: INTERVENTIONS:  - Monitor Blood Glucose as ordered  - Assess for signs and symptoms of hyperglycemia and hypoglycemia  - Administer ordered medications to maintain glucose within target range  - Assess nutritional intake and initiate nutrition service referral as needed  Outcome: Progressing     Problem: SKIN/TISSUE INTEGRITY - ADULT  Goal: Skin integrity remains intact  Description: INTERVENTIONS  - Identify patients at risk for skin breakdown  - Assess and monitor skin integrity  - Assess and monitor nutrition and hydration status  - Monitor labs (i e  albumin)  - Assess for incontinence   - Turn and reposition patient  - Assist with mobility/ambulation  - Relieve pressure over bony prominences  - Avoid friction and shearing  - Provide appropriate hygiene as needed including keeping skin clean and dry  - Evaluate need for skin moisturizer/barrier cream  - Collaborate with interdisciplinary team (i e  Nutrition, Rehabilitation, etc )   - Patient/family teaching  Outcome: Progressing     Problem: HEMATOLOGIC - ADULT  Goal: Maintains hematologic stability  Description: INTERVENTIONS  - Assess for signs and symptoms of bleeding or hemorrhage  - Monitor labs  - Administer supportive blood products/factors as ordered and appropriate  Outcome: Progressing     Problem: MUSCULOSKELETAL - ADULT  Goal: Maintain or return mobility to safest level of function  Description: INTERVENTIONS:  - Assess patient's ability to carry out ADLs; assess patient's baseline for ADL function and identify physical deficits which impact ability to perform ADLs (bathing, care of mouth/teeth, toileting, grooming, dressing, etc )  - Assess/evaluate cause of self-care deficits   - Assess range of motion  - Assess patient's mobility  - Assess patient's need for assistive devices and provide as appropriate  - Encourage maximum independence but intervene and supervise when necessary  - Involve family in performance of ADLs  - Assess for home care needs following discharge   - Consider OT consult to assist with ADL evaluation and planning for discharge  - Provide patient education as appropriate  Outcome: Progressing     Problem: PAIN - ADULT  Goal: Verbalizes/displays adequate comfort level or baseline comfort level  Description: Interventions:  - Encourage patient to monitor pain and request assistance  - Assess pain using appropriate pain scale  - Administer analgesics based on type and severity of pain and evaluate response  - Implement non-pharmacological measures as appropriate and evaluate response  - Consider cultural and social influences on pain and pain management  - Notify physician/advanced practitioner if interventions unsuccessful or patient reports new pain  Outcome: Progressing     Problem: INFECTION - ADULT  Goal: Absence or prevention of progression during hospitalization  Description: INTERVENTIONS:  - Assess and monitor for signs and symptoms of infection  - Monitor lab/diagnostic results  - Monitor all insertion sites, i e  indwelling lines, tubes, and drains  - Monitor endotracheal if appropriate and nasal secretions for changes in amount and color  - [place] appropriate cooling/warming therapies per order  - Administer medications as ordered  - Instruct and encourage patient and family to use good hand hygiene technique  - Identify and instruct in appropriate isolation precautions for identified infection/condition  Outcome: Progressing     Problem: SAFETY ADULT  Goal: Patient will remain free of falls  Description: INTERVENTIONS:  - Assess patient frequently for physical needs  -  Identify cognitive and physical deficits and behaviors that affect risk of falls    -  [place] fall precautions as indicated by assessment   - Educate patient/family on patient safety including physical limitations  - Instruct patient to call for assistance with activity based on assessment  - Modify environment to reduce risk of injury  - Consider OT/PT consult to assist with strengthening/mobility  Outcome: Progressing  Goal: Maintain or return to baseline ADL function  Description: INTERVENTIONS:  -  Assess patient's ability to carry out ADLs; assess patient's baseline for ADL function and identify physical deficits which impact ability to perform ADLs (bathing, care of mouth/teeth, toileting, grooming, dressing, etc )  - Assess/evaluate cause of self-care deficits   - Assess range of motion  - Assess patient's mobility; develop plan if impaired  - Assess patient's need for assistive devices and provide as appropriate  - Encourage maximum independence but intervene and supervise when necessary  - Involve family in performance of ADLs  - Assess for home care needs following discharge   - Consider OT consult to assist with ADL evaluation and planning for discharge  - Provide patient education as appropriate  Outcome: Progressing  Goal: Maintain or return mobility status to optimal level  Description: INTERVENTIONS:  - Assess patient's baseline mobility status (ambulation, transfers, stairs, etc )    - Identify cognitive and physical deficits and behaviors that affect mobility  - Identify mobility aids required to assist with transfers and/or ambulation (gait belt, sit-to-stand, lift, walker, cane, etc )  - [place] fall precautions as indicated by assessment  - Record patient progress and toleration of activity level on Mobility SBAR; progress patient to next Phase/Stage  - Instruct patient to call for assistance with activity based on assessment  - Consider rehabilitation consult to assist with strengthening/weightbearing, etc   Outcome: Progressing     Problem: DISCHARGE PLANNING  Goal: Discharge to home or other facility with appropriate resources  Description: INTERVENTIONS:  - Identify barriers to discharge w/patient and caregiver  - Arrange for needed discharge resources and transportation as appropriate  - Identify discharge learning needs (meds, wound care, etc )  - Arrange for interpretive services to assist at discharge as needed  - Refer to Case Management Department for coordinating discharge planning if the patient needs post-hospital services based on physician/advanced practitioner order or complex needs related to functional status, cognitive ability, or social support system  Outcome: Progressing     Problem: Knowledge Deficit  Goal: Patient/family/caregiver demonstrates understanding of disease process, treatment plan, medications, and discharge instructions  Description: Complete learning assessment and assess knowledge base  Interventions:  - Provide teaching at level of understanding  - Provide teaching via preferred learning methods  Outcome: Progressing     Problem: Prexisting or High Potential for Compromised Skin Integrity  Goal: Skin integrity is maintained or improved  Description: INTERVENTIONS:  - Identify patients at risk for skin breakdown  - Assess and monitor skin integrity  - Assess and monitor nutrition and hydration status  - Monitor labs   - Assess for incontinence   - Turn and reposition patient  - Assist with mobility/ambulation  - Relieve pressure over bony prominences  - Avoid friction and shearing  - Provide appropriate hygiene as needed including keeping skin clean and dry  - Evaluate need for skin moisturizer/barrier cream  - Collaborate with interdisciplinary team   - Patient/family teaching  - Consider wound care consult   Outcome: Progressing     Problem: Nutrition/Hydration-ADULT  Goal: Nutrient/Hydration intake appropriate for improving, restoring or maintaining nutritional needs  Description: Monitor and assess patient's nutrition/hydration status for malnutrition  Collaborate with interdisciplinary team and initiate plan and interventions as ordered  Monitor patient's weight and dietary intake as ordered or per policy   Utilize nutrition screening tool and intervene as necessary  Determine patient's food preferences and provide high-protein, high-caloric foods as appropriate       INTERVENTIONS:  - Monitor oral intake, urinary output, labs, and treatment plans  - Assess nutrition and hydration status and recommend course of action  - Evaluate amount of meals eaten  - Assist patient with eating if necessary   - Allow adequate time for meals  - Recommend/ encourage appropriate diets, oral nutritional supplements, and vitamin/mineral supplements  - Order, calculate, and assess calorie counts as needed  - Recommend, monitor, and adjust tube feedings and TPN/PPN based on assessed needs  - Assess need for intravenous fluids  - Provide specific nutrition/hydration education as appropriate  - Include patient/family/caregiver in decisions related to nutrition  Outcome: Progressing

## 2021-02-02 NOTE — PHYSICAL THERAPY NOTE
PHYSICAL THERAPY TREATMENT NOTE    Patient Name: Terry Aguilar  IPTLF'X Date: 21 1345   PT Last Visit   PT Visit Date 21   Note Type   Note Type Treatment   Pain Assessment   Pain Assessment Tool FLACC   Pain Location/Orientation Location: Abdomen   Effect of Pain on Daily Activities Limits activity tolerance   Pain Rating: FLACC (Rest) - Face 1   Pain Rating: FLACC (Rest) - Legs 1   Pain Rating: FLACC (Rest) - Activity 1   Pain Rating: FLACC (Rest) - Cry 1   Pain Rating: FLACC (Rest) - Consolability 1   Score: FLACC (Rest) 5   Pain Rating: FLACC (Activity) - Face 1   Pain Rating: FLACC (Activity) - Legs 1   Pain Rating: FLACC (Activity) - Activity 1   Pain Rating: FLACC (Activity) - Cry 1   Pain Rating: FLACC (Activity) - Consolability 1   Score: FLACC (Activity) 5   Restrictions/Precautions   Weight Bearing Precautions Per Order No   Other Precautions Chair Alarm; Bed Alarm;O2;Fall Risk;Pain  (2L NC O2)   General   Chart Reviewed Yes   Additional Pertinent History SpO2 100% at start and end of session on 2L NC O2   Response to Previous Treatment Patient reporting fatigue but able to participate  Cognition   Overall Cognitive Status Impaired   Arousal/Participation Responsive; Cooperative   Attention Attends with cues to redirect   Orientation Level Oriented X4  (Pt identified by full name and )   Following Commands Follows multistep commands with increased time or repetition   Comments Pt supine in bed upon arrival  He c/o of abdominal pain, recalls he went for a CT and xrays today  He is agreeable to participate in PT intervention   Subjective   Subjective "I would like to get up to the chair"   Bed Mobility   Supine to Sit 4  Minimal assistance   Additional items Assist x 1;HOB elevated; Increased time required;Verbal cues; Bedrails   Additional Comments Pt seated OOB in recliner chair at end of session w/ chair alarm activated, call bell and room phone within reach w/ all needs met   Transfers   Sit to Stand   (max A --> mod A x 1 )   Additional items Assist x 1; Increased time required;Verbal cues  (for hand placement)   Stand to Sit 3  Moderate assistance   Additional items Assist x 1; Increased time required;Verbal cues   Stand pivot 3  Moderate assistance   Additional items Assist x 1; Increased time required;Verbal cues  (w/ RW; 2nd person SBA)   Additional Comments Pt performed STS from EOB x 2  First trial, pt was max A x 1, BUE support of RW once in standing  Pt is able to maintain standing x 1 min, has BM while standing  Pt min-mod A x 1 to maintain static standing  Second trial, pt is mod A x 1 for STS from EOB  Upon standing, pt c/o needing to urinate, provided urinal  Pt then performs SPT w/ mod A x 1 to recliner chair  Ambulation/Elevation   Gait pattern Decreased foot clearance; Short stride; Step to;Excessively slow   Gait Assistance 3  Moderate assist   Additional items Assist x 1  (2nd person SBA)   Assistive Device Rolling walker   Distance 1 ft   Curbs Pt is able to perform standing marches, step to/retreat  He is able to ambulate ~1 ft forward before initiating SPT to recliner chair   Balance   Static Sitting Poor +   Static Standing Poor   Ambulatory Poor  (w/ RW)   Activity Tolerance   Activity Tolerance Patient limited by fatigue;Patient limited by pain   Nurse Fercho coordination w/ SEYMOUR Biggs   Assessment   Prognosis Fair   Problem List Decreased strength;Decreased range of motion;Decreased endurance; Impaired balance;Decreased mobility; Decreased coordination; Impaired vision;Pain   Assessment Pt is agreeable to participate in PT intervention, and continues to be well-motivated throughout session  Pt demonstrates overall improvements in functional mobility   He is able to perform all tasks w/ less physical A than previous, and appropriate to trial pivot transfers and ambulation of a very short distance w/ mod A x 1 w/ RW  Pt's SpO2 remains >95% throughout session  Pt mostly limited due to his acute abdominal pain  Goals have been updated to reflect pt's recent improvements in mobility  Despite these functional gains, pt continues to be far from his mobility baseline, and would continue to benefit from post-acute inpatient rehab upon discharge  Goals   Patient Goals to get stronger   STG Expiration Date 02/06/21   Short Term Goal #1 Patient will: Increase bilateral LE strength 1/2 grade to facilitate independent mobility, Perform all bed mobility tasks w/ supervision to decrease fall risk factors, Perform all transfers w/ supervision to improve independence, Ambulate at least 50 ft  with roller walker w/ minx1 w/o LOB, Increase all balance 1/2 grade to decrease risk for falls, Complete exercise program independently, Tolerate 3 hr OOB to faciliate upright tolerance and Improve Barthel Index score to 50 or greater to facilitate independence   PT Treatment Day 3   Plan   Treatment/Interventions Functional transfer training;LE strengthening/ROM; Therapeutic exercise; Endurance training;Patient/family training;Equipment eval/education; Bed mobility;Gait training   Progress Progressing toward goals   PT Frequency 5x/wk   Recommendation   PT Discharge Recommendation Post-Acute Rehabilitation Services   Equipment Recommended Walker   AM-PAC Basic Mobility Inpatient   Turning in Bed Without Bedrails 3   Lying on Back to Sitting on Edge of Flat Bed 3   Moving Bed to Chair 2   Standing Up From Chair 2   Walk in Room 2   Climb 3-5 Stairs 1   Basic Mobility Inpatient Raw Score 13   Basic Mobility Standardized Score 33 99     Pt would continue to benefit from skilled PT during this admission in order to progress patient towards goals to decrease risk of falls and maximize independence        Salma Colvin, PT, DPT

## 2021-02-02 NOTE — ASSESSMENT & PLAN NOTE
· Cardiologist and nephrologist on board  · D/c Bumex drip as per nephrology  · Continue cardiovascular and heart failure medications  · Monitor input and output  · Daily weights    · Currently on 2L o2 supplementation

## 2021-02-02 NOTE — ASSESSMENT & PLAN NOTE
Patient reports >1 week since last bowel movement  Complains of abdominal pain 2/2/2021  Abdomen is rigid on examination with diffuse tenderness   Most likely secondary to constipation, however patient states he had fecal impaction during previous hospitalization that required manual disimpaction      - Schedule miralax  - Schedule senna/docusate  - Encourage PO hydration  - Suppositories   - Check Obstruction series

## 2021-02-02 NOTE — ASSESSMENT & PLAN NOTE
· Leukocytosis 14 2/1/2021  · COVID-19, influenza, RSV tests were negative x2  · Chest x-ray officially read as multifocal pneumonia  · Thus will continue to treat as hospital-acquired pneumonia with IV cefepime 10 of 10  · Blood cultures x2 no growth  · Cough medicine/antitussive, lozenges and Chloraseptic spray p r n  [facility]

## 2021-02-02 NOTE — ASSESSMENT & PLAN NOTE
· Monitor closely  · Pt now has oozing around HD cath site  · Have consented for blood if needed  · Required 1 unit PRBC overnight 1/30/2021 after hemoglobin found to be 6 7   Another unit required overnight 2/1/2021 after hemoglobin found to be 5 6  · Improved to 8 7  · Unknown source  · Continue to monitor

## 2021-02-02 NOTE — PLAN OF CARE
Problem: PHYSICAL THERAPY ADULT  Goal: Performs mobility at highest level of function for planned discharge setting  See evaluation for individualized goals  Description: Treatment/Interventions: Functional transfer training, LE strengthening/ROM, Therapeutic exercise, Endurance training, Patient/family training, Equipment eval/education, Bed mobility(PT to see for gait when appropriate)  Equipment Recommended: (Therapy will trial Arely Sleight in upcoming sessions)       See flowsheet documentation for full assessment, interventions and recommendations  Outcome: Progressing  Note: Prognosis: Fair  Problem List: Decreased strength, Decreased range of motion, Decreased endurance, Impaired balance, Decreased mobility, Decreased coordination, Impaired vision, Pain  Assessment: Pt is agreeable to participate in PT intervention, and continues to be well-motivated throughout session  Pt demonstrates overall improvements in functional mobility  He is able to perform all tasks w/ less physical A than previous, and appropriate to trial pivot transfers and ambulation of a very short distance w/ mod A x 1 w/ RW  Pt's SpO2 remains >95% throughout session  Pt mostly limited due to his acute abdominal pain  Goals have been updated to reflect pt's recent improvements in mobility  Despite these functional gains, pt continues to be far from his mobility baseline, and would continue to benefit from post-acute inpatient rehab upon discharge  PT Discharge Recommendation: Post-Acute Rehabilitation Services          See flowsheet documentation for full assessment

## 2021-02-02 NOTE — PLAN OF CARE
Problem: Potential for Falls  Goal: Patient will remain free of falls  Description: INTERVENTIONS:  - Assess patient frequently for physical needs  -  Identify cognitive and physical deficits and behaviors that affect risk of falls    -  Prentice fall precautions as indicated by assessment   - Educate patient/family on patient safety including physical limitations  - Instruct patient to call for assistance with activity based on assessment  - Modify environment to reduce risk of injury  - Consider OT/PT consult to assist with strengthening/mobility  Outcome: Progressing     Problem: CARDIOVASCULAR - ADULT  Goal: Maintains optimal cardiac output and hemodynamic stability  Description: INTERVENTIONS:  - Monitor I/O, vital signs and rhythm  - Monitor for S/S and trends of decreased cardiac output  - Administer and titrate ordered vasoactive medications to optimize hemodynamic stability  - Assess quality of pulses, skin color and temperature  - Assess for signs of decreased coronary artery perfusion  - Instruct patient to report change in severity of symptoms  Outcome: Progressing  Goal: Absence of cardiac dysrhythmias or at baseline rhythm  Description: INTERVENTIONS:  - Continuous cardiac monitoring, vital signs, obtain 12 lead EKG if ordered  - Administer antiarrhythmic and heart rate control medications as ordered  - Monitor electrolytes and administer replacement therapy as ordered  Outcome: Progressing     Problem: RESPIRATORY - ADULT  Goal: Achieves optimal ventilation and oxygenation  Description: INTERVENTIONS:  - Assess for changes in respiratory status  - Assess for changes in mentation and behavior  - Position to facilitate oxygenation and minimize respiratory effort  - Oxygen administered by appropriate delivery if ordered  - Initiate smoking cessation education as indicated  - Encourage broncho-pulmonary hygiene including cough, deep breathe, Incentive Spirometry  - Assess the need for suctioning and aspirate as needed  - Assess and instruct to report SOB or any respiratory difficulty  - Respiratory Therapy support as indicated  Outcome: Progressing     Problem: GASTROINTESTINAL - ADULT  Goal: Maintains adequate nutritional intake  Description: INTERVENTIONS:  - Monitor percentage of each meal consumed  - Identify factors contributing to decreased intake, treat as appropriate  - Assist with meals as needed  - Monitor I&O, weight, and lab values if indicated  - Obtain nutrition services referral as needed  Outcome: Progressing     Problem: GENITOURINARY - ADULT  Goal: Maintains or returns to baseline urinary function  Description: INTERVENTIONS:  - Assess urinary function  - Encourage oral fluids to ensure adequate hydration if ordered  - Administer IV fluids as ordered to ensure adequate hydration  - Administer ordered medications as needed  - Offer frequent toileting  - Follow urinary retention protocol if ordered  Outcome: Progressing  Goal: Absence of urinary retention  Description: INTERVENTIONS:  - Assess patients ability to void and empty bladder  - Monitor I/O  - Bladder scan as needed  - Discuss with physician/AP medications to alleviate retention as needed  - Discuss catheterization for long term situations as appropriate  Outcome: Progressing

## 2021-02-02 NOTE — ASSESSMENT & PLAN NOTE
Presented with chest pain at [facility]   Hx of CAD s/p stenting and CABG 2016  Received ASA 324mg at home and SL nitro x1 in the ER  Troponin peaked at 5 5  Continue cardiovascular medications with aspirin, Plavix, statin, beta-blocker and Imdur  Cardiac Cath Completed at [facility]   - three-vessel disease with 99% occluded proximal circumflex, 50% distal left main, mid % occluded, RCA proximally 100% occluded with collaterals from left circulation  Patient has his patent grafts of LIMA to LAD and SVG to OM2    Most likely Type 2 MI as per cardiology  Medical intervention recommend at this time  Echo complete - reveals 60% EF

## 2021-02-02 NOTE — ASSESSMENT & PLAN NOTE
Lab Results   Component Value Date    EGFR 12 02/01/2021    EGFR 14 01/31/2021    EGFR 20 01/30/2021    CREATININE 4 41 (H) 02/01/2021    CREATININE 3 88 (H) 01/31/2021    CREATININE 2 88 (H) 01/30/2021     Pending new Creatinine   Most recent 4 41, up from 3 88 Baseline creatinine around 2 3-2 5  Previously on HD in 2019;   IR placed HD Cath 1/25/202   Nephrology on board   Most likely ATN post contrast for Cath  No longer on IV diuresis  As per nephrology, will continue dialysis today

## 2021-02-03 LAB
ANION GAP SERPL CALCULATED.3IONS-SCNC: 13 MMOL/L (ref 4–13)
ANION GAP SERPL CALCULATED.3IONS-SCNC: 15 MMOL/L (ref 4–13)
BETA-HYDROXYBUTYRATE: 0 MMOL/L
BUN SERPL-MCNC: 108 MG/DL (ref 5–25)
BUN SERPL-MCNC: 116 MG/DL (ref 5–25)
CALCIUM SERPL-MCNC: 8.1 MG/DL (ref 8.3–10.1)
CALCIUM SERPL-MCNC: 8.2 MG/DL (ref 8.3–10.1)
CHLORIDE SERPL-SCNC: 90 MMOL/L (ref 100–108)
CHLORIDE SERPL-SCNC: 93 MMOL/L (ref 100–108)
CO2 SERPL-SCNC: 22 MMOL/L (ref 21–32)
CO2 SERPL-SCNC: 24 MMOL/L (ref 21–32)
CREAT SERPL-MCNC: 4.29 MG/DL (ref 0.6–1.3)
CREAT SERPL-MCNC: 4.53 MG/DL (ref 0.6–1.3)
GFR SERPL CREATININE-BSD FRML MDRD: 11 ML/MIN/1.73SQ M
GFR SERPL CREATININE-BSD FRML MDRD: 12 ML/MIN/1.73SQ M
GLUCOSE SERPL-MCNC: 264 MG/DL (ref 65–140)
GLUCOSE SERPL-MCNC: 316 MG/DL (ref 65–140)
GLUCOSE SERPL-MCNC: 375 MG/DL (ref 65–140)
GLUCOSE SERPL-MCNC: 391 MG/DL (ref 65–140)
GLUCOSE SERPL-MCNC: 407 MG/DL (ref 65–140)
GLUCOSE SERPL-MCNC: 470 MG/DL (ref 65–140)
GLUCOSE SERPL-MCNC: 484 MG/DL (ref 65–140)
GLUCOSE SERPL-MCNC: 497 MG/DL (ref 65–140)
GLUCOSE SERPL-MCNC: >500 MG/DL (ref 65–140)
HCT VFR BLD AUTO: 24.2 % (ref 36.5–49.3)
HGB BLD-MCNC: 8.1 G/DL (ref 12–17)
POTASSIUM SERPL-SCNC: 4.2 MMOL/L (ref 3.5–5.3)
POTASSIUM SERPL-SCNC: 4.4 MMOL/L (ref 3.5–5.3)
SODIUM SERPL-SCNC: 127 MMOL/L (ref 136–145)
SODIUM SERPL-SCNC: 130 MMOL/L (ref 136–145)

## 2021-02-03 PROCEDURE — 80048 BASIC METABOLIC PNL TOTAL CA: CPT | Performed by: INTERNAL MEDICINE

## 2021-02-03 PROCEDURE — 99223 1ST HOSP IP/OBS HIGH 75: CPT | Performed by: INTERNAL MEDICINE

## 2021-02-03 PROCEDURE — 82010 KETONE BODYS QUAN: CPT | Performed by: FAMILY MEDICINE

## 2021-02-03 PROCEDURE — C9113 INJ PANTOPRAZOLE SODIUM, VIA: HCPCS | Performed by: FAMILY MEDICINE

## 2021-02-03 PROCEDURE — 85014 HEMATOCRIT: CPT | Performed by: HOSPITALIST

## 2021-02-03 PROCEDURE — 82948 REAGENT STRIP/BLOOD GLUCOSE: CPT

## 2021-02-03 PROCEDURE — 85018 HEMOGLOBIN: CPT | Performed by: HOSPITALIST

## 2021-02-03 PROCEDURE — 80048 BASIC METABOLIC PNL TOTAL CA: CPT | Performed by: FAMILY MEDICINE

## 2021-02-03 PROCEDURE — 99232 SBSQ HOSP IP/OBS MODERATE 35: CPT | Performed by: INTERNAL MEDICINE

## 2021-02-03 PROCEDURE — 97535 SELF CARE MNGMENT TRAINING: CPT

## 2021-02-03 PROCEDURE — 99232 SBSQ HOSP IP/OBS MODERATE 35: CPT | Performed by: FAMILY MEDICINE

## 2021-02-03 PROCEDURE — 97116 GAIT TRAINING THERAPY: CPT

## 2021-02-03 PROCEDURE — 97530 THERAPEUTIC ACTIVITIES: CPT

## 2021-02-03 RX ADMIN — PANTOPRAZOLE SODIUM 40 MG: 40 INJECTION, POWDER, FOR SOLUTION INTRAVENOUS at 21:38

## 2021-02-03 RX ADMIN — NYSTATIN 500000 UNITS: 100000 SUSPENSION ORAL at 09:21

## 2021-02-03 RX ADMIN — DOCUSATE SODIUM AND SENNOSIDES 1 TABLET: 8.6; 5 TABLET ORAL at 21:31

## 2021-02-03 RX ADMIN — NYSTATIN 500000 UNITS: 100000 SUSPENSION ORAL at 18:53

## 2021-02-03 RX ADMIN — CEFEPIME HYDROCHLORIDE 2000 MG: 2 INJECTION, POWDER, FOR SOLUTION INTRAVENOUS at 11:01

## 2021-02-03 RX ADMIN — INSULIN LISPRO 10 UNITS: 100 INJECTION, SOLUTION INTRAVENOUS; SUBCUTANEOUS at 14:44

## 2021-02-03 RX ADMIN — Medication 3 MG: at 21:35

## 2021-02-03 RX ADMIN — PANTOPRAZOLE SODIUM 40 MG: 40 INJECTION, POWDER, FOR SOLUTION INTRAVENOUS at 09:22

## 2021-02-03 RX ADMIN — ATORVASTATIN CALCIUM 40 MG: 40 TABLET, FILM COATED ORAL at 16:06

## 2021-02-03 RX ADMIN — SODIUM CHLORIDE 12 UNITS/HR: 9 INJECTION, SOLUTION INTRAVENOUS at 17:17

## 2021-02-03 RX ADMIN — HEPARIN SODIUM 5000 UNITS: 5000 INJECTION INTRAVENOUS; SUBCUTANEOUS at 21:31

## 2021-02-03 RX ADMIN — NYSTATIN 500000 UNITS: 100000 SUSPENSION ORAL at 11:01

## 2021-02-03 RX ADMIN — ESCITALOPRAM 10 MG: 10 TABLET, FILM COATED ORAL at 21:35

## 2021-02-03 RX ADMIN — NYSTATIN 500000 UNITS: 100000 SUSPENSION ORAL at 21:38

## 2021-02-03 RX ADMIN — ASPIRIN 81 MG: 81 TABLET ORAL at 09:22

## 2021-02-03 RX ADMIN — POLYSACCHARIDE-IRON COMPLEX 150 MG: 150 CAPSULE ORAL at 09:22

## 2021-02-03 RX ADMIN — HYDRALAZINE HYDROCHLORIDE 50 MG: 25 TABLET, FILM COATED ORAL at 05:40

## 2021-02-03 RX ADMIN — INSULIN LISPRO 6 UNITS: 100 INJECTION, SOLUTION INTRAVENOUS; SUBCUTANEOUS at 09:23

## 2021-02-03 RX ADMIN — AMLODIPINE BESYLATE 5 MG: 5 TABLET ORAL at 09:22

## 2021-02-03 RX ADMIN — OXYCODONE HYDROCHLORIDE 2.5 MG: 5 TABLET ORAL at 19:53

## 2021-02-03 RX ADMIN — GUAIFENESIN 1200 MG: 600 TABLET, EXTENDED RELEASE ORAL at 21:38

## 2021-02-03 RX ADMIN — INSULIN DETEMIR 30 UNITS: 100 INJECTION, SOLUTION SUBCUTANEOUS at 09:22

## 2021-02-03 RX ADMIN — AMLODIPINE BESYLATE 5 MG: 5 TABLET ORAL at 18:52

## 2021-02-03 RX ADMIN — CLOPIDOGREL BISULFATE 75 MG: 75 TABLET ORAL at 09:22

## 2021-02-03 RX ADMIN — GUAIFENESIN 1200 MG: 600 TABLET, EXTENDED RELEASE ORAL at 09:21

## 2021-02-03 RX ADMIN — METOPROLOL TARTRATE 25 MG: 25 TABLET, FILM COATED ORAL at 09:22

## 2021-02-03 RX ADMIN — METOPROLOL TARTRATE 25 MG: 25 TABLET, FILM COATED ORAL at 21:38

## 2021-02-03 NOTE — ASSESSMENT & PLAN NOTE
· Leukocytosis 14 66 2/3/2021  · COVID-19, influenza, RSV tests were negative x2  · Chest x-ray officially read as multifocal pneumonia  · Thus will continue to treat as hospital-acquired pneumonia with IV cefepime 10 of 10  · Blood cultures x2 no growth  · Cough medicine/antitussive, lozenges and Chloraseptic spray liudmila Espinoza

## 2021-02-03 NOTE — PROGRESS NOTES
NEPHROLOGY PROGRESS NOTE    Niki Sánchez [de-identified] y o  male MRN: [id number]  Unit/Bed#: S -01 Encounter: [id number]  Reason for Consult:  Acute on chronic kidney disease    The patient was sleeping when I went into the room I awakened him  He says he was breathing comfortably  Marinelli catheter was taken out he says he is urinating okay so far  He does feel he is emptying his bladder  He denies any shortness of breath and no chest    ASSESSMENT/PLAN:  1  Renal    The patient developed acute renal failure due to ATN after cardiac catheterization has been dialysis dependent  Yesterday put out close to 2 L of urine in creatinine is increased from around 4 1-4 2 over the last 24 hours  Hopefully this is an indication is beginning to experience some renal recovery  Going to hold dialysis today and patient is aware of this and I also alerted the dialysis nurse  We will monitor him over the next 24 hours to see the trend of creatinine if it plateaus or begins to improve it would be an indication that things are improving  Patient is aware  Hold dialysis today  Monitor urine output and renal function without dialysis to see if is in recovery  Evaluate for need of dialysis tomorrow    2  Hyponatremia    For the hyperglycemia corrects close to 135  No other specific interventions for now  3  Cardiac    History of ischemic heart disease status post catheterization  On medical therapy as no intervention was performed  SUBJECTIVE:  Review of Systems   Constitution: Positive for malaise/fatigue  Negative for chills, diaphoresis and fever  HENT: Negative  Cardiovascular: Negative for chest pain, dyspnea on exertion, leg swelling and orthopnea  Respiratory: Negative for cough, shortness of breath, sputum production and wheezing  Gastrointestinal: Negative for abdominal pain, diarrhea, nausea and vomiting  Genitourinary: Negative for dysuria, flank pain and hematuria          Marinelli catheter removed feels he is urinating okay  Neurological: Positive for weakness  Negative for focal weakness, headaches and light-headedness  Psychiatric/Behavioral: Negative for altered mental status, depression, hallucinations and hypervigilance  OBJECTIVE:  Current Weight:    Vitals:Temp (24hrs), Av 1 °F (36 7 °C), Min:97 5 °F (36 4 °C), Max:98 8 °F (37 1 °C)  Current: Temperature: 97 5 °F (36 4 °C)   Blood pressure 130/62, pulse 68, temperature 97 5 °F (36 4 °C), temperature source Oral, resp  rate 20, SpO2 98 %  , There is no height or weight on file to calculate BMI  Intake/Output Summary (Last 24 hours) at 2/3/2021 0942  Last data filed at 2/3/2021 0538  Gross per 24 hour   Intake 840 ml   Output 1800 ml   Net -960 ml       Physical Exam: /62 (BP Location: Left arm)   Pulse 68   Temp 97 5 °F (36 4 °C) (Oral)   Resp 20   SpO2 98%   Physical Exam  Constitutional:       General: He is not in acute distress  Appearance: He is not toxic-appearing or diaphoretic  HENT:      Head: Normocephalic  Mouth/Throat:      Mouth: Mucous membranes are dry  Eyes:      General: No scleral icterus  Extraocular Movements: Extraocular movements intact  Neck:      Musculoskeletal: Normal range of motion and neck supple  Cardiovascular:      Rate and Rhythm: Normal rate and regular rhythm  Heart sounds: No friction rub  No gallop  Pulmonary:      Effort: Pulmonary effort is normal  No respiratory distress  Breath sounds: Normal breath sounds  No wheezing, rhonchi or rales  Abdominal:      General: Bowel sounds are normal  There is no distension  Palpations: Abdomen is soft  Tenderness: There is no abdominal tenderness  There is no rebound  Neurological:      General: No focal deficit present  Mental Status: He is alert and oriented to person, place, and time  Mental status is at baseline     Psychiatric:         Mood and Affect: Mood normal          Behavior: Behavior normal          Thought Content:  Thought content normal          Judgment: Judgment normal          Medications:    Current Facility-Administered Medications:     acetaminophen (TYLENOL) tablet 650 mg, 650 mg, Oral, Q6H PRN, Sylvie Allen MD, 650 mg at 01/26/21 0636    aluminum-magnesium hydroxide-simethicone (MYLANTA) oral suspension 30 mL, 30 mL, Oral, Q6H PRN, TABITHA Mann    amLODIPine (NORVASC) tablet 5 mg, 5 mg, Oral, BID, [facility], PA-C, 5 mg at 02/03/21 4160    aspirin (ECOTRIN LOW STRENGTH) EC tablet 81 mg, 81 mg, Oral, Daily, Swathi Schumacher MD, 81 mg at 02/03/21 7065    atorvastatin (LIPITOR) tablet 40 mg, 40 mg, Oral, Daily With Diego Lemus MD, 40 mg at 02/02/21 1747    benzonatate (TESSALON PERLES) capsule 100 mg, 100 mg, Oral, TID PRN, TABITHA Manley    bisacodyl (DULCOLAX) rectal suppository 10 mg, 10 mg, Rectal, Daily PRN, Padilla Reyes DO, 10 mg at 02/02/21 0053    calcium carbonate (TUMS) chewable tablet 1,000 mg, 1,000 mg, Oral, Daily PRN, Sylvie Allen MD    cefepime (MAXIPIME) 2,000 mg in dextrose 5 % 50 mL IVPB, 2,000 mg, Intravenous, Q24H, Faye Correa MD, Last Rate: 100 mL/hr at 02/02/21 1128, 2,000 mg at 02/02/21 1128    clopidogrel (PLAVIX) tablet 75 mg, 75 mg, Oral, Daily, Michael Schumacher MD, 75 mg at 02/03/21 0922    escitalopram (LEXAPRO) tablet 10 mg, 10 mg, Oral, HS, Swathi Schumacher MD, 10 mg at 02/02/21 2153    guaiFENesin (MUCINEX) 12 hr tablet 1,200 mg, 1,200 mg, Oral, Q12H [address], TABITHA Manley, 1,200 mg at 02/03/21 4424    heparin (porcine) subcutaneous injection 5,000 Units, 5,000 Units, Subcutaneous, Q8H [address], Faye Correa MD, Stopped at 02/02/21 2152    hydrALAZINE (APRESOLINE) tablet 50 mg, 50 mg, Oral, Q8H [address], [facility], PA-C, 50 mg at 02/03/21 0540    HYDROmorphone (DILAUDID) injection 0 2 mg, 0 2 mg, Intravenous, Q3H PRN, Faye Correa MD, 0 2 mg at 02/02/21 0815    insulin detemir (LEVEMIR) subcutaneous injection 30 Units, 30 Units, Subcutaneous, Q12H [address], Caryle Deputy, MD, 30 Units at 02/03/21 0922    insulin lispro (HumaLOG) 100 units/mL subcutaneous injection 1-6 Units, 1-6 Units, Subcutaneous, TID AC, 6 Units at 02/03/21 0923 **AND** Fingerstick Glucose (POCT), , , TID AC, Swathi Schumacher MD    insulin lispro (HumaLOG) 100 units/mL subcutaneous injection 1-6 Units, 1-6 Units, Subcutaneous, HS, Mindy Lewis MD, 6 Units at 02/02/21 2158    insulin lispro (HumaLOG) 100 units/mL subcutaneous injection 5 Units, 5 Units, Subcutaneous, TID With Meals, Caryle Deputy, MD, 5 Units at 02/03/21 7130    iron polysaccharides (FERREX) capsule 150 mg, 150 mg, Oral, Daily, [facility], [facility], 150 mg at 02/03/21 7614    melatonin tablet 3 mg, 3 mg, Oral, HS, Pina Fernanda, CRNP, 3 mg at 02/02/21 2153    metoprolol tartrate (LOPRESSOR) tablet 25 mg, 25 mg, Oral, Q12H [address], Mindy Lewis MD, 25 mg at 02/03/21 0922    nitroglycerin (NITROSTAT) SL tablet 0 4 mg, 0 4 mg, Sublingual, Q5 Min PRN, Mindy Lewis MD    nystatin (MYCOSTATIN) oral suspension 500,000 Units, 500,000 Units, Swish & Swallow, 4x Daily, Faye Correa MD, 500,000 Units at 02/03/21 0921    ondansetron (ZOFRAN) injection 4 mg, 4 mg, Intravenous, Q6H PRN, Mindy Lewis MD, 4 mg at 01/29/21 1352    oxyCODONE (ROXICODONE) IR tablet 2 5 mg, 2 5 mg, Oral, Q4H PRN, Faye Correa MD    pantoprazole (PROTONIX) injection 40 mg, 40 mg, Intravenous, Q12H [address], Paul Luna MD, 40 mg at 02/03/21 0922    phenol (CHLORASEPTIC) 1 4 % mucosal liquid 1 spray, 1 spray, Mouth/Throat, Q4H PRN, Faye Correa MD    pneumococcal 13-valent conjugate vaccine (PREVNAR-13) IM injection 0 5 mL, 0 5 mL, Intramuscular, Prior to discharge, Mindy Lewis MD    polyethylene glycol (MIRALAX) packet 17 g, 17 g, Oral, Daily, Paul Luna MD, 17 g at 02/02/21 0813    polyethylene glycol (MIRALAX) packet 17 g, 17 g, Oral, Daily PRN, Gladys Beltran MD    senna-docusate sodium (SENOKOT S) 8 6-50 mg per tablet 1 tablet, 1 tablet, Oral, HS, Fausto Alex MD, 1 tablet at 02/02/21 2153    sodium chloride (OCEAN) 0 65 % nasal spray 1 spray, 1 spray, Each Nare, Q1H PRN, Marjorie Sadler MD, 1 spray at 01/31/21 7775    Laboratory Results:  Lab Results   Component Value Date    WBC 14 66 (H) 02/01/2021    HGB 8 1 (L) 02/03/2021    HCT 24 2 (L) 02/03/2021    MCV 94 02/01/2021     (L) 02/01/2021     Lab Results   Component Value Date    SODIUM 130 (L) 02/03/2021    K 4 2 02/03/2021    CL 93 (L) 02/03/2021    CO2 22 02/03/2021     (H) 02/03/2021    CREATININE 4 29 (H) 02/03/2021    GLUC 391 (H) 02/03/2021    CALCIUM 8 1 (L) 02/03/2021     Lab Results   Component Value Date    CALCIUM 8 1 (L) 02/03/2021    PHOS 5 1 (H) 01/27/2021     No results found for: LABPROT

## 2021-02-03 NOTE — PLAN OF CARE
Problem: Potential for Falls  Goal: Patient will remain free of falls  Description: INTERVENTIONS:  - Assess patient frequently for physical needs  -  Identify cognitive and physical deficits and behaviors that affect risk of falls    -  [place] fall precautions as indicated by assessment   - Educate patient/family on patient safety including physical limitations  - Instruct patient to call for assistance with activity based on assessment  - Modify environment to reduce risk of injury  - Consider OT/PT consult to assist with strengthening/mobility  Outcome: Progressing     Problem: CARDIOVASCULAR - ADULT  Goal: Maintains optimal cardiac output and hemodynamic stability  Description: INTERVENTIONS:  - Monitor I/O, vital signs and rhythm  - Monitor for S/S and trends of decreased cardiac output  - Administer and titrate ordered vasoactive medications to optimize hemodynamic stability  - Assess quality of pulses, skin color and temperature  - Assess for signs of decreased coronary artery perfusion  - Instruct patient to report change in severity of symptoms  Outcome: Progressing  Goal: Absence of cardiac dysrhythmias or at baseline rhythm  Description: INTERVENTIONS:  - Continuous cardiac monitoring, vital signs, obtain 12 lead EKG if ordered  - Administer antiarrhythmic and heart rate control medications as ordered  - Monitor electrolytes and administer replacement therapy as ordered  Outcome: Progressing     Problem: RESPIRATORY - ADULT  Goal: Achieves optimal ventilation and oxygenation  Description: INTERVENTIONS:  - Assess for changes in respiratory status  - Assess for changes in mentation and behavior  - Position to facilitate oxygenation and minimize respiratory effort  - Oxygen administered by appropriate delivery if ordered  - Initiate smoking cessation education as indicated  - Encourage broncho-pulmonary hygiene including cough, deep breathe, Incentive Spirometry  - Assess the need for suctioning and aspirate as needed  - Assess and instruct to report SOB or any respiratory difficulty  - Respiratory Therapy support as indicated  Outcome: Progressing     Problem: GASTROINTESTINAL - ADULT  Goal: Maintains adequate nutritional intake  Description: INTERVENTIONS:  - Monitor percentage of each meal consumed  - Identify factors contributing to decreased intake, treat as appropriate  - Assist with meals as needed  - Monitor I&O, weight, and lab values if indicated  - Obtain nutrition services referral as needed  Outcome: Progressing     Problem: GENITOURINARY - ADULT  Goal: Maintains or returns to baseline urinary function  Description: INTERVENTIONS:  - Assess urinary function  - Encourage oral fluids to ensure adequate hydration if ordered  - Administer IV fluids as ordered to ensure adequate hydration  - Administer ordered medications as needed  - Offer frequent toileting  - Follow urinary retention protocol if ordered  Outcome: Progressing  Goal: Absence of urinary retention  Description: INTERVENTIONS:  - Assess patients ability to void and empty bladder  - Monitor I/O  - Bladder scan as needed  - Discuss with physician/AP medications to alleviate retention as needed  - Discuss catheterization for long term situations as appropriate  Outcome: Progressing     Problem: METABOLIC, FLUID AND ELECTROLYTES - ADULT  Goal: Electrolytes maintained within normal limits  Description: INTERVENTIONS:  - Monitor labs and assess patient for signs and symptoms of electrolyte imbalances  - Administer electrolyte replacement as ordered  - Monitor response to electrolyte replacements, including repeat lab results as appropriate  - Instruct patient on fluid and nutrition as appropriate  Outcome: Progressing  Goal: Fluid balance maintained  Description: INTERVENTIONS:  - Monitor labs   - Monitor I/O and WT  - Instruct patient on fluid and nutrition as appropriate  - Assess for signs & symptoms of volume excess or deficit  Outcome: Progressing  Goal: Glucose maintained within target range  Description: INTERVENTIONS:  - Monitor Blood Glucose as ordered  - Assess for signs and symptoms of hyperglycemia and hypoglycemia  - Administer ordered medications to maintain glucose within target range  - Assess nutritional intake and initiate nutrition service referral as needed  Outcome: Progressing     Problem: SKIN/TISSUE INTEGRITY - ADULT  Goal: Skin integrity remains intact  Description: INTERVENTIONS  - Identify patients at risk for skin breakdown  - Assess and monitor skin integrity  - Assess and monitor nutrition and hydration status  - Monitor labs (i e  albumin)  - Assess for incontinence   - Turn and reposition patient  - Assist with mobility/ambulation  - Relieve pressure over bony prominences  - Avoid friction and shearing  - Provide appropriate hygiene as needed including keeping skin clean and dry  - Evaluate need for skin moisturizer/barrier cream  - Collaborate with interdisciplinary team (i e  Nutrition, Rehabilitation, etc )   - Patient/family teaching  Outcome: Progressing     Problem: HEMATOLOGIC - ADULT  Goal: Maintains hematologic stability  Description: INTERVENTIONS  - Assess for signs and symptoms of bleeding or hemorrhage  - Monitor labs  - Administer supportive blood products/factors as ordered and appropriate  Outcome: Progressing     Problem: MUSCULOSKELETAL - ADULT  Goal: Maintain or return mobility to safest level of function  Description: INTERVENTIONS:  - Assess patient's ability to carry out ADLs; assess patient's baseline for ADL function and identify physical deficits which impact ability to perform ADLs (bathing, care of mouth/teeth, toileting, grooming, dressing, etc )  - Assess/evaluate cause of self-care deficits   - Assess range of motion  - Assess patient's mobility  - Assess patient's need for assistive devices and provide as appropriate  - Encourage maximum independence but intervene and supervise when necessary  - Involve family in performance of ADLs  - Assess for home care needs following discharge   - Consider OT consult to assist with ADL evaluation and planning for discharge  - Provide patient education as appropriate  Outcome: Progressing

## 2021-02-03 NOTE — ASSESSMENT & PLAN NOTE
Lab Results   Component Value Date    EGFR 12 02/03/2021    EGFR 13 02/02/2021    EGFR 12 02/01/2021    CREATININE 4 29 (H) 02/03/2021    CREATININE 4 09 (H) 02/02/2021    CREATININE 4 41 (H) 02/01/2021   Management as per primary team   Nephrology on board

## 2021-02-03 NOTE — PLAN OF CARE
Problem: OCCUPATIONAL THERAPY ADULT  Goal: Performs self-care activities at highest level of function for planned discharge setting  See evaluation for individualized goals  Description: Treatment Interventions: ADL retraining, Functional transfer training, UE strengthening/ROM, Endurance training, Patient/family training, Equipment evaluation/education, Continued evaluation, Energy conservation, Activityengagement          See flowsheet documentation for full assessment, interventions and recommendations  Outcome: Progressing  Note: Limitation: Decreased ADL status, Decreased UE strength, Decreased endurance, Decreased self-care trans, Decreased high-level ADLs(increased pain, increaed edema)     Assessment: Pt seen this AM for treatment session to consist of bed mobility, transfers, sitting tolerance for ADLs, grooming, bathing and dressing  Pt tolerates all activity well but expresses dissapointment with his percieved weakness  Grossly min A for bed mobility and STS transfers, tolerating time seated and standing with stable O2 sats  Mod A for bathing overall  Pt to benefit from continued OT to maximize independence in ADLs and functional mobility  Recommending short term rehab at [facility]        OT Discharge Recommendation: Post-Acute Rehabilitation Services

## 2021-02-03 NOTE — PROGRESS NOTES
Progress Note - Jeffery Deluca 1940, [de-identified] y o  male MRN: [id number]    Unit/Bed#: S -01 Encounter: [id number]    Primary Care Provider: Tuan Savage MD   Date and time admitted to hospital: 1/22/2021 12:28 PM        * Coronary artery disease  Assessment & Plan    Presented with chest pain at [facility]   Hx of CAD s/p stenting and CABG 2016  Received ASA 324mg at home and SL nitro x1 in the ER  Troponin peaked at 5 5  Continue cardiovascular medications with aspirin, Plavix, statin, beta-blocker and Imdur  Cardiac Cath Completed at [facility]   - three-vessel disease with 99% occluded proximal circumflex, 50% distal left main, mid % occluded, RCA proximally 100% occluded with collaterals from left circulation  Patient has his patent grafts of LIMA to LAD and SVG to OM2  Most likely Type 2 MI as per cardiology  Medical intervention recommend at this time  Echo complete - reveals 60% EF      Constipation  Assessment & Plan  Patient reports >1 week since last bowel movement  Complains of abdominal pain 2/2/2021  Did have significant bowel movement 2/2/2021 with improvement of symptoms  patient states he had fecal impaction during previous hospitalization that required manual disimpaction      - Schedule miralax  - Schedule senna/docusate  - Encourage PO hydration  - Suppositories     Urinary retention  Assessment & Plan  Overnight 1/27/2021 nursing reported no urine output  Bladder scan revealed >800cc urine  Patient was straight catheterized  - Continue urinary retention protocol    Oral candida  Assessment & Plan  · Nystatin mouthwash  Hyponatremia  Assessment & Plan  · Likely due to hypervolemia/CHF  130  2/1/2021  Asymptomatic  · Nephrology on board  · Monitor  Anemia  Assessment & Plan  · Monitor closely   Currently 8 1  · Pt now has oozing around HD cath site  · Have consented for blood if needed  · Required 1 unit PRBC overnight 1/30/2021 after hemoglobin found to be 6 7  Another unit required overnight 2/1/2021 after hemoglobin found to be 5 6  · Improved to 8 7  · Unknown source  · Continue to monitor    Acute on chronic diastolic congestive heart failure [facility])  Assessment & Plan  · Cardiologist and nephrologist on board  · D/c Bumex drip as per nephrology  · Continue cardiovascular and heart failure medications  · Monitor input and output  · Daily weights  · Currently on 2L o2 supplementation    Hx of CABG  Assessment & Plan  CAD s/p CABG in 2016; history of stenting prior to CABG (LIMA to LAD and SVG to OM)  Status post cardiac catheterization: significant triple vessel coronary artery disease  Patient has his patent grafts of LIMA to LAD and SVG to OM2  Continue cardiovascular meds  Leukocytosis  Assessment & Plan  · Leukocytosis 14 66 2/3/2021  · COVID-19, influenza, RSV tests were negative x2  · Chest x-ray officially read as multifocal pneumonia  · Thus will continue to treat as hospital-acquired pneumonia with IV cefepime 10 of 10  · Blood cultures x2 no growth  · Cough medicine/antitussive, lozenges and Chloraseptic spray p r n       Diabetes mellitus [facility])  Assessment & Plan  Lab Results   Component Value Date    HGBA1C 6 7 (H) 07/03/2019       Recent Labs     02/02/21  1126 02/02/21  1534 02/02/21  2148 02/03/21  0730   POCGLU 352* 358* 422* 375*       Blood Sugar Average: Last 72 hrs:  (P) 344 [id number]   Still poorly controlled  Continue Humalog sliding scale with Accu-Cheks q a c  And HS  Will adjust levemir to 40 units  We will add Humalog 3 times a day with meals  8 units  Adjust treatment accordingly      Acute renal failure superimposed on stage 3 chronic kidney disease [facility])  Assessment & Plan  Lab Results   Component Value Date    EGFR 12 02/03/2021    EGFR 13 02/02/2021    EGFR 12 02/01/2021    CREATININE 4 29 (H) 02/03/2021    CREATININE 4 09 (H) 02/02/2021    CREATININE 4 41 (H) 02/01/2021     Most recent 4 29 trending up  Baseline creatinine around 2 3-2 5  Previously on HD in 2019;   IR placed HD Cath    Nephrology on board   Most likely ATN post contrast for Cath  No longer on IV diuresis  As per nephrology, will hold dialysis today          VTE Pharmacologic Prophylaxis:   Pharmacologic: Heparin  Mechanical VTE Prophylaxis in Place: Yes    Discussions with Specialists or Other Care Team Provider: Hospitalist    Education and Discussions with Family / Patient: Patient    Current Length of Stay: 12 day(s)    Current Patient Status: Inpatient     Discharge Plan / Estimated Discharge Date: TBD    Code Status: Level 1 - Full Code      Subjective:   Patient was seen at bedside resting comfortably  States he feels well and his breathing has improved  He did have a large bowel movement and his abdominal pain has greatly improved  No new complaints today  Objective:     Vitals:   Temp (24hrs), Av 1 °F (36 7 °C), Min:97 5 °F (36 4 °C), Max:98 8 °F (37 1 °C)    Temp:  [97 5 °F (36 4 °C)-98 8 °F (37 1 °C)] 97 5 °F (36 4 °C)  HR:  [57-78] 68  Resp:  [20] 20  BP: (128-146)/(62-65) 130/62  SpO2:  [98 %-100 %] 98 %  There is no height or weight on file to calculate BMI  Input and Output Summary (last 24 hours): Intake/Output Summary (Last 24 hours) at 2/3/2021 1038  Last data filed at 2/3/2021 0538  Gross per 24 hour   Intake 840 ml   Output 1800 ml   Net -960 ml       Physical Exam:     Physical Exam  Vitals signs and nursing note reviewed  Constitutional:       General: He is not in acute distress  Appearance: He is well-developed  He is not ill-appearing or toxic-appearing  HENT:      Head: Normocephalic and atraumatic  Eyes:      General: No scleral icterus  Right eye: No discharge  Left eye: No discharge  Conjunctiva/sclera: Conjunctivae normal    Cardiovascular:      Rate and Rhythm: Normal rate and regular rhythm  Heart sounds: Normal heart sounds  No murmur  Pulmonary:      Effort: No respiratory distress  Breath sounds: Wheezing and rhonchi present  Abdominal:      General: Bowel sounds are normal  There is no distension  Palpations: Abdomen is soft  Tenderness: There is no abdominal tenderness  Musculoskeletal: Normal range of motion  General: No tenderness  Skin:     General: Skin is warm  Findings: No erythema or rash  Comments: HD cath in place  Blood oozing around site   Neurological:      Mental Status: He is alert  Motor: No weakness  Psychiatric:         Behavior: Behavior normal            Additional Data:     Labs:    Results from last 7 days   Lab Units 02/03/21  0825  02/01/21  0333   WBC Thousand/uL  --   --  14 66*   HEMOGLOBIN g/dL 8 1*   < > 7 8*   HEMATOCRIT % 24 2*   < > 23 5*   PLATELETS Thousands/uL  --   --  135*   NEUTROS PCT %  --   --  71   LYMPHS PCT %  --   --  13*   MONOS PCT %  --   --  10   EOS PCT %  --   --  3    < > = values in this interval not displayed  Results from last 7 days   Lab Units 02/03/21  0533   POTASSIUM mmol/L 4 2   CHLORIDE mmol/L 93*   CO2 mmol/L 22   BUN mg/dL 108*   CREATININE mg/dL 4 29*   CALCIUM mg/dL 8 1*     Results from last 7 days   Lab Units 01/31/21  2100   INR  1 18       * I Have Reviewed All Lab Data Listed Above  * Additional Pertinent Lab Tests Reviewed:  All Labs Within Last 24 Hours Reviewed    Imaging:    Imaging Reports Reviewed Today Include: N/A  Imaging Personally Reviewed by Myself Includes:  N/A    Recent Cultures (last 7 days):           Last 24 Hours Medication List:   Current Facility-Administered Medications   Medication Dose Route Frequency Provider Last Rate    acetaminophen  650 mg Oral Q6H PRN Kyle Pfeiffer MD      aluminum-magnesium hydroxide-simethicone  30 mL Oral Q6H PRN TABITHA Rojas      amLODIPine  5 mg Oral BID Estephania Villa, PAMamieC      aspirin  81 mg Oral Daily Kyle Pfeiffer MD      atorvastatin  40 mg Oral Daily With Remy Almanzar MD      benzonatate  100 mg Oral TID PRN Rosita Son, CRNP      bisacodyl  10 mg Rectal Daily PRN Kristine Mckeon DO      calcium carbonate  1,000 mg Oral Daily PRN Phuc Fields MD      cefepime  2,000 mg Intravenous Q24H Faye Correa MD 2,000 mg (02/02/21 1128)    clopidogrel  75 mg Oral Daily Phuc Fields MD      escitalopram  10 mg Oral HS Phuc Fields MD      guaiFENesin  1,200 mg Oral Q12H [facility] Rosita Mcleod, CRNP      heparin (porcine)  5,000 Units Subcutaneous Q8H [facility] Faye Correa MD      hydrALAZINE  50 mg Oral [place], PAMamie      HYDROmorphone  0 2 mg Intravenous Q3H PRN Faye Correa MD      insulin detemir  40 Units Subcutaneous Q12H [facility] Con MD Lucy      insulin lispro  1-6 Units Subcutaneous HS Phuc Fields MD      insulin lispro  1-6 Units Subcutaneous TID AC Con MD Lucy      insulin lispro  8 Units Subcutaneous TID With Meals Con MD Lucy      iron polysaccharides  150 mg Oral Daily [place], [place]      melatonin  3 mg Oral HS TABITHA Kim      metoprolol tartrate  25 mg Oral Q12H [id number], MD      nitroglycerin  0 4 mg Sublingual Q5 Min PRN Phuc Fields MD      nystatin  500,000 Units Swish & Swallow 4x Daily Faye Correa MD      ondansetron  4 mg Intravenous Q6H PRN Phuc Fields MD      oxyCODONE  2 5 mg Oral Q4H PRN Faye Correa MD      pantoprazole  40 mg Intravenous Q12H [facility] Con MD Lucy      phenol  1 spray Mouth/Throat Q4H PRN Faye Correa MD      pneumococcal 13-valent conjugate vaccine  0 5 mL Intramuscular Prior to discharge Phuc Fields MD      polyethylene glycol  17 g Oral Daily Con MD Lucy      polyethylene glycol  17 g Oral Daily PRN Jaymie Guillaume MD      senna-docusate sodium  1 tablet Oral HS Con MD Lucy      sodium chloride  1 spray Each Nare Q1H PRN Lily Cedillo MD          Today, Patient Was Seen By: Flip Estevez MD    ** Please Note: This note has been constructed using a voice recognition system   **

## 2021-02-03 NOTE — PLAN OF CARE
Problem: PHYSICAL THERAPY ADULT  Goal: Performs mobility at highest level of function for planned discharge setting  See evaluation for individualized goals  Description: Treatment/Interventions: Functional transfer training, LE strengthening/ROM, Therapeutic exercise, Endurance training, Patient/family training, Equipment eval/education, Bed mobility(PT to see for gait when appropriate)  Equipment Recommended: (Therapy will trial Christopher Beatncourt in upcoming sessions)       See flowsheet documentation for full assessment, interventions and recommendations  Outcome: Progressing  Note: Prognosis: Fair  Problem List: Decreased strength, Decreased range of motion, Decreased endurance, Impaired balance, Decreased mobility, Decreased coordination, Impaired vision, Pain  Assessment: Pt is agreeable to participate in PT intervention, he requires some initial motivation to participate, but after starting working with therapy, he is motivated to challenge self throughout session  Pt seen as co-treat w/ OT to safely challenge activity tolerance during session, and level of A x 2 skilled to perform further gait trial  Pt benefits from short seated rest breaks between tasks due to overall fatigue  Pt continues to exhibit some UE/upper trunk tremors in WB position w/ UE  Pt continues to be mod A x 1 for STS transfers and demonstrates good carryover of education for hand placement  Pt is appropriate to trial further ambulation today, and is able to participate in multiple short ambulation trials w/ min A x 2  Recommend continued practice w/ mobility w/ a chair follow, and encouraging more frequent OOB to chair transfers throughout the day for short bouts of time  PT Discharge Recommendation: Post-Acute Rehabilitation Services          See flowsheet documentation for full assessment

## 2021-02-03 NOTE — ASSESSMENT & PLAN NOTE
· Monitor closely  Currently 8 1  · Pt now has oozing around HD cath site  · Have consented for blood if needed  · Required 1 unit PRBC overnight 1/30/2021 after hemoglobin found to be 6 7   Another unit required overnight 2/1/2021 after hemoglobin found to be 5 6  · Improved to 8 7  · Unknown source  · Continue to monitor

## 2021-02-03 NOTE — ASSESSMENT & PLAN NOTE
Patient reports >1 week since last bowel movement  Complains of abdominal pain 2/2/2021  Did have significant bowel movement 2/2/2021 with improvement of symptoms   patient states he had fecal impaction during previous hospitalization that required manual disimpaction      - Schedule miralax  - Schedule senna/docusate  - Encourage PO hydration  - Suppositories

## 2021-02-03 NOTE — ASSESSMENT & PLAN NOTE
Lab Results   Component Value Date    EGFR 12 02/03/2021    EGFR 13 02/02/2021    EGFR 12 02/01/2021    CREATININE 4 29 (H) 02/03/2021    CREATININE 4 09 (H) 02/02/2021    CREATININE 4 41 (H) 02/01/2021     Most recent 4 29 trending up   Baseline creatinine around 2 3-2 5  Previously on HD in 2019;   IR placed HD Cath 1/25/202   Nephrology on board   Most likely ATN post contrast for Cath  No longer on IV diuresis  As per nephrology, may consider Dialysis 2/3/2021

## 2021-02-03 NOTE — PHYSICAL THERAPY NOTE
PHYSICAL THERAPY TREATMENT NOTE    Patient Name: Laila Simmons  OGSFS'A Date: 2/3/2021        02/03/21 0849   PT Last Visit   PT Visit Date 21   Note Type   Note Type Treatment   Pain Assessment   Pain Assessment Tool 0-10   Pain Score   ("stiffness")   Restrictions/Precautions   Weight Bearing Precautions Per Order No   Other Precautions Chair Alarm; Bed Alarm;O2;Fall Risk;Pain  (2L NC O2, masimo)   General   Chart Reviewed Yes   Additional Pertinent History SpO2 98% at start, ? dip to 88% w/ ambulation however may be due to  on RW, quickly incresaed to >90% once seated EOB   Response to Previous Treatment Patient reporting fatigue but able to participate  Family/Caregiver Present No   Cognition   Overall Cognitive Status Impaired   Arousal/Participation Responsive; Cooperative   Attention Attends with cues to redirect   Orientation Level Oriented X4  (Pt identified by full name and )   Memory Decreased recall of recent events   Following Commands Follows multistep commands with increased time or repetition   Subjective   Subjective Pt reports feeling stiff, says he thinks he was up too long in the chair (1345-~1600)   Bed Mobility   Supine to Sit 4  Minimal assistance   Additional items Assist x 1; Increased time required;Verbal cues;HOB elevated   Sit to Supine 3  Moderate assistance   Additional items Assist x 1; Increased time required;Verbal cues   Additional Comments Pt sat EOB x ~15 min w/ supervision/ CGA when performing dynamic tasks w/ OT   Transfers   Sit to Stand 3  Moderate assistance   Additional items Assist x 1; Increased time required;Verbal cues  (for hand placement)   Stand to Sit 3  Moderate assistance   Additional items Assist x 1; Increased time required;Verbal cues  (for hand placement)   Additional Comments Pt exhibits some UE tremors when WB UEs on RW, bedrails   Ambulation/Elevation   Gait pattern Decreased foot clearance;Narrow GHISLAINE; Short stride; Excessively slow   Gait Assistance 4  Minimal assist   Additional items Assist x 2   Assistive Device Rolling walker   Distance 2 ft x 2 (fwd, back), 6 ft x 2 (fwd, back), lateral steps towards HOB, 3 ft x 2   Balance   Static Sitting   (Fair - <> Poor +)   Static Standing Poor   Ambulatory Poor  (w/ RW)   Activity Tolerance   Activity Tolerance Patient limited by fatigue;Patient limited by pain   Medical Staff [place] coordination w/ OT Alfie Ndiaye to [address] to RN Suzan Spatz, [facility]   Exercises   Knee AROM Long Arc Quad Sitting;10 reps;AROM; Bilateral   Ankle Pumps Sitting;10 reps;AROM; Bilateral   Assessment   Prognosis Fair   Problem List Decreased strength;Decreased range of motion;Decreased endurance; Impaired balance;Decreased mobility; Decreased coordination; Impaired vision;Pain   Assessment Pt is agreeable to participate in PT intervention, he requires some initial motivation to participate, but after starting working with therapy, he is motivated to challenge self throughout session  Pt seen as co-treat w/ OT to safely challenge activity tolerance during session, and level of A x 2 skilled to perform further gait trial  Pt benefits from short seated rest breaks between tasks due to overall fatigue  Pt continues to exhibit some UE/upper trunk tremors in WB position w/ UE  Pt continues to be mod A x 1 for STS transfers and demonstrates good carryover of education for hand placement  Pt is appropriate to trial further ambulation today, and is able to participate in multiple short ambulation trials w/ min A x 2  Recommend continued practice w/ mobility w/ a chair follow, and encouraging more frequent OOB to chair transfers throughout the day for short bouts of time  Goals   Patient Goals to get stronger, less stiff   STG Expiration Date 02/06/21   Short Term Goal #1 Patient will:  Increase bilateral LE strength 1/2 grade to facilitate independent mobility, Perform all bed mobility tasks w/ supervision to decrease fall risk factors, Perform all transfers w/ supervision to improve independence, Ambulate at least 50 ft  with roller walker w/ minx1 w/o LOB, Increase all balance 1/2 grade to decrease risk for falls, Complete exercise program independently, Tolerate 3 hr OOB to faciliate upright tolerance and Improve Barthel Index score to 50 or greater to facilitate independence   PT Treatment Day 4   Plan   Treatment/Interventions Functional transfer training;LE strengthening/ROM; Therapeutic exercise; Endurance training;Cognitive reorientation;Patient/family training;Equipment eval/education; Bed mobility;Gait training  (PT to see for stairs if/when appropriate)   Progress Progressing toward goals   PT Frequency 5x/wk; Weekend   Recommendation   PT Discharge Recommendation Post-Acute Rehabilitation Services   Equipment Recommended Walker   Additional Comments Spoke to SEYMOUR Navarro about getting pt OOB to chair for meals - for up to an hour at a time  Also educated pt on requesting to do this, verbalized understanding  Also encouraged pt to ask about sitting EOB whenever staff enters to take meds, be examined, etc  Pt verbalizes understanding  AM-PAC Basic Mobility Inpatient   Turning in Bed Without Bedrails 3   Lying on Back to Sitting on Edge of Flat Bed 3   Moving Bed to Chair 2   Standing Up From Chair 2   Walk in Room 2   Climb 3-5 Stairs 1   Basic Mobility Inpatient Raw Score 13   Basic Mobility Standardized Score 33 99     Patient requires PT/OT co-treat due to signficant assistance with mobility, and to safely challenge activity tolerance  Both PT and OT goals were addressed separately during this session  Pt would continue to benefit from skilled PT during this admission in order to progress patient towards goals to decrease risk of falls and maximize independence       Tyrel Snider, PT, DPT

## 2021-02-03 NOTE — OCCUPATIONAL THERAPY NOTE
[address] Progress Note     Patient Name: Jeremiah Pro  SFTARAHYTONEY Date: 2/3/2021  Problem List  Principal Problem:    Coronary artery disease  Active Problems:    Acute renal failure superimposed on stage 3 chronic kidney disease (HCC)    Diabetes mellitus ([facility] [address] )    Leukocytosis    Hx of CABG    Acute on chronic diastolic congestive heart failure (HCC)    Anemia    Hyponatremia    Oral candida    ATN (acute tubular necrosis) ([facility] [address] )    Urinary retention    Constipation          02/03/21 0952   OT Last Visit   OT Visit Date 02/03/21   Note Type   Note Type Treatment   Restrictions/Precautions   Weight Bearing Precautions Per Order No   Other Precautions Chair Alarm; Bed Alarm;O2;Fall Risk;Pain   Lifestyle   Autonomy Pt reports I w/ ADL/ IADL PTA using cane vs walker for functional mobility and no O2   Reciprocal Relationships Pt reports living alone  Supportive local friends that he used to work with  Pt added that he has a  watching [id number] to Others 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 [address] [facility] Pt reports enjoying his cat   Pain Assessment   Pain Assessment Tool Pain Assessment not indicated - pt denies pain   ADL   Where Assessed Edge of bed   Grooming Assistance 5  Supervision/Setup   Grooming Deficit Wash/dry hands; Wash/dry face   UB Bathing Assistance 3  Moderate Assistance   UB Bathing Deficit Chest;Right arm;Left arm  (set up to wash front, assistance to wash back )   UB Bathing Comments Pt provided with washcloths and basin to wash face, chest, armpits, assisted to wash back only  LB Bathing Assistance 3  Moderate Assistance   LB Bathing Deficit Perineal area; Buttocks   LB Bathing Comments Pt stands with one hand on RW to complete rear luis enrique hygiene, mod A to complete thoroughly      UB Dressing Assistance 4  Minimal Assistance   UB Dressing Comments Min A to doff dirty gown and don fresh one   Functional Standing Tolerance   Time 3 minutes   Activity Kita hygiene, walking, side stepping  Bed Mobility   Supine to Sit 4  Minimal assistance   Additional items Assist x 1   Sit to Supine 3  Moderate assistance   Additional items Assist x 1;LE management   Additional Comments Pt tolerates sitting EOB for 10 minutes to wash up  Transfers   Sit to Stand 3  Moderate assistance   Additional items Assist x 2;Assist x 1   Stand to Sit 3  Moderate assistance   Additional items Assist x 2;Assist x 1   Additional Comments Pt performs multiple trials of sit to stand to side step to [facility], walk forwards and backwards with PT and to perform LB bathing  Cognition   Overall Cognitive Status Impaired   Arousal/Participation Responsive; Cooperative   Attention Attends with cues to redirect   Orientation Level Oriented X4   Memory Decreased recall of recent events   Following Commands Follows multistep commands with increased time or repetition   Comments Pleasant and motivated to participate in therapy  Activity Tolerance   Activity Tolerance Patient tolerated treatment well   Medical Staff Made Aware OK to see per RN, treated with Tanya, PT   Assessment   Assessment Pt seen this AM for treatment session to consist of bed mobility, transfers, sitting tolerance for ADLs, grooming, bathing and dressing  Pt tolerates all activity well but expresses dissapointment with his percieved weakness  Grossly min A for bed mobility and STS transfers, tolerating time seated and standing with stable O2 sats  Mod A for bathing overall  Pt to benefit from continued OT to maximize independence in ADLs and functional mobility  Recommending short term rehab at OR  Plan   Treatment Interventions ADL retraining;Functional transfer training;UE strengthening/ROM; Endurance training;Patient/family training;Equipment evaluation/education; Compensatory technique education;Continued evaluation; Energy conservation   Goal Expiration Date 02/08/21   OT Frequency 3-5x/wk Recommendation   OT Discharge Recommendation Post-Acute Rehabilitation Services   AM-PAC Daily Activity Inpatient   Lower Body Dressing 2   Bathing 2   Toileting 2   Upper Body Dressing 3   Grooming 3   Eating 4   Daily Activity Raw Score 16   Daily Activity Standardized Score (Calc for Raw Score >=11) 35 96     WARREN Stein, OTR/L

## 2021-02-03 NOTE — CASE MANAGEMENT
CM met with patient at bedside to introduce self, explain role, and discuss DC planning  CM discussed therapy's evaluation and care team recommendation for post acute rehabilitation services  CM discussed acute vs subacute, the benefits of either option, and freedom of choice in facilities for both levels of rehabilitation  Patient would like blanket referral sent to acute rehabs  [facility]),  [facility] (2 locations), and [facility]  Patient reports no preference in acute rehab facility, and would like to see his options  IMM reviewed with patient at bedside, signed by CM, and placed in medical records bin  Copy provided to patient's bedside for his records  CM will continue to follow

## 2021-02-03 NOTE — CONSULTS
Consultation - Farhana Navarro [de-identified] y o  male MRN: [id number]    Unit/Bed#: S -01 Encounter: [id number]      Assessment/Plan     ATN (acute tubular necrosis) [facility])  Assessment & Plan  Management as per primary team    Acute on chronic diastolic congestive heart failure (HCC)  Assessment & Plan  Wt Readings from Last 3 Encounters:   01/21/21 99 6 kg (219 lb 9 3 oz)   07/15/19 99 5 kg (219 lb 5 7 oz)   12/05/16 99 4 kg (219 lb 2 2 oz)     Management as per primary team        Type 2 diabetes mellitus with hyperglycemia, with long-term current use of insulin [facility])  Assessment & Plan  Lab Results   Component Value Date    HGBA1C 6 7 (H) 07/03/2019       Recent Labs     02/03/21  1129 02/03/21  1343 02/03/21  1519 02/03/21  1523   POCGLU 484* >500* >500* >500*     Type 2 diabetes for more than 30 years  Complicated with retinopathy, nephropathy, CAD  Patient home regimen is Levemir 20 units twice daily and ? NovoLog 20 units before each meals  Most recent A1c in 2019 was 6 7%  patient performs home glucose monitoring, once daily couple as per him his blood sugars are within goal   Patient is admitted for non STEMI status post C  Patient is currently on Levemir 30 units twice daily and Humalog 8 units before each meals and correctional sliding scale  Patient has been hyperglycemic for last few days with blood sugars more than 500 mg/dL and high anion gap metabolic acidosis  We recommend to start none DKA protocol for now,  Check beta hydroxybutyrate  Check hemoglobin A1c  Check C-peptide  Will follow patient blood sugars and lab result and make any necessary changes according          Acute renal failure superimposed on stage 3 chronic kidney disease [facility])  Assessment & Plan  Lab Results   Component Value Date    EGFR 12 02/03/2021    EGFR 13 02/02/2021    EGFR 12 02/01/2021    CREATININE 4 29 (H) 02/03/2021    CREATININE 4 09 (H) 02/02/2021    CREATININE 4 41 (H) 02/01/2021   Management as per primary team   Nephrology on board  CC: Diabetes Consult    History of Present Illness      HPI:     HPI: Penny Ramos is a [de-identified]y o -year-old male with past medical history CAD status post CABG, type 2 diabetes with long-term use of insulin, CKD, who is admitted for non-STEMI status post [facility],   Patient has 30 years history of type 2 diabetes, which is complicated with nephropathy, retinopathy, CAD  Home regimen is Levemir 20 units b i d  And? NovoLog 20 units before each meals, he states that his perform home glucose monitoring once daily, and as per him his blood sugars were controlled, most recent A1c in 2019 was 6 7%  Patient currently on Levemir 30 units b i d  And Humalog 8 units t i d  With meals and correctional sliding scale however patient developed severe hyperglycemia with high anion gap metabolic acidosis for last few days  Patient has had acute renal failure superimposed on a stage III CKD for which patient received dialysis, which is on hold today  Patient was seen and examined at bedside, drowsy but arousable, complains of feeling thirsty, denies abdominal pain, nausea or vomiting  He reports good appetite i      Inpatient consult to Endocrinology     Performed by  Modesta Zimmer MD     Authorized by Kacey Chandler MD              Review of Systems   Constitutional: Positive for fatigue  Negative for activity change, appetite change, chills, diaphoresis, fever and unexpected weight change  HENT: Negative for congestion, drooling, ear discharge, ear pain, trouble swallowing and voice change  Eyes: Negative for photophobia, pain, discharge, redness, itching and visual disturbance  Respiratory: Negative for cough, chest tightness, shortness of breath and wheezing  Cardiovascular: Negative for chest pain, palpitations and leg swelling  Gastrointestinal: Negative for abdominal distention, abdominal pain, blood in stool, diarrhea, nausea and vomiting  Endocrine: Positive for polydipsia  Negative for cold intolerance, heat intolerance, polyphagia and polyuria  Genitourinary: Negative for dysuria, flank pain, frequency and hematuria  Musculoskeletal: Negative for back pain, gait problem, joint swelling, myalgias, neck pain and neck stiffness  Skin: Negative for color change, pallor, rash and wound  Neurological: Negative for dizziness, tremors, seizures, syncope, speech difficulty, weakness, numbness and headaches  Psychiatric/Behavioral: Negative for agitation         Historical Information   Past Medical History:   Diagnosis Date    Acute on chronic diastolic CHF (congestive heart failure) ([facility] ) 7/4/2019    Arthritis     Back pain     Bladder cancer ([facility] )     diagnosed  2/2016    Cancer [facility])     bladder; chemo; resection;     Coronary artery disease     has 2 coronary stents    Dental crowns present      5    Diabetes mellitus ([place] [id number] )     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[place] [address] )     fluid behind right eye    Hematuria     hx of    High anion gap metabolic acidosis 2/69/4991    Hypercholesteremia     Hypertension     Kidney stones     Wears glasses      Past Surgical History:   Procedure Laterality Date    ABDOMINAL SURGERY      CARDIAC SURGERY      CHOLECYSTECTOMY      open    CORONARY ANGIOPLASTY WITH STENT PLACEMENT      2 stents  2009    CYSTECTOMY, PARTIAL N/A 11/30/2016    Procedure: PARTIAL CYSTECTOMY, LEFT LYMPHADENECTOMY;  Surgeon: Arleth Meza MD;  Location: [address] [place] [address];  Service:     CYSTOSCOPY Left 10/20/2016    Procedure: CYSTOSCOPY, BILATERAL RETROGRADE PYLEOGRAM, LEFT SIDE BLADDER BIOPSY, TURBT;  Surgeon: Arleth Meza MD;  Location: [address] [place] [address];  Service:     HERNIA REPAIR      repair [facility]    IR NON-TUNNELED CENTRAL LINE PLACEMENT  1/25/2021    IR TEMPORARY DIALYSIS CATHETER PLACEMENT  7/8/2019    IR TUNNELED DIALYSIS CATHETER PLACEMENT  7/12/2019    IR TUNNELED DIALYSIS CATHETER REMOVAL  9/9/2019    GA CYSTOURETHROSCOPY N/A 2/25/2016    Procedure: CYSTOSCOPY;  Surgeon: Marily Pena MD;  Location: [address] [place] [address];  Service: Urology    [place] CYSTOURETHROSCOPY,FULGUR 2-5 CM LESN N/A 2016    Procedure: TRANSURETHRAL RESECTION OF BLADDER TUMOR (TURBT);   Surgeon: Marily Pena MD;  Location: [address] [place] [address];  Service: Urology    TRANSURETHRAL RESECTION OF BLADDER TUMOR N/A 2016    Procedure: TRANSURETHRAL RESECTION OF BLADDER TUMOR (TURBT), Cystoscopy, deep biopsy;  Surgeon: Marily Pena MD;  Location: [facility];  Service:     TRANSURETHRAL RESECTION OF BLADDER TUMOR Bilateral 2016    Procedure: TRANSURETHRAL RESECTION OF BLADDER TUMOR (TURBT), Cysto, retrograde, BLADDER BIOPSY;  Surgeon: Marily Pena MD;  Location: [place] [facility];  Service:      Social History   Social History     Substance and Sexual Activity   Alcohol Use Yes    Comment: socially     Social History     Substance and Sexual Activity   Drug Use No     Social History     Tobacco Use   Smoking Status Former Smoker    Packs/day: 0 50    Years: 5 00    Pack years: 2 50    Quit date:     Years since quittin 1   Smokeless Tobacco Never Used     Family History:   Family History   Problem Relation Age of Onset    Heart disease Mother     Diabetes Mother     Diabetes Father        Meds/Allergies   Current Facility-Administered Medications   Medication Dose Route Frequency Provider Last Rate Last Admin    acetaminophen (TYLENOL) tablet 650 mg  650 mg Oral Q6H PRN Swathi Schumacher MD   650 mg at 21 0636    aluminum-magnesium hydroxide-simethicone (MYLANTA) oral suspension 30 mL  30 mL Oral Q6H PRN Woodward Goodell, CRNP        amLODIPine (NORVASC) tablet 5 mg  5 mg Oral BID [facility], PA-C   5 mg at 21 1892    aspirin (ECOTRIN LOW STRENGTH) EC tablet 81 mg  81 mg Oral Daily Christy Park MD   81 mg at 21 9944    atorvastatin (LIPITOR) tablet 40 mg  40 mg Oral Daily With Ajay Delvalle MD   40 mg at 21 0692    benzonatate (TESSALON PERLES) capsule 100 mg  100 mg Oral TID PRN TABITHA Crews        bisacodyl (DULCOLAX) rectal suppository 10 mg  10 mg Rectal Daily PRN Kassy Stephenson DO   10 mg at 02/02/21 0053    calcium carbonate (TUMS) chewable tablet 1,000 mg  1,000 mg Oral Daily PRN Eder Rose MD        cefepime (MAXIPIME) 2,000 mg in dextrose 5 % 50 mL IVPB  2,000 mg Intravenous Q24H Faye Correa  mL/hr at 02/03/21 1101 2,000 mg at 02/03/21 1101    clopidogrel (PLAVIX) tablet 75 mg  75 mg Oral Daily Swathi Schumacher MD   75 mg at 02/03/21 0922    escitalopram (LEXAPRO) tablet 10 mg  10 mg Oral HS Eder Rose MD   10 mg at 02/02/21 2153    guaiFENesin (MUCINEX) 12 hr tablet 1,200 mg  1,200 mg Oral Q12H [address] TABITHA Crews   1,200 mg at 02/03/21 8129    heparin (porcine) subcutaneous injection 5,000 Units  5,000 Units Subcutaneous [facility] Faye Correa MD   Stopped at 02/02/21 2152    hydrALAZINE (APRESOLINE) tablet 50 mg  50 mg Oral [facility] RENETTA Chakraborty   50 mg at 02/03/21 0540    HYDROmorphone (DILAUDID) injection 0 2 mg  0 2 mg Intravenous Q3H PRN Faye Correa MD   0 2 mg at 02/02/21 0815    insulin regular (HumuLIN R,NovoLIN R) 1 Units/mL in sodium chloride 0 9 % 100 mL infusion  0 3-21 Units/hr Intravenous Titrated Tunde Montilla MD        iron polysaccharides (FERREX) capsule 150 mg  150 mg Oral Daily RENETTA Chakraborty   150 mg at 02/03/21 5174    melatonin tablet 3 mg  3 mg Oral HS TABITHA Cr   3 mg at 02/02/21 2153    metoprolol tartrate (LOPRESSOR) tablet 25 mg  25 mg Oral Q12H [address] Eder Rose MD   25 mg at 02/03/21 0922    nitroglycerin (NITROSTAT) SL tablet 0 4 mg  0 4 mg Sublingual Q5 Min PRN Swathi Schumacher MD        nystatin (MYCOSTATIN) oral suspension 500,000 Units  500,000 Units Swish & Swallow 4x Daily Faye Correa MD   500,000 Units at 02/03/21 1101    ondansetron (ZOFRAN) injection 4 mg  4 mg Intravenous Q6H PRN Chad Ordonez MD   4 mg at 01/29/21 1352    oxyCODONE (ROXICODONE) IR tablet 2 5 mg  2 5 mg Oral Q4H PRN Faye Correa MD        pantoprazole (PROTONIX) injection 40 mg  40 mg Intravenous Q12H [facility] Suzanne Muñoz MD   40 mg at 02/03/21 0922    phenol (CHLORASEPTIC) 1 4 % mucosal liquid 1 spray  1 spray Mouth/Throat Q4H PRN Faye Correa MD        pneumococcal 13-valent conjugate vaccine (PREVNAR-13) IM injection 0 5 mL  0 5 mL Intramuscular Prior to discharge Chad Ordonez MD        polyethylene glycol (MIRALAX) packet 17 g  17 g Oral Daily Suzanne Muñoz MD   17 g at 02/02/21 0813    polyethylene glycol (MIRALAX) packet 17 g  17 g Oral Daily PRN Manuel Paniagua MD        senna-docusate sodium (SENOKOT S) 8 6-50 mg per tablet 1 tablet  1 tablet Oral HS Suzanne Muñoz MD   1 tablet at 02/02/21 2153    sodium chloride (OCEAN) 0 65 % nasal spray 1 spray  1 spray Each Nare Q1H PRN Jay Pacheco MD   1 spray at 01/31/21 1659     No Known Allergies    Objective   Vitals: Blood pressure 132/60, pulse 69, temperature 97 5 °F (36 4 °C), temperature source Oral, resp  rate 18, SpO2 98 %  Intake/Output Summary (Last 24 hours) at 2/3/2021 1548  Last data filed at 2/3/2021 1414  Gross per 24 hour   Intake 480 ml   Output 2050 ml   Net -1570 ml     Invasive Devices     Peripheral Intravenous Line            Peripheral IV 02/01/21 Left Wrist 2 days          Hemodialysis Catheter            HD Temporary Double Catheter 9 days          Drain            Open Drain Left; Anterior; Other (Comment) LLQ 1526 days                Physical Exam  Vitals signs reviewed  Constitutional:       General: He is not in acute distress  Appearance: Normal appearance  He is not ill-appearing, toxic-appearing or diaphoretic  Comments: Drowsy but arousable   HENT:      Head: Normocephalic and atraumatic  Nose: No congestion  Eyes:      General:         Right eye: No discharge           Left eye: No discharge  Extraocular Movements: Extraocular movements intact  Pupils: Pupils are equal, round, and reactive to light  Neck:      Musculoskeletal: Normal range of motion and neck supple  No neck rigidity or muscular tenderness  Vascular: No carotid bruit  Cardiovascular:      Rate and Rhythm: Normal rate and regular rhythm  Pulses: Normal pulses  Heart sounds: No murmur  Pulmonary:      Effort: No respiratory distress  Breath sounds: No wheezing, rhonchi or rales  Abdominal:      General: Abdomen is flat  There is no distension  Palpations: Abdomen is soft  There is no mass  Tenderness: There is no abdominal tenderness  Musculoskeletal:         General: No swelling or tenderness  Right lower leg: No edema  Left lower leg: No edema  Lymphadenopathy:      Cervical: No cervical adenopathy  Skin:     General: Skin is warm and dry  Capillary Refill: Capillary refill takes less than 2 seconds  Neurological:      General: No focal deficit present  Mental Status: He is oriented to person, place, and time  The history was obtained from the review of the chart, patient      Lab Results:       Lab Results   Component Value Date    WBC 14 66 (H) 02/01/2021    HGB 8 1 (L) 02/03/2021    HCT 24 2 (L) 02/03/2021    MCV 94 02/01/2021     (L) 02/01/2021     Lab Results   Component Value Date/Time     (H) 02/03/2021 05:33 AM    BUN 35 (H) 01/05/2016 08:05 AM     01/05/2016 08:05 AM    K 4 2 02/03/2021 05:33 AM    K 4 5 01/05/2016 08:05 AM    CL 93 (L) 02/03/2021 05:33 AM     01/05/2016 08:05 AM    CO2 22 02/03/2021 05:33 AM    CO2 19 (L) 01/24/2021 12:19 AM    CREATININE 4 29 (H) 02/03/2021 05:33 AM    CREATININE 1 5 (H) 01/05/2016 08:05 AM    AST 53 (H) 01/23/2021 05:30 AM    AST 22 01/05/2016 08:05 AM    ALT 71 01/23/2021 05:30 AM    ALT 53 01/05/2016 08:05 AM    ALB 3 1 (L) 01/23/2021 05:30 AM    ALB 3 5 01/05/2016 08:05 AM No results for input(s): CHOL, HDL, LDL, TRIG, VLDL in the last 72 hours  No results found for: Darcy Phipps  POC Glucose (mg/dl)   Date Value   02/03/2021 >500 ()   02/03/2021 >500 (HH)   02/03/2021 >500 (HH)   02/03/2021 484 (H)   02/03/2021 >500 (HH)   02/03/2021 >500 ([facility])   02/03/2021 375 (H)   02/02/2021 422 (H)   02/02/2021 358 (H)   02/02/2021 352 (H)       Imaging Studies: I have personally reviewed pertinent reports  Portions of the record may have been created with voice recognition software

## 2021-02-03 NOTE — ASSESSMENT & PLAN NOTE
Lab Results   Component Value Date    HGBA1C 6 7 (H) 07/03/2019       Recent Labs     02/03/21  1129 02/03/21  1343 02/03/21  1519 02/03/21  1523   POCGLU 484* >500* >500* >500*     Type 2 diabetes for more than 30 years  Complicated with retinopathy, nephropathy, CAD  Patient home regimen is Levemir 20 units twice daily and ? NovoLog 20 units before each meals  Most recent A1c in 2019 was 6 7%  Patient is currently on insulin infusion drip and he requires average 6 units insulin per hour  With transition him off in insulin infusion with Levemir 40 units b i d , Humalog 25 units before each meals starting now( to prevent fluctuation in insulin infusion does rate), with correctional sliding scale  Discontinue insulin infusion drip 2 hours after 1st dose of Levemir    Continue to monitor blood sugar over time and make adjustments to the regimen if necessary

## 2021-02-03 NOTE — ASSESSMENT & PLAN NOTE
Lab Results   Component Value Date    HGBA1C 6 7 (H) 07/03/2019       Recent Labs     02/02/21  1126 02/02/21  1534 02/02/21  2148 02/03/21  0730   POCGLU 352* 358* 422* 375*       Blood Sugar Average: Last 72 hrs:  (P) 344 [id number]   Still poorly controlled  Continue Humalog sliding scale with Accu-Cheks q a c  And HS  Will adjust levemir to 40 units  We will add Humalog 3 times a day with meals  8 units  Adjust treatment accordingly

## 2021-02-03 NOTE — PROGRESS NOTES
Patient's lunch blood sugar greater than 500 x2  Gave 14 units of Lispro  Rechecked blood sugar 488  Rechecked again was over 500  Patient only complaint is that he "feels more tired " Patient does seem a bit more sleepy than this morning from my observation  Aubrey made aware  Another 10 units of Lispro given at 1440    Will recheck

## 2021-02-04 PROBLEM — K59.00 CONSTIPATION: Status: RESOLVED | Noted: 2021-02-01 | Resolved: 2021-02-04

## 2021-02-04 LAB
ANION GAP SERPL CALCULATED.3IONS-SCNC: 15 MMOL/L (ref 4–13)
BUN SERPL-MCNC: 120 MG/DL (ref 5–25)
CALCIUM SERPL-MCNC: 8.2 MG/DL (ref 8.3–10.1)
CHLORIDE SERPL-SCNC: 93 MMOL/L (ref 100–108)
CO2 SERPL-SCNC: 22 MMOL/L (ref 21–32)
CREAT SERPL-MCNC: 4.88 MG/DL (ref 0.6–1.3)
ERYTHROCYTE [DISTWIDTH] IN BLOOD BY AUTOMATED COUNT: 13.6 % (ref 11.6–15.1)
GFR SERPL CREATININE-BSD FRML MDRD: 10 ML/MIN/1.73SQ M
GLUCOSE SERPL-MCNC: 107 MG/DL (ref 65–140)
GLUCOSE SERPL-MCNC: 124 MG/DL (ref 65–140)
GLUCOSE SERPL-MCNC: 125 MG/DL (ref 65–140)
GLUCOSE SERPL-MCNC: 145 MG/DL (ref 65–140)
GLUCOSE SERPL-MCNC: 155 MG/DL (ref 65–140)
GLUCOSE SERPL-MCNC: 169 MG/DL (ref 65–140)
GLUCOSE SERPL-MCNC: 184 MG/DL (ref 65–140)
GLUCOSE SERPL-MCNC: 191 MG/DL (ref 65–140)
GLUCOSE SERPL-MCNC: 194 MG/DL (ref 65–140)
GLUCOSE SERPL-MCNC: 197 MG/DL (ref 65–140)
GLUCOSE SERPL-MCNC: 216 MG/DL (ref 65–140)
GLUCOSE SERPL-MCNC: 222 MG/DL (ref 65–140)
GLUCOSE SERPL-MCNC: 89 MG/DL (ref 65–140)
HCT VFR BLD AUTO: 23.4 % (ref 36.5–49.3)
HCT VFR BLD AUTO: 23.4 % (ref 36.5–49.3)
HGB BLD-MCNC: 7.8 G/DL (ref 12–17)
HGB BLD-MCNC: 7.8 G/DL (ref 12–17)
MCH RBC QN AUTO: 31.2 PG (ref 26.8–34.3)
MCHC RBC AUTO-ENTMCNC: 33.3 G/DL (ref 31.4–37.4)
MCV RBC AUTO: 94 FL (ref 82–98)
PLATELET # BLD AUTO: 165 THOUSANDS/UL (ref 149–390)
PMV BLD AUTO: 11.3 FL (ref 8.9–12.7)
POTASSIUM SERPL-SCNC: 3.6 MMOL/L (ref 3.5–5.3)
RBC # BLD AUTO: 2.5 MILLION/UL (ref 3.88–5.62)
SODIUM SERPL-SCNC: 130 MMOL/L (ref 136–145)
WBC # BLD AUTO: 15.47 THOUSAND/UL (ref 4.31–10.16)

## 2021-02-04 PROCEDURE — 80048 BASIC METABOLIC PNL TOTAL CA: CPT | Performed by: FAMILY MEDICINE

## 2021-02-04 PROCEDURE — 99232 SBSQ HOSP IP/OBS MODERATE 35: CPT | Performed by: FAMILY MEDICINE

## 2021-02-04 PROCEDURE — 99232 SBSQ HOSP IP/OBS MODERATE 35: CPT | Performed by: INTERNAL MEDICINE

## 2021-02-04 PROCEDURE — 82948 REAGENT STRIP/BLOOD GLUCOSE: CPT

## 2021-02-04 PROCEDURE — 85027 COMPLETE CBC AUTOMATED: CPT | Performed by: FAMILY MEDICINE

## 2021-02-04 PROCEDURE — 85014 HEMATOCRIT: CPT | Performed by: FAMILY MEDICINE

## 2021-02-04 PROCEDURE — 85018 HEMOGLOBIN: CPT | Performed by: FAMILY MEDICINE

## 2021-02-04 RX ORDER — PANTOPRAZOLE SODIUM 40 MG/1
40 TABLET, DELAYED RELEASE ORAL
Status: DISCONTINUED | OUTPATIENT
Start: 2021-02-04 | End: 2021-02-12 | Stop reason: HOSPADM

## 2021-02-04 RX ADMIN — NYSTATIN 500000 UNITS: 100000 SUSPENSION ORAL at 11:11

## 2021-02-04 RX ADMIN — HEPARIN SODIUM 5000 UNITS: 5000 INJECTION INTRAVENOUS; SUBCUTANEOUS at 13:14

## 2021-02-04 RX ADMIN — NYSTATIN 500000 UNITS: 100000 SUSPENSION ORAL at 08:16

## 2021-02-04 RX ADMIN — SALINE NASAL SPRAY 1 SPRAY: 1.5 SOLUTION NASAL at 11:11

## 2021-02-04 RX ADMIN — PANTOPRAZOLE SODIUM 40 MG: 40 TABLET, DELAYED RELEASE ORAL at 15:54

## 2021-02-04 RX ADMIN — NYSTATIN 500000 UNITS: 100000 SUSPENSION ORAL at 18:23

## 2021-02-04 RX ADMIN — ACETAMINOPHEN 650 MG: 325 TABLET, FILM COATED ORAL at 18:23

## 2021-02-04 RX ADMIN — Medication 3 MG: at 23:25

## 2021-02-04 RX ADMIN — GUAIFENESIN 1200 MG: 600 TABLET, EXTENDED RELEASE ORAL at 08:15

## 2021-02-04 RX ADMIN — PANTOPRAZOLE SODIUM 40 MG: 40 TABLET, DELAYED RELEASE ORAL at 08:15

## 2021-02-04 RX ADMIN — POLYSACCHARIDE-IRON COMPLEX 150 MG: 150 CAPSULE ORAL at 08:16

## 2021-02-04 RX ADMIN — ASPIRIN 81 MG: 81 TABLET ORAL at 08:16

## 2021-02-04 RX ADMIN — ATORVASTATIN CALCIUM 40 MG: 40 TABLET, FILM COATED ORAL at 15:54

## 2021-02-04 RX ADMIN — CEFEPIME HYDROCHLORIDE 2000 MG: 2 INJECTION, POWDER, FOR SOLUTION INTRAVENOUS at 11:11

## 2021-02-04 RX ADMIN — HEPARIN SODIUM 5000 UNITS: 5000 INJECTION INTRAVENOUS; SUBCUTANEOUS at 21:29

## 2021-02-04 RX ADMIN — NYSTATIN 500000 UNITS: 100000 SUSPENSION ORAL at 21:30

## 2021-02-04 RX ADMIN — CLOPIDOGREL BISULFATE 75 MG: 75 TABLET ORAL at 08:16

## 2021-02-04 RX ADMIN — SODIUM CHLORIDE 9 UNITS/HR: 9 INJECTION, SOLUTION INTRAVENOUS at 01:21

## 2021-02-04 RX ADMIN — DOCUSATE SODIUM AND SENNOSIDES 1 TABLET: 8.6; 5 TABLET ORAL at 21:30

## 2021-02-04 RX ADMIN — HEPARIN SODIUM 5000 UNITS: 5000 INJECTION INTRAVENOUS; SUBCUTANEOUS at 05:02

## 2021-02-04 RX ADMIN — INSULIN DETEMIR 40 UNITS: 100 INJECTION, SOLUTION SUBCUTANEOUS at 21:31

## 2021-02-04 RX ADMIN — INSULIN LISPRO 25 UNITS: 100 INJECTION, SOLUTION INTRAVENOUS; SUBCUTANEOUS at 17:59

## 2021-02-04 RX ADMIN — ESCITALOPRAM 10 MG: 10 TABLET, FILM COATED ORAL at 21:30

## 2021-02-04 RX ADMIN — METOPROLOL TARTRATE 25 MG: 25 TABLET, FILM COATED ORAL at 08:16

## 2021-02-04 RX ADMIN — METOPROLOL TARTRATE 25 MG: 25 TABLET, FILM COATED ORAL at 21:30

## 2021-02-04 RX ADMIN — GUAIFENESIN 1200 MG: 600 TABLET, EXTENDED RELEASE ORAL at 21:30

## 2021-02-04 NOTE — PROGRESS NOTES
Progress Note - Marbin Dugan 1940, [de-identified] y o  male MRN: [id number]    Unit/Bed#: S -01 Encounter: [id number]    Primary Care Provider: Verito Grey MD   Date and time admitted to hospital: 1/22/2021 12:28 PM        * Coronary artery disease  Assessment & Plan    Presented with chest pain at [facility]   Hx of CAD s/p stenting and CABG 2016  Received ASA 324mg at home and SL nitro x1 in the ER  Troponin peaked at 5 5  Continue cardiovascular medications with aspirin, Plavix, statin, beta-blocker and Imdur  Cardiac Cath Completed at [facility]   - three-vessel disease with 99% occluded proximal circumflex, 50% distal left main, mid % occluded, RCA proximally 100% occluded with collaterals from left circulation  Patient has his patent grafts of LIMA to LAD and SVG to OM2  Most likely Type 2 MI as per cardiology  Medical intervention recommend at this time  Echo complete - reveals 60% EF      Urinary retention  Assessment & Plan  Straight cath 2/4/2021 revealed >400cc urine      - As per nephrology, continue bladder scans and straight cath >250cc    Oral candida  Assessment & Plan  · Nystatin mouthwash  Hyponatremia  Assessment & Plan  · Likely due to hypervolemia/CHF  130  2/4/2021  Asymptomatic  · Nephrology on board  · Monitor  Anemia  Assessment & Plan  · Monitor closely  Currently 7 8  · Pt now has oozing around HD cath site  · Have consented for blood if needed  · Required 1 unit PRBC overnight 1/30/2021 after hemoglobin found to be 6 7  Another unit required overnight 2/1/2021 after hemoglobin found to be 5 6  · Improved to 8 7  · Unknown source  · Continue to monitor    Acute on chronic diastolic congestive heart failure [facility])  Assessment & Plan  · Cardiologist and nephrologist on board  · D/c Bumex drip as per nephrology  · Continue cardiovascular and heart failure medications  · Monitor input and output  · Daily weights    · Currently on room air    Hx of CABG  Assessment & Plan  CAD s/p CABG in 2016; history of stenting prior to CABG (LIMA to LAD and SVG to OM)  Status post cardiac catheterization: significant triple vessel coronary artery disease  Patient has his patent grafts of LIMA to LAD and SVG to OM2  Continue cardiovascular meds  Leukocytosis  Assessment & Plan  · Leukocytosis 15 47 2/4/2021  · COVID-19, influenza, RSV tests were negative x2  · Chest x-ray officially read as multifocal pneumonia  · Completed cefepime  · Blood cultures x2 no growth  · Cough medicine/antitussive, lozenges and Chloraseptic spray p r n  Arlen Hamlin Type 2 diabetes mellitus with hyperglycemia, with long-term current use of insulin [facility])  Assessment & Plan  Lab Results   Component Value Date    HGBA1C 6 7 (H) 07/03/2019       Recent Labs     02/04/21  0459 02/04/21  0658 02/04/21  0913 02/04/21  1110   POCGLU 124 222* 155* 184*       Blood Sugar Average: Last 72 hrs:  (P) 044 [id number]   Continue insulin drip  Endocrinology on board    Acute renal failure superimposed on stage 3 chronic kidney disease [facility])  Assessment & Plan  Lab Results   Component Value Date    EGFR 10 02/04/2021    EGFR 11 02/03/2021    EGFR 12 02/03/2021    CREATININE 4 88 (H) 02/04/2021    CREATININE 4 53 (H) 02/03/2021    CREATININE 4 29 (H) 02/03/2021     Most recent 4 88 trending up   Baseline creatinine around 2 3-2 5  Previously on HD in 2019;   IR placed HD Cath 1/25/202   Nephrology on board   Most likely ATN post contrast for Cath  No longer on IV diuresis  As per nephrology, may consider Dialysis 2/5/2021 if creatinine continues to trend up          VTE Pharmacologic Prophylaxis:   Pharmacologic: Heparin  Mechanical VTE Prophylaxis in Place: Yes    Discussions with Specialists or Other Care Team Provider: Hospitalist    Education and Discussions with Family / Patient: Patient    Current Length of Stay: 13 day(s)    Current Patient Status: Inpatient     Discharge Plan / Estimated Discharge Date: TBD    Code Status: Level 1 - Full Code      Subjective:   Patient was seen at bedside resting comfortably  States he is feeling well over all  He was on Room Air at the time of encounter which is a great improvement to his respiratory status  No new complaints  Objective:     Vitals:   Temp (24hrs), Av 6 °F (36 4 °C), Min:97 5 °F (36 4 °C), Max:97 7 °F (36 5 °C)    Temp:  [97 5 °F (36 4 °C)-97 7 °F (36 5 °C)] 97 7 °F (36 5 °C)  HR:  [62-69] 63  Resp:  [15-24] 15  BP: (112-132)/(50-60) 114/54  SpO2:  [97 %-100 %] 97 %  There is no height or weight on file to calculate BMI  Input and Output Summary (last 24 hours): Intake/Output Summary (Last 24 hours) at 2021 1231  Last data filed at 2021 1130  Gross per 24 hour   Intake 240 ml   Output 2100 ml   Net -1860 ml       Physical Exam:     Physical Exam  Vitals signs and nursing note reviewed  Constitutional:       General: He is not in acute distress  Appearance: He is well-developed  He is not ill-appearing or toxic-appearing  HENT:      Head: Normocephalic and atraumatic  Eyes:      General: No scleral icterus  Right eye: No discharge  Left eye: No discharge  Conjunctiva/sclera: Conjunctivae normal    Cardiovascular:      Rate and Rhythm: Normal rate and regular rhythm  Heart sounds: Normal heart sounds  No murmur  Pulmonary:      Effort: No respiratory distress  Breath sounds: No wheezing or rhonchi  Abdominal:      General: Bowel sounds are normal  There is no distension  Palpations: Abdomen is soft  Tenderness: There is no abdominal tenderness  Musculoskeletal: Normal range of motion  General: No tenderness  Skin:     General: Skin is warm  Findings: No erythema or rash  Comments: HD cath in place   Neurological:      Mental Status: He is alert  Motor: No weakness     Psychiatric:         Behavior: Behavior normal              Additional Data: Labs:    Results from last 7 days   Lab Units 02/04/21  0516  02/01/21  0333   WBC Thousand/uL 15 47*  --  14 66*   HEMOGLOBIN g/dL 7 8*   < > 7 8*   HEMATOCRIT % 23 4*   < > 23 5*   PLATELETS Thousands/uL 165  --  135*   NEUTROS PCT %  --   --  71   LYMPHS PCT %  --   --  13*   MONOS PCT %  --   --  10   EOS PCT %  --   --  3    < > = values in this interval not displayed  Results from last 7 days   Lab Units 02/04/21  0541   POTASSIUM mmol/L 3 6   CHLORIDE mmol/L 93*   CO2 mmol/L 22   BUN mg/dL 120*   CREATININE mg/dL 4 88*   CALCIUM mg/dL 8 2*     Results from last 7 days   Lab Units 01/31/21  2100   INR  1 18       * I Have Reviewed All Lab Data Listed Above  * Additional Pertinent Lab Tests Reviewed:  All Labs Within Last 24 Hours Reviewed    Imaging:    Imaging Reports Reviewed Today Include: N/A  Imaging Personally Reviewed by Myself Includes:  N/A    Recent Cultures (last 7 days):           Last 24 Hours Medication List:   Current Facility-Administered Medications   Medication Dose Route Frequency Provider Last Rate    acetaminophen  650 mg Oral Q6H PRN Swathi Schumacher MD      aluminum-magnesium hydroxide-simethicone  30 mL Oral Q6H PRN TABITHA Mann      amLODIPine  5 mg Oral BID [facility], PA-C      aspirin  81 mg Oral Daily Sylvie Allen MD      atorvastatin  40 mg Oral Daily With Diego Lemus MD      benzonatate  100 mg Oral TID PRN TABITHA Manley      bisacodyl  10 mg Rectal Daily PRN Padilla Reyes DO      calcium carbonate  1,000 mg Oral Daily PRN Sylvie Allen MD      cefepime  2,000 mg Intravenous Q24H Faye Correa MD 2,000 mg (02/04/21 1111)    clopidogrel  75 mg Oral Daily Sylvie Allen MD      escitalopram  10 mg Oral HS Sylvie Allen MD      guaiFENesin  1,200 mg Oral Q12H [address] TABITHA Manley      heparin (porcine)  5,000 Units Subcutaneous Q8H [address] Faye Correa MD      hydrALAZINE  50 mg Oral [facility] [address] Marjan RENETTA Viera      HYDROmorphone  0 2 mg Intravenous Q3H PRN Faye Correa MD      insulin regular (HumuLIN R,NovoLIN R) infusion  0 3-21 Units/hr Intravenous Titrated Dustin Hawkins MD 6 Units/hr (02/04/21 1100)    iron polysaccharides  150 mg Oral Daily Estephania Villa PA-C      melatonin  3 mg Oral HS TABITHA Cr      metoprolol tartrate  25 mg Oral Q12H [facility] Rossi Ward MD      nitroglycerin  0 4 mg Sublingual Q5 Min PRN Rossi Ward MD      nystatin  500,000 Units Swish & Swallow 4x Daily Faye Correa MD      ondansetron  4 mg Intravenous Q6H PRN Rossi Ward MD      oxyCODONE  2 5 mg Oral Q4H PRN Faye Correa MD      pantoprazole  40 mg Oral BID AC Zeny Chang MD      phenol  1 spray Mouth/Throat Q4H PRN Faye Correa MD      pneumococcal 13-valent conjugate vaccine  0 5 mL Intramuscular Prior to discharge Rossi Ward MD      polyethylene glycol  17 g Oral Daily Zeny Chang MD      polyethylene glycol  17 g Oral Daily PRN Dustin Hawkins MD      senna-docusate sodium  1 tablet Oral HS Zeny Chang MD      sodium chloride  1 spray Each Nare Q1H PRN Los Tyson MD          Today, Patient Was Seen By: Zeny Chang MD    ** Please Note: This note has been constructed using a voice recognition system   **

## 2021-02-04 NOTE — PROGRESS NOTES
Progress Note - Gaetano Bermudez [de-identified] y o  male MRN: [id number]    Unit/Bed#: S -01 Encounter: [id number]      CC: diabetes f/u    Subjective:   Gaetano Bermudez is a [de-identified]y o  year old male with type 2 diabetes who is admitted for non STEMI status post [facility]  Patient was seen and examined at bedside, patient is more alert and awake today  Patient states that he feels better today  Currently on insulin infusion  He reports not having good appetite,         Objective:     Vitals: Blood pressure 120/57, pulse 64, temperature 97 7 °F (36 5 °C), temperature source Oral, resp  rate 15, SpO2 97 %  ,There is no height or weight on file to calculate BMI  Intake/Output Summary (Last 24 hours) at 2/4/2021 1457  Last data filed at 2/4/2021 1130  Gross per 24 hour   Intake 240 ml   Output 1150 ml   Net -910 ml       Physical Exam:  General Appearance: awake, appears stated age and cooperative  Head: Normocephalic, without obvious abnormality, atraumatic  Extremities: moves all extremities  Skin: Skin color and temperature normal    Pulm: no labored breathing    Lab, Imaging and other studies: I have personally reviewed pertinent reports        POC Glucose (mg/dl)   Date Value   02/04/2021 191 (H)   02/04/2021 184 (H)   02/04/2021 155 (H)   02/04/2021 222 (H)   02/04/2021 124   02/04/2021 107   02/04/2021 197 (H)   02/03/2021 264 (H)   02/03/2021 316 (H)   02/03/2021 407 (H)       Assessment and plan:    ATN (acute tubular necrosis) ([facility])  Assessment & Plan  Management as per primary team    Acute on chronic diastolic congestive heart failure (HCC)  Assessment & Plan  Wt Readings from Last 3 Encounters:   01/21/21 99 6 kg (219 lb 9 3 oz)   07/15/19 99 5 kg (219 lb 5 7 oz)   12/05/16 99 4 kg (219 lb 2 2 oz)     Management as per primary team        Type 2 diabetes mellitus with hyperglycemia, with long-term current use of insulin [facility])  Assessment & Plan  Lab Results   Component Value Date    HGBA1C 6 7 (H) 07/03/2019 Recent Labs     02/03/21  1129 02/03/21  1343 02/03/21  1519 02/03/21  1523   POCGLU 484* >500* >500* >500*     Type 2 diabetes for more than 30 years  Complicated with retinopathy, nephropathy, CAD  Patient home regimen is Levemir 20 units twice daily and ? NovoLog 20 units before each meals  Most recent A1c in 2019 was 6 7%  Patient is currently on insulin infusion drip and he requires average 6 units insulin per hour  With transition him off in insulin infusion with Levemir 40 units b i d , Humalog 25 units before each meals starting now( to prevent fluctuation in insulin infusion does rate), with correctional sliding scale  Discontinue insulin infusion drip 2 hours after 1st dose of Levemir  Continue to monitor blood sugar over time and make adjustments to the regimen if necessary            Acute renal failure superimposed on stage 3 chronic kidney disease [facility])  Assessment & Plan  Lab Results   Component Value Date    EGFR 12 02/03/2021    EGFR 13 02/02/2021    EGFR 12 02/01/2021    CREATININE 4 29 (H) 02/03/2021    CREATININE 4 09 (H) 02/02/2021    CREATININE 4 41 (H) 02/01/2021   Management as per primary team   Nephrology on board  Portions of the record may have been created with voice recognition software

## 2021-02-04 NOTE — ASSESSMENT & PLAN NOTE
· Cardiologist and nephrologist on board  · D/c Bumex drip as per nephrology  · Continue cardiovascular and heart failure medications  · Monitor input and output  · Daily weights    · Currently on room air

## 2021-02-04 NOTE — ASSESSMENT & PLAN NOTE
· Monitor closely  Currently 7 8  · Pt now has oozing around HD cath site  · Have consented for blood if needed  · Required 1 unit PRBC overnight 1/30/2021 after hemoglobin found to be 6 7   Another unit required overnight 2/1/2021 after hemoglobin found to be 5 6  · Improved to 8 7  · Unknown source  · Continue to monitor

## 2021-02-04 NOTE — PROGRESS NOTES
The pantoprazole has been converted to Oral per [facility] IV-to-PO Auto-Conversion Protocol for Adults as approved by the Pharmacy and Therapeutics Committee  The patient met all eligible criteria:  3 Age = 25years old   2) Received at least one dose of the IV form   3) Receiving at least one other scheduled oral/enteral medication   4) Tolerating an oral/enteral diet   and did not have any exclusions:   1) Critical care patient   2) Active GI bleed (IF assessing H2RAs or PPIs)   3) Continuous tube feeding (IF assessing cipro, doxycycline, levofloxacin, minocycline, rifampin, or voriconazole)   4) Receiving PO vancomycin (IF assessing metronidazole)   5) Persistent nausea and/or vomiting   6) Ileus or gastrointestinal obstruction   7) Nancy/nasogastric tube set for continuous suction   8) Specific order not to automatically convert to PO (in the order's comments or if discussed in the most recent Infectious Disease or primary team's progress notes)

## 2021-02-04 NOTE — ASSESSMENT & PLAN NOTE
· Leukocytosis 15 47 2/4/2021  · COVID-19, influenza, RSV tests were negative x2  · Chest x-ray officially read as multifocal pneumonia  · Completed cefepime  · Blood cultures x2 no growth  · Cough medicine/antitussive, lozenges and Chloraseptic spray p r n  Redd Guise

## 2021-02-04 NOTE — TREATMENT PLAN
The nurse obtained 400 ml on straight cath which is more than estimated on bladder scan  Will order bladder can 4 times a day and straight cath if greater than 250 ml for now

## 2021-02-04 NOTE — PLAN OF CARE
Problem: Potential for Falls  Goal: Patient will remain free of falls  Description: INTERVENTIONS:  - Assess patient frequently for physical needs  -  Identify cognitive and physical deficits and behaviors that affect risk of falls    -  [place] fall precautions as indicated by assessment   - Educate patient/family on patient safety including physical limitations  - Instruct patient to call for assistance with activity based on assessment  - Modify environment to reduce risk of injury  - Consider OT/PT consult to assist with strengthening/mobility  Outcome: Progressing     Problem: CARDIOVASCULAR - ADULT  Goal: Maintains optimal cardiac output and hemodynamic stability  Description: INTERVENTIONS:  - Monitor I/O, vital signs and rhythm  - Monitor for S/S and trends of decreased cardiac output  - Administer and titrate ordered vasoactive medications to optimize hemodynamic stability  - Assess quality of pulses, skin color and temperature  - Assess for signs of decreased coronary artery perfusion  - Instruct patient to report change in severity of symptoms  Outcome: Progressing  Goal: Absence of cardiac dysrhythmias or at baseline rhythm  Description: INTERVENTIONS:  - Continuous cardiac monitoring, vital signs, obtain 12 lead EKG if ordered  - Administer antiarrhythmic and heart rate control medications as ordered  - Monitor electrolytes and administer replacement therapy as ordered  Outcome: Progressing     Problem: RESPIRATORY - ADULT  Goal: Achieves optimal ventilation and oxygenation  Description: INTERVENTIONS:  - Assess for changes in respiratory status  - Assess for changes in mentation and behavior  - Position to facilitate oxygenation and minimize respiratory effort  - Oxygen administered by appropriate delivery if ordered  - Initiate smoking cessation education as indicated  - Encourage broncho-pulmonary hygiene including cough, deep breathe, Incentive Spirometry  - Assess the need for suctioning and aspirate as needed  - Assess and instruct to report SOB or any respiratory difficulty  - Respiratory Therapy support as indicated  Outcome: Progressing     Problem: GASTROINTESTINAL - ADULT  Goal: Maintains adequate nutritional intake  Description: INTERVENTIONS:  - Monitor percentage of each meal consumed  - Identify factors contributing to decreased intake, treat as appropriate  - Assist with meals as needed  - Monitor I&O, weight, and lab values if indicated  - Obtain nutrition services referral as needed  Outcome: Progressing     Problem: GENITOURINARY - ADULT  Goal: Maintains or returns to baseline urinary function  Description: INTERVENTIONS:  - Assess urinary function  - Encourage oral fluids to ensure adequate hydration if ordered  - Administer IV fluids as ordered to ensure adequate hydration  - Administer ordered medications as needed  - Offer frequent toileting  - Follow urinary retention protocol if ordered  Outcome: Progressing  Goal: Absence of urinary retention  Description: INTERVENTIONS:  - Assess patients ability to void and empty bladder  - Monitor I/O  - Bladder scan as needed  - Discuss with physician/AP medications to alleviate retention as needed  - Discuss catheterization for long term situations as appropriate  Outcome: Progressing     Problem: METABOLIC, FLUID AND ELECTROLYTES - ADULT  Goal: Electrolytes maintained within normal limits  Description: INTERVENTIONS:  - Monitor labs and assess patient for signs and symptoms of electrolyte imbalances  - Administer electrolyte replacement as ordered  - Monitor response to electrolyte replacements, including repeat lab results as appropriate  - Instruct patient on fluid and nutrition as appropriate  Outcome: Progressing  Goal: Fluid balance maintained  Description: INTERVENTIONS:  - Monitor labs   - Monitor I/O and WT  - Instruct patient on fluid and nutrition as appropriate  - Assess for signs & symptoms of volume excess or deficit  Outcome: Progressing  Goal: Glucose maintained within target range  Description: INTERVENTIONS:  - Monitor Blood Glucose as ordered  - Assess for signs and symptoms of hyperglycemia and hypoglycemia  - Administer ordered medications to maintain glucose within target range  - Assess nutritional intake and initiate nutrition service referral as needed  Outcome: Progressing     Problem: SKIN/TISSUE INTEGRITY - ADULT  Goal: Skin integrity remains intact  Description: INTERVENTIONS  - Identify patients at risk for skin breakdown  - Assess and monitor skin integrity  - Assess and monitor nutrition and hydration status  - Monitor labs (i e  albumin)  - Assess for incontinence   - Turn and reposition patient  - Assist with mobility/ambulation  - Relieve pressure over bony prominences  - Avoid friction and shearing  - Provide appropriate hygiene as needed including keeping skin clean and dry  - Evaluate need for skin moisturizer/barrier cream  - Collaborate with interdisciplinary team (i e  Nutrition, Rehabilitation, etc )   - Patient/family teaching  Outcome: Progressing     Problem: HEMATOLOGIC - ADULT  Goal: Maintains hematologic stability  Description: INTERVENTIONS  - Assess for signs and symptoms of bleeding or hemorrhage  - Monitor labs  - Administer supportive blood products/factors as ordered and appropriate  Outcome: Progressing     Problem: MUSCULOSKELETAL - ADULT  Goal: Maintain or return mobility to safest level of function  Description: INTERVENTIONS:  - Assess patient's ability to carry out ADLs; assess patient's baseline for ADL function and identify physical deficits which impact ability to perform ADLs (bathing, care of mouth/teeth, toileting, grooming, dressing, etc )  - Assess/evaluate cause of self-care deficits   - Assess range of motion  - Assess patient's mobility  - Assess patient's need for assistive devices and provide as appropriate  - Encourage maximum independence but intervene and supervise when necessary  - Involve family in performance of ADLs  - Assess for home care needs following discharge   - Consider OT consult to assist with ADL evaluation and planning for discharge  - Provide patient education as appropriate  Outcome: Progressing     Problem: PAIN - ADULT  Goal: Verbalizes/displays adequate comfort level or baseline comfort level  Description: Interventions:  - Encourage patient to monitor pain and request assistance  - Assess pain using appropriate pain scale  - Administer analgesics based on type and severity of pain and evaluate response  - Implement non-pharmacological measures as appropriate and evaluate response  - Consider cultural and social influences on pain and pain management  - Notify physician/advanced practitioner if interventions unsuccessful or patient reports new pain  Outcome: Progressing     Problem: INFECTION - ADULT  Goal: Absence or prevention of progression during hospitalization  Description: INTERVENTIONS:  - Assess and monitor for signs and symptoms of infection  - Monitor lab/diagnostic results  - Monitor all insertion sites, i e  indwelling lines, tubes, and drains  - Monitor endotracheal if appropriate and nasal secretions for changes in amount and color  - [place] appropriate cooling/warming therapies per order  - Administer medications as ordered  - Instruct and encourage patient and family to use good hand hygiene technique  - Identify and instruct in appropriate isolation precautions for identified infection/condition  Outcome: Progressing     Problem: SAFETY ADULT  Goal: Patient will remain free of falls  Description: INTERVENTIONS:  - Assess patient frequently for physical needs  -  Identify cognitive and physical deficits and behaviors that affect risk of falls    -  [place] fall precautions as indicated by assessment   - Educate patient/family on patient safety including physical limitations  - Instruct patient to call for assistance with activity based on assessment  - Modify environment to reduce risk of injury  - Consider OT/PT consult to assist with strengthening/mobility  Outcome: Progressing  Goal: Maintain or return to baseline ADL function  Description: INTERVENTIONS:  -  Assess patient's ability to carry out ADLs; assess patient's baseline for ADL function and identify physical deficits which impact ability to perform ADLs (bathing, care of mouth/teeth, toileting, grooming, dressing, etc )  - Assess/evaluate cause of self-care deficits   - Assess range of motion  - Assess patient's mobility; develop plan if impaired  - Assess patient's need for assistive devices and provide as appropriate  - Encourage maximum independence but intervene and supervise when necessary  - Involve family in performance of ADLs  - Assess for home care needs following discharge   - Consider OT consult to assist with ADL evaluation and planning for discharge  - Provide patient education as appropriate  Outcome: Progressing  Goal: Maintain or return mobility status to optimal level  Description: INTERVENTIONS:  - Assess patient's baseline mobility status (ambulation, transfers, stairs, etc )    - Identify cognitive and physical deficits and behaviors that affect mobility  - Identify mobility aids required to assist with transfers and/or ambulation (gait belt, sit-to-stand, lift, walker, cane, etc )  - [place] fall precautions as indicated by assessment  - Record patient progress and toleration of activity level on Mobility SBAR; progress patient to next Phase/Stage  - Instruct patient to call for assistance with activity based on assessment  - Consider rehabilitation consult to assist with strengthening/weightbearing, etc   Outcome: Progressing     Problem: DISCHARGE PLANNING  Goal: Discharge to home or other facility with appropriate resources  Description: INTERVENTIONS:  - Identify barriers to discharge w/patient and caregiver  - Arrange for needed discharge resources and transportation as appropriate  - Identify discharge learning needs (meds, wound care, etc )  - Arrange for interpretive services to assist at discharge as needed  - Refer to Case Management Department for coordinating discharge planning if the patient needs post-hospital services based on physician/advanced practitioner order or complex needs related to functional status, cognitive ability, or social support system  Outcome: Progressing     Problem: Knowledge Deficit  Goal: Patient/family/caregiver demonstrates understanding of disease process, treatment plan, medications, and discharge instructions  Description: Complete learning assessment and assess knowledge base  Interventions:  - Provide teaching at level of understanding  - Provide teaching via preferred learning methods  Outcome: Progressing     Problem: Prexisting or High Potential for Compromised Skin Integrity  Goal: Skin integrity is maintained or improved  Description: INTERVENTIONS:  - Identify patients at risk for skin breakdown  - Assess and monitor skin integrity  - Assess and monitor nutrition and hydration status  - Monitor labs   - Assess for incontinence   - Turn and reposition patient  - Assist with mobility/ambulation  - Relieve pressure over bony prominences  - Avoid friction and shearing  - Provide appropriate hygiene as needed including keeping skin clean and dry  - Evaluate need for skin moisturizer/barrier cream  - Collaborate with interdisciplinary team   - Patient/family teaching  - Consider wound care consult   Outcome: Progressing     Problem: Nutrition/Hydration-ADULT  Goal: Nutrient/Hydration intake appropriate for improving, restoring or maintaining nutritional needs  Description: Monitor and assess patient's nutrition/hydration status for malnutrition  Collaborate with interdisciplinary team and initiate plan and interventions as ordered  Monitor patient's weight and dietary intake as ordered or per policy   Utilize nutrition screening tool and intervene as necessary  Determine patient's food preferences and provide high-protein, high-caloric foods as appropriate       INTERVENTIONS:  - Monitor oral intake, urinary output, labs, and treatment plans  - Assess nutrition and hydration status and recommend course of action  - Evaluate amount of meals eaten  - Assist patient with eating if necessary   - Allow adequate time for meals  - Recommend/ encourage appropriate diets, oral nutritional supplements, and vitamin/mineral supplements  - Order, calculate, and assess calorie counts as needed  - Recommend, monitor, and adjust tube feedings and TPN/PPN based on assessed needs  - Assess need for intravenous fluids  - Provide specific nutrition/hydration education as appropriate  - Include patient/family/caregiver in decisions related to nutrition  Outcome: Progressing

## 2021-02-04 NOTE — ASSESSMENT & PLAN NOTE
Straight cath 2/4/2021 revealed >400cc urine      - As per nephrology, continue bladder scans and straight cath >250cc

## 2021-02-04 NOTE — ASSESSMENT & PLAN NOTE
Presented with chest pain at [facility]   Hx of CAD s/p stenting and CABG 2016  Received ASA 324mg at home and SL nitro x1 in the ER  Troponin peaked at 5 5  Continue cardiovascular medications with aspirin, Plavix, statin, beta-blocker and Imdur  Cardiac Cath Completed at [facility]   - three-vessel disease with 99% occluded proximal circumflex, 50% distal left main, mid % occluded, RCA proximally 100% occluded with collaterals from left circulation  Patient has his patent grafts of LIMA to LAD and SVG to OM2    Most likely Type 2 MI as per cardiology  Medical intervention recommend at this time  Echo complete - reveals 60% EF

## 2021-02-04 NOTE — PROGRESS NOTES
NEPHROLOGY PROGRESS NOTE    Bharat Acosta [de-identified] y o  male MRN: [id number]  Unit/Bed#: S -01 Encounter: [id number]  Reason for Consult:  Acute on chronic kidney disease    The patient is awake and alert seems to feel better today is little more energetic and talkative and states that he does feel better  Pain is improving  No shortness of breath  He does have his Marinelli catheter out states that when he tries urinate only a little is coming out he is not sure if he is emptying his bladder completely or not  ASSESSMENT/PLAN:  1  Renal    Patient has acute on chronic kidney disease with baseline creatinine of 2 5  He developed ATN and required dialysis related to contrast exposure after cardiac catheterization  Urine output has started to  but it appears yesterday's 2nd shift was less  I am question as to whether not there is some bladder dysfunction in emptying as his Marinelli catheter was removed  Creatinine did increase slightly but we need to rule out that he is not retaining urine  I told him that given his urine output improved there is no emergent need for dialysis today were going to observe him another day and also rule out urine retention  If creatinine rises will plan dialysis tomorrow  Mild hyponatremia related to his renal dysfunction which impaired ability to excrete maximal water load  Looking back he did have urine retention on 01/27/2021 which required Marinelli catheter  Check bladder postvoid residual  BMP a m  Evaluate for dialysis in morning    2  Ischemic cardiomyopathy  Medical management for now status post catheterization with no intervention performed  SUBJECTIVE:  Review of Systems   Constitution: Negative for chills, fever and night sweats  Feels a little stronger  HENT: Negative  Eyes: Negative  Cardiovascular: Negative for chest pain, dyspnea on exertion, leg swelling and orthopnea  Respiratory: Negative    Negative for cough, shortness of breath, sputum production and wheezing  Gastrointestinal: Negative for abdominal pain, diarrhea, nausea and vomiting  Genitourinary: Positive for hesitancy  Negative for dysuria, flank pain and hematuria  Possible incomplete emptying  Neurological: Negative for dizziness, focal weakness, headaches and weakness  Psychiatric/Behavioral: Negative for altered mental status, depression, hallucinations and hypervigilance  OBJECTIVE:  Current Weight:    Vitals:Temp (24hrs), Av 6 °F (36 4 °C), Min:97 5 °F (36 4 °C), Max:97 7 °F (36 5 °C)  Current: Temperature: 97 7 °F (36 5 °C)   Blood pressure 114/54, pulse 63, temperature 97 7 °F (36 5 °C), temperature source Oral, resp  rate 15, SpO2 97 %  , There is no height or weight on file to calculate BMI  Intake/Output Summary (Last 24 hours) at 2021 0956  Last data filed at 2021 0819  Gross per 24 hour   Intake 240 ml   Output 1600 ml   Net -1360 ml       Physical Exam: /54 (BP Location: Right arm)   Pulse 63   Temp 97 7 °F (36 5 °C) (Oral)   Resp 15   SpO2 97%   Physical Exam  Constitutional:       General: He is not in acute distress  Appearance: He is not toxic-appearing or diaphoretic  HENT:      Head: Normocephalic and atraumatic  Mouth/Throat:      Mouth: Mucous membranes are dry  Eyes:      General: No scleral icterus  Extraocular Movements: Extraocular movements intact  Neck:      Musculoskeletal: Normal range of motion and neck supple  Cardiovascular:      Rate and Rhythm: Normal rate and regular rhythm  Heart sounds: No friction rub  No gallop  Comments: No significant edema  Pulmonary:      Effort: Pulmonary effort is normal  No respiratory distress  Breath sounds: Normal breath sounds  No wheezing, rhonchi or rales  Abdominal:      General: Bowel sounds are normal  There is no distension  Palpations: Abdomen is soft  Tenderness: There is no abdominal tenderness   There is no rebound  Comments: Possibly palpable bladder   Neurological:      General: No focal deficit present  Mental Status: He is alert and oriented to person, place, and time  Mental status is at baseline  Psychiatric:         Mood and Affect: Mood normal          Behavior: Behavior normal          Thought Content:  Thought content normal          Judgment: Judgment normal          Medications:    Current Facility-Administered Medications:     acetaminophen (TYLENOL) tablet 650 mg, 650 mg, Oral, Q6H PRN, Michael Sierra MD, 650 mg at 01/26/21 0636    aluminum-magnesium hydroxide-simethicone (MYLANTA) oral suspension 30 mL, 30 mL, Oral, Q6H PRN, TABITHA Herrera    amLODIPine (NORVASC) tablet 5 mg, 5 mg, Oral, BID, [facility], [facility], Stopped at 02/04/21 2738    aspirin (ECOTRIN LOW STRENGTH) EC tablet 81 mg, 81 mg, Oral, Daily, Swathi Schumacher MD, 81 mg at 02/04/21 0816    atorvastatin (LIPITOR) tablet 40 mg, 40 mg, Oral, Daily With Liza Griffin MD, 40 mg at 02/03/21 1606    benzonatate (TESSALON PERLES) capsule 100 mg, 100 mg, Oral, TID PRN, TABITHA Heath    bisacodyl (DULCOLAX) rectal suppository 10 mg, 10 mg, Rectal, Daily PRN, Dorys Plaza DO, 10 mg at 02/02/21 0053    calcium carbonate (TUMS) chewable tablet 1,000 mg, 1,000 mg, Oral, Daily PRN, Michael Sierra MD    cefepime (MAXIPIME) 2,000 mg in dextrose 5 % 50 mL IVPB, 2,000 mg, Intravenous, Q24H, Faye Correa MD, Last Rate: 100 mL/hr at 02/03/21 1101, 2,000 mg at 02/03/21 1101    clopidogrel (PLAVIX) tablet 75 mg, 75 mg, Oral, Daily, Swathi Schumacher MD, 75 mg at 02/04/21 0816    escitalopram (LEXAPRO) tablet 10 mg, 10 mg, Oral, HS, Swathi Schumacher MD, 10 mg at 02/03/21 2135    guaiFENesin (MUCINEX) 12 hr tablet 1,200 mg, 1,200 mg, Oral, Q12H [address], TABITHA Heath, 1,200 mg at 02/04/21 0815    heparin (porcine) subcutaneous injection 5,000 Units, 5,000 Units, Subcutaneous, Q8H [address] Faye Perez MD Madeline, 5,000 Units at 02/04/21 0502    hydrALAZINE (APRESOLINE) tablet 50 mg, 50 mg, Oral, Q8H [facility], [place] [facility], Stopped at 02/04/21 0502    HYDROmorphone (DILAUDID) injection 0 2 mg, 0 2 mg, Intravenous, Q3H PRN, Faye Correa MD, 0 2 mg at 02/02/21 0815    insulin regular (HumuLIN R,NovoLIN R) 1 Units/mL in sodium chloride 0 9 % 100 mL infusion, 0 3-21 Units/hr, Intravenous, Titrated, Lnenie Parker MD, Last Rate: 4 mL/hr at 02/04/21 0900, 4 Units/hr at 02/04/21 0900    iron polysaccharides (FERREX) capsule 150 mg, 150 mg, Oral, Daily, [place], [place]-, 150 mg at 02/04/21 0816    melatonin tablet 3 mg, 3 mg, Oral, HS, TABITHA Cr, 3 mg at 02/03/21 2135    metoprolol tartrate (LOPRESSOR) tablet 25 mg, 25 mg, Oral, Q12H [facility], [facility]g Schumacher MD, 25 mg at 02/04/21 0816    nitroglycerin (NITROSTAT) SL tablet 0 4 mg, 0 4 mg, Sublingual, Q5 Min PRN, Arie Mckeon MD    nystatin (MYCOSTATIN) oral suspension 500,000 Units, 500,000 Units, Swish & Swallow, 4x Daily, Faye Correa MD, 500,000 Units at 02/04/21 0816    ondansetron (ZOFRAN) injection 4 mg, 4 mg, Intravenous, Q6H PRN, Arie Mckeon MD, 4 mg at 01/29/21 1352    oxyCODONE (ROXICODONE) IR tablet 2 5 mg, 2 5 mg, Oral, Q4H PRN, Faye Correa MD, 2 5 mg at 02/03/21 1953    pantoprazole (PROTONIX) EC tablet 40 mg, 40 mg, Oral, BID AC, Tawny Shipman MD, 40 mg at 02/04/21 0815    phenol (CHLORASEPTIC) 1 4 % mucosal liquid 1 spray, 1 spray, Mouth/Throat, Q4H PRN, Faye Correa MD    pneumococcal 13-valent conjugate vaccine (PREVNAR-13) IM injection 0 5 mL, 0 5 mL, Intramuscular, Prior to discharge, Arie Mckeon MD    polyethylene glycol (MIRALAX) packet 17 g, 17 g, Oral, Daily, Tawny Shipman MD, 17 g at 02/02/21 0813    polyethylene glycol (MIRALAX) packet 17 g, 17 g, Oral, Daily PRN, Lennie Parker MD    senna-docusate sodium (SENOKOT S) 8 6-50 mg per tablet 1 tablet, 1 tablet, Oral, HS, Maru Rebollar MD, 1 tablet at 02/03/21 2131    sodium chloride (OCEAN) 0 65 % nasal spray 1 spray, 1 spray, Each Nare, Q1H PRN, Rosa M Pittman MD, 1 spray at 01/31/21 1659    Laboratory Results:  Lab Results   Component Value Date    WBC 15 47 (H) 02/04/2021    HGB 7 8 (L) 02/04/2021    HCT 23 4 (L) 02/04/2021    MCV 94 02/04/2021     02/04/2021     Lab Results   Component Value Date    SODIUM 130 (L) 02/04/2021    K 3 6 02/04/2021    CL 93 (L) 02/04/2021    CO2 22 02/04/2021     (H) 02/04/2021    CREATININE 4 88 (H) 02/04/2021    GLUC 125 02/04/2021    CALCIUM 8 2 (L) 02/04/2021     Lab Results   Component Value Date    CALCIUM 8 2 (L) 02/04/2021    PHOS 5 1 (H) 01/27/2021     No results found for: LABPROT

## 2021-02-04 NOTE — ASSESSMENT & PLAN NOTE
Lab Results   Component Value Date    EGFR 10 02/04/2021    EGFR 11 02/03/2021    EGFR 12 02/03/2021    CREATININE 4 88 (H) 02/04/2021    CREATININE 4 53 (H) 02/03/2021    CREATININE 4 29 (H) 02/03/2021     Most recent 4 88 trending up   Baseline creatinine around 2 3-2 5  Previously on HD in 2019;   IR placed HD Cath 1/25/202   Nephrology on board   Most likely ATN post contrast for Cath  No longer on IV diuresis  As per nephrology, may consider Dialysis 2/5/2021 if creatinine continues to trend up

## 2021-02-04 NOTE — ASSESSMENT & PLAN NOTE
Lab Results   Component Value Date    HGBA1C 6 7 (H) 07/03/2019       Recent Labs     02/04/21  0459 02/04/21  0658 02/04/21  0913 02/04/21  1110   POCGLU 124 222* 155* 184*       Blood Sugar Average: Last 72 hrs:  (P) 260 [id number]   Continue insulin drip  Endocrinology on board

## 2021-02-05 ENCOUNTER — APPOINTMENT (INPATIENT)
Dept: DIALYSIS | Facility: HOSPITAL | Age: 81
DRG: 280 | End: 2021-02-05
Attending: INTERNAL MEDICINE
Payer: COMMERCIAL

## 2021-02-05 LAB
ANION GAP SERPL CALCULATED.3IONS-SCNC: 16 MMOL/L (ref 4–13)
BUN SERPL-MCNC: 142 MG/DL (ref 5–25)
CALCIUM SERPL-MCNC: 8.3 MG/DL (ref 8.3–10.1)
CHLORIDE SERPL-SCNC: 94 MMOL/L (ref 100–108)
CO2 SERPL-SCNC: 20 MMOL/L (ref 21–32)
CREAT SERPL-MCNC: 5.78 MG/DL (ref 0.6–1.3)
ERYTHROCYTE [DISTWIDTH] IN BLOOD BY AUTOMATED COUNT: 13.7 % (ref 11.6–15.1)
GFR SERPL CREATININE-BSD FRML MDRD: 8 ML/MIN/1.73SQ M
GLUCOSE SERPL-MCNC: 150 MG/DL (ref 65–140)
GLUCOSE SERPL-MCNC: 162 MG/DL (ref 65–140)
GLUCOSE SERPL-MCNC: 166 MG/DL (ref 65–140)
GLUCOSE SERPL-MCNC: 262 MG/DL (ref 65–140)
GLUCOSE SERPL-MCNC: 309 MG/DL (ref 65–140)
GLUCOSE SERPL-MCNC: 343 MG/DL (ref 65–140)
HCT VFR BLD AUTO: 22.7 % (ref 36.5–49.3)
HGB BLD-MCNC: 7.6 G/DL (ref 12–17)
MCH RBC QN AUTO: 31.7 PG (ref 26.8–34.3)
MCHC RBC AUTO-ENTMCNC: 33.5 G/DL (ref 31.4–37.4)
MCV RBC AUTO: 95 FL (ref 82–98)
PLATELET # BLD AUTO: 171 THOUSANDS/UL (ref 149–390)
PMV BLD AUTO: 11.5 FL (ref 8.9–12.7)
POTASSIUM SERPL-SCNC: 3.7 MMOL/L (ref 3.5–5.3)
RBC # BLD AUTO: 2.4 MILLION/UL (ref 3.88–5.62)
SODIUM SERPL-SCNC: 130 MMOL/L (ref 136–145)
WBC # BLD AUTO: 15.8 THOUSAND/UL (ref 4.31–10.16)

## 2021-02-05 PROCEDURE — 99232 SBSQ HOSP IP/OBS MODERATE 35: CPT | Performed by: INTERNAL MEDICINE

## 2021-02-05 PROCEDURE — 80048 BASIC METABOLIC PNL TOTAL CA: CPT | Performed by: INTERNAL MEDICINE

## 2021-02-05 PROCEDURE — 99232 SBSQ HOSP IP/OBS MODERATE 35: CPT | Performed by: FAMILY MEDICINE

## 2021-02-05 PROCEDURE — 85027 COMPLETE CBC AUTOMATED: CPT | Performed by: FAMILY MEDICINE

## 2021-02-05 PROCEDURE — 82948 REAGENT STRIP/BLOOD GLUCOSE: CPT

## 2021-02-05 RX ORDER — DIAPER,BRIEF,INFANT-TODD,DISP
EACH MISCELLANEOUS 4 TIMES DAILY PRN
Status: DISCONTINUED | OUTPATIENT
Start: 2021-02-05 | End: 2021-02-12 | Stop reason: HOSPADM

## 2021-02-05 RX ORDER — POLYETHYLENE GLYCOL 3350 17 G/17G
17 POWDER, FOR SOLUTION ORAL 2 TIMES DAILY
Status: DISCONTINUED | OUTPATIENT
Start: 2021-02-05 | End: 2021-02-12 | Stop reason: HOSPADM

## 2021-02-05 RX ADMIN — BISACODYL 10 MG: 10 SUPPOSITORY RECTAL at 13:29

## 2021-02-05 RX ADMIN — Medication 3 MG: at 21:53

## 2021-02-05 RX ADMIN — CEFEPIME HYDROCHLORIDE 2000 MG: 2 INJECTION, POWDER, FOR SOLUTION INTRAVENOUS at 12:49

## 2021-02-05 RX ADMIN — INSULIN DETEMIR 42 UNITS: 100 INJECTION, SOLUTION SUBCUTANEOUS at 21:59

## 2021-02-05 RX ADMIN — CLOPIDOGREL BISULFATE 75 MG: 75 TABLET ORAL at 08:40

## 2021-02-05 RX ADMIN — HEPARIN SODIUM 5000 UNITS: 5000 INJECTION INTRAVENOUS; SUBCUTANEOUS at 05:14

## 2021-02-05 RX ADMIN — ATORVASTATIN CALCIUM 40 MG: 40 TABLET, FILM COATED ORAL at 17:11

## 2021-02-05 RX ADMIN — NYSTATIN 500000 UNITS: 100000 SUSPENSION ORAL at 12:49

## 2021-02-05 RX ADMIN — PANTOPRAZOLE SODIUM 40 MG: 40 TABLET, DELAYED RELEASE ORAL at 17:10

## 2021-02-05 RX ADMIN — HEPARIN SODIUM 5000 UNITS: 5000 INJECTION INTRAVENOUS; SUBCUTANEOUS at 21:54

## 2021-02-05 RX ADMIN — ASPIRIN 81 MG: 81 TABLET ORAL at 08:40

## 2021-02-05 RX ADMIN — INSULIN LISPRO 2 UNITS: 100 INJECTION, SOLUTION INTRAVENOUS; SUBCUTANEOUS at 22:02

## 2021-02-05 RX ADMIN — METOPROLOL TARTRATE 25 MG: 25 TABLET, FILM COATED ORAL at 08:40

## 2021-02-05 RX ADMIN — INSULIN DETEMIR 40 UNITS: 100 INJECTION, SOLUTION SUBCUTANEOUS at 08:40

## 2021-02-05 RX ADMIN — INSULIN LISPRO 25 UNITS: 100 INJECTION, SOLUTION INTRAVENOUS; SUBCUTANEOUS at 13:29

## 2021-02-05 RX ADMIN — NYSTATIN 500000 UNITS: 100000 SUSPENSION ORAL at 17:11

## 2021-02-05 RX ADMIN — POLYSACCHARIDE-IRON COMPLEX 150 MG: 150 CAPSULE ORAL at 08:40

## 2021-02-05 RX ADMIN — POLYETHYLENE GLYCOL 3350 17 G: 17 POWDER, FOR SOLUTION ORAL at 17:27

## 2021-02-05 RX ADMIN — OXYCODONE HYDROCHLORIDE 2.5 MG: 5 TABLET ORAL at 10:45

## 2021-02-05 RX ADMIN — NYSTATIN 500000 UNITS: 100000 SUSPENSION ORAL at 08:40

## 2021-02-05 RX ADMIN — ACETAMINOPHEN 650 MG: 325 TABLET, FILM COATED ORAL at 21:51

## 2021-02-05 RX ADMIN — HYDROCORTISONE: 1 CREAM TOPICAL at 21:37

## 2021-02-05 RX ADMIN — ESCITALOPRAM 10 MG: 10 TABLET, FILM COATED ORAL at 21:54

## 2021-02-05 RX ADMIN — NYSTATIN 500000 UNITS: 100000 SUSPENSION ORAL at 21:54

## 2021-02-05 RX ADMIN — INSULIN LISPRO 8 UNITS: 100 INJECTION, SOLUTION INTRAVENOUS; SUBCUTANEOUS at 13:29

## 2021-02-05 RX ADMIN — OXYCODONE HYDROCHLORIDE 2.5 MG: 5 TABLET ORAL at 05:08

## 2021-02-05 RX ADMIN — DOCUSATE SODIUM AND SENNOSIDES 1 TABLET: 8.6; 5 TABLET ORAL at 21:52

## 2021-02-05 RX ADMIN — HEPARIN SODIUM 5000 UNITS: 5000 INJECTION INTRAVENOUS; SUBCUTANEOUS at 13:29

## 2021-02-05 RX ADMIN — GUAIFENESIN 1200 MG: 600 TABLET, EXTENDED RELEASE ORAL at 21:52

## 2021-02-05 RX ADMIN — METOPROLOL TARTRATE 25 MG: 25 TABLET, FILM COATED ORAL at 21:57

## 2021-02-05 RX ADMIN — PANTOPRAZOLE SODIUM 40 MG: 40 TABLET, DELAYED RELEASE ORAL at 06:00

## 2021-02-05 RX ADMIN — POLYETHYLENE GLYCOL 3350 17 G: 17 POWDER, FOR SOLUTION ORAL at 08:41

## 2021-02-05 RX ADMIN — GUAIFENESIN 1200 MG: 600 TABLET, EXTENDED RELEASE ORAL at 08:40

## 2021-02-05 RX ADMIN — INSULIN LISPRO 20 UNITS: 100 INJECTION, SOLUTION INTRAVENOUS; SUBCUTANEOUS at 08:42

## 2021-02-05 RX ADMIN — INSULIN LISPRO 8 UNITS: 100 INJECTION, SOLUTION INTRAVENOUS; SUBCUTANEOUS at 17:07

## 2021-02-05 NOTE — PLAN OF CARE
Problem: Potential for Falls  Goal: Patient will remain free of falls  Description: INTERVENTIONS:  - Assess patient frequently for physical needs  -  Identify cognitive and physical deficits and behaviors that affect risk of falls    -  [place] fall precautions as indicated by assessment   - Educate patient/family on patient safety including physical limitations  - Instruct patient to call for assistance with activity based on assessment  - Modify environment to reduce risk of injury  - Consider OT/PT consult to assist with strengthening/mobility  Outcome: Progressing     Problem: CARDIOVASCULAR - ADULT  Goal: Maintains optimal cardiac output and hemodynamic stability  Description: INTERVENTIONS:  - Monitor I/O, vital signs and rhythm  - Monitor for S/S and trends of decreased cardiac output  - Administer and titrate ordered vasoactive medications to optimize hemodynamic stability  - Assess quality of pulses, skin color and temperature  - Assess for signs of decreased coronary artery perfusion  - Instruct patient to report change in severity of symptoms  Outcome: Progressing  Goal: Absence of cardiac dysrhythmias or at baseline rhythm  Description: INTERVENTIONS:  - Continuous cardiac monitoring, vital signs, obtain 12 lead EKG if ordered  - Administer antiarrhythmic and heart rate control medications as ordered  - Monitor electrolytes and administer replacement therapy as ordered  Outcome: Progressing     Problem: RESPIRATORY - ADULT  Goal: Achieves optimal ventilation and oxygenation  Description: INTERVENTIONS:  - Assess for changes in respiratory status  - Assess for changes in mentation and behavior  - Position to facilitate oxygenation and minimize respiratory effort  - Oxygen administered by appropriate delivery if ordered  - Initiate smoking cessation education as indicated  - Encourage broncho-pulmonary hygiene including cough, deep breathe, Incentive Spirometry  - Assess the need for suctioning and aspirate as needed  - Assess and instruct to report SOB or any respiratory difficulty  - Respiratory Therapy support as indicated  Outcome: Progressing     Problem: GASTROINTESTINAL - ADULT  Goal: Maintains adequate nutritional intake  Description: INTERVENTIONS:  - Monitor percentage of each meal consumed  - Identify factors contributing to decreased intake, treat as appropriate  - Assist with meals as needed  - Monitor I&O, weight, and lab values if indicated  - Obtain nutrition services referral as needed  Outcome: Progressing     Problem: GENITOURINARY - ADULT  Goal: Maintains or returns to baseline urinary function  Description: INTERVENTIONS:  - Assess urinary function  - Encourage oral fluids to ensure adequate hydration if ordered  - Administer IV fluids as ordered to ensure adequate hydration  - Administer ordered medications as needed  - Offer frequent toileting  - Follow urinary retention protocol if ordered  Outcome: Progressing  Goal: Absence of urinary retention  Description: INTERVENTIONS:  - Assess patients ability to void and empty bladder  - Monitor I/O  - Bladder scan as needed  - Discuss with physician/AP medications to alleviate retention as needed  - Discuss catheterization for long term situations as appropriate  Outcome: Progressing     Problem: METABOLIC, FLUID AND ELECTROLYTES - ADULT  Goal: Electrolytes maintained within normal limits  Description: INTERVENTIONS:  - Monitor labs and assess patient for signs and symptoms of electrolyte imbalances  - Administer electrolyte replacement as ordered  - Monitor response to electrolyte replacements, including repeat lab results as appropriate  - Instruct patient on fluid and nutrition as appropriate  Outcome: Progressing  Goal: Fluid balance maintained  Description: INTERVENTIONS:  - Monitor labs   - Monitor I/O and WT  - Instruct patient on fluid and nutrition as appropriate  - Assess for signs & symptoms of volume excess or deficit  Outcome: Progressing  Goal: Glucose maintained within target range  Description: INTERVENTIONS:  - Monitor Blood Glucose as ordered  - Assess for signs and symptoms of hyperglycemia and hypoglycemia  - Administer ordered medications to maintain glucose within target range  - Assess nutritional intake and initiate nutrition service referral as needed  Outcome: Progressing     Problem: SKIN/TISSUE INTEGRITY - ADULT  Goal: Skin integrity remains intact  Description: INTERVENTIONS  - Identify patients at risk for skin breakdown  - Assess and monitor skin integrity  - Assess and monitor nutrition and hydration status  - Monitor labs (i e  albumin)  - Assess for incontinence   - Turn and reposition patient  - Assist with mobility/ambulation  - Relieve pressure over bony prominences  - Avoid friction and shearing  - Provide appropriate hygiene as needed including keeping skin clean and dry  - Evaluate need for skin moisturizer/barrier cream  - Collaborate with interdisciplinary team (i e  Nutrition, Rehabilitation, etc )   - Patient/family teaching  Outcome: Progressing     Problem: HEMATOLOGIC - ADULT  Goal: Maintains hematologic stability  Description: INTERVENTIONS  - Assess for signs and symptoms of bleeding or hemorrhage  - Monitor labs  - Administer supportive blood products/factors as ordered and appropriate  Outcome: Progressing     Problem: MUSCULOSKELETAL - ADULT  Goal: Maintain or return mobility to safest level of function  Description: INTERVENTIONS:  - Assess patient's ability to carry out ADLs; assess patient's baseline for ADL function and identify physical deficits which impact ability to perform ADLs (bathing, care of mouth/teeth, toileting, grooming, dressing, etc )  - Assess/evaluate cause of self-care deficits   - Assess range of motion  - Assess patient's mobility  - Assess patient's need for assistive devices and provide as appropriate  - Encourage maximum independence but intervene and supervise when necessary  - Involve family in performance of ADLs  - Assess for home care needs following discharge   - Consider OT consult to assist with ADL evaluation and planning for discharge  - Provide patient education as appropriate  Outcome: Progressing     Problem: PAIN - ADULT  Goal: Verbalizes/displays adequate comfort level or baseline comfort level  Description: Interventions:  - Encourage patient to monitor pain and request assistance  - Assess pain using appropriate pain scale  - Administer analgesics based on type and severity of pain and evaluate response  - Implement non-pharmacological measures as appropriate and evaluate response  - Consider cultural and social influences on pain and pain management  - Notify physician/advanced practitioner if interventions unsuccessful or patient reports new pain  Outcome: Progressing     Problem: INFECTION - ADULT  Goal: Absence or prevention of progression during hospitalization  Description: INTERVENTIONS:  - Assess and monitor for signs and symptoms of infection  - Monitor lab/diagnostic results  - Monitor all insertion sites, i e  indwelling lines, tubes, and drains  - Monitor endotracheal if appropriate and nasal secretions for changes in amount and color  - [place] appropriate cooling/warming therapies per order  - Administer medications as ordered  - Instruct and encourage patient and family to use good hand hygiene technique  - Identify and instruct in appropriate isolation precautions for identified infection/condition  Outcome: Progressing     Problem: SAFETY ADULT  Goal: Patient will remain free of falls  Description: INTERVENTIONS:  - Assess patient frequently for physical needs  -  Identify cognitive and physical deficits and behaviors that affect risk of falls    -  [place] fall precautions as indicated by assessment   - Educate patient/family on patient safety including physical limitations  - Instruct patient to call for assistance with activity based on assessment  - Modify environment to reduce risk of injury  - Consider OT/PT consult to assist with strengthening/mobility  Outcome: Progressing  Goal: Maintain or return to baseline ADL function  Description: INTERVENTIONS:  -  Assess patient's ability to carry out ADLs; assess patient's baseline for ADL function and identify physical deficits which impact ability to perform ADLs (bathing, care of mouth/teeth, toileting, grooming, dressing, etc )  - Assess/evaluate cause of self-care deficits   - Assess range of motion  - Assess patient's mobility; develop plan if impaired  - Assess patient's need for assistive devices and provide as appropriate  - Encourage maximum independence but intervene and supervise when necessary  - Involve family in performance of ADLs  - Assess for home care needs following discharge   - Consider OT consult to assist with ADL evaluation and planning for discharge  - Provide patient education as appropriate  Outcome: Progressing  Goal: Maintain or return mobility status to optimal level  Description: INTERVENTIONS:  - Assess patient's baseline mobility status (ambulation, transfers, stairs, etc )    - Identify cognitive and physical deficits and behaviors that affect mobility  - Identify mobility aids required to assist with transfers and/or ambulation (gait belt, sit-to-stand, lift, walker, cane, etc )  - [place] fall precautions as indicated by assessment  - Record patient progress and toleration of activity level on Mobility SBAR; progress patient to next Phase/Stage  - Instruct patient to call for assistance with activity based on assessment  - Consider rehabilitation consult to assist with strengthening/weightbearing, etc   Outcome: Progressing     Problem: DISCHARGE PLANNING  Goal: Discharge to home or other facility with appropriate resources  Description: INTERVENTIONS:  - Identify barriers to discharge w/patient and caregiver  - Arrange for needed discharge resources and transportation as appropriate  - Identify discharge learning needs (meds, wound care, etc )  - Arrange for interpretive services to assist at discharge as needed  - Refer to Case Management Department for coordinating discharge planning if the patient needs post-hospital services based on physician/advanced practitioner order or complex needs related to functional status, cognitive ability, or social support system  Outcome: Progressing     Problem: Knowledge Deficit  Goal: Patient/family/caregiver demonstrates understanding of disease process, treatment plan, medications, and discharge instructions  Description: Complete learning assessment and assess knowledge base  Interventions:  - Provide teaching at level of understanding  - Provide teaching via preferred learning methods  Outcome: Progressing     Problem: Prexisting or High Potential for Compromised Skin Integrity  Goal: Skin integrity is maintained or improved  Description: INTERVENTIONS:  - Identify patients at risk for skin breakdown  - Assess and monitor skin integrity  - Assess and monitor nutrition and hydration status  - Monitor labs   - Assess for incontinence   - Turn and reposition patient  - Assist with mobility/ambulation  - Relieve pressure over bony prominences  - Avoid friction and shearing  - Provide appropriate hygiene as needed including keeping skin clean and dry  - Evaluate need for skin moisturizer/barrier cream  - Collaborate with interdisciplinary team   - Patient/family teaching  - Consider wound care consult   Outcome: Progressing     Problem: Nutrition/Hydration-ADULT  Goal: Nutrient/Hydration intake appropriate for improving, restoring or maintaining nutritional needs  Description: Monitor and assess patient's nutrition/hydration status for malnutrition  Collaborate with interdisciplinary team and initiate plan and interventions as ordered  Monitor patient's weight and dietary intake as ordered or per policy   Utilize nutrition screening tool and intervene as necessary  Determine patient's food preferences and provide high-protein, high-caloric foods as appropriate       INTERVENTIONS:  - Monitor oral intake, urinary output, labs, and treatment plans  - Assess nutrition and hydration status and recommend course of action  - Evaluate amount of meals eaten  - Assist patient with eating if necessary   - Allow adequate time for meals  - Recommend/ encourage appropriate diets, oral nutritional supplements, and vitamin/mineral supplements  - Order, calculate, and assess calorie counts as needed  - Recommend, monitor, and adjust tube feedings and TPN/PPN based on assessed needs  - Assess need for intravenous fluids  - Provide specific nutrition/hydration education as appropriate  - Include patient/family/caregiver in decisions related to nutrition  Outcome: Progressing

## 2021-02-05 NOTE — PHYSICAL THERAPY NOTE
Physical Therapy Cancellation Note         02/05/21 1050   PT Last Visit   PT Visit Date 02/05/21   Note Type   Cancel Reasons Other  (bedside dialysis)   Assessment   Assessment Patient not appropriate for PT session, receiving bedside dialysis      Char Rushing PTA

## 2021-02-05 NOTE — PLAN OF CARE
Problem: Potential for Falls  Goal: Patient will remain free of falls  Description: INTERVENTIONS:  - Assess patient frequently for physical needs  -  Identify cognitive and physical deficits and behaviors that affect risk of falls    -  [place] fall precautions as indicated by assessment   - Educate patient/family on patient safety including physical limitations  - Instruct patient to call for assistance with activity based on assessment  - Modify environment to reduce risk of injury  - Consider OT/PT consult to assist with strengthening/mobility  Outcome: Progressing

## 2021-02-05 NOTE — ASSESSMENT & PLAN NOTE
· Monitor closely  Currently 7 6, stable  · Pt now has oozing around HD cath site  · Have consented for blood if needed  · Required 1 unit PRBC overnight 1/30/2021 after hemoglobin found to be 6 7   Another unit required overnight 2/1/2021 after hemoglobin found to be 5 6  · Improved to 8 7  · Unknown source  · Continue to monitor

## 2021-02-05 NOTE — PROGRESS NOTES
Pt began developing pain in R groin where pt did have cardiac cath earlier this admission  Pt mentioned it might be from when he moved from chair to bed for dialysis where he maybe pulled something? Cath site unremarkable  Pulses weak bilaterally, which is unchanged from my previous assessment  Merna from [facility] made aware  Oxy 2 5mg given for pain of 5/10 at site  Will continue to monitor    Tony Berrios RN

## 2021-02-05 NOTE — PLAN OF CARE
Post-Dialysis RN Treatment Note    Blood Pressure:  Pre: 108/52 mm/Hg  Post:  105/65 mmHg   EDW: TBD   Weight:  Pre:  92 8 kg   Post: 92 0 kg   Mode of weight measurement: Bed Scale   Volume Removed  800 ml    Treatment duration: 180 minutes    NS given  No    Treatment shortened?  No   Medications given during Rx None Reported   Estimated Kt/V  Not Applicable   Access type: Permacath/TDC   Access Status: BFR of 250 maintained, despite reversing lines   Report called to primary nurse   [place], Megan      Problem: METABOLIC, FLUID AND ELECTROLYTES - ADULT  Goal: Electrolytes maintained within normal limits  Description: INTERVENTIONS:  - Monitor labs and assess patient for signs and symptoms of electrolyte imbalances  - Administer electrolyte replacement as ordered  - Monitor response to electrolyte replacements, including repeat lab results as appropriate  - Instruct patient on fluid and nutrition as appropriate  Outcome: Progressing  Goal: Fluid balance maintained  Description: INTERVENTIONS:  - Monitor labs   - Monitor I/O and WT  - Instruct patient on fluid and nutrition as appropriate  - Assess for signs & symptoms of volume excess or deficit  Outcome: Progressing     Problem: HEMATOLOGIC - ADULT  Goal: Maintains hematologic stability  Description: INTERVENTIONS  - Assess for signs and symptoms of bleeding or hemorrhage  - Monitor labs  - Administer supportive blood products/factors as ordered and appropriate  Outcome: Progressing

## 2021-02-05 NOTE — ASSESSMENT & PLAN NOTE
Presented with chest pain at [facility]   Hx of CAD s/p stenting and CABG 2016  Received ASA 324mg at home and SL nitro x1 in the ER  Troponin peaked at 5 5  Continue cardiovascular medications with aspirin, Plavix, statin, beta-blocker and Imdur  Cardiac Cath Completed at [facility]   - three-vessel disease with 99% occluded proximal circumflex, 50% distal left main, mid % occluded, RCA proximally 100% occluded with collaterals from left circulation  Patient has his patent grafts of LIMA to LAD and SVG to OM2    Most likely Type 2 MI as per cardiology  Medical intervention recommend at this time  Echo complete - reveals 60% EF

## 2021-02-05 NOTE — NUTRITION
02/05/21 1241   Recommendations/Interventions   Nutrition Recommendations Continue diet as ordered  (Pt with poor appetite, interested in nutrition supplement  Will provide Nepro as pt receiving HD  If HD treatments are stopped, alternate supplement would likely be more appropriate  Adjust as needed  Will continue to follow )     If HD is discontinued but pt with continued need for nutrition supplement, consider supplement such as Su[place]  Will continue to follow

## 2021-02-05 NOTE — ASSESSMENT & PLAN NOTE
Lab Results   Component Value Date    EGFR 8 02/05/2021    EGFR 10 02/04/2021    EGFR 11 02/03/2021    CREATININE 5 78 (H) 02/05/2021    CREATININE 4 88 (H) 02/04/2021    CREATININE 4 53 (H) 02/03/2021     Most recent 5 78 trending up   Baseline creatinine around 2 3-2 5  Previously on HD in 2019;   IR placed HD Cath 1/25/202   Nephrology on board   Most likely ATN post contrast for Cath  No longer on IV diuresis  As per nephrology, may consider Dialysis 2/5/2021 if creatinine continues to trend up

## 2021-02-05 NOTE — PROGRESS NOTES
Progress Note - Farhana Navarro 1940, [de-identified] y o  male MRN: [id number]    Unit/Bed#: S -01 Encounter: [id number]    Primary Care Provider: Ozzy Gorman MD   Date and time admitted to hospital: 1/22/2021 12:28 PM        * Coronary artery disease  Assessment & Plan    Presented with chest pain at [facility]   Hx of CAD s/p stenting and CABG 2016  Received ASA 324mg at home and SL nitro x1 in the ER  Troponin peaked at 5 5  Continue cardiovascular medications with aspirin, Plavix, statin, beta-blocker and Imdur  Cardiac Cath Completed at [facility]   - three-vessel disease with 99% occluded proximal circumflex, 50% distal left main, mid % occluded, RCA proximally 100% occluded with collaterals from left circulation  Patient has his patent grafts of LIMA to LAD and SVG to OM2  Most likely Type 2 MI as per cardiology  Medical intervention recommend at this time  Echo complete - reveals 60% EF      Urinary retention  Assessment & Plan  Straight cath 2/4/2021 revealed >400cc urine      - As per nephrology, continue bladder scans and straight cath >250cc    Oral candida  Assessment & Plan  · Nystatin mouthwash  Hyponatremia  Assessment & Plan  · Likely due to hypervolemia/CHF  130  2/5/2021  Asymptomatic  · Nephrology on board  · Monitor  Anemia  Assessment & Plan  · Monitor closely  Currently 7 6, stable  · Pt now has oozing around HD cath site  · Have consented for blood if needed  · Required 1 unit PRBC overnight 1/30/2021 after hemoglobin found to be 6 7  Another unit required overnight 2/1/2021 after hemoglobin found to be 5 6  · Improved to 8 7  · Unknown source  · Continue to monitor    Acute on chronic diastolic congestive heart failure [facility])  Assessment & Plan  · Cardiologist and nephrologist on board  · D/c Bumex drip as per nephrology  · Continue cardiovascular and heart failure medications  · Monitor input and output  · Daily weights    · Currently on room air    Hx of CABG  Assessment & Plan  CAD s/p CABG in 2016; history of stenting prior to CABG (LIMA to LAD and SVG to OM)  Status post cardiac catheterization: significant triple vessel coronary artery disease  Patient has his patent grafts of LIMA to LAD and SVG to OM2  Continue cardiovascular meds  Leukocytosis  Assessment & Plan  · Leukocytosis 15 8 2/5/2021  · COVID-19, influenza, RSV tests were negative x2  · Chest x-ray officially read as multifocal pneumonia  · Completed cefepime  · Blood cultures x2 no growth  · Cough medicine/antitussive, lozenges and Chloraseptic spray p r n  Arlen Hamlin Type 2 diabetes mellitus with hyperglycemia, with long-term current use of insulin [facility])  Assessment & Plan  Lab Results   Component Value Date    HGBA1C 6 7 (H) 07/03/2019       Recent Labs     02/04/21  2115 02/04/21  2313 02/05/21  0209 02/05/21  0738   POCGLU 145* 89 150* 262*       Blood Sugar Average: Last 72 hrs:  (P) 257   Endocrinology on board  - recommend 40 levemir daily, humalog 25 with meals +SS    Acute renal failure superimposed on stage 3 chronic kidney disease [facility])  Assessment & Plan  Lab Results   Component Value Date    EGFR 8 02/05/2021    EGFR 10 02/04/2021    EGFR 11 02/03/2021    CREATININE 5 78 (H) 02/05/2021    CREATININE 4 88 (H) 02/04/2021    CREATININE 4 53 (H) 02/03/2021     Most recent 5 78 trending up   Baseline creatinine around 2 3-2 5  Previously on HD in 2019;   IR placed HD Cath 1/25/202   Nephrology on board   Most likely ATN post contrast for Cath  No longer on IV diuresis  As per nephrology, may consider Dialysis 2/5/2021 if creatinine continues to trend up          VTE Pharmacologic Prophylaxis:   Pharmacologic: Heparin  Mechanical VTE Prophylaxis in Place: Yes    Discussions with Specialists or Other Care Team Provider: Hospitalist    Education and Discussions with Family / Patient: Patient    Current Length of Stay: 14 day(s)    Current Patient Status: Inpatient     Discharge Plan / Estimated Discharge Date: TBD    Code Status: Level 1 - Full Code      Subjective:   Patient seen at bedside resting comfortably  States he does feel a bit tired  He is doing well without any o2 supplementation  Objective:     Vitals:   Temp (24hrs), Av 8 °F (36 6 °C), Min:97 6 °F (36 4 °C), Max:98 1 °F (36 7 °C)    Temp:  [97 6 °F (36 4 °C)-98 1 °F (36 7 °C)] 97 7 °F (36 5 °C)  HR:  [59-68] 66  Resp:  [16-22] 22  BP: (105-125)/(33-59) 116/55  SpO2:  [96 %-100 %] 96 %  There is no height or weight on file to calculate BMI  Input and Output Summary (last 24 hours): Intake/Output Summary (Last 24 hours) at 2021 0947  Last data filed at 2021 0912  Gross per 24 hour   Intake 540 ml   Output 1275 ml   Net -735 ml       Physical Exam:     Physical Exam  Vitals signs and nursing note reviewed  Constitutional:       General: He is not in acute distress  Appearance: He is well-developed  He is not ill-appearing or toxic-appearing  HENT:      Head: Normocephalic and atraumatic  Eyes:      General: No scleral icterus  Right eye: No discharge  Left eye: No discharge  Conjunctiva/sclera: Conjunctivae normal    Cardiovascular:      Rate and Rhythm: Normal rate and regular rhythm  Heart sounds: Normal heart sounds  No murmur  Pulmonary:      Effort: No respiratory distress  Breath sounds: No wheezing or rhonchi  Abdominal:      General: Bowel sounds are normal  There is no distension  Palpations: Abdomen is soft  Tenderness: There is no abdominal tenderness  Musculoskeletal: Normal range of motion  General: No tenderness  Skin:     General: Skin is warm  Findings: No erythema or rash  Comments: HD cath in place   Neurological:      Mental Status: He is alert  Motor: No weakness     Psychiatric:         Behavior: Behavior normal            Additional Data:     Labs:    Results from last 7 days   Lab Units 21  5940 02/01/21  0333   WBC Thousand/uL 15 80*   < > 14 66*   HEMOGLOBIN g/dL 7 6*   < > 7 8*   HEMATOCRIT % 22 7*   < > 23 5*   PLATELETS Thousands/uL 171   < > 135*   NEUTROS PCT %  --   --  71   LYMPHS PCT %  --   --  13*   MONOS PCT %  --   --  10   EOS PCT %  --   --  3    < > = values in this interval not displayed  Results from last 7 days   Lab Units 02/05/21  0525   POTASSIUM mmol/L 3 7   CHLORIDE mmol/L 94*   CO2 mmol/L 20*   BUN mg/dL 142*   CREATININE mg/dL 5 78*   CALCIUM mg/dL 8 3     Results from last 7 days   Lab Units 01/31/21  2100   INR  1 18       * I Have Reviewed All Lab Data Listed Above  * Additional Pertinent Lab Tests Reviewed:  All Labs Within Last 24 Hours Reviewed    Imaging:    Imaging Reports Reviewed Today Include: N/A  Imaging Personally Reviewed by Myself Includes:  N/A    Recent Cultures (last 7 days):           Last 24 Hours Medication List:   Current Facility-Administered Medications   Medication Dose Route Frequency Provider Last Rate    acetaminophen  650 mg Oral Q6H PRN Swathi Schumacher MD      aluminum-magnesium hydroxide-simethicone  30 mL Oral Q6H PRN TABITHA Devi      amLODIPine  5 mg Oral BID RENETTA Chakraborty      aspirin  81 mg Oral Daily Willie Bansal MD      atorvastatin  40 mg Oral Daily With Chris Ruiz MD      benzonatate  100 mg Oral TID PRN Keysha Officer, TABITHA      bisacodyl  10 mg Rectal Daily PRN Jaun Ch DO      calcium carbonate  1,000 mg Oral Daily PRN Willie Bansal MD      cefepime  2,000 mg Intravenous Q24H Faye Correa MD 2,000 mg (02/04/21 1111)    clopidogrel  75 mg Oral Daily Willie Bansal MD      escitalopram  10 mg Oral HS Willie Bansal MD      guaiFENesin  1,200 mg Oral Q12H [facility] Keysha Officer, TABITHA      heparin (porcine)  5,000 Units Subcutaneous Q8H [facility] Faye Correa MD      hydrALAZINE  50 mg Oral [facility] RENETTA Chakraborty      HYDROmorphone  0 2 mg Intravenous Q3H PRN Faye Correa MD      insulin detemir  40 Units Subcutaneous Q12H [address] Alton Brian MD      insulin lispro  2-12 Units Subcutaneous TID [facility] [place] Alton Brian MD      insulin lispro  2-12 Units Subcutaneous HS Alton Brian MD      insulin lispro  2-12 Units Subcutaneous 0200 Alton Brian MD      insulin lispro  25 Units Subcutaneous TID With Meals Alton Brian MD      iron polysaccharides  150 mg Oral Daily [address]      melatonin  3 mg Oral HS TABITHA Plata      metoprolol tartrate  25 mg Oral Q12H [address] Dany MD Pepper      nitroglycerin  0 4 mg Sublingual Q5 Min PRN Dany MD Pepper      nystatin  500,000 Units Swish & Swallow 4x Daily Faye Correa MD      ondansetron  4 mg Intravenous Q6H PRN Dany Pabon MD      oxyCODONE  2 5 mg Oral Q4H PRN Faye Correa MD      pantoprazole  40 mg Oral BID AC Flip Estevez MD      phenol  1 spray Mouth/Throat Q4H PRN Faye Correa MD      pneumococcal 13-valent conjugate vaccine  0 5 mL Intramuscular Prior to discharge Dany MD Pepper      polyethylene glycol  17 g Oral Daily Flip Estevez MD      polyethylene glycol  17 g Oral Daily PRN Christoph Ivy MD      senna-docusate sodium  1 tablet Oral HS Flip Estevez MD      sodium chloride  1 spray Each Nare Q1H PRN Lily Cedillo MD          Today, Patient Was Seen By: Flip Estevez MD    ** Please Note: This note has been constructed using a voice recognition system   **

## 2021-02-05 NOTE — PROGRESS NOTES
Patient extremely upset regarding his care  Patient feels that no one is addressing his constipation  Patient states "I don't know what is so hard for you guys to understand I need to poop, I need an enema " Per patient, he has not gone for 3-4 days and has been getting miralax to help  Miralax is ordered for 1st line constipation but was not charted as given for PRN  However, dulcolax suppository is 2nd line and was given earlier today with no relief  Patient is complaining of pain in his right lower abdomen and hemorrhoid pain  Patient has a right femoral cardiac cath site and pain is near but the patient states his pain is from constipation  Patient was given miralax  Resident Nicholas Pate was notified of patient's complaints  Miralax offered no relief

## 2021-02-05 NOTE — ASSESSMENT & PLAN NOTE
· Leukocytosis 15 8 2/5/2021  · COVID-19, influenza, RSV tests were negative x2  · Chest x-ray officially read as multifocal pneumonia  · Completed cefepime  · Blood cultures x2 no growth  · Cough medicine/antitussive, lozenges and Chloraseptic spray p r n  Greg Leal

## 2021-02-05 NOTE — PROGRESS NOTES
NEPHROLOGY PROGRESS NOTE    Danish Nur [de-identified] y o  male MRN: [id number]  Unit/Bed#: S -01 Encounter: [id number]  Reason for Consult:  Acute renal failure on chronic kidney disease    The patient is awake and alert I saw him as he was moved to the chair and it took him a little bit time to recover his breathing although he is not short of breath at rest   Also says he has lot of just somatic discomfort and aches and pains  Otherwise he was in good spirits and eating okay  He did require urinary straight catheterization periodically for high residuals  ASSESSMENT/PLAN:  1  Renal    Patient has acute on chronic kidney disease baseline creatinine is 2 5  He status post cardiac catheterization developed ATN became dialysis dependent  Urine output has started to increase and I was hoping creatinine was beginning to plateau but unfortunately it did increase today significantly  Once his Marinelli catheter was removed we have discovered that he had some urine retention still with residuals of around 400 ml after talking to the nurse  For now will continue with intermittent straight catheterization but if this continues will need Urology input  Monitor bladder residuals and straight catheterization  Hemodialysis plan for today  Continue to monitor for signs of recovery over the weekend by trend of creatinine  Plan for dialysis today on F 160, 3 potassium bath for 3 5 hours UF 2 L as tolerated monitor hemodynamically  2  Ischemic cardiomyopathy    Status post cardiac catheterization after myocardial infarction  No intervention was performed  Medical therapy per Cardiology  3  PT/OT  SUBJECTIVE:  Review of Systems   Constitution: Positive for malaise/fatigue  Negative for chills, fever and night sweats  Feels aches and pains all over body  HENT: Negative  Eyes: Negative  Cardiovascular: Positive for dyspnea on exertion  Negative for chest pain, leg swelling and orthopnea  Respiratory: Negative for cough, shortness of breath, sputum production and wheezing  Gastrointestinal: Negative for abdominal pain, anorexia, diarrhea, nausea and vomiting  Genitourinary: Positive for incomplete emptying  Negative for dysuria, flank pain and hematuria  Neurological: Positive for weakness  Negative for focal weakness, headaches and light-headedness  Psychiatric/Behavioral: Negative for altered mental status, depression, hallucinations and hypervigilance  OBJECTIVE:  Current Weight:    Vitals:Temp (24hrs), Av 8 °F (36 6 °C), Min:97 6 °F (36 4 °C), Max:98 1 °F (36 7 °C)  Current: Temperature: 97 7 °F (36 5 °C)   Blood pressure 116/55, pulse 66, temperature 97 7 °F (36 5 °C), temperature source Oral, resp  rate 22, SpO2 96 %  , There is no height or weight on file to calculate BMI  Intake/Output Summary (Last 24 hours) at 2021 0840  Last data filed at 2021 0912  Gross per 24 hour   Intake 540 ml   Output 1275 ml   Net -735 ml       Physical Exam: /55 (BP Location: Right arm)   Pulse 66   Temp 97 7 °F (36 5 °C) (Oral)   Resp 22   SpO2 96%   Physical Exam  Constitutional:       General: He is not in acute distress  Appearance: He is not toxic-appearing or diaphoretic  HENT:      Head: Normocephalic and atraumatic  Mouth/Throat:      Mouth: Mucous membranes are dry  Eyes:      General: No scleral icterus  Extraocular Movements: Extraocular movements intact  Neck:      Musculoskeletal: Normal range of motion and neck supple  Cardiovascular:      Rate and Rhythm: Normal rate and regular rhythm  Heart sounds: No friction rub  No gallop  Pulmonary:      Effort: Pulmonary effort is normal  No respiratory distress  Breath sounds: No wheezing or rhonchi  Comments: Decreased breath sounds bases  Abdominal:      General: Bowel sounds are normal  There is no distension  Palpations: Abdomen is soft  Tenderness:  There is no abdominal tenderness  There is no rebound  Neurological:      General: No focal deficit present  Mental Status: He is alert and oriented to person, place, and time  Mental status is at baseline  Psychiatric:         Mood and Affect: Mood normal          Behavior: Behavior normal          Thought Content:  Thought content normal          Judgment: Judgment normal          Medications:    Current Facility-Administered Medications:     acetaminophen (TYLENOL) tablet 650 mg, 650 mg, Oral, Q6H PRN, Sam Moore MD, 650 mg at 02/04/21 1823    aluminum-magnesium hydroxide-simethicone (MYLANTA) oral suspension 30 mL, 30 mL, Oral, Q6H PRN, TABITHA German    amLODIPine (NORVASC) tablet 5 mg, 5 mg, Oral, BID, Jayden Muñoz PA-C, Stopped at 02/04/21 1543    aspirin (ECOTRIN LOW STRENGTH) EC tablet 81 mg, 81 mg, Oral, Daily, Swathi Schumacher MD, 81 mg at 02/05/21 0840    atorvastatin (LIPITOR) tablet 40 mg, 40 mg, Oral, Daily With Violet Holland MD, 40 mg at 02/04/21 1554    benzonatate (TESSALON PERLES) capsule 100 mg, 100 mg, Oral, TID PRN, TABITHA Owens    bisacodyl (DULCOLAX) rectal suppository 10 mg, 10 mg, Rectal, Daily PRN, Parag Munguia DO, 10 mg at 02/02/21 0053    calcium carbonate (TUMS) chewable tablet 1,000 mg, 1,000 mg, Oral, Daily PRN, Sam Moore MD    cefepime (MAXIPIME) 2,000 mg in dextrose 5 % 50 mL IVPB, 2,000 mg, Intravenous, Q24H, Faye Correa MD, Last Rate: 100 mL/hr at 02/04/21 1111, 2,000 mg at 02/04/21 1111    clopidogrel (PLAVIX) tablet 75 mg, 75 mg, Oral, Daily, Sam Moore MD, 75 mg at 02/05/21 0840    escitalopram (LEXAPRO) tablet 10 mg, 10 mg, Oral, HS, Sam Moore MD, 10 mg at 02/04/21 2130    guaiFENesin (MUCINEX) 12 hr tablet 1,200 mg, 1,200 mg, Oral, Q12H [facility], TABITHA Owens, 1,200 mg at 02/05/21 0840    heparin (porcine) subcutaneous injection 5,000 Units, 5,000 Units, Subcutaneous, Q8H [facility], Faye CONNOR MD Madeline, 5,000 Units at 02/05/21 0514    hydrALAZINE (APRESOLINE) tablet 50 mg, 50 mg, Oral, Q8H [facility], RENETTA Chakraborty, Stopped at 02/04/21 0502    HYDROmorphone (DILAUDID) injection 0 2 mg, 0 2 mg, Intravenous, Q3H PRN, Faye Correa MD, 0 2 mg at 02/02/21 0815    insulin detemir (LEVEMIR) subcutaneous injection 40 Units, 40 Units, Subcutaneous, Q12H [facility], Bill Paulino MD, 40 Units at 02/05/21 0840    insulin lispro (HumaLOG) 100 units/mL subcutaneous injection 2-12 Units, 2-12 Units, Subcutaneous, TID AC **AND** Fingerstick Glucose (POCT), , , TID AC, Bill Paulino MD    insulin lispro (HumaLOG) 100 units/mL subcutaneous injection 2-12 Units, 2-12 Units, Subcutaneous, HS, Bill Paulino MD    insulin lispro (HumaLOG) 100 units/mL subcutaneous injection 2-12 Units, 2-12 Units, Subcutaneous, 0200, Bill Paulino MD    insulin lispro (HumaLOG) 100 units/mL subcutaneous injection 25 Units, 25 Units, Subcutaneous, TID With Meals, Bill Paulino MD, 20 Units at 02/05/21 4432    iron polysaccharides (FERREX) capsule 150 mg, 150 mg, Oral, Daily, RENETTA Chakraborty, 150 mg at 02/05/21 0840    melatonin tablet 3 mg, 3 mg, Oral, HS, Pina Fernanda, ADALIDNP, 3 mg at 02/04/21 2325    metoprolol tartrate (LOPRESSOR) tablet 25 mg, 25 mg, Oral, Q12H [facility], Swathi Schumacher MD, 25 mg at 02/05/21 0840    nitroglycerin (NITROSTAT) SL tablet 0 4 mg, 0 4 mg, Sublingual, Q5 Min PRN, Jessica Mccall MD    nystatin (MYCOSTATIN) oral suspension 500,000 Units, 500,000 Units, Swish & Swallow, 4x Daily, Faye Correa MD, 500,000 Units at 02/05/21 0840    ondansetron (ZOFRAN) injection 4 mg, 4 mg, Intravenous, Q6H PRN, Jessica Mccall MD, 4 mg at 01/29/21 1352    oxyCODONE (ROXICODONE) IR tablet 2 5 mg, 2 5 mg, Oral, Q4H PRN, Faye Correa MD, 2 5 mg at 02/05/21 0508    pantoprazole (PROTONIX) EC tablet 40 mg, 40 mg, Oral, BID AC, Susy Sage MD, 40 mg at 02/05/21 0600    phenol (CHLORASEPTIC) 1 4 % mucosal liquid 1 spray, 1 spray, Mouth/Throat, Q4H PRN, Faye Correa MD    pneumococcal 13-valent conjugate vaccine (PREVNAR-13) IM injection 0 5 mL, 0 5 mL, Intramuscular, Prior to discharge, Gricelda Hanks MD    polyethylene glycol (MIRALAX) packet 17 g, 17 g, Oral, Daily, Maru Rebollar MD, 17 g at 02/05/21 0841    polyethylene glycol (MIRALAX) packet 17 g, 17 g, Oral, Daily PRN, Leonid Singh MD    senna-docusate sodium (SENOKOT S) 8 6-50 mg per tablet 1 tablet, 1 tablet, Oral, HS, Maru Rebollar MD, 1 tablet at 02/04/21 2130    sodium chloride (OCEAN) 0 65 % nasal spray 1 spray, 1 spray, Each Nare, Q1H PRN, Rosa M Pittman MD, 1 spray at 02/04/21 1111    Laboratory Results:  Lab Results   Component Value Date    WBC 15 80 (H) 02/05/2021    HGB 7 6 (L) 02/05/2021    HCT 22 7 (L) 02/05/2021    MCV 95 02/05/2021     02/05/2021     Lab Results   Component Value Date    SODIUM 130 (L) 02/05/2021    K 3 7 02/05/2021    CL 94 (L) 02/05/2021    CO2 20 (L) 02/05/2021     (H) 02/05/2021    CREATININE 5 78 (H) 02/05/2021    GLUC 166 (H) 02/05/2021    CALCIUM 8 3 02/05/2021     Lab Results   Component Value Date    CALCIUM 8 3 02/05/2021    PHOS 5 1 (H) 01/27/2021     No results found for: LABPROT

## 2021-02-05 NOTE — ASSESSMENT & PLAN NOTE
Lab Results   Component Value Date    HGBA1C 6 7 (H) 07/03/2019       Recent Labs     02/04/21  2115 02/04/21  2313 02/05/21  0209 02/05/21  0738   POCGLU 145* 89 150* 262*       Blood Sugar Average: Last 72 hrs:  (P) 257   Endocrinology on board  - recommend 40 levemir daily, humalog 25 with meals +SS

## 2021-02-05 NOTE — PROGRESS NOTES
Progress Note - Lance Argueta [de-identified] y o  male MRN: [id number]    Unit/Bed#: S -01 Encounter: [id number]      CC: diabetes f/u    Subjective:   Lance Argueta is a [de-identified]y o  year old male with type 2 diabetes  Feels well  No complaints  No hypoglycemia  He was admitted for acute on chronic congestive heart failure, non ST elevation MI    He denies chest pain, shortness of breath  He stated that he has been having pain because of his hemorrhoids    His appetite is moderate, he ate bagel for breakfast and severe for his lunch  Objective:     Vitals: Blood pressure 108/66, pulse 68, temperature 97 8 °F (36 6 °C), temperature source Oral, resp  rate 20, SpO2 97 %  ,There is no height or weight on file to calculate BMI  Intake/Output Summary (Last 24 hours) at 2/5/2021 1638  Last data filed at 2/5/2021 1315  Gross per 24 hour   Intake 1540 ml   Output 2275 ml   Net -735 ml       Physical Exam:  General Appearance: awake, appears stated age and cooperative  Head: Normocephalic, without obvious abnormality, atraumatic  Extremities: moves all extremities  Skin: Skin color and temperature normal    Pulm: no labored breathing    Lab, Imaging and other studies: I have personally reviewed pertinent reports        POC Glucose (mg/dl)   Date Value   02/05/2021 343 (H)   02/05/2021 309 (H)   02/05/2021 262 (H)   02/05/2021 150 (H)   02/04/2021 89   02/04/2021 145 (H)   02/04/2021 169 (H)   02/04/2021 194 (H)   02/04/2021 216 (H)   02/04/2021 191 (H)     Lab Results   Component Value Date    CREATININE 5 78 (H) 02/05/2021       Assessment:  diabetes with hyperglycemia, exacerbated from most likely non ST-elevation MI, blood sugars were in 400-500 range was started on insulin infusion on February 3, 2021, he was transition to subcutaneous insulin regimen yesterday night, received 40 units of Levemir last night and 40 units of Levemir in the morning  Blood sugars are in 150-300 range  Acute kidney injury on CKD-status post dialysis today managed by Nephrology  Hypertension-well controlled,   BP Readings from Last 3 Encounters:   02/05/21 108/66   01/22/21 134/73   07/15/19 120/62     Plan:  -continue Humalog 25 units with meals(patient only got 20 units with breakfast)  -increase Levemir to 42  units twice a day  -patient was dialyzed today  -continue to monitor blood sugar with meals and at bedtime  -keep carbohydrate consistent with meals  -will follow-up    Elli Moore MD        Portions of the record may have been created with voice recognition software

## 2021-02-06 ENCOUNTER — APPOINTMENT (INPATIENT)
Dept: RADIOLOGY | Facility: HOSPITAL | Age: 81
DRG: 280 | End: 2021-02-06
Payer: COMMERCIAL

## 2021-02-06 PROBLEM — M25.551 RIGHT HIP PAIN: Status: ACTIVE | Noted: 2021-02-06

## 2021-02-06 PROBLEM — M25.552 LEFT HIP PAIN: Status: ACTIVE | Noted: 2021-02-06

## 2021-02-06 PROBLEM — M25.552 LEFT HIP PAIN: Status: RESOLVED | Noted: 2021-02-06 | Resolved: 2021-02-06

## 2021-02-06 LAB
ANION GAP SERPL CALCULATED.3IONS-SCNC: 12 MMOL/L (ref 4–13)
BUN SERPL-MCNC: 98 MG/DL (ref 5–25)
CALCIUM SERPL-MCNC: 8.1 MG/DL (ref 8.3–10.1)
CHLORIDE SERPL-SCNC: 97 MMOL/L (ref 100–108)
CO2 SERPL-SCNC: 24 MMOL/L (ref 21–32)
CREAT SERPL-MCNC: 4.77 MG/DL (ref 0.6–1.3)
ERYTHROCYTE [DISTWIDTH] IN BLOOD BY AUTOMATED COUNT: 14.2 % (ref 11.6–15.1)
GFR SERPL CREATININE-BSD FRML MDRD: 11 ML/MIN/1.73SQ M
GLUCOSE SERPL-MCNC: 117 MG/DL (ref 65–140)
GLUCOSE SERPL-MCNC: 122 MG/DL (ref 65–140)
GLUCOSE SERPL-MCNC: 151 MG/DL (ref 65–140)
GLUCOSE SERPL-MCNC: 201 MG/DL (ref 65–140)
GLUCOSE SERPL-MCNC: 242 MG/DL (ref 65–140)
GLUCOSE SERPL-MCNC: 260 MG/DL (ref 65–140)
HCT VFR BLD AUTO: 25.8 % (ref 36.5–49.3)
HGB BLD-MCNC: 8.3 G/DL (ref 12–17)
MCH RBC QN AUTO: 31.9 PG (ref 26.8–34.3)
MCHC RBC AUTO-ENTMCNC: 32.2 G/DL (ref 31.4–37.4)
MCV RBC AUTO: 99 FL (ref 82–98)
PLATELET # BLD AUTO: 174 THOUSANDS/UL (ref 149–390)
PMV BLD AUTO: 11.8 FL (ref 8.9–12.7)
POTASSIUM SERPL-SCNC: 4.1 MMOL/L (ref 3.5–5.3)
RBC # BLD AUTO: 2.6 MILLION/UL (ref 3.88–5.62)
SODIUM SERPL-SCNC: 133 MMOL/L (ref 136–145)
WBC # BLD AUTO: 16.66 THOUSAND/UL (ref 4.31–10.16)

## 2021-02-06 PROCEDURE — 71045 X-RAY EXAM CHEST 1 VIEW: CPT

## 2021-02-06 PROCEDURE — 97530 THERAPEUTIC ACTIVITIES: CPT

## 2021-02-06 PROCEDURE — 82948 REAGENT STRIP/BLOOD GLUCOSE: CPT

## 2021-02-06 PROCEDURE — 85027 COMPLETE CBC AUTOMATED: CPT | Performed by: FAMILY MEDICINE

## 2021-02-06 PROCEDURE — 80048 BASIC METABOLIC PNL TOTAL CA: CPT | Performed by: INTERNAL MEDICINE

## 2021-02-06 PROCEDURE — 99232 SBSQ HOSP IP/OBS MODERATE 35: CPT | Performed by: FAMILY MEDICINE

## 2021-02-06 PROCEDURE — 73502 X-RAY EXAM HIP UNI 2-3 VIEWS: CPT

## 2021-02-06 RX ADMIN — NYSTATIN 500000 UNITS: 100000 SUSPENSION ORAL at 17:28

## 2021-02-06 RX ADMIN — HEPARIN SODIUM 5000 UNITS: 5000 INJECTION INTRAVENOUS; SUBCUTANEOUS at 13:27

## 2021-02-06 RX ADMIN — AMLODIPINE BESYLATE 5 MG: 5 TABLET ORAL at 17:27

## 2021-02-06 RX ADMIN — INSULIN DETEMIR 42 UNITS: 100 INJECTION, SOLUTION SUBCUTANEOUS at 21:06

## 2021-02-06 RX ADMIN — GUAIFENESIN 1200 MG: 600 TABLET, EXTENDED RELEASE ORAL at 08:20

## 2021-02-06 RX ADMIN — GUAIFENESIN 1200 MG: 600 TABLET, EXTENDED RELEASE ORAL at 21:03

## 2021-02-06 RX ADMIN — HYDRALAZINE HYDROCHLORIDE 50 MG: 25 TABLET, FILM COATED ORAL at 21:07

## 2021-02-06 RX ADMIN — CEFEPIME HYDROCHLORIDE 2000 MG: 2 INJECTION, POWDER, FOR SOLUTION INTRAVENOUS at 11:45

## 2021-02-06 RX ADMIN — METOPROLOL TARTRATE 25 MG: 25 TABLET, FILM COATED ORAL at 08:20

## 2021-02-06 RX ADMIN — NYSTATIN 500000 UNITS: 100000 SUSPENSION ORAL at 21:06

## 2021-02-06 RX ADMIN — PANTOPRAZOLE SODIUM 40 MG: 40 TABLET, DELAYED RELEASE ORAL at 15:35

## 2021-02-06 RX ADMIN — INSULIN LISPRO 2 UNITS: 100 INJECTION, SOLUTION INTRAVENOUS; SUBCUTANEOUS at 02:41

## 2021-02-06 RX ADMIN — POLYETHYLENE GLYCOL 3350 17 G: 17 POWDER, FOR SOLUTION ORAL at 08:20

## 2021-02-06 RX ADMIN — HYDRALAZINE HYDROCHLORIDE 50 MG: 25 TABLET, FILM COATED ORAL at 13:28

## 2021-02-06 RX ADMIN — ATORVASTATIN CALCIUM 40 MG: 40 TABLET, FILM COATED ORAL at 15:35

## 2021-02-06 RX ADMIN — POLYSACCHARIDE-IRON COMPLEX 150 MG: 150 CAPSULE ORAL at 08:20

## 2021-02-06 RX ADMIN — HEPARIN SODIUM 5000 UNITS: 5000 INJECTION INTRAVENOUS; SUBCUTANEOUS at 05:27

## 2021-02-06 RX ADMIN — INSULIN LISPRO 7 UNITS: 100 INJECTION, SOLUTION INTRAVENOUS; SUBCUTANEOUS at 09:51

## 2021-02-06 RX ADMIN — CLOPIDOGREL BISULFATE 75 MG: 75 TABLET ORAL at 08:20

## 2021-02-06 RX ADMIN — HEPARIN SODIUM 5000 UNITS: 5000 INJECTION INTRAVENOUS; SUBCUTANEOUS at 21:03

## 2021-02-06 RX ADMIN — DOCUSATE SODIUM AND SENNOSIDES 1 TABLET: 8.6; 5 TABLET ORAL at 21:03

## 2021-02-06 RX ADMIN — INSULIN LISPRO 25 UNITS: 100 INJECTION, SOLUTION INTRAVENOUS; SUBCUTANEOUS at 18:36

## 2021-02-06 RX ADMIN — ASPIRIN 81 MG: 81 TABLET ORAL at 08:20

## 2021-02-06 RX ADMIN — NYSTATIN 500000 UNITS: 100000 SUSPENSION ORAL at 09:12

## 2021-02-06 RX ADMIN — POLYETHYLENE GLYCOL 3350 17 G: 17 POWDER, FOR SOLUTION ORAL at 21:03

## 2021-02-06 RX ADMIN — ESCITALOPRAM 10 MG: 10 TABLET, FILM COATED ORAL at 21:03

## 2021-02-06 RX ADMIN — METOPROLOL TARTRATE 25 MG: 25 TABLET, FILM COATED ORAL at 21:07

## 2021-02-06 RX ADMIN — Medication 3 MG: at 21:03

## 2021-02-06 RX ADMIN — INSULIN LISPRO 6 UNITS: 100 INJECTION, SOLUTION INTRAVENOUS; SUBCUTANEOUS at 18:35

## 2021-02-06 RX ADMIN — HYDRALAZINE HYDROCHLORIDE 50 MG: 25 TABLET, FILM COATED ORAL at 05:26

## 2021-02-06 RX ADMIN — PANTOPRAZOLE SODIUM 40 MG: 40 TABLET, DELAYED RELEASE ORAL at 05:26

## 2021-02-06 RX ADMIN — INSULIN DETEMIR 30 UNITS: 100 INJECTION, SOLUTION SUBCUTANEOUS at 09:52

## 2021-02-06 RX ADMIN — INSULIN LISPRO 4 UNITS: 100 INJECTION, SOLUTION INTRAVENOUS; SUBCUTANEOUS at 21:05

## 2021-02-06 RX ADMIN — NYSTATIN 500000 UNITS: 100000 SUSPENSION ORAL at 13:27

## 2021-02-06 RX ADMIN — HYDROMORPHONE HYDROCHLORIDE 0.2 MG: 1 INJECTION, SOLUTION INTRAMUSCULAR; INTRAVENOUS; SUBCUTANEOUS at 09:23

## 2021-02-06 RX ADMIN — AMLODIPINE BESYLATE 5 MG: 5 TABLET ORAL at 08:20

## 2021-02-06 RX ADMIN — HYDROCORTISONE: 1 CREAM TOPICAL at 02:42

## 2021-02-06 RX ADMIN — INSULIN LISPRO 4 UNITS: 100 INJECTION, SOLUTION INTRAVENOUS; SUBCUTANEOUS at 13:28

## 2021-02-06 RX ADMIN — INSULIN LISPRO 25 UNITS: 100 INJECTION, SOLUTION INTRAVENOUS; SUBCUTANEOUS at 13:32

## 2021-02-06 NOTE — PLAN OF CARE
Problem: PHYSICAL THERAPY ADULT  Goal: Performs mobility at highest level of function for planned discharge setting  See evaluation for individualized goals  Description: Treatment/Interventions: Functional transfer training, LE strengthening/ROM, Therapeutic exercise, Endurance training, Patient/family training, Equipment eval/education, Bed mobility(PT to see for gait when appropriate)  Equipment Recommended: (Therapy will trial Nadya Score in upcoming sessions)       See flowsheet documentation for full assessment, interventions and recommendations  Outcome: Progressing  Note: Prognosis: Fair  Problem List: Decreased strength, Decreased range of motion, Decreased endurance, Impaired balance, Decreased mobility, Decreased coordination, Impaired vision, Pain  Assessment: Patient agreeable to participate in therapy session  Patient requires increased assistance for mobility tasks this session secondary to complaints of pain and fatigue  Supine to sit with mod ax1 and increased time with instruction for technique  Sit<>stand with mod ax2 with increased time to get balance and stand erect  Standing tolerance approx  30 seconds with B UE support  SPT from EOB to recliner with mod ax2 with assistance for steadying and instruction for stepping sequence and body positioning  Mobility impacted this session secondary to pain, nursing aware  Continue to focus on OOB mobility with progression of transfers as appropriate  PT Discharge Recommendation: Post-Acute Rehabilitation Services          See flowsheet documentation for full assessment

## 2021-02-06 NOTE — ASSESSMENT & PLAN NOTE
· Leukocytosis 15 8 2/5/2021  · COVID-19, influenza, RSV tests were negative x2  · Chest x-ray officially read as multifocal pneumonia  · Completed cefepime  · Blood cultures x2 no growth  · Cough medicine/antitussive, lozenges and Chloraseptic spray p r n  [facility] Repeat tomorrow    I will check an x-ray

## 2021-02-06 NOTE — PLAN OF CARE
Problem: Potential for Falls  Goal: Patient will remain free of falls  Description: INTERVENTIONS:  - Assess patient frequently for physical needs  -  Identify cognitive and physical deficits and behaviors that affect risk of falls    -  [place] fall precautions as indicated by assessment   - Educate patient/family on patient safety including physical limitations  - Instruct patient to call for assistance with activity based on assessment  - Modify environment to reduce risk of injury  - Consider OT/PT consult to assist with strengthening/mobility  Outcome: Progressing     Problem: CARDIOVASCULAR - ADULT  Goal: Maintains optimal cardiac output and hemodynamic stability  Description: INTERVENTIONS:  - Monitor I/O, vital signs and rhythm  - Monitor for S/S and trends of decreased cardiac output  - Administer and titrate ordered vasoactive medications to optimize hemodynamic stability  - Assess quality of pulses, skin color and temperature  - Assess for signs of decreased coronary artery perfusion  - Instruct patient to report change in severity of symptoms  Outcome: Progressing  Goal: Absence of cardiac dysrhythmias or at baseline rhythm  Description: INTERVENTIONS:  - Continuous cardiac monitoring, vital signs, obtain 12 lead EKG if ordered  - Administer antiarrhythmic and heart rate control medications as ordered  - Monitor electrolytes and administer replacement therapy as ordered  Outcome: Progressing     Problem: RESPIRATORY - ADULT  Goal: Achieves optimal ventilation and oxygenation  Description: INTERVENTIONS:  - Assess for changes in respiratory status  - Assess for changes in mentation and behavior  - Position to facilitate oxygenation and minimize respiratory effort  - Oxygen administered by appropriate delivery if ordered  - Initiate smoking cessation education as indicated  - Encourage broncho-pulmonary hygiene including cough, deep breathe, Incentive Spirometry  - Assess the need for suctioning and aspirate as needed  - Assess and instruct to report SOB or any respiratory difficulty  - Respiratory Therapy support as indicated  Outcome: Progressing     Problem: GASTROINTESTINAL - ADULT  Goal: Maintains adequate nutritional intake  Description: INTERVENTIONS:  - Monitor percentage of each meal consumed  - Identify factors contributing to decreased intake, treat as appropriate  - Assist with meals as needed  - Monitor I&O, weight, and lab values if indicated  - Obtain nutrition services referral as needed  Outcome: Progressing     Problem: GENITOURINARY - ADULT  Goal: Maintains or returns to baseline urinary function  Description: INTERVENTIONS:  - Assess urinary function  - Encourage oral fluids to ensure adequate hydration if ordered  - Administer IV fluids as ordered to ensure adequate hydration  - Administer ordered medications as needed  - Offer frequent toileting  - Follow urinary retention protocol if ordered  Outcome: Progressing  Goal: Absence of urinary retention  Description: INTERVENTIONS:  - Assess patients ability to void and empty bladder  - Monitor I/O  - Bladder scan as needed  - Discuss with physician/AP medications to alleviate retention as needed  - Discuss catheterization for long term situations as appropriate  Outcome: Progressing     Problem: METABOLIC, FLUID AND ELECTROLYTES - ADULT  Goal: Electrolytes maintained within normal limits  Description: INTERVENTIONS:  - Monitor labs and assess patient for signs and symptoms of electrolyte imbalances  - Administer electrolyte replacement as ordered  - Monitor response to electrolyte replacements, including repeat lab results as appropriate  - Instruct patient on fluid and nutrition as appropriate  Outcome: Progressing  Goal: Fluid balance maintained  Description: INTERVENTIONS:  - Monitor labs   - Monitor I/O and WT  - Instruct patient on fluid and nutrition as appropriate  - Assess for signs & symptoms of volume excess or deficit  Outcome: Progressing  Goal: Glucose maintained within target range  Description: INTERVENTIONS:  - Monitor Blood Glucose as ordered  - Assess for signs and symptoms of hyperglycemia and hypoglycemia  - Administer ordered medications to maintain glucose within target range  - Assess nutritional intake and initiate nutrition service referral as needed  Outcome: Progressing     Problem: SKIN/TISSUE INTEGRITY - ADULT  Goal: Skin integrity remains intact  Description: INTERVENTIONS  - Identify patients at risk for skin breakdown  - Assess and monitor skin integrity  - Assess and monitor nutrition and hydration status  - Monitor labs (i e  albumin)  - Assess for incontinence   - Turn and reposition patient  - Assist with mobility/ambulation  - Relieve pressure over bony prominences  - Avoid friction and shearing  - Provide appropriate hygiene as needed including keeping skin clean and dry  - Evaluate need for skin moisturizer/barrier cream  - Collaborate with interdisciplinary team (i e  Nutrition, Rehabilitation, etc )   - Patient/family teaching  Outcome: Progressing     Problem: HEMATOLOGIC - ADULT  Goal: Maintains hematologic stability  Description: INTERVENTIONS  - Assess for signs and symptoms of bleeding or hemorrhage  - Monitor labs  - Administer supportive blood products/factors as ordered and appropriate  Outcome: Progressing     Problem: MUSCULOSKELETAL - ADULT  Goal: Maintain or return mobility to safest level of function  Description: INTERVENTIONS:  - Assess patient's ability to carry out ADLs; assess patient's baseline for ADL function and identify physical deficits which impact ability to perform ADLs (bathing, care of mouth/teeth, toileting, grooming, dressing, etc )  - Assess/evaluate cause of self-care deficits   - Assess range of motion  - Assess patient's mobility  - Assess patient's need for assistive devices and provide as appropriate  - Encourage maximum independence but intervene and supervise when necessary  - Involve family in performance of ADLs  - Assess for home care needs following discharge   - Consider OT consult to assist with ADL evaluation and planning for discharge  - Provide patient education as appropriate  Outcome: Progressing     Problem: PAIN - ADULT  Goal: Verbalizes/displays adequate comfort level or baseline comfort level  Description: Interventions:  - Encourage patient to monitor pain and request assistance  - Assess pain using appropriate pain scale  - Administer analgesics based on type and severity of pain and evaluate response  - Implement non-pharmacological measures as appropriate and evaluate response  - Consider cultural and social influences on pain and pain management  - Notify physician/advanced practitioner if interventions unsuccessful or patient reports new pain  Outcome: Progressing     Problem: INFECTION - ADULT  Goal: Absence or prevention of progression during hospitalization  Description: INTERVENTIONS:  - Assess and monitor for signs and symptoms of infection  - Monitor lab/diagnostic results  - Monitor all insertion sites, i e  indwelling lines, tubes, and drains  - Monitor endotracheal if appropriate and nasal secretions for changes in amount and color  - [place] appropriate cooling/warming therapies per order  - Administer medications as ordered  - Instruct and encourage patient and family to use good hand hygiene technique  - Identify and instruct in appropriate isolation precautions for identified infection/condition  Outcome: Progressing     Problem: SAFETY ADULT  Goal: Patient will remain free of falls  Description: INTERVENTIONS:  - Assess patient frequently for physical needs  -  Identify cognitive and physical deficits and behaviors that affect risk of falls    -  [place] fall precautions as indicated by assessment   - Educate patient/family on patient safety including physical limitations  - Instruct patient to call for assistance with activity based on assessment  - Modify environment to reduce risk of injury  - Consider OT/PT consult to assist with strengthening/mobility  Outcome: Progressing  Goal: Maintain or return to baseline ADL function  Description: INTERVENTIONS:  -  Assess patient's ability to carry out ADLs; assess patient's baseline for ADL function and identify physical deficits which impact ability to perform ADLs (bathing, care of mouth/teeth, toileting, grooming, dressing, etc )  - Assess/evaluate cause of self-care deficits   - Assess range of motion  - Assess patient's mobility; develop plan if impaired  - Assess patient's need for assistive devices and provide as appropriate  - Encourage maximum independence but intervene and supervise when necessary  - Involve family in performance of ADLs  - Assess for home care needs following discharge   - Consider OT consult to assist with ADL evaluation and planning for discharge  - Provide patient education as appropriate  Outcome: Progressing  Goal: Maintain or return mobility status to optimal level  Description: INTERVENTIONS:  - Assess patient's baseline mobility status (ambulation, transfers, stairs, etc )    - Identify cognitive and physical deficits and behaviors that affect mobility  - Identify mobility aids required to assist with transfers and/or ambulation (gait belt, sit-to-stand, lift, walker, cane, etc )  - [place] fall precautions as indicated by assessment  - Record patient progress and toleration of activity level on Mobility SBAR; progress patient to next Phase/Stage  - Instruct patient to call for assistance with activity based on assessment  - Consider rehabilitation consult to assist with strengthening/weightbearing, etc   Outcome: Progressing     Problem: DISCHARGE PLANNING  Goal: Discharge to home or other facility with appropriate resources  Description: INTERVENTIONS:  - Identify barriers to discharge w/patient and caregiver  - Arrange for needed discharge resources and transportation as appropriate  - Identify discharge learning needs (meds, wound care, etc )  - Arrange for interpretive services to assist at discharge as needed  - Refer to Case Management Department for coordinating discharge planning if the patient needs post-hospital services based on physician/advanced practitioner order or complex needs related to functional status, cognitive ability, or social support system  Outcome: Progressing     Problem: Knowledge Deficit  Goal: Patient/family/caregiver demonstrates understanding of disease process, treatment plan, medications, and discharge instructions  Description: Complete learning assessment and assess knowledge base  Interventions:  - Provide teaching at level of understanding  - Provide teaching via preferred learning methods  Outcome: Progressing     Problem: Prexisting or High Potential for Compromised Skin Integrity  Goal: Skin integrity is maintained or improved  Description: INTERVENTIONS:  - Identify patients at risk for skin breakdown  - Assess and monitor skin integrity  - Assess and monitor nutrition and hydration status  - Monitor labs   - Assess for incontinence   - Turn and reposition patient  - Assist with mobility/ambulation  - Relieve pressure over bony prominences  - Avoid friction and shearing  - Provide appropriate hygiene as needed including keeping skin clean and dry  - Evaluate need for skin moisturizer/barrier cream  - Collaborate with interdisciplinary team   - Patient/family teaching  - Consider wound care consult   Outcome: Progressing     Problem: Nutrition/Hydration-ADULT  Goal: Nutrient/Hydration intake appropriate for improving, restoring or maintaining nutritional needs  Description: Monitor and assess patient's nutrition/hydration status for malnutrition  Collaborate with interdisciplinary team and initiate plan and interventions as ordered  Monitor patient's weight and dietary intake as ordered or per policy   Utilize nutrition screening tool and intervene as necessary  Determine patient's food preferences and provide high-protein, high-caloric foods as appropriate       INTERVENTIONS:  - Monitor oral intake, urinary output, labs, and treatment plans  - Assess nutrition and hydration status and recommend course of action  - Evaluate amount of meals eaten  - Assist patient with eating if necessary   - Allow adequate time for meals  - Recommend/ encourage appropriate diets, oral nutritional supplements, and vitamin/mineral supplements  - Order, calculate, and assess calorie counts as needed  - Recommend, monitor, and adjust tube feedings and TPN/PPN based on assessed needs  - Assess need for intravenous fluids  - Provide specific nutrition/hydration education as appropriate  - Include patient/family/caregiver in decisions related to nutrition  Outcome: Progressing

## 2021-02-06 NOTE — PROGRESS NOTES
Progress Note - Gaetano Bermudez 1940, [de-identified] y o  male MRN: [id number]    Unit/Bed#: S -01 Encounter: [id number]    Primary Care Provider: Stew Yen MD   Date and time admitted to hospital: 1/22/2021 12:28 PM        Right hip pain  Assessment & Plan  I do not see anything acute on examination  I will check an x-ray of the right hip  Patient had the cardiac catheterization through the right groin  I do not appreciate any area of induration or redness  Urinary retention  Assessment & Plan  Straight cath 2/4/2021 revealed >400cc urine      - As per nephrology, continue bladder scans and straight cath >250cc    ATN (acute tubular necrosis) [facility])  Assessment & Plan  Per nephrology  See above    Hyponatremia  Assessment & Plan  · Likely due to hypervolemia/CHF  130  2/5/2021  Asymptomatic  · Nephrology on board  · Monitor  Anemia  Assessment & Plan  · Monitor closely  Currently 7 6, stable  · Pt now has oozing around HD cath site  · Have consented for blood if needed  · Required 1 unit PRBC overnight 1/30/2021 after hemoglobin found to be 6 7  Another unit required overnight 2/1/2021 after hemoglobin found to be 5 6  · Hemoglobin right now is 8 3  There is no evidence of any active bleeding    Acute on chronic diastolic congestive heart failure [facility])  Assessment & Plan  · Cardiologist and nephrologist on board  · D/c Bumex drip as per nephrology  · Continue cardiovascular and heart failure medications  · Monitor input and output  · Daily weights  · Currently on room air    Hx of CABG  Assessment & Plan  CAD s/p CABG in 2016; history of stenting prior to CABG (LIMA to LAD and SVG to OM)  Status post cardiac catheterization: significant triple vessel coronary artery disease  Patient has his patent grafts of LIMA to LAD and SVG to OM2  Continue cardiovascular meds      Leukocytosis  Assessment & Plan  · Leukocytosis 15 8 2/5/2021  · COVID-19, influenza, RSV tests were negative x2  · Chest x-ray officially read as multifocal pneumonia  · Completed cefepime  · Blood cultures x2 no growth  · Cough medicine/antitussive, lozenges and Chloraseptic spray p r n  Francella Crooked Francella Crooked Repeat tomorrow  I will check an x-ray    Type 2 diabetes mellitus with hyperglycemia, with long-term current use of insulin [facility])  Assessment & Plan  Lab Results   Component Value Date    HGBA1C 6 7 (H) 07/03/2019       Recent Labs     02/05/21  2103 02/06/21  0240 02/06/21  0725 02/06/21  1031   POCGLU 162* 151* 117 201*       Blood Sugar Average: Last 72 hrs:  (P) 914 [id number]   Endocrinology on board  - recommend 40 levemir daily, humalog 25 with meals +SS  Endocrinology evaluation appreciated  Acute renal failure superimposed on stage 3 chronic kidney disease [facility])  Assessment & Plan  Lab Results   Component Value Date    EGFR 11 02/06/2021    EGFR 8 02/05/2021    EGFR 10 02/04/2021    CREATININE 4 77 (H) 02/06/2021    CREATININE 5 78 (H) 02/05/2021    CREATININE 4 88 (H) 02/04/2021     Most recent 5 78 trending up  Baseline creatinine around 2 3-2 5  Previously on HD in 2019;   IR placed HD Cath 1/25/202   Nephrology on board   Most likely ATN post contrast for Cath  Patient did get dialysis yesterday  Will see how he does over the weekend  Creatinine today was 4 77       * Coronary artery disease  Assessment & Plan    Presented with chest pain at [facility]   Hx of CAD s/p stenting and CABG 2016  Received ASA 324mg at home and SL nitro x1 in the ER  Troponin peaked at 5 5  Continue cardiovascular medications with aspirin, Plavix, statin, beta-blocker and Imdur  Cardiac Cath Com[facility] at [facility]   - three-vessel disease with 99% occluded proximal circumflex, 50% distal left main, mid % occluded, RCA proximally 100% occluded with collaterals from left circulation  Patient has his patent grafts of LIMA to LAD and SVG to OM2    Most likely Type 2 MI as per cardiology  Medical intervention recommend at this time  Echo complete - reveals 60% EF      Constipation-resolved as of 2021  Assessment & Plan  Patient reports >1 week since last bowel movement  Complains of abdominal pain 2021  Did have significant bowel movement 2021 with improvement of symptoms  patient states he had fecal impaction during previous hospitalization that required manual disimpaction      - Schedule miralax  - Schedule senna/docusate  - Encourage PO hydration  - Suppositories         VTE Pharmacologic Prophylaxis:   Pharmacologic: Heparin  Mechanical VTE Prophylaxis in Place: Yes    Patient Centered Rounds: I have performed bedside rounds with nursing staff today  Education and Discussions with Family / Patient: Will update the sister Kristin Lai    Time Spent for Care: 20 minutes  More than 50% of total time spent on counseling and coordination of care as described above  Current Length of Stay: 15 day(s)    Current Patient Status: Inpatient   Certification Statement: The patient will continue to require additional inpatient hospital stay due to Currently requiring new start dialysis as well as having intractable hip pain in need of monitoring    Discharge Plan:  Patient will be here least another 48-72 hours    Code Status: Level 1 - Full Code      Subjective:   Patient seen examined  States he is doing okay  Looks a little short of breath   Also according to the nurse patient is having a lot of right hip pain with ambulation or moving    Objective:     Vitals:   Temp (24hrs), Av 8 °F (36 6 °C), Min:97 6 °F (36 4 °C), Max:98 °F (36 7 °C)    Temp:  [97 6 °F (36 4 °C)-98 °F (36 7 °C)] 98 °F (36 7 °C)  HR:  [64-75] 75  Resp:  [18-20] 19  BP: (102-152)/(52-66) 150/60  SpO2:  [96 %-97 %] 97 %  There is no height or weight on file to calculate BMI  Input and Output Summary (last 24 hours):        Intake/Output Summary (Last 24 hours) at 2021 1300  Last data filed at 2021 0923  Gross per 24 hour Intake 1190 ml   Output 2715 ml   Net -1525 ml       Physical Exam:     Physical Exam  (   General Appearance:    Alert, cooperative, no distress, appears stated age                               Lungs:     Clear to auscultation bilaterally, respirations unlabored       Heart:    Regular rate and rhythm, S1 and S2 normal, no murmur, rub    or gallop   Abdomen:     Soft, non-tender, bowel sounds active all four quadrants,     no masses, no organomegaly           Extremities:   Extremities normal, atraumatic, no cyanosis or edema     Additional Data:     Labs:    Results from last 7 days   Lab Units 02/06/21  0817  02/01/21  0333  01/31/21  0906   WBC Thousand/uL 16 66*   < > 14 66*  --  13 67*   HEMOGLOBIN g/dL 8 3*   < > 7 8*   < > 7 6*   HEMATOCRIT % 25 8*   < > 23 5*   < > 23 1*   PLATELETS Thousands/uL 174   < > 135*  --  151   BANDS PCT %  --   --   --   --  3   NEUTROS PCT %  --   --  71  --   --    LYMPHS PCT %  --   --  13*  --   --    LYMPHO PCT %  --   --   --   --  13*   MONOS PCT %  --   --  10  --   --    MONO PCT %  --   --   --   --  9   EOS PCT %  --   --  3  --  2    < > = values in this interval not displayed  Results from last 7 days   Lab Units 02/06/21  0513   SODIUM mmol/L 133*   POTASSIUM mmol/L 4 1   CHLORIDE mmol/L 97*   CO2 mmol/L 24   BUN mg/dL 98*   CREATININE mg/dL 4 77*   ANION GAP mmol/L 12   CALCIUM mg/dL 8 1*   GLUCOSE RANDOM mg/dL 122     Results from last 7 days   Lab Units 01/31/21  2100   INR  1 18     Results from last 7 days   Lab Units 02/06/21  1031 02/06/21  0725 02/06/21  0240 02/05/21  2103 02/05/21  1517 02/05/21  1119 02/05/21  0738 02/05/21  0209 02/04/21  2313 02/04/21  2115 02/04/21  1900 02/04/21  1701   POC GLUCOSE mg/dl 201* 117 151* 162* 343* 309* 262* 150* 89 145* 169* 194*                   * I Have Reviewed All Lab Data Listed Above  * Additional Pertinent Lab Tests Reviewed:  [facility]        Recent Cultures (last 7 days):            Last 24 Hours Medication List:   Current Facility-Administered Medications   Medication Dose Route Frequency Provider Last Rate    acetaminophen  650 mg Oral Q6H PRN Jessica Mccall MD      aluminum-magnesium hydroxide-simethicone  30 mL Oral Q6H PRN TABITHA Calabrese      amLODIPine  5 mg Oral BID [place] [id number]      aspirin  81 mg Oral Daily Jessica Mccall MD      atorvastatin  40 mg Oral Daily With Matt Moore MD      benzonatate  100 mg Oral TID PRN TABITHA Zhao      bisacodyl  10 mg Rectal Daily PRN Chris Woods DO      calcium carbonate  1,000 mg Oral Daily PRN Jessica Mccall MD      cefepime  2,000 mg Intravenous Q24H Faye Correa MD 2,000 mg (02/06/21 1145)    clopidogrel  75 mg Oral Daily Jessica Mccall MD      escitalopram  10 mg Oral HS Jessica Mccall MD      guaiFENesin  1,200 mg Oral Q12H [facility] TABITHA Zhao      heparin (porcine)  5,000 Units Subcutaneous Q8H [facility] Faye Correa MD      hydrALAZINE  50 mg Oral [address]      hydrocortisone   Topical 4x Daily PRN Quang Lynn MD      HYDROmorphone  0 2 mg Intravenous Q3H PRN Faye Correa MD      insulin detemir  42 Units Subcutaneous Q12H [facility] Neo Patel MD      insulin lispro  2-12 Units Subcutaneous TID [facility] [place] Bill Paulino MD      insulin lispro  2-12 Units Subcutaneous HS Bill Paulino MD      insulin lispro  2-12 Units Subcutaneous 0200 Bill Paulino MD      insulin lispro  25 Units Subcutaneous TID With Meals Bill Paulino MD      iron polysaccharides  150 mg Oral Daily [address]      melatonin  3 mg Oral HS TABITHA Elizalde      metoprolol tartrate  25 mg Oral Q12H [facility] Jessica Mccall MD      nitroglycerin  0 4 mg Sublingual Q5 Min PRN Jessica Mccall MD      nystatin  500,000 Units Swish & Swallow 4x Daily Faye Correa MD      ondansetron  4 mg Intravenous Q6H PRN Jessica Mccall MD  oxyCODONE  2 5 mg Oral Q4H PRN Faye Correa MD      pantoprazole  40 mg Oral BID AC Kimberly Pradhan MD      phenol  1 spray Mouth/Throat Q4H PRN Faye Correa MD      pneumococcal 13-valent conjugate vaccine  0 5 mL Intramuscular Prior to discharge Saniya Lentz MD      polyethylene glycol  17 g Oral Daily PRN Cami Tovar MD      polyethylene glycol  17 g Oral BID Cami Tovar MD      senna-docusate sodium  1 tablet Oral HS Kimberly Pradhan MD      sodium chloride  1 spray Each Nare Q1H PRN Daniela Sutton MD          Today, Patient Was Seen By: Cami Tovar MD    ** Please Note: Dictation voice to text software may have been used in the creation of this document   **

## 2021-02-06 NOTE — ASSESSMENT & PLAN NOTE
Lab Results   Component Value Date    EGFR 11 02/06/2021    EGFR 8 02/05/2021    EGFR 10 02/04/2021    CREATININE 4 77 (H) 02/06/2021    CREATININE 5 78 (H) 02/05/2021    CREATININE 4 88 (H) 02/04/2021     Most recent 5 78 trending up  Baseline creatinine around 2 3-2 5  Previously on HD in 2019;   IR placed HD Cath 1/25/202   Nephrology on board   Most likely ATN post contrast for Cath  Patient did get dialysis yesterday  Will see how he does over the weekend  Creatinine today was 4 77

## 2021-02-06 NOTE — ASSESSMENT & PLAN NOTE
I do not see anything acute on examination  I will check an x-ray of the right hip  Patient had the cardiac catheterization through the right groin  I do not appreciate any area of induration or redness

## 2021-02-06 NOTE — ASSESSMENT & PLAN NOTE
Lab Results   Component Value Date    HGBA1C 6 7 (H) 07/03/2019       Recent Labs     02/05/21  2103 02/06/21  0240 02/06/21  0725 02/06/21  1031   POCGLU 162* 151* 117 201*       Blood Sugar Average: Last 72 hrs:  (P) 519 [id number]   Endocrinology on board  - recommend 40 levemir daily, humalog 25 with meals +SS  Endocrinology evaluation appreciated

## 2021-02-06 NOTE — PHYSICAL THERAPY NOTE
PHYSICAL THERAPY NOTE      Patient Name: Jeremiah Pro  FYBSX'G Date: 21 1152   PT Last Visit   PT Visit Date 21   Note Type   Note Type Treatment   Pain Assessment   Pain Assessment Tool 0-10   Pain Score No Pain  (at rest)   Pain Location/Orientation Location: Groin;Orientation: Right   Restrictions/Precautions   Weight Bearing Precautions Per Order No   Other Precautions Chair Alarm; Bed Alarm; Fall Risk;Pain  (masimo)   General   Family/Caregiver Present No   Subjective   Subjective Patient supine in bed and is agreeable to participate in therapy session  Pt identifers obtained from name &   Bed Mobility   Supine to Sit 3  Moderate assistance   Additional items Assist x 1;HOB elevated; Bedrails; Increased time required;Verbal cues;LE management   Sit to Supine Unable to assess   Additional Comments Patient seated OOB in recliner post session with chair alarm engaged, call bell and belongings in reach  Transfers   Sit to Stand 3  Moderate assistance   Additional items Assist x 2;Armrests; Increased time required;Verbal cues   Stand to Sit 3  Moderate assistance   Additional items Assist x 2;Armrests; Increased time required;Verbal cues   Stand pivot 3  Moderate assistance   Additional items Assist x 2;Armrests; Increased time required;Verbal cues   Ambulation/Elevation   Assistive Device Rolling walker   Balance   Static Standing Poor   Ambulatory Poor   Activity Tolerance   Activity Tolerance Patient limited by fatigue;Patient limited by pain   Nurse Made Aware Spoke to Fredrick Nunez RN    Assessment   Prognosis Fair   Problem List Decreased strength;Decreased range of motion;Decreased endurance; Impaired balance;Decreased mobility; Decreased coordination; Impaired vision;Pain   Assessment Patient agreeable to participate in therapy session   Patient requires increased assistance for mobility tasks this session secondary to complaints of pain and fatigue  Supine to sit with mod ax1 and increased time with instruction for technique  Sit<>stand with mod ax2 with increased time to get balance and stand erect  Standing tolerance approx  30 seconds with B UE support  SPT from EOB to recliner with mod ax2 with assistance for steadying and instruction for stepping sequence and body positioning  Mobility impacted this session secondary to pain, nursing aware  Continue to focus on OOB mobility with progression of transfers as appropriate  Goals   STG Expiration Date 02/06/21   PT Treatment Day 5   Plan   Treatment/Interventions Functional transfer training;LE strengthening/ROM; Therapeutic exercise; Endurance training;Cognitive reorientation;Patient/family training;Equipment eval/education; Bed mobility;Gait training  (PT to see for stairs if/when appropriate)   Progress Progressing toward goals   PT Frequency 5x/wk; Weekend   Recommendation   PT Discharge Recommendation Post-Acute Rehabilitation Services   Equipment Recommended [place], [place]

## 2021-02-06 NOTE — ASSESSMENT & PLAN NOTE
Presented with chest pain at [facility]   Hx of CAD s/p stenting and CABG 2016  Received ASA 324mg at home and SL nitro x1 in the ER  Troponin peaked at 5 5  Continue cardiovascular medications with aspirin, Plavix, statin, beta-blocker and Imdur  Cardiac Cath Com[facility]   - three-vessel disease with 99% occluded proximal circumflex, 50% distal left main, mid % occluded, RCA proximally 100% occluded with collaterals from left circulation  Patient has his patent grafts of LIMA to LAD and SVG to OM2    Most likely Type 2 MI as per cardiology  Medical intervention recommend at this time  Echo complete - reveals 60% EF

## 2021-02-06 NOTE — ASSESSMENT & PLAN NOTE
· Monitor closely  Currently 7 6, stable  · Pt now has oozing around HD cath site  · Have consented for blood if needed  · Required 1 unit PRBC overnight 1/30/2021 after hemoglobin found to be 6 7  Another unit required overnight 2/1/2021 after hemoglobin found to be 5 6  · Hemoglobin right now is 8 3    There is no evidence of any active bleeding

## 2021-02-07 ENCOUNTER — APPOINTMENT (INPATIENT)
Dept: ULTRASOUND IMAGING | Facility: HOSPITAL | Age: 81
DRG: 280 | End: 2021-02-07
Payer: COMMERCIAL

## 2021-02-07 LAB
ANION GAP SERPL CALCULATED.3IONS-SCNC: 17 MMOL/L (ref 4–13)
BUN SERPL-MCNC: 116 MG/DL (ref 5–25)
CALCIUM SERPL-MCNC: 8.2 MG/DL (ref 8.3–10.1)
CHLORIDE SERPL-SCNC: 95 MMOL/L (ref 100–108)
CO2 SERPL-SCNC: 22 MMOL/L (ref 21–32)
CREAT SERPL-MCNC: 5.74 MG/DL (ref 0.6–1.3)
ERYTHROCYTE [DISTWIDTH] IN BLOOD BY AUTOMATED COUNT: 14.1 % (ref 11.6–15.1)
GFR SERPL CREATININE-BSD FRML MDRD: 9 ML/MIN/1.73SQ M
GLUCOSE SERPL-MCNC: 109 MG/DL (ref 65–140)
GLUCOSE SERPL-MCNC: 120 MG/DL (ref 65–140)
GLUCOSE SERPL-MCNC: 159 MG/DL (ref 65–140)
GLUCOSE SERPL-MCNC: 180 MG/DL (ref 65–140)
GLUCOSE SERPL-MCNC: 220 MG/DL (ref 65–140)
GLUCOSE SERPL-MCNC: 244 MG/DL (ref 65–140)
HCT VFR BLD AUTO: 25.4 % (ref 36.5–49.3)
HGB BLD-MCNC: 8.1 G/DL (ref 12–17)
MCH RBC QN AUTO: 31.2 PG (ref 26.8–34.3)
MCHC RBC AUTO-ENTMCNC: 31.9 G/DL (ref 31.4–37.4)
MCV RBC AUTO: 98 FL (ref 82–98)
PLATELET # BLD AUTO: 165 THOUSANDS/UL (ref 149–390)
PMV BLD AUTO: 11.3 FL (ref 8.9–12.7)
POTASSIUM SERPL-SCNC: 3.8 MMOL/L (ref 3.5–5.3)
RBC # BLD AUTO: 2.6 MILLION/UL (ref 3.88–5.62)
SODIUM SERPL-SCNC: 134 MMOL/L (ref 136–145)
WBC # BLD AUTO: 13.4 THOUSAND/UL (ref 4.31–10.16)

## 2021-02-07 PROCEDURE — 99232 SBSQ HOSP IP/OBS MODERATE 35: CPT | Performed by: FAMILY MEDICINE

## 2021-02-07 PROCEDURE — 99232 SBSQ HOSP IP/OBS MODERATE 35: CPT | Performed by: INTERNAL MEDICINE

## 2021-02-07 PROCEDURE — 93926 LOWER EXTREMITY STUDY: CPT

## 2021-02-07 PROCEDURE — 82948 REAGENT STRIP/BLOOD GLUCOSE: CPT

## 2021-02-07 PROCEDURE — 80048 BASIC METABOLIC PNL TOTAL CA: CPT | Performed by: FAMILY MEDICINE

## 2021-02-07 PROCEDURE — 85027 COMPLETE CBC AUTOMATED: CPT | Performed by: FAMILY MEDICINE

## 2021-02-07 RX ORDER — ACETAMINOPHEN 325 MG/1
975 TABLET ORAL ONCE
Status: COMPLETED | OUTPATIENT
Start: 2021-02-07 | End: 2021-02-07

## 2021-02-07 RX ADMIN — INSULIN LISPRO 4 UNITS: 100 INJECTION, SOLUTION INTRAVENOUS; SUBCUTANEOUS at 12:28

## 2021-02-07 RX ADMIN — PANTOPRAZOLE SODIUM 40 MG: 40 TABLET, DELAYED RELEASE ORAL at 06:04

## 2021-02-07 RX ADMIN — POLYSACCHARIDE-IRON COMPLEX 150 MG: 150 CAPSULE ORAL at 08:12

## 2021-02-07 RX ADMIN — ACETAMINOPHEN 650 MG: 325 TABLET, FILM COATED ORAL at 21:41

## 2021-02-07 RX ADMIN — POLYETHYLENE GLYCOL 3350 17 G: 17 POWDER, FOR SOLUTION ORAL at 21:33

## 2021-02-07 RX ADMIN — INSULIN LISPRO 25 UNITS: 100 INJECTION, SOLUTION INTRAVENOUS; SUBCUTANEOUS at 12:28

## 2021-02-07 RX ADMIN — HEPARIN SODIUM 5000 UNITS: 5000 INJECTION INTRAVENOUS; SUBCUTANEOUS at 06:05

## 2021-02-07 RX ADMIN — NYSTATIN 500000 UNITS: 100000 SUSPENSION ORAL at 17:36

## 2021-02-07 RX ADMIN — INSULIN LISPRO 2 UNITS: 100 INJECTION, SOLUTION INTRAVENOUS; SUBCUTANEOUS at 01:38

## 2021-02-07 RX ADMIN — INSULIN DETEMIR 42 UNITS: 100 INJECTION, SOLUTION SUBCUTANEOUS at 21:42

## 2021-02-07 RX ADMIN — INSULIN DETEMIR 42 UNITS: 100 INJECTION, SOLUTION SUBCUTANEOUS at 08:12

## 2021-02-07 RX ADMIN — HEPARIN SODIUM 5000 UNITS: 5000 INJECTION INTRAVENOUS; SUBCUTANEOUS at 13:27

## 2021-02-07 RX ADMIN — DOCUSATE SODIUM AND SENNOSIDES 1 TABLET: 8.6; 5 TABLET ORAL at 21:33

## 2021-02-07 RX ADMIN — METOPROLOL TARTRATE 25 MG: 25 TABLET, FILM COATED ORAL at 08:12

## 2021-02-07 RX ADMIN — INSULIN LISPRO 4 UNITS: 100 INJECTION, SOLUTION INTRAVENOUS; SUBCUTANEOUS at 17:36

## 2021-02-07 RX ADMIN — HEPARIN SODIUM 5000 UNITS: 5000 INJECTION INTRAVENOUS; SUBCUTANEOUS at 21:33

## 2021-02-07 RX ADMIN — NYSTATIN 500000 UNITS: 100000 SUSPENSION ORAL at 12:27

## 2021-02-07 RX ADMIN — PANTOPRAZOLE SODIUM 40 MG: 40 TABLET, DELAYED RELEASE ORAL at 17:36

## 2021-02-07 RX ADMIN — ACETAMINOPHEN 975 MG: 325 TABLET, FILM COATED ORAL at 18:15

## 2021-02-07 RX ADMIN — INSULIN LISPRO 25 UNITS: 100 INJECTION, SOLUTION INTRAVENOUS; SUBCUTANEOUS at 17:37

## 2021-02-07 RX ADMIN — AMLODIPINE BESYLATE 5 MG: 5 TABLET ORAL at 08:12

## 2021-02-07 RX ADMIN — CLOPIDOGREL BISULFATE 75 MG: 75 TABLET ORAL at 08:12

## 2021-02-07 RX ADMIN — ATORVASTATIN CALCIUM 40 MG: 40 TABLET, FILM COATED ORAL at 17:36

## 2021-02-07 RX ADMIN — NYSTATIN 500000 UNITS: 100000 SUSPENSION ORAL at 08:12

## 2021-02-07 RX ADMIN — ASPIRIN 81 MG: 81 TABLET ORAL at 08:11

## 2021-02-07 RX ADMIN — Medication 3 MG: at 21:32

## 2021-02-07 RX ADMIN — GUAIFENESIN 1200 MG: 600 TABLET, EXTENDED RELEASE ORAL at 08:11

## 2021-02-07 RX ADMIN — HYDRALAZINE HYDROCHLORIDE 50 MG: 25 TABLET, FILM COATED ORAL at 06:04

## 2021-02-07 RX ADMIN — ESCITALOPRAM 10 MG: 10 TABLET, FILM COATED ORAL at 21:32

## 2021-02-07 RX ADMIN — INSULIN LISPRO 2 UNITS: 100 INJECTION, SOLUTION INTRAVENOUS; SUBCUTANEOUS at 21:42

## 2021-02-07 RX ADMIN — GUAIFENESIN 1200 MG: 600 TABLET, EXTENDED RELEASE ORAL at 21:32

## 2021-02-07 RX ADMIN — NYSTATIN 500000 UNITS: 100000 SUSPENSION ORAL at 21:41

## 2021-02-07 NOTE — ASSESSMENT & PLAN NOTE
· Leukocytosis 13 4 2/5/2021  · COVID-19, influenza, RSV tests were negative x2  · Chest x-ray officially read as multifocal pneumonia  · Completed cefepime  · Blood cultures x2 no growth  · Cough medicine/antitussive, lozenges and Chloraseptic spray p r n  Michael Baize Michael Shilaze   · Repeat CXR 2/6/2021 negative for any changes

## 2021-02-07 NOTE — ASSESSMENT & PLAN NOTE
· Cardiologist and nephrologist on board  · Continue cardiovascular and heart failure medications  · Monitor input and output  · Daily weights    · Currently on room air

## 2021-02-07 NOTE — PLAN OF CARE
Problem: Potential for Falls  Goal: Patient will remain free of falls  Description: INTERVENTIONS:  - Assess patient frequently for physical needs  -  Identify cognitive and physical deficits and behaviors that affect risk of falls    -  [place] fall precautions as indicated by assessment   - Educate patient/family on patient safety including physical limitations  - Instruct patient to call for assistance with activity based on assessment  - Modify environment to reduce risk of injury  - Consider OT/PT consult to assist with strengthening/mobility  Outcome: Progressing     Problem: CARDIOVASCULAR - ADULT  Goal: Maintains optimal cardiac output and hemodynamic stability  Description: INTERVENTIONS:  - Monitor I/O, vital signs and rhythm  - Monitor for S/S and trends of decreased cardiac output  - Administer and titrate ordered vasoactive medications to optimize hemodynamic stability  - Assess quality of pulses, skin color and temperature  - Assess for signs of decreased coronary artery perfusion  - Instruct patient to report change in severity of symptoms  Outcome: Progressing  Goal: Absence of cardiac dysrhythmias or at baseline rhythm  Description: INTERVENTIONS:  - Continuous cardiac monitoring, vital signs, obtain 12 lead EKG if ordered  - Administer antiarrhythmic and heart rate control medications as ordered  - Monitor electrolytes and administer replacement therapy as ordered  Outcome: Progressing     Problem: RESPIRATORY - ADULT  Goal: Achieves optimal ventilation and oxygenation  Description: INTERVENTIONS:  - Assess for changes in respiratory status  - Assess for changes in mentation and behavior  - Position to facilitate oxygenation and minimize respiratory effort  - Oxygen administered by appropriate delivery if ordered  - Initiate smoking cessation education as indicated  - Encourage broncho-pulmonary hygiene including cough, deep breathe, Incentive Spirometry  - Assess the need for suctioning and aspirate as needed  - Assess and instruct to report SOB or any respiratory difficulty  - Respiratory Therapy support as indicated  Outcome: Progressing     Problem: GASTROINTESTINAL - ADULT  Goal: Maintains adequate nutritional intake  Description: INTERVENTIONS:  - Monitor percentage of each meal consumed  - Identify factors contributing to decreased intake, treat as appropriate  - Assist with meals as needed  - Monitor I&O, weight, and lab values if indicated  - Obtain nutrition services referral as needed  Outcome: Progressing     Problem: GENITOURINARY - ADULT  Goal: Maintains or returns to baseline urinary function  Description: INTERVENTIONS:  - Assess urinary function  - Encourage oral fluids to ensure adequate hydration if ordered  - Administer IV fluids as ordered to ensure adequate hydration  - Administer ordered medications as needed  - Offer frequent toileting  - Follow urinary retention protocol if ordered  Outcome: Progressing  Goal: Absence of urinary retention  Description: INTERVENTIONS:  - Assess patients ability to void and empty bladder  - Monitor I/O  - Bladder scan as needed  - Discuss with physician/AP medications to alleviate retention as needed  - Discuss catheterization for long term situations as appropriate  Outcome: Progressing     Problem: METABOLIC, FLUID AND ELECTROLYTES - ADULT  Goal: Electrolytes maintained within normal limits  Description: INTERVENTIONS:  - Monitor labs and assess patient for signs and symptoms of electrolyte imbalances  - Administer electrolyte replacement as ordered  - Monitor response to electrolyte replacements, including repeat lab results as appropriate  - Instruct patient on fluid and nutrition as appropriate  Outcome: Progressing  Goal: Fluid balance maintained  Description: INTERVENTIONS:  - Monitor labs   - Monitor I/O and WT  - Instruct patient on fluid and nutrition as appropriate  - Assess for signs & symptoms of volume excess or deficit  Outcome: Progressing  Goal: Glucose maintained within target range  Description: INTERVENTIONS:  - Monitor Blood Glucose as ordered  - Assess for signs and symptoms of hyperglycemia and hypoglycemia  - Administer ordered medications to maintain glucose within target range  - Assess nutritional intake and initiate nutrition service referral as needed  Outcome: Progressing     Problem: SKIN/TISSUE INTEGRITY - ADULT  Goal: Skin integrity remains intact  Description: INTERVENTIONS  - Identify patients at risk for skin breakdown  - Assess and monitor skin integrity  - Assess and monitor nutrition and hydration status  - Monitor labs (i e  albumin)  - Assess for incontinence   - Turn and reposition patient  - Assist with mobility/ambulation  - Relieve pressure over bony prominences  - Avoid friction and shearing  - Provide appropriate hygiene as needed including keeping skin clean and dry  - Evaluate need for skin moisturizer/barrier cream  - Collaborate with interdisciplinary team (i e  Nutrition, Rehabilitation, etc )   - Patient/family teaching  Outcome: Progressing     Problem: HEMATOLOGIC - ADULT  Goal: Maintains hematologic stability  Description: INTERVENTIONS  - Assess for signs and symptoms of bleeding or hemorrhage  - Monitor labs  - Administer supportive blood products/factors as ordered and appropriate  Outcome: Progressing     Problem: MUSCULOSKELETAL - ADULT  Goal: Maintain or return mobility to safest level of function  Description: INTERVENTIONS:  - Assess patient's ability to carry out ADLs; assess patient's baseline for ADL function and identify physical deficits which impact ability to perform ADLs (bathing, care of mouth/teeth, toileting, grooming, dressing, etc )  - Assess/evaluate cause of self-care deficits   - Assess range of motion  - Assess patient's mobility  - Assess patient's need for assistive devices and provide as appropriate  - Encourage maximum independence but intervene and supervise when necessary  - Involve family in performance of ADLs  - Assess for home care needs following discharge   - Consider OT consult to assist with ADL evaluation and planning for discharge  - Provide patient education as appropriate  Outcome: Progressing     Problem: PAIN - ADULT  Goal: Verbalizes/displays adequate comfort level or baseline comfort level  Description: Interventions:  - Encourage patient to monitor pain and request assistance  - Assess pain using appropriate pain scale  - Administer analgesics based on type and severity of pain and evaluate response  - Implement non-pharmacological measures as appropriate and evaluate response  - Consider cultural and social influences on pain and pain management  - Notify physician/advanced practitioner if interventions unsuccessful or patient reports new pain  Outcome: Progressing     Problem: INFECTION - ADULT  Goal: Absence or prevention of progression during hospitalization  Description: INTERVENTIONS:  - Assess and monitor for signs and symptoms of infection  - Monitor lab/diagnostic results  - Monitor all insertion sites, i e  indwelling lines, tubes, and drains  - Monitor endotracheal if appropriate and nasal secretions for changes in amount and color  - [place] appropriate cooling/warming therapies per order  - Administer medications as ordered  - Instruct and encourage patient and family to use good hand hygiene technique  - Identify and instruct in appropriate isolation precautions for identified infection/condition  Outcome: Progressing     Problem: SAFETY ADULT  Goal: Patient will remain free of falls  Description: INTERVENTIONS:  - Assess patient frequently for physical needs  -  Identify cognitive and physical deficits and behaviors that affect risk of falls    -  [place] fall precautions as indicated by assessment   - Educate patient/family on patient safety including physical limitations  - Instruct patient to call for assistance with activity based on assessment  - Modify environment to reduce risk of injury  - Consider OT/PT consult to assist with strengthening/mobility  Outcome: Progressing  Goal: Maintain or return to baseline ADL function  Description: INTERVENTIONS:  -  Assess patient's ability to carry out ADLs; assess patient's baseline for ADL function and identify physical deficits which impact ability to perform ADLs (bathing, care of mouth/teeth, toileting, grooming, dressing, etc )  - Assess/evaluate cause of self-care deficits   - Assess range of motion  - Assess patient's mobility; develop plan if impaired  - Assess patient's need for assistive devices and provide as appropriate  - Encourage maximum independence but intervene and supervise when necessary  - Involve family in performance of ADLs  - Assess for home care needs following discharge   - Consider OT consult to assist with ADL evaluation and planning for discharge  - Provide patient education as appropriate  Outcome: Progressing  Goal: Maintain or return mobility status to optimal level  Description: INTERVENTIONS:  - Assess patient's baseline mobility status (ambulation, transfers, stairs, etc )    - Identify cognitive and physical deficits and behaviors that affect mobility  - Identify mobility aids required to assist with transfers and/or ambulation (gait belt, sit-to-stand, lift, walker, cane, etc )  - [place] fall precautions as indicated by assessment  - Record patient progress and toleration of activity level on Mobility SBAR; progress patient to next Phase/Stage  - Instruct patient to call for assistance with activity based on assessment  - Consider rehabilitation consult to assist with strengthening/weightbearing, etc   Outcome: Progressing     Problem: DISCHARGE PLANNING  Goal: Discharge to home or other facility with appropriate resources  Description: INTERVENTIONS:  - Identify barriers to discharge w/patient and caregiver  - Arrange for needed discharge resources and transportation as appropriate  - Identify discharge learning needs (meds, wound care, etc )  - Arrange for interpretive services to assist at discharge as needed  - Refer to Case Management Department for coordinating discharge planning if the patient needs post-hospital services based on physician/advanced practitioner order or complex needs related to functional status, cognitive ability, or social support system  Outcome: Progressing     Problem: Knowledge Deficit  Goal: Patient/family/caregiver demonstrates understanding of disease process, treatment plan, medications, and discharge instructions  Description: Complete learning assessment and assess knowledge base  Interventions:  - Provide teaching at level of understanding  - Provide teaching via preferred learning methods  Outcome: Progressing     Problem: Prexisting or High Potential for Compromised Skin Integrity  Goal: Skin integrity is maintained or improved  Description: INTERVENTIONS:  - Identify patients at risk for skin breakdown  - Assess and monitor skin integrity  - Assess and monitor nutrition and hydration status  - Monitor labs   - Assess for incontinence   - Turn and reposition patient  - Assist with mobility/ambulation  - Relieve pressure over bony prominences  - Avoid friction and shearing  - Provide appropriate hygiene as needed including keeping skin clean and dry  - Evaluate need for skin moisturizer/barrier cream  - Collaborate with interdisciplinary team   - Patient/family teaching  - Consider wound care consult   Outcome: Progressing     Problem: Nutrition/Hydration-ADULT  Goal: Nutrient/Hydration intake appropriate for improving, restoring or maintaining nutritional needs  Description: Monitor and assess patient's nutrition/hydration status for malnutrition  Collaborate with interdisciplinary team and initiate plan and interventions as ordered  Monitor patient's weight and dietary intake as ordered or per policy   Utilize nutrition screening tool and intervene as necessary  Determine patient's food preferences and provide high-protein, high-caloric foods as appropriate       INTERVENTIONS:  - Monitor oral intake, urinary output, labs, and treatment plans  - Assess nutrition and hydration status and recommend course of action  - Evaluate amount of meals eaten  - Assist patient with eating if necessary   - Allow adequate time for meals  - Recommend/ encourage appropriate diets, oral nutritional supplements, and vitamin/mineral supplements  - Order, calculate, and assess calorie counts as needed  - Recommend, monitor, and adjust tube feedings and TPN/PPN based on assessed needs  - Assess need for intravenous fluids  - Provide specific nutrition/hydration education as appropriate  - Include patient/family/caregiver in decisions related to nutrition  Outcome: Progressing

## 2021-02-07 NOTE — ASSESSMENT & PLAN NOTE
Lab Results   Component Value Date    HGBA1C 6 7 (H) 07/03/2019       Recent Labs     02/06/21  2044 02/07/21  0137 02/07/21  0659 02/07/21  1037   POCGLU 242* 159* 109 220*       Blood Sugar Average: Last 72 hrs:  (P) 187 12   Endocrinology on board  - recommend 40 levemir daily, humalog 25 with meals +SS  Endocrinology evaluation appreciated

## 2021-02-07 NOTE — ASSESSMENT & PLAN NOTE
I do not see anything acute on examination  X-Ray right hip unremarkable  Patient had the cardiac catheterization through the right groin  I do not appreciate any area of induration or redness

## 2021-02-07 NOTE — PROGRESS NOTES
Progress Note - Wandalee Duverney 1940, [de-identified] y o  male MRN: [id number]    Unit/Bed#: S -01 Encounter: [id number]    Primary Care Provider: Camelia Noguera MD   Date and time admitted to hospital: 1/22/2021 12:28 PM        * Coronary artery disease  Assessment & Plan    Presented with chest pain at [facility]   Hx of CAD s/p stenting and CABG 2016  Received ASA 324mg at home and SL nitro x1 in the ER  Troponin peaked at 5 5  Continue cardiovascular medications with aspirin, Plavix, statin, beta-blocker and Imdur  Cardiac Cath Completed at [facility]   - three-vessel disease with 99% occluded proximal circumflex, 50% distal left main, mid % occluded, RCA proximally 100% occluded with collaterals from left circulation  Patient has his patent grafts of LIMA to LAD and SVG to OM2  Most likely Type 2 MI as per cardiology  Medical intervention recommend at this time  Echo complete - reveals 60% EF      Right hip pain  Assessment & Plan  I do not see anything acute on examination  X-Ray right hip unremarkable  Patient had the cardiac catheterization through the right groin  I do not appreciate any area of induration or redness  Urinary retention  Assessment & Plan  Straight cath 2/4/2021 revealed >400cc urine      - As per nephrology, continue bladder scans and straight cath >250cc    ATN (acute tubular necrosis) [facility])  Assessment & Plan  Per nephrology  See above    Oral candida  Assessment & Plan  · Nystatin mouthwash  Hyponatremia  Assessment & Plan  · Likely due to hypervolemia/CHF  134  2/5/2021  Asymptomatic  · Nephrology on board  · Monitor  Anemia  Assessment & Plan  · Monitor closely  Currently 9 3, stable  · Pt now has oozing around HD cath site  · Have consented for blood if needed  · Required 1 unit PRBC overnight 1/30/2021 after hemoglobin found to be 6 7   Another unit required overnight 2/1/2021 after hemoglobin found to be 5 6  · Hemoglobin right now is 9 3  There is no evidence of any active bleeding    Acute on chronic diastolic congestive heart failure [facility])  Assessment & Plan  · Cardiologist and nephrologist on board  · Continue cardiovascular and heart failure medications  · Monitor input and output  · Daily weights  · Currently on room air    Hx of CABG  Assessment & Plan  CAD s/p CABG in 2016; history of stenting prior to CABG (LIMA to LAD and SVG to OM)  Status post cardiac catheterization: significant triple vessel coronary artery disease  Patient has his patent grafts of LIMA to LAD and SVG to OM2  Continue cardiovascular meds  Leukocytosis  Assessment & Plan  · Leukocytosis 13 4 2/5/2021  · COVID-19, influenza, RSV tests were negative x2  · Chest x-ray officially read as multifocal pneumonia  · Completed cefepime  · Blood cultures x2 no growth  · Cough medicine/antitussive, lozenges and Chloraseptic spray p r n  Fernando Hollycatracho [place] · Repeat CXR 2/6/2021 negative for any changes    Type 2 diabetes mellitus with hyperglycemia, with long-term current use of insulin [facility])  Assessment & Plan  Lab Results   Component Value Date    HGBA1C 6 7 (H) 07/03/2019       Recent Labs     02/06/21  2044 02/07/21  0137 02/07/21  0659 02/07/21  1037   POCGLU 242* 159* 109 220*       Blood Sugar Average: Last 72 hrs:  (P) 187 12   Endocrinology on board  - recommend 40 levemir daily, humalog 25 with meals +SS  Endocrinology evaluation appreciated  Acute renal failure superimposed on stage 3 chronic kidney disease [facility])  Assessment & Plan  Lab Results   Component Value Date    EGFR 9 02/07/2021    EGFR 11 02/06/2021    EGFR 8 02/05/2021    CREATININE 5 74 (H) 02/07/2021    CREATININE 4 77 (H) 02/06/2021    CREATININE 5 78 (H) 02/05/2021     Most recent 5 74 trending up  Baseline creatinine around 2 3-2 5  Previously on HD in 2019;   IR placed HD Cath 1/25/202   Nephrology on board   Most likely ATN post contrast for Cath  Patient did get dialysis 2/5/2021    Will see how he does over the weekend  VTE Pharmacologic Prophylaxis:   Pharmacologic: Heparin  Mechanical VTE Prophylaxis in Place: Yes    Discussions with Specialists or Other Care Team Provider: Hospitalist    Education and Discussions with Family / Patient: Patient    Current Length of Stay: 16 day(s)    Current Patient Status: Inpatient     Discharge Plan / Estimated Discharge Date: TBD    Code Status: Level 1 - Full Code      Subjective:   Patient was seen at bedside resting comfortably  Reports improvement in his breathing  States he feels well overall  No new complaints  Objective:     Vitals:   Temp (24hrs), Av 5 °F (36 9 °C), Min:98 2 °F (36 8 °C), Max:98 8 °F (37 1 °C)    Temp:  [98 2 °F (36 8 °C)-98 8 °F (37 1 °C)] 98 8 °F (37 1 °C)  HR:  [76-88] 88  Resp:  [18-19] 19  BP: (125-142)/(55-65) 140/65  SpO2:  [97 %-100 %] 98 %  There is no height or weight on file to calculate BMI  Input and Output Summary (last 24 hours): Intake/Output Summary (Last 24 hours) at 2021 1153  Last data filed at 2021 0900  Gross per 24 hour   Intake 240 ml   Output 1150 ml   Net -910 ml       Physical Exam:     Physical Exam  Vitals signs and nursing note reviewed  Constitutional:       General: He is not in acute distress  Appearance: He is well-developed  He is not ill-appearing or toxic-appearing  HENT:      Head: Normocephalic and atraumatic  Eyes:      General: No scleral icterus  Right eye: No discharge  Left eye: No discharge  Conjunctiva/sclera: Conjunctivae normal    Cardiovascular:      Rate and Rhythm: Normal rate and regular rhythm  Heart sounds: Normal heart sounds  No murmur  Pulmonary:      Effort: No respiratory distress  Breath sounds: No wheezing or rhonchi  Abdominal:      General: Bowel sounds are normal  There is no distension  Palpations: Abdomen is soft  Tenderness: There is no abdominal tenderness     Musculoskeletal: Normal range of motion  General: No tenderness  Skin:     General: Skin is warm  Findings: No erythema or rash  Comments: HD cath in place   Neurological:      Mental Status: He is alert  Motor: No weakness  Psychiatric:         Behavior: Behavior normal            Additional Data:     Labs:    Results from last 7 days   Lab Units 02/07/21  0500  02/01/21  0333   WBC Thousand/uL 13 40*   < > 14 66*   HEMOGLOBIN g/dL 8 1*   < > 7 8*   HEMATOCRIT % 25 4*   < > 23 5*   PLATELETS Thousands/uL 165   < > 135*   NEUTROS PCT %  --   --  71   LYMPHS PCT %  --   --  13*   MONOS PCT %  --   --  10   EOS PCT %  --   --  3    < > = values in this interval not displayed  Results from last 7 days   Lab Units 02/07/21  0500   POTASSIUM mmol/L 3 8   CHLORIDE mmol/L 95*   CO2 mmol/L 22   BUN mg/dL 116*   CREATININE mg/dL 5 74*   CALCIUM mg/dL 8 2*     Results from last 7 days   Lab Units 01/31/21  2100   INR  1 18       * I Have Reviewed All Lab Data Listed Above  * Additional Pertinent Lab Tests Reviewed:  All Labs Within Last 24 Hours Reviewed    Imaging:    Imaging Reports Reviewed Today Include: CXR and Xray Hip  Imaging Personally Reviewed by Myself Includes:  CXR and Xray Hip    Recent Cultures (last 7 days):           Last 24 Hours Medication List:   Current Facility-Administered Medications   Medication Dose Route Frequency Provider Last Rate    acetaminophen  650 mg Oral Q6H PRN Swathi Schumacher MD      aluminum-magnesium hydroxide-simethicone  30 mL Oral Q6H PRN Vandana Knee, CRNP      amLODIPine  5 mg Oral BID Estephania Villa, PA-C      aspirin  81 mg Oral Daily Jessica Mccall MD      atorvastatin  40 mg Oral Daily With Matt Moore MD      benzonatate  100 mg Oral TID PRN TABITHA Zhao      bisacodyl  10 mg Rectal Daily PRN Chris Woods DO      calcium carbonate  1,000 mg Oral Daily PRN Jessica Mccall MD      clopidogrel  75 mg Oral Daily MD Maci Foster escitalopram  10 mg Oral HS Swathi Schumacher MD      guaiFENesin  1,200 mg Oral Q12H [address] Fahad Rear, CRNP      heparin (porcine)  5,000 Units Subcutaneous Q8H [address] Faye Correa MD      hydrALAZINE  50 mg Oral [place], [place]      hydrocortisone   Topical 4x Daily PRN Tone Dukes MD      HYDROmorphone  0 2 mg Intravenous Q3H PRN Faye Correa MD      insulin detemir  42 Units Subcutaneous Q12H [address] Bee Phelps MD      insulin lispro  2-12 Units Subcutaneous TID [facility] Saadia Lee MD      insulin lispro  2-12 Units Subcutaneous HS Saadia Lee MD      insulin lispro  2-12 Units Subcutaneous 0200 Saadia Lee MD      insulin lispro  25 Units Subcutaneous TID With Meals Saadia Lee MD      iron polysaccharides  150 mg Oral Daily [place], [place]      melatonin  3 mg Oral HS Radha Coma, CRNP      metoprolol tartrate  25 mg Oral Q12H [address], MD      nitroglycerin  0 4 mg Sublingual Q5 Min PRN Radha Marte MD      nystatin  500,000 Units Swish & Swallow 4x Daily Faye Correa MD      ondansetron  4 mg Intravenous Q6H PRN Radha Marte MD      oxyCODONE  2 5 mg Oral Q4H PRN Faye Correa MD      pantoprazole  40 mg Oral BID AC Ariel More MD      phenol  1 spray Mouth/Throat Q4H PRN Faye Correa MD      pneumococcal 13-valent conjugate vaccine  0 5 mL Intramuscular Prior to discharge Radha Marte MD      polyethylene glycol  17 g Oral Daily PRN Eric Kiser MD      polyethylene glycol  17 g Oral BID Eric Kiser MD      senna-docusate sodium  1 tablet Oral HS Ariel More MD      sodium chloride  1 spray Each Nare Q1H PRN Renae Gonzalez MD          Today, Patient Was Seen By: Ariel More MD    ** Please Note: This note has been constructed using a voice recognition system   **

## 2021-02-07 NOTE — ASSESSMENT & PLAN NOTE
Lab Results   Component Value Date    EGFR 9 02/07/2021    EGFR 11 02/06/2021    EGFR 8 02/05/2021    CREATININE 5 74 (H) 02/07/2021    CREATININE 4 77 (H) 02/06/2021    CREATININE 5 78 (H) 02/05/2021     Most recent 5 74 trending up  Baseline creatinine around 2 3-2 5  Previously on HD in 2019;   IR placed HD Cath 1/25/202   Nephrology on board   Most likely ATN post contrast for Cath  Patient did get dialysis 2/5/2021  Will see how he does over the weekend

## 2021-02-07 NOTE — PROGRESS NOTES
NEPHROLOGY PROGRESS NOTE   Den Quinteros [de-identified] y o  male MRN: [id number]  Unit/Bed#: S -01 Encounter: [id number]  Reason for Consult: BRIAN        ASSESSMENT and PLAN:    Acute renal failure  -- secondary to acute tubular necrosis/contrast associated nephropathy, post cardiac catheterization  -- currently on hemodialysis  -- patient is making urine which is encouraging but his creatinine appears to be trending up off dialysis  His azotemia is also worsening  -- will plan for hemodialysis tomorrow  -- check bladder scan as there was some concern about retention after his Marinelli catheter was removed    Chronic kidney disease stage IV  -- baseline creatinine 2 5 mg/dL    Hyponatremia  -- mild will monitor    Anemia  -- stable      SUBJECTIVE / INTERVAL HISTORY:    Having a lot of body aches    OBJECTIVE:  Current Weight:    Vitals:    02/06/21 1725 02/06/21 2201 02/07/21 0604 02/07/21 0659   BP: 128/59 132/55 142/63 140/65   BP Location: Left arm Left arm  Left arm   Pulse: 85 78  88   Resp: 18 18  19   Temp:  98 4 °F (36 9 °C)  98 8 °F (37 1 °C)   TempSrc:  Oral  Oral   SpO2: 98% 97%  98%       Intake/Output Summary (Last 24 hours) at 2/7/2021 0936  Last data filed at 2/7/2021 0900  Gross per 24 hour   Intake 240 ml   Output 1150 ml   Net -910 ml       Review of Systems:    12 point ROS has been reviewed  Physical Exam  Vitals signs and nursing note reviewed  Constitutional:       General: He is not in acute distress  Appearance: He is well-developed  He is not diaphoretic  HENT:      Head: Normocephalic and atraumatic  Eyes:      General: No scleral icterus  Pupils: Pupils are equal, round, and reactive to light  Neck:      Musculoskeletal: Normal range of motion and neck supple  Cardiovascular:      Rate and Rhythm: Normal rate and regular rhythm  Heart sounds: Normal heart sounds  No murmur  No friction rub  No gallop      Pulmonary:      Effort: Pulmonary effort is normal  No respiratory distress  Breath sounds: Normal breath sounds  No wheezing or rales  Chest:      Chest wall: No tenderness  Abdominal:      General: Bowel sounds are normal  There is no distension  Palpations: Abdomen is soft  Tenderness: There is no abdominal tenderness  There is no rebound  Musculoskeletal: Normal range of motion  Skin:     Findings: No rash  Neurological:      Mental Status: He is alert and oriented to person, place, and time           Medications:    Current Facility-Administered Medications:     acetaminophen (TYLENOL) tablet 650 mg, 650 mg, Oral, Q6H PRN, Arnol Robertson MD, 650 mg at 02/05/21 2151    aluminum-magnesium hydroxide-simethicone (MYLANTA) oral suspension 30 mL, 30 mL, Oral, Q6H PRN, TABITHA Jarrell    amLODIPine (NORVASC) tablet 5 mg, 5 mg, Oral, BID, Estephania Villa PA-C, 5 mg at 02/07/21 8621    aspirin (ECOTRIN LOW STRENGTH) EC tablet 81 mg, 81 mg, Oral, Daily, Swathi Schumacher MD, 81 mg at 02/07/21 0811    atorvastatin (LIPITOR) tablet 40 mg, 40 mg, Oral, Daily With Cesar Robles MD, 40 mg at 02/06/21 1535    benzonatate (TESSALON PERLES) capsule 100 mg, 100 mg, Oral, TID PRN, TABITHA Viveros    bisacodyl (DULCOLAX) rectal suppository 10 mg, 10 mg, Rectal, Daily PRN, Laureen Parker DO, 10 mg at 02/05/21 1329    calcium carbonate (TUMS) chewable tablet 1,000 mg, 1,000 mg, Oral, Daily PRN, Arnol Robertson MD    clopidogrel (PLAVIX) tablet 75 mg, 75 mg, Oral, Daily, Sabi Schumacher MD, 75 mg at 02/07/21 0812    escitalopram (LEXAPRO) tablet 10 mg, 10 mg, Oral, HS, Arnol Robertson MD, 10 mg at 02/06/21 2103    guaiFENesin (MUCINEX) 12 hr tablet 1,200 mg, 1,200 mg, Oral, Q12H [facility], TABITHA Viveros, 1,200 mg at 02/07/21 0811    heparin (porcine) subcutaneous injection 5,000 Units, 5,000 Units, Subcutaneous, Q8H [facility], [facility] MD Madeline, 5,000 Units at 02/07/21 0605    hydrALAZINE (APRESOLINE) tablet 50 mg, 50 mg, Oral, Q8H [facility], [place], [place], 50 mg at 02/07/21 0604    hydrocortisone 1 % cream, , Topical, 4x Daily PRN, Glendy Lisa MD, Given at 02/06/21 0242    HYDROmorphone (DILAUDID) injection 0 2 mg, 0 2 mg, Intravenous, Q3H PRN, Faye Correa MD, 0 2 mg at 02/06/21 0923    insulin detemir (LEVEMIR) subcutaneous injection 42 Units, 42 Units, Subcutaneous, Q12H [facility], Raven Douglas MD, 42 Units at 02/07/21 0812    insulin lispro (HumaLOG) 100 units/mL subcutaneous injection 2-12 Units, 2-12 Units, Subcutaneous, TID AC, 6 Units at 02/06/21 1835 **AND** Fingerstick Glucose (POCT), , , TID AC, Vijay Louis MD    insulin lispro (HumaLOG) 100 units/mL subcutaneous injection 2-12 Units, 2-12 Units, Subcutaneous, HS, Vijay Louis MD, 4 Units at 02/06/21 2105    insulin lispro (HumaLOG) 100 units/mL subcutaneous injection 2-12 Units, 2-12 Units, Subcutaneous, 0200, Vijay Louis MD, 2 Units at 02/07/21 0138    insulin lispro (HumaLOG) 100 units/mL subcutaneous injection 25 Units, 25 Units, Subcutaneous, TID With Meals, Vijay Louis MD, 25 Units at 02/06/21 1836    iron polysaccharides (FERREX) capsule 150 mg, 150 mg, Oral, Daily, RENETTA Chakraborty, 150 mg at 02/07/21 9679    melatonin tablet 3 mg, 3 mg, Oral, HS, Pina Michael, TABITHA, 3 mg at 02/06/21 2103    metoprolol tartrate (LOPRESSOR) tablet 25 mg, 25 mg, Oral, Q12H [facility], Swathi Schumacher MD, 25 mg at 02/07/21 0812    nitroglycerin (NITROSTAT) SL tablet 0 4 mg, 0 4 mg, Sublingual, Q5 Min PRN, Marielena Altman MD    nystatin (MYCOSTATIN) oral suspension 500,000 Units, 500,000 Units, Swish & Swallow, 4x Daily, Faye Correa MD, 500,000 Units at 02/07/21 0812    ondansetron (ZOFRAN) injection 4 mg, 4 mg, Intravenous, Q6H PRN, Marielena Altman MD, 4 mg at 01/29/21 1352    oxyCODONE (ROXICODONE) IR tablet 2 5 mg, 2 5 mg, Oral, Q4H PRN, Faye Correa MD, 2 5 mg at 02/05/21 1045    pantoprazole (PROTONIX) EC tablet 40 mg, 40 mg, Oral, BID AC, Sandy Brewster MD, 40 mg at 02/07/21 0604    phenol (CHLORASEPTIC) 1 4 % mucosal liquid 1 spray, 1 spray, Mouth/Throat, Q4H PRN, Faye Correa MD    pneumococcal 13-valent conjugate vaccine (PREVNAR-13) IM injection 0 5 mL, 0 5 mL, Intramuscular, Prior to discharge, Zane Yin MD    polyethylene glycol (MIRALAX) packet 17 g, 17 g, Oral, Daily PRN, Billy Ospina MD, 17 g at 02/05/21 1727    polyethylene glycol (MIRALAX) packet 17 g, 17 g, Oral, BID, Billy Ospina MD, 17 g at 02/06/21 2103    senna-docusate sodium (SENOKOT S) 8 6-50 mg per tablet 1 tablet, 1 tablet, Oral, HS, Sandy Brewster MD, 1 tablet at 02/06/21 2103    sodium chloride (OCEAN) 0 65 % nasal spray 1 spray, 1 spray, Each Nare, Q1H PRN, Sydney Connros MD, 1 spray at 02/04/21 1111    Laboratory Results:  Results from last 7 days   Lab Units 02/07/21  0500 02/06/21  0817 02/06/21  0513 02/05/21  0525 02/04/21  2329 02/04/21  0541 02/04/21  0516 02/03/21  1611 02/03/21  0825 02/03/21  0533 02/02/21  2026 02/02/21  1129  02/01/21  0333   WBC Thousand/uL 13 40* 16 66*  --  15 80*  --   --  15 47*  --   --   --   --   --   --  14 66*   HEMOGLOBIN g/dL 8 1* 8 3*  --  7 6* 7 8*  --  7 8*  --  8 1*  --  7 9*  --    < > 7 8*   HEMATOCRIT % 25 4* 25 8*  --  22 7* 23 4*  --  23 4*  --  24 2*  --  23 3*  --    < > 23 5*   PLATELETS Thousands/uL 165 174  --  171  --   --  165  --   --   --   --   --   --  135*   POTASSIUM mmol/L 3 8  --  4 1 3 7  --  3 6  --  4 4  --  4 2  --  4 5  --  3 9   CHLORIDE mmol/L 95*  --  97* 94*  --  93*  --  90*  --  93*  --  93*  --  93*   CO2 mmol/L 22  --  24 20*  --  22  --  24  --  22  --  23  --  22   BUN mg/dL 116*  --  98* 142*  --  120*  --  116*  --  108*  --  94*  --  113*   CREATININE mg/dL 5 74*  --  4 77* 5 78*  --  4 88*  --  4 53*  --  4 29*  --  4 09*  --  4 41*   CALCIUM mg/dL 8 2*  --  8 1* 8 3  --  8 2*  --  8 2*  --  8 1*  --  7 9*  --  8 1*    < > = values in this interval not displayed

## 2021-02-08 ENCOUNTER — APPOINTMENT (INPATIENT)
Dept: DIALYSIS | Facility: HOSPITAL | Age: 81
DRG: 280 | End: 2021-02-08
Attending: INTERNAL MEDICINE
Payer: COMMERCIAL

## 2021-02-08 PROBLEM — B37.0 ORAL CANDIDA: Status: RESOLVED | Noted: 2021-01-24 | Resolved: 2021-02-08

## 2021-02-08 LAB
ANION GAP SERPL CALCULATED.3IONS-SCNC: 16 MMOL/L (ref 4–13)
ATRIAL RATE: 87 BPM
ATRIAL RATE: 87 BPM
BASE EX.OXY STD BLDV CALC-SCNC: 88.9 % (ref 60–80)
BASE EXCESS BLDV CALC-SCNC: 0.7 MMOL/L
BUN SERPL-MCNC: 129 MG/DL (ref 5–25)
CALCIUM SERPL-MCNC: 8.3 MG/DL (ref 8.3–10.1)
CHLORIDE SERPL-SCNC: 95 MMOL/L (ref 100–108)
CO2 SERPL-SCNC: 22 MMOL/L (ref 21–32)
CREAT SERPL-MCNC: 6.95 MG/DL (ref 0.6–1.3)
ERYTHROCYTE [DISTWIDTH] IN BLOOD BY AUTOMATED COUNT: 14.2 % (ref 11.6–15.1)
GFR SERPL CREATININE-BSD FRML MDRD: 7 ML/MIN/1.73SQ M
GLUCOSE SERPL-MCNC: 124 MG/DL (ref 65–140)
GLUCOSE SERPL-MCNC: 220 MG/DL (ref 65–140)
GLUCOSE SERPL-MCNC: 222 MG/DL (ref 65–140)
GLUCOSE SERPL-MCNC: 83 MG/DL (ref 65–140)
GLUCOSE SERPL-MCNC: 87 MG/DL (ref 65–140)
GLUCOSE SERPL-MCNC: 89 MG/DL (ref 65–140)
HCO3 BLDV-SCNC: 23.7 MMOL/L (ref 24–30)
HCT VFR BLD AUTO: 23.2 % (ref 36.5–49.3)
HGB BLD-MCNC: 7.6 G/DL (ref 12–17)
LACTATE SERPL-SCNC: 1 MMOL/L (ref 0.5–2)
MCH RBC QN AUTO: 31.3 PG (ref 26.8–34.3)
MCHC RBC AUTO-ENTMCNC: 32.8 G/DL (ref 31.4–37.4)
MCV RBC AUTO: 96 FL (ref 82–98)
O2 CT BLDV-SCNC: 8.8 ML/DL
P AXIS: 43 DEGREES
P AXIS: 52 DEGREES
PCO2 BLDV: 30.5 MM HG (ref 42–50)
PH BLDV: 7.51 [PH] (ref 7.3–7.4)
PLATELET # BLD AUTO: 159 THOUSANDS/UL (ref 149–390)
PMV BLD AUTO: 11.1 FL (ref 8.9–12.7)
PO2 BLDV: 57.5 MM HG (ref 35–45)
POTASSIUM SERPL-SCNC: 4.2 MMOL/L (ref 3.5–5.3)
PR INTERVAL: 222 MS
PR INTERVAL: 226 MS
QRS AXIS: -6 DEGREES
QRS AXIS: -7 DEGREES
QRSD INTERVAL: 112 MS
QRSD INTERVAL: 114 MS
QT INTERVAL: 418 MS
QT INTERVAL: 430 MS
QTC INTERVAL: 502 MS
QTC INTERVAL: 517 MS
RBC # BLD AUTO: 2.43 MILLION/UL (ref 3.88–5.62)
SODIUM SERPL-SCNC: 133 MMOL/L (ref 136–145)
T WAVE AXIS: 25 DEGREES
T WAVE AXIS: 30 DEGREES
VENTRICULAR RATE: 87 BPM
VENTRICULAR RATE: 87 BPM
WBC # BLD AUTO: 13.13 THOUSAND/UL (ref 4.31–10.16)

## 2021-02-08 PROCEDURE — 83605 ASSAY OF LACTIC ACID: CPT | Performed by: FAMILY MEDICINE

## 2021-02-08 PROCEDURE — 0T9B70Z DRAINAGE OF BLADDER WITH DRAINAGE DEVICE, VIA NATURAL OR ARTIFICIAL OPENING: ICD-10-PCS | Performed by: INTERNAL MEDICINE

## 2021-02-08 PROCEDURE — 99233 SBSQ HOSP IP/OBS HIGH 50: CPT | Performed by: FAMILY MEDICINE

## 2021-02-08 PROCEDURE — 93005 ELECTROCARDIOGRAM TRACING: CPT

## 2021-02-08 PROCEDURE — 99232 SBSQ HOSP IP/OBS MODERATE 35: CPT | Performed by: INTERNAL MEDICINE

## 2021-02-08 PROCEDURE — 82948 REAGENT STRIP/BLOOD GLUCOSE: CPT

## 2021-02-08 PROCEDURE — 93010 ELECTROCARDIOGRAM REPORT: CPT | Performed by: INTERNAL MEDICINE

## 2021-02-08 PROCEDURE — 80048 BASIC METABOLIC PNL TOTAL CA: CPT | Performed by: FAMILY MEDICINE

## 2021-02-08 PROCEDURE — 82805 BLOOD GASES W/O2 SATURATION: CPT | Performed by: FAMILY MEDICINE

## 2021-02-08 PROCEDURE — 90935 HEMODIALYSIS ONE EVALUATION: CPT | Performed by: INTERNAL MEDICINE

## 2021-02-08 PROCEDURE — 85027 COMPLETE CBC AUTOMATED: CPT | Performed by: FAMILY MEDICINE

## 2021-02-08 RX ORDER — ALBUMIN (HUMAN) 12.5 G/50ML
25 SOLUTION INTRAVENOUS ONCE
Status: COMPLETED | OUTPATIENT
Start: 2021-02-08 | End: 2021-02-08

## 2021-02-08 RX ADMIN — METOPROLOL TARTRATE 25 MG: 25 TABLET, FILM COATED ORAL at 20:57

## 2021-02-08 RX ADMIN — DOCUSATE SODIUM AND SENNOSIDES 1 TABLET: 8.6; 5 TABLET ORAL at 21:03

## 2021-02-08 RX ADMIN — NYSTATIN 500000 UNITS: 100000 SUSPENSION ORAL at 11:25

## 2021-02-08 RX ADMIN — NYSTATIN 500000 UNITS: 100000 SUSPENSION ORAL at 21:03

## 2021-02-08 RX ADMIN — NYSTATIN 500000 UNITS: 100000 SUSPENSION ORAL at 17:02

## 2021-02-08 RX ADMIN — HEPARIN SODIUM 5000 UNITS: 5000 INJECTION INTRAVENOUS; SUBCUTANEOUS at 15:05

## 2021-02-08 RX ADMIN — PANTOPRAZOLE SODIUM 40 MG: 40 TABLET, DELAYED RELEASE ORAL at 17:01

## 2021-02-08 RX ADMIN — HEPARIN SODIUM 5000 UNITS: 5000 INJECTION INTRAVENOUS; SUBCUTANEOUS at 06:23

## 2021-02-08 RX ADMIN — GLYCERIN, PETROLATUM, PHENYLEPHRINE HCL, PRAMOXINE HCL: 144; 2.5; 10; 15 CREAM TOPICAL at 01:08

## 2021-02-08 RX ADMIN — GUAIFENESIN 1200 MG: 600 TABLET, EXTENDED RELEASE ORAL at 20:56

## 2021-02-08 RX ADMIN — ASPIRIN 81 MG: 81 TABLET ORAL at 11:25

## 2021-02-08 RX ADMIN — INSULIN DETEMIR 35 UNITS: 100 INJECTION, SOLUTION SUBCUTANEOUS at 21:17

## 2021-02-08 RX ADMIN — ATORVASTATIN CALCIUM 40 MG: 40 TABLET, FILM COATED ORAL at 17:02

## 2021-02-08 RX ADMIN — HYDROMORPHONE HYDROCHLORIDE 0.2 MG: 1 INJECTION, SOLUTION INTRAMUSCULAR; INTRAVENOUS; SUBCUTANEOUS at 08:01

## 2021-02-08 RX ADMIN — ALBUMIN (HUMAN) 25 G: 0.25 INJECTION, SOLUTION INTRAVENOUS at 11:20

## 2021-02-08 RX ADMIN — INSULIN LISPRO 2 UNITS: 100 INJECTION, SOLUTION INTRAVENOUS; SUBCUTANEOUS at 17:01

## 2021-02-08 RX ADMIN — Medication 3 MG: at 21:01

## 2021-02-08 RX ADMIN — POLYSACCHARIDE-IRON COMPLEX 150 MG: 150 CAPSULE ORAL at 11:25

## 2021-02-08 RX ADMIN — HEPARIN SODIUM 5000 UNITS: 5000 INJECTION INTRAVENOUS; SUBCUTANEOUS at 21:17

## 2021-02-08 RX ADMIN — PANTOPRAZOLE SODIUM 40 MG: 40 TABLET, DELAYED RELEASE ORAL at 06:50

## 2021-02-08 RX ADMIN — POLYETHYLENE GLYCOL 3350 17 G: 17 POWDER, FOR SOLUTION ORAL at 20:57

## 2021-02-08 RX ADMIN — INSULIN LISPRO 2 UNITS: 100 INJECTION, SOLUTION INTRAVENOUS; SUBCUTANEOUS at 21:04

## 2021-02-08 RX ADMIN — CLOPIDOGREL BISULFATE 75 MG: 75 TABLET ORAL at 11:25

## 2021-02-08 RX ADMIN — ACETAMINOPHEN 650 MG: 325 TABLET, FILM COATED ORAL at 06:23

## 2021-02-08 RX ADMIN — ESCITALOPRAM 10 MG: 10 TABLET, FILM COATED ORAL at 21:00

## 2021-02-08 RX ADMIN — GUAIFENESIN 1200 MG: 600 TABLET, EXTENDED RELEASE ORAL at 11:25

## 2021-02-08 RX ADMIN — SALINE NASAL SPRAY 1 SPRAY: 1.5 SOLUTION NASAL at 17:03

## 2021-02-08 NOTE — ASSESSMENT & PLAN NOTE
Patient does not appear fluid overloaded at this time  Will continue with HD per Nephrology recommendations      Plan    · Continue current regimen as noted under CAD  · Monitor I/Os  · Daily weights

## 2021-02-08 NOTE — PHYSICAL THERAPY NOTE
PHYSICAL THERAPY CANCELLATION NOTE    Patient Name: Sd Garcia  MJCMC'N Date: 2/8/2021 02/08/21 1013   PT Last Visit   PT Visit Date 02/08/21   Note Type   Cancel Reasons Other  (HD at bedside)   Assessment   Assessment Spoke to BARTOLO Contreras  Pt is currently receiving HD at bedside and unable to patricipate in PT re-evaluation at this time   PT will continue to follow     Loree Corley, PT, DPT

## 2021-02-08 NOTE — OCCUPATIONAL THERAPY NOTE
OccupationalTherapy Progress Note     Patient Name: Lydia Anglin  LDDBM'Z Date: 2/8/2021  Problem List  Principal Problem:    Coronary artery disease  Active Problems:    Acute renal failure superimposed on stage 3 chronic kidney disease (HCC)    Type 2 diabetes mellitus with hyperglycemia, with long-term current use of insulin (HCC)    Leukocytosis    Hx of CABG    Acute on chronic diastolic congestive heart failure (HCC)    Anemia    Hyponatremia    Oral candida    ATN (acute tubular necrosis) ([facility] [address] )    Urinary retention    Right hip pain          02/08/21 1147   OT Last Visit   OT Visit Date 02/08/21  (Monday)   Note Type   Note Type Treatment   Activity Tolerance   Medical Staff Made Aware care coordination w/ PT, Brayden Cortes  [facility]iana [facility]   Assessment   Assessment Attempted to see pt for OT tx session 2nd time  Per RN, pt is lethargic and low BP following dialysis  Contact made w/ pt  Pt declined participation in OT tx session reporting that he has the urge to void but unable  Pt required + time and cues to communicate wants / needs  Will continue to follow as appropriate       Sai Quiñones OTR/L

## 2021-02-08 NOTE — ASSESSMENT & PLAN NOTE
Lab Results   Component Value Date    HGBA1C 6 7 (H) 07/03/2019       Recent Labs     02/07/21  2033 02/08/21  0357 02/08/21  0717 02/08/21  1035   POCGLU 180* 83 89 124       Blood Sugar Average: Last 72 hrs:  (P) 47 [id number]     Plan  · Endocrinology previously consulted   · Recommending  Levemir 42 units BID, Humalog 25 units with meals +SSI  · BS stable, goal 140-180s during admission

## 2021-02-08 NOTE — PLAN OF CARE
Post-Dialysis RN Treatment Note    Blood Pressure:  Pre 111/40 mm/Hg  Post 121/56 mmHg   EDW  TBD kg    Weight:  Pre 93 2  kg   Post 92 2 kg   Mode of weight measurement: Bed Scale   Volume Removed  1000 ml    Treatment duration 150 minutes    NS given  Yes, cramping right arm    Treatment shortened?  Yes, describe: per Dr Josie Collins   Medications given during Rx Not Applicable   Estimated Kt/V  Not Applicable   Access type: Temporary HD catheter   Access Status: Yes, describe: maintains BFR    Report called to primary nurse   Yes /   Drea Zapien RN    2500 ml as tolerated, 3K bath, 3 hrs  Problem: METABOLIC, FLUID AND ELECTROLYTES - ADULT  Goal: Electrolytes maintained within normal limits  Description: INTERVENTIONS:  - Monitor labs and assess patient for signs and symptoms of electrolyte imbalances  - Administer electrolyte replacement as ordered  - Monitor response to electrolyte replacements, including repeat lab results as appropriate  - Instruct patient on fluid and nutrition as appropriate  Outcome: Progressing  Goal: Fluid balance maintained  Description: INTERVENTIONS:  - Monitor labs   - Monitor I/O and WT  - Instruct patient on fluid and nutrition as appropriate  - Assess for signs & symptoms of volume excess or deficit  Outcome: Progressing

## 2021-02-08 NOTE — PROGRESS NOTES
Progress Note - Yoon Koenig [de-identified] y o  male MRN: [id number]    Unit/Bed#: S -01 Encounter: [id number]      CC: diabetes f/u    Subjective:   Yoon Koenig is a [de-identified]y o  year old male with type 2 diabetes  Complains of not feeling well, aches and complains of pain in his buttock  As result, p o  Intake has been poor  Says that he did not eat any breakfast this morning, or any lunch and so mealtime insulin was held  AM lantus was also held  No hypoglycemia however he did have slightly tight a m  Blood sugars 83 mg/dL this morning  Objective:     Vitals: Blood pressure 102/51, pulse 84, temperature 98 2 °F (36 8 °C), resp  rate 16, SpO2 98 %  ,There is no height or weight on file to calculate BMI  Intake/Output Summary (Last 24 hours) at 2/8/2021 1505  Last data filed at 2/8/2021 1010  Gross per 24 hour   Intake 1180 ml   Output 1700 ml   Net -520 ml       Physical Exam:  General Appearance: awake, appears stated age and cooperative  Head: Normocephalic, without obvious abnormality, atraumatic  Extremities: moves all extremities  Skin: Skin color and temperature normal    Pulm: no labored breathing    Lab, Imaging and other studies: I have personally reviewed pertinent reports  POC Glucose (mg/dl)   Date Value   02/08/2021 124   02/08/2021 89   02/08/2021 83   02/07/2021 180 (H)   02/07/2021 244 (H)   02/07/2021 220 (H)   02/07/2021 109   02/07/2021 159 (H)   02/06/2021 242 (H)   02/06/2021 260 (H)       Assessment and Plan:  1  Type 2 mellitus on insulin therapy  Patient had very poor oral intake, has not required any insulin coverage since this morning  Blood sugars 89, 124 mg/dL  Recommend the following for now  - decrease Levemir to 35 units subcutaneously at bedtime  - discontinue mealtime Humalog for now  Occasions appetite, oral intake improves, please resume mealtime Humalog  -change sliding scale to algorithm 2  Will continue to follow and make changes as needed    2   End stage disease on dialysis  Dialysis per Nephrology    3  Buttock pain   Discussed with primary team, patient will be examined       Portions of the record may have been created with voice recognition software  Occasional wrong word or "sound a like" substitutions may have occurred due to the inherent limitations of voice recognition software  Read the chart carefully and recognize, using context, where substitutions have occurred

## 2021-02-08 NOTE — QUICK NOTE
Called sister Arcadio Maya) to update her on plan of care  Answered all questions and noted that we will continue calling with updates

## 2021-02-08 NOTE — ASSESSMENT & PLAN NOTE
Lab Results   Component Value Date    EGFR 7 02/08/2021    EGFR 9 02/07/2021    EGFR 11 02/06/2021    CREATININE 6 95 (H) 02/08/2021    CREATININE 5 74 (H) 02/07/2021    CREATININE 4 77 (H) 02/06/2021     S/p cardiac cath on 1/22/21, creatinine elevated (baseline 2 3-2 5) likely due post-cath contrast ATN  Cr continues trending upward   Patient is currently dialysis dependent, s/p HD Cath placement on 1/25/20 2     Plan  · Nephrology following recommending continued HD, next session for Wednesday 2/10  · Per Nephrology possibly encephalopathic s/p 1 dose albumin, will obtain lactic and VBG  · Cr up to 6 95, likely combination of ATN and underlying CKD

## 2021-02-08 NOTE — ASSESSMENT & PLAN NOTE
Patient originally to [facility] on 1/21/2021 due to chest pain with chest pain at [facility] Subsequently found to have Type 2 MI and underwent cardiac catheterization on 1/22/21 after transfer to [facility]  Patient reports he has been asymptomatic (denies any chest pain) since cardiac cath     1/24/21 echo-was normal  Systolic function was normal  Ejection fraction was estimated to be 60 %  There was hypokinesis of the basal inferior wall (grade 2 diastolic dysfunction)      Plan  · Continue aspirin, Plavix, statin, beta-blocker and Imdur  · Monitor for s/sx

## 2021-02-08 NOTE — PROGRESS NOTES
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 status with cardiac catheterization on January 22nd  The patient is currently dialysis dependent  His access is temporary dialysis catheter  Will stop dialysis after 2-1/2 hours and not take off any more fluid  Would plan for next dialysis on Wednesday  2  Abdominal distension:  Some of this may be urinary retention will order straight cath x1  I spoke with the hospitalist service with the attending Dr Glenn Sharma  3  Questionable acute encephalopathy:  Give 1 dose of intravenous albumin now check lactate, check venous ABG  4  Fluid status:  I think he is euvolemic at this point  5  Acute anemia:  Patient has required transfusions during this hospitalization hemoglobin 7 6 this morning was 8 1 on February 7    Plan:  As above; check EKG, check lactic acid check venous blood gas give 1 dose of IV albumin now  Subjective:   Patient is seen and examined on hemodialysis  I spoke with the dialysis nurse  Patient with belching on dialysis  Mildly confused on dialysis  He was able to obey commands but was slow to respond to some questions  He is not sure of the last time that he voided  Systolic blood pressures been anywhere from   Objective:     Vitals: Blood pressure 107/55, pulse 87, temperature 98 °F (36 7 °C), temperature source Oral, resp  rate 14, SpO2 98 %  ,There is no height or weight on file to calculate BMI      Weight (last 2 days)     None            Intake/Output Summary (Last 24 hours) at 2/8/2021 1006  Last data filed at 2/8/2021 0735  Gross per 24 hour   Intake 920 ml   Output 0 ml   Net 920 ml       HD Temporary Double Catheter (Active)   Reasons to continue HD Cath Treatment Therapy 02/08/21 0735   Goal for Removal N/A- chronic HD catheter 02/08/21 0735   Line Necessity Reviewed Yes, reviewed with provider 02/08/21 0735   Site Assessment Dry; Intact 02/08/21 0735   Proximal Lumen Status Blood return noted;Cap changed; Flushed 02/08/21 0735   Distal Lumen Status Blood return noted;Cap changed; Flushed 02/08/21 0735   Line Care Connections checked and tightened 01/29/21 0845   Dressing Type Chlorhexidine dressing 02/08/21 0735   Dressing Status Dry; Intact; Old drainage 02/08/21 0735   Dressing Intervention Dressing changed 02/08/21 0735   Dressing Change Due 02/15/21 02/08/21 0735       Physical Exam: General: patient is in NAD; mildly toxic  Skin:  No new rash  Eyes:  No scleral icterus  Neck:  Supple no adenopathy  Chest:  Coarse breath sounds  CVS:  S1-S2 no rub  Abdomen:  Soft there is abdominal distension mild tympany bowel sounds present there is tenderness over area in the right lower quadrant I was pressing on where he had heparin subcutaneous injections  Extremities:  No edema  Neuro:  Nonfocal                Medications Prior to Admission   Medication    amLODIPine (NORVASC) 5 mg tablet    aspirin (ASPIR-LOW) 81 mg EC tablet    atorvastatin (LIPITOR) 40 mg tablet    escitalopram (LEXAPRO) 10 mg tablet    furosemide (LASIX) 80 mg tablet    hydrALAZINE (APRESOLINE) 50 mg tablet    insulin detemir (LEVEMIR) 100 units/mL subcutaneous injection    insulin lispro (HumaLOG) 100 units/mL injection    insulin lispro (HumaLOG) 100 units/mL injection    isosorbide mononitrate (IMDUR) 30 mg 24 hr tablet    metoprolol tartrate (LOPRESSOR) 50 mg tablet    nitroglycerin (NITROSTAT) 0 4 mg SL tablet    polyethylene glycol (MIRALAX) 17 g packet       Current Facility-Administered Medications   Medication Dose Route Frequency    acetaminophen (TYLENOL) tablet 650 mg  650 mg Oral Q6H PRN    aluminum-magnesium hydroxide-simethicone (MYLANTA) oral suspension 30 mL  30 mL Oral Q6H PRN    amLODIPine (NORVASC) tablet 5 mg  5 mg Oral BID    aspirin (ECOTRIN LOW STRENGTH) EC tablet 81 mg  81 mg Oral Daily    atorvastatin (LIPITOR) tablet 40 mg  40 mg Oral Daily With Dinner    benzonatate (TESSALON PERLES) capsule 100 mg  100 mg Oral TID PRN    bisacodyl (DULCOLAX) rectal suppository 10 mg  10 mg Rectal Daily PRN    calcium carbonate (TUMS) chewable tablet 1,000 mg  1,000 mg Oral Daily PRN    clopidogrel (PLAVIX) tablet 75 mg  75 mg Oral Daily    escitalopram (LEXAPRO) tablet 10 mg  10 mg Oral HS    guaiFENesin (MUCINEX) 12 hr tablet 1,200 mg  1,200 mg Oral Q12H [place]    heparin (porcine) subcutaneous injection 5,000 Units  5,000 Units Subcutaneous Q8H [address]    hydrALAZINE (APRESOLINE) tablet 50 mg  50 mg Oral Q8H [address]    hydrocortisone 1 % cream   Topical 4x Daily PRN    HYDROmorphone (DILAUDID) injection 0 2 mg  0 2 mg Intravenous Q3H PRN    insulin detemir (LEVEMIR) subcutaneous injection 42 Units  42 Units Subcutaneous Q12H [place]    insulin lispro (HumaLOG) 100 units/mL subcutaneous injection 2-12 Units  2-12 Units Subcutaneous TID AC    insulin lispro (HumaLOG) 100 units/mL subcutaneous injection 2-12 Units  2-12 Units Subcutaneous HS    insulin lispro (HumaLOG) 100 units/mL subcutaneous injection 2-12 Units  2-12 Units Subcutaneous 0200    insulin lispro (HumaLOG) 100 units/mL subcutaneous injection 25 Units  25 Units Subcutaneous TID With Meals    iron polysaccharides (FERREX) capsule 150 mg  150 mg Oral Daily    melatonin tablet 3 mg  3 mg Oral HS    metoprolol tartrate (LOPRESSOR) tablet 25 mg  25 mg Oral Q12H [place]    nitroglycerin (NITROSTAT) SL tablet 0 4 mg  0 4 mg Sublingual Q5 Min PRN    nystatin (MYCOSTATIN) oral suspension 500,000 Units  500,000 Units Swish & Swallow 4x Daily    ondansetron (ZOFRAN) injection 4 mg  4 mg Intravenous Q6H PRN    oxyCODONE (ROXICODONE) IR tablet 2 5 mg  2 5 mg Oral Q4H PRN    pantoprazole (PROTONIX) EC tablet 40 mg  40 mg Oral BID AC    phenol (CHLORASEPTIC) 1 4 % mucosal liquid 1 spray  1 spray Mouth/Throat Q4H PRN    pneumococcal 13-valent conjugate vaccine (PREVNAR-13) IM injection 0 5 mL  0 5 mL Intramuscular Prior to discharge    polyethylene glycol (MIRALAX) packet 17 g  17 g Oral Daily PRN    polyethylene glycol (MIRALAX) packet 17 g  17 g Oral BID    Pramox-PE-Glycerin-Petrolatum (PREPARATION H MAX) 1-0 25-14 4-15 % rectal cream   Rectal BID PRN    senna-docusate sodium (SENOKOT S) 8 6-50 mg per tablet 1 tablet  1 tablet Oral HS    sodium chloride (OCEAN) 0 65 % nasal spray 1 spray  1 spray Each Nare Q1H PRN        Lab, Imaging and other studies: I have personally reviewed pertinent labs    CBC:   Lab Results   Component Value Date    WBC 13 13 (H) 02/08/2021    WBC 8 8 01/05/2016    RBC 2 43 (L) 02/08/2021    RBC 4 61 (L) 01/05/2016     CMP:   Lab Results   Component Value Date     01/05/2016    CL 95 (L) 02/08/2021     01/05/2016    CO2 22 02/08/2021    CO2 19 (L) 01/24/2021    ANIONGAP 14 2 01/05/2016     (H) 02/08/2021    BUN 35 (H) 01/05/2016    CREATININE 6 95 (H) 02/08/2021    CREATININE 1 5 (H) 01/05/2016    GLUCOSE 232 (H) 01/24/2021    GLUCOSE 131 (H) 01/05/2016    CALCIUM 8 3 02/08/2021    CALCIUM 9 0 01/05/2016    AST 53 (H) 01/23/2021    AST 22 01/05/2016    ALT 71 01/23/2021    ALT 53 01/05/2016    ALKPHOS 106 01/23/2021    ALKPHOS 66 01/05/2016    PROT 10 2 (H) 01/05/2016    BILITOT 0 9 01/05/2016    EGFR 7 02/08/2021     Phosphorus:   Lab Results   Component Value Date    PHOS 5 1 (H) 01/27/2021     Magnesium:   Lab Results   Component Value Date    MG 2 9 (H) 07/10/2019     Urinalysis:   Lab Results   Component Value Date    COLORU Yellow 07/08/2019    CLARITYU Clear 07/08/2019    CLARITYU YELLOW 01/05/2016    CLARITYU SL CLOUDY 01/05/2016    SPECGRAV 1 025 07/08/2019    SPECGRAV 1 025 01/05/2016    PHUR 5 0 07/08/2019    PHUR 5 5 02/14/2017    PHUR 6 0 01/05/2016    LEUKOCYTESUR Negative 07/08/2019    LEUKOCYTESUR TRACE (A) 01/05/2016 NITRITE Negative 07/08/2019    NITRITE POSITIVE (A) 01/05/2016    PROTEINUA 100 (A) 01/05/2016    GLUCOSEU Negative 07/08/2019    GLUCOSEU >=1000 01/05/2016    KETONESU Negative 07/08/2019    KETONESU NEGATIVE 01/05/2016    BILIRUBINUR Negative 07/08/2019    BILIRUBINUR NEGATIVE 01/05/2016    BLOODU Negative 07/08/2019    BLOODU LARGE (A) 01/05/2016     BMP:   Lab Results   Component Value Date    GLUCOSE 232 (H) 01/24/2021    GLUCOSE 131 (H) 01/05/2016    SODIUM 133 (L) 02/08/2021    CO2 22 02/08/2021    CO2 19 (L) 01/24/2021     (H) 02/08/2021    BUN 35 (H) 01/05/2016    CREATININE 6 95 (H) 02/08/2021    CREATININE 1 5 (H) 01/05/2016    CALCIUM 8 3 02/08/2021    CALCIUM 9 0 01/05/2016     ABGs:   Lab Results   Component Value Date    PH 7 342 (L) 01/24/2021

## 2021-02-08 NOTE — ASSESSMENT & PLAN NOTE
Patient continues to experience mild right hip pain, no concerning findings on on examination  X-Ray right hip unremarkable  Patient had the cardiac catheterization through the right groin  I do not appreciate any area of induration or redness      Plan  · Continue to monitor pain symptoms  · Consider Aqua K pad for discomfort

## 2021-02-08 NOTE — PROGRESS NOTES
Progress Note - Wandalee Duverney 1940, [de-identified] y o  male MRN: [id number]    Unit/Bed#: S -01 Encounter: [id number]    Primary Care Provider: Camelia Noguera MD   Date and time admitted to hospital: 1/22/2021 12:28 PM        * Acute renal failure superimposed on stage 3 chronic kidney disease [facility])  Assessment & Plan  Lab Results   Component Value Date    EGFR 7 02/08/2021    EGFR 9 02/07/2021    EGFR 11 02/06/2021    CREATININE 6 95 (H) 02/08/2021    CREATININE 5 74 (H) 02/07/2021    CREATININE 4 77 (H) 02/06/2021     S/p cardiac cath on 1/22/21, creatinine elevated (baseline 2 3-2 5) likely due post-cath contrast ATN  Cr continues trending upward  Patient is currently dialysis dependent, s/p HD Cath placement on 1/25/20 2     Plan  · Nephrology following recommending continued HD, next session for Wednesday 2/10  · Per Nephrology possibly encephalopathic s/p 1 dose albumin, will obtain lactic and VBG  · Cr up to 6 95, likely combination of ATN and underlying CKD      Coronary artery disease  Assessment & Plan  Patient originally to [facility] on 1/21/2021 due to chest pain with chest pain at [facility] Subsequently found to have Type 2 MI and underwent cardiac catheterization on 1/22/21 after transfer to [facility]  Patient reports he has been asymptomatic (denies any chest pain) since cardiac cath     1/24/21 echo-was normal  Systolic function was normal  Ejection fraction was estimated to be 60 %  There was hypokinesis of the basal inferior wall (grade 2 diastolic dysfunction)  Plan  · Continue aspirin, Plavix, statin, beta-blocker and Imdur  · Monitor for s/sx           Right hip pain  Assessment & Plan  Patient continues to experience mild right hip pain, no concerning findings on on examination  X-Ray right hip unremarkable  Patient had the cardiac catheterization through the right groin  I do not appreciate any area of induration or redness      Plan  · Continue to monitor pain symptoms  · Consider Aqua K pad for discomfort    Urinary retention  Assessment & Plan  Straight cath 2/4/2021 revealed >400cc urine  Plan  · As per nephrology, continue bladder scans and straight cath for  >250cc    Hyponatremia  Assessment & Plan  · Patient continues to have borderline low sodium, today at 133  Questionable AMS today on 2/8/21  Plan  · Monitor for AMS  · Neurochecks  · Nephrology following     Anemia  Assessment & Plan  Hemoglobin is downtrending this morning at 7 6 down from 8 1 on 2/7/21  Plan  · Continue to monitor with daily CBC  · S/p 2 units of PRBC during this admission  · Transfuse for Hg <7, or if any active bleeding and patient becomes symptomatic  · Have consented for blood if needed    Acute on chronic diastolic congestive heart failure [facility])  Assessment & Plan  Patient does not appear fluid overloaded at this time  Will continue with HD per Nephrology recommendations  Plan    · Continue current regimen as noted under CAD  · Monitor I/Os  · Daily weights      Hx of CABG  Assessment & Plan  CAD s/p CABG in 2016; history of stenting prior to CABG (LIMA to LAD and SVG to OM)  Status post cardiac catheterization: significant triple vessel coronary artery disease  Plan  · Continue amlodpine 5 mg BID, 81 ASA daily, Plavix 75mg, metoprolol 25mg    Leukocytosis  Assessment & Plan  Leukocytosis downtrending 13 1 considering reactive vs underlying infectious etiology  COVID-19, influenza, RSV tests were negative x2  Chest x-ray obtained read as multifocal pneumonia, repeat CXR on 2/6/2021 reporting previous parenchymal changes are resolved  Plan  · Completed 8 day course of cefepime on 2/6  · Blood cultures x2 no growth x 5 days  · Cough medicine/antitussive, lozenges and Chloraseptic spray p r n    · Continue to monitor WBC and fever curves      Type 2 diabetes mellitus with hyperglycemia, with long-term current use of insulin [facility])  Assessment & Plan  Lab Results Component Value Date    HGBA1C 6 7 (H) 2019       Recent Labs     21  2033 21  0357 21  0717 21  1035   POCGLU 180* 83 89 124       Blood Sugar Average: Last 72 hrs:  (P) 039 [id number]     Plan  · Endocrinology previously consulted   · Recommending  Levemir 42 units BID, Humalog 25 units with meals +SSI  · BS stable, goal 140-180s during admission     ATN (acute tubular necrosis) (HCC)-resolved as of 2021  Assessment & Plan  Per nephrology  See above        VTE Pharmacologic Prophylaxis:   Pharmacologic: Heparin  Mechanical VTE Prophylaxis in Place: Yes    Discussions with Specialists or Other Care Team Provider: Nephrology    Education and Discussions with Family / Patient: Will update family    Current Length of Stay: 17 day(s)    Current Patient Status: Inpatient     Discharge Plan / Estimated Discharge Date: TBD    Code Status: Level 1 - Full Code      Subjective:   Patient resting in bed, currently on dialysis during examination, was somewhat lethargic during conversation  No complaints of chest pain  Objective:     Vitals:   Temp (24hrs), Av 2 °F (36 8 °C), Min:98 °F (36 7 °C), Max:98 3 °F (36 8 °C)    Temp:  [98 °F (36 7 °C)-98 3 °F (36 8 °C)] 98 2 °F (36 8 °C)  HR:  [72-94] 84  Resp:  [14-18] 16  BP: ()/(40-58) 102/51  SpO2:  [97 %-98 %] 98 %  There is no height or weight on file to calculate BMI  Input and Output Summary (last 24 hours): Intake/Output Summary (Last 24 hours) at 2021 1508  Last data filed at 2021 1010  Gross per 24 hour   Intake 1180 ml   Output 1700 ml   Net -520 ml       Physical Exam:     Physical Exam  Vitals signs reviewed  Constitutional:       Appearance: He is well-developed  HENT:      Head: Normocephalic and atraumatic        Right Ear: External ear normal       Left Ear: External ear normal       Nose: Nose normal    Eyes:      Conjunctiva/sclera: Conjunctivae normal    Neck:      Musculoskeletal: Normal range of motion and neck supple  Comments: RI HD cath in place  Mild bleeding noted from site  Cardiovascular:      Rate and Rhythm: Normal rate and regular rhythm  Heart sounds: Normal heart sounds  Pulmonary:      Effort: Pulmonary effort is normal       Breath sounds: Normal breath sounds  Abdominal:      General: Bowel sounds are normal       Palpations: Abdomen is soft  Skin:     General: Skin is warm and dry  Neurological:      Mental Status: He is alert  Mental status is at baseline  Psychiatric:         Mood and Affect: Mood normal            Additional Data:     Labs:    Results from last 7 days   Lab Units 02/08/21  0608   WBC Thousand/uL 13 13*   HEMOGLOBIN g/dL 7 6*   HEMATOCRIT % 23 2*   PLATELETS Thousands/uL 159     Results from last 7 days   Lab Units 02/08/21  0608   POTASSIUM mmol/L 4 2   CHLORIDE mmol/L 95*   CO2 mmol/L 22   BUN mg/dL 129*   CREATININE mg/dL 6 95*   CALCIUM mg/dL 8 3           * I Have Reviewed All Lab Data Listed Above  * Additional Pertinent Lab Tests Reviewed:  All Labs Within Last 24 Hours Reviewed    Imaging:    Imaging Reports Reviewed Today Include: VAS pseduoaneursym study, final read still pending  Imaging Personally Reviewed by Myself Includes:  As above    Recent Cultures (last 7 days):           Last 24 Hours Medication List:   Current Facility-Administered Medications   Medication Dose Route Frequency Provider Last Rate    acetaminophen  650 mg Oral Q6H PRN Swathi Schumacher MD      aluminum-magnesium hydroxide-simethicone  30 mL Oral Q6H PRN TABITHA Calabrese      amLODIPine  5 mg Oral BID Estephania Villa, PAMamieC      aspirin  81 mg Oral Daily Jessica Mccall MD      atorvastatin  40 mg Oral Daily With Matt Moore MD      benzonatate  100 mg Oral TID PRN TABITHA Zhao      bisacodyl  10 mg Rectal Daily PRN Chris Woods DO      calcium carbonate  1,000 mg Oral Daily PRN Jessica Mccall MD      clopidogrel  75 mg Oral Daily Kyle Pfeiffer MD      escitalopram  10 mg Oral HS Kyle Pfeiffer MD      guaiFENesin  1,200 mg Oral Q12H [facility] TABITHA Combs      heparin (porcine)  5,000 Units Subcutaneous Q8H [facility] Faye Correa MD      hydrALAZINE  50 mg Oral [place], [place]      hydrocortisone   Topical 4x Daily PRN Candido Dove MD      HYDROmorphone  0 2 mg Intravenous Q3H PRN Faye Correa MD      insulin detemir  42 Units Subcutaneous Q12H [facility] Gagan Sandhu MD      insulin lispro  2-12 Units Subcutaneous TID [facility] Fidelina Mccurdy MD      insulin lispro  2-12 Units Subcutaneous HS Fidelina Mccurdy MD      insulin lispro  2-12 Units Subcutaneous 0200 Fidelina Mccurdy MD      insulin lispro  25 Units Subcutaneous TID With Meals Fidelina Mccurdy MD      iron polysaccharides  150 mg Oral Daily [place], [place]      melatonin  3 mg Oral HS TABITHA Clements      metoprolol tartrate  25 mg Oral Q12H [id number], MD      nitroglycerin  0 4 mg Sublingual Q5 Min PRN Kyle Pfeiffer MD      nystatin  500,000 Units Swish & Swallow 4x Daily Faye Correa MD      ondansetron  4 mg Intravenous Q6H PRN Kyle Pfeiffer MD      oxyCODONE  2 5 mg Oral Q4H PRN Faye Correa MD      pantoprazole  40 mg Oral BID AC Wendy Spencer MD      phenol  1 spray Mouth/Throat Q4H PRN Faye Correa MD      pneumococcal 13-valent conjugate vaccine  0 5 mL Intramuscular Prior to discharge Kyle Pfeiffer MD      polyethylene glycol  17 g Oral Daily PRN Jensen Blas MD      polyethylene glycol  17 g Oral BID Jensen Blas MD      Pramox-PE-Glycerin-Petrolatum   Rectal BID PRN Marty Hughes DO      senna-docusate sodium  1 tablet Oral HS Wendy Spencer MD      sodium chloride  1 spray Each Nare Q1H PRN Jimmy Alexandre MD          Today, Patient Was Seen By: Kaleigh Arndt MD    ** Please Note: This note has been constructed using a voice recognition system  **

## 2021-02-08 NOTE — OCCUPATIONAL THERAPY NOTE
OccupationalTherapy Progress Note     Patient Name: Yoon Koenig  HRUBU'F Date: 2/8/2021  Problem List  Principal Problem:    Coronary artery disease  Active Problems:    Acute renal failure superimposed on stage 3 chronic kidney disease (HCC)    Type 2 diabetes mellitus with hyperglycemia, with long-term current use of insulin (HCC)    Leukocytosis    Hx of CABG    Acute on chronic diastolic congestive heart failure (HCC)    Anemia    Hyponatremia    Oral candida    ATN (acute tubular necrosis) ([facility] [address] )    Urinary retention    Right hip pain          02/08/21 0820   OT Last Visit   OT Visit Date 02/08/21  (Monday)   Note Type   Note Type Treatment   Cancel Reasons Other  (HD at bedside)   Assessment   Assessment Attempted to see pt for OT tx session  Pt currently unavailable due to HD at bedside   Will continue to follow as appropriate and yuly Romano OTR/L

## 2021-02-08 NOTE — ASSESSMENT & PLAN NOTE
· Patient continues to have borderline low sodium, today at 133  Questionable AMS today on 2/8/21        Plan  · Monitor for AMS  · Neurochecks  · Nephrology following

## 2021-02-08 NOTE — ASSESSMENT & PLAN NOTE
Hemoglobin is downtrending this morning at 7 6 down from 8 1 on 2/7/21      Plan  · Continue to monitor with daily CBC  · S/p 2 units of PRBC during this admission  · Transfuse for Hg <7, or if any active bleeding and patient becomes symptomatic  · Have consented for blood if needed

## 2021-02-08 NOTE — ASSESSMENT & PLAN NOTE
CAD s/p CABG in 2016; history of stenting prior to CABG (LIMA to LAD and SVG to OM)  Status post cardiac catheterization: significant triple vessel coronary artery disease      Plan  · Continue amlodpine 5 mg BID, 81 ASA daily, Plavix 75mg, metoprolol 25mg

## 2021-02-08 NOTE — PHYSICAL THERAPY NOTE
PHYSICAL THERAPY CANCELLATION NOTE    Patient Name: Rama Paul  GCRIZ'O Date: 2/8/2021 02/08/21 1150   Note Type   Cancel Reasons Other   Activity Tolerance   Medical Staff Made Aware Care coordination w/ OT Diane   Nurse Made Aware Spoke to SEYMOUR Jernigan   Assessment   Assessment Attempted to see pt for PT re-eval/treat session for a 2nd time after conclusion of HD  Spoke to Content[employer] who reports pt has been lethargic, unable to urinate, and has had hypotension following HD  Contact made w/ pt who politely declines participation in PT session during to having urge to void, but unable, and groin pain  PT will continue to follow as appropriate       Leslie Cazares, PT, DPT

## 2021-02-08 NOTE — ASSESSMENT & PLAN NOTE
Leukocytosis downtrending 13 1 considering reactive vs underlying infectious etiology  COVID-19, influenza, RSV tests were negative x2  Chest x-ray obtained read as multifocal pneumonia, repeat CXR on 2/6/2021 reporting previous parenchymal changes are resolved  Plan  · Completed 8 day course of cefepime on 2/6  · Blood cultures x2 no growth x 5 days  · Cough medicine/antitussive, lozenges and Chloraseptic spray p r n    · Continue to monitor WBC and fever curves

## 2021-02-08 NOTE — ASSESSMENT & PLAN NOTE
Straight cath 2/4/2021 revealed >400cc urine       Plan  · As per nephrology, continue bladder scans and straight cath for  >250cc

## 2021-02-09 ENCOUNTER — APPOINTMENT (INPATIENT)
Dept: RADIOLOGY | Facility: HOSPITAL | Age: 81
DRG: 280 | End: 2021-02-09
Attending: INTERNAL MEDICINE
Payer: COMMERCIAL

## 2021-02-09 LAB
ANION GAP SERPL CALCULATED.3IONS-SCNC: 13 MMOL/L (ref 4–13)
BASOPHILS # BLD AUTO: 0.03 THOUSANDS/ΜL (ref 0–0.1)
BASOPHILS NFR BLD AUTO: 0 % (ref 0–1)
BUN SERPL-MCNC: 89 MG/DL (ref 5–25)
CALCIUM SERPL-MCNC: 8.6 MG/DL (ref 8.3–10.1)
CHLORIDE SERPL-SCNC: 94 MMOL/L (ref 100–108)
CO2 SERPL-SCNC: 25 MMOL/L (ref 21–32)
CREAT SERPL-MCNC: 5.83 MG/DL (ref 0.6–1.3)
EOSINOPHIL # BLD AUTO: 0.09 THOUSAND/ΜL (ref 0–0.61)
EOSINOPHIL NFR BLD AUTO: 1 % (ref 0–6)
ERYTHROCYTE [DISTWIDTH] IN BLOOD BY AUTOMATED COUNT: 13.9 % (ref 11.6–15.1)
GFR SERPL CREATININE-BSD FRML MDRD: 8 ML/MIN/1.73SQ M
GLUCOSE SERPL-MCNC: 158 MG/DL (ref 65–140)
GLUCOSE SERPL-MCNC: 162 MG/DL (ref 65–140)
GLUCOSE SERPL-MCNC: 197 MG/DL (ref 65–140)
GLUCOSE SERPL-MCNC: 239 MG/DL (ref 65–140)
GLUCOSE SERPL-MCNC: 254 MG/DL (ref 65–140)
GLUCOSE SERPL-MCNC: 272 MG/DL (ref 65–140)
HCT VFR BLD AUTO: 25.1 % (ref 36.5–49.3)
HGB BLD-MCNC: 8 G/DL (ref 12–17)
IMM GRANULOCYTES # BLD AUTO: 0.08 THOUSAND/UL (ref 0–0.2)
IMM GRANULOCYTES NFR BLD AUTO: 1 % (ref 0–2)
LYMPHOCYTES # BLD AUTO: 1.05 THOUSANDS/ΜL (ref 0.6–4.47)
LYMPHOCYTES NFR BLD AUTO: 10 % (ref 14–44)
MCH RBC QN AUTO: 31 PG (ref 26.8–34.3)
MCHC RBC AUTO-ENTMCNC: 31.9 G/DL (ref 31.4–37.4)
MCV RBC AUTO: 97 FL (ref 82–98)
MONOCYTES # BLD AUTO: 1.25 THOUSAND/ΜL (ref 0.17–1.22)
MONOCYTES NFR BLD AUTO: 12 % (ref 4–12)
NEUTROPHILS # BLD AUTO: 8.22 THOUSANDS/ΜL (ref 1.85–7.62)
NEUTS SEG NFR BLD AUTO: 76 % (ref 43–75)
NRBC BLD AUTO-RTO: 0 /100 WBCS
PLATELET # BLD AUTO: 178 THOUSANDS/UL (ref 149–390)
PMV BLD AUTO: 11.7 FL (ref 8.9–12.7)
POTASSIUM SERPL-SCNC: 5.4 MMOL/L (ref 3.5–5.3)
RBC # BLD AUTO: 2.58 MILLION/UL (ref 3.88–5.62)
SODIUM SERPL-SCNC: 132 MMOL/L (ref 136–145)
WBC # BLD AUTO: 10.72 THOUSAND/UL (ref 4.31–10.16)

## 2021-02-09 PROCEDURE — 80048 BASIC METABOLIC PNL TOTAL CA: CPT | Performed by: FAMILY MEDICINE

## 2021-02-09 PROCEDURE — NC001 PR NO CHARGE: Performed by: RADIOLOGY

## 2021-02-09 PROCEDURE — 85025 COMPLETE CBC W/AUTO DIFF WBC: CPT | Performed by: FAMILY MEDICINE

## 2021-02-09 PROCEDURE — 97530 THERAPEUTIC ACTIVITIES: CPT

## 2021-02-09 PROCEDURE — 99233 SBSQ HOSP IP/OBS HIGH 50: CPT | Performed by: INTERNAL MEDICINE

## 2021-02-09 PROCEDURE — 97164 PT RE-EVAL EST PLAN CARE: CPT

## 2021-02-09 PROCEDURE — 97168 OT RE-EVAL EST PLAN CARE: CPT

## 2021-02-09 PROCEDURE — 82948 REAGENT STRIP/BLOOD GLUCOSE: CPT

## 2021-02-09 PROCEDURE — 99232 SBSQ HOSP IP/OBS MODERATE 35: CPT | Performed by: INTERNAL MEDICINE

## 2021-02-09 RX ORDER — SODIUM POLYSTYRENE SULFONATE 4.1 MEQ/G
15 POWDER, FOR SUSPENSION ORAL; RECTAL ONCE
Status: COMPLETED | OUTPATIENT
Start: 2021-02-09 | End: 2021-02-09

## 2021-02-09 RX ORDER — HYDRALAZINE HYDROCHLORIDE 25 MG/1
25 TABLET, FILM COATED ORAL EVERY 8 HOURS SCHEDULED
Status: DISCONTINUED | OUTPATIENT
Start: 2021-02-09 | End: 2021-02-12 | Stop reason: HOSPADM

## 2021-02-09 RX ADMIN — PANTOPRAZOLE SODIUM 40 MG: 40 TABLET, DELAYED RELEASE ORAL at 17:46

## 2021-02-09 RX ADMIN — HYDRALAZINE HYDROCHLORIDE 25 MG: 25 TABLET, FILM COATED ORAL at 21:11

## 2021-02-09 RX ADMIN — ASPIRIN 81 MG: 81 TABLET ORAL at 08:56

## 2021-02-09 RX ADMIN — Medication 3 MG: at 21:11

## 2021-02-09 RX ADMIN — HEPARIN SODIUM 5000 UNITS: 5000 INJECTION INTRAVENOUS; SUBCUTANEOUS at 21:11

## 2021-02-09 RX ADMIN — NYSTATIN 500000 UNITS: 100000 SUSPENSION ORAL at 17:47

## 2021-02-09 RX ADMIN — INSULIN LISPRO 5 UNITS: 100 INJECTION, SOLUTION INTRAVENOUS; SUBCUTANEOUS at 17:49

## 2021-02-09 RX ADMIN — CLOPIDOGREL BISULFATE 75 MG: 75 TABLET ORAL at 08:56

## 2021-02-09 RX ADMIN — INSULIN LISPRO 1 UNITS: 100 INJECTION, SOLUTION INTRAVENOUS; SUBCUTANEOUS at 09:00

## 2021-02-09 RX ADMIN — ATORVASTATIN CALCIUM 40 MG: 40 TABLET, FILM COATED ORAL at 17:46

## 2021-02-09 RX ADMIN — NYSTATIN 500000 UNITS: 100000 SUSPENSION ORAL at 08:56

## 2021-02-09 RX ADMIN — INSULIN LISPRO 3 UNITS: 100 INJECTION, SOLUTION INTRAVENOUS; SUBCUTANEOUS at 12:05

## 2021-02-09 RX ADMIN — GUAIFENESIN 1200 MG: 600 TABLET, EXTENDED RELEASE ORAL at 08:56

## 2021-02-09 RX ADMIN — POLYETHYLENE GLYCOL 3350 17 G: 17 POWDER, FOR SOLUTION ORAL at 08:56

## 2021-02-09 RX ADMIN — PANTOPRAZOLE SODIUM 40 MG: 40 TABLET, DELAYED RELEASE ORAL at 06:41

## 2021-02-09 RX ADMIN — METOPROLOL TARTRATE 25 MG: 25 TABLET, FILM COATED ORAL at 08:56

## 2021-02-09 RX ADMIN — ESCITALOPRAM 10 MG: 10 TABLET, FILM COATED ORAL at 21:11

## 2021-02-09 RX ADMIN — BISACODYL 10 MG: 10 SUPPOSITORY RECTAL at 12:05

## 2021-02-09 RX ADMIN — NYSTATIN 500000 UNITS: 100000 SUSPENSION ORAL at 21:11

## 2021-02-09 RX ADMIN — NYSTATIN 500000 UNITS: 100000 SUSPENSION ORAL at 12:04

## 2021-02-09 RX ADMIN — HEPARIN SODIUM 5000 UNITS: 5000 INJECTION INTRAVENOUS; SUBCUTANEOUS at 06:41

## 2021-02-09 RX ADMIN — HYDRALAZINE HYDROCHLORIDE 25 MG: 25 TABLET, FILM COATED ORAL at 13:57

## 2021-02-09 RX ADMIN — POLYSACCHARIDE-IRON COMPLEX 150 MG: 150 CAPSULE ORAL at 08:56

## 2021-02-09 RX ADMIN — SODIUM POLYSTYRENE SULFONATE 15 G: 4.1 POWDER, FOR SUSPENSION ORAL; RECTAL at 10:19

## 2021-02-09 RX ADMIN — CALCIUM CARBONATE (ANTACID) CHEW TAB 500 MG 1000 MG: 500 CHEW TAB at 10:01

## 2021-02-09 RX ADMIN — INSULIN LISPRO 2 UNITS: 100 INJECTION, SOLUTION INTRAVENOUS; SUBCUTANEOUS at 21:17

## 2021-02-09 RX ADMIN — METOPROLOL TARTRATE 25 MG: 25 TABLET, FILM COATED ORAL at 21:11

## 2021-02-09 RX ADMIN — HEPARIN SODIUM 5000 UNITS: 5000 INJECTION INTRAVENOUS; SUBCUTANEOUS at 13:54

## 2021-02-09 RX ADMIN — GUAIFENESIN 1200 MG: 600 TABLET, EXTENDED RELEASE ORAL at 21:11

## 2021-02-09 RX ADMIN — INSULIN LISPRO 1 UNITS: 100 INJECTION, SOLUTION INTRAVENOUS; SUBCUTANEOUS at 02:30

## 2021-02-09 RX ADMIN — INSULIN LISPRO 2 UNITS: 100 INJECTION, SOLUTION INTRAVENOUS; SUBCUTANEOUS at 17:49

## 2021-02-09 RX ADMIN — INSULIN DETEMIR 35 UNITS: 100 INJECTION, SOLUTION SUBCUTANEOUS at 21:16

## 2021-02-09 RX ADMIN — GLYCERIN, PETROLATUM, PHENYLEPHRINE HCL, PRAMOXINE HCL: 144; 2.5; 10; 15 CREAM TOPICAL at 20:59

## 2021-02-09 NOTE — PLAN OF CARE
Problem: OCCUPATIONAL THERAPY ADULT  Goal: Performs self-care activities at highest level of function for planned discharge setting  See evaluation for individualized goals  Description: Treatment Interventions: ADL retraining, Functional transfer training, Endurance training, Patient/family training, Equipment evaluation/education, Continued evaluation, Energy conservation, Activityengagement  Equipment Recommended: (Will continue to assess)       See flowsheet documentation for full assessment, interventions and recommendations  Note: Limitation: Decreased ADL status, Decreased Safe judgement during ADL, Decreased cognition, Decreased endurance, Decreased self-care trans, Decreased high-level ADLs     Assessment: Pt is an [de-identified] male admitted to [facility] on 21  Pt previously on OT caseload and seen for OT re-eval to assess progress as goals  on 21  Pt lives alone at baseline in [address] [facility] w/ full flight of stairs from garage entrance  Pt used cane vs walker for functional mobility and completed ADL w/ out assistance  Upon eval, pt alert and oriented to person, place, generally to situation, time  Pt required min A to complete UBD  Pt required max A to complete LBD  Pt required max A to complete toileting  Pt required mod A x 1 to complete bed mobility sit to supine  Pt required max A x 2 to complete sit <> stand from elevated bed height to RW  Pt demonstrated decreased standing tolerance / balance and unable to complete SPT  Pt alert and able to follow directions w/ encouragement to challenge activity tolerance  Pt presents w/ increased pain, decreased activity tolerance, decreased endurance, decreased standing tolerance, decreased standing balance, generalized weakness / deconditioning, limited insight into deficits impacting his I w/ dressing, bathing, oral hygiene, functional mobility, functional transfers, clothing mgmt   Pt completing ADL below baseline level of I and would benefit from continued OT while in acute care to address deficits  Recommend post acute rehab when medically stable for discharge from acute care to return to baseline level of I   Will continue to follow     OT Discharge Recommendation: Post-Acute Rehabilitation Services

## 2021-02-09 NOTE — PHYSICAL THERAPY NOTE
PHYSICAL THERAPY RE-EVALUATION NOTE    Patient Name: Elsa Alexander  RNVHW'Z Date: 2021     AGE:   [de-identified] y o  Mrn:   [id number]  ADMIT DX:  NSTEMI (non-ST elevated myocardial infarction) ([place] [address] ) [I21 4]    Past Medical History:   Diagnosis Date    Acute on chronic diastolic CHF (congestive heart failure) ([place] [address] ) 2019    Arthritis     Back pain     Bladder cancer ([place] [address] )     diagnosed  2016    Cancer [facility])     bladder; chemo; resection;     Constipation 2021    Coronary artery disease     has 2 coronary stents    Dental crowns present      5    Diabetes mellitus ([place] [address] )     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[place] [address] )     fluid behind right eye    Hematuria     hx of    High anion gap metabolic acidosis     Hypercholesteremia     Hypertension     Kidney stones     Wears glasses      Length Of Stay: 18  PHYSICAL THERAPY RE-EVALUATION :   Patient's identity confirmed via 2 patient identifiers (full name and ) at start of session       21 1156   PT Last Visit   PT Visit Date 21   Note Type   Note type Re-Evaluation   Pain Assessment   Pain Assessment Tool FLACC   Pain Score   ("Discomfort")   Pain Location/Orientation Location: Abdomen   Pain Onset/Description   (Constipated)   Effect of Pain on Daily Activities Limits activity tolerance   Patient's Stated Pain Goal No pain   Hospital Pain Intervention(s) Repositioned; Ambulation/increased activity; Emotional support   Pain Rating: FLACC (Rest) - Face 1   Pain Rating: FLACC (Rest) - Legs 1   Pain Rating: FLACC (Rest) - Activity 1   Pain Rating: FLACC (Rest) - Cry 1   Pain Rating: FLACC (Rest) - Consolability 1   Score: FLACC (Rest) 5   Pain Rating: FLACC (Activity) - Face 1   Pain Rating: FLACC (Activity) - Legs 1   Pain Rating: FLACC (Activity) - Activity 2   Pain Rating: FLACC (Activity) - Cry 2   Pain Rating: FLACC (Activity) - Consolability 2   Score: FLACC (Activity) 8   Restrictions/Precautions   Weight Bearing Precautions Per Order No   Other Precautions Cognitive; Bed Alarm; Fall Risk;Pain;Multiple lines  (jovon Marinelli)   General   Additional Pertinent History SpO2 >98% throughout session on RA   Cognition   Overall Cognitive Status Impaired   Arousal/Participation Alert   Attention Attends with cues to redirect   Orientation Level Oriented to person;Oriented to place   Memory Decreased recall of recent events;Decreased short term memory   Following Commands Follows one step commands with increased time or repetition   Comments Pt identified by full name and   Benefits from education and motivation to participate in PT session due to c/o constipation  Pt unable to direct off topic of constipation  Also continues to state he hasn't gone in 2 weeks (pt w/ BM w/ this writer last week, and per RN had BM yesterday)  Pt irritable at times "I've told so many people and no one has done anything about this constipation  No one understands"   Strength RLE   RLE Overall Strength 3-/5   Strength LLE   LLE Overall Strength 3+/5   Coordination   Movements are Fluid and Coordinated 0   Coordination and Movement Description Continues to present w/ coase-like tremors of B UEs, neck, trunk in unsupported sitting   Light Touch   RLE Light Touch Grossly intact   LLE Light Touch Grossly intact   Bed Mobility   Supine to Sit 3  Moderate assistance   Additional items Assist x 1;HOB elevated; Increased time required;Verbal cues;LE management   Transfers   Sit to Stand 2  Maximal assistance   Additional items Assist x 2; Increased time required;Verbal cues  (elevated bed height)   Stand to Sit 2  Maximal assistance   Additional items Assist x 2; Increased time required   Stand pivot Unable to assess   Additional Comments Pt performed STS x 2 from EOB  initially w/ A x 1, pt unable to clear bottom from bed  OT then present and bed level raised   Pt is able to perform STS w/ max A x 2 and begins to have a BM   Ambulation/Elevation   Gait pattern Not appropriate  (Poor standing tolerance, balance)   Balance   Static Sitting Poor +   Static Standing Zero  (Ax2)   Activity Tolerance   Activity Tolerance Patient limited by fatigue;Patient limited by pain  (Constipation)   Medical Staff Fercho coordination w/ OT Jarrod to SEYMOUR Abbott, PCA Silvia/Avila   Assessment   Prognosis Fair   Problem List Decreased strength;Decreased range of motion;Decreased endurance; Impaired balance;Decreased mobility; Decreased coordination; Impaired vision;Pain   Assessment Patient seen for physical therapy re-evaluation due to expiration of previously set goals  Pt continues to present w/ dx of acute renal failure superimposed on CKD Stage 3  Upon re-evaluation, pt continues to present with the following impairments: pain, weakness, decreased ROM, decreased endurance, impaired cognition, impaired balance, gait deviations, decreased safety awareness and fall risk  These impairments are still evident in findings from physical examination (weakness, decreased ROM and edema of extremities)  Since IE, pt was able to ambulate short distances w/ min A x 2  However this session and previous, pt has required increased level of A for transfers alone  Pt requires max A x 2 for STS transfers this session and unable to perform SPT due to fatigue, pain, and weakness  Pt also w/ intermittent confusion which is of acute onset the last few days impacting his safety awareness  Pt needed input for task input and mobility technique/safety  Patient would continue to benefit from skilled inpatient PT in order to address these deficits so the patient can maximize their level of independence and continue to progress towards their goals  Discharge recommendation continues to be for post-acute inpatient rehab upon DC      Goals   Patient Goals Decreased pain, to have a BM   STG Expiration Date 02/19/21   Short Term Goal #1 Patient will: Increase bilateral LE strength 1/2 grade to facilitate independent mobility, Perform all bed mobility tasks w/ supervision to decrease fall risk factors, Perform all transfers w/ supervision to improve independence, Ambulate at least 50 ft  with roller walker w/ minx1 w/o LOB, Increase all balance 1/2 grade to decrease risk for falls, Complete exercise program independently, Tolerate 3 hr OOB to faciliate upright tolerance and Improve Barthel Index score to 50 or greater to facilitate independence   PT Treatment Day 1   Plan   Treatment/Interventions Functional transfer training;LE strengthening/ROM; Therapeutic exercise; Endurance training;Cognitive reorientation;Patient/family training;Equipment eval/education; Bed mobility;Gait training   PT Frequency Other (Comment)  (3-5x/wk)   Recommendation   PT Discharge Recommendation Post-Acute Rehabilitation Services   Equipment Recommended Walker   AM-PAC Basic Mobility Inpatient   Turning in Bed Without Bedrails 2   Lying on Back to Sitting on Edge of Flat Bed 2   Moving Bed to Chair 1   Standing Up From Chair 1   Walk in Room 1   Climb 3-5 Stairs 1   Basic Mobility Inpatient Raw Score 8   Turning Head Towards Sound 3   Follow Simple Instructions 3   Low Function Basic Mobility Raw Score 14   Low Function Basic Mobility Standardized Score 22 01   Barthel Index   Feeding 10   Bathing 0   Grooming Score 0   Dressing Score 5   Bladder Score 0   Bowels Score 5   Toilet Use Score 0   Transfers (Bed/Chair) Score 0   Mobility (Level Surface) Score 0   Stairs Score 0   Barthel Index Score 20            PHYSICAL THERAPY TREATMENT NOTE    Name: Ammy Reinoso  : 1940  Time in: 1146  Time out: 1156  Total treat time: 10 min    S: Pt seated EOB w/ OT  Pt states he thinks he need to have a BM  O: Pt performed STS from EOB again w/ max A x 2 and RW for BUE support once standing   Pt is able to tolerate standing x 1 min w/ max A x 2 while PCAs present to assist w/ pericare  Upon return to EOB, pt is able to laterally scoot towards [facility] w/ min A x 1 w/ use of vaibhav pad  Pt requires mod A x 1 for sit to supine  Pt supine in bed w/ bed alarm activated, call bell and room phone within reach w/ all needs met  Continues to c/o constipation  A: Pt continues to require max A x 2 for STS transfers and to maintain standing  Pt is able to tolerate standing x 1 min before requesting to sit and increased knee blocking required to keep knees extended  Pt limited due to his constipation and overall discomfort       P: Continue per evaluation above    Skilled inpatient PT is recommended during this admission in order to progress patient toward goals so patient is able to maximize independence    Jamie Roland, PT, DPT

## 2021-02-09 NOTE — PROGRESS NOTES
Progress Note - Flo Burns [de-identified] y o  male MRN: [id number]    Unit/Bed#: S -01 Encounter: [id number]      CC: diabetes f/u    Subjective:   Flo Burns is a [de-identified]y o  year old male with type 2 diabetes  Feeling better Today  He said he had a bowel movement pain has improved  Ate breakfast but not lunch  No hypoglycemia however since patient has started taking orally he was noted to post prandial hyperglycemia to  272, 239 mg/dL    Objective:     Vitals: Blood pressure 125/83, pulse 75, temperature 97 5 °F (36 4 °C), temperature source Oral, resp  rate 18, SpO2 98 %  ,There is no height or weight on file to calculate BMI  No intake or output data in the 24 hours ending 02/09/21 1139    Physical Exam:  General Appearance: awake, appears stated age and cooperative  Head: Normocephalic, without obvious abnormality, atraumatic  Extremities: moves all extremities  Skin: Skin color and temperature normal    Pulm: no labored breathing    Lab, Imaging and other studies: I have personally reviewed pertinent reports  POC Glucose (mg/dl)   Date Value   02/09/2021 272 (H)   02/09/2021 162 (H)   02/09/2021 197 (H)   02/08/2021 220 (H)   02/08/2021 222 (H)   02/08/2021 124   02/08/2021 89   02/08/2021 83   02/07/2021 180 (H)   02/07/2021 244 (H)       Assessment and Plan:  1  Type 2 diabetes mellitus on Insulin therapy with hyperglycemia   2  End-stage renal disease on dialysis   recommend the following for now   - continue lower dose of Levemir 35 units subcutaneously at bedtime   -Start Humalog 5 units with meals 3 times a day  -continue lower dose sliding scale insulin   Will continue to follow and make changes as needed    Portions of the record may have been created with voice recognition software  Occasional wrong word or "sound a like" substitutions may have occurred due to the inherent limitations of voice recognition software    Read the chart carefully and recognize, using context, where substitutions have occurred

## 2021-02-09 NOTE — ASSESSMENT & PLAN NOTE
Patient originally to [facility] on 1/21/2021 due to chest pain with chest pain at [facility] Subsequently found to have Type 2 MI and underwent cardiac catheterization on 1/22/21 after transfer to [facility]  Patient reports he has been asymptomatic (denies any chest pain) since cardiac cath     1/24/21 echo-was normal  Systolic function was normal  Ejection fraction was estimated to be 60 %  There was hypokinesis of the basal inferior wall (grade 2 diastolic dysfunction)      Plan  · Continue aspirin, Plavix, statin, and beta-blocker  · Monitor for s/sx

## 2021-02-09 NOTE — OCCUPATIONAL THERAPY NOTE
Occupational Therapy Evaluation     Patient Name: Den PUGH Date: 2/9/2021  Problem List  Principal Problem:    Acute renal failure superimposed on stage 3 chronic kidney disease ([facility] )  Active Problems:    Coronary artery disease    Type 2 diabetes mellitus with hyperglycemia, with long-term current use of insulin (HCC)    Leukocytosis    Hx of CABG    Acute on chronic diastolic congestive heart failure (HCC)    Anemia    Hyponatremia    Urinary retention    Right hip pain    Past Medical History  Past Medical History:   Diagnosis Date    Acute on chronic diastolic CHF (congestive heart failure) ([facility] ) 7/4/2019    Arthritis     Back pain     Bladder cancer ([facility] )     diagnosed  2/2016    Cancer ([place] [address] )     bladder; chemo; resection;     Constipation 2/1/2021    Coronary artery disease     has 2 coronary stents    Dental crowns present      5    Diabetes mellitus ([facility] )     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[facility] )     fluid behind right eye    Hematuria     hx of    High anion gap metabolic acidosis 4/46/5260    Hypercholesteremia     Hypertension     Kidney stones     Wears glasses      Past Surgical History  Past Surgical History:   Procedure Laterality Date    ABDOMINAL SURGERY      CARDIAC SURGERY      CHOLECYSTECTOMY      open    CORONARY ANGIOPLASTY WITH STENT PLACEMENT      2 stents  2009    CYSTECTOMY, PARTIAL N/A 11/30/2016    Procedure: PARTIAL CYSTECTOMY, LEFT LYMPHADENECTOMY;  Surgeon: Laquita Nuñez MD;  Location: [address] [place] [address];  Service:     CYSTOSCOPY Left 10/20/2016    Procedure: CYSTOSCOPY, BILATERAL RETROGRADE PYLEOGRAM, LEFT SIDE BLADDER BIOPSY, TURBT;  Surgeon: Laquita Nuñez MD;  Location: [address] [place] [address];  Service:     HERNIA REPAIR      repair [facility]    IR NON-TUNNELED CENTRAL LINE PLACEMENT  1/25/2021    IR TEMPORARY DIALYSIS CATHETER PLACEMENT  7/8/2019    IR TUNNELED DIALYSIS CATHETER PLACEMENT  7/12/2019    IR TUNNELED DIALYSIS CATHETER REMOVAL  9/9/2019    DE CYSTOURETHROSCOPY N/A 2/25/2016    Procedure: CYSTOSCOPY;  Surgeon: Elyse Govea MD;  Location: [address] [place] [address];  Service: Urology    DE CYSTOURETHROSCOPY,FULGUR 2-5 CM LESN N/A 2/25/2016    Procedure: TRANSURETHRAL RESECTION OF BLADDER TUMOR (TURBT); Surgeon: Elyse Govea MD;  Location: [address] [place] [address];  Service: Urology    TRANSURETHRAL RESECTION OF BLADDER TUMOR N/A 7/7/2016    Procedure: TRANSURETHRAL RESECTION OF BLADDER TUMOR (TURBT), Cystoscopy, deep biopsy;  Surgeon: Elyse Govea MD;  Location: [address] [place] [address];  Service:     TRANSURETHRAL RESECTION OF BLADDER TUMOR Bilateral 6/9/2016    Procedure: TRANSURETHRAL RESECTION OF BLADDER TUMOR (TURBT), Cysto, retrograde, BLADDER BIOPSY;  Surgeon: Elyse Govea MD;  Location: [address] [place] [address];  Service:         02/09/21 1155   OT Last Visit   OT Visit Date 02/09/21  (Tuesday)   Note Type   Note type Re-Evaluation  (progress)   Restrictions/Precautions   Weight Bearing Precautions Per Order No   Other Precautions Cognitive; Chair Alarm; Bed Alarm; Fall Risk;Pain  (on room air, catheter, Zach)   Pain Assessment   Pain Assessment Tool FLACC   Pain Location/Orientation Location: Abdomen   Pain Onset/Description   (constipated)   Effect of Pain on Daily Activities limits activity tolerance and I w/ ADL performance   Patient's Stated Pain Goal No pain   Hospital Pain Intervention(s) Repositioned; Ambulation/increased activity; Emotional support   Pain Rating: FLACC (Activity) - Face 1   Pain Rating: FLACC (Activity) - Legs 1   Pain Rating: FLACC (Activity) - Activity 2   Pain Rating: FLACC (Activity) - Cry 2   Pain Rating: FLACC (Activity) - Consolability 2   Score: FLACC (Activity) 8   Home Living   Type of Home House   Home Layout One level; Laundry in basement   Bathroom Shower/Tub Tub/shower unit   Bathroom Toilet Standard   Bathroom Equipment Shower chair   Bathroom Accessibility Accessible   Home Equipment Walker;Cane   Additional Comments Pt previously on OT caseload and seen for OT re-eval as goals  21 to assess progress and assist in safe discharge planning  Pt lives alone at baseline   Prior Function   Level of [place] Independent with ADLs and functional mobility   Lives With Alone  (cat, Piter Lovett)   Receives Help From Neighbor;Friend(s)   ADL Assistance Independent   IADLs Independent   Falls in the last 6 months 0   Vocational Retired   Lifestyle   Autonomy Pt reports I w/ ADL at baseline including driving   Reciprocal Relationships Pt reports  is watching his cat and sister lives in [address] to Others 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 [address] Pt reports enjoying his cat ( on initial eval)   Psychosocial   Patient Behaviors/Mood Irritable   ADL   Where Assessed Edge of bed   Eating Assistance 6  Modified independent   Eating Deficit Setup  (decreased appetite,awaiting lunch tray at end of session)   [address] to assess    [address] Unable to assess  (decreased activity, sitting tolerance)   LB Bathing Assistance Unable to assess  (decreased activity, sitting tolerance)   UB Dressing Assistance 4  Minimal Assistance   UB Dressing Deficit Setup;Supervision/safety;Verbal cueing; Increased time to complete   LB Dressing Assistance 2  Maximal Assistance   Toileting Assistance  2  Maximal Assistance   Toileting Deficit Perineal hygiene;Setup; Increased time to complete;Verbal cueing;Supervison/safety   Additional Comments required encouragement to actively participate in ADL tasks due to abdominal pain  Pt reports feeling constipated X 3 weeks   Bed Mobility   Supine to Sit Unable to assess   Sit to Supine 3  Moderate assistance   Additional items Assist x 1; Increased time required;Verbal cues;LE management   Additional Comments Pt seated at EOB upon therapist arrival and supine HOB elevated post eval w/ needs met, call bell in reach and bed alarm activated   Pt seen w/ PT, Tanya due to cues / prompts required to challenge activity tolerance and physical assistance required for sit <> stand   Transfers   Sit to Stand 2  Maximal assistance   Additional items Assist x 2; Increased time required;Verbal cues; Bedrails;Armrests  (elevated bed height)   Stand to Sit 2  Maximal assistance   Additional items Assist x 2; Increased time required;Verbal cues; Bedrails;Armrests   Stand pivot Unable to assess   Additional Comments Pt performed sit <>stand 2X from elevated bed height to RW  Tolerated standing 1 minute during personal hygiene / wiping w/ poor balance   Functional Mobility   Additional Comments NT due to decreased activity tolerance, standing tolerance, standing balance   Additional items Rolling walker   Balance   Static Sitting   (inconsistent; poor + <> fair -)   Static Standing Zero  (A x2 )   Ambulatory   (NT)   Activity Tolerance   Activity Tolerance Patient limited by fatigue;Patient limited by pain   Medical Staff Made Aware care coordination w/ PT, Pierre Timberon  Spoke to [facility]   Nurse Made Aware spoke to RODOLFO Leija / Yaritza Cortez  Per RNLui appropriate to see pt   RUE Assessment   RUE Assessment WFL   RUE Strength   RUE Overall Strength Deficits; Other (Comment)  (generalized weakness, able to participate in ADL)   LUE Assessment   LUE Assessment WFL   LUE Strength   LUE Overall Strength Deficits; Other (Comment)  (gen weakness, able to participate in ADL)   Hand Function   Gross Motor Coordination Functional   Fine Motor Coordination   (Will continue to assess)   Sensation   Light Touch Not tested   Sharp/Dull Not tested   Additional Comments responded to light touch B UE   Vision-Basic Assessment   Current Vision Wears glasses all the time   Cognition   Overall Cognitive Status Impaired   Arousal/Participation Alert   Attention Attends with cues to redirect   Orientation Level Oriented to person;Oriented to place   Memory Decreased recall of recent events;Decreased short term memory   Following Commands Follows one step commands with increased time or repetition  (frequent redirection)   Comments Identified pt by full name and wrist band  Pt required + time and cues  Encouragement to challenge activity tolerance   Assessment   Limitation Decreased ADL status; Decreased Safe judgement during ADL;Decreased cognition;Decreased endurance;Decreased self-care trans;Decreased high-level ADLs   Assessment Pt is an [de-identified] male [facility]  Pt previously on OT caseload and seen for OT re-eval to assess progress as goals  on 21  Pt lives alone at baseline in  [address] w/ full flight of stairs from garage entrance  Pt used cane vs walker for functional mobility and completed ADL w/ out assistance  Upon eval, pt alert and oriented to person, place, generally to situation, time  Pt required min A to complete UBD  Pt required max A to complete LBD  Pt required max A to complete toileting  Pt required mod A x 1 to complete bed mobility sit to supine  Pt required max A x 2 to complete sit <> stand from elevated bed height to RW  Pt demonstrated decreased standing tolerance / balance and unable to complete SPT  Pt alert and able to follow directions w/ encouragement to challenge activity tolerance  Pt presents w/ increased pain, decreased activity tolerance, decreased endurance, decreased standing tolerance, decreased standing balance, generalized weakness / deconditioning, limited insight into deficits impacting his I w/ dressing, bathing, oral hygiene, functional mobility, functional transfers, clothing mgmt  Pt completing ADL below baseline level of I and would benefit from continued OT while in acute care to address deficits  Recommend post acute rehab when medically stable for discharge from acute care to return to baseline level of I   Will continue to follow   Goals   Patient Goals Pt stated that he would like to have less pain   Plan   Treatment Interventions ADL retraining;Functional transfer training; Endurance training;Patient/family training;Equipment evaluation/education;Continued evaluation; Energy conservation; Activityengagement   Goal Expiration Date 02/19/21   OT Frequency 3-5x/wk   Recommendation   OT Discharge Recommendation Post-Acute Rehabilitation Services   Equipment Recommended   (Will continue to assess)   [facility] Daily Activity Inpatient   Lower Body Dressing 2   Bathing 2   Toileting 2   Upper Body Dressing 3   Grooming 3   Eating 4   Daily Activity Raw Score 16   Daily Activity Standardized Score (Calc for Raw Score >=11) 35 96   Barthel Index   Feeding 10   Bathing 0   Grooming Score 0   Dressing Score 5   Bladder Score 0   Bowels Score 5   Toilet Use Score 0   Transfers (Bed/Chair) Score 0   Mobility (Level Surface) Score 0   Stairs Score 0   Barthel Index Score 20   Modified [place] Scale   Modified [place] Scale 4      The patient's raw score on the AM-PAC Daily Activity inpatient short form is 16, standardized score is 35 96, less than 39 4  Patients at this level are likely to benefit from DC to post-acute rehabilitation services  Please refer to the recommendation of the Occupational Therapist for safe DC planning      Pt goals to be met by 2/19/21:  -Pt will complete bed mobility supine <> sit w/ S to max I w/ ADL performance     -Pt will demonstrate increased activity and sitting tolerance for at least 10 minutes w/ at least fair balance to max I w/ ADL performance    -Pt will complete UBD w/ mod I after set-up    -Pt will complete LBD w/ min A x1 using AE as needed to max I w/ ADL performance    -Pt will consistently follow 2 step directions during ADL tasks w/ good recall and no more than 1 cue/prompt to max I and improve activity engagement    -Pt will complete sit <> stand from EOB to RW w/ mod A x1 to max I and participate in continued evaluation to assess functional transfers    -Pt will demonstrate good attention and participation in continued evaluation to assess functional mobility using AD as needed to assist in safe discharge planning    Diane Noriega, OTR/L

## 2021-02-09 NOTE — UTILIZATION REVIEW
Continued Stay Review    Date: 2/9/21                       Current Patient Class: Inpatient Current Level of Care: Med Surg    HPI:80 y o  male with PMH of CABG, CKD Stage III and DM2, initially admitted on 1/22/21,with  NSTEMI, cardiac cath revealed three vessel disease  ATN occurred after cardiac cath and he required dialysis on 1/25/21  On 2/3, patient was placed on insulin gtt for elevated blood sugars, transitioned to SQ insulin on 2/4  Physical Therapy recommending post acute rehab when medially stable for transfer  Referrals placed by case management  On 2/6, patient developed intractable right hip pain, x-ray negative  Patient remains on dialysis  On 2/8, patient became confused while on dialysis, treatment terminated early  EKG, ABG and lactic acid obtained  Patient given IV albumin  Mentation improved throughout the day  2/9 Nephrology: Patient alert and awake  Currently on MWF dialysis schedule  Temporary dialysis catheter placed 1/25/21  Consult Interventional Radiology for a permanent catheter placement  Baseline creatinine 2-2 5  Of note, required dialysis in August 2019 for BRIAN which resolved  Patient has not been receiving antihypertensives the last 2-3 days  Discontinue amlodipine and reduce hydralazine dose  Continue hold parameters and monitor  Recommend oral iron supplement  Continue fluid restriction of 1500 ml/day  Recheck potassium  Physical Therapy continues to recommend post acute inpatient rehab upon D/C  Pertinent Labs/Diagnostic Results:     2/8 EKG:    Sinus rhythm with 1st degree A-V block  Left ventricular hypertrophy with repolarization abnormality  Inferior infarct , age undetermined  Prolonged QT  Abnormal ECG  When compared with ECG of 21-JAN-2021 18:02,  NV interval has increased    2/6 CXR: No acute cardiopulmonary disease  2/6 x-ray right hip and pelvis:  No acute osseous abnormality        Results from last 7 days   Lab Units 02/09/21  7303 02/08/21  0608 02/07/21  0500 02/06/21  0817 02/05/21  0525   WBC Thousand/uL 10 72* 13 13* 13 40* 16 66* 15 80*   HEMOGLOBIN g/dL 8 0* 7 6* 8 1* 8 3* 7 6*   HEMATOCRIT % 25 1* 23 2* 25 4* 25 8* 22 7*   PLATELETS Thousands/uL 178 159 165 174 171   NEUTROS ABS Thousands/µL 8 22*  --   --   --   --          Results from last 7 days   Lab Units 02/09/21  0638 02/08/21  0608 02/07/21  0500 02/06/21  0513 02/05/21  0525   SODIUM mmol/L 132* 133* 134* 133* 130*   POTASSIUM mmol/L 5 4* 4 2 3 8 4 1 3 7   CHLORIDE mmol/L 94* 95* 95* 97* 94*   CO2 mmol/L 25 22 22 24 20*   ANION GAP mmol/L 13 16* 17* 12 16*   BUN mg/dL 89* 129* 116* 98* 142*   CREATININE mg/dL 5 83* 6 95* 5 74* 4 77* 5 78*   EGFR ml/min/1 73sq m 8 7 9 11 8   CALCIUM mg/dL 8 6 8 3 8 2* 8 1* 8 3         Results from last 7 days   Lab Units 02/09/21  1043 02/09/21  0732 02/09/21  0228 02/08/21 2056 02/08/21  1625 02/08/21  1035 02/08/21  0717 02/08/21  0357 02/07/21  2033 02/07/21  1457 02/07/21  1037 02/07/21  0659   POC GLUCOSE mg/dl 272* 162* 197* 220* 222* 124 89 83 180* 244* 220* 109     Results from last 7 days   Lab Units 02/09/21  0638 02/08/21  0608 02/07/21  0500 02/06/21  0513 02/05/21  0525 02/04/21  0541 02/03/21  1611 02/03/21  0533   GLUCOSE RANDOM mg/dL 158* 87 120 122 166* 125 497* 391*             BETA-HYDROXYBUTYRATE   Date Value Ref Range Status   02/03/2021 0 0 <0 6 mmol/L Final          Results from last 7 days   Lab Units 02/08/21  1824   PH HARPER  7 508*   PCO2 HARPER mm Hg 30 5*   PO2 HARPER mm Hg 57 5*   HCO3 HARPER mmol/L 23 7*   BASE EXC HARPER mmol/L 0 7   O2 CONTENT HARPER ml/dL 8 8   O2 HGB, VENOUS % 88 9*         Results from last 7 days   Lab Units 02/08/21  1824   LACTIC ACID mmol/L 1 0                   Vital Signs:     Date/Time  Temp  Pulse  Resp  BP  MAP   SpO2  O2 Device   02/09/21 0700  97 5 °F (36 4 °C)  75  18  125/83  99  98 %  None (Room air)   02/09/21 0640  --  --  --  103/50  --  --  --   02/09/21 0200  --  --  --  --  --  --  None (Room air)   02/08/21 2213  98 °F (36 7 °C)  75  18  99/50  --  97 %  None (Room air)   02/08/21 2057  --  70  --  110/40  --  --  --   02/08/21 1623  98 2 °F (36 8 °C)  --  18  --  --  98 %  None (Room air)   02/08/21 1500  --  84  --  102/51  --  --  --   02/08/21 1010  98 2 °F (36 8 °C)  87  16  121/56  --  --  --   02/08/21 1000  --  94  16  82/50Abnormal   --  --  --   02/08/21 0930  --  87  14  107/55  --  --  --   02/08/21 0900  --  86  16  92/49Abnormal   --  --  --   02/08/21 0830  --  82  14  103/45Abnormal   --  --  --   02/08/21 0800  --  91  16  110/57  --  --  --   02/08/21 0735  --  80  16  111/40Abnormal   71  --  --   02/08/21 0700  98 °F (36 7 °C)  72  18  125/56  88  98 %  None (Room air)   02/08/21 0623  --  --  --  119/58  84  --  --   02/08/21 0000  --  --  --  --  --  --  None (Room air)   02/07/21 2202  98 3 °F (36 8 °C)  86  18  96/52  --  97 %  None (Room air)   02/07/21 1457  98 4 °F (36 9 °C)  73  19  100/50  --  97 %  None (Room air)   02/07/21 1327  --  --  --  100/49Abnormal   --  --  --   02/07/21 0900  --  --  --  --  --  --  None (Room air)       Date and Time Eye Opening Best Verbal Response Best Motor Response [place] Coma Scale Score   02/09/21 0740 4 5 6 15   02/09/21 0200 4 5 6 15   02/08/21 1900 4 5 6 15   02/08/21 1500 4 5 6 15   02/08/21 1000 4 5 6 15   02/08/21 0000 4 5 6 15   02/07/21 0900 4 5 6 15   02/06/21 2315 4 5 6 15   02/06/21 0730 4 5 6 15         Medications:   Scheduled Medications:      aspirin, 81 mg, Oral, Daily  atorvastatin, 40 mg, Oral, Daily With Dinner  clopidogrel, 75 mg, Oral, Daily  escitalopram, 10 mg, Oral, HS  guaiFENesin, 1,200 mg, Oral, Q12H FRANKLIN  heparin (porcine), 5,000 Units, Subcutaneous, Q8H FRANKLIN  hydrALAZINE, 25 mg, Oral, Q8H FRANKLIN  insulin detemir, 35 Units, Subcutaneous, HS  insulin lispro, 1-5 Units, Subcutaneous, HS  insulin lispro, 1-5 Units, Subcutaneous, 0200  insulin lispro, 1-5 Units, Subcutaneous, TID AC  iron polysaccharides, 150 mg, Oral, Daily  melatonin, 3 mg, Oral, HS  metoprolol tartrate, 25 mg, Oral, Q12H FRANKLIN  nystatin, 500,000 Units, Swish & Swallow, 4x Daily  pantoprazole, 40 mg, Oral, BID AC  polyethylene glycol, 17 g, Oral, BID  senna-docusate sodium, 1 tablet, Oral, HS      albumin human (FLEXBUMIN) 25 % injection 25 g   Dose: 25 g  Freq: Once Route: IV  Last Dose: Stopped (02/08/21 1505)  Start: 02/08/21 1015 End: 02/08/21     Continuous IV Infusions:  None  PRN Meds:      acetaminophen, 650 mg, Oral, Q6H PRN  aluminum-magnesium hydroxide-simethicone, 30 mL, Oral, Q6H PRN  benzonatate, 100 mg, Oral, TID PRN  bisacodyl, 10 mg, Rectal, Daily PRN  calcium carbonate, 1,000 mg, Oral, Daily PRN  hydrocortisone, , Topical, 4x Daily PRN  HYDROmorphone, 0 2 mg, Intravenous, Q3H PRN  nitroglycerin, 0 4 mg, Sublingual, Q5 Min PRN  ondansetron, 4 mg, Intravenous, Q6H PRN  oxyCODONE, 2 5 mg, Oral, Q4H PRN  phenol, 1 spray, Mouth/Throat, Q4H PRN  pneumococcal 13-valent conjugate vaccine, 0 5 mL, Intramuscular, Prior to discharge  polyethylene glycol, 17 g, Oral, Daily PRN  Pramox-PE-Glycerin-Petrolatum, , Rectal, BID PRN  sodium chloride, 1 spray, Each Nare, Q1H PRN        Discharge Plan: D            Network Utilization Review Department  ATTENTION: Please call with any questions or concerns to [phone] and carefully listen to the prompts so that you are directed to the right person  All voicemails are confidential   Brandie Tejeda all requests for admission clinical reviews, approved or denied determinations and any other requests to dedicated fax number below belonging to the campus where the patient is receiving treatment   List of dedicated fax numbers for the Facilities:  [address] DENIALS (Administrative/Medical Necessity) [phone]   [address] (Maternity/NICU/Pediatrics) [phone]   [address] [phone]   [address] [phone]     [address] [place] [address]) [address] [place] [address] [address] [phone]   [place] [address] [phone]

## 2021-02-09 NOTE — PLAN OF CARE
Problem: PHYSICAL THERAPY ADULT  Goal: Performs mobility at highest level of function for planned discharge setting  See evaluation for individualized goals  Description: Treatment/Interventions: Functional transfer training, LE strengthening/ROM, Therapeutic exercise, Endurance training, Patient/family training, Equipment eval/education, Bed mobility(PT to see for gait when appropriate)  Equipment Recommended: (Therapy will trial Kim Mckeon in upcoming sessions)       See flowsheet documentation for full assessment, interventions and recommendations  Note: Prognosis: Fair  Problem List: Decreased strength, Decreased range of motion, Decreased endurance, Impaired balance, Decreased mobility, Decreased coordination, Impaired vision, Pain  Assessment: Patient seen for physical therapy re-evaluation due to expiration of previously set goals  Pt continues to present w/ dx of acute renal failure superimposed on CKD Stage 3  Upon re-evaluation, pt continues to present with the following impairments: pain, weakness, decreased ROM, decreased endurance, impaired cognition, impaired balance, gait deviations, decreased safety awareness and fall risk  These impairments are still evident in findings from physical examination (weakness, decreased ROM and edema of extremities)  Since IE, pt was able to ambulate short distances w/ min A x 2  However this session and previous, pt has required increased level of A for transfers alone  Pt requires max A x 2 for STS transfers this session and unable to perform SPT due to fatigue, pain, and weakness  Pt also w/ intermittent confusion which is of acute onset the last few days impacting his safety awareness  Pt needed input for task input and mobility technique/safety  Patient would continue to benefit from skilled inpatient PT in order to address these deficits so the patient can maximize their level of independence and continue to progress towards their goals   Discharge recommendation continues to be for post-acute inpatient rehab upon DC  PT Discharge Recommendation: Post-Acute Rehabilitation Services          See flowsheet documentation for full assessment

## 2021-02-09 NOTE — QUICK NOTE
I went to evaluate the patient  He was a lot better when I saw him this evening  Answering questions appropriately  Main complaint is right hip pain  He was alert awake and oriented  Just complained of constipation  To consider an MI physician improve  He is on MiraLax twice a day

## 2021-02-09 NOTE — ASSESSMENT & PLAN NOTE
Lab Results   Component Value Date    HGBA1C 6 7 (H) 07/03/2019       Recent Labs     02/09/21  0228 02/09/21  0732 02/09/21  1043 02/09/21  1601   POCGLU 197* 162* 272* 239*       Blood Sugar Average: Last 72 hrs:  (P) 049 [id number]     Plan  · BS are stable  · Patient to resume home regimen upon discharge

## 2021-02-09 NOTE — ASSESSMENT & PLAN NOTE
CAD s/p CABG in 2016; history of stenting prior to CABG (LIMA to LAD and SVG to OM)  Status post cardiac catheterization: significant triple vessel coronary artery disease      Plan  · Continue 81 ASA daily, Plavix 75mg, metoprolol 25mg  · Hold amlodpine

## 2021-02-09 NOTE — ASSESSMENT & PLAN NOTE
Lab Results   Component Value Date    EGFR 8 02/09/2021    EGFR 7 02/08/2021    EGFR 9 02/07/2021    CREATININE 5 83 (H) 02/09/2021    CREATININE 6 95 (H) 02/08/2021    CREATININE 5 74 (H) 02/07/2021     S/p cardiac cath on 1/22/21, creatinine elevated (baseline 2 3-2 5) likely due post-cath contrast ATN  Cr remains elevated, slowly downtrending  Patient is currently dialysis dependent, s/p HD Cath placement on 1/25/21      Plan  · Patient s/p perm cath placement on 2/10 by IR  · Nephrology following, patient stable for discharge pending outpatient HD placement  · Start Torsemide 100mg daily on non-HD days  · Start Epogen 6000 units with HD  · Per Nephrology d/c amlodipine and continue Hydralazine at decreased dose

## 2021-02-09 NOTE — CONSULTS
Interventional Radiology  Consultation 2/9/2021     Consults  Marky Ruiz   1940   [id number]      Assessment/Plan:  [de-identified]year old male with acute on chronic renal disease s/p non-tunneled central line placement  -IR consulted for conversion of non-tunneled to tunneled central line  Problem List     * (Principal) Acute renal failure superimposed on stage 3 chronic kidney disease (HCC)    Lab Results   Component Value Date    EGFR 8 02/09/2021    EGFR 7 02/08/2021    EGFR 9 02/07/2021    CREATININE 5 83 (H) 02/09/2021    CREATININE 6 95 (H) 02/08/2021    CREATININE 5 74 (H) 02/07/2021         Coronary artery disease    Bladder cancer (HCC)    Hypercholesteremia    Type 2 diabetes mellitus with hyperglycemia, with long-term current use of insulin (HCC)    Lab Results   Component Value Date    HGBA1C 6 7 (H) 07/03/2019       Recent Labs     02/08/21 2056 02/09/21  0228 02/09/21  0732 02/09/21  1043   POCGLU 220* 197* 162* 272*       Blood Sugar Average: Last 72 hrs:  (P) 978 [id number]        Benign hypertension with chronic kidney disease, stage III    Lab Results   Component Value Date    EGFR 8 02/09/2021    EGFR 7 02/08/2021    EGFR 9 02/07/2021    CREATININE 5 83 (H) 02/09/2021    CREATININE 6 95 (H) 02/08/2021    CREATININE 5 74 (H) 02/07/2021         Leukocytosis    Hx of CABG    Chronic kidney disease    Lab Results   Component Value Date    EGFR 8 02/09/2021    EGFR 7 02/08/2021    EGFR 9 02/07/2021    CREATININE 5 83 (H) 02/09/2021    CREATININE 6 95 (H) 02/08/2021    CREATININE 5 74 (H) 02/07/2021         Acute on chronic diastolic congestive heart failure (HCC)    Wt Readings from Last 3 Encounters:   01/21/21 99 6 kg (219 lb 9 3 oz)   07/15/19 99 5 kg (219 lb 5 7 oz)   12/05/16 99 4 kg (219 lb 2 2 oz)                 Anemia    Hyponatremia    Urinary retention    Right hip pain            Subjective:     Patient ID: Marky Ruiz is a [de-identified] y o  male      History of Present Illness  80 year old male with acute on chronic renal disease s/p non-tunneled central line placement CAD, DM2 and history of NSTEMI  Review of Systems  Not performed    Past Medical History:   Diagnosis Date    Acute on chronic diastolic CHF (congestive heart failure) ([facility] [address] ) 7/4/2019    Arthritis     Back pain     Bladder cancer ([facility] [address] )     diagnosed  2/2016    Cancer [facility])     bladder; chemo; resection;     Constipation 2/1/2021    Coronary artery disease     has 2 coronary stents    Dental crowns present      5    Diabetes mellitus ([facility] [address] )     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[facility] [address] )     fluid behind right eye    Hematuria     hx of    High anion gap metabolic acidosis 3/56/8466    Hypercholesteremia     Hypertension     Kidney stones     Wears glasses         Past Surgical History:   Procedure Laterality Date    ABDOMINAL SURGERY      CARDIAC SURGERY      CHOLECYSTECTOMY      open    CORONARY ANGIOPLASTY WITH STENT PLACEMENT      2 stents  2009    CYSTECTOMY, PARTIAL N/A 11/30/2016    Procedure: PARTIAL CYSTECTOMY, LEFT LYMPHADENECTOMY;  Surgeon: Ely Tobar MD;  Location: [address] [place] [address];  Service:     CYSTOSCOPY Left 10/20/2016    Procedure: CYSTOSCOPY, BILATERAL RETROGRADE PYLEOGRAM, LEFT SIDE BLADDER BIOPSY, TURBT;  Surgeon: Ely Tobar MD;  Location: [address] [place] [address];  Service:     HERNIA REPAIR      repair RIH    IR NON-TUNNELED CENTRAL LINE PLACEMENT  1/25/2021    IR TEMPORARY DIALYSIS CATHETER PLACEMENT  7/8/2019    IR TUNNELED DIALYSIS CATHETER PLACEMENT  7/12/2019    IR TUNNELED DIALYSIS CATHETER REMOVAL  9/9/2019    PA CYSTOURETHROSCOPY N/A 2/25/2016    Procedure: CYSTOSCOPY;  Surgeon: Ely Tobar MD;  Location: [address] [place] [address];  Service: Urology    PA CYSTOURETHROSCOPY,FULGUR 2-5 CM LESN N/A 2/25/2016    Procedure: TRANSURETHRAL RESECTION OF BLADDER TUMOR (TURBT);   Surgeon: Ely Tobar MD;  Location: [address] [place] [address];  Service: Urology    TRANSURETHRAL RESECTION OF BLADDER TUMOR N/A 7/7/2016 Procedure: TRANSURETHRAL RESECTION OF BLADDER TUMOR (TURBT), Cystoscopy, deep biopsy;  Surgeon: Gloria Garcia MD;  Location: [facility];  Service:     TRANSURETHRAL RESECTION OF BLADDER TUMOR Bilateral 2016    Procedure: TRANSURETHRAL RESECTION OF BLADDER TUMOR (TURBT), Cysto, retrograde, BLADDER BIOPSY;  Surgeon: Gloria Garcia MD;  Location: [facility];  Service:         Social History     Tobacco Use   Smoking Status Former Smoker    Packs/day: 0 50    Years: 5 00    Pack years: 2 50    Quit date:     Years since quittin 1   Smokeless Tobacco Never Used        Social History     Substance and Sexual Activity   Alcohol Use Yes    Comment: socially        Social History     Substance and Sexual Activity   Drug Use No        No Known Allergies    Current Facility-Administered Medications   Medication Dose Route Frequency Provider Last Rate Last Admin    acetaminophen (TYLENOL) tablet 650 mg  650 mg Oral Q6H PRN Swathi Schumacher MD   650 mg at 21 0623    aluminum-magnesium hydroxide-simethicone (MYLANTA) oral suspension 30 mL  30 mL Oral Q6H PRN TABITHA Rowland        aspirin (ECOTRIN LOW STRENGTH) EC tablet 81 mg  81 mg Oral Daily Swathi Schumacher MD   81 mg at 21 0856    atorvastatin (LIPITOR) tablet 40 mg  40 mg Oral Daily With Jack Monteiro MD   40 mg at 21 1702    benzonatate (TESSALON PERLES) capsule 100 mg  100 mg Oral TID PRN TABITHA Denis        bisacodyl (DULCOLAX) rectal suppository 10 mg  10 mg Rectal Daily PRN Lonell Gess, DO   10 mg at 21 1205    calcium carbonate (TUMS) chewable tablet 1,000 mg  1,000 mg Oral Daily PRN Carleen De La Cruz MD   1,000 mg at 21 1001    clopidogrel (PLAVIX) tablet 75 mg  75 mg Oral Daily Carleen De La Cruz MD   75 mg at 21 0856    escitalopram (LEXAPRO) tablet 10 mg  10 mg Oral HS Carleen De La Cruz MD   10 mg at 21 2100    guaiFENesin (MUCINEX) 12 hr tablet 1,200 mg  1,200 mg Oral Q12H [address] TABITHA Rubio   1,200 mg at 02/09/21 7670    heparin (porcine) subcutaneous injection 5,000 Units  5,000 Units Subcutaneous [facility] Faye Correa MD   5,000 Units at 02/09/21 1354    hydrALAZINE (APRESOLINE) tablet 25 mg  25 mg Oral [facility], PA-C   25 mg at 02/09/21 1357    hydrocortisone 1 % cream   Topical 4x Daily PRN Antoinette Gallardo MD   Given at 02/06/21 0242    HYDROmorphone (DILAUDID) injection 0 2 mg  0 2 mg Intravenous Q3H PRN Faye Correa MD   0 2 mg at 02/08/21 0801    insulin detemir (LEVEMIR) subcutaneous injection 35 Units  35 Units Subcutaneous HS Kelechi Tinajero MD   35 Units at 02/08/21 2117    insulin lispro (HumaLOG) 100 units/mL subcutaneous injection 1-5 Units  1-5 Units Subcutaneous HS Kelechi Tinajero MD   2 Units at 02/08/21 2104    insulin lispro (HumaLOG) 100 units/mL subcutaneous injection 1-5 Units  1-5 Units Subcutaneous 0200 Kelechi Tinajero MD   1 Units at 02/09/21 0230    insulin lispro (HumaLOG) 100 units/mL subcutaneous injection 1-5 Units  1-5 Units Subcutaneous TID [facility] Kelechi Tinajero MD   3 Units at 02/09/21 1205    iron polysaccharides (FERREX) capsule 150 mg  150 mg Oral Daily [facility]   150 mg at 02/09/21 0856    melatonin tablet 3 mg  3 mg Oral HS TABITHA Cr   3 mg at 02/08/21 2101    metoprolol tartrate (LOPRESSOR) tablet 25 mg  25 mg Oral Q12H [address] Farideh Nuñez MD   25 mg at 02/09/21 0856    nitroglycerin (NITROSTAT) SL tablet 0 4 mg  0 4 mg Sublingual Q5 Min PRN Swathi Schumacher MD        nystatin (MYCOSTATIN) oral suspension 500,000 Units  500,000 Units Swish & Swallow 4x Daily Faye Correa MD   500,000 Units at 02/09/21 1204    ondansetron (ZOFRAN) injection 4 mg  4 mg Intravenous Q6H PRN Farideh Nuñez MD   4 mg at 01/29/21 1352    oxyCODONE (ROXICODONE) IR tablet 2 5 mg  2 5 mg Oral Q4H PRN Faye Correa MD   2 5 mg at 02/05/21 1045    pantoprazole (PROTONIX) EC tablet 40 mg  40 mg Oral BID AC Kayla Benítez MD   40 mg at 02/09/21 0641    phenol (CHLORASEPTIC) 1 4 % mucosal liquid 1 spray  1 spray Mouth/Throat Q4H PRN Faye Correa MD        pneumococcal 13-valent conjugate vaccine (PREVNAR-13) IM injection 0 5 mL  0 5 mL Intramuscular Prior to discharge James Mark MD        polyethylene glycol (MIRALAX) packet 17 g  17 g Oral Daily PRN Catrachito Dozier MD   17 g at 02/05/21 1727    polyethylene glycol (MIRALAX) packet 17 g  17 g Oral BID Catrachito Dozier MD   17 g at 02/09/21 0856    Pramox-PE-Glycerin-Petrolatum (PREPARATION H MAX) 1-0 25-14 4-15 % rectal cream   Rectal BID PRN Anselmo Marrufo DO   Given at 02/08/21 0108    senna-docusate sodium (SENOKOT S) 8 6-50 mg per tablet 1 tablet  1 tablet Oral HS Kayla Benítez MD   1 tablet at 02/08/21 2103    sodium chloride (OCEAN) 0 65 % nasal spray 1 spray  1 spray Each Nare Q1H PRN Andi Watts MD   1 spray at 02/08/21 1703          Objective:    Vitals:    02/08/21 2213 02/09/21 0640 02/09/21 0700 02/09/21 1357   BP: 99/50 103/50 125/83 136/64   BP Location: Left arm  Left arm    Pulse: 75  75    Resp: 18  18    Temp: 98 °F (36 7 °C)  97 5 °F (36 4 °C)    TempSrc: Oral  Oral    SpO2: 97%  98%         Physical Exam  Not performed    No results found for: BNP   Lab Results   Component Value Date    WBC 10 72 (H) 02/09/2021    HGB 8 0 (L) 02/09/2021    HCT 25 1 (L) 02/09/2021    MCV 97 02/09/2021     02/09/2021     Lab Results   Component Value Date    INR 1 18 01/31/2021    INR 1 23 (H) 01/31/2021    INR 1 24 (H) 01/22/2021    PROTIME 15 1 (H) 01/31/2021    PROTIME 15 6 (H) 01/31/2021    PROTIME 15 5 (H) 01/22/2021     Lab Results   Component Value Date     ([place]) 01/31/2021         I have personally reviewed pertinent imaging and laboratory results       Code Status: Level 1 - Full Code  Advance Directive and Living Will:      Power of :    POLST:      IR has been consulted to evaluate the patient, determine the appropriate procedure, and whether or not a procedure can and should be performed  Thank you for allowing me to participate in the care of [facility]  Please don't hesitate to call, text, email, or TigerText with any questions  This text is generated with voice recognition software  There may be translation, syntax,  or grammatical errors  If you have any questions, please contact the dictating provider

## 2021-02-09 NOTE — PROGRESS NOTES
NEPHROLOGY PROGRESS NOTE   Lori Shaikh [de-identified] y o  male MRN: [id number]  Unit/Bed#: S -01 Encounter: [id number]  Reason for Consult: BRIAN/HD    ASSESSMENT/PLAN:  1  Acute Kidney Injury- secondary to ATN from contrast induced nephropathy in the setting of cardiac catheterization  - started dialysis on 1/25/21  - currently on a MWF schedule  - hepatitis panel nonreactive  - monitor for renal recovery  2  Access- temporary catheter placed 1/25/21  - consult IR for perm cath placement  3  Chronic kidney Disease stage IV- Baseline creatinine is 2-2 5  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 due to diabetic nephropathy and hypertensive nephrosclerosis  Follows with [facility], Dr Nelly Arevalo  Of note, he required dialysis in August 2019 for BRIAN which resolved  4  Acute on Chronic CHF- improved  5  Hypertension- BP soft  - patient has not been receiving antihypertensives last 2-3 days  - discontinue amlodipine  - reduce hydralazine dose  - continue hold parameters and monitor  6  Anemia- secondary to CKD  - iron saturation low but ferritin elevated  - recommend oral iron supplement  7  Hyponatremia- continue to challenge UF on HD  - fluid restriction of 1500ml/day  8  Hyperkalemia- specimen was hemolyzed and likely normal if not hemolyzed  - continue to monitor  9  Urinary Retention- s/p chowdhury catheter placed 2/8/21    Disposition:  Next HD Wednesday  Reduce antihypertensive regimen  Fluid restriction  Consult IR for perm cath  SUBJECTIVE:  Patient feeling well overall  Denies acute SOB  Feels he is constipated still but he did have a bowel movement this morning      OBJECTIVE:  Current Weight:    Vitals:    02/08/21 2057 02/08/21 2213 02/09/21 0640 02/09/21 0700   BP: (!) 110/40 99/50 103/50 125/83   BP Location:  Left arm  Left arm   Pulse: 70 75  75   Resp:  18  18   Temp:  98 °F (36 7 °C)  97 5 °F (36 4 °C)   TempSrc:  Oral  Oral   SpO2:  97%  98%     No intake or output data in the 24 hours ending 02/09/21 1311  General: NAD  Skin: no rash  Eyes: anicteric  ENMT: mm moist  Neck: no masses  Respiratory: bibasilar crackles  Cardiac: RRR  Extremities: no edema  GI: soft nt nd  Neuro: alert awake  Psych: mood and affect appropriate    Medications:    Current Facility-Administered Medications:     acetaminophen (TYLENOL) tablet 650 mg, 650 mg, Oral, Q6H PRN, Gricelda Hanks MD, 650 mg at 02/08/21 1706    aluminum-magnesium hydroxide-simethicone (MYLANTA) oral suspension 30 mL, 30 mL, Oral, Q6H PRN, TABITHA Gore    aspirin (ECOTRIN LOW STRENGTH) EC tablet 81 mg, 81 mg, Oral, Daily, Swathi Schumacher MD, 81 mg at 02/09/21 0856    atorvastatin (LIPITOR) tablet 40 mg, 40 mg, Oral, Daily With Lilian Trivedi MD, 40 mg at 02/08/21 1702    benzonatate (TESSALON PERLES) capsule 100 mg, 100 mg, Oral, TID PRN, TABITHA Mukherjee    bisacodyl (DULCOLAX) rectal suppository 10 mg, 10 mg, Rectal, Daily PRN, Mery Royal DO, 10 mg at 02/09/21 1205    calcium carbonate (TUMS) chewable tablet 1,000 mg, 1,000 mg, Oral, Daily PRN, Gricelda Hanks MD, 1,000 mg at 02/09/21 1001    clopidogrel (PLAVIX) tablet 75 mg, 75 mg, Oral, Daily, Swathi Schumacher MD, 75 mg at 02/09/21 0856    escitalopram (LEXAPRO) tablet 10 mg, 10 mg, Oral, HS, Swathi Schumacher MD, 10 mg at 02/08/21 2100    guaiFENesin (MUCINEX) 12 hr tablet 1,200 mg, 1,200 mg, Oral, Q12H [facility], TABITHA Mukherjee, 1,200 mg at 02/09/21 0856    heparin (porcine) subcutaneous injection 5,000 Units, 5,000 Units, Subcutaneous, Q8H [facility], Faye Correa MD, 5,000 Units at 02/09/21 0641    hydrALAZINE (APRESOLINE) tablet 25 mg, 25 mg, Oral, Q8H [facility], [place], PA-C    hydrocortisone 1 % cream, , Topical, 4x Daily PRN, Marva Pedersen MD, Given at 02/06/21 0242    HYDROmorphone (DILAUDID) injection 0 2 mg, 0 2 mg, Intravenous, Q3H PRN, Faye Correa MD, 0 2 mg at 02/08/21 0801    insulin detemir (LEVEMIR) subcutaneous injection 35 Units, 35 Units, Subcutaneous, HS, Andi Joyce MD, 35 Units at 02/08/21 2117    insulin lispro (HumaLOG) 100 units/mL subcutaneous injection 1-5 Units, 1-5 Units, Subcutaneous, HS, Andi Joyce MD, 2 Units at 02/08/21 2104    insulin lispro (HumaLOG) 100 units/mL subcutaneous injection 1-5 Units, 1-5 Units, Subcutaneous, 0200, Andi Joyce MD, 1 Units at 02/09/21 0230    insulin lispro (HumaLOG) 100 units/mL subcutaneous injection 1-5 Units, 1-5 Units, Subcutaneous, TID AC, 3 Units at 02/09/21 1205 **AND** Fingerstick Glucose (POCT), , , TID AC, Andi Joyce MD    iron polysaccharides (FERREX) capsule 150 mg, 150 mg, Oral, Daily, [facility], PA-C, 150 mg at 02/09/21 0856    melatonin tablet 3 mg, 3 mg, Oral, HS, TABITHA Cr, 3 mg at 02/08/21 2101    metoprolol tartrate (LOPRESSOR) tablet 25 mg, 25 mg, Oral, Q12H [facility], Yudelka Schumacher MD, 25 mg at 02/09/21 0856    nitroglycerin (NITROSTAT) SL tablet 0 4 mg, 0 4 mg, Sublingual, Q5 Min PRN, Lia Killian MD    nystatin (MYCOSTATIN) oral suspension 500,000 Units, 500,000 Units, Swish & Swallow, 4x Daily, Faye Correa MD, 500,000 Units at 02/09/21 1204    ondansetron (ZOFRAN) injection 4 mg, 4 mg, Intravenous, Q6H PRN, Lia Killian MD, 4 mg at 01/29/21 1352    oxyCODONE (ROXICODONE) IR tablet 2 5 mg, 2 5 mg, Oral, Q4H PRN, Faye Correa MD, 2 5 mg at 02/05/21 1045    pantoprazole (PROTONIX) EC tablet 40 mg, 40 mg, Oral, BID AC, Lo Lee MD, 40 mg at 02/09/21 0641    phenol (CHLORASEPTIC) 1 4 % mucosal liquid 1 spray, 1 spray, Mouth/Throat, Q4H PRN, Faye Correa MD    pneumococcal 13-valent conjugate vaccine (PREVNAR-13) IM injection 0 5 mL, 0 5 mL, Intramuscular, Prior to discharge, Lia Killian MD    polyethylene glycol (MIRALAX) packet 17 g, 17 g, Oral, Daily PRN, Nelda Laird MD, 17 g at 02/05/21 1727    polyethylene glycol (MIRALAX) packet 17 g, 17 g, Oral, BID, Nelda Laird MD, 17 g at 02/09/21 200    Pramox-PE-Glycerin-Petrolatum (PREPARATION H MAX) 1-0 25-14 4-15 % rectal cream, , Rectal, BID PRN, Silvia Durbin DO, Given at 02/08/21 0108    senna-docusate sodium (SENOKOT S) 8 6-50 mg per tablet 1 tablet, 1 tablet, Oral, HS, Heath Medeiros MD, 1 tablet at 02/08/21 2103    sodium chloride (OCEAN) 0 65 % nasal spray 1 spray, 1 spray, Each Nare, Q1H PRN, Maxine Beavers MD, 1 spray at 02/08/21 1703    Laboratory Results:  Results from last 7 days   Lab Units 02/09/21  0638 02/08/21  0608 02/07/21  0500 02/06/21  0817 02/06/21  0513 02/05/21  0525 02/04/21  2329 02/04/21  0541 02/04/21  0516 02/03/21  1611   WBC Thousand/uL 10 72* 13 13* 13 40* 16 66*  --  15 80*  --   --  15 47*  --    HEMOGLOBIN g/dL 8 0* 7 6* 8 1* 8 3*  --  7 6* 7 8*  --  7 8*  --    HEMATOCRIT % 25 1* 23 2* 25 4* 25 8*  --  22 7* 23 4*  --  23 4*  --    PLATELETS Thousands/uL 178 159 165 174  --  171  --   --  165  --    POTASSIUM mmol/L 5 4* 4 2 3 8  --  4 1 3 7  --  3 6  --  4 4   CHLORIDE mmol/L 94* 95* 95*  --  97* 94*  --  93*  --  90*   CO2 mmol/L 25 22 22  --  24 20*  --  22  --  24   BUN mg/dL 89* 129* 116*  --  98* 142*  --  120*  --  116*   CREATININE mg/dL 5 83* 6 95* 5 74*  --  4 77* 5 78*  --  4 88*  --  4 53*   CALCIUM mg/dL 8 6 8 3 8 2*  --  8 1* 8 3  --  8 2*  --  8 2*

## 2021-02-09 NOTE — PLAN OF CARE
Problem: Potential for Falls  Goal: Patient will remain free of falls  Description: INTERVENTIONS:  - Assess patient frequently for physical needs  -  Identify cognitive and physical deficits and behaviors that affect risk of falls    -  [place] fall precautions as indicated by assessment   - Educate patient/family on patient safety including physical limitations  - Instruct patient to call for assistance with activity based on assessment  - Modify environment to reduce risk of injury  - Consider OT/PT consult to assist with strengthening/mobility  Outcome: Progressing     Problem: CARDIOVASCULAR - ADULT  Goal: Maintains optimal cardiac output and hemodynamic stability  Description: INTERVENTIONS:  - Monitor I/O, vital signs and rhythm  - Monitor for S/S and trends of decreased cardiac output  - Administer and titrate ordered vasoactive medications to optimize hemodynamic stability  - Assess quality of pulses, skin color and temperature  - Assess for signs of decreased coronary artery perfusion  - Instruct patient to report change in severity of symptoms  Outcome: Progressing  Goal: Absence of cardiac dysrhythmias or at baseline rhythm  Description: INTERVENTIONS:  - Continuous cardiac monitoring, vital signs, obtain 12 lead EKG if ordered  - Administer antiarrhythmic and heart rate control medications as ordered  - Monitor electrolytes and administer replacement therapy as ordered  Outcome: Progressing     Problem: RESPIRATORY - ADULT  Goal: Achieves optimal ventilation and oxygenation  Description: INTERVENTIONS:  - Assess for changes in respiratory status  - Assess for changes in mentation and behavior  - Position to facilitate oxygenation and minimize respiratory effort  - Oxygen administered by appropriate delivery if ordered  - Initiate smoking cessation education as indicated  - Encourage broncho-pulmonary hygiene including cough, deep breathe, Incentive Spirometry  - Assess the need for suctioning and aspirate as needed  - Assess and instruct to report SOB or any respiratory difficulty  - Respiratory Therapy support as indicated  Outcome: Progressing     Problem: GASTROINTESTINAL - ADULT  Goal: Maintains adequate nutritional intake  Description: INTERVENTIONS:  - Monitor percentage of each meal consumed  - Identify factors contributing to decreased intake, treat as appropriate  - Assist with meals as needed  - Monitor I&O, weight, and lab values if indicated  - Obtain nutrition services referral as needed  Outcome: Progressing     Problem: GENITOURINARY - ADULT  Goal: Maintains or returns to baseline urinary function  Description: INTERVENTIONS:  - Assess urinary function  - Encourage oral fluids to ensure adequate hydration if ordered  - Administer IV fluids as ordered to ensure adequate hydration  - Administer ordered medications as needed  - Offer frequent toileting  - Follow urinary retention protocol if ordered  Outcome: Progressing  Goal: Absence of urinary retention  Description: INTERVENTIONS:  - Assess patients ability to void and empty bladder  - Monitor I/O  - Bladder scan as needed  - Discuss with physician/AP medications to alleviate retention as needed  - Discuss catheterization for long term situations as appropriate  Outcome: Progressing     Problem: METABOLIC, FLUID AND ELECTROLYTES - ADULT  Goal: Electrolytes maintained within normal limits  Description: INTERVENTIONS:  - Monitor labs and assess patient for signs and symptoms of electrolyte imbalances  - Administer electrolyte replacement as ordered  - Monitor response to electrolyte replacements, including repeat lab results as appropriate  - Instruct patient on fluid and nutrition as appropriate  Outcome: Progressing  Goal: Fluid balance maintained  Description: INTERVENTIONS:  - Monitor labs   - Monitor I/O and WT  - Instruct patient on fluid and nutrition as appropriate  - Assess for signs & symptoms of volume excess or deficit  Outcome: Progressing  Goal: Glucose maintained within target range  Description: INTERVENTIONS:  - Monitor Blood Glucose as ordered  - Assess for signs and symptoms of hyperglycemia and hypoglycemia  - Administer ordered medications to maintain glucose within target range  - Assess nutritional intake and initiate nutrition service referral as needed  Outcome: Progressing     Problem: SKIN/TISSUE INTEGRITY - ADULT  Goal: Skin integrity remains intact  Description: INTERVENTIONS  - Identify patients at risk for skin breakdown  - Assess and monitor skin integrity  - Assess and monitor nutrition and hydration status  - Monitor labs (i e  albumin)  - Assess for incontinence   - Turn and reposition patient  - Assist with mobility/ambulation  - Relieve pressure over bony prominences  - Avoid friction and shearing  - Provide appropriate hygiene as needed including keeping skin clean and dry  - Evaluate need for skin moisturizer/barrier cream  - Collaborate with interdisciplinary team (i e  Nutrition, Rehabilitation, etc )   - Patient/family teaching  Outcome: Progressing     Problem: HEMATOLOGIC - ADULT  Goal: Maintains hematologic stability  Description: INTERVENTIONS  - Assess for signs and symptoms of bleeding or hemorrhage  - Monitor labs  - Administer supportive blood products/factors as ordered and appropriate  Outcome: Progressing     Problem: MUSCULOSKELETAL - ADULT  Goal: Maintain or return mobility to safest level of function  Description: INTERVENTIONS:  - Assess patient's ability to carry out ADLs; assess patient's baseline for ADL function and identify physical deficits which impact ability to perform ADLs (bathing, care of mouth/teeth, toileting, grooming, dressing, etc )  - Assess/evaluate cause of self-care deficits   - Assess range of motion  - Assess patient's mobility  - Assess patient's need for assistive devices and provide as appropriate  - Encourage maximum independence but intervene and supervise when necessary  - Involve family in performance of ADLs  - Assess for home care needs following discharge   - Consider OT consult to assist with ADL evaluation and planning for discharge  - Provide patient education as appropriate  Outcome: Progressing     Problem: PAIN - ADULT  Goal: Verbalizes/displays adequate comfort level or baseline comfort level  Description: Interventions:  - Encourage patient to monitor pain and request assistance  - Assess pain using appropriate pain scale  - Administer analgesics based on type and severity of pain and evaluate response  - Implement non-pharmacological measures as appropriate and evaluate response  - Consider cultural and social influences on pain and pain management  - Notify physician/advanced practitioner if interventions unsuccessful or patient reports new pain  Outcome: Progressing     Problem: INFECTION - ADULT  Goal: Absence or prevention of progression during hospitalization  Description: INTERVENTIONS:  - Assess and monitor for signs and symptoms of infection  - Monitor lab/diagnostic results  - Monitor all insertion sites, i e  indwelling lines, tubes, and drains  - Monitor endotracheal if appropriate and nasal secretions for changes in amount and color  - [place] appropriate cooling/warming therapies per order  - Administer medications as ordered  - Instruct and encourage patient and family to use good hand hygiene technique  - Identify and instruct in appropriate isolation precautions for identified infection/condition  Outcome: Progressing     Problem: SAFETY ADULT  Goal: Patient will remain free of falls  Description: INTERVENTIONS:  - Assess patient frequently for physical needs  -  Identify cognitive and physical deficits and behaviors that affect risk of falls    -  [place] fall precautions as indicated by assessment   - Educate patient/family on patient safety including physical limitations  - Instruct patient to call for assistance with activity based on assessment  - Modify environment to reduce risk of injury  - Consider OT/PT consult to assist with strengthening/mobility  Outcome: Progressing  Goal: Maintain or return to baseline ADL function  Description: INTERVENTIONS:  -  Assess patient's ability to carry out ADLs; assess patient's baseline for ADL function and identify physical deficits which impact ability to perform ADLs (bathing, care of mouth/teeth, toileting, grooming, dressing, etc )  - Assess/evaluate cause of self-care deficits   - Assess range of motion  - Assess patient's mobility; develop plan if impaired  - Assess patient's need for assistive devices and provide as appropriate  - Encourage maximum independence but intervene and supervise when necessary  - Involve family in performance of ADLs  - Assess for home care needs following discharge   - Consider OT consult to assist with ADL evaluation and planning for discharge  - Provide patient education as appropriate  Outcome: Progressing  Goal: Maintain or return mobility status to optimal level  Description: INTERVENTIONS:  - Assess patient's baseline mobility status (ambulation, transfers, stairs, etc )    - Identify cognitive and physical deficits and behaviors that affect mobility  - Identify mobility aids required to assist with transfers and/or ambulation (gait belt, sit-to-stand, lift, walker, cane, etc )  - [place] fall precautions as indicated by assessment  - Record patient progress and toleration of activity level on Mobility SBAR; progress patient to next Phase/Stage  - Instruct patient to call for assistance with activity based on assessment  - Consider rehabilitation consult to assist with strengthening/weightbearing, etc   Outcome: Progressing     Problem: DISCHARGE PLANNING  Goal: Discharge to home or other facility with appropriate resources  Description: INTERVENTIONS:  - Identify barriers to discharge w/patient and caregiver  - Arrange for needed discharge resources and transportation as appropriate  - Identify discharge learning needs (meds, wound care, etc )  - Arrange for interpretive services to assist at discharge as needed  - Refer to Case Management Department for coordinating discharge planning if the patient needs post-hospital services based on physician/advanced practitioner order or complex needs related to functional status, cognitive ability, or social support system  Outcome: Progressing     Problem: Knowledge Deficit  Goal: Patient/family/caregiver demonstrates understanding of disease process, treatment plan, medications, and discharge instructions  Description: Complete learning assessment and assess knowledge base  Interventions:  - Provide teaching at level of understanding  - Provide teaching via preferred learning methods  Outcome: Progressing     Problem: Prexisting or High Potential for Compromised Skin Integrity  Goal: Skin integrity is maintained or improved  Description: INTERVENTIONS:  - Identify patients at risk for skin breakdown  - Assess and monitor skin integrity  - Assess and monitor nutrition and hydration status  - Monitor labs   - Assess for incontinence   - Turn and reposition patient  - Assist with mobility/ambulation  - Relieve pressure over bony prominences  - Avoid friction and shearing  - Provide appropriate hygiene as needed including keeping skin clean and dry  - Evaluate need for skin moisturizer/barrier cream  - Collaborate with interdisciplinary team   - Patient/family teaching  - Consider wound care consult   Outcome: Progressing     Problem: Nutrition/Hydration-ADULT  Goal: Nutrient/Hydration intake appropriate for improving, restoring or maintaining nutritional needs  Description: Monitor and assess patient's nutrition/hydration status for malnutrition  Collaborate with interdisciplinary team and initiate plan and interventions as ordered  Monitor patient's weight and dietary intake as ordered or per policy   Utilize nutrition screening tool and intervene as necessary  Determine patient's food preferences and provide high-protein, high-caloric foods as appropriate       INTERVENTIONS:  - Monitor oral intake, urinary output, labs, and treatment plans  - Assess nutrition and hydration status and recommend course of action  - Evaluate amount of meals eaten  - Assist patient with eating if necessary   - Allow adequate time for meals  - Recommend/ encourage appropriate diets, oral nutritional supplements, and vitamin/mineral supplements  - Order, calculate, and assess calorie counts as needed  - Recommend, monitor, and adjust tube feedings and TPN/PPN based on assessed needs  - Assess need for intravenous fluids  - Provide specific nutrition/hydration education as appropriate  - Include patient/family/caregiver in decisions related to nutrition  Outcome: Progressing

## 2021-02-10 ENCOUNTER — APPOINTMENT (INPATIENT)
Dept: DIALYSIS | Facility: HOSPITAL | Age: 81
DRG: 280 | End: 2021-02-10
Payer: COMMERCIAL

## 2021-02-10 ENCOUNTER — APPOINTMENT (INPATIENT)
Dept: RADIOLOGY | Facility: HOSPITAL | Age: 81
DRG: 280 | End: 2021-02-10
Attending: RADIOLOGY
Payer: COMMERCIAL

## 2021-02-10 LAB
ANION GAP SERPL CALCULATED.3IONS-SCNC: 15 MMOL/L (ref 4–13)
BASOPHILS # BLD AUTO: 0.04 THOUSANDS/ΜL (ref 0–0.1)
BASOPHILS NFR BLD AUTO: 0 % (ref 0–1)
BUN SERPL-MCNC: 104 MG/DL (ref 5–25)
CALCIUM SERPL-MCNC: 8.2 MG/DL (ref 8.3–10.1)
CHLORIDE SERPL-SCNC: 94 MMOL/L (ref 100–108)
CO2 SERPL-SCNC: 23 MMOL/L (ref 21–32)
CREAT SERPL-MCNC: 6.94 MG/DL (ref 0.6–1.3)
EOSINOPHIL # BLD AUTO: 0.2 THOUSAND/ΜL (ref 0–0.61)
EOSINOPHIL NFR BLD AUTO: 2 % (ref 0–6)
ERYTHROCYTE [DISTWIDTH] IN BLOOD BY AUTOMATED COUNT: 13.4 % (ref 11.6–15.1)
GFR SERPL CREATININE-BSD FRML MDRD: 7 ML/MIN/1.73SQ M
GLUCOSE SERPL-MCNC: 118 MG/DL (ref 65–140)
GLUCOSE SERPL-MCNC: 128 MG/DL (ref 65–140)
GLUCOSE SERPL-MCNC: 156 MG/DL (ref 65–140)
GLUCOSE SERPL-MCNC: 219 MG/DL (ref 65–140)
GLUCOSE SERPL-MCNC: 226 MG/DL (ref 65–140)
GLUCOSE SERPL-MCNC: 261 MG/DL (ref 65–140)
GLUCOSE SERPL-MCNC: 266 MG/DL (ref 65–140)
GLUCOSE SERPL-MCNC: 287 MG/DL (ref 65–140)
HCT VFR BLD AUTO: 23.5 % (ref 36.5–49.3)
HGB BLD-MCNC: 7.5 G/DL (ref 12–17)
IMM GRANULOCYTES # BLD AUTO: 0.08 THOUSAND/UL (ref 0–0.2)
IMM GRANULOCYTES NFR BLD AUTO: 1 % (ref 0–2)
LYMPHOCYTES # BLD AUTO: 1.28 THOUSANDS/ΜL (ref 0.6–4.47)
LYMPHOCYTES NFR BLD AUTO: 11 % (ref 14–44)
MCH RBC QN AUTO: 31.1 PG (ref 26.8–34.3)
MCHC RBC AUTO-ENTMCNC: 31.9 G/DL (ref 31.4–37.4)
MCV RBC AUTO: 98 FL (ref 82–98)
MONOCYTES # BLD AUTO: 1.27 THOUSAND/ΜL (ref 0.17–1.22)
MONOCYTES NFR BLD AUTO: 11 % (ref 4–12)
NEUTROPHILS # BLD AUTO: 8.39 THOUSANDS/ΜL (ref 1.85–7.62)
NEUTS SEG NFR BLD AUTO: 75 % (ref 43–75)
NRBC BLD AUTO-RTO: 0 /100 WBCS
PLATELET # BLD AUTO: 158 THOUSANDS/UL (ref 149–390)
PMV BLD AUTO: 11.5 FL (ref 8.9–12.7)
POTASSIUM SERPL-SCNC: 4.2 MMOL/L (ref 3.5–5.3)
RBC # BLD AUTO: 2.41 MILLION/UL (ref 3.88–5.62)
SODIUM SERPL-SCNC: 132 MMOL/L (ref 136–145)
WBC # BLD AUTO: 11.26 THOUSAND/UL (ref 4.31–10.16)

## 2021-02-10 PROCEDURE — 82948 REAGENT STRIP/BLOOD GLUCOSE: CPT

## 2021-02-10 PROCEDURE — 77001 FLUOROGUIDE FOR VEIN DEVICE: CPT | Performed by: STUDENT IN AN ORGANIZED HEALTH CARE EDUCATION/TRAINING PROGRAM

## 2021-02-10 PROCEDURE — 99233 SBSQ HOSP IP/OBS HIGH 50: CPT | Performed by: INTERNAL MEDICINE

## 2021-02-10 PROCEDURE — 80048 BASIC METABOLIC PNL TOTAL CA: CPT | Performed by: FAMILY MEDICINE

## 2021-02-10 PROCEDURE — 85025 COMPLETE CBC W/AUTO DIFF WBC: CPT | Performed by: FAMILY MEDICINE

## 2021-02-10 PROCEDURE — 02H633Z INSERTION OF INFUSION DEVICE INTO RIGHT ATRIUM, PERCUTANEOUS APPROACH: ICD-10-PCS | Performed by: STUDENT IN AN ORGANIZED HEALTH CARE EDUCATION/TRAINING PROGRAM

## 2021-02-10 PROCEDURE — 77001 FLUOROGUIDE FOR VEIN DEVICE: CPT

## 2021-02-10 PROCEDURE — NC001 PR NO CHARGE: Performed by: STUDENT IN AN ORGANIZED HEALTH CARE EDUCATION/TRAINING PROGRAM

## 2021-02-10 PROCEDURE — 36558 INSERT TUNNELED CV CATH: CPT

## 2021-02-10 PROCEDURE — 36558 INSERT TUNNELED CV CATH: CPT | Performed by: STUDENT IN AN ORGANIZED HEALTH CARE EDUCATION/TRAINING PROGRAM

## 2021-02-10 PROCEDURE — 99232 SBSQ HOSP IP/OBS MODERATE 35: CPT | Performed by: INTERNAL MEDICINE

## 2021-02-10 PROCEDURE — 0JH63XZ INSERTION OF TUNNELED VASCULAR ACCESS DEVICE INTO CHEST SUBCUTANEOUS TISSUE AND FASCIA, PERCUTANEOUS APPROACH: ICD-10-PCS | Performed by: STUDENT IN AN ORGANIZED HEALTH CARE EDUCATION/TRAINING PROGRAM

## 2021-02-10 RX ORDER — CEFAZOLIN SODIUM 2 G/50ML
SOLUTION INTRAVENOUS
Status: COMPLETED | OUTPATIENT
Start: 2021-02-10 | End: 2021-02-10

## 2021-02-10 RX ORDER — FENTANYL CITRATE 50 UG/ML
INJECTION, SOLUTION INTRAMUSCULAR; INTRAVENOUS CODE/TRAUMA/SEDATION MEDICATION
Status: COMPLETED | OUTPATIENT
Start: 2021-02-10 | End: 2021-02-10

## 2021-02-10 RX ADMIN — INSULIN LISPRO 5 UNITS: 100 INJECTION, SOLUTION INTRAVENOUS; SUBCUTANEOUS at 12:42

## 2021-02-10 RX ADMIN — FENTANYL CITRATE 50 MCG: 50 INJECTION, SOLUTION INTRAMUSCULAR; INTRAVENOUS at 14:41

## 2021-02-10 RX ADMIN — CEFAZOLIN SODIUM 2000 MG: 2 SOLUTION INTRAVENOUS at 14:35

## 2021-02-10 RX ADMIN — PANTOPRAZOLE SODIUM 40 MG: 40 TABLET, DELAYED RELEASE ORAL at 17:13

## 2021-02-10 RX ADMIN — INSULIN DETEMIR 35 UNITS: 100 INJECTION, SOLUTION SUBCUTANEOUS at 21:47

## 2021-02-10 RX ADMIN — GUAIFENESIN 1200 MG: 600 TABLET, EXTENDED RELEASE ORAL at 21:46

## 2021-02-10 RX ADMIN — INSULIN LISPRO 2 UNITS: 100 INJECTION, SOLUTION INTRAVENOUS; SUBCUTANEOUS at 02:11

## 2021-02-10 RX ADMIN — ESCITALOPRAM 10 MG: 10 TABLET, FILM COATED ORAL at 21:46

## 2021-02-10 RX ADMIN — FENTANYL CITRATE 50 MCG: 50 INJECTION, SOLUTION INTRAMUSCULAR; INTRAVENOUS at 14:35

## 2021-02-10 RX ADMIN — NYSTATIN 500000 UNITS: 100000 SUSPENSION ORAL at 21:46

## 2021-02-10 RX ADMIN — Medication 3 MG: at 21:46

## 2021-02-10 RX ADMIN — INSULIN LISPRO 2 UNITS: 100 INJECTION, SOLUTION INTRAVENOUS; SUBCUTANEOUS at 18:00

## 2021-02-10 RX ADMIN — HEPARIN SODIUM 5000 UNITS: 5000 INJECTION INTRAVENOUS; SUBCUTANEOUS at 05:32

## 2021-02-10 RX ADMIN — PANTOPRAZOLE SODIUM 40 MG: 40 TABLET, DELAYED RELEASE ORAL at 05:32

## 2021-02-10 RX ADMIN — INSULIN LISPRO 3 UNITS: 100 INJECTION, SOLUTION INTRAVENOUS; SUBCUTANEOUS at 21:49

## 2021-02-10 RX ADMIN — ATORVASTATIN CALCIUM 40 MG: 40 TABLET, FILM COATED ORAL at 17:13

## 2021-02-10 RX ADMIN — NYSTATIN 500000 UNITS: 100000 SUSPENSION ORAL at 17:16

## 2021-02-10 RX ADMIN — HEPARIN SODIUM 5000 UNITS: 5000 INJECTION INTRAVENOUS; SUBCUTANEOUS at 21:47

## 2021-02-10 NOTE — PLAN OF CARE
Post-Dialysis RN Treatment Note    Blood Pressure:  Pre 118/55 mm/Hg  Post 119/61 mmHg   EDW  TBD kg    Weight:  Pre 90 8 kg   Post 90 1kg   Mode of weight measurement: Bed Scale   Volume Removed  600 ml    Treatment duration 210 minutes    NS given  No    Treatment shortened?  No   Medications given during Rx None Reported   Estimated Kt/V  None Reported   Access type: Temporary HD catheter   Access Status: Yes, describe:  MD ashly aware     Report called to primary nurse   Yes Bere Robertson   Problem: METABOLIC, FLUID AND ELECTROLYTES - ADULT  Goal: Electrolytes maintained within normal limits  Description: INTERVENTIONS:  - Monitor labs and assess patient for signs and symptoms of electrolyte imbalances  - Administer electrolyte replacement as ordered  - Monitor response to electrolyte replacements, including repeat lab results as appropriate  - Instruct patient on fluid and nutrition as appropriate  Outcome: Progressing  Goal: Fluid balance maintained  Description: INTERVENTIONS:  - Monitor labs   - Monitor I/O and WT  - Instruct patient on fluid and nutrition as appropriate  - Assess for signs & symptoms of volume excess or deficit  Outcome: Progressing

## 2021-02-10 NOTE — CASE MANAGEMENT
CM called sister Kamaljit Harris to discuss discharge planning  Name and role reviewed  Sister Kamaljit Harris states that CM should speak with patient directly as she will not make decisions for the patient at this time  Patient does not have any other contacts  CM met with patient at bedside to discuss discharge planning  CM notified patient that [facility] is unable to accept  CM provided patient with a list of all SNF rehabs within 20 mile radius of his home  Patient chooses referrals to the [facility] in [place] stating that he has been to [facility] in the past   Referrals placed per patient choice  Patient was getting dialysis at bedside and is aware that he is scheduled for his permacath and will require outpatient HD  Patient requests [facility], Nephrology note states the same  Preliminary referral placed to [facility]  Awaiting responses from SNF rehabs and [facility]  CM will continue to follow patient's hospital course and assist through discharge

## 2021-02-10 NOTE — PLAN OF CARE
Problem: Potential for Falls  Goal: Patient will remain free of falls  Description: INTERVENTIONS:  - Assess patient frequently for physical needs  -  Identify cognitive and physical deficits and behaviors that affect risk of falls    -  [place] fall precautions as indicated by assessment   - Educate patient/family on patient safety including physical limitations  - Instruct patient to call for assistance with activity based on assessment  - Modify environment to reduce risk of injury  - Consider OT/PT consult to assist with strengthening/mobility  Outcome: Progressing

## 2021-02-10 NOTE — ASSESSMENT & PLAN NOTE
Patient continues to have borderline low sodium, today at 132  Questionable AMS today on 2/8/21, has resolved since   Patient AAOx3 this morning on exam       Plan  · Monitor for AMS  · Neurochecks  · Nephrology following

## 2021-02-10 NOTE — PROGRESS NOTES
Interventional Radiology Preprocedure Note    History/Indication for procedure:   Rosa Esqueda is a [de-identified] y o  male with a PMH of BRIAN on CKD who presents for nTDC to [facility] conversion      Relevant past medical history:    Past Medical History:   Diagnosis Date    [facility] 7[facility]    Arthritis     Back pain     Bladder cancer (HCC)     diagnosed  2/2016    Cancer [facility])     bladder; chemo; resection;     Constipation 2/1/2021    Coronary artery disease     has 2 coronary stents    Dental crowns present      5    Diabetes mellitus [facility] )     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[facility] )     fluid behind right eye    Hematuria     hx of    High anion gap metabolic acidosis 8/32/2595    Hypercholesteremia     Hypertension     Kidney stones     Wears glasses      Patient Active Problem List   Diagnosis    Coronary artery disease    Acute renal failure superimposed on stage 3 chronic kidney disease (HCC)    Bladder cancer [facility]    Hypercholesteremia    Type 2 diabetes mellitus with hyperglycemia, with long-term current use of insulin (HCC)    Benign hypertension with chronic kidney disease, stage III    Leukocytosis    Hx of CABG    Chronic kidney disease    Acute on chronic diastolic congestive heart failure (HCC)    Anemia    Hyponatremia    Urinary retention    Right hip pain       BP (!) 112/38   Pulse 78   Temp 97 6 °F (36 4 °C) (Oral)   Resp 18   SpO2 99%     Medications:    Inpatient Medications:     Scheduled Medications:  Current Facility-Administered Medications   Medication Dose Route Frequency Provider Last Rate    acetaminophen  650 mg Oral Q6H PRN Swathi Schumacher MD      aluminum-magnesium hydroxide-simethicone  30 mL Oral Q6H PRN TABITHA Devi      aspirin  81 mg Oral Daily Willie Bansal MD      atorvastatin  40 mg Oral Daily With Chris Ruiz MD      benzonatate  100 mg Oral TID PRN Keysha OfficeTABITHA saavedra  bisacodyl  10 mg Rectal Daily PRN Kassy Stephenson DO      calcium carbonate  1,000 mg Oral Daily PRN Eder Rose MD      clopidogrel  75 mg Oral Daily Eder Rose MD      escitalopram  10 mg Oral HS Eder Rose MD      guaiFENesin  1,200 mg Oral Q12H [facility] Olivier TelloTABITHA      heparin (porcine)  5,000 Units Subcutaneous Q8H [facility] Faye Correa MD      hydrALAZINE  25 mg Oral [place], [place]      hydrocortisone   Topical 4x Daily PRN Senthil Sharma MD      HYDROmorphone  0 2 mg Intravenous Q3H PRN Faye Correa MD      insulin detemir  35 Units Subcutaneous HS Ashish Vergara MD      insulin lispro  1-5 Units Subcutaneous HS Ashish Vergara MD      insulin lispro  1-5 Units Subcutaneous 0200 Ashish Vergara MD      insulin lispro  1-5 Units Subcutaneous TID AC Ashish Vergara MD      insulin lispro  5 Units Subcutaneous TID With Meals Ashish Vergara MD      iron polysaccharides  150 mg Oral Daily MenifeeRENETTA      melatonin  3 mg Oral HS TABITHA Arita      metoprolol tartrate  25 mg Oral Q12H [facility] Eder Rose MD      nitroglycerin  0 4 mg Sublingual Q5 Min PRN Eder Rose MD      nystatin  500,000 Units Swish & Swallow 4x Daily Faye Correa MD      ondansetron  4 mg Intravenous Q6H PRN Eder Rose MD      oxyCODONE  2 5 mg Oral Q4H PRN Faye Correa MD      pantoprazole  40 mg Oral BID AC Christopher Maravilla MD      phenol  1 spray Mouth/Throat Q4H PRN Faye Correa MD      pneumococcal 13-valent conjugate vaccine  0 5 mL Intramuscular Prior to discharge Eder Rose MD      polyethylene glycol  17 g Oral Daily PRN Tunde Montilla MD      polyethylene glycol  17 g Oral BID Tunde Montilla MD      Pramox-PE-Glycerin-Petrolatum   Rectal BID PRN Shirley Senate,       senna-docusate sodium  1 tablet Oral HS Christopher Maravilla MD      sodium chloride  1 spray Each Humaira Dai PRN April Giordano MD Infusions:       PRN:    acetaminophen    aluminum-magnesium hydroxide-simethicone    benzonatate    bisacodyl    calcium carbonate    hydrocortisone    HYDROmorphone    nitroglycerin    ondansetron    oxyCODONE    phenol    pneumococcal 13-valent conjugate vaccine    polyethylene glycol    Pramox-PE-Glycerin-Petrolatum    sodium chloride    Outpatient Medications:  No current facility-administered medications on file prior to encounter        Current Outpatient Medications on File Prior to Encounter   Medication Sig Dispense Refill    amLODIPine (NORVASC) 5 mg tablet Take 1 tablet (5 mg total) by mouth 2 (two) times a day 60 tablet 0    aspirin (ASPIR-LOW) 81 mg EC tablet Daily      atorvastatin (LIPITOR) 40 mg tablet atorvastatin 40 mg tablet   TAKE ONE TABLET DAILY      escitalopram (LEXAPRO) 10 mg tablet Take 1 tablet (10 mg total) by mouth daily at bedtime 30 tablet 0    furosemide (LASIX) 80 mg tablet Take 1 tablet (80 mg total) by mouth 2 (two) times a day (Patient taking differently: Take 80 mg by mouth 2 (two) times a day Pt takes 40 mg in AM and20 mg PM) 60 tablet 0    hydrALAZINE (APRESOLINE) 50 mg tablet Take 1 tablet (50 mg total) by mouth every 8 (eight) hours 90 tablet 0    insulin detemir (LEVEMIR) 100 units/mL subcutaneous injection Inject 20 Units under the skin every 12 (twelve) hours 20 mL 0    insulin lispro (HumaLOG) 100 units/mL injection Inject 1-6 Units under the skin daily at bedtime 20 mL 0    insulin lispro (HumaLOG) 100 units/mL injection Inject 2-12 Units under the skin 3 (three) times a day before meals 20 mL 0    isosorbide mononitrate (IMDUR) 30 mg 24 hr tablet Take 1 tablet (30 mg total) by mouth daily (Patient not taking: Reported on 1/21/2021) 60 tablet 0    metoprolol tartrate (LOPRESSOR) 50 mg tablet Take 0 5 tablets (25 mg total) by mouth every 12 (twelve) hours 60 tablet 0    nitroglycerin (NITROSTAT) 0 4 mg SL tablet Place 1 tablet (0 4 mg total) under the tongue every 5 (five) minutes as needed for chest pain 30 tablet 0    polyethylene glycol (MIRALAX) 17 g packet Take 17 g by mouth daily as needed (Constipation) 14 each 0       No Known Allergies    Anticoagulants: ASA    ASA classification: ASA 3 - Patient with moderate systemic disease with functional limitations    Airway Assessment: III (soft and hard palate and base of uvula visible)    Relevant family history: None    Relevant review of systems: None    Prior sedation/anesthesia: yes    Can the patient lie flat? Yes     NPO Status: yes    Labs:   CBC with diff:   Lab Results   Component Value Date    WBC 11 26 (H) 02/10/2021    HGB 7 5 (L) 02/10/2021    HCT 23 5 (L) 02/10/2021    MCV 98 02/10/2021     02/10/2021    ADJUSTEDWBC 6 60 07/06/2016    MCH 31 1 02/10/2021    MCHC 31 9 02/10/2021    RDW 13 4 02/10/2021    MPV 11 5 02/10/2021    NRBC 0 02/10/2021     BMP/CMP:  Lab Results   Component Value Date     01/05/2016    K 4 2 02/10/2021    K 4 5 01/05/2016    CL 94 (L) 02/10/2021     01/05/2016    CO2 23 02/10/2021    CO2 19 (L) 01/24/2021    ANIONGAP 14 2 01/05/2016     (H) 02/10/2021    BUN 35 (H) 01/05/2016    CREATININE 6 94 (H) 02/10/2021    CREATININE 1 5 (H) 01/05/2016    GLUCOSE 232 (H) 01/24/2021    GLUCOSE 131 (H) 01/05/2016    CALCIUM 8 2 (L) 02/10/2021    CALCIUM 9 0 01/05/2016    AST 53 (H) 01/23/2021    AST 22 01/05/2016    ALT 71 01/23/2021    ALT 53 01/05/2016    ALKPHOS 106 01/23/2021    ALKPHOS 66 01/05/2016    PROT 10 2 (H) 01/05/2016    BILITOT 0 9 01/05/2016    EGFR 7 02/10/2021   ,     Coags:   Lab Results   Component Value Date     (HH) 01/31/2021    INR 1 18 01/31/2021   ,          Relevant imaging studies:   Reviewed  Directed physical examination:  I agree with the physical exam performed on 2/10/21 and there are no additional changes  [facility]  Sedation/Anesthesia plan:   Moderate sedation will be used as needed for procedure  Consent with alternatives to the procedure, risks and benefits have been explained and discussed with the patient/patient's family: yes

## 2021-02-10 NOTE — OCCUPATIONAL THERAPY NOTE
Occupational Therapy Treatment Note:       02/10/21 0205   OT Last Visit   OT Visit Date 02/10/21   Note Type   Cancel Reasons Patient off floor/test     Attempt to see pt x 2 this date, however pt was receiving dialysis this am and is now off the floor at IR  Not available for OT at this time  Continue to follow as able       Sherry Kapadia

## 2021-02-10 NOTE — ASSESSMENT & PLAN NOTE
Patient with continued urinary retention s/p chowdhury catheter placed on 2/8      Plan  · Monitor urine output  · Routine catheter care

## 2021-02-10 NOTE — PROGRESS NOTES
Progress Note - Marbin Dugan 1940, [de-identified] y o  male MRN: [id number]    Unit/Bed#: S -01 Encounter: [id number]    Primary Care Provider: Verito Grey MD   Date and time admitted to hospital: 1/22/2021 12:28 PM        * Acute renal failure superimposed on stage 3 chronic kidney disease [facility])  Assessment & Plan  Lab Results   Component Value Date    EGFR 8 02/09/2021    EGFR 7 02/08/2021    EGFR 9 02/07/2021    CREATININE 5 83 (H) 02/09/2021    CREATININE 6 95 (H) 02/08/2021    CREATININE 5 74 (H) 02/07/2021     S/p cardiac cath on 1/22/21, creatinine elevated (baseline 2 3-2 5) likely due post-cath contrast ATN  Cr remains elevated, slowly downtrending  Patient is currently dialysis dependent, s/p HD Cath placement on 1/25/21  Plan  · Nephrology following consulted IR for permcath placement scheduled for today  · HD scheduled for today  · Patient becomes hypotensive with dialysis, per Nephrology will hold amlodipine and decrease Hydralazine from 50mg to 25 mg q8  · Continue to monitor renal indices  · Avoid nephrotoxins      Coronary artery disease  Assessment & Plan  Patient originally to [address] [place] [address] on 1/21/2021 due to chest pain with chest pain at [facility] Subsequently found to have Type 2 MI and underwent cardiac catheterization on 1/22/21 after transfer to [facility]  Patient reports he has been asymptomatic (denies any chest pain) since cardiac cath     1/24/21 echo-was normal  Systolic function was normal  Ejection fraction was estimated to be 60 %  There was hypokinesis of the basal inferior wall (grade 2 diastolic dysfunction)  Plan  · Continue aspirin, Plavix, statin, and beta-blocker  · Monitor for s/sx           Right hip pain  Assessment & Plan  Patient continues to experience mild right hip pain, no concerning findings on on examination  X-Ray right hip unremarkable  Patient had the cardiac catheterization through the right groin    I do not appreciate any area of induration or redness  Plan  · Continue to monitor pain symptoms  · Consider Aqua K pad for discomfort    Urinary retention  Assessment & Plan  Patient with continued urinary retention s/p chowdhury catheter placed on 2/8  Plan  · Monitor urine output  · Routine catheter care    Hyponatremia  Assessment & Plan  Patient continues to have borderline low sodium, today at 132  Questionable AMS today on 2/8/21, has resolved since  Patient AAOx3 this morning on exam       Plan  · Monitor for AMS  · Neurochecks  · Nephrology following     Anemia  Assessment & Plan  Hemoglobin continues downtrending, stable at 8 this am    Plan  · Continue to monitor with daily CBC  · S/p 2 units of PRBC during this admission  · Transfuse for Hg <7, or if any active bleeding and patient becomes symptomatic  · Have consented for blood if needed    Acute on chronic diastolic congestive heart failure [facility])  Assessment & Plan  Patient does not appear fluid overloaded at this time  Will continue with HD per Nephrology recommendations  Plan    · Continue current regimen as noted under CAD  · Monitor I/Os  · Daily weights      Hx of CABG  Assessment & Plan  CAD s/p CABG in 2016; history of stenting prior to CABG (LIMA to LAD and SVG to OM)  Status post cardiac catheterization: significant triple vessel coronary artery disease  Plan  · Continue 81 ASA daily, Plavix 75mg, metoprolol 25mg  · Hold amlodpine    Leukocytosis  Assessment & Plan  Leukocytosis downtrending 13 1 considering reactive vs underlying infectious etiology  COVID-19, influenza, RSV tests were negative x2  Chest x-ray obtained read as multifocal pneumonia, repeat CXR on 2/6/2021 reporting previous parenchymal changes are resolved  Plan  · Completed 8 day course of cefepime on 2/6  · Blood cultures x2 no growth x 5 days  · Cough medicine/antitussive, lozenges and Chloraseptic spray p r n    · Continue to monitor WBC and fever curves      Type 2 diabetes mellitus with hyperglycemia, with long-term current use of insulin [facility])  Assessment & Plan  Lab Results   Component Value Date    HGBA1C 6 7 (H) 2019       Recent Labs     21  0228 21  0732 21  1043 21  1601   POCGLU 197* 162* 272* 239*       Blood Sugar Average: Last 72 hrs:  (P) 543 [id number]     Plan  · Endocrinology previously consulted   · Recommending  Levemir 42 units BID, Humalog 25 units with meals +SSI  · BS stable, goal 140-180s during admission         VTE Pharmacologic Prophylaxis:   Pharmacologic: Heparin  Mechanical VTE Prophylaxis in Place: Yes    Discussions with Specialists or Other Care Team Provider: Nephrology    Education and Discussions with Family / Patient: Will update family    Current Length of Stay: 23 day(s)    Current Patient Status: Inpatient     Discharge Plan / Estimated Discharge Date: TBD    Code Status: Level 1 - Full Code      Subjective:   Patient resting in bed this am, states he is feeling better this morning  Noting decreased right hip pain  Objective:     Vitals:   Temp (24hrs), Av 8 °F (36 6 °C), Min:97 6 °F (36 4 °C), Max:97 9 °F (36 6 °C)    Temp:  [97 6 °F (36 4 °C)-97 9 °F (36 6 °C)] 97 6 °F (36 4 °C)  HR:  [60-84] 78  Resp:  [18] 18  BP: ()/(38-66) 114/55  SpO2:  [97 %-100 %] 98 %  There is no height or weight on file to calculate BMI  Input and Output Summary (last 24 hours): Intake/Output Summary (Last 24 hours) at 2/10/2021 1507  Last data filed at 2/10/2021 1236  Gross per 24 hour   Intake 320 ml   Output 1650 ml   Net -1330 ml       Physical Exam:     Physical Exam  Vitals signs reviewed  Constitutional:       Appearance: He is well-developed  HENT:      Head: Normocephalic and atraumatic  Right Ear: External ear normal       Left Ear: External ear normal       Nose: Nose normal    Eyes:      Conjunctiva/sclera: Conjunctivae normal    Neck:      Musculoskeletal: Normal range of motion and neck supple        Comments: ALONA CATALAN cath in place  Mild bleeding noted from site  Cardiovascular:      Rate and Rhythm: Normal rate and regular rhythm  Heart sounds: Normal heart sounds  Pulmonary:      Effort: Pulmonary effort is normal       Breath sounds: Normal breath sounds  Abdominal:      General: Bowel sounds are normal       Palpations: Abdomen is soft  Genitourinary:     Comments: Marinelli catheter in place  Skin:     General: Skin is warm and dry  Neurological:      Mental Status: He is alert  Mental status is at baseline  Psychiatric:         Mood and Affect: Mood normal            Additional Data:     Labs:    Results from last 7 days   Lab Units 02/10/21  0459   WBC Thousand/uL 11 26*   HEMOGLOBIN g/dL 7 5*   HEMATOCRIT % 23 5*   PLATELETS Thousands/uL 158   NEUTROS PCT % 75   LYMPHS PCT % 11*   MONOS PCT % 11   EOS PCT % 2     Results from last 7 days   Lab Units 02/10/21  0459   POTASSIUM mmol/L 4 2   CHLORIDE mmol/L 94*   CO2 mmol/L 23   BUN mg/dL 104*   CREATININE mg/dL 6 94*   CALCIUM mg/dL 8 2*           * I Have Reviewed All Lab Data Listed Above  * Additional Pertinent Lab Tests Reviewed:  All Labs Within Last 24 Hours Reviewed    Imaging:    Imaging Reports Reviewed Today Include: VAS pseduoaneursym study, final read still pending  Imaging Personally Reviewed by Myself Includes:  As above    Recent Cultures (last 7 days):           Last 24 Hours Medication List:   Current Facility-Administered Medications   Medication Dose Route Frequency Provider Last Rate    acetaminophen  650 mg Oral Q6H PRN Swathi Schumacher MD      aluminum-magnesium hydroxide-simethicone  30 mL Oral Q6H PRN TABITHA Herrera      aspirin  81 mg Oral Daily Michael Sierra MD      atorvastatin  40 mg Oral Daily With Lzia Griffin MD      benzonatate  100 mg Oral TID PRN TABITHA Heath      bisacodyl  10 mg Rectal Daily PRN Dorys Plaza DO      calcium carbonate  1,000 mg Oral Daily PRN MD Aquiles [place] clopidogrel  75 mg Oral Daily Carleen De La Cruz MD      escitalopram  10 mg Oral HS Carleen De La Cruz MD      guaiFENesin  1,200 mg Oral Q12H [facility] TABITHA Denis      heparin (porcine)  5,000 Units Subcutaneous Q8H [facility] Faye Correa MD      hydrALAZINE  25 mg Oral [place], [place]      hydrocortisone   Topical 4x Daily PRN Maya Weiss MD      HYDROmorphone  0 2 mg Intravenous Q3H PRN Faye Correa MD      insulin detemir  35 Units Subcutaneous HS Katherine Mott MD      insulin lispro  1-5 Units Subcutaneous HS Katherine Mott MD      insulin lispro  1-5 Units Subcutaneous 0200 Katherine Mott MD      insulin lispro  1-5 Units Subcutaneous TID AC Katherine Mott MD      insulin lispro  5 Units Subcutaneous TID With Meals Katherine Mott MD      iron polysaccharides  150 mg Oral Daily [place]ho[place], PA-C      melatonin  3 mg Oral HS TABITHA Gee      metoprolol tartrate  25 mg Oral Q12H [facility] Carleen De La Cruz MD      nitroglycerin  0 4 mg Sublingual Q5 Min PRN Carleen De La Cruz MD      nystatin  500,000 Units Swish & Swallow 4x Daily Faye Correa MD      ondansetron  4 mg Intravenous Q6H PRN Carleen De La Cruz MD      oxyCODONE  2 5 mg Oral Q4H PRN Faye Correa MD      pantoprazole  40 mg Oral BID AC Sia Berrios MD      phenol  1 spray Mouth/Throat Q4H PRN Faye Correa MD      pneumococcal 13-valent conjugate vaccine  0 5 mL Intramuscular Prior to discharge Carleen De La Cruz MD      polyethylene glycol  17 g Oral Daily PRN Brandee Herrera MD      polyethylene glycol  17 g Oral BID Brandee Herrera MD      Pramox-PE-Glycerin-Petrolatum   Rectal BID PRN Kwaku Dunne DO      senna-docusate sodium  1 tablet Oral HS Sia Berrios MD      sodium chloride  1 spray Each Nare Q1H PRN Jacek Perez MD          Today, Patient Was Seen By: Nitesh Stearns MD    ** Please Note: This note has been constructed using a voice recognition system  **

## 2021-02-10 NOTE — PHYSICAL THERAPY NOTE
PHYSICAL THERAPY CANCELLATION NOTE    Patient Name: Isatu Napier  [id number] Date: 2/10/2021        02/10/21 0024   Note Type   Cancel Reasons Patient off floor/test   Assessment   Assessment Attempted to see pt x 2  This AM, pt unavailable due to receiving HD at bedside  Presently pt is off floor at IR   PT will continue to follow as appropriate and schedule allows     Yamileth Natarajan, PT, DPT

## 2021-02-10 NOTE — BRIEF OP NOTE (RAD/CATH)
INTERVENTIONAL RADIOLOGY PROCEDURE NOTE    Date: 2/10/2021    Procedure: Procedure name not found  Preoperative diagnosis:   1  Stage 3b chronic kidney disease    2  Type 2 diabetes mellitus without complication, with long-term current use of insulin (HCC)         Postoperative diagnosis: Same  Surgeon: Pita Tsang MD     Assistant: None  No qualified resident was available  Blood loss: 5 ml    Specimens: None  Findings: nTDC to [facility] conversion  Complications: None immediate      Anesthesia: local and IV Fentanyl

## 2021-02-10 NOTE — PROGRESS NOTES
Progress Note - Rosa Esqueda [de-identified] y o  male MRN: [id number]    Unit/Bed#: S -01 Encounter: [id number]      CC: diabetes f/u    Subjective:   Rosa Esqueda is a [de-identified]y o  year old male with type 2 diabetes  Feels well  No complaints  Tired, wants to sleep  No hypoglycemia  Was NPO for PermCath placement today  Did not have any breakfast, ate lunch well  Appetite is improved    Objective:     Vitals: Blood pressure 134/60, pulse 83, temperature 97 8 °F (36 6 °C), temperature source Oral, resp  rate 17, SpO2 98 %  ,There is no height or weight on file to calculate BMI  Intake/Output Summary (Last 24 hours) at 2/10/2021 1642  Last data filed at 2/10/2021 1236  Gross per 24 hour   Intake 620 ml   Output 2750 ml   Net -2130 ml       Physical Exam:  General Appearance: awake, appears stated age and cooperative  Head: Normocephalic, without obvious abnormality, atraumatic  Extremities: moves all extremities  Skin: Skin color and temperature normal    Pulm: no labored breathing    Lab, Imaging and other studies: I have personally reviewed pertinent reports  POC Glucose (mg/dl)   Date Value   02/10/2021 226 (H)   02/10/2021 128   02/10/2021 118   02/10/2021 219 (H)   02/09/2021 254 (H)   02/09/2021 239 (H)   02/09/2021 272 (H)   02/09/2021 162 (H)   02/09/2021 197 (H)   02/08/2021 220 (H)       Assessment and Plan:  1  Type 2 diabetes mellitus on long-term insulin therapy with hyperglycemia  2  End-stage renal disease on dialysis  The patient's appetite, oral intake has improved explaining postprandial hyperglycemia  Recommend the following for now  -continue Levemir 35 units subcutaneously at bedtime  -increase Humalog to 8 units with meals 3 times a day  -continue sliding scale insulin  Will continue to follow and make changes as needed      Portions of the record may have been created with voice recognition software    Occasional wrong word or "sound a like" substitutions may have occurred due to the inherent limitations of voice recognition software  Read the chart carefully and recognize, using context, where substitutions have occurred

## 2021-02-10 NOTE — PROGRESS NOTES
[address] [facility] NOTE   Ammy Reinoso [de-identified] y o  male MRN: [id number]  Unit/Bed#: S -01 Encounter: [id number]  Reason for Consult: BRIAN    ASSESSMENT and PLAN:  1  Acute kidney injury:   · Presented with a creatinine of 2 35 on January 21, 2021  · Renal function worsened in the hospital     · Etiology felt to be due to ATN from contrast nephropathy  · HD dependent since 1/25/21  · Continue HD support since no evidence of renal recovery - SCr 6 94 today  2  Access:   · R IJ temp cath   · For permcath today  3  CKD IV:  · Baseline creatinine is 2 0 to 2 5  · Follows with Dr Kwaku Flores of [email or web address]  · Has history of previous HD dependence  4  CHF:  · EF is 60% with G2DD based on 1/24/21 echo  · Improved overall  · May consider Torsemide once BP more stable  5  HTN:   · BP was low but better now  · Amlodipine stopped and Hydralazine decreased on 2/9/21  · Continue Metoprolol and Hydralazine  6  Anemia:  · Hgb below goal   · Will discuss Epogen with patient  · Iron studies with borderline Ferritin and would avoid IV iron for now  7  Hyponatremia: Mild  Monitor with HD    8  Hyperkalemia: Resolved  9  Urinary retention s/p chowdhury catheter placement       DISPOSITION:  · No evidence of renal recovery  · Continue dialytic support - HD today  · He will need outpatient HD placement at [address]  · May consider adding Torsemide 100 mg daily if BP stable  · Will discuss Epogen with patient  SUBJECTIVE / 24H INTERVAL HISTORY:  Not feeling great  Denies any SOB  HEMODIALYSIS PROCEDURE NOTE  The patient was seen and examined on hemodialysis    Time: 3 5 hours  Sodium: 138 Blood flow: 275   Dialyzer: F160 Potassium: 3 Dialysate flow: 500   Access: R IJ temp cath Bicarbonate: 30 Ultrafiltration goal: 0 5   Medications on HD: none     OBJECTIVE:  Current Weight:    Vitals:    02/10/21 1433 02/10/21 1438 02/10/21 1439 02/10/21 1441   BP: 114/55      BP Location: Pulse: 83 79 79 78   Resp:       Temp:       TempSrc:       SpO2: 100% 99% 98% 98%       Intake/Output Summary (Last 24 hours) at 2/10/2021 1522  Last data filed at 2/10/2021 1236  Gross per 24 hour   Intake 320 ml   Output 2750 ml   Net -2430 ml     General: conscious, cooperative, no distress  Skin: dry  Eyes: pale conjunctivae  ENT: moist mucous membranes  Chest/Lungs: equal chest expansion, decreased in bases  CVS: distinct heart sounds, normal rate, regular rhythm, no rub  Abdomen: soft, non tender, non distended, normal bowel sounds  Extremities: no edema  : (+) chowdhury catheter  Neuro: awake, alert     Psych: appropriate affect    Medications:    Current Facility-Administered Medications:     acetaminophen (TYLENOL) tablet 650 mg, 650 mg, Oral, Q6H PRN, Zahra Costa MD, 650 mg at 02/08/21 0492    aluminum-magnesium hydroxide-simethicone (MYLANTA) oral suspension 30 mL, 30 mL, Oral, Q6H PRN, TABITHA Valdez    aspirin (ECOTRIN LOW STRENGTH) EC tablet 81 mg, 81 mg, Oral, Daily, Zahra Costa MD, Stopped at 02/10/21 0955    atorvastatin (LIPITOR) tablet 40 mg, 40 mg, Oral, Daily With Leia Soliz MD, 40 mg at 02/09/21 1746    benzonatate (TESSALON PERLES) capsule 100 mg, 100 mg, Oral, TID PRN, TABITHA Briceno    bisacodyl (DULCOLAX) rectal suppository 10 mg, 10 mg, Rectal, Daily PRN, Gabe Casas DO, 10 mg at 02/09/21 1205    calcium carbonate (TUMS) chewable tablet 1,000 mg, 1,000 mg, Oral, Daily PRN, Zahra Costa MD, 1,000 mg at 02/09/21 1001    clopidogrel (PLAVIX) tablet 75 mg, 75 mg, Oral, Daily, Skylar Schumacher MD, Stopped at 02/10/21 0955    escitalopram (LEXAPRO) tablet 10 mg, 10 mg, Oral, HS, Swathi Schumacher MD, 10 mg at 02/09/21 2111    guaiFENesin (MUCINEX) 12 hr tablet 1,200 mg, 1,200 mg, Oral, Q12H [address], [place] JunrTABITHA, 1,200 mg at 02/09/21 2111    heparin (porcine) subcutaneous injection 5,000 Units, 5,000 Units, Subcutaneous, Q8H [address], Luis Fernando Castro MD, 5,000 Units at 02/10/21 0532    hydrALAZINE (APRESOLINE) tablet 25 mg, 25 mg, Oral, Q8H [address], [facility], [facility], 25 mg at 02/09/21 2111    hydrocortisone 1 % cream, , Topical, 4x Daily PRN, Jair Hubbard MD, Given at 02/06/21 0242    HYDROmorphone (DILAUDID) injection 0 2 mg, 0 2 mg, Intravenous, Q3H PRN, Faye Correa MD, 0 2 mg at 02/08/21 0801    insulin detemir (LEVEMIR) subcutaneous injection 35 Units, 35 Units, Subcutaneous, HS, Nitin Chew MD, 35 Units at 02/09/21 2116    insulin lispro (HumaLOG) 100 units/mL subcutaneous injection 1-5 Units, 1-5 Units, Subcutaneous, HS, Nitin Chew MD, 2 Units at 02/09/21 2117    insulin lispro (HumaLOG) 100 units/mL subcutaneous injection 1-5 Units, 1-5 Units, Subcutaneous, 0200, Nitin Chew MD, 2 Units at 02/10/21 0211    insulin lispro (HumaLOG) 100 units/mL subcutaneous injection 1-5 Units, 1-5 Units, Subcutaneous, TID AC, 2 Units at 02/09/21 1749 **AND** Fingerstick Glucose (POCT), , , TID AC, Nitin Chew MD    insulin lispro (HumaLOG) 100 units/mL subcutaneous injection 5 Units, 5 Units, Subcutaneous, TID With Meals, Nitin Chew MD, 5 Units at 02/10/21 1242    iron polysaccharides (FERREX) capsule 150 mg, 150 mg, Oral, Daily, [facility], [place], Stopped at 02/10/21 0957    melatonin tablet 3 mg, 3 mg, Oral, HS, TABITHA Cr, 3 mg at 02/09/21 2111    metoprolol tartrate (LOPRESSOR) tablet 25 mg, 25 mg, Oral, Q12H [address], Swathi Schumacher MD, 25 mg at 02/09/21 2111    nitroglycerin (NITROSTAT) SL tablet 0 4 mg, 0 4 mg, Sublingual, Q5 Min PRN, Arnol Robertson MD    nystatin (MYCOSTATIN) oral suspension 500,000 Units, 500,000 Units, Swish & Swallow, 4x Daily, Faye Correa MD, 500,000 Units at 02/09/21 2111    ondansetron (ZOFRAN) injection 4 mg, 4 mg, Intravenous, Q6H PRN, Arnol Robertson MD, 4 mg at 01/29/21 1352    oxyCODONE (ROXICODONE) IR tablet 2 5 mg, 2 5 mg, Oral, Q4H PRN, Deena Patel MD, 2 5 mg at 02/05/21 1045    pantoprazole (PROTONIX) EC tablet 40 mg, 40 mg, Oral, BID AC, Jayson Barclay MD, 40 mg at 02/10/21 0532    phenol (CHLORASEPTIC) 1 4 % mucosal liquid 1 spray, 1 spray, Mouth/Throat, Q4H PRN, Faye Correa MD    pneumococcal 13-valent conjugate vaccine (PREVNAR-13) IM injection 0 5 mL, 0 5 mL, Intramuscular, Prior to discharge, Leodan Shah MD    polyethylene glycol (MIRALAX) packet 17 g, 17 g, Oral, Daily PRN, Maximus Perez MD, 17 g at 02/05/21 1727    polyethylene glycol (MIRALAX) packet 17 g, 17 g, Oral, BID, Maximus Perez MD, 17 g at 02/09/21 0856    Pramox-PE-Glycerin-Petrolatum (PREPARATION H MAX) 1-0 25-14 4-15 % rectal cream, , Rectal, BID PRN, Eliseo Alvares DO, Given at 02/09/21 2059    senna-docusate sodium (SENOKOT S) 8 6-50 mg per tablet 1 tablet, 1 tablet, Oral, HS, Jayson Barclay MD, 1 tablet at 02/08/21 2103    sodium chloride (OCEAN) 0 65 % nasal spray 1 spray, 1 spray, Each Nare, Q1H PRN, Pauletta Hatchet, MD, 1 spray at 02/08/21 1703    Laboratory Results:  Results from last 7 days   Lab Units 02/10/21  0459 02/09/21  8586 02/08/21  1177 02/07/21  0500 02/06/21  0817 02/06/21  0513 02/05/21  0525 02/04/21  2329 02/04/21  0541 02/04/21  0516   WBC Thousand/uL 11 26* 10 72* 13 13* 13 40* 16 66*  --  15 80*  --   --  15 47*   HEMOGLOBIN g/dL 7 5* 8 0* 7 6* 8 1* 8 3*  --  7 6* 7 8*  --  7 8*   HEMATOCRIT % 23 5* 25 1* 23 2* 25 4* 25 8*  --  22 7* 23 4*  --  23 4*   PLATELETS Thousands/uL 158 178 159 165 174  --  171  --   --  165   POTASSIUM mmol/L 4 2 5 4* 4 2 3 8  --  4 1 3 7  --  3 6  --    CHLORIDE mmol/L 94* 94* 95* 95*  --  97* 94*  --  93*  --    CO2 mmol/L 23 25 22 22  --  24 20*  --  22  --    BUN mg/dL 104* 89* 129* 116*  --  98* 142*  --  120*  --    CREATININE mg/dL 6 94* 5 83* 6 95* 5 74*  --  4 77* 5 78*  --  4 88*  --    CALCIUM mg/dL 8 2* 8 6 8 3 8 2*  --  8 1* 8 3  --  8 2*  --

## 2021-02-10 NOTE — ASSESSMENT & PLAN NOTE
Hemoglobin continues downtrending, stable at 8 this am    Plan  · Continue to monitor with daily CBC  · S/p 2 units of PRBC during this admission  · Transfuse for Hg <7, or if any active bleeding and patient becomes symptomatic  · Have consented for blood if needed

## 2021-02-10 NOTE — PLAN OF CARE
Problem: Potential for Falls  Goal: Patient will remain free of falls  Description: INTERVENTIONS:  - Assess patient frequently for physical needs  -  Identify cognitive and physical deficits and behaviors that affect risk of falls    -  [place] fall precautions as indicated by assessment   - Educate patient/family on patient safety including physical limitations  - Instruct patient to call for assistance with activity based on assessment  - Modify environment to reduce risk of injury  - Consider OT/PT consult to assist with strengthening/mobility  Outcome: Progressing     Problem: CARDIOVASCULAR - ADULT  Goal: Maintains optimal cardiac output and hemodynamic stability  Description: INTERVENTIONS:  - Monitor I/O, vital signs and rhythm  - Monitor for S/S and trends of decreased cardiac output  - Administer and titrate ordered vasoactive medications to optimize hemodynamic stability  - Assess quality of pulses, skin color and temperature  - Assess for signs of decreased coronary artery perfusion  - Instruct patient to report change in severity of symptoms  Outcome: Progressing  Goal: Absence of cardiac dysrhythmias or at baseline rhythm  Description: INTERVENTIONS:  - Continuous cardiac monitoring, vital signs, obtain 12 lead EKG if ordered  - Administer antiarrhythmic and heart rate control medications as ordered  - Monitor electrolytes and administer replacement therapy as ordered  Outcome: Progressing     Problem: RESPIRATORY - ADULT  Goal: Achieves optimal ventilation and oxygenation  Description: INTERVENTIONS:  - Assess for changes in respiratory status  - Assess for changes in mentation and behavior  - Position to facilitate oxygenation and minimize respiratory effort  - Oxygen administered by appropriate delivery if ordered  - Initiate smoking cessation education as indicated  - Encourage broncho-pulmonary hygiene including cough, deep breathe, Incentive Spirometry  - Assess the need for suctioning and aspirate as needed  - Assess and instruct to report SOB or any respiratory difficulty  - Respiratory Therapy support as indicated  Outcome: Progressing     Problem: GASTROINTESTINAL - ADULT  Goal: Maintains adequate nutritional intake  Description: INTERVENTIONS:  - Monitor percentage of each meal consumed  - Identify factors contributing to decreased intake, treat as appropriate  - Assist with meals as needed  - Monitor I&O, weight, and lab values if indicated  - Obtain nutrition services referral as needed  Outcome: Progressing     Problem: GENITOURINARY - ADULT  Goal: Maintains or returns to baseline urinary function  Description: INTERVENTIONS:  - Assess urinary function  - Encourage oral fluids to ensure adequate hydration if ordered  - Administer IV fluids as ordered to ensure adequate hydration  - Administer ordered medications as needed  - Offer frequent toileting  - Follow urinary retention protocol if ordered  Outcome: Progressing  Goal: Absence of urinary retention  Description: INTERVENTIONS:  - Assess patients ability to void and empty bladder  - Monitor I/O  - Bladder scan as needed  - Discuss with physician/AP medications to alleviate retention as needed  - Discuss catheterization for long term situations as appropriate  Outcome: Progressing     Problem: METABOLIC, FLUID AND ELECTROLYTES - ADULT  Goal: Electrolytes maintained within normal limits  Description: INTERVENTIONS:  - Monitor labs and assess patient for signs and symptoms of electrolyte imbalances  - Administer electrolyte replacement as ordered  - Monitor response to electrolyte replacements, including repeat lab results as appropriate  - Instruct patient on fluid and nutrition as appropriate  Outcome: Progressing  Goal: Fluid balance maintained  Description: INTERVENTIONS:  - Monitor labs   - Monitor I/O and WT  - Instruct patient on fluid and nutrition as appropriate  - Assess for signs & symptoms of volume excess or deficit  Outcome: Progressing  Goal: Glucose maintained within target range  Description: INTERVENTIONS:  - Monitor Blood Glucose as ordered  - Assess for signs and symptoms of hyperglycemia and hypoglycemia  - Administer ordered medications to maintain glucose within target range  - Assess nutritional intake and initiate nutrition service referral as needed  Outcome: Progressing     Problem: SKIN/TISSUE INTEGRITY - ADULT  Goal: Skin integrity remains intact  Description: INTERVENTIONS  - Identify patients at risk for skin breakdown  - Assess and monitor skin integrity  - Assess and monitor nutrition and hydration status  - Monitor labs (i e  albumin)  - Assess for incontinence   - Turn and reposition patient  - Assist with mobility/ambulation  - Relieve pressure over bony prominences  - Avoid friction and shearing  - Provide appropriate hygiene as needed including keeping skin clean and dry  - Evaluate need for skin moisturizer/barrier cream  - Collaborate with interdisciplinary team (i e  Nutrition, Rehabilitation, etc )   - Patient/family teaching  Outcome: Progressing     Problem: HEMATOLOGIC - ADULT  Goal: Maintains hematologic stability  Description: INTERVENTIONS  - Assess for signs and symptoms of bleeding or hemorrhage  - Monitor labs  - Administer supportive blood products/factors as ordered and appropriate  Outcome: Progressing     Problem: MUSCULOSKELETAL - ADULT  Goal: Maintain or return mobility to safest level of function  Description: INTERVENTIONS:  - Assess patient's ability to carry out ADLs; assess patient's baseline for ADL function and identify physical deficits which impact ability to perform ADLs (bathing, care of mouth/teeth, toileting, grooming, dressing, etc )  - Assess/evaluate cause of self-care deficits   - Assess range of motion  - Assess patient's mobility  - Assess patient's need for assistive devices and provide as appropriate  - Encourage maximum independence but intervene and supervise when necessary  - Involve family in performance of ADLs  - Assess for home care needs following discharge   - Consider OT consult to assist with ADL evaluation and planning for discharge  - Provide patient education as appropriate  Outcome: Progressing     Problem: PAIN - ADULT  Goal: Verbalizes/displays adequate comfort level or baseline comfort level  Description: Interventions:  - Encourage patient to monitor pain and request assistance  - Assess pain using appropriate pain scale  - Administer analgesics based on type and severity of pain and evaluate response  - Implement non-pharmacological measures as appropriate and evaluate response  - Consider cultural and social influences on pain and pain management  - Notify physician/advanced practitioner if interventions unsuccessful or patient reports new pain  Outcome: Progressing     Problem: INFECTION - ADULT  Goal: Absence or prevention of progression during hospitalization  Description: INTERVENTIONS:  - Assess and monitor for signs and symptoms of infection  - Monitor lab/diagnostic results  - Monitor all insertion sites, i e  indwelling lines, tubes, and drains  - Monitor endotracheal if appropriate and nasal secretions for changes in amount and color  - [place] appropriate cooling/warming therapies per order  - Administer medications as ordered  - Instruct and encourage patient and family to use good hand hygiene technique  - Identify and instruct in appropriate isolation precautions for identified infection/condition  Outcome: Progressing     Problem: SAFETY ADULT  Goal: Patient will remain free of falls  Description: INTERVENTIONS:  - Assess patient frequently for physical needs  -  Identify cognitive and physical deficits and behaviors that affect risk of falls    -  [place] fall precautions as indicated by assessment   - Educate patient/family on patient safety including physical limitations  - Instruct patient to call for assistance with activity based on assessment  - Modify environment to reduce risk of injury  - Consider OT/PT consult to assist with strengthening/mobility  Outcome: Progressing  Goal: Maintain or return to baseline ADL function  Description: INTERVENTIONS:  -  Assess patient's ability to carry out ADLs; assess patient's baseline for ADL function and identify physical deficits which impact ability to perform ADLs (bathing, care of mouth/teeth, toileting, grooming, dressing, etc )  - Assess/evaluate cause of self-care deficits   - Assess range of motion  - Assess patient's mobility; develop plan if impaired  - Assess patient's need for assistive devices and provide as appropriate  - Encourage maximum independence but intervene and supervise when necessary  - Involve family in performance of ADLs  - Assess for home care needs following discharge   - Consider OT consult to assist with ADL evaluation and planning for discharge  - Provide patient education as appropriate  Outcome: Progressing  Goal: Maintain or return mobility status to optimal level  Description: INTERVENTIONS:  - Assess patient's baseline mobility status (ambulation, transfers, stairs, etc )    - Identify cognitive and physical deficits and behaviors that affect mobility  - Identify mobility aids required to assist with transfers and/or ambulation (gait belt, sit-to-stand, lift, walker, cane, etc )  - [place] fall precautions as indicated by assessment  - Record patient progress and toleration of activity level on Mobility SBAR; progress patient to next Phase/Stage  - Instruct patient to call for assistance with activity based on assessment  - Consider rehabilitation consult to assist with strengthening/weightbearing, etc   Outcome: Progressing     Problem: DISCHARGE PLANNING  Goal: Discharge to home or other facility with appropriate resources  Description: INTERVENTIONS:  - Identify barriers to discharge w/patient and caregiver  - Arrange for needed discharge resources and transportation as appropriate  - Identify discharge learning needs (meds, wound care, etc )  - Arrange for interpretive services to assist at discharge as needed  - Refer to Case Management Department for coordinating discharge planning if the patient needs post-hospital services based on physician/advanced practitioner order or complex needs related to functional status, cognitive ability, or social support system  Outcome: Progressing     Problem: Knowledge Deficit  Goal: Patient/family/caregiver demonstrates understanding of disease process, treatment plan, medications, and discharge instructions  Description: Complete learning assessment and assess knowledge base  Interventions:  - Provide teaching at level of understanding  - Provide teaching via preferred learning methods  Outcome: Progressing     Problem: Prexisting or High Potential for Compromised Skin Integrity  Goal: Skin integrity is maintained or improved  Description: INTERVENTIONS:  - Identify patients at risk for skin breakdown  - Assess and monitor skin integrity  - Assess and monitor nutrition and hydration status  - Monitor labs   - Assess for incontinence   - Turn and reposition patient  - Assist with mobility/ambulation  - Relieve pressure over bony prominences  - Avoid friction and shearing  - Provide appropriate hygiene as needed including keeping skin clean and dry  - Evaluate need for skin moisturizer/barrier cream  - Collaborate with interdisciplinary team   - Patient/family teaching  - Consider wound care consult   Outcome: Progressing     Problem: Nutrition/Hydration-ADULT  Goal: Nutrient/Hydration intake appropriate for improving, restoring or maintaining nutritional needs  Description: Monitor and assess patient's nutrition/hydration status for malnutrition  Collaborate with interdisciplinary team and initiate plan and interventions as ordered  Monitor patient's weight and dietary intake as ordered or per policy   Utilize nutrition screening tool and intervene as necessary  Determine patient's food preferences and provide high-protein, high-caloric foods as appropriate       INTERVENTIONS:  - Monitor oral intake, urinary output, labs, and treatment plans  - Assess nutrition and hydration status and recommend course of action  - Evaluate amount of meals eaten  - Assist patient with eating if necessary   - Allow adequate time for meals  - Recommend/ encourage appropriate diets, oral nutritional supplements, and vitamin/mineral supplements  - Order, calculate, and assess calorie counts as needed  - Recommend, monitor, and adjust tube feedings and TPN/PPN based on assessed needs  - Assess need for intravenous fluids  - Provide specific nutrition/hydration education as appropriate  - Include patient/family/caregiver in decisions related to nutrition  Outcome: Progressing

## 2021-02-10 NOTE — PROGRESS NOTES
Progress Note - Flo Ripper 1940, [de-identified] y o  male MRN: [id number]    Unit/Bed#: S -01 Encounter: [id number]    Primary Care Provider: Rao Ham MD   Date and time admitted to hospital: 1/22/2021 12:28 PM        * Acute renal failure superimposed on stage 3 chronic kidney disease [facility])  Assessment & Plan  Lab Results   Component Value Date    EGFR 8 02/09/2021    EGFR 7 02/08/2021    EGFR 9 02/07/2021    CREATININE 5 83 (H) 02/09/2021    CREATININE 6 95 (H) 02/08/2021    CREATININE 5 74 (H) 02/07/2021     S/p cardiac cath on 1/22/21, creatinine elevated (baseline 2 3-2 5) likely due post-cath contrast ATN  Cr remains elevated, slowly downtrending  Patient is currently dialysis dependent, s/p HD Cath placement on 1/25/21  Plan  · Nephrology following consulted IR for permcath placement  · HD scheduled for Wednesday 2/10  · Patient becomes hypotensive with dialysis, per Nephrology will hold amlodipine and decrease Hydralazine from 50mg to 25 mg q8  · Continue to monitor renal indices  · Avoid nephrotoxins      Coronary artery disease  Assessment & Plan  Patient originally to [address] [place] [address] on 1/21/2021 due to chest pain with chest pain at [facility] Subsequently found to have Type 2 MI and underwent cardiac catheterization on 1/22/21 after transfer to [address] [place] [address]  Patient reports he has been asymptomatic (denies any chest pain) since cardiac cath     1/24/21 echo-was normal  Systolic function was normal  Ejection fraction was estimated to be 60 %  There was hypokinesis of the basal inferior wall (grade 2 diastolic dysfunction)  Plan  · Continue aspirin, Plavix, statin, and beta-blocker  · Monitor for s/sx           Right hip pain  Assessment & Plan  Patient continues to experience mild right hip pain, no concerning findings on on examination  X-Ray right hip unremarkable  Patient had the cardiac catheterization through the right groin    I do not appreciate any area of induration or redness  Plan  · Continue to monitor pain symptoms  · Consider Aqua K pad for discomfort    Urinary retention  Assessment & Plan  Patient with continued urinary retention s/p chowdhury catheter placed on 2/8  Plan  · Monitor urine output  · Routine catheter care    Hyponatremia  Assessment & Plan  Patient continues to have borderline low sodium, today at 132  Questionable AMS today on 2/8/21, has resolved since  Patient AAOx3 this morning on exam       Plan  · Monitor for AMS  · Neurochecks  · Nephrology following     Anemia  Assessment & Plan  Hemoglobin continues downtrending, stable at 8 this am    Plan  · Continue to monitor with daily CBC  · S/p 2 units of PRBC during this admission  · Transfuse for Hg <7, or if any active bleeding and patient becomes symptomatic  · Have consented for blood if needed    Acute on chronic diastolic congestive heart failure [facility])  Assessment & Plan  Patient does not appear fluid overloaded at this time  Will continue with HD per Nephrology recommendations  Plan    · Continue current regimen as noted under CAD  · Monitor I/Os  · Daily weights      Hx of CABG  Assessment & Plan  CAD s/p CABG in 2016; history of stenting prior to CABG (LIMA to LAD and SVG to OM)  Status post cardiac catheterization: significant triple vessel coronary artery disease  Plan  · Continue 81 ASA daily, Plavix 75mg, metoprolol 25mg  · Hold amlodpine    Leukocytosis  Assessment & Plan  Leukocytosis downtrending 13 1 considering reactive vs underlying infectious etiology  COVID-19, influenza, RSV tests were negative x2  Chest x-ray obtained read as multifocal pneumonia, repeat CXR on 2/6/2021 reporting previous parenchymal changes are resolved  Plan  · Completed 8 day course of cefepime on 2/6  · Blood cultures x2 no growth x 5 days  · Cough medicine/antitussive, lozenges and Chloraseptic spray p r n    · Continue to monitor WBC and fever curves      Type 2 diabetes mellitus with hyperglycemia, with long-term current use of insulin [facility])  Assessment & Plan  Lab Results   Component Value Date    HGBA1C 6 7 (H) 2019       Recent Labs     21  0228 21  0732 21  1043 21  1601   POCGLU 197* 162* 272* 239*       Blood Sugar Average: Last 72 hrs:  (P) 583 [id number]     Plan  · Endocrinology previously consulted   · Recommending  Levemir 42 units BID, Humalog 25 units with meals +SSI  · BS stable, goal 140-180s during admission         VTE Pharmacologic Prophylaxis:   Pharmacologic: Heparin  Mechanical VTE Prophylaxis in Place: Yes    Discussions with Specialists or Other Care Team Provider: Nephrology    Education and Discussions with Family / Patient: Will update family    Current Length of Stay: 25 day(s)    Current Patient Status: Inpatient     Discharge Plan / Estimated Discharge Date: TBD    Code Status: Level 1 - Full Code      Subjective:   Patient resting in bed this am, states he is feeling better this morning  Noting decreased right hip pain  Objective:     Vitals:   Temp (24hrs), Av 8 °F (36 6 °C), Min:97 5 °F (36 4 °C), Max:98 °F (36 7 °C)    Temp:  [97 5 °F (36 4 °C)-98 °F (36 7 °C)] 97 5 °F (36 4 °C)  HR:  [69-75] 69  Resp:  [18] 18  BP: ()/(40-83) 114/48  SpO2:  [97 %-98 %] 98 %  There is no height or weight on file to calculate BMI  Input and Output Summary (last 24 hours): Intake/Output Summary (Last 24 hours) at 2021 1904  Last data filed at 2021 1357  Gross per 24 hour   Intake 240 ml   Output --   Net 240 ml       Physical Exam:     Physical Exam  Vitals signs reviewed  Constitutional:       Appearance: He is well-developed  HENT:      Head: Normocephalic and atraumatic  Right Ear: External ear normal       Left Ear: External ear normal       Nose: Nose normal    Eyes:      Conjunctiva/sclera: Conjunctivae normal    Neck:      Musculoskeletal: Normal range of motion and neck supple        Comments: RIJ HD cath in place  Mild bleeding noted from site  Cardiovascular:      Rate and Rhythm: Normal rate and regular rhythm  Heart sounds: Normal heart sounds  Pulmonary:      Effort: Pulmonary effort is normal       Breath sounds: Normal breath sounds  Abdominal:      General: Bowel sounds are normal       Palpations: Abdomen is soft  Genitourinary:     Comments: Marinelli catheter in place  Skin:     General: Skin is warm and dry  Neurological:      Mental Status: He is alert  Mental status is at baseline  Psychiatric:         Mood and Affect: Mood normal            Additional Data:     Labs:    Results from last 7 days   Lab Units 02/09/21  0638   WBC Thousand/uL 10 72*   HEMOGLOBIN g/dL 8 0*   HEMATOCRIT % 25 1*   PLATELETS Thousands/uL 178   NEUTROS PCT % 76*   LYMPHS PCT % 10*   MONOS PCT % 12   EOS PCT % 1     Results from last 7 days   Lab Units 02/09/21  0638   POTASSIUM mmol/L 5 4*   CHLORIDE mmol/L 94*   CO2 mmol/L 25   BUN mg/dL 89*   CREATININE mg/dL 5 83*   CALCIUM mg/dL 8 6           * I Have Reviewed All Lab Data Listed Above  * Additional Pertinent Lab Tests Reviewed:  All Labs Within Last 24 Hours Reviewed    Imaging:    Imaging Reports Reviewed Today Include: VAS pseduoaneursym study, final read still pending  Imaging Personally Reviewed by Myself Includes:  As above    Recent Cultures (last 7 days):           Last 24 Hours Medication List:   Current Facility-Administered Medications   Medication Dose Route Frequency Provider Last Rate    acetaminophen  650 mg Oral Q6H PRN Swathi Schumacher MD      aluminum-magnesium hydroxide-simethicone  30 mL Oral Q6H PRN TABITHA Herrera      aspirin  81 mg Oral Daily Michael Sierra MD      atorvastatin  40 mg Oral Daily With Fidelina Horn MD      benzonatate  100 mg Oral TID PRN TABITHA Heath      bisacodyl  10 mg Rectal Daily PRN Dorys Plaza DO      calcium carbonate  1,000 mg Oral Daily PRN Michael Sierra MD      clopidogrel  75 mg Oral Daily Nidia Dueñas MD      escitalopram  10 mg Oral HS Nidia Dueñas MD      guaiFENesin  1,200 mg Oral Q12H [facility] TABITHA Mullen      heparin (porcine)  5,000 Units Subcutaneous Q8H [facility] Faye Correa MD      hydrALAZINE  25 mg Oral [place], [place]      hydrocortisone   Topical 4x Daily PRN Lauren Canales MD      HYDROmorphone  0 2 mg Intravenous Q3H PRN Faye Correa MD      insulin detemir  35 Units Subcutaneous HS Yesenia Layne MD      insulin lispro  1-5 Units Subcutaneous HS Yesenia Layne MD      insulin lispro  1-5 Units Subcutaneous 0200 Yesenia Layne MD      insulin lispro  1-5 Units Subcutaneous TID AC Yesenia Layne MD      insulin lispro  5 Units Subcutaneous TID With Meals Yesenia Layne MD      iron polysaccharides  150 mg Oral Daily [place], PA-C      melatonin  3 mg Oral HS TABITHA Hawley      metoprolol tartrate  25 mg Oral Q12H [facility] Nidia Dueñas MD      nitroglycerin  0 4 mg Sublingual Q5 Min PRN Nidia Dueñas MD      nystatin  500,000 Units Swish & Swallow 4x Daily Faye Correa MD      ondansetron  4 mg Intravenous Q6H PRN Nidia Dueñas MD      oxyCODONE  2 5 mg Oral Q4H PRN Faey Correa MD      pantoprazole  40 mg Oral BID AC Kiko Sterling MD      phenol  1 spray Mouth/Throat Q4H PRN Faye Correa MD      pneumococcal 13-valent conjugate vaccine  0 5 mL Intramuscular Prior to discharge Nidia Dueñas MD      polyethylene glycol  17 g Oral Daily PRN Tong Simon MD      polyethylene glycol  17 g Oral BID Tong Simon MD      Pramox-PE-Glycerin-Petrolatum   Rectal BID PRN Naif Splinter, DO      senna-docusate sodium  1 tablet Oral HS Kiko Sterling MD      sodium chloride  1 spray Each Nare Q1H PRN Jaclyn Joe MD          Today, Patient Was Seen By: Lashae Austin MD    ** Please Note: This note has been constructed using a voice recognition system  **

## 2021-02-11 LAB
ANION GAP SERPL CALCULATED.3IONS-SCNC: 12 MMOL/L (ref 4–13)
BASOPHILS # BLD AUTO: 0.03 THOUSANDS/ΜL (ref 0–0.1)
BASOPHILS NFR BLD AUTO: 0 % (ref 0–1)
BUN SERPL-MCNC: 64 MG/DL (ref 5–25)
CALCIUM SERPL-MCNC: 8.3 MG/DL (ref 8.3–10.1)
CHLORIDE SERPL-SCNC: 99 MMOL/L (ref 100–108)
CO2 SERPL-SCNC: 24 MMOL/L (ref 21–32)
CREAT SERPL-MCNC: 5.01 MG/DL (ref 0.6–1.3)
EOSINOPHIL # BLD AUTO: 0.15 THOUSAND/ΜL (ref 0–0.61)
EOSINOPHIL NFR BLD AUTO: 2 % (ref 0–6)
ERYTHROCYTE [DISTWIDTH] IN BLOOD BY AUTOMATED COUNT: 13.6 % (ref 11.6–15.1)
FLUAV RNA RESP QL NAA+PROBE: NEGATIVE
FLUBV RNA RESP QL NAA+PROBE: NEGATIVE
GFR SERPL CREATININE-BSD FRML MDRD: 10 ML/MIN/1.73SQ M
GLUCOSE SERPL-MCNC: 203 MG/DL (ref 65–140)
GLUCOSE SERPL-MCNC: 250 MG/DL (ref 65–140)
GLUCOSE SERPL-MCNC: 250 MG/DL (ref 65–140)
GLUCOSE SERPL-MCNC: 281 MG/DL (ref 65–140)
GLUCOSE SERPL-MCNC: 303 MG/DL (ref 65–140)
HCT VFR BLD AUTO: 24.7 % (ref 36.5–49.3)
HGB BLD-MCNC: 7.7 G/DL (ref 12–17)
IMM GRANULOCYTES # BLD AUTO: 0.04 THOUSAND/UL (ref 0–0.2)
IMM GRANULOCYTES NFR BLD AUTO: 1 % (ref 0–2)
LYMPHOCYTES # BLD AUTO: 1.07 THOUSANDS/ΜL (ref 0.6–4.47)
LYMPHOCYTES NFR BLD AUTO: 13 % (ref 14–44)
MCH RBC QN AUTO: 30.9 PG (ref 26.8–34.3)
MCHC RBC AUTO-ENTMCNC: 31.2 G/DL (ref 31.4–37.4)
MCV RBC AUTO: 99 FL (ref 82–98)
MONOCYTES # BLD AUTO: 0.95 THOUSAND/ΜL (ref 0.17–1.22)
MONOCYTES NFR BLD AUTO: 12 % (ref 4–12)
NEUTROPHILS # BLD AUTO: 5.94 THOUSANDS/ΜL (ref 1.85–7.62)
NEUTS SEG NFR BLD AUTO: 72 % (ref 43–75)
NRBC BLD AUTO-RTO: 0 /100 WBCS
PLATELET # BLD AUTO: 143 THOUSANDS/UL (ref 149–390)
PMV BLD AUTO: 10.9 FL (ref 8.9–12.7)
POTASSIUM SERPL-SCNC: 4.6 MMOL/L (ref 3.5–5.3)
RBC # BLD AUTO: 2.49 MILLION/UL (ref 3.88–5.62)
RSV RNA RESP QL NAA+PROBE: NEGATIVE
SARS-COV-2 RNA RESP QL NAA+PROBE: NEGATIVE
SODIUM SERPL-SCNC: 135 MMOL/L (ref 136–145)
WBC # BLD AUTO: 8.18 THOUSAND/UL (ref 4.31–10.16)

## 2021-02-11 PROCEDURE — 99232 SBSQ HOSP IP/OBS MODERATE 35: CPT | Performed by: INTERNAL MEDICINE

## 2021-02-11 PROCEDURE — 85025 COMPLETE CBC W/AUTO DIFF WBC: CPT | Performed by: FAMILY MEDICINE

## 2021-02-11 PROCEDURE — 82948 REAGENT STRIP/BLOOD GLUCOSE: CPT

## 2021-02-11 PROCEDURE — 0241U HB NFCT DS VIR RESP RNA 4 TRGT: CPT | Performed by: FAMILY MEDICINE

## 2021-02-11 PROCEDURE — 80048 BASIC METABOLIC PNL TOTAL CA: CPT | Performed by: FAMILY MEDICINE

## 2021-02-11 RX ORDER — LACTULOSE 20 G/30ML
30 SOLUTION ORAL DAILY
Status: DISCONTINUED | OUTPATIENT
Start: 2021-02-11 | End: 2021-02-12 | Stop reason: HOSPADM

## 2021-02-11 RX ORDER — TORSEMIDE 100 MG/1
100 TABLET ORAL
Status: DISCONTINUED | OUTPATIENT
Start: 2021-02-11 | End: 2021-02-12 | Stop reason: HOSPADM

## 2021-02-11 RX ADMIN — GUAIFENESIN 1200 MG: 600 TABLET, EXTENDED RELEASE ORAL at 09:41

## 2021-02-11 RX ADMIN — HEPARIN SODIUM 5000 UNITS: 5000 INJECTION INTRAVENOUS; SUBCUTANEOUS at 05:14

## 2021-02-11 RX ADMIN — HEPARIN SODIUM 5000 UNITS: 5000 INJECTION INTRAVENOUS; SUBCUTANEOUS at 13:58

## 2021-02-11 RX ADMIN — POLYSACCHARIDE-IRON COMPLEX 150 MG: 150 CAPSULE ORAL at 09:41

## 2021-02-11 RX ADMIN — ESCITALOPRAM 10 MG: 10 TABLET, FILM COATED ORAL at 21:12

## 2021-02-11 RX ADMIN — TORSEMIDE 100 MG: 100 TABLET ORAL at 15:32

## 2021-02-11 RX ADMIN — HYDRALAZINE HYDROCHLORIDE 25 MG: 25 TABLET, FILM COATED ORAL at 13:58

## 2021-02-11 RX ADMIN — METOPROLOL TARTRATE 25 MG: 25 TABLET, FILM COATED ORAL at 21:13

## 2021-02-11 RX ADMIN — HEPARIN SODIUM 5000 UNITS: 5000 INJECTION INTRAVENOUS; SUBCUTANEOUS at 21:14

## 2021-02-11 RX ADMIN — INSULIN LISPRO 3 UNITS: 100 INJECTION, SOLUTION INTRAVENOUS; SUBCUTANEOUS at 02:43

## 2021-02-11 RX ADMIN — INSULIN LISPRO 2 UNITS: 100 INJECTION, SOLUTION INTRAVENOUS; SUBCUTANEOUS at 17:19

## 2021-02-11 RX ADMIN — Medication 3 MG: at 21:12

## 2021-02-11 RX ADMIN — NYSTATIN 500000 UNITS: 100000 SUSPENSION ORAL at 17:21

## 2021-02-11 RX ADMIN — HYDRALAZINE HYDROCHLORIDE 25 MG: 25 TABLET, FILM COATED ORAL at 05:15

## 2021-02-11 RX ADMIN — ASPIRIN 81 MG: 81 TABLET ORAL at 09:45

## 2021-02-11 RX ADMIN — INSULIN LISPRO 3 UNITS: 100 INJECTION, SOLUTION INTRAVENOUS; SUBCUTANEOUS at 21:05

## 2021-02-11 RX ADMIN — NYSTATIN 500000 UNITS: 100000 SUSPENSION ORAL at 21:18

## 2021-02-11 RX ADMIN — INSULIN LISPRO 1 UNITS: 100 INJECTION, SOLUTION INTRAVENOUS; SUBCUTANEOUS at 09:42

## 2021-02-11 RX ADMIN — INSULIN DETEMIR 40 UNITS: 100 INJECTION, SOLUTION SUBCUTANEOUS at 21:07

## 2021-02-11 RX ADMIN — CLOPIDOGREL BISULFATE 75 MG: 75 TABLET ORAL at 09:41

## 2021-02-11 RX ADMIN — INSULIN LISPRO 3 UNITS: 100 INJECTION, SOLUTION INTRAVENOUS; SUBCUTANEOUS at 13:59

## 2021-02-11 RX ADMIN — PANTOPRAZOLE SODIUM 40 MG: 40 TABLET, DELAYED RELEASE ORAL at 17:18

## 2021-02-11 RX ADMIN — NYSTATIN 500000 UNITS: 100000 SUSPENSION ORAL at 13:58

## 2021-02-11 RX ADMIN — GUAIFENESIN 1200 MG: 600 TABLET, EXTENDED RELEASE ORAL at 21:13

## 2021-02-11 RX ADMIN — ATORVASTATIN CALCIUM 40 MG: 40 TABLET, FILM COATED ORAL at 17:17

## 2021-02-11 RX ADMIN — PANTOPRAZOLE SODIUM 40 MG: 40 TABLET, DELAYED RELEASE ORAL at 05:15

## 2021-02-11 RX ADMIN — NYSTATIN 500000 UNITS: 100000 SUSPENSION ORAL at 09:41

## 2021-02-11 RX ADMIN — METOPROLOL TARTRATE 25 MG: 25 TABLET, FILM COATED ORAL at 09:41

## 2021-02-11 NOTE — PROGRESS NOTES
Progress Note - Jeffery Deluca 89[address] y o  male MRN: [id number]    Unit/Bed#: S -01 Encounter: [id number]      CC: diabetes f/u    Subjective:   Jeffery Deluca is a [address] o  year old male with type 2 diabetes  Feels well  No complaints  No hypoglycemia  Appetite is improving    Objective:     Vitals: Blood pressure 106/58, pulse 78, temperature 97 7 °F (36 5 °C), temperature source Oral, resp  rate 12, SpO2 100 %  ,There is no height or weight on file to calculate BMI  Intake/Output Summary (Last 24 hours) at 2/11/2021 1609  Last data filed at 2/10/2021 1945  Gross per 24 hour   Intake 120 ml   Output --   Net 120 ml       Physical Exam:  General Appearance: awake, appears stated age and cooperative  Head: Normocephalic, without obvious abnormality, atraumatic  Extremities: moves all extremities  Skin: Skin color and temperature normal    Pulm: no labored breathing    Lab, Imaging and other studies: I have personally reviewed pertinent reports  POC Glucose (mg/dl)   Date Value   02/11/2021 250 (H)   02/11/2021 303 (H)   02/11/2021 203 (H)   02/10/2021 287 (H)   02/10/2021 266 (H)   02/10/2021 261 (H)   02/10/2021 226 (H)   02/10/2021 128   02/10/2021 118   02/10/2021 219 (H)       Assessment and Plan:  1  Type 2 diabetes mellitus and hyperglycemia  2  End-stage renal disease on dialysis  Noted to have hyperglycemia throughout the  Recommended fine for now  -increase Levemir to 40 units subcutaneously at bedtime  -increase Humalog to 10 units with meals 3 times a day  -continue sliding scale insulin  Will continue to follow and make changes as needed       Portions of the record may have been created with voice recognition software  Occasional wrong word or "sound a like" substitutions may have occurred due to the inherent limitations of voice recognition software  Read the chart carefully and recognize, using context, where substitutions have occurred

## 2021-02-11 NOTE — PROGRESS NOTES
NEPHROLOGY PROGRESS NOTE   Marbin Dugan [de-identified] y o  male MRN: [id number]  Unit/Bed#: S -01 Encounter: [id number]  Reason for Consult: BRIAN    ASSESSMENT/PLAN:  1  Acute Kidney Injury- secondary to ATN from contrast induced nephropathy in the setting of cardiac catheterization  - started dialysis on 1/25/21  - currently on a MWF schedule  - hepatitis panel nonreactive  - monitor for renal recovery, no evidence yet  - placement pending at [address] [place] [address]  2  Access- permanent catheter placed 2/10/21 by IR  3  Chronic kidney Disease stage IV- Baseline creatinine is 2-2 5  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 due to diabetic nephropathy and hypertensive nephrosclerosis  Follows with [facility], Dr Heath Anderson  Of note, he required dialysis in August 2019 for BRIAN which resolved  4  Acute on Chronic CHF- improved  - torsemide 100mg on non HD days as an outpatient  5  Hypertension- BP improved with lower antihypertensive regimen  - continue hold parameters and monitor  6  Anemia- secondary to CKD  - iron saturation low but ferritin elevated  - recommend oral iron supplement  7  Hyponatremia- continue to challenge UF on HD  - fluid restriction of 1500ml/day  8  Hyperkalemia- resolved  9  Urinary Retention- s/p chowdhury catheter placed 2/8/21    Disposition:  Awaiting outpatient HD placement  Okay to be discharged once finalized  SUBJECTIVE:  Patient states he has to have a bowel movement  Aide notified  Otherwise, denies acute complaints  Patient denies acute SOB      OBJECTIVE:  Current Weight:    Vitals:    02/10/21 2045 02/11/21 0055 02/11/21 0514 02/11/21 0716   BP: 100/54 132/66 134/62 136/61   BP Location: Right arm Left arm  Left arm   Pulse: 82 74  77   Resp: 20 15  12   Temp: 99 1 °F (37 3 °C) 98 °F (36 7 °C)  97 5 °F (36 4 °C)   TempSrc: Oral Oral  Oral   SpO2: 100% 98%  97%       Intake/Output Summary (Last 24 hours) at 2/11/2021 1139  Last data filed at 2/10/2021 1945  Gross per 24 hour   Intake 540 ml   Output 1600 ml   Net -1060 ml     General: NAD  Skin: no rash  Eyes: anicteric  ENMT: mm moist  Neck: no masses  Respiratory: decreased breath sounds  Cardiac: RRR  Extremities: no edema  GI: soft nt nd  Neuro: alert awake  Psych: mood and affect appropriate    Medications:    Current Facility-Administered Medications:     acetaminophen (TYLENOL) tablet 650 mg, 650 mg, Oral, Q6H PRN, Michael Sierra MD, 650 mg at 02/08/21 9156    aluminum-magnesium hydroxide-simethicone (MYLANTA) oral suspension 30 mL, 30 mL, Oral, Q6H PRN, TABITHA Herrera    aspirin (ECOTRIN LOW STRENGTH) EC tablet 81 mg, 81 mg, Oral, Daily, Swathi Schumacher MD, 81 mg at 02/11/21 0945    atorvastatin (LIPITOR) tablet 40 mg, 40 mg, Oral, Daily With Liza Griffin MD, 40 mg at 02/10/21 1713    benzonatate (TESSALON PERLES) capsule 100 mg, 100 mg, Oral, TID PRN, TABITHA Heath    bisacodyl (DULCOLAX) rectal suppository 10 mg, 10 mg, Rectal, Daily PRN, Dorys Plaza DO, 10 mg at 02/09/21 1205    calcium carbonate (TUMS) chewable tablet 1,000 mg, 1,000 mg, Oral, Daily PRN, Michael Sierra MD, 1,000 mg at 02/09/21 1001    clopidogrel (PLAVIX) tablet 75 mg, 75 mg, Oral, Daily, Swathi Schumacher MD, 75 mg at 02/11/21 0941    escitalopram (LEXAPRO) tablet 10 mg, 10 mg, Oral, HS, Swathi Schumcaher MD, 10 mg at 02/10/21 2146    guaiFENesin (MUCINEX) 12 hr tablet 1,200 mg, 1,200 mg, Oral, Q12H [facility], TABITHA Heath, 1,200 mg at 02/11/21 0941    heparin (porcine) subcutaneous injection 5,000 Units, 5,000 Units, Subcutaneous, Q8H [facility], Fyae Correa MD, 5,000 Units at 02/11/21 0514    hydrALAZINE (APRESOLINE) tablet 25 mg, 25 mg, Oral, Q8H [facility], [facility], PA-C, 25 mg at 02/11/21 0515    hydrocortisone 1 % cream, , Topical, 4x Daily PRN, Kevin Jenkins MD, Given at 02/06/21 0242    HYDROmorphone (DILAUDID) injection 0 2 mg, 0 2 mg, Intravenous, Q3H PRN, Faye Correa MD, 0 2 mg at 02/08/21 0801    insulin detemir (LEVEMIR) subcutaneous injection 35 Units, 35 Units, Subcutaneous, HS, Salvatore Heredia MD, 35 Units at 02/10/21 2147    insulin lispro (HumaLOG) 100 units/mL subcutaneous injection 1-5 Units, 1-5 Units, Subcutaneous, HS, Salvatore Heredia MD, 3 Units at 02/10/21 2149    insulin lispro (HumaLOG) 100 units/mL subcutaneous injection 1-5 Units, 1-5 Units, Subcutaneous, 0200, Salvatore Heredia MD, 3 Units at 02/11/21 0243    insulin lispro (HumaLOG) 100 units/mL subcutaneous injection 1-5 Units, 1-5 Units, Subcutaneous, TID AC, 1 Units at 02/11/21 0942 **AND** Fingerstick Glucose (POCT), , , TID AC, Salvatore Heredia MD    insulin lispro (HumaLOG) 100 units/mL subcutaneous injection 8 Units, 8 Units, Subcutaneous, TID With Meals, Salvatore Heredia MD, 8 Units at 02/11/21 0942    iron polysaccharides (FERREX) capsule 150 mg, 150 mg, Oral, Daily, RENETTA Chakraborty, 150 mg at 02/11/21 0941    lactulose oral solution 30 g, 30 g, Oral, Daily, Karen Randall MD, Stopped at 02/11/21 0938    melatonin tablet 3 mg, 3 mg, Oral, HS, Pina Michael, TABITHA, 3 mg at 02/10/21 2146    metoprolol tartrate (LOPRESSOR) tablet 25 mg, 25 mg, Oral, Q12H [facility], Nestor Schumacehr MD, 25 mg at 02/11/21 0941    nitroglycerin (NITROSTAT) SL tablet 0 4 mg, 0 4 mg, Sublingual, Q5 Min PRN, Gricelad Hanks MD    nystatin (MYCOSTATIN) oral suspension 500,000 Units, 500,000 Units, Swish & Swallow, 4x Daily, Faye Correa MD, 500,000 Units at 02/11/21 0941    ondansetron (ZOFRAN) injection 4 mg, 4 mg, Intravenous, Q6H PRN, Gricelda Hanks MD, 4 mg at 01/29/21 1352    oxyCODONE (ROXICODONE) IR tablet 2 5 mg, 2 5 mg, Oral, Q4H PRN, Faye Correa MD, 2 5 mg at 02/05/21 1045    pantoprazole (PROTONIX) EC tablet 40 mg, 40 mg, Oral, BID AC, Maru Rebollar MD, 40 mg at 02/11/21 0515    phenol (CHLORASEPTIC) 1 4 % mucosal liquid 1 spray, 1 spray, Mouth/Throat, Q4H PRN, Faye Correa MD    pneumococcal 13-valent conjugate vaccine (PREVNAR-13) IM injection 0 5 mL, 0 5 mL, Intramuscular, Prior to discharge, Sam Moore MD    polyethylene glycol (MIRALAX) packet 17 g, 17 g, Oral, Daily PRN, Sharyn Mirza MD, 17 g at 02/05/21 1727    polyethylene glycol (MIRALAX) packet 17 g, 17 g, Oral, BID, Sharyn Mirza MD, Stopped at 02/11/21 0941    Pramox-PE-Glycerin-Petrolatum (PREPARATION H MAX) 1-0 25-14 4-15 % rectal cream, , Rectal, BID PRN, Jordan Mojica DO, Given at 02/09/21 2059    senna-docusate sodium (SENOKOT S) 8 6-50 mg per tablet 1 tablet, 1 tablet, Oral, HS, Young Herrera MD, 1 tablet at 02/08/21 2103    sodium chloride (OCEAN) 0 65 % nasal spray 1 spray, 1 spray, Each Nare, Q1H PRN, Samy Powers MD, 1 spray at 02/08/21 1703    Laboratory Results:  Results from last 7 days   Lab Units 02/11/21  0504 02/10/21  0459 02/09/21  0589 02/08/21  0608 02/07/21  0500 02/06/21  0817 02/06/21  0513 02/05/21  0525   WBC Thousand/uL 8 18 11 26* 10 72* 13 13* 13 40* 16 66*  --  15 80*   HEMOGLOBIN g/dL 7 7* 7 5* 8 0* 7 6* 8 1* 8 3*  --  7 6*   HEMATOCRIT % 24 7* 23 5* 25 1* 23 2* 25 4* 25 8*  --  22 7*   PLATELETS Thousands/uL 143* 158 178 159 165 174  --  171   POTASSIUM mmol/L 4 6 4 2 5 4* 4 2 3 8  --  4 1 3 7   CHLORIDE mmol/L 99* 94* 94* 95* 95*  --  97* 94*   CO2 mmol/L 24 23 25 22 22  --  24 20*   BUN mg/dL 64* 104* 89* 129* 116*  --  98* 142*   CREATININE mg/dL 5 01* 6 94* 5 83* 6 95* 5 74*  --  4 77* 5 78*   CALCIUM mg/dL 8 3 8 2* 8 6 8 3 8 2*  --  8 1* 8 3

## 2021-02-11 NOTE — PLAN OF CARE
Problem: Potential for Falls  Goal: Patient will remain free of falls  Description: INTERVENTIONS:  - Assess patient frequently for physical needs  -  Identify cognitive and physical deficits and behaviors that affect risk of falls    -  [place] fall precautions as indicated by assessment   - Educate patient/family on patient safety including physical limitations  - Instruct patient to call for assistance with activity based on assessment  - Modify environment to reduce risk of injury  - Consider OT/PT consult to assist with strengthening/mobility  Outcome: Progressing     Problem: CARDIOVASCULAR - ADULT  Goal: Maintains optimal cardiac output and hemodynamic stability  Description: INTERVENTIONS:  - Monitor I/O, vital signs and rhythm  - Monitor for S/S and trends of decreased cardiac output  - Administer and titrate ordered vasoactive medications to optimize hemodynamic stability  - Assess quality of pulses, skin color and temperature  - Assess for signs of decreased coronary artery perfusion  - Instruct patient to report change in severity of symptoms  Outcome: Progressing  Goal: Absence of cardiac dysrhythmias or at baseline rhythm  Description: INTERVENTIONS:  - Continuous cardiac monitoring, vital signs, obtain 12 lead EKG if ordered  - Administer antiarrhythmic and heart rate control medications as ordered  - Monitor electrolytes and administer replacement therapy as ordered  Outcome: Progressing     Problem: RESPIRATORY - ADULT  Goal: Achieves optimal ventilation and oxygenation  Description: INTERVENTIONS:  - Assess for changes in respiratory status  - Assess for changes in mentation and behavior  - Position to facilitate oxygenation and minimize respiratory effort  - Oxygen administered by appropriate delivery if ordered  - Initiate smoking cessation education as indicated  - Encourage broncho-pulmonary hygiene including cough, deep breathe, Incentive Spirometry  - Assess the need for suctioning and aspirate as needed  - Assess and instruct to report SOB or any respiratory difficulty  - Respiratory Therapy support as indicated  Outcome: Progressing     Problem: GASTROINTESTINAL - ADULT  Goal: Maintains adequate nutritional intake  Description: INTERVENTIONS:  - Monitor percentage of each meal consumed  - Identify factors contributing to decreased intake, treat as appropriate  - Assist with meals as needed  - Monitor I&O, weight, and lab values if indicated  - Obtain nutrition services referral as needed  Outcome: Progressing     Problem: GENITOURINARY - ADULT  Goal: Maintains or returns to baseline urinary function  Description: INTERVENTIONS:  - Assess urinary function  - Encourage oral fluids to ensure adequate hydration if ordered  - Administer IV fluids as ordered to ensure adequate hydration  - Administer ordered medications as needed  - Offer frequent toileting  - Follow urinary retention protocol if ordered  Outcome: Progressing  Goal: Absence of urinary retention  Description: INTERVENTIONS:  - Assess patients ability to void and empty bladder  - Monitor I/O  - Bladder scan as needed  - Discuss with physician/AP medications to alleviate retention as needed  - Discuss catheterization for long term situations as appropriate  Outcome: Progressing

## 2021-02-11 NOTE — PROGRESS NOTES
Progress Note - Marky Ruiz 1940, [de-identified] y o  male MRN: [id number]    Unit/Bed#: S -01 Encounter: [id number]    Primary Care Provider: Laure Ahumada MD   Date and time admitted to hospital: 1/22/2021 12:28 PM        * Acute renal failure superimposed on stage 3 chronic kidney disease [facility])  Assessment & Plan  Lab Results   Component Value Date    EGFR 8 02/09/2021    EGFR 7 02/08/2021    EGFR 9 02/07/2021    CREATININE 5 83 (H) 02/09/2021    CREATININE 6 95 (H) 02/08/2021    CREATININE 5 74 (H) 02/07/2021     S/p cardiac cath on 1/22/21, creatinine elevated (baseline 2 3-2 5) likely due post-cath contrast ATN  Cr remains elevated, slowly downtrending  Patient is currently dialysis dependent, s/p HD Cath placement on 1/25/21  Plan  · Patient s/p perm cath placement on 2/10 by IR  · Nephrology following, patient stable for discharge pending outpatient HD placement  · Start Torsemide 100mg daily on non-HD days  · Start Epogen 6000 units with HD  · Per Nephrology d/c amlodipine and continue Hydralazine at decreased dose        Coronary artery disease  Assessment & Plan  Patient originally to [address] [place] [address] on 1/21/2021 due to chest pain with chest pain at [facility] Subsequently found to have Type 2 MI and underwent cardiac catheterization on 1/22/21 after transfer to [facility]  Patient reports he has been asymptomatic (denies any chest pain) since cardiac cath     1/24/21 echo-was normal  Systolic function was normal  Ejection fraction was estimated to be 60 %  There was hypokinesis of the basal inferior wall (grade 2 diastolic dysfunction)  Plan  · Continue aspirin, Plavix, statin, and beta-blocker  · Monitor for s/sx           Right hip pain  Assessment & Plan  Patient continues to experience mild right hip pain, no concerning findings on on examination  X-Ray right hip unremarkable  Patient had the cardiac catheterization through the right groin    I do not appreciate any area of induration or redness  Plan  · Continue to monitor pain symptoms  · Consider Aqua K pad for discomfort    Urinary retention  Assessment & Plan  Patient with continued urinary retention s/p chowdhury catheter placed on 2/8  Plan  · Monitor urine output  · Routine catheter care    Hyponatremia  Assessment & Plan  Patient continues to have borderline low sodium, today at 132  Questionable AMS today on 2/8/21, has resolved since  Patient AAOx3 this morning on exam       Plan  · Monitor for AMS  · Neurochecks  · Nephrology following     Anemia  Assessment & Plan  Hemoglobin continues downtrending, stable at 8 this am    Plan  · Continue to monitor with daily CBC  · S/p 2 units of PRBC during this admission  · Transfuse for Hg <7, or if any active bleeding and patient becomes symptomatic  · Have consented for blood if needed    Acute on chronic diastolic congestive heart failure [facility])  Assessment & Plan  Patient does not appear fluid overloaded at this time  Will continue with HD per Nephrology recommendations  Plan    · Continue current regimen as noted under CAD  · Monitor I/Os  · Daily weights      Hx of CABG  Assessment & Plan  CAD s/p CABG in 2016; history of stenting prior to CABG (LIMA to LAD and SVG to OM)  Status post cardiac catheterization: significant triple vessel coronary artery disease  Plan  · Continue 81 ASA daily, Plavix 75mg, metoprolol 25mg  · Hold amlodpine    Leukocytosis  Assessment & Plan  Leukocytosis downtrending 13 1 considering reactive vs underlying infectious etiology  COVID-19, influenza, RSV tests were negative x2  Chest x-ray obtained read as multifocal pneumonia, repeat CXR on 2/6/2021 reporting previous parenchymal changes are resolved  Plan  · Completed 8 day course of cefepime on 2/6  · Blood cultures x2 no growth x 5 days  · Cough medicine/antitussive, lozenges and Chloraseptic spray p r n    · Continue to monitor WBC and fever curves      Type 2 diabetes mellitus with hyperglycemia, with long-term current use of insulin [facility])  Assessment & Plan  Lab Results   Component Value Date    HGBA1C 6 7 (H) 2019       Recent Labs     21  0228 21  0732 21  1043 21  1601   POCGLU 197* 162* 272* 239*       Blood Sugar Average: Last 72 hrs:  (P) 008 [id number]     Plan  · Endocrinology previously consulted   · Recommending  Levemir 42 units BID, Humalog 25 units with meals +SSI  · BS stable, goal 140-180s during admission         VTE Pharmacologic Prophylaxis:   Pharmacologic: Heparin  Mechanical VTE Prophylaxis in Place: Yes    Discussions with Specialists or Other Care Team Provider: Nephrology    Education and Discussions with Family / Patient: Will update family    Current Length of Stay: 20 day(s)    Current Patient Status: Inpatient     Discharge Plan / Estimated Discharge Date: TBD    Code Status: Level 1 - Full Code      Subjective:   Patient resting in bed this am, states he is feeling better this morning  Noting decreased right hip pain  Objective:     Vitals:   Temp (24hrs), Av 1 °F (36 7 °C), Min:97 5 °F (36 4 °C), Max:99 1 °F (37 3 °C)    Temp:  [97 5 °F (36 4 °C)-99 1 °F (37 3 °C)] 97 7 °F (36 5 °C)  HR:  [68-82] 78  Resp:  [12-20] 12  BP: (100-139)/(54-66) 106/58  SpO2:  [97 %-100 %] 100 %  There is no height or weight on file to calculate BMI  Input and Output Summary (last 24 hours): Intake/Output Summary (Last 24 hours) at 2021 1702  Last data filed at 2/10/2021 1945  Gross per 24 hour   Intake 120 ml   Output --   Net 120 ml       Physical Exam:     Physical Exam  Vitals signs reviewed  Constitutional:       Appearance: He is well-developed  HENT:      Head: Normocephalic and atraumatic  Right Ear: External ear normal       Left Ear: External ear normal       Nose: Nose normal    Eyes:      Conjunctiva/sclera: Conjunctivae normal    Neck:      Musculoskeletal: Normal range of motion and neck supple        Comments: ALONA CATALAN cath in place  Mild bleeding noted from site  Cardiovascular:      Rate and Rhythm: Normal rate and regular rhythm  Heart sounds: Normal heart sounds  Pulmonary:      Effort: Pulmonary effort is normal       Breath sounds: Normal breath sounds  Abdominal:      General: Bowel sounds are normal       Palpations: Abdomen is soft  Genitourinary:     Comments: Marinelli catheter in place  Skin:     General: Skin is warm and dry  Neurological:      Mental Status: He is alert  Mental status is at baseline  Psychiatric:         Mood and Affect: Mood normal            Additional Data:     Labs:    Results from last 7 days   Lab Units 02/11/21  0504   WBC Thousand/uL 8 18   HEMOGLOBIN g/dL 7 7*   HEMATOCRIT % 24 7*   PLATELETS Thousands/uL 143*   NEUTROS PCT % 72   LYMPHS PCT % 13*   MONOS PCT % 12   EOS PCT % 2     Results from last 7 days   Lab Units 02/11/21  0504   POTASSIUM mmol/L 4 6   CHLORIDE mmol/L 99*   CO2 mmol/L 24   BUN mg/dL 64*   CREATININE mg/dL 5 01*   CALCIUM mg/dL 8 3           * I Have Reviewed All Lab Data Listed Above  * Additional Pertinent Lab Tests Reviewed:  All Labs Within Last 24 Hours Reviewed    Imaging:    Imaging Reports Reviewed Today Include: No new imaging  Imaging Personally Reviewed by Myself Includes:  As above    Recent Cultures (last 7 days):           Last 24 Hours Medication List:   Current Facility-Administered Medications   Medication Dose Route Frequency Provider Last Rate    acetaminophen  650 mg Oral Q6H PRN Swathi Schumacher MD      aluminum-magnesium hydroxide-simethicone  30 mL Oral Q6H PRN Woodward Goodell, CRNP      aspirin  81 mg Oral Daily Christy Park MD      atorvastatin  40 mg Oral Daily With Ajay Delvalle MD      benzonatate  100 mg Oral TID PRN TABITHA Vasquez      bisacodyl  10 mg Rectal Daily PRN Pro Severino DO      calcium carbonate  1,000 mg Oral Daily PRN Christy Park MD      clopidogrel  75 mg Oral Daily Swathi MD Winsome      epoetin thu  6,000 Units Intravenous After Dialysis [facility], PA-C      escitalopram  10 mg Oral HS Marielena Altman MD      guaiFENesin  1,200 mg Oral Q12H [address] TABITHA Harper      heparin (porcine)  5,000 Units Subcutaneous Q8H [address] Faye Correa MD      hydrALAZINE  25 mg Oral [place], [place]      hydrocortisone   Topical 4x Daily PRN Glendy Lisa MD      HYDROmorphone  0 2 mg Intravenous Q3H PRN Faye Correa MD      insulin detemir  40 Units Subcutaneous HS Kristofer Eckert MD      insulin lispro  1-5 Units Subcutaneous HS Kristofer Eckert MD      insulin lispro  1-5 Units Subcutaneous 0200 Kristofer Eckert MD      insulin lispro  1-5 Units Subcutaneous TID AC Kristofer Eckert MD      insulin lispro  10 Units Subcutaneous TID With Meals Kristofer Eckert MD      iron polysaccharides  150 mg Oral Daily [facility], PA-C      lactulose  30 g Oral Daily Lindsay Monday, MD      melatonin  3 mg Oral HS TABITHA Wiggins      metoprolol tartrate  25 mg Oral Q12H [address] Marielena Altman MD      nitroglycerin  0 4 mg Sublingual Q5 Min PRN Marielena Altman MD      nystatin  500,000 Units Swish & Swallow 4x Daily Faye Correa MD      ondansetron  4 mg Intravenous Q6H PRN Marielena Altman MD      oxyCODONE  2 5 mg Oral Q4H PRN Faye Correa MD      pantoprazole  40 mg Oral BID AC Stefania Barnard MD      phenol  1 spray Mouth/Throat Q4H PRN Faye Correa MD      pneumococcal 13-valent conjugate vaccine  0 5 mL Intramuscular Prior to discharge Marielena Altman MD      polyethylene glycol  17 g Oral Daily PRN Ronna Ferguson MD      polyethylene glycol  17 g Oral BID Ronna Ferguson MD      Pramox-PE-Glycerin-Petrolatum   Rectal BID PRN Kutr Henderson DO      senna-docusate sodium  1 tablet Oral HS Stefania Barnard MD      sodium chloride  1 spray Each Nare Q1H PRN Abdullahi Herman MD      torsemide  100 mg Oral Once per day on Alex SPARKS JNK JOSLYNY Jean-Claude Dong MD          Today, Patient Was Seen By: Violet Barrett MD    ** Please Note: This note has been constructed using a voice recognition system   **

## 2021-02-11 NOTE — CASE MANAGEMENT
CM made aware by care team that patient would be medically stable pending OP HD set up confirmation of schedule and STR placement  CM was speaking to Esther Morelos with [facility] and Nupur Thompson in admissions with [facility] in [place] throughout the day to coordinate  Nupur Thompson reports after confirming with patient they are able to accept at the [facility] as that is patient's location preference  Nupur Thompson reports they are able to accept patient today and CM requested verification if insurance auth was being waived, or if they were accepting auth pending and submitting for auth  After back and forth with [facility] patient is accepted by [address] for Anup Odell 78 5 am chair time  CM updated facility and care team  Per conversation with Esther Morelos with [facility], CM to call dialysis center in AM to see if they can make an exception for patient to start dialysis with their center this Saturday (as most centers do not start new patients on a Saturday), and DC to [facility] tomorrow as the liaison for the center already left for the day  CM to follow up in AM with [address] regarding dialysis start date, Esther Morelos [facility] Coordinator, and [facility] to confirm DCP

## 2021-02-11 NOTE — PROGRESS NOTES
Patient resting in bed comfortably  No signs of distress noted  Bed is in lowest position & locked with bed alarm on and audible  Call bell is within reach  Will continue to monitor

## 2021-02-12 ENCOUNTER — APPOINTMENT (INPATIENT)
Dept: DIALYSIS | Facility: HOSPITAL | Age: 81
DRG: 280 | End: 2021-02-12
Payer: COMMERCIAL

## 2021-02-12 VITALS
TEMPERATURE: 97.9 F | SYSTOLIC BLOOD PRESSURE: 122 MMHG | OXYGEN SATURATION: 97 % | DIASTOLIC BLOOD PRESSURE: 58 MMHG | RESPIRATION RATE: 18 BRPM | HEART RATE: 77 BPM

## 2021-02-12 PROBLEM — D72.829 LEUKOCYTOSIS: Status: RESOLVED | Noted: 2021-01-21 | Resolved: 2021-02-12

## 2021-02-12 LAB
ANION GAP SERPL CALCULATED.3IONS-SCNC: 16 MMOL/L (ref 4–13)
BUN SERPL-MCNC: 83 MG/DL (ref 5–25)
CALCIUM SERPL-MCNC: 8.5 MG/DL (ref 8.3–10.1)
CHLORIDE SERPL-SCNC: 97 MMOL/L (ref 100–108)
CO2 SERPL-SCNC: 22 MMOL/L (ref 21–32)
CREAT SERPL-MCNC: 6.31 MG/DL (ref 0.6–1.3)
GFR SERPL CREATININE-BSD FRML MDRD: 8 ML/MIN/1.73SQ M
GLUCOSE SERPL-MCNC: 152 MG/DL (ref 65–140)
GLUCOSE SERPL-MCNC: 152 MG/DL (ref 65–140)
GLUCOSE SERPL-MCNC: 159 MG/DL (ref 65–140)
GLUCOSE SERPL-MCNC: 207 MG/DL (ref 65–140)
POTASSIUM SERPL-SCNC: 4.5 MMOL/L (ref 3.5–5.3)
SODIUM SERPL-SCNC: 135 MMOL/L (ref 136–145)

## 2021-02-12 PROCEDURE — 82948 REAGENT STRIP/BLOOD GLUCOSE: CPT

## 2021-02-12 PROCEDURE — 99238 HOSP IP/OBS DSCHRG MGMT 30/<: CPT | Performed by: INTERNAL MEDICINE

## 2021-02-12 PROCEDURE — 99232 SBSQ HOSP IP/OBS MODERATE 35: CPT | Performed by: INTERNAL MEDICINE

## 2021-02-12 PROCEDURE — 80048 BASIC METABOLIC PNL TOTAL CA: CPT | Performed by: PHYSICIAN ASSISTANT

## 2021-02-12 RX ORDER — TORSEMIDE 100 MG/1
100 TABLET ORAL
Qty: 30 TABLET | Refills: 0 | Status: SHIPPED | OUTPATIENT
Start: 2021-02-13 | End: 2021-03-22 | Stop reason: HOSPADM

## 2021-02-12 RX ORDER — HYDRALAZINE HYDROCHLORIDE 25 MG/1
25 TABLET, FILM COATED ORAL EVERY 8 HOURS SCHEDULED
Qty: 30 TABLET | Refills: 0 | Status: SHIPPED | OUTPATIENT
Start: 2021-02-12 | End: 2021-01-01 | Stop reason: HOSPADM

## 2021-02-12 RX ADMIN — CLOPIDOGREL BISULFATE 75 MG: 75 TABLET ORAL at 11:25

## 2021-02-12 RX ADMIN — HEPARIN SODIUM 5000 UNITS: 5000 INJECTION INTRAVENOUS; SUBCUTANEOUS at 05:01

## 2021-02-12 RX ADMIN — NYSTATIN 500000 UNITS: 100000 SUSPENSION ORAL at 09:00

## 2021-02-12 RX ADMIN — NYSTATIN 500000 UNITS: 100000 SUSPENSION ORAL at 11:25

## 2021-02-12 RX ADMIN — INSULIN LISPRO 1 UNITS: 100 INJECTION, SOLUTION INTRAVENOUS; SUBCUTANEOUS at 12:31

## 2021-02-12 RX ADMIN — INSULIN LISPRO 1 UNITS: 100 INJECTION, SOLUTION INTRAVENOUS; SUBCUTANEOUS at 08:26

## 2021-02-12 RX ADMIN — PANTOPRAZOLE SODIUM 40 MG: 40 TABLET, DELAYED RELEASE ORAL at 11:27

## 2021-02-12 RX ADMIN — ACETAMINOPHEN 650 MG: 325 TABLET, FILM COATED ORAL at 05:01

## 2021-02-12 RX ADMIN — POLYSACCHARIDE-IRON COMPLEX 150 MG: 150 CAPSULE ORAL at 11:26

## 2021-02-12 RX ADMIN — EPOETIN ALFA 6000 UNITS: 3000 SOLUTION INTRAVENOUS; SUBCUTANEOUS at 10:19

## 2021-02-12 RX ADMIN — ASPIRIN 81 MG: 81 TABLET ORAL at 11:26

## 2021-02-12 RX ADMIN — GUAIFENESIN 1200 MG: 600 TABLET, EXTENDED RELEASE ORAL at 11:27

## 2021-02-12 NOTE — DISCHARGE INSTR - AVS FIRST PAGE
Dear Bharat Acosta,     It was our pleasure to care for you here at [facility]  It is our hope that we were always able to exceed the expected standards for your care during your stay  You were hospitalized due to Acute Renal Failure  You were cared for on the 3rd floor by Rubina Mcmanus MD under the service of Mannie Boudreaux MD with the [address] [place] [address] Internal [facility]ist [facility] who covers for your primary care physician (PCP), Malcolm Leal MD, while you were hospitalized  If you have any questions or concerns related to this hospitalization, you may contact us at [phone]  For follow up as well as any medication refills, we recommend that you follow up with your primary care physician  A registered nurse will reach out to you by phone within a few days after your discharge to answer any additional questions that you may have after going home  However, at this time we provide for you here, the most important instructions / recommendations at discharge:     · Notable Medication Adjustments -   · Start Torsemide 100 mg daily on non HD days  · Continue on Hydralazine at decreased dose of 25mg every 8 hours  · Start Epogen 6000 units with HD  · Testing Required after Discharge -   · None  · Important follow up information -   · Follow up with Nephrology outpatient  · Other Instructions -   · Patient scheduled for HD on Satudray  · Please review this entire after visit summary as additional general instructions including medication list, appointments, activity, diet, any pertinent wound care, and other additional recommendations from your care team that may be provided for you        Sincerely,     Rubina Mcmanus MD

## 2021-02-12 NOTE — PLAN OF CARE
Pt received , report from nurse Maru Hawkins RN  Goal set for 500ml      Post-Dialysis RN Treatment Note    Blood Pressure:  Pre 105/52  61    mm/Hg  Post 122/58  77      mmHg   EDW   kg    Weight:  Pre  89 4 kg   Post 88 9 kg   Mode of weight measurement: bed   Volume Removed 500   ml    Treatment duration 120 minutes    NS given      Treatment shortened?  No   Medications given during Rx Epo 6000   Estimated Kt/V    Access type: CVC   Access Status:  Patent infusing   Report called to primary nurse   YES Saint Bumps, RN    Problem: METABOLIC, FLUID AND ELECTROLYTES - ADULT  Goal: Electrolytes maintained within normal limits  Description: INTERVENTIONS:  - Monitor labs and assess patient for signs and symptoms of electrolyte imbalances  - Administer electrolyte replacement as ordered  - Monitor response to electrolyte replacements, including repeat lab results as appropriate  - Instruct patient on fluid and nutrition as appropriate  Outcome: Progressing

## 2021-02-12 NOTE — OCCUPATIONAL THERAPY NOTE
OccupationalTherapy Progress Note     Patient Name: Ammy Reinoso  NQPWS'H Date: 2/12/2021  Problem List  Principal Problem:    Acute renal failure superimposed on stage 3 chronic kidney disease ([address] )  Active Problems:    Coronary artery disease    Type 2 diabetes mellitus with hyperglycemia, with long-term current use of insulin (HCC)    Leukocytosis    Hx of CABG    Acute on chronic diastolic congestive heart failure (HCC)    Anemia    Hyponatremia    Urinary retention    Right hip pain          02/12/21 0826   OT Last Visit   OT Visit Date 02/12/21  (Friday)   Note Type   Note Type Treatment   Cancel Reasons Other  (HD at bedside)   Activity Tolerance   Medical Staff Made Aware spoke to Maru AHUJA and Tanya BENSON   Assessment   Assessment Chart review completed  Per Maru AHUJA pt is unavailable to participate in OT tx session due to HD at bedside   Will continue to follow as appropriate and schedule allows       Kassy Oates OT

## 2021-02-12 NOTE — PHYSICAL THERAPY NOTE
PHYSICAL THERAPY CANCELLATION NOTE    Patient Name: Obey Lopez  NJGAG'X Date: 2/12/2021 02/12/21 6121   Note Type   Note Type Treatment   Cancel Reasons Other  (HD @ bedside)   Assessment   Assessment Attempted to see pt for PT intervention, however pt currently receiving HD at bedside and unable to participate at this time  PT will continue to follow as appropriate and schedule allows          Maycol Arias, PT, DPT

## 2021-02-12 NOTE — PROGRESS NOTES
NEPHROLOGY PROGRESS NOTE   Isatu Napier [de-identified] y o  male MRN: [id number]  Unit/Bed#: S -01 Encounter: [id number]  Reason for Consult: BRIAN/CKD/HD    ASSESSMENT/PLAN:  1  Acute Kidney Injury- secondary to ATN from contrast induced nephropathy in the setting of cardiac catheterization  - started dialysis on 1/25/21  - currently on a MWF schedule, received 2 hours today and will transition to TTS at [facility] starting tomorrow  - hepatitis panel nonreactive  - monitor for renal recovery, no evidence yet  - placement confirmed at [facility] first shift  2  Access- permanent catheter placed 2/10/21 by IR  3  Chronic kidney Disease stage IV- Baseline creatinine is 2-2  5   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 due to diabetic nephropathy and hypertensive nephrosclerosis   Follows with [address] nephrology, Dr Chanelle Aparicio   Of note, he required dialysis in August 2019 for BRIAN which resolved  4  Acute on Chronic CHF- improved  - torsemide 100mg on non HD days as an outpatient  5  Hypertension- BP improved with lower antihypertensive regimen  - continue hold parameters and monitor  6  Anemia- secondary to CKD  - iron saturation low but ferritin elevated  - recommend oral iron supplement  - given epogen 6000 units 2/12/21 with HD  7  Hyponatremia- continue to challenge UF on HD  - fluid restriction of 1500ml/day  8  Hyperkalemia- resolved  9  Urinary Retention- s/p chowdhury catheter placed 2/8/21    Disposition:  Okay to discharge today from a renal standpoint  Discussed with case management today  SUBJECTIVE:  Patient denies SOB  Feeling well overall but does have to use the bathroom for a bowel movement      OBJECTIVE:  Current Weight:    Vitals:    02/12/21 0930 02/12/21 1000 02/12/21 1030 02/12/21 1038   BP: 107/53 102/53 100/57 122/58   BP Location: Left arm Left arm Left arm Left arm   Pulse: 76 77 80 77   Resp: 18 18 18 18   Temp:    97 9 °F (36 6 °C)   TempSrc:    Oral   SpO2:           Intake/Output Summary (Last 24 hours) at 2/12/2021 JEREMY Ji Schultz 67 filed at 2/12/2021 1049  Gross per 24 hour   Intake 1240 ml   Output 1275 ml   Net -35 ml     General: NAD  Skin: no rash  Eyes: anicteric  ENMT: mm moist  Neck: no masses  Respiratory: CTAB  Cardiac: RRR  Extremities: no edema  GI: soft nt nd  Neuro: alert awake  Psych: mood and affect appropriate    Medications:    Current Facility-Administered Medications:     acetaminophen (TYLENOL) tablet 650 mg, 650 mg, Oral, Q6H PRN, Rossi Ward MD, 650 mg at 02/12/21 0501    aluminum-magnesium hydroxide-simethicone (MYLANTA) oral suspension 30 mL, 30 mL, Oral, Q6H PRN, TABITHA Lee    aspirin (ECOTRIN LOW STRENGTH) EC tablet 81 mg, 81 mg, Oral, Daily, Swathi Schumacher MD, 81 mg at 02/12/21 1126    atorvastatin (LIPITOR) tablet 40 mg, 40 mg, Oral, Daily With [id number] MD Frank, 40 mg at 02/11/21 1717    benzonatate (TESSALON PERLES) capsule 100 mg, 100 mg, Oral, TID PRN, TABITHA Arevalo    bisacodyl (DULCOLAX) rectal suppository 10 mg, 10 mg, Rectal, Daily PRN, Sharyle Schlatter, DO, 10 mg at 02/09/21 1205    calcium carbonate (TUMS) chewable tablet 1,000 mg, 1,000 mg, Oral, Daily PRN, Zainab Schumacher MD, 1,000 mg at 02/09/21 1001    clopidogrel (PLAVIX) tablet 75 mg, 75 mg, Oral, Daily, Swathi Schumacher MD, 75 mg at 02/12/21 1125    epoetin thu (EPOGEN,PROCRIT) injection 6,000 Units, 6,000 Units, Intravenous, After Dialysis, [facility], 6,000 Units at 02/12/21 1019    escitalopram (LEXAPRO) tablet 10 mg, 10 mg, Oral, HS, Swathi Schumacher MD, 10 mg at 02/11/21 2112    guaiFENesin (MUCINEX) 12 hr tablet 1,200 mg, 1,200 mg, Oral, Q12H [facility], TABITHA Arevalo, 1,200 mg at 02/12/21 1127    heparin (porcine) subcutaneous injection 5,000 Units, 5,000 Units, Subcutaneous, Q8H [facility], Faye Correa MD, 5,000 Units at 02/12/21 0501    hydrALAZINE (APRESOLINE) tablet 25 mg, 25 mg, Oral, Q8H [facility], [facility], RENETTA, 25 mg at 02/11/21 1358    hydrocortisone 1 % cream, , Topical, 4x Daily PRN, Lydia Infante MD, Given at 02/06/21 0242    HYDROmorphone (DILAUDID) injection 0 2 mg, 0 2 mg, Intravenous, Q3H PRN, Faye Correa MD, 0 2 mg at 02/08/21 0801    insulin detemir (LEVEMIR) subcutaneous injection 40 Units, 40 Units, Subcutaneous, HS, Milan Craig MD, 40 Units at 02/11/21 2107    insulin lispro (HumaLOG) 100 units/mL subcutaneous injection 1-5 Units, 1-5 Units, Subcutaneous, HS, Milan Craig MD, 3 Units at 02/11/21 2105    insulin lispro (HumaLOG) 100 units/mL subcutaneous injection 1-5 Units, 1-5 Units, Subcutaneous, 0200, Milan Craig MD, 3 Units at 02/11/21 0243    insulin lispro (HumaLOG) 100 units/mL subcutaneous injection 1-5 Units, 1-5 Units, Subcutaneous, TID AC, 1 Units at 02/12/21 0826 **AND** Fingerstick Glucose (POCT), , , TID AC, Milan Craig MD    insulin lispro (HumaLOG) 100 units/mL subcutaneous injection 10 Units, 10 Units, Subcutaneous, TID With Meals, Milan Craig MD, 10 Units at 02/11/21 1721    iron polysaccharides (FERREX) capsule 150 mg, 150 mg, Oral, Daily, RENETTA Chakraborty, 150 mg at 02/12/21 1126    lactulose oral solution 30 g, 30 g, Oral, Daily, Lucretia Goodpasture, MD, Stopped at 02/11/21 0938    melatonin tablet 3 mg, 3 mg, Oral, HS, TABITHA Cr, 3 mg at 02/11/21 2112    metoprolol tartrate (LOPRESSOR) tablet 25 mg, 25 mg, Oral, Q12H [facility], Marcus Schumacher MD, 25 mg at 02/11/21 2113    nitroglycerin (NITROSTAT) SL tablet 0 4 mg, 0 4 mg, Sublingual, Q5 Min PRN, Saniya Lentz MD    nystatin (MYCOSTATIN) oral suspension 500,000 Units, 500,000 Units, Swish & Swallow, 4x Daily, Faye Correa MD, 500,000 Units at 02/12/21 1125    ondansetron (ZOFRAN) injection 4 mg, 4 mg, Intravenous, Q6H PRN, Saniya Lentz MD, 4 mg at 01/29/21 1352    oxyCODONE (ROXICODONE) IR tablet 2 5 mg, 2 5 mg, Oral, Q4H PRN, Faye Correa MD, 2 5 mg at 02/05/21 1045    pantoprazole (PROTONIX) EC tablet 40 mg, 40 mg, Oral, BID AC, Sia Berrios MD, 40 mg at 02/12/21 1127    phenol (CHLORASEPTIC) 1 4 % mucosal liquid 1 spray, 1 spray, Mouth/Throat, Q4H PRN, Faye Correa MD    pneumococcal 13-valent conjugate vaccine (PREVNAR-13) IM injection 0 5 mL, 0 5 mL, Intramuscular, Prior to discharge, Carleen De La Cruz MD    polyethylene glycol (MIRALAX) packet 17 g, 17 g, Oral, Daily PRN, Brandee Herrera MD, 17 g at 02/05/21 1727    polyethylene glycol (MIRALAX) packet 17 g, 17 g, Oral, BID, Brandee Herrera MD, Stopped at 02/11/21 0941    Pramox-PE-Glycerin-Petrolatum (PREPARATION H MAX) 1-0 25-14 4-15 % rectal cream, , Rectal, BID PRN, Kwaku Dunne DO, Given at 02/09/21 2059    senna-docusate sodium (SENOKOT S) 8 6-50 mg per tablet 1 tablet, 1 tablet, Oral, HS, Sia Berrios MD, 1 tablet at 02/08/21 2103    sodium chloride (OCEAN) 0 65 % nasal spray 1 spray, 1 spray, Each Nare, Q1H PRN, Jacek Perez MD, 1 spray at 02/08/21 1703    torsemide [facility]) tablet 100 mg, 100 mg, Oral, Once per day on Sun Tue Thu Sat, Mervin Stevenson MD, 100 mg at 02/11/21 1532    Laboratory Results:  Results from last 7 days   Lab Units 02/12/21  0507 02/11/21  0504 02/10/21  0459 02/09/21  0638 02/08/21  0608 02/07/21  0500 02/06/21  0817 02/06/21  0513   WBC Thousand/uL  --  8 18 11 26* 10 72* 13 13* 13 40* 16 66*  --    HEMOGLOBIN g/dL  --  7 7* 7 5* 8 0* 7 6* 8 1* 8 3*  --    HEMATOCRIT %  --  24 7* 23 5* 25 1* 23 2* 25 4* 25 8*  --    PLATELETS Thousands/uL  --  143* 158 178 159 165 174  --    POTASSIUM mmol/L 4 5 4 6 4 2 5 4* 4 2 3 8  --  4 1   CHLORIDE mmol/L 97* 99* 94* 94* 95* 95*  --  97*   CO2 mmol/L 22 24 23 25 22 22  --  24   BUN mg/dL 83* 64* 104* 89* 129* 116*  --  98*   CREATININE mg/dL 6 31* 5 01* 6 94* 5 83* 6 95* 5 74*  --  4 77*   CALCIUM mg/dL 8 5 8 3 8 2* 8 6 8 3 8 2*  --  8 1*

## 2021-02-12 NOTE — DISCHARGE SUMMARY
Discharge- Frannie Garcia 1940, [de-identified] y o  male MRN: [id number]    Unit/Bed#: S -01 Encounter: [id number]    Primary Care Provider: Silvia Joseph MD   Date and time admitted to hospital: 1/22/2021 12:28 PM        * Acute renal failure superimposed on stage 3 chronic kidney disease [facility])  Assessment & Plan  Lab Results   Component Value Date    EGFR 8 02/09/2021    EGFR 7 02/08/2021    EGFR 9 02/07/2021    CREATININE 5 83 (H) 02/09/2021    CREATININE 6 95 (H) 02/08/2021    CREATININE 5 74 (H) 02/07/2021     S/p cardiac cath on 1/22/21, creatinine elevated (baseline 2 3-2 5) likely due post-cath contrast ATN  Cr remains elevated, slowly downtrending  Patient is currently dialysis dependent, s/p HD Cath placement on 1/25/21  Plan  · Patient s/p perm cath placement on 2/10 by IR  · Nephrology following, patient stable for discharge pending outpatient HD placement  · Start Torsemide 100mg daily on non-HD days  · Start Epogen 6000 units with HD  · Per Nephrology d/c amlodipine and continue Hydralazine at decreased dose        Coronary artery disease  Assessment & Plan  Patient originally to [address] [place] [address] on 1/21/2021 due to chest pain with chest pain at [facility] Subsequently found to have Type 2 MI and underwent cardiac catheterization on 1/22/21 after transfer to [facility]  Patient reports he has been asymptomatic (denies any chest pain) since cardiac cath     1/24/21 echo-was normal  Systolic function was normal  Ejection fraction was estimated to be 60 %  There was hypokinesis of the basal inferior wall (grade 2 diastolic dysfunction)  Plan  · Continue aspirin, Plavix, statin, and beta-blocker  · Monitor for s/sx           Right hip pain  Assessment & Plan  Patient continues to experience mild right hip pain, no concerning findings on on examination  X-Ray right hip unremarkable  Patient had the cardiac catheterization through the right groin    I do not appreciate any area of induration or redness  Plan  · Continue to monitor pain symptoms  · Consider Aqua K pad for discomfort    Urinary retention  Assessment & Plan  Patient with continued urinary retention s/p chowdhury catheter placed on 2/8  Plan  · Monitor urine output  · Routine catheter care    Hyponatremia  Assessment & Plan  Patient continues to have borderline low sodium, today at 132  Questionable AMS today on 2/8/21, has resolved since  Patient AAOx3 this morning on exam       Plan  · Monitor for AMS  · Neurochecks  · Nephrology following     Anemia  Assessment & Plan  Hemoglobin continues downtrending, stable at 8 this am    Plan  · Continue to monitor with daily CBC  · S/p 2 units of PRBC during this admission  · Transfuse for Hg <7, or if any active bleeding and patient becomes symptomatic  · Have consented for blood if needed    Acute on chronic diastolic congestive heart failure [facility])  Assessment & Plan  Patient does not appear fluid overloaded at this time  Will continue with HD per Nephrology recommendations  Plan    · Continue current regimen as noted under CAD  · Monitor I/Os  · Daily weights      Hx of CABG  Assessment & Plan  CAD s/p CABG in 2016; history of stenting prior to CABG (LIMA to LAD and SVG to OM)  Status post cardiac catheterization: significant triple vessel coronary artery disease      Plan  · Continue 81 ASA daily, Plavix 75mg, metoprolol 25mg  · Hold amlodpine    Type 2 diabetes mellitus with hyperglycemia, with long-term current use of insulin [facility])  Assessment & Plan  Lab Results   Component Value Date    HGBA1C 6 7 (H) 07/03/2019       Recent Labs     02/09/21  0228 02/09/21  0732 02/09/21  1043 02/09/21  1601   POCGLU 197* 162* 272* 239*       Blood Sugar Average: Last 72 hrs:  (P) 094 [id number]     Plan  · BS are stable  · Patient to resume home regimen upon discharge    Leukocytosis-resolved as of 2/12/2021  Assessment & Plan  Leukocytosis downtrending 13 1 considering reactive vs underlying infectious etiology  COVID-19, influenza, RSV tests were negative x2  Chest x-ray obtained read as multifocal pneumonia, repeat CXR on 2/6/2021 reporting previous parenchymal changes are resolved  Plan  · Completed 8 day course of cefepime on 2/6  · Blood cultures x2 no growth x 5 days  · Cough medicine/antitussive, lozenges and Chloraseptic spray p r n  · Continue to monitor WBC and fever curves            Discharging Physician / Practitioner: Nitesh Stearns MD  PCP: Alda Diaz MD  Admission Date:   Admission Orders (From admission, onward)     Ordered        01/22/21 1403  Inpatient Admission  Once                   Discharge Date: 02/12/21    Disposition:      Other: SNF    For Discharges to [facility]:   · Imelda Hatch CM has spoken to facility    Reason for Admission: Acute Renal Failure    Discharge Diagnoses:     Please see assessment and plan section above for further details regarding discharge diagnoses  Resolved Problems  Date Reviewed: 2/12/2021          Resolved    Leukocytosis 2/12/2021     Resolved by  Nitesh Stearns MD    High anion gap metabolic acidosis 3/42/9603     Resolved by  Sia Berrios MD    Oral candida 2/8/2021     Resolved by  Nitesh Stearns MD    ATN (acute tubular necrosis) ([facility] [address] ) 2/8/2021     Resolved by  Nitesh Stearns MD    Constipation 2/4/2021     Resolved by  Sia Berrios MD          Consultations During Hospital Stay:  · Nephrology  · Endocrinology  · Interventional Radiology    Procedures Performed:   · S/p perm cath placement on 2/10 with IR    Medication Adjustments and Discharge Medications:  · Summary of Medication Adjustments made as a result of this hospitalization: Please see front page instructions  · Medication Dosing Tapers - Please refer to Discharge Medication List for details on any medication dosing tapers (if applicable to patient)    · Medications being temporarily held (include recommended restart time): N/A  · Discharge Medication List: See after visit summary for reconciled discharge medications  Wound Care Recommendations:  When applicable, please see wound care section of After Visit Summary  Diet Recommendations at Discharge:  Diet -        Diet Orders   (From admission, onward)             Start     Ordered    02/10/21 1303  Dietary nutrition supplements  Once     Question Answer Comment   Select Supplement: Nepro-Mixed Berry    Frequency Dinner        02/10/21 1302    02/09/21 1310  Diet Morgan/CHO Controlled; Consistent Carbohydrate Diet Level 2 (5 carb servings/75 grams CHO/meal); Sodium 2 GM, Fluid Restriction 1500 ML  Diet effective now     Comments: Fluid restriction of 1500ml/day   Question Answer Comment   Diet Type Morgan/CHO Controlled    Morgan/CHO Controlled Consistent Carbohydrate Diet Level 2 (5 carb servings/75 grams CHO/meal)    Other Restriction(s): Sodium 2 GM    Other Restriction(s): Fluid Restriction 1500 ML    RD to adjust diet per protocol? Yes        02/09/21 1309    01/22/21 1232  Room Service  Once     Question:  Type of Service  Answer:  Room Service - Appropriate with Assistance    01/22/21 1231                Instructions for any Catheters / Lines Present at Discharge (including removal date, if applicable): N/A    Significant Findings / Test Results:   · XR abdomen obstruction series- Moderate amount of fecal debris noted most pronounced in the region of the rectum  · CT abdomen and pelvis w/o contrast- No acute intra-abdominal pathology within the limits of this unenhanced examination  Uncomplicated colonic diverticulosis  Bilateral nephrolithiasis  ·  XR hip/pelvis- no acute osseous abnormality     Incidental Findings:   · None    Test Results Pending at Discharge (will require follow up):    · None     Outpatient Tests Requested:  · None    Complications:  None    Hospital Course:     Kristi Corey is a [de-identified] y o  male patient with pmhx of hypertension, hyperlipidemia, diabetes, CAD s/p CABG (2016), and CKD, bladder cancer  who originally presented to the hospital on 1/22/2021 due to [address]  on 1/21/2021 due to chest pain  Subsequently found to have Type 2 MI and underwent cardiac catheterization on 1/22/21  Patient now dialysis dependent due to post-contrast ATN, new baseline (2-2 5)  PPatient s/p R IJ permcath placed on 2/10/21, per Nephrology patient will require continued fluid restriction of 1500ml/day  atient will now follow a T,TH, Sat hemodialysis schedule  Patient medically stable for discharge and has been accepted to the [facility], along with outpatient HD placement  Condition at Discharge: stable     Discharge Day Visit / Exam:     Subjective:  Patient stating he feels improved, does continue to have intermittent right hip pain  Vitals: Blood Pressure: 122/58 (02/12/21 1038)  Pulse: 77 (02/12/21 1038)  Temperature: 97 9 °F (36 6 °C) (02/12/21 1038)  Temp Source: Oral (02/12/21 1038)  Respirations: 18 (02/12/21 1038)  SpO2: 97 % (02/12/21 4174)  Exam:   Physical Exam    Discussion with Family: Discussed with family    Goals of Care Discussions:  · Code Status at Discharge: Level 1 - Full Code  · Were there any Goals of Care Discussions during Hospitalization?: Yes  · Results of any General Goals of Care Discussions: N/A   · POLST Completed: No   · If POLST Completed, Summary of POLST Agreement Provided Here: None   · OK to Rehospitalize if Needed? Yes    Discharge instructions/Information to patient and family:   See after visit summary section titled Discharge Instructions for information provided to patient and family  Planned Readmission: None      Discharge Statement:  I spent 30 minutes discharging the patient  This time was spent on the day of discharge  I had direct contact with the patient on the day of discharge   Greater than 50% of the total time was spent examining patient, answering all patient questions, arranging and discussing plan of care with patient as well as directly providing post-discharge instructions  Additional time then spent on discharge activities      ** Please Note: This note has been constructed using a voice recognition system **

## 2021-02-12 NOTE — PLAN OF CARE
Problem: Potential for Falls  Goal: Patient will remain free of falls  Description: INTERVENTIONS:  - Assess patient frequently for physical needs  -  Identify cognitive and physical deficits and behaviors that affect risk of falls    -  [place] fall precautions as indicated by assessment   - Educate patient/family on patient safety including physical limitations  - Instruct patient to call for assistance with activity based on assessment  - Modify environment to reduce risk of injury  - Consider OT/PT consult to assist with strengthening/mobility  Outcome: Progressing     Problem: CARDIOVASCULAR - ADULT  Goal: Maintains optimal cardiac output and hemodynamic stability  Description: INTERVENTIONS:  - Monitor I/O, vital signs and rhythm  - Monitor for S/S and trends of decreased cardiac output  - Administer and titrate ordered vasoactive medications to optimize hemodynamic stability  - Assess quality of pulses, skin color and temperature  - Assess for signs of decreased coronary artery perfusion  - Instruct patient to report change in severity of symptoms  Outcome: Progressing  Goal: Absence of cardiac dysrhythmias or at baseline rhythm  Description: INTERVENTIONS:  - Continuous cardiac monitoring, vital signs, obtain 12 lead EKG if ordered  - Administer antiarrhythmic and heart rate control medications as ordered  - Monitor electrolytes and administer replacement therapy as ordered  Outcome: Progressing     Problem: RESPIRATORY - ADULT  Goal: Achieves optimal ventilation and oxygenation  Description: INTERVENTIONS:  - Assess for changes in respiratory status  - Assess for changes in mentation and behavior  - Position to facilitate oxygenation and minimize respiratory effort  - Oxygen administered by appropriate delivery if ordered  - Initiate smoking cessation education as indicated  - Encourage broncho-pulmonary hygiene including cough, deep breathe, Incentive Spirometry  - Assess the need for suctioning and aspirate as needed  - Assess and instruct to report SOB or any respiratory difficulty  - Respiratory Therapy support as indicated  Outcome: Progressing     Problem: GASTROINTESTINAL - ADULT  Goal: Maintains adequate nutritional intake  Description: INTERVENTIONS:  - Monitor percentage of each meal consumed  - Identify factors contributing to decreased intake, treat as appropriate  - Assist with meals as needed  - Monitor I&O, weight, and lab values if indicated  - Obtain nutrition services referral as needed  Outcome: Progressing     Problem: GENITOURINARY - ADULT  Goal: Maintains or returns to baseline urinary function  Description: INTERVENTIONS:  - Assess urinary function  - Encourage oral fluids to ensure adequate hydration if ordered  - Administer IV fluids as ordered to ensure adequate hydration  - Administer ordered medications as needed  - Offer frequent toileting  - Follow urinary retention protocol if ordered  Outcome: Progressing  Goal: Absence of urinary retention  Description: INTERVENTIONS:  - Assess patients ability to void and empty bladder  - Monitor I/O  - Bladder scan as needed  - Discuss with physician/AP medications to alleviate retention as needed  - Discuss catheterization for long term situations as appropriate  Outcome: Progressing     Problem: METABOLIC, FLUID AND ELECTROLYTES - ADULT  Goal: Electrolytes maintained within normal limits  Description: INTERVENTIONS:  - Monitor labs and assess patient for signs and symptoms of electrolyte imbalances  - Administer electrolyte replacement as ordered  - Monitor response to electrolyte replacements, including repeat lab results as appropriate  - Instruct patient on fluid and nutrition as appropriate  Outcome: Progressing  Goal: Fluid balance maintained  Description: INTERVENTIONS:  - Monitor labs   - Monitor I/O and WT  - Instruct patient on fluid and nutrition as appropriate  - Assess for signs & symptoms of volume excess or deficit  Outcome: Progressing  Goal: Glucose maintained within target range  Description: INTERVENTIONS:  - Monitor Blood Glucose as ordered  - Assess for signs and symptoms of hyperglycemia and hypoglycemia  - Administer ordered medications to maintain glucose within target range  - Assess nutritional intake and initiate nutrition service referral as needed  Outcome: Progressing     Problem: SKIN/TISSUE INTEGRITY - ADULT  Goal: Skin integrity remains intact  Description: INTERVENTIONS  - Identify patients at risk for skin breakdown  - Assess and monitor skin integrity  - Assess and monitor nutrition and hydration status  - Monitor labs (i e  albumin)  - Assess for incontinence   - Turn and reposition patient  - Assist with mobility/ambulation  - Relieve pressure over bony prominences  - Avoid friction and shearing  - Provide appropriate hygiene as needed including keeping skin clean and dry  - Evaluate need for skin moisturizer/barrier cream  - Collaborate with interdisciplinary team (i e  Nutrition, Rehabilitation, etc )   - Patient/family teaching  Outcome: Progressing     Problem: HEMATOLOGIC - ADULT  Goal: Maintains hematologic stability  Description: INTERVENTIONS  - Assess for signs and symptoms of bleeding or hemorrhage  - Monitor labs  - Administer supportive blood products/factors as ordered and appropriate  Outcome: Progressing     Problem: MUSCULOSKELETAL - ADULT  Goal: Maintain or return mobility to safest level of function  Description: INTERVENTIONS:  - Assess patient's ability to carry out ADLs; assess patient's baseline for ADL function and identify physical deficits which impact ability to perform ADLs (bathing, care of mouth/teeth, toileting, grooming, dressing, etc )  - Assess/evaluate cause of self-care deficits   - Assess range of motion  - Assess patient's mobility  - Assess patient's need for assistive devices and provide as appropriate  - Encourage maximum independence but intervene and supervise when necessary  - Involve family in performance of ADLs  - Assess for home care needs following discharge   - Consider OT consult to assist with ADL evaluation and planning for discharge  - Provide patient education as appropriate  Outcome: Progressing     Problem: PAIN - ADULT  Goal: Verbalizes/displays adequate comfort level or baseline comfort level  Description: Interventions:  - Encourage patient to monitor pain and request assistance  - Assess pain using appropriate pain scale  - Administer analgesics based on type and severity of pain and evaluate response  - Implement non-pharmacological measures as appropriate and evaluate response  - Consider cultural and social influences on pain and pain management  - Notify physician/advanced practitioner if interventions unsuccessful or patient reports new pain  Outcome: Progressing     Problem: INFECTION - ADULT  Goal: Absence or prevention of progression during hospitalization  Description: INTERVENTIONS:  - Assess and monitor for signs and symptoms of infection  - Monitor lab/diagnostic results  - Monitor all insertion sites, i e  indwelling lines, tubes, and drains  - Monitor endotracheal if appropriate and nasal secretions for changes in amount and color  - [place] appropriate cooling/warming therapies per order  - Administer medications as ordered  - Instruct and encourage patient and family to use good hand hygiene technique  - Identify and instruct in appropriate isolation precautions for identified infection/condition  Outcome: Progressing     Problem: SAFETY ADULT  Goal: Patient will remain free of falls  Description: INTERVENTIONS:  - Assess patient frequently for physical needs  -  Identify cognitive and physical deficits and behaviors that affect risk of falls    -  [place] fall precautions as indicated by assessment   - Educate patient/family on patient safety including physical limitations  - Instruct patient to call for assistance with activity based on assessment  - Modify environment to reduce risk of injury  - Consider OT/PT consult to assist with strengthening/mobility  Outcome: Progressing  Goal: Maintain or return to baseline ADL function  Description: INTERVENTIONS:  -  Assess patient's ability to carry out ADLs; assess patient's baseline for ADL function and identify physical deficits which impact ability to perform ADLs (bathing, care of mouth/teeth, toileting, grooming, dressing, etc )  - Assess/evaluate cause of self-care deficits   - Assess range of motion  - Assess patient's mobility; develop plan if impaired  - Assess patient's need for assistive devices and provide as appropriate  - Encourage maximum independence but intervene and supervise when necessary  - Involve family in performance of ADLs  - Assess for home care needs following discharge   - Consider OT consult to assist with ADL evaluation and planning for discharge  - Provide patient education as appropriate  Outcome: Progressing  Goal: Maintain or return mobility status to optimal level  Description: INTERVENTIONS:  - Assess patient's baseline mobility status (ambulation, transfers, stairs, etc )    - Identify cognitive and physical deficits and behaviors that affect mobility  - Identify mobility aids required to assist with transfers and/or ambulation (gait belt, sit-to-stand, lift, walker, cane, etc )  - [place] fall precautions as indicated by assessment  - Record patient progress and toleration of activity level on Mobility SBAR; progress patient to next Phase/Stage  - Instruct patient to call for assistance with activity based on assessment  - Consider rehabilitation consult to assist with strengthening/weightbearing, etc   Outcome: Progressing     Problem: DISCHARGE PLANNING  Goal: Discharge to home or other facility with appropriate resources  Description: INTERVENTIONS:  - Identify barriers to discharge w/patient and caregiver  - Arrange for needed discharge resources and transportation as appropriate  - Identify discharge learning needs (meds, wound care, etc )  - Arrange for interpretive services to assist at discharge as needed  - Refer to Case Management Department for coordinating discharge planning if the patient needs post-hospital services based on physician/advanced practitioner order or complex needs related to functional status, cognitive ability, or social support system  Outcome: Progressing     Problem: Knowledge Deficit  Goal: Patient/family/caregiver demonstrates understanding of disease process, treatment plan, medications, and discharge instructions  Description: Complete learning assessment and assess knowledge base  Interventions:  - Provide teaching at level of understanding  - Provide teaching via preferred learning methods  Outcome: Progressing     Problem: Prexisting or High Potential for Compromised Skin Integrity  Goal: Skin integrity is maintained or improved  Description: INTERVENTIONS:  - Identify patients at risk for skin breakdown  - Assess and monitor skin integrity  - Assess and monitor nutrition and hydration status  - Monitor labs   - Assess for incontinence   - Turn and reposition patient  - Assist with mobility/ambulation  - Relieve pressure over bony prominences  - Avoid friction and shearing  - Provide appropriate hygiene as needed including keeping skin clean and dry  - Evaluate need for skin moisturizer/barrier cream  - Collaborate with interdisciplinary team   - Patient/family teaching  - Consider wound care consult   Outcome: Progressing     Problem: Nutrition/Hydration-ADULT  Goal: Nutrient/Hydration intake appropriate for improving, restoring or maintaining nutritional needs  Description: Monitor and assess patient's nutrition/hydration status for malnutrition  Collaborate with interdisciplinary team and initiate plan and interventions as ordered  Monitor patient's weight and dietary intake as ordered or per policy   Utilize nutrition screening tool and intervene as necessary  Determine patient's food preferences and provide high-protein, high-caloric foods as appropriate       INTERVENTIONS:  - Monitor oral intake, urinary output, labs, and treatment plans  - Assess nutrition and hydration status and recommend course of action  - Evaluate amount of meals eaten  - Assist patient with eating if necessary   - Allow adequate time for meals  - Recommend/ encourage appropriate diets, oral nutritional supplements, and vitamin/mineral supplements  - Order, calculate, and assess calorie counts as needed  - Recommend, monitor, and adjust tube feedings and TPN/PPN based on assessed needs  - Assess need for intravenous fluids  - Provide specific nutrition/hydration education as appropriate  - Include patient/family/caregiver in decisions related to nutrition  Outcome: Progressing

## 2021-02-12 NOTE — CASE MANAGEMENT
CM spoke to [place] at [facility] [phone] who confirmed after review with their center they can make an exception for patient to have start date this Saturday at their center with chair time 5 am  Per request of Daxa Junior faxed DCI to [facility] at [phone] to ensure coordination of care  CM spoke to [facility] with [address] ext  [phone] who confirms same and reports financial clearance is completed  CM called and spoke to Nelson Brown 81 in admissions at [facility] [phone] who confirms they are able to accept patient for admission today and have insurance authorization with #[id number] NRD 2/15  Nelson Brown 8141 confirmed they have transportation arranged for patient's dialysis start  CM met with patient alongside SLIM and made patient aware of same  Patient is happy to hear everything is coordinated  CM discussed transportation at [place] including CV transport and cost associated  Patient would like CM to arrange [facility] and is agreeable to cost  CM sent [place] request to BRIAN JUAREZ made aware transport will be with [facility] for a 1 pm   CM made SLIM, nephrology, admissions at [facility], bedside RN, patient, and patient's sister Caro Hodge aware of transport time  CM spoke to Caro Hodge providing her the facility address so patient's nephew can drop off patient's belongings to   IMM reviewed with patient at bedside, signed by CM, and placed in medical records bin  Copy provided to patient for his records  Facility contacts completed

## 2021-02-15 PROCEDURE — 93926 LOWER EXTREMITY STUDY: CPT | Performed by: SURGERY

## 2021-02-16 NOTE — UTILIZATION REVIEW
Notification of Discharge  This is a Notification of Discharge from our facility [address]  Please be advised that this patient has been discharge from our facility  Below you will find the admission and discharge date and time including the patients disposition  PRESENTATION DATE: 1/22/2021 12:28 PM  OBS ADMISSION DATE:   IP ADMISSION DATE: 1/22/21 1228   DISCHARGE DATE: 2/12/2021  2:29 PM  DISPOSITION: Non [facility] SN[facility]/S[facility] Non [facility] SN[facility]SNU   Admission Orders listed below:  Admission Orders (From admission, onward)     Ordered        01/22/21 1403  Inpatient Admission  Once                   Please contact the UR Department if additional information is required to close this patient's authorization/case  [address] [facility] Utilization Review Department  Main: [phone] x carefully listen to the prompts  All voicemails are confidential   [email or web address]  Send all requests for admission clinical reviews, approved or denied determinations and any other requests to dedicated fax number below belonging to the campus where the patient is receiving treatment   List of dedicated fax numbers:  [address] DENIALS (Administrative/Medical Necessity) [phone]   [address] (Maternity/NICU/Pediatrics) [phone]   [facility] [phone]     [address] [phone]   [facility] [phone]   Dario Lang [address] [phone]   [facility]  [phone]   [address] [facility] [address] [facility]  [address] [facility] 1000 [facility] [phone]

## 2021-03-18 ENCOUNTER — HOSPITAL ENCOUNTER (INPATIENT)
Facility: HOSPITAL | Age: 81
LOS: 4 days | Discharge: NURSING, REHAB OR LONG-TERM CARE | DRG: 065 | End: 2021-03-22
Attending: EMERGENCY MEDICINE | Admitting: INTERNAL MEDICINE
Payer: COMMERCIAL

## 2021-03-18 ENCOUNTER — APPOINTMENT (EMERGENCY)
Dept: RADIOLOGY | Facility: HOSPITAL | Age: 81
DRG: 065 | End: 2021-03-18
Payer: COMMERCIAL

## 2021-03-18 DIAGNOSIS — I63.9 CVA (CEREBRAL VASCULAR ACCIDENT) (HCC): Primary | ICD-10-CM

## 2021-03-18 DIAGNOSIS — K59.00 CONSTIPATION: ICD-10-CM

## 2021-03-18 DIAGNOSIS — N17.0 ACUTE RENAL FAILURE WITH ACUTE TUBULAR NECROSIS SUPERIMPOSED ON STAGE 3 CHRONIC KIDNEY DISEASE, UNSPECIFIED WHETHER STAGE 3A OR 3B CKD (HCC): ICD-10-CM

## 2021-03-18 DIAGNOSIS — N18.30 ACUTE RENAL FAILURE WITH ACUTE TUBULAR NECROSIS SUPERIMPOSED ON STAGE 3 CHRONIC KIDNEY DISEASE, UNSPECIFIED WHETHER STAGE 3A OR 3B CKD (HCC): ICD-10-CM

## 2021-03-18 DIAGNOSIS — R33.9 URINARY RETENTION: ICD-10-CM

## 2021-03-18 DIAGNOSIS — N18.9 CHRONIC KIDNEY DISEASE, UNSPECIFIED CKD STAGE: ICD-10-CM

## 2021-03-18 DIAGNOSIS — G25.0 ESSENTIAL TREMOR: ICD-10-CM

## 2021-03-18 PROBLEM — R77.8 ELEVATED TROPONIN: Status: ACTIVE | Noted: 2021-03-18

## 2021-03-18 PROBLEM — E87.6 HYPOKALEMIA: Status: ACTIVE | Noted: 2021-03-18

## 2021-03-18 LAB
ALBUMIN SERPL BCP-MCNC: 2.8 G/DL (ref 3.5–5)
ALP SERPL-CCNC: 92 U/L (ref 46–116)
ALT SERPL W P-5'-P-CCNC: 28 U/L (ref 12–78)
ANION GAP SERPL CALCULATED.3IONS-SCNC: 9 MMOL/L (ref 4–13)
AST SERPL W P-5'-P-CCNC: 30 U/L (ref 5–45)
ATRIAL RATE: 79 BPM
BACTERIA UR QL AUTO: ABNORMAL /HPF
BASOPHILS # BLD AUTO: 0.04 THOUSANDS/ΜL (ref 0–0.1)
BASOPHILS NFR BLD AUTO: 1 % (ref 0–1)
BILIRUB SERPL-MCNC: 0.7 MG/DL (ref 0.2–1)
BILIRUB UR QL STRIP: NEGATIVE
BUN SERPL-MCNC: 48 MG/DL (ref 5–25)
CALCIUM ALBUM COR SERPL-MCNC: 9.8 MG/DL (ref 8.3–10.1)
CALCIUM SERPL-MCNC: 8.8 MG/DL (ref 8.3–10.1)
CHLORIDE SERPL-SCNC: 99 MMOL/L (ref 100–108)
CLARITY UR: CLEAR
CO2 SERPL-SCNC: 30 MMOL/L (ref 21–32)
COLOR UR: ABNORMAL
CREAT SERPL-MCNC: 2.65 MG/DL (ref 0.6–1.3)
EOSINOPHIL # BLD AUTO: 0.04 THOUSAND/ΜL (ref 0–0.61)
EOSINOPHIL NFR BLD AUTO: 1 % (ref 0–6)
ERYTHROCYTE [DISTWIDTH] IN BLOOD BY AUTOMATED COUNT: 15.2 % (ref 11.6–15.1)
GFR SERPL CREATININE-BSD FRML MDRD: 22 ML/MIN/1.73SQ M
GLUCOSE SERPL-MCNC: 101 MG/DL (ref 65–140)
GLUCOSE SERPL-MCNC: 149 MG/DL (ref 65–140)
GLUCOSE SERPL-MCNC: 96 MG/DL (ref 65–140)
GLUCOSE UR STRIP-MCNC: NEGATIVE MG/DL
HCT VFR BLD AUTO: 25.8 % (ref 36.5–49.3)
HGB BLD-MCNC: 8.4 G/DL (ref 12–17)
HGB UR QL STRIP.AUTO: ABNORMAL
HYALINE CASTS #/AREA URNS LPF: ABNORMAL /LPF
IMM GRANULOCYTES # BLD AUTO: 0.03 THOUSAND/UL (ref 0–0.2)
IMM GRANULOCYTES NFR BLD AUTO: 0 % (ref 0–2)
KETONES UR STRIP-MCNC: NEGATIVE MG/DL
LEUKOCYTE ESTERASE UR QL STRIP: ABNORMAL
LYMPHOCYTES # BLD AUTO: 1.23 THOUSANDS/ΜL (ref 0.6–4.47)
LYMPHOCYTES NFR BLD AUTO: 17 % (ref 14–44)
MAGNESIUM SERPL-MCNC: 1.8 MG/DL (ref 1.6–2.6)
MCH RBC QN AUTO: 32.6 PG (ref 26.8–34.3)
MCHC RBC AUTO-ENTMCNC: 32.6 G/DL (ref 31.4–37.4)
MCV RBC AUTO: 100 FL (ref 82–98)
MONOCYTES # BLD AUTO: 0.6 THOUSAND/ΜL (ref 0.17–1.22)
MONOCYTES NFR BLD AUTO: 8 % (ref 4–12)
NEUTROPHILS # BLD AUTO: 5.32 THOUSANDS/ΜL (ref 1.85–7.62)
NEUTS SEG NFR BLD AUTO: 73 % (ref 43–75)
NITRITE UR QL STRIP: NEGATIVE
NON-SQ EPI CELLS URNS QL MICRO: ABNORMAL /HPF
NRBC BLD AUTO-RTO: 0 /100 WBCS
P AXIS: 15 DEGREES
PH UR STRIP.AUTO: 7 [PH]
PLATELET # BLD AUTO: 169 THOUSANDS/UL (ref 149–390)
PMV BLD AUTO: 10 FL (ref 8.9–12.7)
POTASSIUM SERPL-SCNC: 3 MMOL/L (ref 3.5–5.3)
PR INTERVAL: 218 MS
PROT SERPL-MCNC: 7.2 G/DL (ref 6.4–8.2)
PROT UR STRIP-MCNC: ABNORMAL MG/DL
QRS AXIS: -14 DEGREES
QRSD INTERVAL: 104 MS
QT INTERVAL: 398 MS
QTC INTERVAL: 456 MS
RBC # BLD AUTO: 2.58 MILLION/UL (ref 3.88–5.62)
RBC #/AREA URNS AUTO: ABNORMAL /HPF
SODIUM SERPL-SCNC: 138 MMOL/L (ref 136–145)
SP GR UR STRIP.AUTO: 1.01 (ref 1–1.03)
T WAVE AXIS: 66 DEGREES
TROPONIN I SERPL-MCNC: 0.07 NG/ML
UROBILINOGEN UR QL STRIP.AUTO: 0.2 E.U./DL
VENTRICULAR RATE: 79 BPM
WBC # BLD AUTO: 7.26 THOUSAND/UL (ref 4.31–10.16)
WBC #/AREA URNS AUTO: ABNORMAL /HPF
WBC CLUMPS # UR AUTO: PRESENT /UL

## 2021-03-18 PROCEDURE — 93005 ELECTROCARDIOGRAM TRACING: CPT

## 2021-03-18 PROCEDURE — 85025 COMPLETE CBC W/AUTO DIFF WBC: CPT | Performed by: EMERGENCY MEDICINE

## 2021-03-18 PROCEDURE — 80053 COMPREHEN METABOLIC PANEL: CPT | Performed by: EMERGENCY MEDICINE

## 2021-03-18 PROCEDURE — 0T9B70Z DRAINAGE OF BLADDER WITH DRAINAGE DEVICE, VIA NATURAL OR ARTIFICIAL OPENING: ICD-10-PCS | Performed by: EMERGENCY MEDICINE

## 2021-03-18 PROCEDURE — 70450 CT HEAD/BRAIN W/O DYE: CPT

## 2021-03-18 PROCEDURE — G1004 CDSM NDSC: HCPCS

## 2021-03-18 PROCEDURE — 99223 1ST HOSP IP/OBS HIGH 75: CPT | Performed by: PSYCHIATRY & NEUROLOGY

## 2021-03-18 PROCEDURE — 96374 THER/PROPH/DIAG INJ IV PUSH: CPT

## 2021-03-18 PROCEDURE — 82948 REAGENT STRIP/BLOOD GLUCOSE: CPT

## 2021-03-18 PROCEDURE — 36415 COLL VENOUS BLD VENIPUNCTURE: CPT | Performed by: EMERGENCY MEDICINE

## 2021-03-18 PROCEDURE — 83735 ASSAY OF MAGNESIUM: CPT | Performed by: EMERGENCY MEDICINE

## 2021-03-18 PROCEDURE — 70551 MRI BRAIN STEM W/O DYE: CPT

## 2021-03-18 PROCEDURE — 99285 EMERGENCY DEPT VISIT HI MDM: CPT

## 2021-03-18 PROCEDURE — 81001 URINALYSIS AUTO W/SCOPE: CPT | Performed by: EMERGENCY MEDICINE

## 2021-03-18 PROCEDURE — 84484 ASSAY OF TROPONIN QUANT: CPT | Performed by: EMERGENCY MEDICINE

## 2021-03-18 PROCEDURE — 99223 1ST HOSP IP/OBS HIGH 75: CPT | Performed by: INTERNAL MEDICINE

## 2021-03-18 PROCEDURE — 93010 ELECTROCARDIOGRAM REPORT: CPT | Performed by: INTERNAL MEDICINE

## 2021-03-18 PROCEDURE — 99285 EMERGENCY DEPT VISIT HI MDM: CPT | Performed by: EMERGENCY MEDICINE

## 2021-03-18 RX ORDER — ONDANSETRON 2 MG/ML
4 INJECTION INTRAMUSCULAR; INTRAVENOUS EVERY 6 HOURS PRN
Status: DISCONTINUED | OUTPATIENT
Start: 2021-03-18 | End: 2021-03-22 | Stop reason: HOSPADM

## 2021-03-18 RX ORDER — ACETAMINOPHEN 325 MG/1
650 TABLET ORAL EVERY 6 HOURS PRN
Status: DISCONTINUED | OUTPATIENT
Start: 2021-03-18 | End: 2021-03-22 | Stop reason: HOSPADM

## 2021-03-18 RX ORDER — ISOSORBIDE MONONITRATE 30 MG/1
30 TABLET, EXTENDED RELEASE ORAL DAILY
Status: DISCONTINUED | OUTPATIENT
Start: 2021-03-19 | End: 2021-03-22 | Stop reason: HOSPADM

## 2021-03-18 RX ORDER — ESCITALOPRAM OXALATE 10 MG/1
10 TABLET ORAL
Status: DISCONTINUED | OUTPATIENT
Start: 2021-03-18 | End: 2021-03-22 | Stop reason: HOSPADM

## 2021-03-18 RX ORDER — ATORVASTATIN CALCIUM 40 MG/1
80 TABLET, FILM COATED ORAL ONCE
Status: COMPLETED | OUTPATIENT
Start: 2021-03-18 | End: 2021-03-18

## 2021-03-18 RX ORDER — NITROGLYCERIN 0.4 MG/1
0.4 TABLET SUBLINGUAL
Status: DISCONTINUED | OUTPATIENT
Start: 2021-03-18 | End: 2021-03-22 | Stop reason: HOSPADM

## 2021-03-18 RX ORDER — POTASSIUM CHLORIDE 20 MEQ/1
40 TABLET, EXTENDED RELEASE ORAL ONCE
Status: COMPLETED | OUTPATIENT
Start: 2021-03-18 | End: 2021-03-18

## 2021-03-18 RX ORDER — HYDRALAZINE HYDROCHLORIDE 25 MG/1
25 TABLET, FILM COATED ORAL EVERY 8 HOURS SCHEDULED
Status: DISCONTINUED | OUTPATIENT
Start: 2021-03-18 | End: 2021-03-22 | Stop reason: HOSPADM

## 2021-03-18 RX ORDER — ASPIRIN 81 MG/1
81 TABLET ORAL DAILY
Status: DISCONTINUED | OUTPATIENT
Start: 2021-03-19 | End: 2021-03-22 | Stop reason: HOSPADM

## 2021-03-18 RX ORDER — CLOPIDOGREL BISULFATE 75 MG/1
300 TABLET ORAL ONCE
Status: COMPLETED | OUTPATIENT
Start: 2021-03-18 | End: 2021-03-18

## 2021-03-18 RX ORDER — CLOPIDOGREL BISULFATE 75 MG/1
75 TABLET ORAL DAILY
Status: DISCONTINUED | OUTPATIENT
Start: 2021-03-19 | End: 2021-03-22 | Stop reason: HOSPADM

## 2021-03-18 RX ORDER — POTASSIUM CHLORIDE 14.9 MG/ML
20 INJECTION INTRAVENOUS ONCE
Status: COMPLETED | OUTPATIENT
Start: 2021-03-18 | End: 2021-03-18

## 2021-03-18 RX ORDER — ATORVASTATIN CALCIUM 40 MG/1
40 TABLET, FILM COATED ORAL EVERY EVENING
Status: DISCONTINUED | OUTPATIENT
Start: 2021-03-18 | End: 2021-03-18

## 2021-03-18 RX ORDER — ATORVASTATIN CALCIUM 80 MG/1
80 TABLET, FILM COATED ORAL EVERY EVENING
Status: DISCONTINUED | OUTPATIENT
Start: 2021-03-19 | End: 2021-03-22 | Stop reason: HOSPADM

## 2021-03-18 RX ADMIN — ESCITALOPRAM OXALATE 10 MG: 10 TABLET ORAL at 21:39

## 2021-03-18 RX ADMIN — METOPROLOL TARTRATE 25 MG: 25 TABLET, FILM COATED ORAL at 21:39

## 2021-03-18 RX ADMIN — HYDRALAZINE HYDROCHLORIDE 25 MG: 25 TABLET, FILM COATED ORAL at 21:39

## 2021-03-18 RX ADMIN — ATORVASTATIN CALCIUM 80 MG: 40 TABLET, FILM COATED ORAL at 16:06

## 2021-03-18 RX ADMIN — POTASSIUM CHLORIDE 40 MEQ: 1500 TABLET, EXTENDED RELEASE ORAL at 15:38

## 2021-03-18 RX ADMIN — INSULIN DETEMIR 20 UNITS: 100 INJECTION, SOLUTION SUBCUTANEOUS at 21:45

## 2021-03-18 RX ADMIN — POTASSIUM CHLORIDE 20 MEQ: 14.9 INJECTION, SOLUTION INTRAVENOUS at 15:57

## 2021-03-18 RX ADMIN — CLOPIDOGREL BISULFATE 300 MG: 75 TABLET ORAL at 16:06

## 2021-03-18 NOTE — CONSULTS
[address]   Neurology Initial Consult    Eric Bhatt is a [de-identified] y o  male  [address]          Information obtained from:   Chief Complaint   Patient presents with    Extremity Weakness     Arrives BLS  reported that he has extrimity weakness and shakiness  Just d/c from rehab 3 days ago  Hx angiogram 4-5 weeks ago and needed dialysis  Last dialysis was Tuersday and completed  Assessment/Plan:    1  Acute Lt Lacunar Infarct int he Lt parietal periventricular region  2  HLD  3  DM  4  HTN  5  CKD on dialysis  Done today    -Monitor on Telemetry  -Vitals and Neuro Assessment per stroke pathway  -BASA 81mg daily  -Plavix load today, then 75mg daily starting in AM  -Lipitor 80mg daily  -MRI of the Brain-completed  -Echo with Shunt study  -May need to consider alternative to CTA, unless Nephrology clears for study before a follow up dialysis run    -Permissive HTN for now  -Labs: Lipid panel and A1C   -ST  -PT/OT  -Maintaine Euglycemia    Pt is an [de-identified] old male who lives at home and has multiple medical comorbidities consistent with vascular risk factors for CVA  Pt was brought to the ED today for increased weakness and falls at home after his legs began to shake and give way  Pt had reported that this was  In both legs and preceded by increased shaking of the legs  Pt noted no reports of H/A, dizziness, Lightheadedness, Vision changes, Speech or swallowing difficulty or hearing changes  Pt has minimal focal deficits comprised of RLE weakness  He reports weakness for a couple of weeks since his cardiac cath as it was traumatic to the RLE  Pt does have some increased weakness in the Plantar flexion and extension with testing  Pt has equal , sensation and positive reflexes  Pt has no dysarthria or difficulty with vision of speaking      He will get DAPT at this time, MRI of the Brain has been completed, will need to consider poss CTA if cleared by Nephrology for contrast study and Echo with shunt study re: new CVA on MRI this afternoon  Pt will remain on High dose statin and continue to eval his risk factors once admitted  TPA Decision: Patient not a TPA candidate  Unclear time of onset outside appropriate time window  Reason for Consult / Principal Problem: LE weakness R>L  Hx and PE limited by: n/a  Patient last known well: 3/17/2021  Stroke alert called: not called due to timeframe and limited focal deficits  Neurology time of arrival: called by the ED and responded, NP at bedside for eval at 1615    NIHSS:  1a Level of Consciousness: 0 = Alert   1b  LOC Questions: 0 = Answers both correctly   1c  LOC Commands: 0 = Obeys both correctly   2  Best Gaze: 0 = Normal   3  Visual: 0 = No visual field loss   4  Facial Palsy: 0=Normal symmetric movement   5a  Motor Right Arm: 0=No drift, limb holds 90 (or 45) degrees for full 10 seconds   5b  Motor Left Arm: 0=No drift, limb holds 90 (or 45) degrees for full 10 seconds   6a  Motor Right Le=Drift, limb holds 90 (or 45) degrees but drifts down before full 10 seconds: does not hit bed   6b  Motor Left Le=No drift, limb holds 90 (or 45) degrees for full 10 seconds   7  Limb Ataxia:  0=Absent   8  Sensory: 0=Normal; no sensory loss   9  Best Language:  0=No aphasia, normal   10  Dysarthria: 0=Normal articulation   11  Extinction and Inattention (formerly Neglect): 0=No abnormality   Total Score: Orrspelsv 82 will need follow up in 2-3 months with general attending or advance practitioner  He will not require outpatient neurological testing  HPI:  Armin Gillis  Ryan Genao is a [de-identified] old male with significant comorbid history of bladder CA, CAD w/stents, CKD, HLD, HTN and diabetes  He had reports that he had been in STR after his last hospital stay and went home a couple of days ago  He noted that last night he was walking with his RW as his baseline now and he took a step and his legs just went out on him    Pt noted that it was both legs just gave way and he fell  He had family around him so they helped him up  Stated that the next morning he got up around 330 for his dialysis and he had a repeat of the prior event  He lives alone but had already called for EMS to take him to dialysis so they were there to help him up  When he got home from dialysis he stated that he sat in his chair until the Visiting Nurse came  He assisted the patient up and he started shaking and then he had a collapse again  VNA nurse advised that he come to the ED  Upon arrival to the ED, pt reported the incidents above and noted that he has had RLE weakness due to a traumatic Cardiac Cath a couple of weeks ago to the leg  He had continued weakness in the ED of this extremity and had BP of 140/59  Pt was sent for CT of the Head which had no IC abnl  Pt is unable to have CTA due to his kidney status so was sent for MRI out of the ED  MRI finding for Acute punctate Lt parietal periventricular infarct  Pt has chronic microangiopathic changes and ventriculomegaly that is out of proportion, suspect of lead to NPH  Pt has an indwelling catheter for urine due to bladder cancer, he has some LE weakness and falls, although is alert and oriented with no significant confusion  Will be difficult to make case of NPH at this time  Pt initial symptoms started last night, therefore he is out of any window for tPA administration  Pt has multiple vascular risk factors including HTN, HLD and DM  He is on BASA at home as well as Lipitor 40mg  Pt was loaded with Plavix for DAPT at this time and given high dose statin at this time  Pt was admitted to Telemetry for further work on the Stroke Pathway       Past Medical History:   Diagnosis Date    Acute on chronic diastolic CHF (congestive heart failure) ([facility]) 7/4/2019    Arthritis     Back pain     Bladder cancer ([facility] [address] )     diagnosed  2/2016    [facility])     bladder; chemo; resection;     Constipation 2/1/2021    Coronary artery disease     has 2 coronary stents    Dental crowns present      5    Diabetes mellitus ([facility] [address] )     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[facility] [address] )     fluid behind right eye    Hematuria     hx of    High anion gap metabolic acidosis 2/68/9635    Hypercholesteremia     Hypertension     Kidney stones     Wears glasses        Past Surgical History:   Procedure Laterality Date    ABDOMINAL SURGERY      CARDIAC SURGERY      CHOLECYSTECTOMY      open    CORONARY ANGIOPLASTY WITH STENT PLACEMENT      2 stents  2009    CYSTECTOMY, PARTIAL N/A 11/30/2016    Procedure: PARTIAL CYSTECTOMY, LEFT LYMPHADENECTOMY;  Surgeon: Jeannie Gann MD;  Location: [address] [place] [address];  Service:     CYSTOSCOPY Left 10/20/2016    Procedure: CYSTOSCOPY, BILATERAL RETROGRADE PYLEOGRAM, LEFT SIDE BLADDER BIOPSY, TURBT;  Surgeon: Jeannie Gann MD;  Location: [address] [place] [address];  Service:     HERNIA REPAIR      repair RIH    IR NON-TUNNELED CENTRAL LINE PLACEMENT  1/25/2021    IR TEMPORARY DIALYSIS CATHETER PLACEMENT  7/8/2019    IR TUNNELED CENTRAL LINE PLACEMENT  2/10/2021    IR TUNNELED DIALYSIS CATHETER PLACEMENT  7/12/2019    IR TUNNELED DIALYSIS CATHETER REMOVAL  9/9/2019    IA CYSTOURETHROSCOPY N/A 2/25/2016    Procedure: CYSTOSCOPY;  Surgeon: Jeannie Gann MD;  Location: [address] [place] [address];  Service: Urology    IA CYSTOURETHROSCOPY,FULGUR 2-5 CM LESN N/A 2/25/2016    Procedure: TRANSURETHRAL RESECTION OF BLADDER TUMOR (TURBT);   Surgeon: Jeannie Gann MD;  Location: [address] [place] [address];  Service: Urology    TRANSURETHRAL RESECTION OF BLADDER TUMOR N/A 7/7/2016    Procedure: TRANSURETHRAL RESECTION OF BLADDER TUMOR (TURBT), Cystoscopy, deep biopsy;  Surgeon: Jeannie Gann MD;  Location: [address] [place] [address];  Service:     TRANSURETHRAL RESECTION OF BLADDER TUMOR Bilateral 6/9/2016    Procedure: TRANSURETHRAL RESECTION OF BLADDER TUMOR (TURBT), Cysto, retrograde, BLADDER BIOPSY;  Surgeon: Jeannie Gann MD;  Location: [facility] MAIN OR;  Service:        No Known Allergies      Current Facility-Administered Medications:     potassium chloride 20 mEq IVPB (premix), 20 mEq, Intravenous, Once, Henny Kirk DO, Last Rate: 50 mL/hr at 21 1557, 20 mEq at 21 1557    Social History     Socioeconomic History    Marital status: Single     Spouse name: Not on file    Number of children: Not on file    Years of education: Not on file    Highest education level: Not on file   Occupational History    Not on file   Social Needs    Financial resource strain: Not on file    Food insecurity     Worry: Not on file     Inability: Not on file   [employer] needs     Medical: Not on file     Non-medical: Not on file   Tobacco Use    Smoking status: Former Smoker     Packs/day: 0 50     Years: 5 00     Pack years: 2 50     Quit date:      Years since quittin 2    Smokeless tobacco: Never Used   Substance and Sexual Activity    Alcohol use: Yes     Comment: socially    Drug use: No    Sexual activity: Never   Lifestyle    Physical activity     Days per week: Not on file     Minutes per session: Not on file    Stress: Not on file   Relationships    Social connections     Talks on phone: Not on file     Gets together: Not on file     Attends Gnosticism service: Not on file     Active member of club or organization: Not on file     Attends meetings of clubs or organizations: Not on file     Relationship status: Not on file    Intimate partner violence     Fear of current or ex partner: Not on file     Emotionally abused: Not on file     Physically abused: Not on file     Forced sexual activity: Not on file   Other Topics Concern    Not on file   Social History Narrative    Not on file       Family History   Problem Relation Age of Onset    Heart disease Mother     Diabetes Mother     Diabetes Father          Review of systems:  Please see HPI for positive symptoms     Constitutional: No fever, no chills, no weight change  Ocular: No diplopia, no blurred vision, spots/zigzag lines  HEENT:  No sore throat, headache or congestion  COR:  No chest pain  No palpitations  Lungs:  no sob  GI:  no  nausea, no vomiting, no diarrhea, no constipation, no anorexia  :  No dysuria, frequency, or urgency  No hematuria  Musculoskeletal:  No joint pain or swelling   Skin:  No rash or itching  Psychiatric:  no anxiety, no depression  Endocrine:  No polyuria or polydipsia  Physical examination:  /74 (BP Location: Left arm)   Pulse 70   Temp 98 4 °F (36 9 °C) (Oral)   Resp 20   Wt 89 1 kg (196 lb 6 9 oz)   SpO2 99%   BMI 25 92 kg/m²     GENERAL APPEARANCE:  The patient is alert, oriented  HEENT:  Head is normocephalic  Pupils are equal and reactive  NECK:  Supple without lymphadenopathy  HEART:  Regular rate and rhythm  LUNGS:  clear to auscultation  No crackles or wheezes are heard  ABDOMEN:  Soft, nontender, nondistended with good bowel sounds heard  EXTREMITIES:  Without cyanosis, clubbing or edema  Mental status: The patient is alert, attentive, and oriented  Speech is clear and fluent, good repetition, comprehension, and naming  Cranial nerves:  CN II: Visual fields are full to confrontation  Fundoscopic exam is normal with sharp discs and no vascular changes  Pupils are 3 mm and briskly reactive to light  CN III, IV, VI: At primary gaze, there is no eye deviation  CN V: Facial sensation is intact to pinprick in all 3 divisions bilaterally  Corneal responses are intact  CN VII: Face is symmetric with normal eye closure and smile  CN VIII: Hearing is normal to rubbing fingers  CN IX, X: Palate elevates symmetrically  Phonation is normal   CN XI: Head turning and shoulder shrug are intact  CN XII: Tongue is midline with normal movements and no atrophy  Motor: There is no pronator drift of out-stretched arms      Muscle bulk and tone are normal    Muscle exam  Arm Right Left Leg Right Left   Deltoid 5/5 5/5 Iliopsoas 5/5 4+/5   Biceps 5/5 5/5 Quads 5/5 4+/5   Triceps 5/5 5/5 Hamstrings 5/5 4+/5   Wrist Extension 5/5 5/5 Ankle Dorsi Flexion 5/5 4/5   Wrist Flexion 5/5 5/5 Ankle Plantar Flexion 5/5 4/5        Reflexes    RJ BJ TJ KJ AJ Plantars Thomas's   Right 2+ 2+ 2+ 2+ 2+ Downgoing Not present   Left 2+ 2+ 2+ 2+ 2+ Downgoing Not present      Sensory:  Light touch, Temperature, position sense, and vibration sense are intact in fingers and toes  Coordination:  Rapid alternating movements and fine finger movements are intact  There is no dysmetria on finger-to-nose and heel-knee-shin  There are no abnormal or extraneous movements  Romberg mild sway with standing  Gait/Stance:  Unable to test pt is unstable on his feet, shaking and is a fall risk    Lab Results   Component Value Date    WBC 7 26 03/18/2021    HGB 8 4 (L) 03/18/2021    HCT 25 8 (L) 03/18/2021     (H) 03/18/2021     03/18/2021     Lab Results   Component Value Date    HGBA1C 6 7 (H) 07/03/2019     Lab Results   Component Value Date    ALT 28 03/18/2021    AST 30 03/18/2021    ALKPHOS 92 03/18/2021    BILITOT 0 9 01/05/2016     Lab Results   Component Value Date    GLUCOSE 232 (H) 01/24/2021    CALCIUM 8 8 03/18/2021     01/05/2016    K 3 0 (L) 03/18/2021    CO2 30 03/18/2021    CL 99 (L) 03/18/2021    BUN 48 (H) 03/18/2021    CREATININE 2 65 (H) 03/18/2021         Review of reports and notes reveal:  Independent Interpretation of images or specimens:  Ct Head Without Contrast  Result Date: 3/18/2021  No acute intracranial abnormality  Acute sphenoid sinusitis  Workstation performed: [id number]     Mri Brain Wo Contrast  Result Date: 3/18/2021  Punctate acute lacunar infarct left parietal periventricular region  There is chronic microangiopathic changes are noted  Ventriculomegaly out of proportion to sulcal prominence with widening of the sylvian fissures    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  Workstation performed: [id number]           Thank you for this consult  Total time of encounter: 70 Minutes  More than 50% of time was spent in counseling and coordination of care of patient

## 2021-03-18 NOTE — ASSESSMENT & PLAN NOTE
Likely non MI troponin elevation in the setting of chronic kidney disease  Hold off on checking further serial troponins as patient has known history of severe 3 vessel CAD and patient is asymptomatic

## 2021-03-18 NOTE — ASSESSMENT & PLAN NOTE
Lab Results   Component Value Date    EGFR 22 03/18/2021    EGFR 8 02/12/2021    EGFR 10 02/11/2021    CREATININE 2 65 (H) 03/18/2021    CREATININE 6 31 (H) 02/12/2021    CREATININE 5 01 (H) 02/11/2021   Continue hydralazine 25 milligram p o  Q 8 hours, Lopressor 25 milligram p o  B i d   Hold Demadex at the current time to allow permissive hypertension

## 2021-03-18 NOTE — ASSESSMENT & PLAN NOTE
Patient has history of CAD status post 2 vessel bypass  Patient underwent cardiac catheterization in January which showed severe three-vessel disease with 99% occluded proximal circumflex, 50% distal left main, mid % occluded, RCA proximal 100% occluded with collaterals from left circulation  Graft from LIMA to LAD is widely patent    Graft to obtuse marginal 1 has ostial around 40% stenosis but no flow limiting noted  Continue medical management with Imdur, metoprolol, nitroglycerin p r n , aspirin and statin

## 2021-03-18 NOTE — ASSESSMENT & PLAN NOTE
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  Patient did have outpatient cystoscopy yesterday which showed no recurrence  Patient recently had urinary retention and is currently on Flomax and has a Marinelli catheter  Voiding trial and to discontinue Marinelli catheter and March 20th per Urology

## 2021-03-18 NOTE — PLAN OF CARE
Problem: Potential for Falls  Goal: Patient will remain free of falls  Description: INTERVENTIONS:  - Assess patient frequently for physical needs  -  Identify cognitive and physical deficits and behaviors that affect risk of falls  -  [place] fall precautions as indicated by assessment   - Educate patient/family on patient safety including physical limitations  - Instruct patient to call for assistance with activity based on assessment  - Modify environment to reduce risk of injury  - Consider OT/PT consult to assist with strengthening/mobility  Outcome: Progressing     Problem: Neurological Deficit  Goal: Neurological status is stable or improving  Description: Interventions:  - Monitor and assess patient's level of consciousness, motor function, sensory function, and level of assistance needed for ADLs  - Monitor and report changes from baseline  Collaborate with interdisciplinary team to initiate plan and implement interventions as ordered  - Provide and maintain a safe environment  - Consider seizure precautions  - Consider fall precautions  - Consider aspiration precautions  - Consider bleeding precautions  Outcome: Progressing     Problem: Activity Intolerance/Impaired Mobility  Goal: Mobility/activity is maintained at optimum level for patient  Description: Interventions:  - Assess and monitor patient  barriers to mobility and need for assistive/adaptive devices  - Assess patient's emotional response to limitations  - Collaborate with interdisciplinary team and initiate plans and interventions as ordered  - Encourage independent activity per ability   - Maintain proper body alignment  - Perform active/passive rom as tolerated/ordered    - Plan activities to conserve energy   - Turn patient as appropriate  Outcome: Progressing     Problem: Communication Impairment  Goal: Ability to express needs and understand communication  Description: Assess patient's communication skills and ability to understand information  Patient will demonstrate use of effective communication techniques, alternative methods of communication and understanding even if not able to speak  - Encourage communication and provide alternate methods of communication as needed  - Collaborate with case management/ for discharge needs  - Include patient/family/caregiver in decisions related to communication  Outcome: Progressing     Problem: Potential for Aspiration  Goal: Non-ventilated patient's risk of aspiration is minimized  Description: Assess and monitor vital signs, respiratory status, and labs (WBC)  Monitor for signs of aspiration (tachypnea, cough, rales, wheezing, cyanosis, fever)  - Assess and monitor patient's ability to swallow  - Place patient up in chair to eat if possible  - HOB up at 90 degrees to eat if unable to get patient up into chair   - Supervise patient during oral intake  - Instruct patient/ family to take small bites  - Instruct patient/ family to take small single sips when taking liquids  - Follow patient-specific strategies generated by speech pathologist   Outcome: Progressing     Problem: Nutrition  Goal: Nutrition/Hydration status is improving  Description: Monitor and assess patient's nutrition/hydration status for malnutrition (ex- brittle hair, bruises, dry skin, pale skin and conjunctiva, muscle wasting, smooth red tongue, and disorientation)  Collaborate with interdisciplinary team and initiate plan and interventions as ordered  Monitor patient's weight and dietary intake as ordered or per policy  Utilize nutrition screening tool and intervene per policy  Determine patient's food preferences and provide high-protein, high-caloric foods as appropriate  - Assist patient with eating   - Allow adequate time for meals   - Encourage patient to take dietary supplement as ordered    - Collaborate with clinical nutritionist   - Include patient/family/caregiver in decisions related to nutrition    Outcome: Progressing     Problem: PAIN - ADULT  Goal: Verbalizes/displays adequate comfort level or baseline comfort level  Description: Interventions:  - Encourage patient to monitor pain and request assistance  - Assess pain using appropriate pain scale  - Administer analgesics based on type and severity of pain and evaluate response  - Implement non-pharmacological measures as appropriate and evaluate response  - Consider cultural and social influences on pain and pain management  - Notify physician/advanced practitioner if interventions unsuccessful or patient reports new pain  Outcome: Progressing     Problem: INFECTION - ADULT  Goal: Absence or prevention of progression during hospitalization  Description: INTERVENTIONS:  - Assess and monitor for signs and symptoms of infection  - Monitor lab/diagnostic results  - Monitor all insertion sites, i e  indwelling lines, tubes, and drains  - Monitor endotracheal if appropriate and nasal secretions for changes in amount and color  - [place] appropriate cooling/warming therapies per order  - Administer medications as ordered  - Instruct and encourage patient and family to use good hand hygiene technique  - Identify and instruct in appropriate isolation precautions for identified infection/condition  Outcome: Progressing  Goal: Absence of fever/infection during neutropenic period  Description: INTERVENTIONS:  - Monitor WBC    Outcome: Progressing     Problem: SAFETY ADULT  Goal: Maintain or return to baseline ADL function  Description: INTERVENTIONS:  -  Assess patient's ability to carry out ADLs; assess patient's baseline for ADL function and identify physical deficits which impact ability to perform ADLs (bathing, care of mouth/teeth, toileting, grooming, dressing, etc )  - Assess/evaluate cause of self-care deficits   - Assess range of motion  - Assess patient's mobility; develop plan if impaired  - Assess patient's need for assistive devices and provide as appropriate  - Encourage maximum independence but intervene and supervise when necessary  - Involve family in performance of ADLs  - Assess for home care needs following discharge   - Consider OT consult to assist with ADL evaluation and planning for discharge  - Provide patient education as appropriate  Outcome: Progressing  Goal: Maintain or return mobility status to optimal level  Description: INTERVENTIONS:  - Assess patient's baseline mobility status (ambulation, transfers, stairs, etc )    - Identify cognitive and physical deficits and behaviors that affect mobility  - Identify mobility aids required to assist with transfers and/or ambulation (gait belt, sit-to-stand, lift, walker, cane, etc )  - [place] fall precautions as indicated by assessment  - Record patient progress and toleration of activity level on Mobility SBAR; progress patient to next Phase/Stage  - Instruct patient to call for assistance with activity based on assessment  - Consider rehabilitation consult to assist with strengthening/weightbearing, etc   Outcome: Progressing     Problem: DISCHARGE PLANNING  Goal: Discharge to home or other facility with appropriate resources  Description: INTERVENTIONS:  - Identify barriers to discharge w/patient and caregiver  - Arrange for needed discharge resources and transportation as appropriate  - Identify discharge learning needs (meds, wound care, etc )  - Arrange for interpretive services to assist at discharge as needed  - Refer to Case Management Department for coordinating discharge planning if the patient needs post-hospital services based on physician/advanced practitioner order or complex needs related to functional status, cognitive ability, or social support system  Outcome: Progressing     Problem: Knowledge Deficit  Goal: Patient/family/caregiver demonstrates understanding of disease process, treatment plan, medications, and discharge instructions  Description: Complete learning assessment and assess knowledge base    Interventions:  - Provide teaching at level of understanding  - Provide teaching via preferred learning methods  Outcome: Progressing     Problem: CARDIOVASCULAR - ADULT  Goal: Maintains optimal cardiac output and hemodynamic stability  Description: INTERVENTIONS:  - Monitor I/O, vital signs and rhythm  - Monitor for S/S and trends of decreased cardiac output  - Administer and titrate ordered vasoactive medications to optimize hemodynamic stability  - Assess quality of pulses, skin color and temperature  - Assess for signs of decreased coronary artery perfusion  - Instruct patient to report change in severity of symptoms  Outcome: Progressing  Goal: Absence of cardiac dysrhythmias or at baseline rhythm  Description: INTERVENTIONS:  - Continuous cardiac monitoring, vital signs, obtain 12 lead EKG if ordered  - Administer antiarrhythmic and heart rate control medications as ordered  - Monitor electrolytes and administer replacement therapy as ordered  Outcome: Progressing     Problem: GENITOURINARY - ADULT  Goal: Urinary catheter remains patent  Description: INTERVENTIONS:  - Assess patency of urinary catheter  - If patient has a chronic chowdhury, consider changing catheter if non-functioning  - Follow guidelines for intermittent irrigation of non-functioning urinary catheter  Outcome: Progressing     Problem: MUSCULOSKELETAL - ADULT  Goal: Maintain or return mobility to safest level of function  Description: INTERVENTIONS:  - Assess patient's ability to carry out ADLs; assess patient's baseline for ADL function and identify physical deficits which impact ability to perform ADLs (bathing, care of mouth/teeth, toileting, grooming, dressing, etc )  - Assess/evaluate cause of self-care deficits   - Assess range of motion  - Assess patient's mobility  - Assess patient's need for assistive devices and provide as appropriate  - Encourage maximum independence but intervene and supervise when necessary  - Involve family in performance of ADLs  - Assess for home care needs following discharge   - Consider OT consult to assist with ADL evaluation and planning for discharge  - Provide patient education as appropriate  Outcome: Progressing

## 2021-03-18 NOTE — H&P
Karissa 45  H&P- Courtney Bryan 1940, [de-identified] y o  male MRN: [id number]  Unit/Bed#: [address] [address] Encounter: [id number]  Primary Care Provider: Maribell Nunez MD   Date and time admitted to hospital: 3/18/2021  1:36 PM    Acute ischemic stroke [facility])  Assessment & Plan  Patient presented with bilateral leg weakness right more than left  CT scan of the brain showed acute sphenoid sinusitis without any acute intracranial abnormality  MRI of the brain showed punctate acute lacunar infarct in the left parietal periventricular region with chronic microangiopathic changes and ventriculomegaly  Limited echo with bubble study was ordered  Patient cannot get CTA due to chronic kidney disease  Will check carotid Dopplers to rule out stenosis  A low permissive hypertension to keep SBP more than 180  Check fasting lipid panel, hemoglobin A1c  PT/OT and speech therapy  Tight control of blood sugars  Coronary artery disease  Assessment & Plan  Patient has history of CAD status post 2 vessel bypass  Patient underwent cardiac catheterization in January which showed severe three-vessel disease with 99% occluded proximal circumflex, 50% distal left main, mid % occluded, RCA proximal 100% occluded with collaterals from left circulation  Graft from LIMA to LAD is widely patent  Graft to obtuse marginal 1 has ostial around 40% stenosis but no flow limiting noted  Continue medical management with Imdur, metoprolol, nitroglycerin p r n , aspirin and statin    Elevated troponin  Assessment & Plan  Likely non MI troponin elevation in the setting of chronic kidney disease  Hold off on checking further serial troponins as patient has known history of severe 3 vessel CAD and patient is asymptomatic    Hypokalemia  Assessment & Plan  Patient was given KCl 40 milliequivalents p o  And 20 milliequivalents IV in the ED  Follow-up BMP in a m      Chronic kidney disease  Assessment & Plan  Lab Results Component Value Date    EGFR 22 03/18/2021    EGFR 8 02/12/2021    EGFR 10 02/11/2021    CREATININE 2 65 (H) 03/18/2021    CREATININE 6 31 (H) 02/12/2021    CREATININE 5 01 (H) 02/11/2021   Patient has chronic kidney disease stage 4 with baseline creatinine 2-2 5  Patient recently developed acute kidney injury on chronic kidney disease secondary to contrast induced diabetic nephropathy and has been on dialysis since then  As per patient  His last session of hemodialysis was on March 16, 2021 and nephrology wanted to hold off further dialysis and monitor kidney function  Will consult Nephrology for further recommendations  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    Benign hypertension with chronic kidney disease, stage III  Assessment & Plan  Lab Results   Component Value Date    EGFR 22 03/18/2021    EGFR 8 02/12/2021    EGFR 10 02/11/2021    CREATININE 2 65 (H) 03/18/2021    CREATININE 6 31 (H) 02/12/2021    CREATININE 5 01 (H) 02/11/2021   Continue hydralazine 25 milligram p o  Q 8 hours, Lopressor 25 milligram p o  B i d   Hold Demadex at the current time to allow permissive hypertension    Type 2 diabetes mellitus with hyperglycemia, with long-term current use of insulin [facility])  Assessment & Plan  Lab Results   Component Value Date    HGBA1C 6 7 (H) 07/03/2019       Recent Labs     03/18/21  1344   POCGLU 96       Blood Sugar Average: Last 72 hrs:  (P) 96   Continue Levemir 20 units subcutaneously daily with Humalog sliding scale  Patient will be on diabetic diet    Hypercholesteremia  Assessment & Plan  Continue Lipitor 40 milligram p o  Daily  Check Fasting lipid panel in a m      Bladder cancer [facility])  Assessment & Plan  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  Patient did have outpatient cystoscopy yesterday which showed no recurrence  Patient recently had urinary retention and is currently on Flomax and has a Marinelli catheter  Voiding trial and to discontinue Marinelli catheter and March 20th per Urology  VTE Prophylaxis: Enoxaparin (Lovenox)  / sequential compression device   Code Status:  Full code      Anticipated Length of Stay:  Patient will be admitted on an Inpatient basis with an anticipated length of stay of  more than 2 midnights  Justification for Hospital Stay:  Acute stroke, leg weakness    Total Time for Visit, including Counseling / Coordination of Care: 1 hour  Greater than 50% of this total time spent on direct patient counseling and coordination of care  Chief Complaint:   Bilateral leg weakness    History of Present Illness:    Hans Overton is a [de-identified] y o  male with past medical history of hypertension, hyperlipidemia, CAD status post CABG, diabetes, bladder cancer, recent non-STEMI, history of contrast induced nephropathy status post dialysis, in her retention with chronic Marinelli who presents with bilateral leg weakness and difficulty walking  Patient reports he was just released from rehab about 3 days ago as he was walking 300 feet with help of walker without any problems  Patient was at home and reports that his both legs gave out under him and patient slowly slid to the floor as he was with a walker about couple of days ago  Patient had another episode today where he felt significant bilateral leg weakness right more than left and felt that his legs are giving out under him when he was seen by visiting nurse today  Patient also complains of significant shakiness  Patient reports he has significant pain in the right leg after his cardiac catheterization but has been having weakness in both legs right more than left wound past few days  Patient denies any tingling or numbness in the legs  Patient otherwise denies any chest pain, shortness of breath, abdominal pain, nausea or vomiting  In the ED patient's vital signs were stable    Labs showed a potassium level of 3, BUN creatinine of 48 and 2 65 with mild elevation of troponin at 0 07  CT scan of the brain was negative and MRI showed acute infarction in the ED  Review of Systems:    Review of Systems   Constitutional: Negative for chills, diaphoresis, fatigue and unexpected weight change  HENT: Negative for congestion, ear discharge, ear pain, facial swelling, hearing loss, mouth sores, nosebleeds, postnasal drip, rhinorrhea, sinus pressure, sneezing, sore throat, tinnitus, trouble swallowing and voice change  Eyes: Negative for photophobia, discharge, redness and visual disturbance  Respiratory: Negative for cough, chest tightness, shortness of breath, wheezing and stridor  Cardiovascular: Negative for chest pain, palpitations and leg swelling  Gastrointestinal: Negative for abdominal distention, abdominal pain, anal bleeding, blood in stool, constipation, diarrhea, nausea and vomiting  Endocrine: Negative for polydipsia, polyphagia and polyuria  Genitourinary: Negative for decreased urine volume, difficulty urinating, dysuria, flank pain, frequency, hematuria and urgency  Musculoskeletal: Negative for arthralgias, back pain and neck stiffness  Skin: Negative for pallor and rash  Neurological: Positive for tremors and weakness  Negative for dizziness, seizures, facial asymmetry, speech difficulty, light-headedness, numbness and headaches  Hematological: Negative for adenopathy  Does not bruise/bleed easily  Psychiatric/Behavioral: Negative for agitation and confusion         Past Medical and Surgical History:     Past Medical History:   Diagnosis Date    [facility] 7/4/2019    Arthritis     Back pain     Bladder cancer ([facility] )     diagnosed  2/2016    Cancer [facility])     bladder; chemo; resection;     Constipation 2/1/2021    Coronary artery disease     has 2 coronary stents    Dental crowns present      5    Diabetes mellitus ([facility] )     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[facility] ) fluid behind right eye    Hematuria     hx of    High anion gap metabolic acidosis 8/92/5749    Hypercholesteremia     Hypertension     Kidney stones     Wears glasses        Past Surgical History:   Procedure Laterality Date    ABDOMINAL SURGERY      CARDIAC SURGERY      CHOLECYSTECTOMY      open    CORONARY ANGIOPLASTY WITH STENT PLACEMENT      2 stents  2009    CYSTECTOMY, PARTIAL N/A 11/30/2016    Procedure: PARTIAL CYSTECTOMY, LEFT LYMPHADENECTOMY;  Surgeon: Manav García MD;  Location: [address] [place] [address];  Service:    Leticia Cabot Left 10/20/2016    Procedure: CYSTOSCOPY, BILATERAL RETROGRADE PYLEOGRAM, LEFT SIDE BLADDER BIOPSY, TURBT;  Surgeon: Manav García MD;  Location: [address] [place] [address];  Service:     HERNIA REPAIR      repair RIH    IR NON-TUNNELED CENTRAL LINE PLACEMENT  1/25/2021    IR TEMPORARY DIALYSIS CATHETER PLACEMENT  7/8/2019    IR TUNNELED CENTRAL LINE PLACEMENT  2/10/2021    IR TUNNELED DIALYSIS CATHETER PLACEMENT  7/12/2019    IR TUNNELED DIALYSIS CATHETER REMOVAL  9/9/2019    DE CYSTOURETHROSCOPY N/A 2/25/2016    Procedure: CYSTOSCOPY;  Surgeon: Manav García MD;  Location: [address] [place] [address];  Service: Urology    DE CYSTOURETHROSCOPY,FULGUR 2-5 CM LESN N/A 2/25/2016    Procedure: TRANSURETHRAL RESECTION OF BLADDER TUMOR (TURBT); Surgeon: Manav García MD;  Location: [address] [place] [address];  Service: Urology    TRANSURETHRAL RESECTION OF BLADDER TUMOR N/A 7/7/2016    Procedure: TRANSURETHRAL RESECTION OF BLADDER TUMOR (TURBT), Cystoscopy, deep biopsy;  Surgeon: Manav García MD;  Location: [address] [place] [address];  Service:     TRANSURETHRAL RESECTION OF BLADDER TUMOR Bilateral 6/9/2016    Procedure: TRANSURETHRAL RESECTION OF BLADDER TUMOR (TURBT), Cysto, retrograde, BLADDER BIOPSY;  Surgeon: Manav García MD;  Location: [facility];  Service:        Meds/Allergies:    Prior to Admission medications    Medication Sig Start Date End Date Taking?  Authorizing Provider   aspirin (ASPIR-LOW) 81 mg EC tablet Daily 2/24/17   Historical Provider, MD   atorvastatin (LIPITOR) 40 mg tablet atorvastatin 40 mg tablet   TAKE ONE TABLET DAILY    Historical Provider, MD   epoetin thu (EPOGEN,PROCRIT) 3,000 units/mL Inject 2 mL (6,000 Units total) under the skin After Dialysis (anemia) for up to 30 doses 2/12/21   Seda Mccarthy MD   escitalopram (LEXAPRO) 10 mg tablet Take 1 tablet (10 mg total) by mouth daily at bedtime 7/13/19   Usman Palm MD   hydrALAZINE (APRESOLINE) 25 mg tablet Take 1 tablet (25 mg total) by mouth every 8 (eight) hours 2/12/21   Seda Mccarthy MD   insulin detemir (LEVEMIR) 100 units/mL subcutaneous injection Inject 20 Units under the skin every 12 (twelve) hours 7/13/19   Usman Palm MD   insulin lispro (HumaLOG) 100 units/mL injection Inject 1-6 Units under the skin daily at bedtime 7/13/19   Usman Palm MD   insulin lispro (HumaLOG) 100 units/mL injection Inject 2-12 Units under the skin 3 (three) times a day before meals 7/13/19   Usman Palm MD   isosorbide mononitrate (IMDUR) 30 mg 24 hr tablet Take 1 tablet (30 mg total) by mouth daily  Patient not taking: Reported on 1/21/2021 7/14/19   Usman Palm MD   metoprolol tartrate (LOPRESSOR) 50 mg tablet Take 0 5 tablets (25 mg total) by mouth every 12 (twelve) hours 7/13/19   Usman Palm MD   nitroglycerin (NITROSTAT) 0 4 mg SL tablet Place 1 tablet (0 4 mg total) under the tongue every 5 (five) minutes as needed for chest pain 7/13/19   Usman Palm MD   polyethylene glycol (MIRALAX) 17 g packet Take 17 g by mouth daily as needed (Constipation) 7/13/19   Usman Palm MD   torsemide (DEMADEX) 100 mg tablet Take 1 tablet (100 mg total) by mouth 4 (four) times a week 2/13/21   Seda Mccarthy MD     I have reviewed home medications using allscripts      Allergies: No Known Allergies    Social History:     Marital Status: Single     Substance Use History:   Social History Substance and Sexual Activity   Alcohol Use Yes    Comment: socially     Social History     Tobacco Use   Smoking Status Former Smoker    Packs/day: 0 50    Years: 5 00    Pack years: 2 50    Quit date: Shante Aver Years since quittin 2   Smokeless Tobacco Never Used     Social History     Substance and Sexual Activity   Drug Use No       Family History:    Family History   Problem Relation Age of Onset    Heart disease Mother     Diabetes Mother     Diabetes Father        Physical Exam:     Vitals:   Blood Pressure: 148/65 (21)  Pulse: 83 (21)  Temperature: 98 9 °F (37 2 °C) (21)  Temp Source: Oral (21 1615)  Respirations: 20 (21)  Weight - Scale: 89 1 kg (196 lb 6 9 oz) (21 1338)  SpO2: 98 % (21)    Physical Exam  Constitutional:       Appearance: Normal appearance  HENT:      Head: Normocephalic and atraumatic  Eyes:      Extraocular Movements: Extraocular movements intact  Pupils: Pupils are equal, round, and reactive to light  Neck:      Musculoskeletal: Normal range of motion and neck supple  Cardiovascular:      Rate and Rhythm: Normal rate and regular rhythm  Heart sounds: No murmur  No gallop  Pulmonary:      Effort: Pulmonary effort is normal       Breath sounds: Normal breath sounds  Abdominal:      General: Bowel sounds are normal       Palpations: Abdomen is soft  Tenderness: There is no abdominal tenderness  Musculoskeletal: Normal range of motion  General: No swelling or deformity  Skin:     General: Skin is warm and dry  Neurological:      General: No focal deficit present  Mental Status: He is alert               Additional Data:     Lab Results: I have personally reviewed pertinent films in PACS    Results from last 7 days   Lab Units 21  1356   WBC Thousand/uL 7 26   HEMOGLOBIN g/dL 8 4*   HEMATOCRIT % 25 8*   PLATELETS Thousands/uL 169   NEUTROS PCT % 73   LYMPHS PCT % 17   MONOS PCT % 8   EOS PCT % 1     Results from last 7 days   Lab Units 03/18/21  1356   SODIUM mmol/L 138   POTASSIUM mmol/L 3 0*   CHLORIDE mmol/L 99*   CO2 mmol/L 30   BUN mg/dL 48*   CREATININE mg/dL 2 65*   ANION GAP mmol/L 9   CALCIUM mg/dL 8 8   ALBUMIN g/dL 2 8*   TOTAL BILIRUBIN mg/dL 0 70   ALK PHOS U/L 92   ALT U/L 28   AST U/L 30   GLUCOSE RANDOM mg/dL 101         Results from last 7 days   Lab Units 03/18/21  1344   POC GLUCOSE mg/dl 96               Imaging: I have personally reviewed pertinent films in PACS    MRI brain wo contrast   Final Result by Sheridan 6, DO (03/18 1522)      Punctate acute lacunar infarct left parietal periventricular region  There is chronic microangiopathic changes are noted  Ventriculomegaly out of proportion to sulcal prominence with widening of the sylvian fissures  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  Workstation performed: [id number]         CT head without contrast   Final Result by Marylen Glad, MD (03/18 3024)      No acute intracranial abnormality  Acute sphenoid sinusitis  Workstation performed: [id number]         VAS carotid complete study    (Results Pending)       EKG, Pathology, and Other Studies Reviewed on Admission:   · EKG:  EKG showed sinus rhythm with first-degree AV block, LVH and inferior infarction    Allscripts / Epic Records Reviewed: Yes     ** Please Note: This note has been constructed using a voice recognition system   **

## 2021-03-18 NOTE — ED PROVIDER NOTES
History  Chief Complaint   Patient presents with    Extremity Weakness     Arrives ARJUN  reported that he has extrimity weakness and shakiness  Just d/c from rehab 3 days ago  Hx angiogram 4-5 weeks ago and needed dialysis  Last dialysis was Tuersday and completed  45-year-old male presents with bilateral lower extremity weakness that started yesterday where he is not able to walk and his legs are just giving out  Denies any expressive aphasia slurred speech focal numbness tingling headache coordination difficulty or any other symptoms  Patient had coronary artery disease status post cardiac catheterization in January followed by several sessions of dialysis for chronic kidney disease  Patient went to rehab from his hospitalization in February and he was just discharged a couple days ago  He is not on any anticoagulation  Only on aspirin  History of diabetes hypertension high cholesterol  No trauma noted  Patient says he has chronic right lower extremity weakness from the cardiac catheterization couple weeks ago  History provided by:  Patient   used: No        Prior to Admission Medications   Prescriptions Last Dose Informant Patient Reported?  Taking?   aspirin (ASPIR-LOW) 81 mg EC tablet   Yes No   Sig: Daily   atorvastatin (LIPITOR) 40 mg tablet   Yes No   Sig: atorvastatin 40 mg tablet   TAKE ONE TABLET DAILY   epoetin thu (EPOGEN,PROCRIT) 3,000 units/mL   No No   Sig: Inject 2 mL (6,000 Units total) under the skin After Dialysis (anemia) for up to 30 doses   escitalopram (LEXAPRO) 10 mg tablet   No No   Sig: Take 1 tablet (10 mg total) by mouth daily at bedtime   hydrALAZINE (APRESOLINE) 25 mg tablet   No No   Sig: Take 1 tablet (25 mg total) by mouth every 8 (eight) hours   insulin detemir (LEVEMIR) 100 units/mL subcutaneous injection   No No   Sig: Inject 20 Units under the skin every 12 (twelve) hours   insulin lispro (HumaLOG) 100 units/mL injection   No No   Sig: Inject 1-6 Units under the skin daily at bedtime   insulin lispro (HumaLOG) 100 units/mL injection   No No   Sig: Inject 2-12 Units under the skin 3 (three) times a day before meals   isosorbide mononitrate (IMDUR) 30 mg 24 hr tablet   No No   Sig: Take 1 tablet (30 mg total) by mouth daily   Patient not taking: Reported on 1/21/2021   metoprolol tartrate (LOPRESSOR) 50 mg tablet   No No   Sig: Take 0 5 tablets (25 mg total) by mouth every 12 (twelve) hours   nitroglycerin (NITROSTAT) 0 4 mg SL tablet   No No   Sig: Place 1 tablet (0 4 mg total) under the tongue every 5 (five) minutes as needed for chest pain   polyethylene glycol (MIRALAX) 17 g packet   No No   Sig: Take 17 g by mouth daily as needed (Constipation)   torsemide (DEMADEX) 100 mg tablet   No No   Sig: Take 1 tablet (100 mg total) by mouth 4 (four) times a week      Facility-Administered Medications: None       Past Medical History:   Diagnosis Date    Acute on chronic diastolic CHF (congestive heart failure) ([facility] ) 7/4/2019    Arthritis     Back pain     Bladder cancer ([facility] )     diagnosed  2/2016    Cancer [facility])     bladder; chemo; resection;     Constipation 2/1/2021    Coronary artery disease     has 2 coronary stents    Dental crowns present      5    Diabetes mellitus ([facility] )     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[facility] [facility] )     fluid behind right eye    Hematuria     hx of    High anion gap metabolic acidosis 2/13/1663    Hypercholesteremia     Hypertension     Kidney stones     Wears glasses        Past Surgical History:   Procedure Laterality Date    ABDOMINAL SURGERY      CARDIAC SURGERY      CHOLECYSTECTOMY      open    CORONARY ANGIOPLASTY WITH STENT PLACEMENT      2 stents  2009    CYSTECTOMY, PARTIAL N/A 11/30/2016    Procedure: PARTIAL CYSTECTOMY, LEFT LYMPHADENECTOMY;  Surgeon: Vi Hughes MD;  Location: [address] [place] [address];  Service:     CYSTOSCOPY Left 10/20/2016    Procedure: CYSTOSCOPY, BILATERAL RETROGRADE PYLEOGRAM, LEFT SIDE BLADDER BIOPSY, TURBT;  Surgeon: Vi Hughes MD;  Location: [address] [place] [address];  Service:     HERNIA REPAIR      repair RIH    IR NON-TUNNELED CENTRAL LINE PLACEMENT  2021    IR TEMPORARY DIALYSIS CATHETER PLACEMENT  2019    IR TUNNELED CENTRAL LINE PLACEMENT  2/10/2021    IR TUNNELED DIALYSIS CATHETER PLACEMENT  2019    IR TUNNELED DIALYSIS CATHETER REMOVAL  2019    FL CYSTOURETHROSCOPY N/A 2016    Procedure: CYSTOSCOPY;  Surgeon: Vi Hughes MD;  Location: [address] [place] [address];  Service: Urology    FL CYSTOURETHROSCOPY,FULGUR 2-5 CM LESN N/A 2016    Procedure: TRANSURETHRAL RESECTION OF BLADDER TUMOR (TURBT); Surgeon: Vi Hughes MD;  Location: [address] [place] [address];  Service: Urology    TRANSURETHRAL RESECTION OF BLADDER TUMOR N/A 2016    Procedure: TRANSURETHRAL RESECTION OF BLADDER TUMOR (TURBT), Cystoscopy, deep biopsy;  Surgeon: Vi Hughes MD;  Location: [facility];  Service:     TRANSURETHRAL RESECTION OF BLADDER TUMOR Bilateral 2016    Procedure: TRANSURETHRAL RESECTION OF BLADDER TUMOR (TURBT), Cysto, retrograde, BLADDER BIOPSY;  Surgeon: Vi Hughes MD;  Location: [facility];  Service:        Family History   Problem Relation Age of Onset    Heart disease Mother     Diabetes Mother     Diabetes Father      I have reviewed and agree with the history as documented  E-Cigarette/Vaping     E-Cigarette/Vaping Substances     Social History     Tobacco Use    Smoking status: Former Smoker     Packs/day: 0 50     Years: 5 00     Pack years: 2 50     Quit date:      Years since quittin 2    Smokeless tobacco: Never Used   Substance Use Topics    Alcohol use: Yes     Comment: socially    Drug use: No       Review of Systems   Constitutional: Negative  HENT: Negative  Eyes: Negative  Respiratory: Negative  Cardiovascular: Negative  Gastrointestinal: Negative  Endocrine: Negative  Genitourinary: Negative  Musculoskeletal: Negative      Skin: Negative  Allergic/Immunologic: Negative  Neurological: Positive for weakness  Hematological: Negative  Psychiatric/Behavioral: Negative  All other systems reviewed and are negative  Physical Exam  Physical Exam  Vitals signs and nursing note reviewed  Constitutional:       Appearance: He is well-developed  HENT:      Head: Normocephalic and atraumatic  Eyes:      Pupils: Pupils are equal, round, and reactive to light  Neck:      Musculoskeletal: Normal range of motion and neck supple  Cardiovascular:      Rate and Rhythm: Normal rate and regular rhythm  Pulmonary:      Effort: Pulmonary effort is normal       Breath sounds: Normal breath sounds  Abdominal:      General: Bowel sounds are normal       Palpations: Abdomen is soft  Musculoskeletal: Normal range of motion  Skin:     General: Skin is warm and dry  Neurological:      Mental Status: He is alert and oriented to person, place, and time  Mental status is at baseline  Cranial Nerves: No cranial nerve deficit  Sensory: No sensory deficit  Motor: Weakness present  Coordination: Coordination normal       Gait: Gait normal       Deep Tendon Reflexes: Reflexes normal       Comments: Right lower extremity decreased motor strength compared to the left  Deep tendon reflexes are intact  Otherwise cranial nerves are intact 2 through 12 coordination is intact  Patient otherwise neurologically intact           Vital Signs  ED Triage Vitals [03/18/21 1338]   Temperature Pulse Respirations Blood Pressure SpO2   97 8 °F (36 6 °C) 83 18 140/59 99 %      Temp Source Heart Rate Source Patient Position - Orthostatic VS BP Location FiO2 (%)   Oral Monitor Lying Left arm --      Pain Score       No Pain           Vitals:    03/18/21 1430 03/18/21 1516 03/18/21 1615 03/18/21 1700   BP: 153/70 154/67 155/71 152/74   Pulse: 84 78  70   Patient Position - Orthostatic VS:  Lying  Lying         Visual Acuity  Visual Acuity Most Recent Value   L Pupil Size (mm)  3   R Pupil Size (mm)  3          ED Medications  Medications   potassium chloride 20 mEq IVPB (premix) (20 mEq Intravenous New Bag 3/18/21 1557)   potassium chloride (K-DUR,KLOR-CON) CR tablet 40 mEq (40 mEq Oral Given 3/18/21 1538)   clopidogrel (PLAVIX) tablet 300 mg (300 mg Oral Given 3/18/21 1606)   atorvastatin (LIPITOR) tablet 80 mg (80 mg Oral Given 3/18/21 1606)       Diagnostic Studies  Results Reviewed     Procedure Component Value Units Date/Time    UA (URINE) with reflex to Scope [[id number]]  (Abnormal) Collected: 03/18/21 1713    Lab Status: Final result Specimen: Urine, Indwelling Marinelli Catheter Updated: 03/18/21 1719     Color, UA Light Yellow     Clarity, UA Clear     Specific Gravity, UA 1 010     pH, UA 7 0     Leukocytes, UA Moderate     Nitrite, UA Negative     Protein, UA 30 (1+) mg/dl      Glucose, UA Negative mg/dl      Ketones, UA Negative mg/dl      Urobilinogen, UA 0 2 E U /dl      Bilirubin, UA Negative     Blood, UA Small    Urine Microscopic [[id number]] Collected: 03/18/21 1713    Lab Status:  In process Specimen: Urine, Indwelling Marinelli Catheter Updated: 03/18/21 1719    Fingerstick Glucose (POCT) [[id number]]  (Normal) Collected: 03/18/21 1344    Lab Status: Final result Updated: 03/18/21 1547     POC Glucose 96 mg/dl     Troponin I [[id number]]  (Abnormal) Collected: 03/18/21 1356    Lab Status: Final result Specimen: Blood from Arm, Right Updated: 03/18/21 1424     Troponin I 0 07 ng/mL     Comprehensive metabolic panel [[id number]]  (Abnormal) Collected: 03/18/21 1356    Lab Status: Final result Specimen: Blood from Arm, Right Updated: 03/18/21 1419     Sodium 138 mmol/L      Potassium 3 0 mmol/L      Chloride 99 mmol/L      CO2 30 mmol/L      ANION GAP 9 mmol/L      BUN 48 mg/dL      Creatinine 2 65 mg/dL      Glucose 101 mg/dL      Calcium 8 8 mg/dL      Corrected Calcium 9 8 mg/dL      AST 30 U/L      ALT 28 U/L      Alkaline Phosphatase 92 U/L      Total Protein 7 2 g/dL      Albumin 2 8 g/dL      Total Bilirubin 0 70 mg/dL      eGFR 22 ml/min/1 73sq m     Narrative:      National Kidney Disease Foundation guidelines for Chronic Kidney Disease (CKD):     Stage 1 with normal or high GFR (GFR > 90 mL/min/1 73 square meters)    Stage 2 Mild CKD (GFR = 60-89 mL/min/1 73 square meters)    Stage 3A Moderate CKD (GFR = 45-59 mL/min/1 73 square meters)    Stage 3B Moderate CKD (GFR = 30-44 mL/min/1 73 square meters)    Stage 4 Severe CKD (GFR = 15-29 mL/min/1 73 square meters)    Stage 5 End Stage CKD (GFR <15 mL/min/1 73 square meters)  Note: GFR calculation is accurate only with a steady state creatinine    Magnesium [[id number]]  (Normal) Collected: 03/18/21 1356    Lab Status: Final result Specimen: Blood from Arm, Right Updated: 03/18/21 1419     Magnesium 1 8 mg/dL     CBC and differential [[id number]]  (Abnormal) Collected: 03/18/21 1356    Lab Status: Final result Specimen: Blood from Arm, Right Updated: 03/18/21 1403     WBC 7 26 Thousand/uL      RBC 2 58 Million/uL      Hemoglobin 8 4 g/dL      Hematocrit 25 8 %       fL      MCH 32 6 pg      MCHC 32 6 g/dL      RDW 15 2 %      MPV 10 0 fL      Platelets 732 Thousands/uL      nRBC 0 /100 WBCs      Neutrophils Relative 73 %      Immat GRANS % 0 %      Lymphocytes Relative 17 %      Monocytes Relative 8 %      Eosinophils Relative 1 %      Basophils Relative 1 %      Neutrophils Absolute 5 32 Thousands/µL      Immature Grans Absolute 0 03 Thousand/uL      Lymphocytes Absolute 1 23 Thousands/µL      Monocytes Absolute 0 60 Thousand/µL      Eosinophils Absolute 0 04 Thousand/µL      Basophils Absolute 0 04 Thousands/µL                  MRI brain wo contrast   Final Result by Miley Bhat DO (03/18 1522)      Punctate acute lacunar infarct left parietal periventricular region  There is chronic microangiopathic changes are noted        Ventriculomegaly out of proportion to sulcal prominence with widening of the sylvian fissures  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  Workstation performed: [id number]         CT head without contrast   Final Result by Gerda Siu MD (03/18 1519)      No acute intracranial abnormality  Acute sphenoid sinusitis  Workstation performed: [id number]                    Procedures  ECG 12 Lead Documentation Only    Date/Time: 3/18/2021 2:10 PM  Performed by: Anitra Mcclelland DO  Authorized by: Anitra Mcclelland DO     ECG reviewed by me, the ED Provider: yes    Patient location:  ED  Previous ECG:     Previous ECG:  Unavailable    Comparison to cardiac monitor: Yes    Interpretation:     Interpretation: non-specific    Rate:     ECG rate assessment: normal    Rhythm:     Rhythm: sinus rhythm    Ectopy:     Ectopy: none    QRS:     QRS axis:  Normal  Conduction:     Conduction: normal    ST segments:     ST segments:  Non-specific  T waves:     T waves: non-specific               ED Course                 Stroke Assessment     Row Name 03/18/21 1341             NIH Stroke Scale    Interval  Baseline      Level of Consciousness (1a )  0      LOC Questions (1b )  0      LOC Commands (1c )  0      Best Gaze (2 )  0      Visual (3 )  0      Facial Palsy (4 )  0      Motor Arm, Left (5a )  0      Motor Arm, Right (5b )  0      Motor Leg, Left (6a )  0      Motor Leg, Right (6b )  2      Limb Ataxia (7 )  0      Sensory (8 )  0      Best Language (9 )  0      Dysarthria (10 )  0      Extinction and Inattention (11 ) (Formerly Neglect)  0      Total  2                                  MDM  Number of Diagnoses or Management Options  CVA (cerebral vascular accident) [facility]):   Urinary retention:   Diagnosis management comments: Spoke to Dr Shaina Bhatt the on-call neurologist   Not a candidate for tPA did not do a CTA of the head and neck because of his chronic kidney disease  MRI of the brain showed acute infarct    Admitted to hospitalist service with Neurology consult  Patient aware of his test results and plan any verbalized understanding  Amount and/or Complexity of Data Reviewed  Clinical lab tests: ordered and reviewed  Tests in the radiology section of CPT®: ordered and reviewed  Tests in the medicine section of CPT®: ordered and reviewed    Patient Progress  Patient progress: stable      Disposition  Final diagnoses:   CVA (cerebral vascular accident) [facility])   Urinary retention     Time reflects when diagnosis was documented in both MDM as applicable and the Disposition within this note     Time User Action Codes Description Comment    3/18/2021  4:06 PM Clive Kirk Add [I63 9] CVA (cerebral vascular accident) ([facility] [address] [address] )     3/18/2021  4:41 PM Clive Kirk Add [R33 9] Urinary retention       ED Disposition     ED Disposition Condition Date/Time Comment    Admit Stable Thu Mar 18, 2021  4:06 PM         Follow-up Information    None         Patient's Medications   Discharge Prescriptions    No medications on file     No discharge procedures on file      PDMP Review     None          ED Provider  Electronically Signed by           Bryn Patel DO  03/18/21 7304

## 2021-03-18 NOTE — ASSESSMENT & PLAN NOTE
Patient was given KCl 40 milliequivalents p o  And 20 milliequivalents IV in the ED  Follow-up BMP in a m

## 2021-03-18 NOTE — CONSULTS
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 2016 undergoing follow-up flex cysto was in the office setting every 6 months recently admitted to [facility] for chest pain undergoing angiogram causing renal failure and dialysis transferred to rehab center going into retention presenting to my office last week started on tamsulosin seen in the office yesterday undergoing surveillance flex cysto confirming no bladder tumor  Patient's Marinelli was replaced in light of late afternoon visit and if to go back into retention would need to seek ER  Review of Systems:   Constitutional:  Negative for activity change, fever, chills and diaphoresis  HENT: Negative for hearing loss and trouble swallowing  Eyes: Negative for itching and visual disturbance  Respiratory: Negative for chest tightness and shortness of breath  Cardiovascular: Negative for chest pain, edema  Gastrointestinal: Negative for abdominal distention, na abdominal pain, constipation, diarrhea, Nausea and vomiting  Genitourinary: Negative for decreased urine volume, difficulty urinating, dysuria, enuresis, frequency, hematuria and urgency  Musculoskeletal: Negative for gait problem and myalgias  Neurological: Negative for dizziness and headaches  Hematological: Does not bruise/bleed easily         Historical Information   Past Medical History:   Diagnosis Date    Acute on chronic diastolic CHF (congestive heart failure) ([facility] [address] ) 2019    Arthritis     Back pain     [facility] )     diagnosed  2016    Cancer [facility])     bladder; chemo; resection;     Constipation 2021    Coronary artery disease     has 2 coronary stents    Dental crowns present      5    Diabetes mellitus ([address] )     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 behind right eye    Hematuria     hx of    High anion gap metabolic acidosis 2/46/3322    Hypercholesteremia     Hypertension     Kidney stones     Wears glasses      Past Surgical History:   Procedure Laterality Date    ABDOMINAL SURGERY      CARDIAC SURGERY      CHOLECYSTECTOMY      open    CORONARY ANGIOPLASTY WITH STENT PLACEMENT      2 stents  2009    CYSTECTOMY, PARTIAL N/A 11/30/2016    Procedure: PARTIAL CYSTECTOMY, LEFT LYMPHADENECTOMY;  Surgeon: Gurwinder Harris MD;  Location: [address] [place] [address];  Service:    Lourdes Albanian Left 10/20/2016    Procedure: CYSTOSCOPY, BILATERAL RETROGRADE PYLEOGRAM, LEFT SIDE BLADDER BIOPSY, TURBT;  Surgeon: Gurwinder Harris MD;  Location: [address] [place] [address];  Service:     HERNIA REPAIR      repair RIH    IR NON-TUNNELED CENTRAL LINE PLACEMENT  1/25/2021    IR TEMPORARY DIALYSIS CATHETER PLACEMENT  7/8/2019    IR TUNNELED CENTRAL LINE PLACEMENT  2/10/2021    IR TUNNELED DIALYSIS CATHETER PLACEMENT  7/12/2019    IR TUNNELED DIALYSIS CATHETER REMOVAL  9/9/2019    DC CYSTOURETHROSCOPY N/A 2/25/2016    Procedure: CYSTOSCOPY;  Surgeon: Gurwinder Harris MD;  Location: [address] [place] [address];  Service: Urology    DC CYSTOURETHROSCOPY,FULGUR 2-5 CM LESN N/A 2/25/2016    Procedure: TRANSURETHRAL RESECTION OF BLADDER TUMOR (TURBT);   Surgeon: Gurwinder Harris MD;  Location: [address] [place] [address];  Service: Urology    TRANSURETHRAL RESECTION OF BLADDER TUMOR N/A 7/7/2016    Procedure: TRANSURETHRAL RESECTION OF BLADDER TUMOR (TURBT), Cystoscopy, deep biopsy;  Surgeon: Gurwinder Harris MD;  Location: [address] [place] [address];  Service:     TRANSURETHRAL RESECTION OF BLADDER TUMOR Bilateral 6/9/2016    Procedure: TRANSURETHRAL RESECTION OF BLADDER TUMOR (TURBT), Cysto, retrograde, BLADDER BIOPSY;  Surgeon: Gurwinder Harris MD;  Location: [address] [place] [address];  Service:      Social History Social History     Substance and Sexual Activity   Alcohol Use Yes    Comment: socially     Social History     Substance and Sexual Activity   Drug Use No     Social History     Tobacco Use   Smoking Status Former Smoker    Packs/day: 0 50    Years: 5 00    Pack years: 2 50    Quit date:     Years since quittin 2   Smokeless Tobacco Never Used     Family History:   Family History   Problem Relation Age of Onset    Heart disease Mother     Diabetes Mother     Diabetes Father        Meds/Allergies   (Not in a hospital admission)    No Known Allergies    Objective   Vitals: /71   Pulse 78   Temp 97 8 °F (36 6 °C) (Oral)   Resp 21   Wt 89 1 kg (196 lb 6 9 oz)   SpO2 100%   BMI 25 92 kg/m²     Physical Exam:  General Alert awake   Normocephalic atraumatic PERRLA  Lungs clear bilaterally  Cardiac normal S1 normal S2  Abdomen soft, flank pain  Marinelli draining clear urine  Extremities no edema    No intake/output data recorded  Invasive Devices     Peripheral Intravenous Line            Peripheral IV 21 Right Antecubital less than 1 day          Hemodialysis Catheter            HD Permanent Double Catheter 36 days          Drain            Open Drain Left; Anterior; Other (Comment) LLQ 1569 days    Urethral Catheter Double-lumen 16 Fr  37 days                    Lab Results: CBC:   Lab Results   Component Value Date    WBC 7 26 2021    HGB 8 4 (L) 2021    HCT 25 8 (L) 2021     (H) 2021     2021    ADJUSTEDWBC 6 60 2016    MCH 32 6 2021    MCHC 32 6 2021    RDW 15 2 (H) 2021    MPV 10 0 2021    NRBC 0 2021     CMP:   Lab Results   Component Value Date     2016    CL 99 (L) 2021     2016    CO2 30 2021    CO2 19 (L) 2021    ANIONGAP 14 2 2016    BUN 48 (H) 2021    BUN 35 (H) 2016    CREATININE 2 65 (H) 2021    CREATININE 1 5 (H) 2016 GLUCOSE 232 (H) 01/24/2021    GLUCOSE 131 (H) 01/05/2016    CALCIUM 8 8 03/18/2021    CALCIUM 9 0 01/05/2016    AST 30 03/18/2021    AST 22 01/05/2016    ALT 28 03/18/2021    ALT 53 01/05/2016    ALKPHOS 92 03/18/2021    ALKPHOS 66 01/05/2016    PROT 10 2 (H) 01/05/2016    BILITOT 0 9 01/05/2016    EGFR 22 03/18/2021     Urinalysis:   Lab Results   Component Value Date    COLORU Yellow 07/08/2019    CLARITYU Clear 07/08/2019    CLARITYU YELLOW 01/05/2016    CLARITYU SL CLOUDY 01/05/2016    SPECGRAV 1 025 07/08/2019    SPECGRAV 1 025 01/05/2016    PHUR 5 0 07/08/2019    PHUR 5 5 02/14/2017    PHUR 6 0 01/05/2016    LEUKOCYTESUR Negative 07/08/2019    LEUKOCYTESUR TRACE (A) 01/05/2016    NITRITE Negative 07/08/2019    NITRITE POSITIVE (A) 01/05/2016    PROTEINUA 100 (A) 01/05/2016    GLUCOSEU Negative 07/08/2019    GLUCOSEU >=1000 01/05/2016    KETONESU Negative 07/08/2019    KETONESU NEGATIVE 01/05/2016    BILIRUBINUR Negative 07/08/2019    BILIRUBINUR NEGATIVE 01/05/2016    BLOODU Negative 07/08/2019    BLOODU LARGE (A) 01/05/2016     Urine Culture:   Lab Results   Component Value Date    URINECX  03/04/2016     >100,000 cfu/ml Klebsiella oxytoca-Extended Beta Lactamase      PSA:   Lab Results   Component Value Date    PSA 0 4 04/12/2016    PSA 0 66 01/05/2016           Assessment/ Plan:  History muscle invasive bladder cancer  Status post left partial cystectomy October 2016 with no signs of recurrence since then  Status post office cysto yesterday no recurrence  Urinary retention  On tamsulosin now for 5 days  Can DC Marinelli catheter on Saturday March 20th voiding trial      Felipa Turk MD

## 2021-03-18 NOTE — ASSESSMENT & PLAN NOTE
Lab Results   Component Value Date    HGBA1C 6 7 (H) 07/03/2019       Recent Labs     03/18/21  1344   POCGLU 96       Blood Sugar Average: Last 72 hrs:  (P) 96   Continue Levemir 20 units subcutaneously daily with Humalog sliding scale    Patient will be on diabetic diet

## 2021-03-18 NOTE — ASSESSMENT & PLAN NOTE
Lab Results   Component Value Date    EGFR 22 03/18/2021    EGFR 8 02/12/2021    EGFR 10 02/11/2021    CREATININE 2 65 (H) 03/18/2021    CREATININE 6 31 (H) 02/12/2021    CREATININE 5 01 (H) 02/11/2021   Patient has chronic kidney disease stage 4 with baseline creatinine 2-2 5  Patient recently developed acute kidney injury on chronic kidney disease secondary to contrast induced diabetic nephropathy and has been on dialysis since then  As per patient  His last session of hemodialysis was on March 16, 2021 and nephrology wanted to hold off further dialysis and monitor kidney function    Will consult Nephrology for further recommendations  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

## 2021-03-18 NOTE — ASSESSMENT & PLAN NOTE
Patient presented with bilateral leg weakness right more than left  CT scan of the brain showed acute sphenoid sinusitis without any acute intracranial abnormality  MRI of the brain showed punctate acute lacunar infarct in the left parietal periventricular region with chronic microangiopathic changes and ventriculomegaly  Limited echo with bubble study was ordered  Patient cannot get CTA due to chronic kidney disease  Will check carotid Dopplers to rule out stenosis  A low permissive hypertension to keep SBP more than 180  Check fasting lipid panel, hemoglobin A1c  PT/OT and speech therapy  Tight control of blood sugars

## 2021-03-19 ENCOUNTER — APPOINTMENT (INPATIENT)
Dept: NON INVASIVE DIAGNOSTICS | Facility: HOSPITAL | Age: 81
DRG: 065 | End: 2021-03-19
Payer: COMMERCIAL

## 2021-03-19 ENCOUNTER — APPOINTMENT (INPATIENT)
Dept: RADIOLOGY | Facility: HOSPITAL | Age: 81
DRG: 065 | End: 2021-03-19
Payer: COMMERCIAL

## 2021-03-19 PROBLEM — G25.0 ESSENTIAL TREMOR: Status: ACTIVE | Noted: 2021-03-19

## 2021-03-19 LAB
ANION GAP SERPL CALCULATED.3IONS-SCNC: 7 MMOL/L (ref 4–13)
BUN SERPL-MCNC: 48 MG/DL (ref 5–25)
CALCIUM SERPL-MCNC: 8.7 MG/DL (ref 8.3–10.1)
CHLORIDE SERPL-SCNC: 103 MMOL/L (ref 100–108)
CHOLEST SERPL-MCNC: 156 MG/DL (ref 50–200)
CO2 SERPL-SCNC: 30 MMOL/L (ref 21–32)
CREAT SERPL-MCNC: 2.49 MG/DL (ref 0.6–1.3)
EST. AVERAGE GLUCOSE BLD GHB EST-MCNC: 143 MG/DL
GFR SERPL CREATININE-BSD FRML MDRD: 23 ML/MIN/1.73SQ M
GLUCOSE SERPL-MCNC: 112 MG/DL (ref 65–140)
GLUCOSE SERPL-MCNC: 284 MG/DL (ref 65–140)
HBA1C MFR BLD: 6.6 %
HDLC SERPL-MCNC: 29 MG/DL
LDLC SERPL CALC-MCNC: 80 MG/DL (ref 0–100)
POTASSIUM SERPL-SCNC: 3.5 MMOL/L (ref 3.5–5.3)
SODIUM SERPL-SCNC: 140 MMOL/L (ref 136–145)
TRIGL SERPL-MCNC: 233 MG/DL

## 2021-03-19 PROCEDURE — 99232 SBSQ HOSP IP/OBS MODERATE 35: CPT | Performed by: INTERNAL MEDICINE

## 2021-03-19 PROCEDURE — 99223 1ST HOSP IP/OBS HIGH 75: CPT | Performed by: INTERNAL MEDICINE

## 2021-03-19 PROCEDURE — 80048 BASIC METABOLIC PNL TOTAL CA: CPT | Performed by: INTERNAL MEDICINE

## 2021-03-19 PROCEDURE — 97163 PT EVAL HIGH COMPLEX 45 MIN: CPT

## 2021-03-19 PROCEDURE — 93880 EXTRACRANIAL BILAT STUDY: CPT

## 2021-03-19 PROCEDURE — NC001 PR NO CHARGE: Performed by: INTERNAL MEDICINE

## 2021-03-19 PROCEDURE — 93308 TTE F-UP OR LMTD: CPT

## 2021-03-19 PROCEDURE — 93308 TTE F-UP OR LMTD: CPT | Performed by: INTERNAL MEDICINE

## 2021-03-19 PROCEDURE — 97535 SELF CARE MNGMENT TRAINING: CPT

## 2021-03-19 PROCEDURE — 80061 LIPID PANEL: CPT | Performed by: INTERNAL MEDICINE

## 2021-03-19 PROCEDURE — 99233 SBSQ HOSP IP/OBS HIGH 50: CPT | Performed by: PSYCHIATRY & NEUROLOGY

## 2021-03-19 PROCEDURE — 83036 HEMOGLOBIN GLYCOSYLATED A1C: CPT | Performed by: INTERNAL MEDICINE

## 2021-03-19 PROCEDURE — 82948 REAGENT STRIP/BLOOD GLUCOSE: CPT

## 2021-03-19 PROCEDURE — 97110 THERAPEUTIC EXERCISES: CPT

## 2021-03-19 PROCEDURE — 97167 OT EVAL HIGH COMPLEX 60 MIN: CPT

## 2021-03-19 RX ORDER — BISACODYL 10 MG
10 SUPPOSITORY, RECTAL RECTAL DAILY
Status: DISCONTINUED | OUTPATIENT
Start: 2021-03-19 | End: 2021-03-22 | Stop reason: HOSPADM

## 2021-03-19 RX ORDER — POLYETHYLENE GLYCOL 3350 17 G/17G
17 POWDER, FOR SOLUTION ORAL DAILY
Status: DISCONTINUED | OUTPATIENT
Start: 2021-03-19 | End: 2021-03-22 | Stop reason: HOSPADM

## 2021-03-19 RX ORDER — PRIMIDONE 50 MG/1
50 TABLET ORAL
Status: DISCONTINUED | OUTPATIENT
Start: 2021-03-19 | End: 2021-03-22 | Stop reason: HOSPADM

## 2021-03-19 RX ORDER — DOCUSATE SODIUM 100 MG/1
100 CAPSULE, LIQUID FILLED ORAL 2 TIMES DAILY
Status: DISCONTINUED | OUTPATIENT
Start: 2021-03-19 | End: 2021-03-22 | Stop reason: HOSPADM

## 2021-03-19 RX ADMIN — BISACODYL 10 MG: 10 SUPPOSITORY RECTAL at 10:46

## 2021-03-19 RX ADMIN — POLYETHYLENE GLYCOL 3350 17 G: 17 POWDER, FOR SOLUTION ORAL at 10:46

## 2021-03-19 RX ADMIN — HYDRALAZINE HYDROCHLORIDE 25 MG: 25 TABLET, FILM COATED ORAL at 14:09

## 2021-03-19 RX ADMIN — CLOPIDOGREL BISULFATE 75 MG: 75 TABLET ORAL at 09:00

## 2021-03-19 RX ADMIN — DOCUSATE SODIUM 100 MG: 100 CAPSULE, LIQUID FILLED ORAL at 10:46

## 2021-03-19 RX ADMIN — HYDRALAZINE HYDROCHLORIDE 25 MG: 25 TABLET, FILM COATED ORAL at 05:28

## 2021-03-19 RX ADMIN — ESCITALOPRAM OXALATE 10 MG: 10 TABLET ORAL at 21:20

## 2021-03-19 RX ADMIN — METOPROLOL TARTRATE 25 MG: 25 TABLET, FILM COATED ORAL at 21:20

## 2021-03-19 RX ADMIN — INSULIN DETEMIR 20 UNITS: 100 INJECTION, SOLUTION SUBCUTANEOUS at 21:20

## 2021-03-19 RX ADMIN — ATORVASTATIN CALCIUM 80 MG: 80 TABLET ORAL at 18:23

## 2021-03-19 RX ADMIN — INSULIN DETEMIR 20 UNITS: 100 INJECTION, SOLUTION SUBCUTANEOUS at 09:00

## 2021-03-19 RX ADMIN — METOPROLOL TARTRATE 25 MG: 25 TABLET, FILM COATED ORAL at 09:00

## 2021-03-19 RX ADMIN — DOCUSATE SODIUM 100 MG: 100 CAPSULE, LIQUID FILLED ORAL at 18:24

## 2021-03-19 RX ADMIN — HYDRALAZINE HYDROCHLORIDE 25 MG: 25 TABLET, FILM COATED ORAL at 21:21

## 2021-03-19 RX ADMIN — ASPIRIN 81 MG: 81 TABLET, COATED ORAL at 09:01

## 2021-03-19 RX ADMIN — PRIMIDONE 50 MG: 50 TABLET ORAL at 21:20

## 2021-03-19 RX ADMIN — INSULIN LISPRO 2 UNITS: 100 INJECTION, SOLUTION INTRAVENOUS; SUBCUTANEOUS at 22:46

## 2021-03-19 RX ADMIN — ENOXAPARIN SODIUM 30 MG: 30 INJECTION SUBCUTANEOUS at 08:53

## 2021-03-19 RX ADMIN — ISOSORBIDE MONONITRATE 30 MG: 30 TABLET, EXTENDED RELEASE ORAL at 09:01

## 2021-03-19 NOTE — PROGRESS NOTES
Progress Note - Urology      Patient: Kyalh Elizabeth   : 1940 Sex: male   MRN: [id number]     CSN: [id number]  Unit/Bed#: [address] [place] [address]     SUBJECTIVE:   Weakness left side      Objective   Vitals: /61   Pulse 78   Temp 98 °F (36 7 °C)   Resp 18   Wt 89 1 kg (196 lb 6 9 oz)   SpO2 98%   BMI 25 92 kg/m²     I/O last 24 hours:   In: 600 [P O :580; I V :20]  Out: 1050 [Urine:450; Stool:600]      Physical Exam:   General Alert awake   Normocephalic atraumatic PERRLA  Lungs clear bilaterally  Cardiac normal S1 normal S2  Abdomen soft, flank pain  Extremities no edema      Lab Results: CBC:   Lab Results   Component Value Date    WBC 7 26 2021    HGB 8 4 (L) 2021    HCT 25 8 (L) 2021     (H) 2021     2021    ADJUSTEDWBC 6 60 2016    MCH 32 6 2021    MCHC 32 6 2021    RDW 15 2 (H) 2021    MPV 10 0 2021    NRBC 0 2021     CMP:   Lab Results   Component Value Date     2016     2021     2016    CO2 30 2021    CO2 19 (L) 2021    ANIONGAP 14 2 2016    BUN 48 (H) 2021    BUN 35 (H) 2016    CREATININE 2 49 (H) 2021    CREATININE 1 5 (H) 2016    GLUCOSE 232 (H) 2021    GLUCOSE 131 (H) 2016    CALCIUM 8 7 2021    CALCIUM 9 0 2016    AST 30 2021    AST 22 2016    ALT 28 2021    ALT 53 2016    ALKPHOS 92 2021    ALKPHOS 66 2016    PROT 10 2 (H) 2016    BILITOT 0 9 2016    EGFR 23 2021     Urinalysis:   Lab Results   Component Value Date    COLORU Light Yellow 2021    CLARITYU Clear 2021    CLARITYU YELLOW 2016    CLARITYU SL CLOUDY 2016    SPECGRAV 1 010 2021    SPECGRAV 1 025 2016    PHUR 7 0 2021    PHUR 5 5 2017    PHUR 6 0 2016    LEUKOCYTESUR Moderate (A) 2021    LEUKOCYTESUR TRACE (A) 2016    NITRITE Negative 03/18/2021    NITRITE POSITIVE (A) 01/05/2016    PROTEINUA 100 (A) 01/05/2016    GLUCOSEU Negative 03/18/2021    GLUCOSEU >=1000 01/05/2016    KETONESU Negative 03/18/2021    KETONESU NEGATIVE 01/05/2016    BILIRUBINUR Negative 03/18/2021    BILIRUBINUR NEGATIVE 01/05/2016    BLOODU Small (A) 03/18/2021    BLOODU LARGE (A) 01/05/2016     Urine Culture:   Lab Results   Component Value Date    URINECX  03/04/2016     >100,000 cfu/ml Klebsiella oxytoca-Extended Beta Lactamase      PSA:   Lab Results   Component Value Date    PSA 0 4 04/12/2016    PSA 0 66 01/05/2016         Assessment/ Plan:  Urinary tension  Renal failure  On tamsulosin  DC Marinelli tomorrow morning in a m  8          Felipa Turk MD

## 2021-03-19 NOTE — CONSULTS
[address] Fadi Cagle [de-identified] y o  male MRN: [id number]  Unit/Bed#: [address] [place] [address] Encounter: [id number]    ASSESSMENT and PLAN:    Stage IV chronic kidney disease  Estimated Creatinine Clearance: 26 7 mL/min (A) (by C-G formula based on SCr of 2 49 mg/dL (H))  -patient was undergoing dialysis for acute kidney injury superimposed on chronic kidney disease, contrast induced  Baseline creatinine mid 2s, creatinine today 2 49, BUN 48  Recovering from renal standpoint   - patient does have access at this time, advised to maintain access until further advised  - avoid contrast if possible   - 24 hour urine creatinine extrication study recently obtained on outpatient basis  - no urgent no urgent indication for dialysis at this time    Hypokalemia  -potassium upon admission 3, repleted  - K 3 5 today, goal 4    Acute ischemic stroke  -MRI brain without contrast revealed acute lacunar infarct left parietal periventricular region  - Neurology following  - results of carotid ultrasound and echo with shunt study pending  - DAPT, Lipitor 80 mg p o  Q d   -permissive hypertension  -telemetry monitoring  Component      Latest Ref Rng & Units 4/12/2016   Hemoglobin A1C      Normal 3 8-5 6%; PreDiabetic 5 7-6 4%; Diabetic >=6 5%; Glycemic control for adults with diabetes <7 0% % 7 8 (H)   -of note, hemoglobin A1c may not be accurately reflective given history of chronic kidney disease  Component      Latest Ref Rng & Units 3/19/2021   Cholesterol      50 - 200 mg/dL 156   Triglycerides      <=150 mg/dL 233 (H)   HDL      >=40 mg/dL 29 (L)   LDL Calculated      0 - 100 mg/dL 80   -PT/OT    Coronary artery disease  -hydralazine 25 mg p o  q 8h, Imdur 30 mg p o  Q d , Lopressor 25 mg p o  Q 12h  -permissive hypertension  - Lipitor 80 mg p o  Q d   - aspirin 80 mg p o  Q d   - 1/22/21 cardiac catheterization : significant triple vessel coronary artery disease   Patient has his patent grafts of LIMA to LAD and SVG to OM2  Elevated troponin  Component      Latest Ref Rng & Units 3/18/2021             Troponin I      <=0 04 ng/mL 0 07 (H)   3/18/21 EKG:  Sinus rhythm with 1st degree A-V block  Left ventricular hypertrophy with repolarization abnormality  Inferior infarct (cited on or before 08-FEB-2021)    Cannot rule out ischemia  Abnormal ECG    Grade 2 diastolic dysfunction  -antihypertensive management as noted    Type 2 diabetes mellitus  Component      Latest Ref Rng & Units 4/12/2016   Hemoglobin A1C      Normal 3 8-5 6%; PreDiabetic 5 7-6 4%; Diabetic >=6 5%; Glycemic control for adults with diabetes <7 0% % 7 8 (H)   -of note, hemoglobin A1c may not be accurately reflective given history of chronic kidney disease  -insulin-dependent    Hypercholesteremia  Component      Latest Ref Rng & Units 3/19/2021   Cholesterol      50 - 200 mg/dL 156   Triglycerides      <=150 mg/dL 233 (H)   HDL      >=40 mg/dL 29 (L)   LDL Calculated      0 - 100 mg/dL 80   -Lipitor 80 mg p o  Q d  History of bladder cancer  -follows with Dr Genesis Roberto  - history of muscle invasive bladder cancer  - status post left partial cystectomy October 2016  - recently underwent cystoscopy, reported no recurrence  - plan to discontinue Marinelli catheter on March 20th her urology  - urology consult note 03/18/2021 commented on tamsulosin x5 days      HISTORY OF PRESENT ILLNESS:  Requesting Physician: Yvonne Avalos MD  Reason for Consult: HD    Estevan Mcdonnell is a [de-identified] y o  male who was admitted to [address] [place] [address] after presenting with bilateral lower extremity weakness, MRI brain without contrast revealed acute lacunar infarct    A renal consultation is requested today for assistance in the management of recommendations about dialysis  Estevan Mcdonnell was undergoing dialysis after contrast induced acute kidney injury superimposed on chronic kidney disease, his last hemodialysis session was on 03/16/2021    In addition to chronic kidney disease patient does have history of bladder cancer following with Dr Carmencita Felipe, type 2 diabetes mellitus on insulin, hypertension, stage 4 chronic kidney disease, CAD, grade 2 diastolic dysfunction  Patient was seen and examined at bedside this morning in seated upright position  Patient recounts with prompted admission was "legs giving out like rubber"         PAST MEDICAL HISTORY:  Past Medical History:   Diagnosis Date    Acute on chronic diastolic CHF (congestive heart failure) ([facility] [address] ) 7/4/2019    Arthritis     Back pain     Bladder cancer ([facility] [address] )     diagnosed  2/2016    Cancer [facility])     bladder; chemo; resection;     Constipation 2/1/2021    Coronary artery disease     has 2 coronary stents    Dental crowns present      5    Diabetes mellitus ([facility] [address] )     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[facility] [address] )     fluid behind right eye    Hematuria     hx of    High anion gap metabolic acidosis 0/82/2352    Hypercholesteremia     Hypertension     Kidney stones     Wears glasses        PAST SURGICAL HISTORY:  Past Surgical History:   Procedure Laterality Date    ABDOMINAL SURGERY      CARDIAC SURGERY      CHOLECYSTECTOMY      open    CORONARY ANGIOPLASTY WITH STENT PLACEMENT      2 stents  2009    CYSTECTOMY, PARTIAL N/A 11/30/2016    Procedure: PARTIAL CYSTECTOMY, LEFT LYMPHADENECTOMY;  Surgeon: Tj Holbrook MD;  Location: [address] [place] [address];  Service:     CYSTOSCOPY Left 10/20/2016    Procedure: CYSTOSCOPY, BILATERAL RETROGRADE PYLEOGRAM, LEFT SIDE BLADDER BIOPSY, TURBT;  Surgeon: Tj Holbrook MD;  Location: [address] [place] [address];  Service:     HERNIA REPAIR      repair [address] [facility]    IR NON-TUNNELED CENTRAL LINE PLACEMENT  1/25/2021    IR TEMPORARY DIALYSIS CATHETER PLACEMENT  7/8/2019    IR TUNNELED CENTRAL LINE PLACEMENT  2/10/2021    IR TUNNELED DIALYSIS CATHETER PLACEMENT  7/12/2019    IR TUNNELED DIALYSIS CATHETER REMOVAL  9/9/2019    CO CYSTOURETHROSCOPY N/A 2/25/2016    Procedure: CYSTOSCOPY;  Surgeon: Tj Holbrook MD;  Location: [address] [place] [address];  Service: Urology    MO CYSTOURETHROSCOPY,FULGUR 2-5 CM LESN N/A 2016    Procedure: TRANSURETHRAL RESECTION OF BLADDER TUMOR (TURBT);   Surgeon: Sammie Ferrer MD;  Location: [address] [place] [address];  Service: Urology    TRANSURETHRAL RESECTION OF BLADDER TUMOR N/A 2016    Procedure: TRANSURETHRAL RESECTION OF BLADDER TUMOR (TURBT), Cystoscopy, deep biopsy;  Surgeon: Sammie Ferrer MD;  Location: [facility];  Service:     TRANSURETHRAL RESECTION OF BLADDER TUMOR Bilateral 2016    Procedure: TRANSURETHRAL RESECTION OF BLADDER TUMOR (TURBT), Cysto, retrograde, BLADDER BIOPSY;  Surgeon: Sammie Ferrer MD;  Location: [facility];  Service:        ALLERGIES:  No Known Allergies    SOCIAL HISTORY:  Social History     Substance and Sexual Activity   Alcohol Use Yes    Comment: socially     Social History     Substance and Sexual Activity   Drug Use No     Social History     Tobacco Use   Smoking Status Former Smoker    Packs/day: 0 50    Years: 5 00    Pack years: 2 50    Quit date:     Years since quittin 2   Smokeless Tobacco Never Used       FAMILY HISTORY:  Family History   Problem Relation Age of Onset    Heart disease Mother     Diabetes Mother     Diabetes Father        MEDICATIONS:    Current Facility-Administered Medications:     acetaminophen (TYLENOL) tablet 650 mg, 650 mg, Oral, Q6H PRN, Lynette Greco MD    aspirin (ECOTRIN LOW STRENGTH) EC tablet 81 mg, 81 mg, Oral, Daily, Lindsay Schumacher MD    atorvastatin (LIPITOR) tablet 80 mg, 80 mg, Oral, QPM, TABITHA Pinon    clopidogrel (PLAVIX) tablet 75 mg, 75 mg, Oral, Daily, Lindsay Schumacher MD    enoxaparin (LOVENOX) subcutaneous injection 30 mg, 30 mg, Subcutaneous, Daily, Lindsay Schumacher MD, 30 mg at 21 0853    escitalopram (LEXAPRO) tablet 10 mg, 10 mg, Oral, HS, Lindsay Schumacher MD, 10 mg at 21 488    hydrALAZINE (APRESOLINE) tablet 25 mg, 25 mg, Oral, Q8H [address], Lindsay Posada MD Winsome, 25 mg at 03/19/21 0528    insulin detemir (LEVEMIR) subcutaneous injection 20 Units, 20 Units, Subcutaneous, Q12H [address], Zora Guthrie MD, 20 Units at 03/18/21 2145    isosorbide mononitrate (IMDUR) 24 hr tablet 30 mg, 30 mg, Oral, Daily, Zora Guthrie MD    metoprolol tartrate (LOPRESSOR) tablet 25 mg, 25 mg, Oral, Q12H [address], Randolph Schumacher MD, 25 mg at 03/18/21 2139    nitroglycerin (NITROSTAT) SL tablet 0 4 mg, 0 4 mg, Sublingual, Q5 Min PRN, Zora Guthrie MD    ondansetron (ZOFRAN) injection 4 mg, 4 mg, Intravenous, Q6H PRN, Zora Guthrie MD    REVIEW OF SYSTEMS:  Constitutional:  Negative for fever chills  HENT: Negative for postnasal drip  Eyes:  Wears glasses, no recent visual changes  Respiratory: Negative for cough, shortness of breath and wheezing  Cardiovascular: Negative for chest pain, palpitations and leg swelling  Gastrointestinal: Negative for abdominal pain, nausea and vomiting  Positive for constipation  Genitourinary: No dysuria at this time, Marinelli   Endocrine: Negative for polyuria  Musculoskeletal:  Positive for chronic back pain  Skin: Negative for rash  Neurological:  Positive for bilateral lower extremity weakness  Negative for dizziness  Hematological: Negative for easy bruising or bleeding  Psychiatric/Behavioral: Negative for confusion and sleep disturbance  PHYSICAL EXAM:  Current Weight: Weight - Scale: 89 1 kg (196 lb 6 9 oz)  First Weight: Weight - Scale: 89 1 kg (196 lb 6 9 oz)  Vitals:    03/19/21 0115 03/19/21 0419 03/19/21 0527 03/19/21 0749   BP: 111/52 104/53 126/58 141/61   BP Location:  Left arm  Left arm   Pulse: 69 68 67 76   Resp:   18 20   Temp:  98 2 °F (36 8 °C)  98 2 °F (36 8 °C)   TempSrc:  Oral  Oral   SpO2: 95% 93% 95% 95%   Weight:         No intake or output data in the 24 hours ending 03/19/21 0900  Physical Exam  Constitutional:       General: He is not in acute distress       Appearance: He is not ill-appearing, toxic-appearing or diaphoretic  HENT:      Head: Normocephalic and atraumatic  Nose: Nose normal       Mouth/Throat:      Pharynx: Oropharynx is clear  Eyes:      Conjunctiva/sclera: Conjunctivae normal       Comments: Wears glasses   Neck:      Musculoskeletal: Neck supple  Cardiovascular:      Rate and Rhythm: Normal rate and regular rhythm  Pulses: Normal pulses  Heart sounds: Normal heart sounds  Pulmonary:      Effort: Pulmonary effort is normal       Breath sounds: Normal breath sounds  Abdominal:      General: Abdomen is flat  Bowel sounds are normal       Palpations: Abdomen is soft  Tenderness: There is no abdominal tenderness  There is no guarding or rebound  Musculoskeletal:      Right lower leg: No edema  Left lower leg: No edema  Skin:     General: Skin is warm and dry  Neurological:      Mental Status: He is alert and oriented to person, place, and time  Motor: Weakness present  Psychiatric:         Thought Content: Thought content normal          Urethral Catheter Double-lumen 16 Fr   (Active)     Lab Results:   Results from last 7 days   Lab Units 03/19/21  0522 03/18/21  1356   WBC Thousand/uL  --  7 26   HEMOGLOBIN g/dL  --  8 4*   HEMATOCRIT %  --  25 8*   PLATELETS Thousands/uL  --  169   POTASSIUM mmol/L 3 5 3 0*   CHLORIDE mmol/L 103 99*   CO2 mmol/L 30 30   BUN mg/dL 48* 48*   CREATININE mg/dL 2 49* 2 65*   CALCIUM mg/dL 8 7 8 8   MAGNESIUM mg/dL  --  1 8   ALK PHOS U/L  --  92   ALT U/L  --  28   AST U/L  --  30     Lab Results   Component Value Date     2 (H) 07/15/2019    CALCIUM 8 7 03/19/2021    PHOS 5 1 (H) 01/27/2021

## 2021-03-19 NOTE — PHYSICAL THERAPY NOTE
PT EVALUATION     03/19/21 1205   Note Type   Note type Evaluation   Pain Assessment   Pain Assessment Tool Pain Assessment not indicated - pt denies pain   Home Living   Type of [address] Two level;Stairs to enter with rails  (5 stairs to enter and 5 inside home)   Home Equipment Walker;Cane   Additional Comments Patient ambulating with a roller walker in short-term rehab 300 feet prior to discharge home   Prior Function   Level of [place] Independent with ADLs and functional mobility   Lives With Alone   Receives Help From Home health  (home PT/OT)   ADL Assistance Independent   IADLs Independent   Falls in the last 6 months 1 to 4   Comments Patient discharge from short-term rehab 3 days prior to this admission  Patient with home care services including PT and OT   Restrictions/Precautions   Other Precautions Chair Alarm; Bed Alarm; Fall Risk   General   Additional Pertinent History Chart reviewed, patient admitted acute ischemic stroke  Patient now presents as requiring min to mod assist transfers and gait and will benefit return to short-term rehab   Family/Caregiver Present No   Cognition   Overall Cognitive Status WFL   Arousal/Participation Cooperative   Attention Within functional limits   Orientation Level Oriented X4   RLE Assessment   RLE Assessment   (ROM WFL, strength 3/3+)   LLE Assessment   LLE Assessment   (ROM WFL, strength 3+/4-)   Coordination   Movements are Fluid and Coordinated 0   Bed Mobility   Supine to Sit 4  Minimal assistance   Additional items Assist x 1;Verbal cues;LE management   Transfers   Sit to Stand 4  Minimal assistance   Additional items Assist x 1   Stand to Sit 4  Minimal assistance   Additional items Assist x 1   Ambulation/Elevation   Gait Assistance 3  Moderate assist   Additional items Assist x 1   Assistive Device Rolling walker   Distance 5 feet from bed to chair with knees buckling at times    Patient fearful falling and anxious at times Balance   Static Sitting Fair   Dynamic Sitting Fair -   Static Standing Fair -   Dynamic Standing Poor +   Ambulatory Poor +   Activity Tolerance   Activity Tolerance Patient limited by fatigue  (limited by weakness)   Nurse Made Aware yes   Assessment   Prognosis Good   Problem List Decreased strength;Decreased range of motion;Decreased endurance; Impaired balance;Decreased mobility; Decreased coordination   Assessment Patient seen for Physical Therapy evaluation  Patient admitted with Acute ischemic stroke [facility])  Comorbidities affecting patient's physical performance include: hypertension, hyperlipidemia, CAD status post CABG, diabetes, bladder cancer, recent non-STEMI, history of contrast induced nephropathy status post dialysis, in her retention with chronic Marinelli who presents with bilateral leg weakness and difficulty walking   Personal factors affecting patient at time of initial evaluation include: ambulating with assistive device, inability to ambulate household distances, inability to navigate community distances, inability to navigate level surfaces without external assistance, inability to perform dynamic tasks in community, limited home support, positive fall history, inability to perform physical activity, inability to perform ADLS and inability to perform IADLS   Prior to admission, patient was independent with functional mobility with walker, independent with ADLS, requiring assist for IADLS, living in a multi-level home and ambulating household distance  Please find objective findings from Physical Therapy assessment regarding body systems outlined above with impairments and limitations including weakness, decreased ROM, impaired balance, decreased endurance, impaired coordination, gait deviations, pain, decreased activity tolerance, decreased functional mobility tolerance and fall risk    The Barthel Index was used as a functional outcome tool presenting with a score of 50 today indicating marked limitations of functional mobility and ADLS  Patient's clinical presentation is currently unstable/unpredictable as seen in patient's presentation of vital sign response, changing level of pain, increased fall risk, new onset of impairment of functional mobility, decreased endurance and new onset of weakness  Pt would benefit from continued Physical Therapy treatment to address deficits as defined above and maximize level of functional mobility  As demonstrated by objective findings, the assigned level of complexity for this evaluation is high  The patient's [facility] Basic Mobility Inpatient Short Form Raw Score is 15, Standardized Score is 36 97  A standardized score less than 42 9 suggests the patient may benefit from discharge to post-acute rehabilitation services  Please also refer to the recommendation of the Physical Therapist for safe discharge planning  Goals   Patient Goals To get stronger    STG Expiration Date 03/26/21   Short Term Goal #1 Transfers and gait with roller walker with supervision    Short Term Goal #2 Gait and endurance to functional household distances, strength right lower extremity to 4-/5 all to me patient goal of improved strength   LTG Expiration Date 04/02/21   Long Term Goal #1 Transfers and gait with walker independently   Long Term Goal #2 Gait endurance to functional community distances   Plan   Treatment/Interventions ADL retraining;Functional transfer training;LE strengthening/ROM; Elevations; Therapeutic exercise; Endurance training;Equipment eval/education;Patient/family training;Bed mobility;Gait training; Compensatory technique education   PT Frequency 5x/wk   Recommendation   PT Discharge Recommendation Post-Acute Rehabilitation Services   [facility] Basic Mobility Inpatient   Turning in Bed Without Bedrails 3   Lying on Back to Sitting on Edge of Flat Bed 3   Moving Bed to Chair 2   Standing Up From Chair 3   Walk in Room 2   Climb 3-5 Stairs 2   Basic Mobility Inpatient Raw Score 15   Basic Mobility Standardized Score 36 97   Barthel Index   Feeding 10   Bathing 0   Grooming Score 0   Dressing Score 5   Bladder Score 0   Bowels Score 5   Toilet Use Score 5   Transfers (Bed/Chair) Score 10   Mobility (Level Surface) Score 0   Stairs Score 0   Barthel Index Score 28   Licensure   [place] License Number  Bozena Atkinson PT [id number]     PT TREATMENT  Time In:1146  Time Out:1205  Total Time: 19min      S:  Patient without pain at this time  O: -sit to and from stand x3 for strengthening and balance  -exercise completed bilateral lower extremities sitting on bed edge without lumbar: LAQs, ankle pumps, hip flexion 10 reps BLEs  -standing balance activities completed with sidestepping and backward walking  A:  Patient cooperative and motivated to return and will benefit from continued physical therapy with progression as tolerated  P:  STR    [place] Cl[place], PT

## 2021-03-19 NOTE — CASE MANAGEMENT
CM met with patient again regarding dc planning and ST SNF choices  He was provided with choice and is agreeable to [place] referrals to be sent to ST SNF's in the area  [place] referral was sent via [address]  IMM was discussed and he verbally states understanding

## 2021-03-19 NOTE — CASE MANAGEMENT
LOS: 1 DAY  UNPLANNED RA RISK SCORE: 26  RA: NO  BUNDLE: NO    Cm met with patient and discussed dc planning and the role of cm  Patient was very upset and agitated discussing possible dc later today or tomorrow  He states he hasn't even seen his doctor  Once he calmed down CM discussed services available and the recommendation for ST SNF  He states he was at [facility] in the past-was just dc'd on Sunday and does not want to go back  He states his nephew Anali Jaffe was looking into choices for him  CM offered to call his nephjackeline Briones [place] [phone] and discuss choices with him and he is agreeable  Patient states he is independent of ADL's and ambulates with a walker  He states he lives in a house with 11 steps to enter and then all one floor  He has a cane, RTS and SC at home  No services at present  Patient has medication coverage and uses the [address] [place] [address] Drive  His PCP is Dr Kamaljit Marvin  His family provides him with transportation to appointments  He denies any h/o MH or D&A problems  No POA or LW at this time  Declines information and states his nephew Anali Jaffe is working on it for him  CM may need to set up transport at [place]  CM to follow

## 2021-03-19 NOTE — OCCUPATIONAL THERAPY NOTE
Occupational Therapy Evaluation/Treatment       03/19/21 1135   Note Type   Note type Evaluation   Pain Assessment   Pain Assessment Tool 0-10   Pain Score Worst Possible Pain   Pain Location/Orientation Location: Abdomen; Location: Buttocks  (constipated; last BM 5 days ago)   Hospital Pain Intervention(s) Repositioned; Ambulation/increased activity; Emotional support   Home Living   Type of [address] Two level; Laundry in basement;Performs ADLs on one level;Stairs to enter with rails  (5 steps in from garage, landing, 5 steps to main level)   Bathroom Shower/Tub Tub/shower unit   Bathroom Toilet Raised   Bathroom Equipment Shower chair;Hand-held shower   Bathroom Accessibility Accessible via walker   Home Equipment Walker;Cane   Prior Function   Level of [address] with ADLs and functional mobility   Lives With [address] [place] [address] in the last 6 months 1 to 4   Comments Discharged from STR only 3 days ago  Had 2 VNA visits and one OT visit, PT to start next week  Home care OT was planning to get pt walker basket and portable handrail for bed  Psychosocial   Psychosocial (WDL) WDL   Subjective   Subjective "I'm in so much pain   I haven't had a BM since Sunday "   ADL   Where Assessed Chair   Eating Assistance 7  Independent   Grooming Assistance 5  Supervision/Setup   UB Bathing Assistance 5  Supervision/Setup   LB Bathing Assistance 4  Minimal Assistance   UB Dressing Assistance 5  Supervision/Setup    [place] [address] [address]  3  Moderate Assistance   Toileting Deficit Bedside commode  (urinary catheter)   Bed Mobility   Supine to Sit 4  Minimal assistance   Additional items Assist x 1;Bedrails;Verbal cues   Transfers   Sit to Stand 4  Minimal assistance   Additional items Assist x 1;Verbal cues   Stand to Sit 4  Minimal assistance   Additional items Assist x 1;Verbal cues   Balance   Static Sitting Fair   Dynamic Sitting Fair -   Static Standing Fair -   Dynamic Standing Poor +   Activity Tolerance   Activity Tolerance Patient limited by pain; Patient limited by fatigue   Nurse Made Aware BERTHA, RN   RUE Assessment   RUE Assessment WFL  (gross strength 4/5)   LUE Assessment   LUE Assessment WFL  (gross strength 4/5)   Hand Function   Gross Motor Coordination Functional   Fine Motor Coordination Functional   Sensation   Light Touch No apparent deficits   Proprioception   Proprioception No apparent deficits   Vision-Basic Assessment   Current Vision Wears glasses all the time   Patient Visual Report   (being followed by MD due to diabetes)   Vision - Complex Assessment   Ocular Range of Motion [facility]   Perception   Inattention/Neglect Appears intact   Spatial Orientation Appears intact   Motor Planning Appears intact   Cognition   Overall Cognitive Status WFL   Arousal/Participation Alert; Cooperative   Attention Attends with cues to redirect   Orientation Level Oriented X4   Memory Within functional limits   Following Commands Follows all commands and directions without difficulty   Assessment   Limitation Decreased ADL status; Decreased UE strength;Decreased endurance;Decreased self-care trans;Decreased high-level ADLs  (decreased balance and mobility)   Prognosis Good   Assessment Patient evaluated by Occupational Therapy  Patient admitted with Acute ischemic stroke [facility])  The patients occupational profile, medical and therapy history includes a extensive additional review of physical, cognitive, or psychosocial history related to current functional performance  Comorbidities affecting functional mobility and ADLS include: HTN, DM, CAD s/p CABG (2016), bladder ca s/p resection, Dialysis iniitiated 1/25/2021   Prior to admission, patient was independent with functional mobility with RW, independent with ADLS, requiring assist for IADLS, living alone in 2 story home with 10 steps to enter and ambulating household distance, and recently discharged from [address] [place] [address] only 3 days ago  The evaluation identifies the following performance deficits: weakness, impaired balance, decreased endurance, increased fall risk, new onset of impairment of functional mobility, decreased ADLS, decreased IADLS, pain, decreased activity tolerance, decreased safety awareness, decreased strength and impaired psychosocial skills, that result in activity limitations and/or participation restrictions  This evaluation requires clinical decision making of high complexity, because the patient presents with comorbidites that affect occupational performance and required significant modification of tasks or assistance with consideration of multiple treatment options  The Barthel Index was used as a functional outcome tool presenting with a score of 35, indicating marked limitations of functional mobility and ADLS  The patient's raw score on the -PAC Daily Activity inpatient short form is 21, standardized score is 44 27, greater than 39 4  Patients at this level are likely to benefit from DC to home  However, please refer to the recommendation of the Occupational Therapist for safe DC planning  Patient will benefit from skilled Occupational Therapy services to address above deficits and facilitate a safe return to prior level of function  Goals   Patient Goals take care of myself   STG Time Frame   (1-7)   Short Term Goal #1 Patient will increase standing tolerance to 3 minutes during ADL task to decrease assistance level and decrease fall risk; Patient will increase bed mobility to supervision in preparation for ADLS and transfers;  Patient will increase functional mobility to and from bathroom with rolling walker with min assist to increase performance with ADLS and to use a toilet; Patient will tolerate 15 minutes of UE ROM/strengthening to increase general activity tolerance and performance in ADLS/IADLS; Patient will improve functional activity tolerance to 15 minutes of sustained functional tasks to increase participation in basic self-care and decrease assistance level;  Patient will be able to to verbalize understanding and perform energy conservation/proper body mechanics during ADLS and functional mobility at least 75% of the time with minimal cueing to decrease signs of fatigue and increase stamina to return to prior level of function; Patient will increase static/dynamic sitting balance to fair+ to improve the ability to sit at edge of bed or on a chair for ADLS;  Patient will increase static/dynamic standing balance to fair to improve postural stability and decrease fall risk during standing ADLS and transfers  LTG Time Frame   (8-14)   Long Term Goal #1 Patient will increase standing tolerance to 10 minutes during ADL task to decrease assistance level and decrease fall risk; Patient will increase bed mobility to independent in preparation for ADLS and transfers; Patient will increase functional mobility to and from bathroom with rolling walker independently to increase performance with ADLS and to use a toilet; Patient will tolerate 20 minutes of UE ROM/strengthening to increase general activity tolerance and performance in ADLS/IADLS; Patient will improve functional activity tolerance to 20 minutes of sustained functional tasks to increase participation in basic self-care and decrease assistance level;  Patient will be able to to verbalize understanding and perform energy conservation/proper body mechanics during ADLS and functional mobility at least 90% of the time with nocueing to decrease signs of fatigue and increase stamina to return to prior level of function; Patient will increase static/dynamic sitting balance to good to improve the ability to sit at edge of bed or on a chair for ADLS;  Patient will increase static/dynamic standing balance to good to improve postural stability and decrease fall risk during standing ADLS and transfers  Functional Transfer Goals   Pt Will Perform All Functional Transfers With min assist;With assistive devices; With good judgment/safety  (LTG- Indeoendent)   ADL Goals   Pt Will Perform All ADL's In chair; With stand by assist;With setup; With cues; With good judgment/safety  (LTG- Indeoendent standing or sitting)   Plan   Treatment Interventions ADL retraining;Functional transfer training;UE strengthening/ROM; Endurance training;Equipment evaluation/education;Patient/family training; Compensatory technique education; Energy conservation   Goal Expiration Date 04/02/21   OT Frequency 5x/wk   Additional Treatment Session   Start Time 1110   End Time 7207   Treatment Assessment Pt seen for ADL training  Education and training begun with pt regarding energy conservation/proper body mechanics during ADLS and functional mobility to decrease fall risks, decrease signs of fatigue and increase stamina needed to return to prior level of function  Supine to sit Sri with HOB elvated 75 degrees due to 10/10 abdominal pain from constipation x 5 days  Transfer bed to commode with ModA and RW and cues  Toileting with Sri for clothing management, perineal hygiene and steadying  Pt had very large BM and reported immediate pain relief and anxiety decreased  Functional ambulation a few steps from commode to chair with Sri and RW and cues  Provided education regarding possible need to go back to [facility] home with OT, pending progress and emotional support provided to pt  Will discuss case with PT and CM to determine appropriate discharge placement  Pt demonstrating good verbal understanding of all information provided and all questions answered  Patient left OOB in chair with all needs within reach, SCD pumps, and tab alarm in place  Continue OT per POC      Recommendation   OT Discharge Recommendation Post-Acute Rehabilitation Services  (pt lives alone and unable to walk independently at this time)   Equipment Recommended Other (comment)  (Grab bar in shower)   AM-PAC Daily Activity Inpatient   Lower Body Dressing 3   Bathing 3   Toileting 3   Upper Body Dressing 4   Grooming 4   Eating 4   Daily Activity Raw Score 21   Daily Activity Standardized Score (Calc for Raw Score >=11) 44 27   AM-PAC Applied Cognition Inpatient   Following a Speech/Presentation 4   Understanding Ordinary Conversation 4   Taking Medications 4   Remembering Where Things Are Placed or Put Away 4   Remembering List of 4-5 Errands 4   Taking Care of Complicated Tasks 4   Applied Cognition Raw Score 24   Applied Cognition Standardized Score 62 21   Barthel Index   Feeding 10   Bathing 0   Grooming Score 0   Dressing Score 5   Bladder Score 0   Bowels Score 5   Toilet Use Score 5   Transfers (Bed/Chair) Score 10   Mobility (Level Surface) Score 0   Stairs Score 0   Barthel Index Score 35   Licensure   [place] License Number  Nicci Bhat Monica Donnelly Akbar 87, OTR/L, [place] Lic# [id number]

## 2021-03-19 NOTE — CASE MANAGEMENT
Case management spoke with Sharif Johnson at [facility] and patient is accepted at their facilities  She states they can start auth process  Cm must call in the am to check which facility auth is being obtained for

## 2021-03-19 NOTE — UTILIZATION REVIEW
Initial Clinical Review    Admission: Date/Time/Statement:   Admission Orders (From admission, onward)     Ordered        03/18/21 1606  Inpatient Admission  Once                   Orders Placed This Encounter   Procedures    Inpatient Admission     Standing Status:   Standing     Number of Occurrences:   1     Order Specific Question:   Level of Care     Answer:   Med Surg [16]     Order Specific Question:   Estimated length of stay     Answer:   More than 2 Midnights     Order Specific Question:   Certification     Answer:   I certify that inpatient services are medically necessary for this patient for a duration of greater than two midnights  See H&P and MD Progress Notes for additional information about the patient's course of treatment  ED Arrival Information     Expected Arrival Acuity Means of Arrival Escorted By Service Admission Type    - 3/18/2021 13:31 Urgent Ambulance [address] Urgent    Arrival Complaint    weakness        Chief Complaint   Patient presents with    Extremity Weakness     Arrives BLS  reported that he has extrimity weakness and shakiness  Just d/c from rehab 3 days ago  Hx angiogram 4-5 weeks ago and needed dialysis  Last dialysis was Tuersday and completed  HPI:     [de-identified] y o  male with PMH of HTN, HL, CAD status post CABG, DM, CKD Stage IV, bladder CA and recent non-STEMI, contrast induced nephropathy status post dialysis, and chronic Marinelli who presents to the ED from home with bilateral leg weakness and difficulty walking  Patient reports he was just released from rehab about 3 days ago and was walking 300 feet with a walker  Patient was at home and reports that his both legs gave out under him and patient slowly slid to the floor  Patient had another episode today where he felt significant bilateral leg weakness, right more than left and felt that his legs are giving out under him  ED MRI revealed  acute infarction   ED labs revealed potassium of 3 0      Plan: Inpatient Med Surg admission for evaluation and treatment of acute ischemic stroke, hypokalemia:  Permissive hypertension, PT/OT and Speech Therapy  Follow BMP     3/18 Neurology consult:  Acute Lt Lacunar Infarct int he Lt parietal periventricular region Not a TPA candidate, unclear time of onset, limited focal deficits  Telemetry, neuro checks, ASA 81 mg daily, Plavix load today, then 75 mg daily starting in a m , Lipitor 80 mg daily  ECHO with shunt study  May need to consider alternative to CTA unless cleared by Nephrology  Permissive HTN, check lipid panel and A1c  Maintain euglycemia, PT/OT and ST      3/19 Nephrology consult: Stage IV chronic kidney disease  Estimated Creatinine Clearance: 26 7 mL/min (A) (by C-G formula based on SCr of 2 49 mg/dL (H))  patient was undergoing dialysis for acute kidney injury superimposed on chronic kidney disease, contrast induced  Baseline creatinine mid 2s, creatinine today 2 49, BUN 48  Recovering from renal standpoint  Patient does have access at this time, advised to maintain access until further advised  Avoid contrast if possible  No urgent no urgent indication for dialysis at this time  Hypokalemia, potassium upon admission 3, repleted,  K 3 5 today, goal 4     Physical and OccupationalTherapy evaluated the patient , PT noted patient ambulated 5 feet from bed to chair using rolling walker with knees buckling at times  PT/OT recommending post acute rehab       ED Triage Vitals [03/18/21 1338]   Temperature Pulse Respirations Blood Pressure SpO2   97 8 °F (36 6 °C) 83 18 140/59 99 %      Temp Source Heart Rate Source Patient Position - Orthostatic VS BP Location FiO2 (%)   Oral Monitor Lying Left arm --      Pain Score       No Pain          Wt Readings from Last 1 Encounters:   03/18/21 89 1 kg (196 lb 6 9 oz)     Additional Vital Signs:     Date/Time  Temp  Pulse  Resp  BP  MAP (mmHg)  SpO2 O2 Device   03/19/21 07:49:38  98 2 °F (36 8 °C)  76  20  141/61 88  95 % None (Room air)   03/19/21 05:27:28  --  67  18  126/58  81  95 % --   03/19/21 0419  98 2 °F (36 8 °C)  68  --  104/53  70  93 % --   03/19/21 0115  --  69  --  111/52  72  95 % --   03/19/21 0015  --  70  --  110/52  71  97 % --   03/18/21 23:32:11  98 5 °F (36 9 °C)  68  18  105/47Abnormal   66  90 % --   03/18/21 2215  --  84  --  122/55  77  86 %Abnormal  --   03/18/21 21:38:22  98 3 °F (36 8 °C)  85  18  120/54  76  91 % --   03/18/21 2138  98 3 °F (36 8 °C)  85  --  120/54  76  92 % --   03/18/21 2115  --  84  --  --  --  88 %Abnormal  --   03/18/21 2015  --  82  --  81/55Abnormal   64  91 % --   03/18/21 1915  --  83  --  148/65  93  98 % --   03/18/21 19:00:06  98 9 °F (37 2 °C)  86  20  166/78  107  95 % --   03/18/21 1900  98 9 °F (37 2 °C)  87  --  166/78  107  95 % --   03/18/21 1835  --  92  --  147/71  96  97 % --   03/18/21 1820  --  76  --  156/102Abnormal   120  99 % --   03/18/21 18:04:22  --  83  --  165/77  106  97 % --   03/18/21 1700  --  70  20  152/74  --  99 % None (Room air)   03/18/21 1615  98 4 °F (36 9 °C)  --  21  155/71  102  -- --   03/18/21 1516  --  78  20  154/67  --  100 % None (Room air)   03/18/21 1430  --  84  20  153/70  100  100 % --   03/18/21 1415  --  84  21  150/65  94  95 % --         Date and Time Eye Opening Best Verbal Response Best Motor Response Nikko Coma Scale Score   03/19/21 0230 4 5 6 15   03/19/21 0030 4 5 6 15   03/18/21 2220 4 5 6 15   03/18/21 2056 4 5 6 15   03/18/21 1835 4 5 6 15   03/18/21 1819 4 5 6 15   03/18/21 1410 4 5 6 15         Pertinent Labs/Diagnostic Test Results:     3/18 MRI brain:  Punctate acute lacunar infarct left parietal periventricular region  There is chronic microangiopathic changes are noted   Ventriculomegaly out of proportion to sulcal prominence with widening of the sylvian fissures  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        3/18 CT head:  No acute intracranial abnormality  Acute sphenoid sinusitis      3/18 EKG:     Sinus rhythm with 1st degree A-V block  Left ventricular hypertrophy with repolarization abnormality  Inferior infarct (cited on or before 08-FEB-2021)   Cannot rule out ischemia  Abnormal ECG         Results from last 7 days   Lab Units 03/18/21  1356   WBC Thousand/uL 7 26   HEMOGLOBIN g/dL 8 4*   HEMATOCRIT % 25 8*   PLATELETS Thousands/uL 169   NEUTROS ABS Thousands/µL 5 32         Results from last 7 days   Lab Units 03/19/21  0522 03/18/21  1356   SODIUM mmol/L 140 138   POTASSIUM mmol/L 3 5 3 0*   CHLORIDE mmol/L 103 99*   CO2 mmol/L 30 30   ANION GAP mmol/L 7 9   BUN mg/dL 48* 48*   CREATININE mg/dL 2 49* 2 65*   EGFR ml/min/1 73sq m 23 22   CALCIUM mg/dL 8 7 8 8   MAGNESIUM mg/dL  --  1 8     Results from last 7 days   Lab Units 03/18/21  1356   AST U/L 30   ALT U/L 28   ALK PHOS U/L 92   TOTAL PROTEIN g/dL 7 2   ALBUMIN g/dL 2 8*   TOTAL BILIRUBIN mg/dL 0 70     Results from last 7 days   Lab Units 03/18/21  2142 03/18/21  1344   POC GLUCOSE mg/dl 149* 96     Results from last 7 days   Lab Units 03/19/21  0522 03/18/21  1356   GLUCOSE RANDOM mg/dL 112 101             BETA-HYDROXYBUTYRATE   Date Value Ref Range Status   02/03/2021 0 0 <0 6 mmol/L Final          Results from last 7 days   Lab Units 03/18/21  1356   TROPONIN I ng/mL 0 07*                 Results from last 7 days   Lab Units 03/18/21  1713   CLARITY UA  Clear   COLOR UA  Light Yellow   SPEC GRAV UA  1 010   PH UA  7 0   GLUCOSE UA mg/dl Negative   KETONES UA mg/dl Negative   BLOOD UA  Small*   PROTEIN UA mg/dl 30 (1+)*   NITRITE UA  Negative   BILIRUBIN UA  Negative   UROBILINOGEN UA E U /dl 0 2   LEUKOCYTES UA  Moderate*   WBC UA /hpf 4-10*   RBC UA /hpf 0-1   BACTERIA UA /hpf Occasional   EPITHELIAL CELLS WET PREP /hpf Occasional         ED Treatment:   Medication Administration from 03/18/2021 1331 to 03/18/2021 1736       Date/Time Order Dose Route Action     03/18/2021 1538 potassium chloride (K-DUR,KLOR-CON) CR tablet 40 mEq 40 mEq Oral Given     03/18/2021 1557 potassium chloride 20 mEq IVPB (premix) 20 mEq Intravenous New Bag     03/18/2021 1606 clopidogrel (PLAVIX) tablet 300 mg 300 mg Oral Given     03/18/2021 1606 atorvastatin (LIPITOR) tablet 80 mg 80 mg Oral Given        Past Medical History:   Diagnosis Date    Acute on chronic diastolic CHF (congestive heart failure) (HCC) 7/4/2019    Arthritis     Back pain     Bladder cancer ([facility] )     diagnosed  2/2016    Cancer [facility])     bladder; chemo; resection;     Constipation 2/1/2021    Coronary artery disease     has 2 coronary stents    Dental crowns present      5    Diabetes mellitus ([facility] )     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[facility] )     fluid behind right eye    Hematuria     hx of    High anion gap metabolic acidosis 9/08/3471    Hypercholesteremia     Hypertension     Kidney stones     Wears glasses      Present on Admission:   Coronary artery disease   Bladder cancer ([facility] )   Hypercholesteremia   Benign hypertension with chronic kidney disease, stage III   Chronic kidney disease      Admitting Diagnosis: Urinary retention [R33 9]  Weakness [R53 1]  CVA (cerebral vascular accident) ([facility] ) [I63 9]  Age/Sex: [de-identified] y o  male         Admission Orders:      Baseline NIH stroke scale on admission, reassess Q24H x 2 D, Nursing dysphagia assessment prior to staring diet, neuro checks  Telemetry, SCD  Carotid study, ECHO  PT/OT  Scheduled Medications:    aspirin, 81 mg, Oral, Daily  atorvastatin, 80 mg, Oral, QPM  clopidogrel, 75 mg, Oral, Daily  enoxaparin, 30 mg, Subcutaneous, Daily  escitalopram, 10 mg, Oral, HS  hydrALAZINE, 25 mg, Oral, Q8H [place]  insulin detemir, 20 Units, Subcutaneous, Q12H [place]  isosorbide mononitrate, 30 mg, Oral, Daily  metoprolol tartrate, 25 mg, Oral, Q12H [address]      Continuous IV Infusions:   None       PRN Meds:    acetaminophen, 650 mg, Oral, Q6H PRN  nitroglycerin, 0 4 mg, Sublingual, Q5 Min PRN  ondansetron, 4 mg, Intravenous, Q6H PRN        IP CONSULT TO NEUROLOGY  IP CONSULT TO NEPHROLOGY    Network Utilization Review Department  ATTENTION: Please call with any questions or concerns to [phone] and carefully listen to the prompts so that you are directed to the right person  All voicemails are confidential   [place] Fetch all requests for admission clinical reviews, approved or denied determinations and any other requests to dedicated fax number below belonging to the campus where the patient is receiving treatment   List of dedicated fax numbers for the Facilities:  [address] DENIALS (Administrative/Medical Necessity) [phone]   [address] (Maternity/NICU/Pediatrics) [phone]   [address] [place] [address] Dr Trinity Sloan 3965 (  Sravanthi Bledsoe "Felicia" 103) [address] [place] [address] [address] [place] [address] [phone]

## 2021-03-20 PROBLEM — E87.6 HYPOKALEMIA: Status: RESOLVED | Noted: 2021-03-18 | Resolved: 2021-03-20

## 2021-03-20 LAB
ANION GAP SERPL CALCULATED.3IONS-SCNC: 8 MMOL/L (ref 4–13)
BUN SERPL-MCNC: 49 MG/DL (ref 5–25)
CALCIUM SERPL-MCNC: 9.1 MG/DL (ref 8.3–10.1)
CHLORIDE SERPL-SCNC: 100 MMOL/L (ref 100–108)
CO2 SERPL-SCNC: 28 MMOL/L (ref 21–32)
CREAT SERPL-MCNC: 2.39 MG/DL (ref 0.6–1.3)
GFR SERPL CREATININE-BSD FRML MDRD: 25 ML/MIN/1.73SQ M
GLUCOSE SERPL-MCNC: 161 MG/DL (ref 65–140)
GLUCOSE SERPL-MCNC: 173 MG/DL (ref 65–140)
GLUCOSE SERPL-MCNC: 280 MG/DL (ref 65–140)
GLUCOSE SERPL-MCNC: 287 MG/DL (ref 65–140)
GLUCOSE SERPL-MCNC: 300 MG/DL (ref 65–140)
POTASSIUM SERPL-SCNC: 3.8 MMOL/L (ref 3.5–5.3)
SODIUM SERPL-SCNC: 136 MMOL/L (ref 136–145)

## 2021-03-20 PROCEDURE — 99232 SBSQ HOSP IP/OBS MODERATE 35: CPT | Performed by: INTERNAL MEDICINE

## 2021-03-20 PROCEDURE — 97535 SELF CARE MNGMENT TRAINING: CPT

## 2021-03-20 PROCEDURE — 97116 GAIT TRAINING THERAPY: CPT | Performed by: PHYSICAL THERAPIST

## 2021-03-20 PROCEDURE — 97110 THERAPEUTIC EXERCISES: CPT | Performed by: PHYSICAL THERAPIST

## 2021-03-20 PROCEDURE — 82948 REAGENT STRIP/BLOOD GLUCOSE: CPT

## 2021-03-20 PROCEDURE — 80048 BASIC METABOLIC PNL TOTAL CA: CPT | Performed by: INTERNAL MEDICINE

## 2021-03-20 RX ORDER — TORSEMIDE 100 MG/1
100 TABLET ORAL
Status: DISCONTINUED | OUTPATIENT
Start: 2021-03-20 | End: 2021-03-22

## 2021-03-20 RX ADMIN — INSULIN LISPRO 2 UNITS: 100 INJECTION, SOLUTION INTRAVENOUS; SUBCUTANEOUS at 12:40

## 2021-03-20 RX ADMIN — METOPROLOL TARTRATE 25 MG: 25 TABLET, FILM COATED ORAL at 08:18

## 2021-03-20 RX ADMIN — ESCITALOPRAM OXALATE 10 MG: 10 TABLET ORAL at 21:34

## 2021-03-20 RX ADMIN — ATORVASTATIN CALCIUM 80 MG: 80 TABLET ORAL at 17:02

## 2021-03-20 RX ADMIN — INSULIN LISPRO 2 UNITS: 100 INJECTION, SOLUTION INTRAVENOUS; SUBCUTANEOUS at 21:35

## 2021-03-20 RX ADMIN — TORSEMIDE 100 MG: 100 TABLET ORAL at 17:09

## 2021-03-20 RX ADMIN — INSULIN DETEMIR 20 UNITS: 100 INJECTION, SOLUTION SUBCUTANEOUS at 08:18

## 2021-03-20 RX ADMIN — INSULIN LISPRO 1 UNITS: 100 INJECTION, SOLUTION INTRAVENOUS; SUBCUTANEOUS at 08:19

## 2021-03-20 RX ADMIN — METOPROLOL TARTRATE 25 MG: 25 TABLET, FILM COATED ORAL at 21:34

## 2021-03-20 RX ADMIN — INSULIN LISPRO 3 UNITS: 100 INJECTION, SOLUTION INTRAVENOUS; SUBCUTANEOUS at 17:03

## 2021-03-20 RX ADMIN — POLYETHYLENE GLYCOL 3350 17 G: 17 POWDER, FOR SOLUTION ORAL at 08:17

## 2021-03-20 RX ADMIN — DOCUSATE SODIUM 100 MG: 100 CAPSULE, LIQUID FILLED ORAL at 08:18

## 2021-03-20 RX ADMIN — HYDRALAZINE HYDROCHLORIDE 25 MG: 25 TABLET, FILM COATED ORAL at 15:11

## 2021-03-20 RX ADMIN — HYDRALAZINE HYDROCHLORIDE 25 MG: 25 TABLET, FILM COATED ORAL at 05:20

## 2021-03-20 RX ADMIN — PRIMIDONE 50 MG: 50 TABLET ORAL at 21:35

## 2021-03-20 RX ADMIN — ASPIRIN 81 MG: 81 TABLET, COATED ORAL at 08:18

## 2021-03-20 RX ADMIN — DOCUSATE SODIUM 100 MG: 100 CAPSULE, LIQUID FILLED ORAL at 17:02

## 2021-03-20 RX ADMIN — ENOXAPARIN SODIUM 30 MG: 30 INJECTION SUBCUTANEOUS at 08:18

## 2021-03-20 RX ADMIN — INSULIN DETEMIR 20 UNITS: 100 INJECTION, SOLUTION SUBCUTANEOUS at 21:35

## 2021-03-20 RX ADMIN — CLOPIDOGREL BISULFATE 75 MG: 75 TABLET ORAL at 08:18

## 2021-03-20 RX ADMIN — HYDRALAZINE HYDROCHLORIDE 25 MG: 25 TABLET, FILM COATED ORAL at 21:35

## 2021-03-20 RX ADMIN — ISOSORBIDE MONONITRATE 30 MG: 30 TABLET, EXTENDED RELEASE ORAL at 08:17

## 2021-03-20 NOTE — ASSESSMENT & PLAN NOTE
Patient presented with bilateral leg weakness right more than left  CT scan of the brain showed acute sphenoid sinusitis without any acute intracranial abnormality  MRI of the brain showed punctate acute lacunar infarct in the left parietal periventricular region with chronic microangiopathic changes and ventriculomegaly  Limited echo with bubble study showed no evidence of right-to-left shunt  Patient cannot get CTA due to chronic kidney disease  Carotid Dopplers were performed results of which are pending at the current time  Lipid panel showed LDL level of  80  hemoglobin A1c was 6 6  Continue PT/OT  Tight control of blood sugars    Patient to continue dual antiplatelet therapy for 3 months and Lipitor 80 milligram daily

## 2021-03-20 NOTE — ASSESSMENT & PLAN NOTE
Lab Results   Component Value Date    EGFR 23 03/19/2021    EGFR 22 03/18/2021    EGFR 8 02/12/2021    CREATININE 2 49 (H) 03/19/2021    CREATININE 2 65 (H) 03/18/2021    CREATININE 6 31 (H) 02/12/2021   Continue hydralazine 25 milligram p o  Q 8 hours, Lopressor 25 milligram p o  B i d   Hold Demadex at the current time to allow permissive hypertension

## 2021-03-20 NOTE — PROGRESS NOTES
Karissa 45  Progress Note - Lexa Belden 1940, [de-identified] y o  male MRN: [id number]  Unit/Bed#: [address] [place] [address] Encounter: [id number]  Primary Care Provider: Nitesh Souza MD   Date and time admitted to hospital: 3/18/2021  1:36 PM    * Acute ischemic stroke [facility])  Assessment & Plan  Patient presented with bilateral leg weakness right more than left  CT scan of the brain showed acute sphenoid sinusitis without any acute intracranial abnormality  MRI of the brain showed punctate acute lacunar infarct in the left parietal periventricular region with chronic microangiopathic changes and ventriculomegaly  Limited echo with bubble study showed no evidence of right-to-left shunt  Patient cannot get CTA due to chronic kidney disease  Carotid Dopplers were performed results of which are pending at the current time  A low permissive hypertension to keep SBP more than 180  Lipid panel showed LDL level of  80  hemoglobin A1c was 6 6  PT/OT and speech therapy  Tight control of blood sugars  Patient to continue dual antiplatelet therapy for 3 months and Lipitor 80 milligram daily    Acute renal failure superimposed on chronic kidney disease [facility])  Assessment & Plan  Lab Results   Component Value Date    EGFR 23 03/19/2021    EGFR 22 03/18/2021    EGFR 8 02/12/2021    CREATININE 2 49 (H) 03/19/2021    CREATININE 2 65 (H) 03/18/2021    CREATININE 6 31 (H) 02/12/2021   Patient has chronic kidney disease stage 4 with baseline creatinine 2-2 5  Patient recently developed acute kidney injury on chronic kidney disease secondary to contrast induced diabetic nephropathy and has been on dialysis since then  Patient's last session of hemodialysis was on March 16th and Outpatient Nephrology considering taking him off dialysis  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  No urgent indication for dialysis as per Nephrology      Elevated troponin  Assessment & Plan  Likely non MI troponin elevation in the setting of chronic kidney disease  Hold off on checking further serial troponins as patient has known history of severe 3 vessel CAD and patient is asymptomatic    Coronary artery disease  Assessment & Plan  Patient has history of CAD status post 2 vessel bypass  Patient underwent cardiac catheterization in January which showed severe three-vessel disease with 99% occluded proximal circumflex, 50% distal left main, mid % occluded, RCA proximal 100% occluded with collaterals from left circulation  Graft from LIMA to LAD is widely patent  Graft to obtuse marginal 1 has ostial around 40% stenosis but no flow limiting noted  Continue medical management with Imdur, metoprolol, nitroglycerin p r n , aspirin and statin    Essential tremor  Assessment & Plan  Patient placed on low-dose primidone 50 milligram q h s  Hypokalemia  Assessment & Plan  Patient was given KCl 40 milliequivalents p o  And 20 milliequivalents IV in the ED  Improved with repletion    Benign hypertension with chronic kidney disease, stage III  Assessment & Plan  Lab Results   Component Value Date    EGFR 23 03/19/2021    EGFR 22 03/18/2021    EGFR 8 02/12/2021    CREATININE 2 49 (H) 03/19/2021    CREATININE 2 65 (H) 03/18/2021    CREATININE 6 31 (H) 02/12/2021   Continue hydralazine 25 milligram p o  Q 8 hours, Lopressor 25 milligram p o  B i d   Hold Demadex at the current time to allow permissive hypertension    Type 2 diabetes mellitus with hyperglycemia, with long-term current use of insulin [facility])  Assessment & Plan  Lab Results   Component Value Date    HGBA1C 6 6 (H) 03/19/2021       Recent Labs     03/18/21  1344 03/18/21  2142   POCGLU 96 149*       Blood Sugar Average: Last 72 hrs:  (P) 122 5   Continue Levemir 20 units subcutaneously daily with Humalog sliding scale    Patient will be on diabetic diet    Bladder cancer [facility])  Assessment & Plan  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  Patient did have outpatient cystoscopy yesterday which showed no recurrence  Patient recently had urinary retention and is currently on Flomax and has a Marinelli catheter  Voiding trial tomorrow per Urology      VTE Pharmacologic Prophylaxis:   Pharmacologic: Heparin  Mechanical VTE Prophylaxis in Place: Yes    Patient Centered Rounds: I have performed bedside rounds with nursing staff today  Discussions with Specialists or Other Care Team Provider: Dr Aurora Ziegler    Education and Discussions with Family / Patient: yes    Time Spent for Care: 45 minutes  More than 50% of total time spent on counseling and coordination of care as described above  Current Length of Stay: 1 day(s)    Current Patient Status: Inpatient   Certification Statement: The patient will continue to require additional inpatient hospital stay due to Essential tremors, acute stroke pending placement    Discharge Plan:  Pending course    Code Status: Level 1 - Full Code      Subjective:   Patient continues to have significant shaking and did not do well with physical therapy  Patient did have good bowel movement after getting suppository  Patient denies any chest pain, shortness of breath or dizziness  Objective:     Vitals:   Temp (24hrs), Av 3 °F (36 8 °C), Min:98 °F (36 7 °C), Max:98 6 °F (37 °C)    Temp:  [98 °F (36 7 °C)-98 6 °F (37 °C)] 98 6 °F (37 °C)  HR:  [66-85] 75  Resp:  [18-20] 18  BP: (104-141)/(47-61) 126/60  SpO2:  [79 %-98 %] 94 %  Body mass index is 25 92 kg/m²  Input and Output Summary (last 24 hours): Intake/Output Summary (Last 24 hours) at 3/19/2021 2024  Last data filed at 3/19/2021 1801  Gross per 24 hour   Intake 600 ml   Output 1050 ml   Net -450 ml       Physical Exam:     Physical Exam  Constitutional:       General: He is not in acute distress  HENT:      Head: Normocephalic and atraumatic     Eyes:      Conjunctiva/sclera: Conjunctivae normal  Pupils: Pupils are equal, round, and reactive to light  Neck:      Musculoskeletal: Normal range of motion and neck supple  Cardiovascular:      Rate and Rhythm: Normal rate and regular rhythm  Heart sounds: Murmur present  Pulmonary:      Effort: Pulmonary effort is normal  No respiratory distress  Breath sounds: No wheezing, rhonchi or rales  Chest:      Chest wall: No tenderness  Abdominal:      General: Bowel sounds are normal  There is no distension  Palpations: Abdomen is soft  Tenderness: There is no abdominal tenderness  There is no guarding or rebound  Skin:     General: Skin is warm and dry  Findings: No rash  Neurological:      Mental Status: He is alert  Cranial Nerves: No cranial nerve deficit  Additional Data:     Labs:    Results from last 7 days   Lab Units 03/18/21  1356   WBC Thousand/uL 7 26   HEMOGLOBIN g/dL 8 4*   HEMATOCRIT % 25 8*   PLATELETS Thousands/uL 169   NEUTROS PCT % 73   LYMPHS PCT % 17   MONOS PCT % 8   EOS PCT % 1     Results from last 7 days   Lab Units 03/19/21  0522 03/18/21  1356   POTASSIUM mmol/L 3 5 3 0*   CHLORIDE mmol/L 103 99*   CO2 mmol/L 30 30   BUN mg/dL 48* 48*   CREATININE mg/dL 2 49* 2 65*   CALCIUM mg/dL 8 7 8 8   ALK PHOS U/L  --  92   ALT U/L  --  28   AST U/L  --  30           * I Have Reviewed All Lab Data Listed Above  * Additional Pertinent Lab Tests Reviewed:  [facility]      Recent Cultures (last 7 days):           Last 24 Hours Medication List:   Current Facility-Administered Medications   Medication Dose Route Frequency Provider Last Rate    acetaminophen  650 mg Oral Q6H PRN Samantha Cerda MD      aspirin  81 mg Oral Daily Samantha Cerda MD      atorvastatin  80 mg Oral QPM TABITHA Zhao      bisacodyl  10 mg Rectal Daily Samantha Cerda MD      clopidogrel  75 mg Oral Daily Samantha Cerda MD      docusate sodium  100 mg Oral BID Lexa Garner MD      enoxaparin  30 mg Subcutaneous Daily Lexa Garner MD      escitalopram  10 mg Oral HS Lexa Garner MD      hydrALAZINE  25 mg Oral Q8H [address] Lexa Garner MD      insulin detemir  20 Units Subcutaneous Q12H [address] Lexa Garner MD      isosorbide mononitrate  30 mg Oral Daily Lexa Garner MD      metoprolol tartrate  25 mg Oral Q12H [address] Lexa Garner MD      nitroglycerin  0 4 mg Sublingual Q5 Min PRN Lexa Garner MD      ondansetron  4 mg Intravenous Q6H PRN Lexa Garner MD      polyethylene glycol  17 g Oral Daily Lexa Garner MD      primidone  50 mg Oral HS Lm Molina MD          Today, Patient Was Seen By: Lexa Garner MD    ** Please Note: Dictation voice to text software may have been used in the creation of this document   **

## 2021-03-20 NOTE — ASSESSMENT & PLAN NOTE
Lab Results   Component Value Date    HGBA1C 6 6 (H) 03/19/2021       Recent Labs     03/19/21  2118 03/20/21  0737 03/20/21  1159 03/20/21  1551   POCGLU 284* 173* 287* 300*       Blood Sugar Average: Last 72 hrs:  (P) 149 [id number]   Continue Levemir 20 units subcutaneously daily with Humalog sliding scale    Patient will be on diabetic diet

## 2021-03-20 NOTE — PROGRESS NOTES
NEPHROLOGY PROGRESS NOTE   Mike Rodarte [de-identified] y o  male MRN: [id number]  Unit/Bed#: [address] [place] [address] Encounter: [id number]  Reason for Consult: ARF      SUMMARY:    28-year-old male, with a history of acute renal failure/acute tubular necrosis secondary to contrast associated nephropathy currently dialysis dependent who dialyzes at [address] Tuesday Thursday Saturday where he is being monitored for for renal recovery presented for bilateral lower extremity weakness with imaging showing a acute lacunar infarct      Patient's last dialysis was on Tuesday, his dialysis was held on Thursday due to improvement of his renal function where his creatinine has plateaued in the mid 2s  He had a 24 hour urine done as an outpatient  I spoke to his outpatient nephrologist reported that it was not a good collection as some of the urine sample was not adequate collected and was only aborted as 200 cc  ASSESSMENT and PLAN:    Acute renal failure  --currently dialysis dependent  --Tuesday Thursday Saturday at [address]  --access PermCath  --last dialysis was on March 16th  --renal function remains stable with a creatinine 2 39 mg/dL  --nonoliguric  --continue to monitor input and output  --no urgent indication for renal placement therapy at this time  --no plans for CT a planning for Doppler  --discussed with hospitalist  --maintain PermCath if renal function remains stable can likely have a discontinue sometime next week  I spoke to the patient about this    --will send a message to his outpatient nephrologist  --stable from a renal standpoint for discharge     Chronic kidney disease stage IV  --prior baseline creatinine in the mid 2s  --currently at his baseline     Hypokalemia  --resolved     Bladder cancer  --history of muscle invasive bladder cancer  --status post left partial cystectomy on October 2016  --Marinelli catheter in place  --management as per Urology     Acute CVA  --MRI showed an acute lacunar infarct left parietal periventricular region  --management as per Neurology  --continue statin, aspirin      SUBJECTIVE / INTERVAL HISTORY:    No chest pain or shortness of breath    OBJECTIVE:  Current Weight: Weight - Scale: 89 1 kg (196 lb 6 9 oz)  Vitals:    03/19/21 2347 03/20/21 0403 03/20/21 0730 03/20/21 0800   BP: 123/59 119/61 157/78    BP Location:       Pulse: 67 69 77    Resp: 19 17 18    Temp: 97 5 °F (36 4 °C) (!) 97 4 °F (36 3 °C) 97 7 °F (36 5 °C) 97 7 °F (36 5 °C)   TempSrc:   Oral Oral   SpO2: 93% 97% 96% 96%   Weight:           Intake/Output Summary (Last 24 hours) at 3/20/2021 1031  Last data filed at 3/20/2021 0940  Gross per 24 hour   Intake 340 ml   Output 1800 ml   Net -1460 ml       Review of Systems:    12 point ROS has been reviewed  Physical Exam  Vitals signs and nursing note reviewed  Constitutional:       General: He is not in acute distress  Appearance: He is well-developed  HENT:      Head: Normocephalic and atraumatic  Eyes:      General: No scleral icterus  Conjunctiva/sclera: Conjunctivae normal       Pupils: Pupils are equal, round, and reactive to light  Neck:      Musculoskeletal: Normal range of motion and neck supple  Cardiovascular:      Rate and Rhythm: Normal rate and regular rhythm  Heart sounds: S1 normal and S2 normal  No murmur  No friction rub  No gallop  Pulmonary:      Effort: Pulmonary effort is normal  No respiratory distress  Breath sounds: Normal breath sounds  No wheezing or rales  Abdominal:      General: Bowel sounds are normal       Palpations: Abdomen is soft  Tenderness: There is no abdominal tenderness  There is no rebound  Musculoskeletal: Normal range of motion  Skin:     Findings: No rash  Neurological:      Mental Status: He is alert and oriented to person, place, and time     Psychiatric:         Behavior: Behavior normal          Medications:    Current Facility-Administered Medications:     acetaminophen (TYLENOL) tablet 650 mg, 650 mg, Oral, Q6H PRN, Amol Mann MD    aspirin (ECOTRIN LOW STRENGTH) EC tablet 81 mg, 81 mg, Oral, Daily, Swathi Schumacher MD, 81 mg at 03/20/21 0818    atorvastatin (LIPITOR) tablet 80 mg, 80 mg, Oral, QPM, TABITHA Rosario, 80 mg at 03/19/21 1823    bisacodyl (DULCOLAX) rectal suppository 10 mg, 10 mg, Rectal, Daily, Amol Mann MD, 10 mg at 03/19/21 1046    clopidogrel (PLAVIX) tablet 75 mg, 75 mg, Oral, Daily, Amol Mann MD, 75 mg at 03/20/21 0818    docusate sodium (COLACE) capsule 100 mg, 100 mg, Oral, BID, Amol Mann MD, 100 mg at 03/20/21 0818    enoxaparin (LOVENOX) subcutaneous injection 30 mg, 30 mg, Subcutaneous, Daily, Amol Mann MD, 30 mg at 03/20/21 0818    escitalopram (LEXAPRO) tablet 10 mg, 10 mg, Oral, HS, Amol Mann MD, 10 mg at 03/19/21 2120    hydrALAZINE (APRESOLINE) tablet 25 mg, 25 mg, Oral, Q8H [address], Amol Mann MD, 25 mg at 03/20/21 0520    insulin detemir (LEVEMIR) subcutaneous injection 20 Units, 20 Units, Subcutaneous, Q12H [address], Amol Mann MD, 20 Units at 03/20/21 0818    insulin lispro (HumaLOG) 100 units/mL subcutaneous injection 1-5 Units, 1-5 Units, Subcutaneous, TID AC, 1 Units at 03/20/21 0819 **AND** Fingerstick Glucose (POCT), , , TID ACJane CRNP    insulin lispro (HumaLOG) 100 units/mL subcutaneous injection 1-5 Units, 1-5 Units, Subcutaneous, HS, TABITHA Boles, 2 Units at 03/19/21 2246    isosorbide mononitrate (IMDUR) 24 hr tablet 30 mg, 30 mg, Oral, Daily, Amol Mann MD, 30 mg at 03/20/21 0817    metoprolol tartrate (LOPRESSOR) tablet 25 mg, 25 mg, Oral, Q12H [address], Brena Pulse, MD, 25 mg at 03/20/21 0818    nitroglycerin (NITROSTAT) SL tablet 0 4 mg, 0 4 mg, Sublingual, Q5 Min PRN, Brena Pulse, MD    ondansetron (ZOFRAN) injection 4 mg, 4 mg, Intravenous, Q6H PRN, Brena Pulse, MD    polyethylene glycol (MIRALAX) packet 17 g, 17 g, Oral, Daily, Jethro Maravilla MD, 17 g at 03/20/21 0817    primidone (MYSOLINE) tablet 50 mg, 50 mg, Oral, HS, Babar Chris MD, 50 mg at 03/19/21 2120    Laboratory Results:  Results from last 7 days   Lab Units 03/20/21  0535 03/19/21  0522 03/18/21  1356   WBC Thousand/uL  --   --  7 26   HEMOGLOBIN g/dL  --   --  8 4*   HEMATOCRIT %  --   --  25 8*   PLATELETS Thousands/uL  --   --  169   POTASSIUM mmol/L 3 8 3 5 3 0*   CHLORIDE mmol/L 100 103 99*   CO2 mmol/L 28 30 30   BUN mg/dL 49* 48* 48*   CREATININE mg/dL 2 39* 2 49* 2 65*   CALCIUM mg/dL 9 1 8 7 8 8   MAGNESIUM mg/dL  --   --  1 8

## 2021-03-20 NOTE — PROGRESS NOTES
Progress Note - Urology      Patient: Reggie Serum   : 1940 Sex: male   MRN: [id number]     CSN: [id number]  Unit/Bed#: [address] [place] [address]     SUBJECTIVE:   Marinelli out  Patient voiding multiple times  Doing well  Being transferred to short-term rehab      Objective   Vitals: /77   Pulse 75   Temp 97 7 °F (36 5 °C) (Oral)   Resp 18   Wt 89 1 kg (196 lb 6 9 oz)   SpO2 94%   BMI 25 92 kg/m²     I/O last 24 hours: In: 1320 [P O :1300;  I V :20]  Out: 1900 [Urine:1300; Stool:600]      Physical Exam:   General Alert awake   Normocephalic atraumatic PERRLA  Lungs clear bilaterally  Cardiac normal S1 normal S2  Abdomen soft, flank pain  Extremities no edema      Lab Results: CBC:   Lab Results   Component Value Date    WBC 7 26 2021    HGB 8 4 (L) 2021    HCT 25 8 (L) 2021     (H) 2021     2021    ADJUSTEDWBC 6 60 2016    MCH 32 6 2021    MCHC 32 6 2021    RDW 15 2 (H) 2021    MPV 10 0 2021    NRBC 0 2021     CMP:   Lab Results   Component Value Date     2016     2021     2016    CO2 28 2021    CO2 19 (L) 2021    ANIONGAP 14 2 2016    BUN 49 (H) 2021    BUN 35 (H) 2016    CREATININE 2 39 (H) 2021    CREATININE 1 5 (H) 2016    GLUCOSE 232 (H) 2021    GLUCOSE 131 (H) 2016    CALCIUM 9 1 2021    CALCIUM 9 0 2016    AST 30 2021    AST 22 2016    ALT 28 2021    ALT 53 2016    ALKPHOS 92 2021    ALKPHOS 66 2016    PROT 10 2 (H) 2016    BILITOT 0 9 2016    EGFR 25 2021     Urinalysis:   Lab Results   Component Value Date    COLORU Light Yellow 2021    CLARITYU Clear 2021    CLARITYU YELLOW 2016    CLARITYU SL CLOUDY 2016    SPECGRAV 1 010 2021    SPECGRAV 1 025 2016    PHUR 7 0 2021    PHUR 5 5 2017    PHUR 6 0 2016 LEUKOCYTESUR Moderate (A) 03/18/2021    LEUKOCYTESUR TRACE (A) 01/05/2016    NITRITE Negative 03/18/2021    NITRITE POSITIVE (A) 01/05/2016    PROTEINUA 100 (A) 01/05/2016    GLUCOSEU Negative 03/18/2021    GLUCOSEU >=1000 01/05/2016    KETONESU Negative 03/18/2021    KETONESU NEGATIVE 01/05/2016    BILIRUBINUR Negative 03/18/2021    BILIRUBINUR NEGATIVE 01/05/2016    BLOODU Small (A) 03/18/2021    BLOODU LARGE (A) 01/05/2016     Urine Culture:   Lab Results   Component Value Date    URINECX  03/04/2016     >100,000 cfu/ml Klebsiella oxytoca-Extended Beta Lactamase      PSA:   Lab Results   Component Value Date    PSA 0 4 04/12/2016    PSA 0 66 01/05/2016         Assessment/ Plan:  Urinary tension  On tamsulosin 0 4 mg  Voiding  Check postvoid residual        Lidya Menard MD

## 2021-03-20 NOTE — PLAN OF CARE
Problem: OCCUPATIONAL THERAPY ADULT  Goal: Performs self-care activities at highest level of function for planned discharge setting  See evaluation for individualized goals  Outcome: Progressing  Note: Limitation: Decreased ADL status, Decreased UE strength, Decreased endurance, Decreased self-care trans, Decreased high-level ADLs(decreased balance and mobility)  Prognosis: Good  Assessment: Patient seen for OT treatment this AM  Patient is cooperative and motivated to participate in therapy  Patient currently requiring min-mod assist with RW for functional mobility/transfers and overall min-mod assist for ADLs  The patient's raw score on the AM-PAC Daily Activity inpatient short form is 17, standardized score is 37 26, less than 39 4  Patients at this level are likely to benefit from discharge to post-acute rehabilitation services  Please refer to the recommendation of the Occupational Therapist for safe discharge planning  Patient will benefit from STR at discharge as patient lives alone and was previously ambulating independently and performing ADLs at an independent level and is now requiring min-mod assist for all functional tasks  At end of session patient seated in recliner chair with all needs met, chair alarm set  Continue OT per POC       OT Discharge Recommendation: Post-Acute Rehabilitation Services

## 2021-03-20 NOTE — ASSESSMENT & PLAN NOTE
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

## 2021-03-20 NOTE — NURSING NOTE
Marinelli catheter removed as ordered with 150ml of urine output in the drainage bag  Patient with no c/o pain or discomfort  Urinal provided  Oral fluids encouraged  Will continue to monitor

## 2021-03-20 NOTE — PHYSICAL THERAPY NOTE
PT TREATMENT     03/20/21 1125   PT Last Visit   PT Visit Date 03/20/21   Note Type   Note Type Treatment   Pain Assessment   Pain Assessment Tool 0-10   Pain Score No Pain   Restrictions/Precautions   Other Precautions Chair Alarm; Bed Alarm; Fall Risk   General   Chart Reviewed Yes   Additional Pertinent History Pt noting increase in tremors hands and legs and made MD aware  Pt to trial new med per pt report to assist with tremors   Family/Caregiver Present No   Cognition   Overall Cognitive Status [facility]   Arousal/Participation Alert; Cooperative   Attention Within functional limits   Orientation Level Oriented X4   Transfers   Sit to Stand 3  Moderate assistance   Stand to Sit 4  Minimal assistance   Ambulation/Elevation   Gait pattern Narrow GHISLAINE   Gait Assistance 3  Moderate assist   Assistive Device Rolling walker   Distance 20'x2   Balance   Static Sitting Good   Dynamic Sitting Fair   Static Standing Fair   Dynamic Standing Fair -   Ambulatory Fair -   Endurance Deficit   Endurance Deficit Yes   Endurance Deficit Description pt fatigues easily   Activity Tolerance   Activity Tolerance Patient limited by fatigue   Nurse Made Aware yes   Exercises   Quad Sets Supine;10 reps;AROM; Bilateral   Heelslides Supine;Sitting;10 reps;AROM; Bilateral   Glute Sets Supine;10 reps;Bilateral   Hip Flexion Standing;10 reps;AROM; Bilateral   Knee AROM Sitting;10 reps;AROM; Bilateral   Ankle Pumps Sitting;10 reps;AROM; Bilateral   Balance Training Standing;10 reps;Bilateral  (weightshifts lateral)   Marching Standing;10 reps;AROM; Bilateral   Assessment   Prognosis Good   Problem List Decreased strength;Decreased endurance; Impaired balance;Decreased mobility; Decreased skin integrity;Pain   Assessment Pt progressing towards PT goals and pleased with progress  Goals   Patient Goals "to be able to walk"   Plan   Treatment/Interventions ADL retraining;Functional transfer training;LE strengthening/ROM; Therapeutic exercise; Endurance training;Patient/family training;Bed mobility;Gait training;Spoke to MD;Spoke to nursing;OT   Progress Progressing toward goals   PT Frequency Once a day   Recommendation   PT Discharge Recommendation Post-Acute Rehabilitation Services   AM-PAC Basic Mobility Inpatient   Turning in Bed Without Bedrails 3   Lying on Back to Sitting on Edge of Flat Bed 3   Moving Bed to Chair 3   Standing Up From Chair 2   Walk in Room 3   Climb 3-5 Stairs 1   Basic Mobility Inpatient Raw Score 15   Basic Mobility Standardized Score 17 85   Licensure   [address] [place], [place] [id number]       The patient's AM-PAC Basic Mobility Inpatient Short Form Raw Score is 15, Standardized Score is 36 97  A standardized score less than 42 9 suggests the patient may benefit from discharge to post acute rehab  Please also refer to the recommendation of the Physical Therapist for safe discharge planning      MRS=4

## 2021-03-20 NOTE — ASSESSMENT & PLAN NOTE
Lab Results   Component Value Date    EGFR 25 03/20/2021    EGFR 23 03/19/2021    EGFR 22 03/18/2021    CREATININE 2 39 (H) 03/20/2021    CREATININE 2 49 (H) 03/19/2021    CREATININE 2 65 (H) 03/18/2021   Continue hydralazine 25 milligram p o  Q 8 hours, Lopressor 25 milligram p o  B i d   Hold Demadex at the current time to allow permissive hypertension

## 2021-03-20 NOTE — ASSESSMENT & PLAN NOTE
Patient presented with bilateral leg weakness right more than left  CT scan of the brain showed acute sphenoid sinusitis without any acute intracranial abnormality  MRI of the brain showed punctate acute lacunar infarct in the left parietal periventricular region with chronic microangiopathic changes and ventriculomegaly  Limited echo with bubble study showed no evidence of right-to-left shunt  Patient cannot get CTA due to chronic kidney disease  Carotid Dopplers were performed results of which are pending at the current time  A low permissive hypertension to keep SBP more than 180  Lipid panel showed LDL level of  80  hemoglobin A1c was 6 6  PT/OT and speech therapy  Tight control of blood sugars    Patient to continue dual antiplatelet therapy for 3 months and Lipitor 80 milligram daily

## 2021-03-20 NOTE — ASSESSMENT & PLAN NOTE
Patient was given KCl 40 milliequivalents p o  And 20 milliequivalents IV in the ED    Improved with repletion

## 2021-03-20 NOTE — ASSESSMENT & PLAN NOTE
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

## 2021-03-20 NOTE — OCCUPATIONAL THERAPY NOTE
Occupational Therapy Treatment Note       03/20/21 0915   Note Type   Note Type Treatment   Restrictions/Precautions   Other Precautions Chair Alarm; Bed Alarm; Fall Risk   Pain Assessment   Pain Assessment Tool 0-10   Pain Score No Pain   ADL   Eating Assistance 5  Supervision/Setup   Grooming Assistance 5  Supervision/Setup   UB Bathing Assistance 4  Minimal Assistance   LB Bathing Assistance 3  Moderate Assistance   UB Dressing Assistance 4  Minimal [address] 3  Moderate Assistance   LB Dressing Comments Daniel Rolls bilateral sneakers while seated at edge of recliner chair   Toileting Assistance  4  Minimal Assistance   Toileting Comments Standing to use urinal   Transfers   Sit to Stand 3  Moderate assistance   Additional items Assist x 1   Stand to Sit 4  Minimal assistance   Additional items Assist x 1   Additional Comments STS from recliner chair performed x multiple trials throughout session, initially patient able to perform with min assist however with increased repetitions patient eventually requiring mod assist to stand from chair due to BLE weakness and decreased activity tolerance   Functional Mobility   Functional Mobility 4  Minimal assistance   Additional Comments Patient ambulated few feet with RW and min assist   Toilet Transfers   Toilet Transfer Type To and from   Toilet Transfer to Standard bedside commode   Toilet Transfer Technique Ambulating   Toilet Transfers Minimal assistance   Toilet Transfers Comments With RW, BSC placed approx  10 feet away from patient   Cognition   Overall Cognitive Status [facility]   Arousal/Participation Alert; Cooperative   Attention Within functional limits   Orientation Level Oriented X4   Memory Within functional limits   Following Commands Follows all commands and directions without difficulty   Activity Tolerance   Activity Tolerance Patient limited by fatigue  (Limited by generalized weakness)   Assessment   Assessment Patient seen for OT treatment this AM  Patient is cooperative and motivated to participate in therapy  Patient currently requiring min-mod assist with RW for functional mobility/transfers and overall min-mod assist for ADLs  The patient's raw score on the AM-PAC Daily Activity inpatient short form is 17, standardized score is 37 26, less than 39 4  Patients at this level are likely to benefit from discharge to post-acute rehabilitation services  Please refer to the recommendation of the Occupational Therapist for safe discharge planning  Patient will benefit from STR at discharge as patient lives alone and was previously ambulating independently and performing ADLs at an independent level and is now requiring min-mod assist for all functional tasks  At end of session patient seated in recliner chair with all needs met, chair alarm set  Continue OT per POC     Plan   OT Frequency 3-5x/wk   Recommendation   OT Discharge Recommendation Post-Acute Rehabilitation Services   AM-[facility] Daily Activity Inpatient   Lower Body Dressing 2   Bathing 2   Toileting 3   Upper Body Dressing 3   Grooming 3   Eating 4   Daily Activity Raw Score 17   Daily Activity Standardized Score (Calc for Raw Score >=11) 37 26   AM-[facility] Applied Cognition Inpatient   Following a Speech/Presentation 4   Understanding Ordinary Conversation 4   Taking Medications 4   Remembering Where Things Are Placed or Put Away 4   Remembering List of 4-5 Errands 4   Taking Care of Complicated Tasks 4   Applied Cognition Raw Score 24   Applied Cognition Standardized Score 67 74   Licensure   [place] License Number  Lakeshia Campbell OTR/L [id number]       MRS = 4

## 2021-03-20 NOTE — PROGRESS NOTES
[address]  [facility]  Progress Note - Eric Poplar 1940, [de-identified] y o  male MRN: [id number]  Unit/Bed#: [address] [place] [address] Encounter: [id number]  Primary Care Provider: Alisia Graham MD   Date and time admitted to hospital: 3/18/2021  1:36 PM    * Acute ischemic stroke [facility])  Assessment & Plan  Patient presented with bilateral leg weakness right more than left  CT scan of the brain showed acute sphenoid sinusitis without any acute intracranial abnormality  MRI of the brain showed punctate acute lacunar infarct in the left parietal periventricular region with chronic microangiopathic changes and ventriculomegaly  Limited echo with bubble study showed no evidence of right-to-left shunt  Patient cannot get CTA due to chronic kidney disease  Carotid Dopplers were performed results of which are pending at the current time  Lipid panel showed LDL level of  80  hemoglobin A1c was 6 6  Continue PT/OT  Tight control of blood sugars  Patient to continue dual antiplatelet therapy for 3 months and Lipitor 80 milligram daily    Acute renal failure superimposed on chronic kidney disease [facility])  Assessment & Plan  Lab Results   Component Value Date    EGFR 25 03/20/2021    EGFR 23 03/19/2021    EGFR 22 03/18/2021    CREATININE 2 39 (H) 03/20/2021    CREATININE 2 49 (H) 03/19/2021    CREATININE 2 65 (H) 03/18/2021   Patient has chronic kidney disease stage 4 with baseline creatinine 2-2 5  Patient recently developed acute kidney injury on chronic kidney disease secondary to contrast induced diabetic nephropathy and has been on dialysis since then  Patient's last session of hemodialysis was on March 16th and Outpatient Nephrology considering taking him off dialysis    Patient had 24 hour urine done as outpatient  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  Elevated troponin  Assessment & Plan  Likely non MI troponin elevation in the setting of chronic kidney disease  Hold off on checking further serial troponins as patient has known history of severe 3 vessel CAD and patient is asymptomatic    Coronary artery disease  Assessment & Plan  Patient has history of CAD status post 2 vessel bypass  Patient underwent cardiac catheterization in January which showed severe three-vessel disease with 99% occluded proximal circumflex, 50% distal left main, mid % occluded, RCA proximal 100% occluded with collaterals from left circulation  Graft from LIMA to LAD is widely patent  Graft to obtuse marginal 1 has ostial around 40% stenosis but no flow limiting noted  Continue medical management with Imdur, metoprolol, nitroglycerin p r n , aspirin and statin    Essential tremor  Assessment & Plan  Patient placed on low-dose primidone 50 milligram q h s  Benign hypertension with chronic kidney disease, stage III  Assessment & Plan  Lab Results   Component Value Date    EGFR 25 03/20/2021    EGFR 23 03/19/2021    EGFR 22 03/18/2021    CREATININE 2 39 (H) 03/20/2021    CREATININE 2 49 (H) 03/19/2021    CREATININE 2 65 (H) 03/18/2021   Continue hydralazine 25 milligram p o  Q 8 hours, Lopressor 25 milligram p o  B i d   Hold Demadex at the current time to allow permissive hypertension    Type 2 diabetes mellitus with hyperglycemia, with long-term current use of insulin [facility])  Assessment & Plan  Lab Results   Component Value Date    HGBA1C 6 6 (H) 03/19/2021       Recent Labs     03/19/21  2118 03/20/21  0737 03/20/21  1159 03/20/21  1551   POCGLU 284* 173* 287* 300*       Blood Sugar Average: Last 72 hrs:  (P) 838 [id number]   Continue Levemir 20 units subcutaneously daily with Humalog sliding scale    Patient will be on diabetic diet    Hypercholesteremia  Assessment & Plan  Continue Lipitor 40 milligram p o  Daily  Lipid panel as stated above    Bladder cancer [facility])  Assessment & Plan  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  Patient did have outpatient cystoscopy yesterday which showed no recurrence  Patient had urinary retention and was placed on Flomax and had a Marinelli catheter  Marinelli catheter was discontinued today  Voiding trial-monitor PVR    Hypokalemia-resolved as of 3/20/2021  Assessment & Plan  Patient was given KCl 40 milliequivalents p o  And 20 milliequivalents IV in the ED  Improved with repletion        VTE Pharmacologic Prophylaxis:   Pharmacologic: Heparin  Mechanical VTE Prophylaxis in Place: Yes    Patient Centered Rounds: I have performed bedside rounds with nursing staff today  Discussions with Specialists or Other Care Team Provider: Dr Paulo Alanis and Discussions with Family / Patient: yes    Time Spent for Care: 45 minutes  More than 50% of total time spent on counseling and coordination of care as described above  Current Length of Stay: 2 day(s)    Current Patient Status: Inpatient   Certification Statement: The patient will continue to require additional inpatient hospital stay due to Acute stroke pending placement    Discharge Plan:  Short-term rehabilitation    Code Status: Level 1 - Full Code      Subjective:   Patient is feeling well  Denies any shakiness at rest   Denies any chest pain, shortness of breath or palpitations    Objective:     Vitals:   Temp (24hrs), Av 8 °F (36 6 °C), Min:97 4 °F (36 3 °C), Max:98 6 °F (37 °C)    Temp:  [97 4 °F (36 3 °C)-98 6 °F (37 °C)] 97 7 °F (36 5 °C)  HR:  [67-77] 75  Resp:  [17-19] 18  BP: (119-157)/(59-78) 142/77  SpO2:  [93 %-97 %] 94 %  Body mass index is 25 92 kg/m²  Input and Output Summary (last 24 hours):        Intake/Output Summary (Last 24 hours) at 3/20/2021 1616  Last data filed at 3/20/2021 1300  Gross per 24 hour   Intake 960 ml   Output 1050 ml   Net -90 ml       Physical Exam:     Physical Exam  Constitutional:       General: He is not in acute distress  HENT:      Head: Normocephalic and atraumatic  Eyes:      Conjunctiva/sclera: Conjunctivae normal       Pupils: Pupils are equal, round, and reactive to light  Neck:      Musculoskeletal: Normal range of motion and neck supple  Cardiovascular:      Rate and Rhythm: Normal rate and regular rhythm  Heart sounds: Murmur present  Pulmonary:      Effort: Pulmonary effort is normal  No respiratory distress  Breath sounds: No wheezing, rhonchi or rales  Chest:      Chest wall: No tenderness  Abdominal:      General: Bowel sounds are normal  There is no distension  Palpations: Abdomen is soft  Tenderness: There is no abdominal tenderness  There is no guarding or rebound  Skin:     General: Skin is warm and dry  Findings: No rash  Neurological:      Mental Status: He is alert  Cranial Nerves: No cranial nerve deficit  Additional Data:     Labs:    Results from last 7 days   Lab Units 03/18/21  1356   WBC Thousand/uL 7 26   HEMOGLOBIN g/dL 8 4*   HEMATOCRIT % 25 8*   PLATELETS Thousands/uL 169   NEUTROS PCT % 73   LYMPHS PCT % 17   MONOS PCT % 8   EOS PCT % 1     Results from last 7 days   Lab Units 03/20/21  0535  03/18/21  1356   POTASSIUM mmol/L 3 8   < > 3 0*   CHLORIDE mmol/L 100   < > 99*   CO2 mmol/L 28   < > 30   BUN mg/dL 49*   < > 48*   CREATININE mg/dL 2 39*   < > 2 65*   CALCIUM mg/dL 9 1   < > 8 8   ALK PHOS U/L  --   --  92   ALT U/L  --   --  28   AST U/L  --   --  30    < > = values in this interval not displayed  * I Have Reviewed All Lab Data Listed Above  * Additional Pertinent Lab Tests Reviewed:  [facility]      Recent Cultures (last 7 days):           Last 24 Hours Medication List:   Current Facility-Administered Medications Medication Dose Route Frequency Provider Last Rate    acetaminophen  650 mg Oral Q6H PRN Keshawn Solomon MD      aspirin  81 mg Oral Daily Keshawn Solomon MD      atorvastatin  80 mg Oral QPM Nidia TABITHA Gonzalez      bisacodyl  10 mg Rectal Daily Keshawn Solomon MD      clopidogrel  75 mg Oral Daily Keshawn Solomon MD      docusate sodium  100 mg Oral BID Ksehawn Solomon MD      enoxaparin  30 mg Subcutaneous Daily Keshawn Solomon MD      escitalopram  10 mg Oral HS Keshawn Solomon MD      hydrALAZINE  25 mg Oral Q8H [address] Keshawn Solomon MD      insulin detemir  20 Units Subcutaneous Q12H [address] Swathi Schumacher MD      insulin lispro  1-5 Units Subcutaneous TID AC TABITHA Walker      insulin lispro  1-5 Units Subcutaneous HS TABITHA Walker      isosorbide mononitrate  30 mg Oral Daily Keshawn Solomon MD      metoprolol tartrate  25 mg Oral Q12H [address] Keshawn Solomon MD      nitroglycerin  0 4 mg Sublingual Q5 Min PRN Keshawn Solomon MD      ondansetron  4 mg Intravenous Q6H PRN Keshawn Solomon MD      polyethylene glycol  17 g Oral Daily Keshawn Solomon MD      primidone  50 mg Oral HS Toi Draper MD      torsemide  100 mg Oral Once per day on Sun Tue Thu Sat Keshawn Solomon MD          Today, Patient Was Seen By: Keshawn Solomon MD    ** Please Note: Dictation voice to text software may have been used in the creation of this document   **

## 2021-03-20 NOTE — ASSESSMENT & PLAN NOTE
Lab Results   Component Value Date    HGBA1C 6 6 (H) 03/19/2021       Recent Labs     03/18/21  1344 03/18/21  2142   POCGLU 96 149*       Blood Sugar Average: Last 72 hrs:  (P) 122 5   Continue Levemir 20 units subcutaneously daily with Humalog sliding scale    Patient will be on diabetic diet

## 2021-03-20 NOTE — NURSING NOTE
Patient been voiding freely using urinal at bedside  No c/o pain or discomfort upon urination  Output documented  Continue plan of care

## 2021-03-20 NOTE — ASSESSMENT & PLAN NOTE
Lab Results   Component Value Date    EGFR 25 03/20/2021    EGFR 23 03/19/2021    EGFR 22 03/18/2021    CREATININE 2 39 (H) 03/20/2021    CREATININE 2 49 (H) 03/19/2021    CREATININE 2 65 (H) 03/18/2021   Patient has chronic kidney disease stage 4 with baseline creatinine 2-2 5  Patient recently developed acute kidney injury on chronic kidney disease secondary to contrast induced diabetic nephropathy and has been on dialysis since then  Patient's last session of hemodialysis was on March 16th and Outpatient Nephrology considering taking him off dialysis  Patient had 24 hour urine done as outpatient  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

## 2021-03-20 NOTE — ASSESSMENT & PLAN NOTE
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

## 2021-03-21 LAB
ANION GAP SERPL CALCULATED.3IONS-SCNC: 6 MMOL/L (ref 4–13)
BUN SERPL-MCNC: 49 MG/DL (ref 5–25)
CALCIUM SERPL-MCNC: 8.8 MG/DL (ref 8.3–10.1)
CHLORIDE SERPL-SCNC: 97 MMOL/L (ref 100–108)
CO2 SERPL-SCNC: 30 MMOL/L (ref 21–32)
CREAT SERPL-MCNC: 2.22 MG/DL (ref 0.6–1.3)
GFR SERPL CREATININE-BSD FRML MDRD: 27 ML/MIN/1.73SQ M
GLUCOSE SERPL-MCNC: 167 MG/DL (ref 65–140)
GLUCOSE SERPL-MCNC: 167 MG/DL (ref 65–140)
GLUCOSE SERPL-MCNC: 297 MG/DL (ref 65–140)
GLUCOSE SERPL-MCNC: 302 MG/DL (ref 65–140)
GLUCOSE SERPL-MCNC: 335 MG/DL (ref 65–140)
POTASSIUM SERPL-SCNC: 4.2 MMOL/L (ref 3.5–5.3)
SODIUM SERPL-SCNC: 133 MMOL/L (ref 136–145)

## 2021-03-21 PROCEDURE — 97535 SELF CARE MNGMENT TRAINING: CPT

## 2021-03-21 PROCEDURE — 97110 THERAPEUTIC EXERCISES: CPT

## 2021-03-21 PROCEDURE — 82948 REAGENT STRIP/BLOOD GLUCOSE: CPT

## 2021-03-21 PROCEDURE — 99232 SBSQ HOSP IP/OBS MODERATE 35: CPT | Performed by: INTERNAL MEDICINE

## 2021-03-21 PROCEDURE — 99441 PR PHYS/QHP TELEPHONE EVALUATION 5-10 MIN: CPT | Performed by: RADIOLOGY

## 2021-03-21 PROCEDURE — 97530 THERAPEUTIC ACTIVITIES: CPT

## 2021-03-21 PROCEDURE — 80048 BASIC METABOLIC PNL TOTAL CA: CPT | Performed by: INTERNAL MEDICINE

## 2021-03-21 RX ADMIN — METOPROLOL TARTRATE 25 MG: 25 TABLET, FILM COATED ORAL at 08:24

## 2021-03-21 RX ADMIN — INSULIN LISPRO 3 UNITS: 100 INJECTION, SOLUTION INTRAVENOUS; SUBCUTANEOUS at 22:05

## 2021-03-21 RX ADMIN — INSULIN LISPRO 1 UNITS: 100 INJECTION, SOLUTION INTRAVENOUS; SUBCUTANEOUS at 07:55

## 2021-03-21 RX ADMIN — ASPIRIN 81 MG: 81 TABLET, COATED ORAL at 08:23

## 2021-03-21 RX ADMIN — INSULIN LISPRO 3 UNITS: 100 INJECTION, SOLUTION INTRAVENOUS; SUBCUTANEOUS at 12:12

## 2021-03-21 RX ADMIN — DOCUSATE SODIUM 100 MG: 100 CAPSULE, LIQUID FILLED ORAL at 08:23

## 2021-03-21 RX ADMIN — ESCITALOPRAM OXALATE 10 MG: 10 TABLET ORAL at 22:05

## 2021-03-21 RX ADMIN — ENOXAPARIN SODIUM 30 MG: 30 INJECTION SUBCUTANEOUS at 08:23

## 2021-03-21 RX ADMIN — CLOPIDOGREL BISULFATE 75 MG: 75 TABLET ORAL at 08:24

## 2021-03-21 RX ADMIN — ISOSORBIDE MONONITRATE 30 MG: 30 TABLET, EXTENDED RELEASE ORAL at 08:24

## 2021-03-21 RX ADMIN — METOPROLOL TARTRATE 25 MG: 25 TABLET, FILM COATED ORAL at 22:05

## 2021-03-21 RX ADMIN — INSULIN DETEMIR 20 UNITS: 100 INJECTION, SOLUTION SUBCUTANEOUS at 08:23

## 2021-03-21 RX ADMIN — PRIMIDONE 50 MG: 50 TABLET ORAL at 22:07

## 2021-03-21 RX ADMIN — POLYETHYLENE GLYCOL 3350 17 G: 17 POWDER, FOR SOLUTION ORAL at 08:22

## 2021-03-21 RX ADMIN — INSULIN LISPRO 2 UNITS: 100 INJECTION, SOLUTION INTRAVENOUS; SUBCUTANEOUS at 16:17

## 2021-03-21 RX ADMIN — TORSEMIDE 100 MG: 100 TABLET ORAL at 08:23

## 2021-03-21 RX ADMIN — HYDRALAZINE HYDROCHLORIDE 25 MG: 25 TABLET, FILM COATED ORAL at 05:32

## 2021-03-21 RX ADMIN — ATORVASTATIN CALCIUM 80 MG: 80 TABLET ORAL at 17:06

## 2021-03-21 RX ADMIN — HYDRALAZINE HYDROCHLORIDE 25 MG: 25 TABLET, FILM COATED ORAL at 22:05

## 2021-03-21 RX ADMIN — INSULIN DETEMIR 20 UNITS: 100 INJECTION, SOLUTION SUBCUTANEOUS at 22:04

## 2021-03-21 NOTE — PROGRESS NOTES
NEPHROLOGY PROGRESS NOTE   Davion Solano [de-identified] y o  male MRN: [id number]  Unit/Bed#: [address] [place] [address] Encounter: [id number]  Reason for Consult: BRIAN      SUMMARY:    20-year-old male, with a history of acute renal failure/acute tubular necrosis secondary to contrast associated nephropathy currently dialysis dependent who dialyzes at [address] Tuesday Thursday Saturday where he is being monitored for for renal recovery presented for bilateral lower extremity weakness with imaging showing a acute lacunar infarct      Patient's last dialysis was on Tuesday, his dialysis was held on Thursday due to improvement of his renal function where his creatinine has plateaued in the mid 2s   He had a 24 hour urine done as an outpatient   I spoke to his outpatient nephrologist reported that it was not a good collection as some of the urine sample was not adequate collected and was only aborted as 200 cc      ASSESSMENT and PLAN:     Acute renal failure//acute tubular necrosis  --currently dialysis dependent, but showing renal recovery  --Tuesday Thursday Saturday at [address]  --access PermCath  --last dialysis was on March 16th  --renal function continues to improve creatinine down to 2 2 mg/dL  --nonoliguric, excellent urine output  --continue to monitor input and output  --no urgent indication for renal placement therapy at this time  --no plans for contrast study  --given the improvement of his renal function and excellent urine output believe would be okay to proceed with removal of the PermCath  I will consult interventional radiology to see if they could do it tomorrow  Though the removal of the PermCath should not delay discharge    If IR cannot remove the PermCath as an inpatient this can be done as an outpatient if necessary      Chronic kidney disease stage IV  --prior baseline creatinine in the mid 2s  --currently at his baseline     Hypokalemia  --resolved     Bladder cancer  --history of muscle invasive bladder cancer  --status post left partial cystectomy on October 2016  --Marinelli catheter in place  --management as per Urology     Acute CVA  --MRI showed an acute lacunar infarct left parietal periventricular region  --management as per Neurology  --continue statin, aspirin    Hyponatremia  --mild  --will monitor    SUBJECTIVE / INTERVAL HISTORY:    No chest pain or shortness of breath    OBJECTIVE:  Current Weight: Weight - Scale: 89 1 kg (196 lb 6 9 oz)  Vitals:    03/21/21 0532 03/21/21 0534 03/21/21 0730 03/21/21 0755   BP: 160/76 160/76 152/76    Pulse:  73 74    Resp:   18    Temp:   98 2 °F (36 8 °C)    TempSrc:       SpO2:  97% 95%    Weight:       Height:    6' 1" (1 854 m)       Intake/Output Summary (Last 24 hours) at 3/21/2021 1028  Last data filed at 3/21/2021 1001  Gross per 24 hour   Intake 1020 ml   Output 2525 ml   Net -1505 ml       Review of Systems:    12 point ROS has been reviewed  Physical Exam  Vitals signs and nursing note reviewed  Constitutional:       General: He is not in acute distress  Appearance: He is well-developed  HENT:      Head: Normocephalic and atraumatic  Eyes:      General: No scleral icterus  Conjunctiva/sclera: Conjunctivae normal       Pupils: Pupils are equal, round, and reactive to light  Neck:      Musculoskeletal: Normal range of motion and neck supple  Cardiovascular:      Rate and Rhythm: Normal rate and regular rhythm  Heart sounds: S1 normal and S2 normal  No murmur  No friction rub  No gallop  Pulmonary:      Effort: Pulmonary effort is normal  No respiratory distress  Breath sounds: Normal breath sounds  No wheezing or rales  Abdominal:      General: Bowel sounds are normal       Palpations: Abdomen is soft  Tenderness: There is no abdominal tenderness  There is no rebound  Musculoskeletal: Normal range of motion  Skin:     Findings: No rash     Neurological:      Mental Status: He is alert and oriented to person, place, and time     Psychiatric:         Behavior: Behavior normal          Medications:    Current Facility-Administered Medications:     acetaminophen (TYLENOL) tablet 650 mg, 650 mg, Oral, Q6H PRN, Amol Mann MD    aspirin (ECOTRIN LOW STRENGTH) EC tablet 81 mg, 81 mg, Oral, Daily, Swathi Schumacher MD, 81 mg at 03/21/21 0823    atorvastatin (LIPITOR) tablet 80 mg, 80 mg, Oral, QPM, TABITHA Whitfield, 80 mg at 03/20/21 1702    bisacodyl (DULCOLAX) rectal suppository 10 mg, 10 mg, Rectal, Daily, Amol Mann MD, 10 mg at 03/19/21 1046    clopidogrel (PLAVIX) tablet 75 mg, 75 mg, Oral, Daily, Amol Mann MD, 75 mg at 03/21/21 0824    docusate sodium (COLACE) capsule 100 mg, 100 mg, Oral, BID, Amol Mann MD, 100 mg at 03/21/21 0823    enoxaparin (LOVENOX) subcutaneous injection 30 mg, 30 mg, Subcutaneous, Daily, Amol Mann MD, 30 mg at 03/21/21 0823    escitalopram (LEXAPRO) tablet 10 mg, 10 mg, Oral, HS, Amol Mann MD, 10 mg at 03/20/21 2134    hydrALAZINE (APRESOLINE) tablet 25 mg, 25 mg, Oral, Q8H [facility], Swathi Schumacher MD, 25 mg at 03/21/21 0532    insulin detemir (LEVEMIR) subcutaneous injection 20 Units, 20 Units, Subcutaneous, Q12H [facility], Swathi Schumacher MD, 20 Units at 03/21/21 0823    insulin lispro (HumaLOG) 100 units/mL subcutaneous injection 1-5 Units, 1-5 Units, Subcutaneous, TID AC, 1 Units at 03/21/21 0755 **AND** Fingerstick Glucose (POCT), , , TID AC, TABITHA Boles    insulin lispro (HumaLOG) 100 units/mL subcutaneous injection 1-5 Units, 1-5 Units, Subcutaneous, HS, TABITHA Boles, 2 Units at 03/20/21 2135    isosorbide mononitrate (IMDUR) 24 hr tablet 30 mg, 30 mg, Oral, Daily, Amol Mann MD, 30 mg at 03/21/21 0824    metoprolol tartrate (LOPRESSOR) tablet 25 mg, 25 mg, Oral, Q12H [facility], Swathi Schumacher MD, 25 mg at 03/21/21 0824    nitroglycerin (NITROSTAT) SL tablet 0 4 mg, 0 4 mg, Sublingual, Q5 Min PRN, Lance Vides MD    ondansetron (ZOFRAN) injection 4 mg, 4 mg, Intravenous, Q6H PRN, Lance Vides MD    polyethylene glycol (MIRALAX) packet 17 g, 17 g, Oral, Daily, Lance Vides MD, 17 g at 03/21/21 5805    primidone (MYSOLINE) tablet 50 mg, 50 mg, Oral, HS, Alyson Nicole MD, 50 mg at 03/20/21 2135    torsemide (DEMADEX) tablet 100 mg, 100 mg, Oral, Once per day on Sun Tue Thu Sat, Lance Vides MD, 100 mg at 03/21/21 5130    Laboratory Results:  Results from last 7 days   Lab Units 03/21/21  0539 03/20/21  0535 03/19/21  0522 03/18/21  1356   WBC Thousand/uL  --   --   --  7 26   HEMOGLOBIN g/dL  --   --   --  8 4*   HEMATOCRIT %  --   --   --  25 8*   PLATELETS Thousands/uL  --   --   --  169   POTASSIUM mmol/L 4 2 3 8 3 5 3 0*   CHLORIDE mmol/L 97* 100 103 99*   CO2 mmol/L 30 28 30 30   BUN mg/dL 49* 49* 48* 48*   CREATININE mg/dL 2 22* 2 39* 2 49* 2 65*   CALCIUM mg/dL 8 8 9 1 8 7 8 8   MAGNESIUM mg/dL  --   --   --  1 8

## 2021-03-21 NOTE — PLAN OF CARE
Problem: Potential for Falls  Goal: Patient will remain free of falls  Description: INTERVENTIONS:  - Assess patient frequently for physical needs  -  Identify cognitive and physical deficits and behaviors that affect risk of falls  -  [place] fall precautions as indicated by assessment   - Educate patient/family on patient safety including physical limitations  - Instruct patient to call for assistance with activity based on assessment  - Modify environment to reduce risk of injury  - Consider OT/PT consult to assist with strengthening/mobility  Outcome: Progressing     Problem: Neurological Deficit  Goal: Neurological status is stable or improving  Description: Interventions:  - Monitor and assess patient's level of consciousness, motor function, sensory function, and level of assistance needed for ADLs  - Monitor and report changes from baseline  Collaborate with interdisciplinary team to initiate plan and implement interventions as ordered  - Provide and maintain a safe environment  - Consider seizure precautions  - Consider fall precautions  - Consider aspiration precautions  - Consider bleeding precautions  Outcome: Progressing     Problem: Activity Intolerance/Impaired Mobility  Goal: Mobility/activity is maintained at optimum level for patient  Description: Interventions:  - Assess and monitor patient  barriers to mobility and need for assistive/adaptive devices  - Assess patient's emotional response to limitations  - Collaborate with interdisciplinary team and initiate plans and interventions as ordered  - Encourage independent activity per ability   - Maintain proper body alignment  - Perform active/passive rom as tolerated/ordered    - Plan activities to conserve energy   - Turn patient as appropriate  Outcome: Progressing     Problem: Communication Impairment  Goal: Ability to express needs and understand communication  Description: Assess patient's communication skills and ability to understand information  Patient will demonstrate use of effective communication techniques, alternative methods of communication and understanding even if not able to speak  - Encourage communication and provide alternate methods of communication as needed  - Collaborate with case management/ for discharge needs  - Include patient/family/caregiver in decisions related to communication  Outcome: Progressing     Problem: Potential for Aspiration  Goal: Non-ventilated patient's risk of aspiration is minimized  Description: Assess and monitor vital signs, respiratory status, and labs (WBC)  Monitor for signs of aspiration (tachypnea, cough, rales, wheezing, cyanosis, fever)  - Assess and monitor patient's ability to swallow  - Place patient up in chair to eat if possible  - HOB up at 90 degrees to eat if unable to get patient up into chair   - Supervise patient during oral intake  - Instruct patient/ family to take small bites  - Instruct patient/ family to take small single sips when taking liquids  - Follow patient-specific strategies generated by speech pathologist   Outcome: Progressing     Problem: Nutrition  Goal: Nutrition/Hydration status is improving  Description: Monitor and assess patient's nutrition/hydration status for malnutrition (ex- brittle hair, bruises, dry skin, pale skin and conjunctiva, muscle wasting, smooth red tongue, and disorientation)  Collaborate with interdisciplinary team and initiate plan and interventions as ordered  Monitor patient's weight and dietary intake as ordered or per policy  Utilize nutrition screening tool and intervene per policy  Determine patient's food preferences and provide high-protein, high-caloric foods as appropriate  - Assist patient with eating   - Allow adequate time for meals   - Encourage patient to take dietary supplement as ordered    - Collaborate with clinical nutritionist   - Include patient/family/caregiver in decisions related to nutrition    Outcome: Progressing     Problem: PAIN - ADULT  Goal: Verbalizes/displays adequate comfort level or baseline comfort level  Description: Interventions:  - Encourage patient to monitor pain and request assistance  - Assess pain using appropriate pain scale  - Administer analgesics based on type and severity of pain and evaluate response  - Implement non-pharmacological measures as appropriate and evaluate response  - Consider cultural and social influences on pain and pain management  - Notify physician/advanced practitioner if interventions unsuccessful or patient reports new pain  Outcome: Progressing     Problem: INFECTION - ADULT  Goal: Absence or prevention of progression during hospitalization  Description: INTERVENTIONS:  - Assess and monitor for signs and symptoms of infection  - Monitor lab/diagnostic results  - Monitor all insertion sites, i e  indwelling lines, tubes, and drains  - Monitor endotracheal if appropriate and nasal secretions for changes in amount and color  - [place] appropriate cooling/warming therapies per order  - Administer medications as ordered  - Instruct and encourage patient and family to use good hand hygiene technique  - Identify and instruct in appropriate isolation precautions for identified infection/condition  Outcome: Progressing  Goal: Absence of fever/infection during neutropenic period  Description: INTERVENTIONS:  - Monitor WBC    Outcome: Progressing     Problem: SAFETY ADULT  Goal: Maintain or return to baseline ADL function  Description: INTERVENTIONS:  -  Assess patient's ability to carry out ADLs; assess patient's baseline for ADL function and identify physical deficits which impact ability to perform ADLs (bathing, care of mouth/teeth, toileting, grooming, dressing, etc )  - Assess/evaluate cause of self-care deficits   - Assess range of motion  - Assess patient's mobility; develop plan if impaired  - Assess patient's need for assistive devices and provide as appropriate  - Encourage maximum independence but intervene and supervise when necessary  - Involve family in performance of ADLs  - Assess for home care needs following discharge   - Consider OT consult to assist with ADL evaluation and planning for discharge  - Provide patient education as appropriate  Outcome: Progressing  Goal: Maintain or return mobility status to optimal level  Description: INTERVENTIONS:  - Assess patient's baseline mobility status (ambulation, transfers, stairs, etc )    - Identify cognitive and physical deficits and behaviors that affect mobility  - Identify mobility aids required to assist with transfers and/or ambulation (gait belt, sit-to-stand, lift, walker, cane, etc )  - [place] fall precautions as indicated by assessment  - Record patient progress and toleration of activity level on Mobility SBAR; progress patient to next Phase/Stage  - Instruct patient to call for assistance with activity based on assessment  - Consider rehabilitation consult to assist with strengthening/weightbearing, etc   Outcome: Progressing     Problem: DISCHARGE PLANNING  Goal: Discharge to home or other facility with appropriate resources  Description: INTERVENTIONS:  - Identify barriers to discharge w/patient and caregiver  - Arrange for needed discharge resources and transportation as appropriate  - Identify discharge learning needs (meds, wound care, etc )  - Arrange for interpretive services to assist at discharge as needed  - Refer to Case Management Department for coordinating discharge planning if the patient needs post-hospital services based on physician/advanced practitioner order or complex needs related to functional status, cognitive ability, or social support system  Outcome: Progressing     Problem: Knowledge Deficit  Goal: Patient/family/caregiver demonstrates understanding of disease process, treatment plan, medications, and discharge instructions  Description: Complete learning assessment and assess knowledge base    Interventions:  - Provide teaching at level of understanding  - Provide teaching via preferred learning methods  Outcome: Progressing     Problem: CARDIOVASCULAR - ADULT  Goal: Maintains optimal cardiac output and hemodynamic stability  Description: INTERVENTIONS:  - Monitor I/O, vital signs and rhythm  - Monitor for S/S and trends of decreased cardiac output  - Administer and titrate ordered vasoactive medications to optimize hemodynamic stability  - Assess quality of pulses, skin color and temperature  - Assess for signs of decreased coronary artery perfusion  - Instruct patient to report change in severity of symptoms  Outcome: Progressing  Goal: Absence of cardiac dysrhythmias or at baseline rhythm  Description: INTERVENTIONS:  - Continuous cardiac monitoring, vital signs, obtain 12 lead EKG if ordered  - Administer antiarrhythmic and heart rate control medications as ordered  - Monitor electrolytes and administer replacement therapy as ordered  Outcome: Progressing     Problem: GENITOURINARY - ADULT  Goal: Urinary catheter remains patent  Description: INTERVENTIONS:  - Assess patency of urinary catheter  - If patient has a chronic chowdhury, consider changing catheter if non-functioning  - Follow guidelines for intermittent irrigation of non-functioning urinary catheter  Outcome: Progressing     Problem: MUSCULOSKELETAL - ADULT  Goal: Maintain or return mobility to safest level of function  Description: INTERVENTIONS:  - Assess patient's ability to carry out ADLs; assess patient's baseline for ADL function and identify physical deficits which impact ability to perform ADLs (bathing, care of mouth/teeth, toileting, grooming, dressing, etc )  - Assess/evaluate cause of self-care deficits   - Assess range of motion  - Assess patient's mobility  - Assess patient's need for assistive devices and provide as appropriate  - Encourage maximum independence but intervene and supervise when necessary  - Involve family in performance of ADLs  - Assess for home care needs following discharge   - Consider OT consult to assist with ADL evaluation and planning for discharge  - Provide patient education as appropriate  Outcome: Progressing

## 2021-03-21 NOTE — ASSESSMENT & PLAN NOTE
Lab Results   Component Value Date    EGFR 27 03/21/2021    EGFR 25 03/20/2021    EGFR 23 03/19/2021    CREATININE 2 22 (H) 03/21/2021    CREATININE 2 39 (H) 03/20/2021    CREATININE 2 49 (H) 03/19/2021   Patient has chronic kidney disease stage 4 with baseline creatinine 2-2 5  Patient recently developed acute kidney injury on chronic kidney disease secondary to contrast induced diabetic nephropathy and has been on dialysis since then  Patient's last session of hemodialysis was on March 16th and Outpatient Nephrology considering taking him off dialysis  Patient had 24 hour urine done as outpatient  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

## 2021-03-21 NOTE — TELEMEDICINE
INTERPROFESSIONAL (PHONE) CONSULTATION - Interventional Radiology  Mike Rodarte [de-identified] y o  male MRN: [id number]  Unit/Bed#: [address] [place] [address] Encounter: [id number]    IR has been consulted to evaluate the patient, determine the appropriate procedure, and whether or not a procedure can and should be performed regarding the care of Mike Rodarte  We were consulted by Dr Brian Bocanegra concerning dialysis catheter, and to possibly perform a catheter removal if medically appropriate for the patient  IP Consult to IR  Consult performed by: Roly Bender DO  Consult ordered by: Estefania Chavarria MD        03/21/21      Assessment/Recommendation:   Briefly, [de-identified] y M with h/o of acute renal recovery requiring dialysis through a right PermCath  Patient kidney function is improving and he is making urine  Renal replacement is not indicated this time  Request for catheter removal     Will attempt to remove on Monday  Does not need to be NPO  Total time spent in review of data, discussion with requesting provider and rendering advice was 10 minutes  Patient or appropriate family member was verbally informed by nephrology of this consultative service on their behalf to provide more timely access to specialty care in lieu of an in person consultation  Verbal consent was obtained  Thank you for allowing Interventional Radiology to participate in the care of Mike Rodarte  Please don't hesitate to call or TigerText us with any questions       Roly Bender DO

## 2021-03-21 NOTE — ASSESSMENT & PLAN NOTE
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

## 2021-03-21 NOTE — PHYSICAL THERAPY NOTE
PT TREATMENT     03/21/21 0923   PT Last Visit   PT Visit Date 03/21/21   Note Type   Note Type Treatment   Pain Assessment   Pain Assessment Tool Pain Assessment not indicated - pt denies pain   Restrictions/Precautions   Weight Bearing Precautions Per Order No   Other Precautions Chair Alarm; Bed Alarm; Fall Risk   General   Chart Reviewed Yes   Family/Caregiver Present No   Cognition   Overall Cognitive Status WFL   Arousal/Participation Cooperative   Attention Within functional limits   Following Commands Follows all commands and directions without difficulty   Subjective   Subjective Pt can't wait to get back home to see his cat  Transfers   Sit to Stand 4  Minimal assistance   Additional items Assist x 1;Verbal cues   Stand to Sit 4  Minimal assistance   Additional items Verbal cues   Ambulation/Elevation   Gait pattern Narrow GHISLAINE   Gait Assistance 4  Minimal assist   Additional items Assist x 1;Verbal cues   Assistive Device Rolling walker   Distance 50 x 2 with changes in direction   Balance   Ambulatory Fair  (with RW)   Activity Tolerance   Activity Tolerance Patient tolerated treatment well   Nurse Made Aware yes: Dante Vanegas pt in chair  Seat alarm activated   Exercises   Hip Flexion Sitting;10 reps;Bilateral   Knee AROM Long Arc Quad Sitting;20 reps;Bilateral   Ankle Pumps Sitting;20 reps;Bilateral  (heel toe raises)   Balance training  use of RW for changes in direction   Assessment   Prognosis Good   Problem List Decreased strength;Decreased endurance; Impaired balance;Decreased mobility   Assessment Pt very motivated; much better endurance today  tolerated very well  In chair at end of session with needs in reach  The patient's AM-PAC Basic Mobility Inpatient Short Form Raw Score is 18, Standardized Score is 41 05  A standardized score less than 42 9 suggests the patient may benefit from discharge to post-acute rehabilitation services   Please also refer to the recommendation of the Physical Therapist for safe discharge planning  Goals   Patient Goals to go home to see his cat    Plan   Treatment/Interventions Functional transfer training;LE strengthening/ROM; Therapeutic exercise; Endurance training;Patient/family training;Equipment eval/education; Bed mobility;Gait training;Spoke to nursing   Progress Progressing toward goals   Recommendation   PT Discharge Recommendation [facility] Basic Mobility Inpatient   Turning in Bed Without Bedrails 3   Lying on Back to Sitting on Edge of Flat Bed 3   Moving Bed to Chair 3   Standing Up From Chair 3   Walk in Room 3   Climb 3-5 Stairs 3   Basic Mobility Inpatient Raw Score 18   Basic Mobility Standardized Score 08 05   Licensure   [place] License Number  Raquel Adela Gibson Pt  [id number]

## 2021-03-21 NOTE — ASSESSMENT & PLAN NOTE
Lab Results   Component Value Date    EGFR 27 03/21/2021    EGFR 25 03/20/2021    EGFR 23 03/19/2021    CREATININE 2 22 (H) 03/21/2021    CREATININE 2 39 (H) 03/20/2021    CREATININE 2 49 (H) 03/19/2021   Continue hydralazine 25 milligram p o  Q 8 hours, Lopressor 25 milligram p o  B i d    Demadex was restarted

## 2021-03-21 NOTE — PROGRESS NOTES
Karissa 45  Progress Note - Earmon Prior 1940, [de-identified] y o  male MRN: [id number]  Unit/Bed#: [address] [place] [address] Encounter: [id number]  Primary Care Provider: Arlene Michaels MD   Date and time admitted to hospital: 3/18/2021  1:36 PM    * Acute ischemic stroke [facility])  Assessment & Plan  Patient presented with bilateral leg weakness right more than left  CT scan of the brain showed acute sphenoid sinusitis without any acute intracranial abnormality  MRI of the brain showed punctate acute lacunar infarct in the left parietal periventricular region with chronic microangiopathic changes and ventriculomegaly  Limited echo with bubble study showed no evidence of right-to-left shunt  Patient cannot get CTA due to chronic kidney disease  Carotid Dopplers were performed results of which are pending at the current time  Lipid panel showed LDL level of  80  hemoglobin A1c was 6 6  Continue PT/OT  Tight control of blood sugars  Patient to continue dual antiplatelet therapy for 3 months and Lipitor 80 milligram daily    Acute renal failure superimposed on chronic kidney disease [facility])  Assessment & Plan  Lab Results   Component Value Date    EGFR 27 03/21/2021    EGFR 25 03/20/2021    EGFR 23 03/19/2021    CREATININE 2 22 (H) 03/21/2021    CREATININE 2 39 (H) 03/20/2021    CREATININE 2 49 (H) 03/19/2021   Patient has chronic kidney disease stage 4 with baseline creatinine 2-2 5  Patient recently developed acute kidney injury on chronic kidney disease secondary to contrast induced diabetic nephropathy and has been on dialysis since then  Patient's last session of hemodialysis was on March 16th and Outpatient Nephrology considering taking him off dialysis    Patient had 24 hour urine done as outpatient  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  Patient's creatinine continues to remain stable and patient is nonoliguric  Continue to maintain PermCath for now and possible removal if creatinine continues to remain stable  Patient restarted back on Demadex and patient is giving very good urine output  No urgent indication for dialysis as per Nephrology  Nephrology recommending removing of PermCath    Elevated troponin  Assessment & Plan  Likely non MI troponin elevation in the setting of chronic kidney disease  Hold off on checking further serial troponins as patient has known history of severe 3 vessel CAD and patient is asymptomatic    Coronary artery disease  Assessment & Plan  Patient has history of CAD status post 2 vessel bypass  Patient underwent cardiac catheterization in January which showed severe three-vessel disease with 99% occluded proximal circumflex, 50% distal left main, mid % occluded, RCA proximal 100% occluded with collaterals from left circulation  Graft from LIMA to LAD is widely patent  Graft to obtuse marginal 1 has ostial around 40% stenosis but no flow limiting noted  Continue medical management with Imdur, metoprolol, nitroglycerin p r n , aspirin and statin    Essential tremor  Assessment & Plan  Patient placed on low-dose primidone 50 milligram q h s  With improved tremors    Benign hypertension with chronic kidney disease, stage III  Assessment & Plan  Lab Results   Component Value Date    EGFR 27 03/21/2021    EGFR 25 03/20/2021    EGFR 23 03/19/2021    CREATININE 2 22 (H) 03/21/2021    CREATININE 2 39 (H) 03/20/2021    CREATININE 2 49 (H) 03/19/2021   Continue hydralazine 25 milligram p o  Q 8 hours, Lopressor 25 milligram p o  B i d    Demadex was restarted    Type 2 diabetes mellitus with hyperglycemia, with long-term current use of insulin [facility])  Assessment & Plan  Lab Results   Component Value Date    HGBA1C 6 6 (H) 03/19/2021       Recent Labs     03/20/21  2101 03/21/21  0709 03/21/21  1145 03/21/21  1557   POCGLU 280* 167* 335* 297*       Blood Sugar Average: Last 72 hrs:  (P) 236 8   Continue Levemir 20 units subcutaneously daily with Humalog sliding scale  Patient will be on diabetic diet    Hypercholesteremia  Assessment & Plan  Continue Lipitor 40 milligram p o  Daily  Lipid panel as stated above    Bladder cancer [facility])  Assessment & Plan  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  Patient did have outpatient cystoscopy yesterday which showed no recurrence  Patient had urinary retention and was placed on Flomax and had a Marinelli catheter  Marinelli catheter was discontinued on 2021 and patient has been urinating well without any postvoid residuals      VTE Pharmacologic Prophylaxis:   Pharmacologic: Heparin  Mechanical VTE Prophylaxis in Place: Yes    Patient Centered Rounds: I have performed bedside rounds with nursing staff today  Discussions with Specialists or Other Care Team Provider: No    Education and Discussions with Family / Patient: *yes    Time Spent for Care: 45 minutes  More than 50% of total time spent on counseling and coordination of care as described above  Current Length of Stay: 3 day(s)    Current Patient Status: Inpatient   Certification Statement: The patient will continue to require additional inpatient hospital stay due to Acute stroke pending placement    Discharge Plan:  S TR    Code Status: Level 1 - Full Code      Subjective:   Patient is feeling well  Improved tremors  Denies any chest pain, shortness of breath, abdominal pain, nausea or vomiting  Objective:     Vitals:   Temp (24hrs), Av 4 °F (36 9 °C), Min:98 1 °F (36 7 °C), Max:98 6 °F (37 °C)    Temp:  [98 1 °F (36 7 °C)-98 6 °F (37 °C)] 98 6 °F (37 °C)  HR:  [73-80] 74  Resp:  [17-20] 18  BP: (103-160)/(51-76) 103/54  SpO2:  [94 %-97 %] 96 %  Body mass index is 25 92 kg/m²  Input and Output Summary (last 24 hours):        Intake/Output Summary (Last 24 hours) at 3/21/2021 1634  Last data filed at 3/21/2021 1601  Gross per 24 hour   Intake 660 ml   Output 2725 ml   Net -2065 ml Physical Exam:     Physical Exam  Constitutional:       General: He is not in acute distress  HENT:      Head: Normocephalic and atraumatic  Eyes:      Conjunctiva/sclera: Conjunctivae normal       Pupils: Pupils are equal, round, and reactive to light  Neck:      Musculoskeletal: Normal range of motion and neck supple  Cardiovascular:      Rate and Rhythm: Normal rate and regular rhythm  Heart sounds: Murmur present  Pulmonary:      Effort: Pulmonary effort is normal  No respiratory distress  Breath sounds: No wheezing, rhonchi or rales  Chest:      Chest wall: No tenderness  Abdominal:      General: Bowel sounds are normal  There is no distension  Palpations: Abdomen is soft  Tenderness: There is no abdominal tenderness  There is no guarding or rebound  Musculoskeletal:      Comments: Trace edema   Skin:     General: Skin is warm and dry  Findings: No rash  Neurological:      Mental Status: He is alert  Cranial Nerves: No cranial nerve deficit  Additional Data:     Labs:    Results from last 7 days   Lab Units 03/18/21  1356   WBC Thousand/uL 7 26   HEMOGLOBIN g/dL 8 4*   HEMATOCRIT % 25 8*   PLATELETS Thousands/uL 169   NEUTROS PCT % 73   LYMPHS PCT % 17   MONOS PCT % 8   EOS PCT % 1     Results from last 7 days   Lab Units 03/21/21  0539  03/18/21  1356   POTASSIUM mmol/L 4 2   < > 3 0*   CHLORIDE mmol/L 97*   < > 99*   CO2 mmol/L 30   < > 30   BUN mg/dL 49*   < > 48*   CREATININE mg/dL 2 22*   < > 2 65*   CALCIUM mg/dL 8 8   < > 8 8   ALK PHOS U/L  --   --  92   ALT U/L  --   --  28   AST U/L  --   --  30    < > = values in this interval not displayed  * I Have Reviewed All Lab Data Listed Above  * Additional Pertinent Lab Tests Reviewed:  [facility]        Recent Cultures (last 7 days):           Last 24 Hours Medication List:   Current Facility-Administered Medications   Medication Dose Route Frequency Provider Last Rate    acetaminophen  650 mg Oral Q6H PRN Lilian Darby MD      aspirin  81 mg Oral Daily Lilian Darby MD      atorvastatin  80 mg Oral QPM TABITHA Coulter      bisacodyl  10 mg Rectal Daily Lilian Darby MD      clopidogrel  75 mg Oral Daily Lilian Darby MD      docusate sodium  100 mg Oral BID Lilian Darby MD      enoxaparin  30 mg Subcutaneous Daily Lilian Darby MD      escitalopram  10 mg Oral HS Lilian Darby MD      hydrALAZINE  25 mg Oral Q8H [address] Lilian Darby MD      insulin detemir  20 Units Subcutaneous Q12H [address] Swathi Schumacher MD      insulin lispro  1-5 Units Subcutaneous TID AC America Red, CRNP      insulin lispro  1-5 Units Subcutaneous HS America RedTABITHA      isosorbide mononitrate  30 mg Oral Daily Lilian Darby MD      metoprolol tartrate  25 mg Oral Q12H [address] Lilian Darby MD      nitroglycerin  0 4 mg Sublingual Q5 Min PRN Lilian Darby MD      ondansetron  4 mg Intravenous Q6H PRN Lilian Darby MD      polyethylene glycol  17 g Oral Daily Lilian Darby MD      primidone  50 mg Oral HS Davis Bobo MD      torsemide  100 mg Oral Once per day on Sun Tue Thu Sat Lilian Darby MD          Today, Patient Was Seen By: Lilian Darby MD    ** Please Note: Dictation voice to text software may have been used in the creation of this document   **

## 2021-03-21 NOTE — OCCUPATIONAL THERAPY NOTE
OT TREATMENT       03/21/21 0900   OT Last Visit   OT Visit Date 03/21/21   Note Type   Note Type Treatment   Pain Assessment   Pain Assessment Tool 0-10   Pain Score No Pain   ADL   Where Assessed Edge of bed   LB Dressing Assistance 4  Minimal Assistance   LB Dressing Deficit Don/doff R sock; Don/doff L sock; Tie shoes; Don/doff R shoe;Don/doff L shoe   Bed Mobility   Rolling R 5  Supervision   Rolling L 5  Supervision   Supine to Sit 4  Minimal assistance   Transfers   Sit to Stand 4  Minimal assistance   Toilet transfer 4  Minimal assistance   Additional items Commode;Assist x 1   Functional Mobility   Functional Mobility 4  Minimal assistance   Additional Comments in room 10 feet   Toilet Transfers   Toilet Transfer From [place] Type To and from   Toilet Transfer to Standard bedside commode   Toilet Transfer Technique Ambulating   Toilet Transfers Minimal assistance   Cognition   Overall Cognitive Status WFL   Arousal/Participation Alert; Cooperative   Attention Within functional limits   Orientation Level Oriented X4   Following Commands Follows all commands and directions without difficulty   Activity Tolerance   Activity Tolerance Patient tolerated treatment well   Assessment   Assessment Pt seen at bedside for skilled intervention  Pt completed ADLS  with MIN assist and increased time to complete  Pt cooperative throughout, benefits from rest breaks  Pt will continue to benefit from skill tx, will follow  Recommend D/C to Post Acute Rehabilitation Services  Thank you  Plan   Treatment Interventions Activityengagement; Energy conservation; Compensatory technique education;Equipment evaluation/education;Patient/family training; Endurance training;UE strengthening/ROM; Functional transfer training;ADL retraining   Goal Expiration Date 04/02/21   OT Frequency 5x/wk   Recommendation   OT Discharge Recommendation Post-Acute Rehabilitation Services   AM-PAC Daily Activity Inpatient   Lower Body Dressing 3   Bathing 2   Toileting 3   Upper Body Dressing 3   Grooming 3   Eating 4   Daily Activity Raw Score 18   Daily Activity Standardized Score (Calc for Raw Score >=11) 38 66   AM-PAC Applied Cognition Inpatient   Following a Speech/Presentation 4   Understanding Ordinary Conversation 4   Taking Medications 4   Remembering Where Things Are Placed or Put Away 4   Remembering List of 4-5 Errands 4   Taking Care of Complicated Tasks 4   Applied Cognition Raw Score 24   Applied Cognition Standardized Score 62 21   Modified [place] Scale   Modified [place] Scale 4   Licensure   [place] License Number  Arnold Chen 429 # [id number]       The patient's raw score on the AM-PAC Daily Activity inpatient short form is 18, standardized score is 38 66, less than 39 4  Patients at this level are likely to benefit from DC to post-acute rehabilitation services  Please refer to the recommendation of the Occupational Therapist for safe DC planning    MRS: 4

## 2021-03-22 ENCOUNTER — APPOINTMENT (INPATIENT)
Dept: NON INVASIVE DIAGNOSTICS | Facility: HOSPITAL | Age: 81
DRG: 065 | End: 2021-03-22
Attending: RADIOLOGY
Payer: COMMERCIAL

## 2021-03-22 LAB
ANION GAP SERPL CALCULATED.3IONS-SCNC: 6 MMOL/L (ref 4–13)
BUN SERPL-MCNC: 57 MG/DL (ref 5–25)
CALCIUM SERPL-MCNC: 8.7 MG/DL (ref 8.3–10.1)
CHLORIDE SERPL-SCNC: 98 MMOL/L (ref 100–108)
CO2 SERPL-SCNC: 30 MMOL/L (ref 21–32)
CREAT SERPL-MCNC: 2.5 MG/DL (ref 0.6–1.3)
GFR SERPL CREATININE-BSD FRML MDRD: 23 ML/MIN/1.73SQ M
GLUCOSE SERPL-MCNC: 161 MG/DL (ref 65–140)
GLUCOSE SERPL-MCNC: 167 MG/DL (ref 65–140)
GLUCOSE SERPL-MCNC: 236 MG/DL (ref 65–140)
GLUCOSE SERPL-MCNC: 257 MG/DL (ref 65–140)
POTASSIUM SERPL-SCNC: 4.7 MMOL/L (ref 3.5–5.3)
SODIUM SERPL-SCNC: 134 MMOL/L (ref 136–145)

## 2021-03-22 PROCEDURE — 99232 SBSQ HOSP IP/OBS MODERATE 35: CPT | Performed by: INTERNAL MEDICINE

## 2021-03-22 PROCEDURE — 80048 BASIC METABOLIC PNL TOTAL CA: CPT | Performed by: INTERNAL MEDICINE

## 2021-03-22 PROCEDURE — 99239 HOSP IP/OBS DSCHRG MGMT >30: CPT | Performed by: INTERNAL MEDICINE

## 2021-03-22 PROCEDURE — 82948 REAGENT STRIP/BLOOD GLUCOSE: CPT

## 2021-03-22 PROCEDURE — 36589 REMOVAL TUNNELED CV CATH: CPT

## 2021-03-22 PROCEDURE — 93880 EXTRACRANIAL BILAT STUDY: CPT | Performed by: SURGERY

## 2021-03-22 PROCEDURE — 97530 THERAPEUTIC ACTIVITIES: CPT

## 2021-03-22 RX ORDER — PRIMIDONE 50 MG/1
50 TABLET ORAL
Refills: 0
Start: 2021-03-22

## 2021-03-22 RX ORDER — ATORVASTATIN CALCIUM 80 MG/1
80 TABLET, FILM COATED ORAL EVERY EVENING
Refills: 0
Start: 2021-03-22

## 2021-03-22 RX ORDER — CLOPIDOGREL BISULFATE 75 MG/1
75 TABLET ORAL DAILY
Refills: 0
Start: 2021-03-23 | End: 2021-01-01

## 2021-03-22 RX ORDER — LIDOCAINE WITH 8.4% SOD BICARB 0.9%(10ML)
SYRINGE (ML) INJECTION CODE/TRAUMA/SEDATION MEDICATION
Status: COMPLETED | OUTPATIENT
Start: 2021-03-22 | End: 2021-03-22

## 2021-03-22 RX ORDER — BISACODYL 10 MG
10 SUPPOSITORY, RECTAL RECTAL DAILY
Qty: 12 SUPPOSITORY | Refills: 0
Start: 2021-03-23 | End: 2021-01-01 | Stop reason: HOSPADM

## 2021-03-22 RX ORDER — TORSEMIDE 20 MG/1
40 TABLET ORAL DAILY
Refills: 0
Start: 2021-03-23

## 2021-03-22 RX ORDER — TORSEMIDE 20 MG/1
40 TABLET ORAL DAILY
Status: DISCONTINUED | OUTPATIENT
Start: 2021-03-23 | End: 2021-03-22 | Stop reason: HOSPADM

## 2021-03-22 RX ORDER — DOCUSATE SODIUM 100 MG/1
100 CAPSULE, LIQUID FILLED ORAL 2 TIMES DAILY
Qty: 10 CAPSULE | Refills: 0
Start: 2021-03-22

## 2021-03-22 RX ADMIN — INSULIN DETEMIR 20 UNITS: 100 INJECTION, SOLUTION SUBCUTANEOUS at 09:01

## 2021-03-22 RX ADMIN — CLOPIDOGREL BISULFATE 75 MG: 75 TABLET ORAL at 08:48

## 2021-03-22 RX ADMIN — LIDOCAINE HYDROCHLORIDE 5 ML: 10 INJECTION, SOLUTION INFILTRATION; PERINEURAL at 10:54

## 2021-03-22 RX ADMIN — ATORVASTATIN CALCIUM 80 MG: 80 TABLET ORAL at 18:02

## 2021-03-22 RX ADMIN — INSULIN LISPRO 2 UNITS: 100 INJECTION, SOLUTION INTRAVENOUS; SUBCUTANEOUS at 18:00

## 2021-03-22 RX ADMIN — ISOSORBIDE MONONITRATE 30 MG: 30 TABLET, EXTENDED RELEASE ORAL at 08:48

## 2021-03-22 RX ADMIN — POLYETHYLENE GLYCOL 3350 17 G: 17 POWDER, FOR SOLUTION ORAL at 08:48

## 2021-03-22 RX ADMIN — INSULIN LISPRO 2 UNITS: 100 INJECTION, SOLUTION INTRAVENOUS; SUBCUTANEOUS at 12:28

## 2021-03-22 RX ADMIN — METOPROLOL TARTRATE 25 MG: 25 TABLET, FILM COATED ORAL at 08:47

## 2021-03-22 RX ADMIN — DOCUSATE SODIUM 100 MG: 100 CAPSULE, LIQUID FILLED ORAL at 18:02

## 2021-03-22 RX ADMIN — ENOXAPARIN SODIUM 30 MG: 30 INJECTION SUBCUTANEOUS at 08:48

## 2021-03-22 RX ADMIN — ASPIRIN 81 MG: 81 TABLET, COATED ORAL at 08:47

## 2021-03-22 RX ADMIN — HYDRALAZINE HYDROCHLORIDE 25 MG: 25 TABLET, FILM COATED ORAL at 05:52

## 2021-03-22 RX ADMIN — DOCUSATE SODIUM 100 MG: 100 CAPSULE, LIQUID FILLED ORAL at 08:48

## 2021-03-22 RX ADMIN — INSULIN LISPRO 1 UNITS: 100 INJECTION, SOLUTION INTRAVENOUS; SUBCUTANEOUS at 08:49

## 2021-03-22 NOTE — PROGRESS NOTES
Progress Note - Urology      Patient: Cecile Murphy   : 1940 Sex: male   MRN: [id number]     CSN: [id number]  Unit/Bed#: [address] [place] [address]     SUBJECTIVE:   Doing well  Voiding      Objective   Vitals: /69 (BP Location: Left arm)   Pulse 72   Temp 97 8 °F (36 6 °C) (Oral)   Resp 18   Ht 6' 1" (1 854 m)   Wt 89 1 kg (196 lb 6 9 oz)   SpO2 95%   BMI 25 92 kg/m²     I/O last 24 hours:   In: 900 [P O :900]  Out: 1550 [Urine:1550]      Physical Exam:   General Alert awake   Normocephalic atraumatic PERRLA  Lungs clear bilaterally  Cardiac normal S1 normal S2  Abdomen soft, flank pain  Extremities no edema      Lab Results: CBC:   Lab Results   Component Value Date    WBC 7 26 2021    HGB 8 4 (L) 2021    HCT 25 8 (L) 2021     (H) 2021     2021    ADJUSTEDWBC 6 60 2016    MCH 32 6 2021    MCHC 32 6 2021    RDW 15 2 (H) 2021    MPV 10 0 2021    NRBC 0 2021     CMP:   Lab Results   Component Value Date     2016    CL 98 (L) 2021     2016    CO2 30 2021    CO2 19 (L) 2021    ANIONGAP 14 2 2016    BUN 57 (H) 2021    BUN 35 (H) 2016    CREATININE 2 50 (H) 2021    CREATININE 1 5 (H) 2016    GLUCOSE 232 (H) 2021    GLUCOSE 131 (H) 2016    CALCIUM 8 7 2021    CALCIUM 9 0 2016    AST 30 2021    AST 22 2016    ALT 28 2021    ALT 53 2016    ALKPHOS 92 2021    ALKPHOS 66 2016    PROT 10 2 (H) 2016    BILITOT 0 9 2016    EGFR 23 2021     Urinalysis:   Lab Results   Component Value Date    COLORU Light Yellow 2021    CLARITYU Clear 2021    CLARITYU YELLOW 2016    CLARITYU SL CLOUDY 2016    SPECGRAV 1 010 2021    SPECGRAV 1 025 2016    PHUR 7 0 2021    PHUR 5 5 2017    PHUR 6 0 2016    LEUKOCYTESUR Moderate (A) 2021    LEUKOCYTESUR TRACE (A) 01/05/2016    NITRITE Negative 03/18/2021    NITRITE POSITIVE (A) 01/05/2016    PROTEINUA 100 (A) 01/05/2016    GLUCOSEU Negative 03/18/2021    GLUCOSEU >=1000 01/05/2016    KETONESU Negative 03/18/2021    KETONESU NEGATIVE 01/05/2016    BILIRUBINUR Negative 03/18/2021    BILIRUBINUR NEGATIVE 01/05/2016    BLOODU Small (A) 03/18/2021    BLOODU LARGE (A) 01/05/2016     Urine Culture:   Lab Results   Component Value Date    URINECX  03/04/2016     >100,000 cfu/ml Klebsiella oxytoca-Extended Beta Lactamase      PSA:   Lab Results   Component Value Date    PSA 0 4 04/12/2016    PSA 0 66 01/05/2016         Assessment/ Plan:  Urinary tension  Marinelli removed on Saturday  Voiding Q 1-2 hours  On tamsulosin  Check PVR  Was seen in the office for follow-up in a few weeks after discharge from rehab          Felipa Turk MD

## 2021-03-22 NOTE — ASSESSMENT & PLAN NOTE
Patient presented with bilateral leg weakness right more than left  CT scan of the brain showed acute sphenoid sinusitis without any acute intracranial abnormality  MRI of the brain showed punctate acute lacunar infarct in the left parietal periventricular region with chronic microangiopathic changes and ventriculomegaly  Limited echo with bubble study showed no evidence of right-to-left shunt  Patient cannot get CTA due to chronic kidney disease  Carotid Doppler showed less than 50% stenosis bilaterally  Lipid panel showed LDL level of  80  hemoglobin A1c was 6 6  Continue PT/OT  Tight control of blood sugars    Patient to continue dual antiplatelet therapy for 3 months and Lipitor 80 milligram daily

## 2021-03-22 NOTE — SEDATION DOCUMENTATION
Perm HD cath removed without incident  Gauze with Tegaderm placed over site  Patient returned to room in stable condition  Report given to receiving nurse

## 2021-03-22 NOTE — PROGRESS NOTES
Luis Manuel 50 PROGRESS NOTE   Earlyne Kush [de-identified] y o  male MRN: [id number]  Unit/Bed#: [address] [place] [address] Encounter: [id number]  Reason for Consult: BRIAN    ASSESSMENT and PLAN:  1  Acute kidney injury (POA):   · HD dependent on presentation and has been since 1/25/21  · Etiology felt to be due to ATN from contrast nephropathy  · He appears to have recovered from ATN  · His creatinine has been stable between 2 2 and 2 6 during this hospital stay  · He has good urine output  · Will hold any further dialysis  2  Access:   · R IJ permcath  · IR consulted for removal       3  CKD IV:  · Baseline creatinine is 2 0 to 2 5 prior to episode of BRIAN in Jan 2021  · Follows with Dr Stuart Goodman of [address]  · Has history of previous HD dependence       4  CHF:  · EF is 60% with G2DD based on 1/24/21 echo  · Change Torsemide to 40 mg daily - this was his diuretic dose upon further review of the chart  5  HTN:   · BP is controlled  · Continue metoprolol 25 mg twice a day, isosorbide 30 mg daily, hydralazine 25 mg every 8 hours  6  Anemia:  · Hgb below goal    · Hold Epogen due to recent CVA       7  Hyponatremia:  · Monitor for now  8  Urinary retention:  · S/p chowdhury catheter placement and removal  · Follow up with urology  9  Bladder cancer:   · s/p cystectomy in October 2016  · Deferred to urology  10  Acute CVA: per SLIM       DISPOSITION:  · IR consulted for permcath removal    · Stable renal function  · Change to Torsemide 40 mg daily  · Upon discharge, he will need close follow-up with Dr Stuart Goodman of [address]  The above plan was discussed with Dr Liseth Ramirez  SUBJECTIVE / 24H INTERVAL HISTORY:  Feels okay  Denies any chest pain or shortness of breath  No acute events  Good urine output  1400 cc over past 24 hours      OBJECTIVE:  Current Weight: Weight - Scale: 89 1 kg (196 lb 6 9 oz)  Vitals:    03/22/21 0002 03/22/21 0551 03/22/21 0731 03/22/21 0800   BP: 121/60 135/65 139/69    BP Location:   Left arm    Pulse: 72 67 72    Resp: 17 18 18    Temp: 97 6 °F (36 4 °C) (!) 97 4 °F (36 3 °C) 97 8 °F (36 6 °C) 97 8 °F (36 6 °C)   TempSrc:   Oral Oral   SpO2: 97% 96% 95% 95%   Weight:       Height:           Intake/Output Summary (Last 24 hours) at 3/22/2021 1001  Last data filed at 3/22/2021 0800  Gross per 24 hour   Intake 600 ml   Output 1000 ml   Net -400 ml     General: conscious, cooperative, no distress  Skin: dry  Eyes: pink conjunctivae  ENT: moist mucous membranes  Chest/Lungs: equal chest expansion, clear breath sounds  CVS: distinct heart sounds, normal rate, regular rhythm, no rub  Abdomen: soft, non tender, non distended, normal bowel sounds  Extremities: no edema  : no chowdhury catheter  Neuro: awake, alert     Psych: appropriate affect    Medications:    Current Facility-Administered Medications:     acetaminophen (TYLENOL) tablet 650 mg, 650 mg, Oral, Q6H PRN, Stephanie Pacheco MD    aspirin (ECOTRIN LOW STRENGTH) EC tablet 81 mg, 81 mg, Oral, Daily, Swathi Schumacher MD, 81 mg at 03/22/21 0847    atorvastatin (LIPITOR) tablet 80 mg, 80 mg, Oral, QPM, TABITHA Matthews, 80 mg at 03/21/21 1706    bisacodyl (DULCOLAX) rectal suppository 10 mg, 10 mg, Rectal, Daily, Stephanie Pacheco MD, 10 mg at 03/19/21 1046    clopidogrel (PLAVIX) tablet 75 mg, 75 mg, Oral, Daily, Stephanie Pacheco MD, 75 mg at 03/22/21 0848    docusate sodium (COLACE) capsule 100 mg, 100 mg, Oral, BID, Shania Schumacher MD, 100 mg at 03/22/21 0848    enoxaparin (LOVENOX) subcutaneous injection 30 mg, 30 mg, Subcutaneous, Daily, Stephanie Pacheco MD, 30 mg at 03/22/21 0848    escitalopram (LEXAPRO) tablet 10 mg, 10 mg, Oral, HS, Stephanie Pacheco MD, 10 mg at 03/21/21 2205    hydrALAZINE (APRESOLINE) tablet 25 mg, 25 mg, Oral, Q8H [address], Stephanie Pacheco MD, 25 mg at 03/22/21 0552    insulin detemir (LEVEMIR) subcutaneous injection 20 Units, 20 Units, Subcutaneous, Q12H [address], Shania Suarez MD Winsome, 20 Units at 03/22/21 0901    insulin lispro (HumaLOG) 100 units/mL subcutaneous injection 1-5 Units, 1-5 Units, Subcutaneous, TID AC, 1 Units at 03/22/21 0849 **AND** Fingerstick Glucose (POCT), , , TID AC, Logan Harada, CRNP    insulin lispro (HumaLOG) 100 units/mL subcutaneous injection 1-5 Units, 1-5 Units, Subcutaneous, HS, Logan Harada, CRNP, 3 Units at 03/21/21 2205    isosorbide mononitrate (IMDUR) 24 hr tablet 30 mg, 30 mg, Oral, Daily, Key Nance MD, 30 mg at 03/22/21 0848    metoprolol tartrate (LOPRESSOR) tablet 25 mg, 25 mg, Oral, Q12H [address], Gonzalez Odell Schumacher MD, 25 mg at 03/22/21 0847    nitroglycerin (NITROSTAT) SL tablet 0 4 mg, 0 4 mg, Sublingual, Q5 Min PRN, Key Nance MD    ondansetron (ZOFRAN) injection 4 mg, 4 mg, Intravenous, Q6H PRN, Key Nance MD    polyethylene glycol (MIRALAX) packet 17 g, 17 g, Oral, Daily, Key Nance MD, 17 g at 03/22/21 0848    primidone (MYSOLINE) tablet 50 mg, 50 mg, Oral, HS, Khushbu Cuevas MD, 50 mg at 03/21/21 2207    torsemide (DEMADEX) tablet 100 mg, 100 mg, Oral, Once per day on Sun Tue Thu Sat, Key Nance MD, 100 mg at 03/21/21 1125    Laboratory Results:  Results from last 7 days   Lab Units 03/22/21  0559 03/21/21  0539 03/20/21  0535 03/19/21  0522 03/18/21  1356   WBC Thousand/uL  --   --   --   --  7 26   HEMOGLOBIN g/dL  --   --   --   --  8 4*   HEMATOCRIT %  --   --   --   --  25 8*   PLATELETS Thousands/uL  --   --   --   --  169   POTASSIUM mmol/L 4 7 4 2 3 8 3 5 3 0*   CHLORIDE mmol/L 98* 97* 100 103 99*   CO2 mmol/L 30 30 28 30 30   BUN mg/dL 57* 49* 49* 48* 48*   CREATININE mg/dL 2 50* 2 22* 2 39* 2 49* 2 65*   CALCIUM mg/dL 8 7 8 8 9 1 8 7 8 8   MAGNESIUM mg/dL  --   --   --   --  1 8

## 2021-03-22 NOTE — PLAN OF CARE
Problem: Potential for Falls  Goal: Patient will remain free of falls  Description: INTERVENTIONS:  - Assess patient frequently for physical needs  -  Identify cognitive and physical deficits and behaviors that affect risk of falls  -  [place] fall precautions as indicated by assessment   - Educate patient/family on patient safety including physical limitations  - Instruct patient to call for assistance with activity based on assessment  - Modify environment to reduce risk of injury  - Consider OT/PT consult to assist with strengthening/mobility  Outcome: Adequate for Discharge     Problem: Neurological Deficit  Goal: Neurological status is stable or improving  Description: Interventions:  - Monitor and assess patient's level of consciousness, motor function, sensory function, and level of assistance needed for ADLs  - Monitor and report changes from baseline  Collaborate with interdisciplinary team to initiate plan and implement interventions as ordered  - Provide and maintain a safe environment  - Consider seizure precautions  - Consider fall precautions  - Consider aspiration precautions  - Consider bleeding precautions  Outcome: Adequate for Discharge     Problem: Activity Intolerance/Impaired Mobility  Goal: Mobility/activity is maintained at optimum level for patient  Description: Interventions:  - Assess and monitor patient  barriers to mobility and need for assistive/adaptive devices  - Assess patient's emotional response to limitations  - Collaborate with interdisciplinary team and initiate plans and interventions as ordered  - Encourage independent activity per ability   - Maintain proper body alignment  - Perform active/passive rom as tolerated/ordered    - Plan activities to conserve energy   - Turn patient as appropriate  Outcome: Adequate for Discharge     Problem: Communication Impairment  Goal: Ability to express needs and understand communication  Description: Assess patient's communication skills and ability to understand information  Patient will demonstrate use of effective communication techniques, alternative methods of communication and understanding even if not able to speak  - Encourage communication and provide alternate methods of communication as needed  - Collaborate with case management/ for discharge needs  - Include patient/family/caregiver in decisions related to communication  Outcome: Adequate for Discharge     Problem: Potential for Aspiration  Goal: Non-ventilated patient's risk of aspiration is minimized  Description: Assess and monitor vital signs, respiratory status, and labs (WBC)  Monitor for signs of aspiration (tachypnea, cough, rales, wheezing, cyanosis, fever)  - Assess and monitor patient's ability to swallow  - Place patient up in chair to eat if possible  - HOB up at 90 degrees to eat if unable to get patient up into chair   - Supervise patient during oral intake  - Instruct patient/ family to take small bites  - Instruct patient/ family to take small single sips when taking liquids  - Follow patient-specific strategies generated by speech pathologist   Outcome: Adequate for Discharge     Problem: Nutrition  Goal: Nutrition/Hydration status is improving  Description: Monitor and assess patient's nutrition/hydration status for malnutrition (ex- brittle hair, bruises, dry skin, pale skin and conjunctiva, muscle wasting, smooth red tongue, and disorientation)  Collaborate with interdisciplinary team and initiate plan and interventions as ordered  Monitor patient's weight and dietary intake as ordered or per policy  Utilize nutrition screening tool and intervene per policy  Determine patient's food preferences and provide high-protein, high-caloric foods as appropriate  - Assist patient with eating   - Allow adequate time for meals   - Encourage patient to take dietary supplement as ordered    - Collaborate with clinical nutritionist   - Include patient/family/caregiver in decisions related to nutrition    Outcome: Adequate for Discharge     Problem: PAIN - ADULT  Goal: Verbalizes/displays adequate comfort level or baseline comfort level  Description: Interventions:  - Encourage patient to monitor pain and request assistance  - Assess pain using appropriate pain scale  - Administer analgesics based on type and severity of pain and evaluate response  - Implement non-pharmacological measures as appropriate and evaluate response  - Consider cultural and social influences on pain and pain management  - Notify physician/advanced practitioner if interventions unsuccessful or patient reports new pain  Outcome: Adequate for Discharge     Problem: SAFETY ADULT  Goal: Maintain or return to baseline ADL function  Description: INTERVENTIONS:  -  Assess patient's ability to carry out ADLs; assess patient's baseline for ADL function and identify physical deficits which impact ability to perform ADLs (bathing, care of mouth/teeth, toileting, grooming, dressing, etc )  - Assess/evaluate cause of self-care deficits   - Assess range of motion  - Assess patient's mobility; develop plan if impaired  - Assess patient's need for assistive devices and provide as appropriate  - Encourage maximum independence but intervene and supervise when necessary  - Involve family in performance of ADLs  - Assess for home care needs following discharge   - Consider OT consult to assist with ADL evaluation and planning for discharge  - Provide patient education as appropriate  Outcome: Adequate for Discharge  Goal: Maintain or return mobility status to optimal level  Description: INTERVENTIONS:  - Assess patient's baseline mobility status (ambulation, transfers, stairs, etc )    - Identify cognitive and physical deficits and behaviors that affect mobility  - Identify mobility aids required to assist with transfers and/or ambulation (gait belt, sit-to-stand, lift, walker, cane, etc )  - [place] fall precautions as indicated by assessment  - Record patient progress and toleration of activity level on Mobility SBAR; progress patient to next Phase/Stage  - Instruct patient to call for assistance with activity based on assessment  - Consider rehabilitation consult to assist with strengthening/weightbearing, etc   Outcome: Adequate for Discharge     Problem: SAFETY ADULT  Goal: Maintain or return mobility status to optimal level  Description: INTERVENTIONS:  - Assess patient's baseline mobility status (ambulation, transfers, stairs, etc )    - Identify cognitive and physical deficits and behaviors that affect mobility  - Identify mobility aids required to assist with transfers and/or ambulation (gait belt, sit-to-stand, lift, walker, cane, etc )  - [place] fall precautions as indicated by assessment  - Record patient progress and toleration of activity level on Mobility SBAR; progress patient to next Phase/Stage  - Instruct patient to call for assistance with activity based on assessment  - Consider rehabilitation consult to assist with strengthening/weightbearing, etc   Outcome: Adequate for Discharge

## 2021-03-22 NOTE — CASE MANAGEMENT
DANAY spoke with Dion Lewis at [facility] for update on insurance authorization  Per Samira Pritchard, insurance requested more clinicals which were sent today  Samira Pritchard aware that patient is ready for discharge today pending authorization   Will follow for authorization approval

## 2021-03-22 NOTE — BRIEF OP NOTE (RAD/CATH)
INTERVENTIONAL RADIOLOGY PROCEDURE NOTE    Date: 3/22/2021    Procedure: Procedure name not found  Preoperative diagnosis:   1  CVA (cerebral vascular accident) ([address] )    2  Urinary retention    3  Chronic kidney disease, unspecified CKD stage         Postoperative diagnosis: Same  Surgeon: Dle Ardon MD     Assistant: None  No qualified resident was available  Blood loss: None    Specimens: None     Findings: Successful removal of tunneled right IJV Jorge Split Cath  HD catheter (70S x 62OA)    Complications: None immediate      Anesthesia: local

## 2021-03-22 NOTE — DISCHARGE SUMMARY
[address]  Discharge- Xavier Ana Rosa 1940, [de-identified] y o  male MRN: [id number]  Unit/Bed#: [address] [facility]- Encounter: [id number]  Primary Care Provider: Miguel Lentz MD   Date and time admitted to hospital: 3/18/2021  1:36 PM    * Acute ischemic stroke [facility])  Assessment & Plan  Patient presented with bilateral leg weakness right more than left  CT scan of the brain showed acute sphenoid sinusitis without any acute intracranial abnormality  MRI of the brain showed punctate acute lacunar infarct in the left parietal periventricular region with chronic microangiopathic changes and ventriculomegaly  Limited echo with bubble study showed no evidence of right-to-left shunt  Patient cannot get CTA due to chronic kidney disease  Carotid Doppler showed less than 50% stenosis bilaterally  Lipid panel showed LDL level of  80  hemoglobin A1c was 6 6  Continue PT/OT  Tight control of blood sugars  Patient to continue dual antiplatelet therapy for 3 months and Lipitor 80 milligram daily    Acute renal failure superimposed on chronic kidney disease [facility])  Assessment & Plan  Lab Results   Component Value Date    EGFR 23 03/22/2021    EGFR 27 03/21/2021    EGFR 25 03/20/2021    CREATININE 2 50 (H) 03/22/2021    CREATININE 2 22 (H) 03/21/2021    CREATININE 2 39 (H) 03/20/2021   Patient has chronic kidney disease stage 4 with baseline creatinine 2-2 5  Patient recently developed acute kidney injury on chronic kidney disease secondary to contrast induced diabetic nephropathy and has been on dialysis since then  Patient's last session of hemodialysis was on March 16th and Outpatient Nephrology considering taking him off dialysis    Patient had 24 hour urine done as outpatient  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    Elevated troponin  Assessment & Plan  Likely non MI troponin elevation in the setting of chronic kidney disease  Held off on checking further serial troponins as patient has known history of severe 3 vessel CAD and patient is asymptomatic    Coronary artery disease  Assessment & Plan  Patient has history of CAD status post 2 vessel bypass  Patient underwent cardiac catheterization in January which showed severe three-vessel disease with 99% occluded proximal circumflex, 50% distal left main, mid % occluded, RCA proximal 100% occluded with collaterals from left circulation  Graft from LIMA to LAD is widely patent  Graft to obtuse marginal 1 has ostial around 40% stenosis but no flow limiting noted  Continue medical management with Imdur, metoprolol, nitroglycerin p r n , aspirin and statin    Essential tremor  Assessment & Plan  Patient placed on low-dose primidone 50 milligram q h s  With improved tremors    Benign hypertension with chronic kidney disease, stage III  Assessment & Plan  Lab Results   Component Value Date    EGFR 23 03/22/2021    EGFR 27 03/21/2021    EGFR 25 03/20/2021    CREATININE 2 50 (H) 03/22/2021    CREATININE 2 22 (H) 03/21/2021    CREATININE 2 39 (H) 03/20/2021   Continue hydralazine 25 milligram p o  Q 8 hours, Lopressor 25 milligram p o  B i d  Demadex was decreased to 40 milligram p o   Daily    Type 2 diabetes mellitus with hyperglycemia, with long-term current use of insulin [facility])  Assessment & Plan  Lab Results   Component Value Date    HGBA1C 6 6 (H) 03/19/2021       Recent Labs     03/21/21  2059 03/22/21  0713 03/22/21  1152 03/22/21  1611   POCGLU 302* 161* 236* 257* Blood Sugar Average: Last 72 hrs:  (P) 791 [id number]   Continue Levemir 20 units subcutaneously daily with Humalog sliding scale  Patient will be on diabetic diet    Hypercholesteremia  Assessment & Plan  Continue Lipitor 40 milligram p o  Daily  Lipid panel as stated above    Bladder cancer [facility])  Assessment & Plan  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  Patient did have outpatient cystoscopy yesterday which showed no recurrence  Patient had urinary retention and was placed on Flomax and had a Marinelli catheter  Marinelli catheter was discontinued on March 20, 2021 and patient has been urinating well without any postvoid residuals        Discharging Physician / Practitioner: Drew Askew MD  PCP: Ortiz Simental MD  Admission Date:   Admission Orders (From admission, onward)     Ordered        03/18/21 1606  Inpatient Admission  Once                   Discharge Date: 03/22/21    Resolved Problems  Date Reviewed: 3/22/2021          Resolved    Hypokalemia 3/20/2021     Resolved by  Drew Askew MD          Consultations During Hospital Stay:  · Neurology    Procedures Performed:   · Limited echo showed no evidence of right-to-left shunt     Outpatient Tests Requested:  · Follow-up with Neurology in 8-9IGHAK    Complications:  None    Reason for Admission:     Hospital Course:     Jes Hernandez is a [de-identified] y o  male patient with past medical history of hypertension, hyperlipidemia, CAD status post CABG, diabetes, bladder cancer, acute kidney injury secondary to contrast induced nephropathy on dialysis, urinary retention who originally presented to the hospital on 3/18/2021 due to bilateral leg weakness and shakiness  Patient had CT scan of the brain which was unremarkable and later underwent MRI of the brain which showed acute infarction  Patient was admitted to the hospital   Patient was loaded with Plavix and was continued on Plavix    Later patient underwent carotid Dopplers which showed no evidence of stenosis  During hospital stay patient Marinelli catheter was removed which he had upon admission due to noted tension patient has been urinating well without any postvoid residuals  Patient was also seen by Nephrology as patient was trying to come off dialysis as outpatient  Patient's creatinine continued to remain stable with good urine output with stable creatinine  Will get this removed as per Nephrology recommendations  Patient was seen by Physical therapy and will be discharged to short-term rehabilitation for continued PT        Please see above list of diagnoses and related plan for additional information  Condition at Discharge: stable     Discharge Day Visit / Exam:     Subjective:  Patient is feeling well  Denies any chest pain  No further shakiness  Patient did have good bowel movement yesterday  Vitals: Blood Pressure: 105/58 (03/22/21 1530)  Pulse: 75 (03/22/21 1530)  Temperature: 98 3 °F (36 8 °C) (03/22/21 1530)  Temp Source: Oral (03/22/21 0800)  Respirations: 18 (03/22/21 1530)  Height: 6' 1" (185 4 cm) (03/21/21 0755)  Weight - Scale: 89 1 kg (196 lb 6 9 oz) (03/18/21 1338)  SpO2: 97 % (03/22/21 1530)  Exam:   Physical Exam  Constitutional:       Appearance: Normal appearance  HENT:      Head: Normocephalic and atraumatic  Eyes:      Extraocular Movements: Extraocular movements intact  Pupils: Pupils are equal, round, and reactive to light  Neck:      Musculoskeletal: Normal range of motion and neck supple  Cardiovascular:      Rate and Rhythm: Normal rate and regular rhythm  Heart sounds: No murmur  No gallop  Pulmonary:      Effort: Pulmonary effort is normal       Breath sounds: Normal breath sounds  Abdominal:      General: Bowel sounds are normal       Palpations: Abdomen is soft  Tenderness: There is no abdominal tenderness  Musculoskeletal: Normal range of motion           General: No swelling or deformity  Skin:     General: Skin is warm and dry  Neurological:      General: No focal deficit present  Mental Status: He is alert  Discharge instructions/Information to patient and family:   See after visit summary for information provided to patient and family  Provisions for Follow-Up Care:  See after visit summary for information related to follow-up care and any pertinent home health orders  Disposition:     Natasha Neal at [facility]    Planned Readmission: No     Discharge Statement:  I spent 35  minutes discharging the patient  This time was spent on the day of discharge  I had direct contact with the patient on the day of discharge  Greater than 50% of the total time was spent examining patient, answering all patient questions, arranging and discussing plan of care with patient as well as directly providing post-discharge instructions  Additional time then spent on discharge activities  Discharge Medications:  See after visit summary for reconciled discharge medications provided to patient and family        ** Please Note: This note has been constructed using a voice recognition system **

## 2021-03-22 NOTE — PHYSICAL THERAPY NOTE
PT TREATMENT     03/22/21 1415   Note Type   Note Type Treatment   Pain Assessment   Pain Assessment Tool Pain Assessment not indicated - pt denies pain   Restrictions/Precautions   Other Precautions Fall Risk; Chair Alarm; Bed Alarm   General   Chart Reviewed Yes   Cognition   Overall Cognitive Status WFL   Subjective   Subjective "feeling better" "I miss my cat"   Transfers   Sit to Stand 4  Minimal assistance   Stand to Sit 4  Minimal assistance   Stand pivot 4  Minimal assistance   Additional items   (with walker)   Toilet transfer 4  Minimal assistance   Ambulation/Elevation   Gait pattern   (flexed posture)   Gait Assistance 4  Minimal assist   Assistive Device Rolling walker   Distance 3x75 feet with several episodes of tripping with fatigue   Balance   Static Sitting Good   Dynamic Sitting Fair +   Static Standing Fair   Dynamic Standing Fair -   Ambulatory Fair -   Activity Tolerance   Activity Tolerance Patient tolerated treatment well   Assessment   Prognosis Good   Problem List Decreased strength;Decreased endurance; Impaired balance;Decreased mobility   Assessment Pt demonstrates improving endurance, balance, mobility and function  Pt is highly motivated to regain his independence and should do well at STR  The patient's AM-PAC Basic Mobility Inpatient Short Form Raw Score is 18, Standardized Score is 41 05  A standardized score less than 42 9 suggests the patient may benefit from discharge to post-acute rehabilitation services  Plan   Treatment/Interventions ADL retraining;Functional transfer training;LE strengthening/ROM; Elevations; Therapeutic exercise; Endurance training;Gait training;Bed mobility; Equipment eval/education;Patient/family training   Recommendation   PT Discharge Recommendation [id number] Basic Mobility Inpatient   Turning in Bed Without Bedrails 4   Lying on Back to Sitting on Edge of Flat Bed 3   Moving Bed to Chair 3   Standing Up From Chair 3   Walk in Room 3   Climb 3-5 Stairs 2   Basic Mobility Inpatient Raw Score 18   Basic Mobility Standardized Score 82 92   Licensure   [place] License Number  Still MARCELINO [id number]

## 2021-03-22 NOTE — ASSESSMENT & PLAN NOTE
Lab Results   Component Value Date    EGFR 23 03/22/2021    EGFR 27 03/21/2021    EGFR 25 03/20/2021    CREATININE 2 50 (H) 03/22/2021    CREATININE 2 22 (H) 03/21/2021    CREATININE 2 39 (H) 03/20/2021   Patient has chronic kidney disease stage 4 with baseline creatinine 2-2 5  Patient recently developed acute kidney injury on chronic kidney disease secondary to contrast induced diabetic nephropathy and has been on dialysis since then  Patient's last session of hemodialysis was on March 16th and Outpatient Nephrology considering taking him off dialysis    Patient had 24 hour urine done as outpatient  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

## 2021-03-22 NOTE — CASE MANAGEMENT
Discharge ordered  Patient will be going to [facility]  for STR today  Facility obtained authorization Jessica Alonso number [id number], NRD 3/24)  DANAY initiated transport request  SLETS to call patient RN with a transport time

## 2021-03-22 NOTE — PLAN OF CARE
Problem: PHYSICAL THERAPY ADULT  Goal: Performs mobility at highest level of function for planned discharge setting  See evaluation for individualized goals  Description: Treatment/Interventions: ADL retraining, Functional transfer training, LE strengthening/ROM, Elevations, Therapeutic exercise, Endurance training, Gait training, Bed mobility, Equipment eval/education, Patient/family training          See flowsheet documentation for full assessment, interventions and recommendations  Outcome: Progressing  Note: Prognosis: Good  Problem List: Decreased strength, Decreased endurance, Impaired balance, Decreased mobility  Assessment: Pt demonstrates improving endurance, balance, mobility and function  Pt is highly motivated to regain his independence and should do well at STR  PT Discharge Recommendation: Post-Acute Rehabilitation Services          See flowsheet documentation for full assessment

## 2021-03-22 NOTE — ASSESSMENT & PLAN NOTE
Lab Results   Component Value Date    HGBA1C 6 6 (H) 03/19/2021       Recent Labs     03/21/21  2059 03/22/21  0713 03/22/21  1152 03/22/21  1611   POCGLU 302* 161* 236* 257*       Blood Sugar Average: Last 72 hrs:  (P) 427 [id number]   Continue Levemir 20 units subcutaneously daily with Humalog sliding scale    Patient will be on diabetic diet

## 2021-03-22 NOTE — NURSING NOTE
Patient for discharge today at [facility] [place]  Report given to Yara Linton  Informed that patient has no COVID Test done and stated its ok  Awaiting transport

## 2021-03-22 NOTE — ASSESSMENT & PLAN NOTE
Lab Results   Component Value Date    EGFR 23 03/22/2021    EGFR 27 03/21/2021    EGFR 25 03/20/2021    CREATININE 2 50 (H) 03/22/2021    CREATININE 2 22 (H) 03/21/2021    CREATININE 2 39 (H) 03/20/2021   Continue hydralazine 25 milligram p o  Q 8 hours, Lopressor 25 milligram p o  B i d  Demadex was decreased to 40 milligram p o   Daily

## 2021-03-22 NOTE — ASSESSMENT & PLAN NOTE
Likely non MI troponin elevation in the setting of chronic kidney disease  Held off on checking further serial troponins as patient has known history of severe 3 vessel CAD and patient is asymptomatic

## 2021-03-22 NOTE — [place] UNIVERS[place] TRANSF[place] FORM
[place] UN[place] FORM  (ALL ITEMS MUST BE COMPLETED)    1  TRANSFER FROM: [address]      TRANSFER TO: [facility]    2  DATE OF TRANSFER: 3/22/2021                        TIME OF TRANSFER: 1730    3  PATIENT NAME: AMADO Robledo      YOB: 1940                             GENDER: male    4  LANGUAGE:   English    5  PHYSICIAN NAME:  Merly Vega MD                   PHONE: [phone]    6  CODE STATUS: Level 1 - Full Code        Out of Hospital DNR Attached: No    7  :                                      :  Extended Emergency Contact Information  Primary Emergency Contact: Sherrie Weber   [address] Phone: [phone]  Relation: [id number] Representative/Proxy:  No           Legal Guardian:  No             NAME OF:           HEALTH CARE REPRESENTATIVE/PROXY:                                         OR           LEGAL GUARDIAN, IF NOT :                                               PHONE:  (Day)           (Night)                        (Cell)    8  REASON FOR TRANSFER: (Must include brief medical history and recent changes in physical function or cognition ) Stable            V/S: /58   Pulse 75   Temp 98 3 °F (36 8 °C)   Resp 18   Ht 6' 1" (1 854 m)   Wt 89 1 kg (196 lb 6 9 oz)   SpO2 97%   BMI 25 92 kg/m²           PAIN: None    9  PRIMARY DIAGNOSIS: Acute ischemic stroke (HCC)      Secondary Diagnosis:         Pacemaker: No      Internal Defib: No          Mental Health Diagnosis (if Applicable):    10  RESTRAINTS: No     11  RESPIRATORY NEEDS: None    12  ISOLATION/PRECAUTION: None    13  ALLERGY: Patient has no known allergies  14  SENSORY:       Vision Glasses    15  SKIN CONDITION: No Wounds    16  DIET: Special (describe)Diabetic     17  IV ACCESS: None    18  PERSONAL ITEMS SENT WITH PATIENT: Glasses and [employer] phone    19  ATTACHED DOCUMENTS: MUST ATTACH CURRENT MEDICATION INFORMATION Face Sheet, MAR, Medication Reconciliation, Diagnostic Studies, Code Status, Discharge Summary, PT Note, OT Note and HX/PE    20  AT RISK ALERTS:Falls and Pressure Ulcer        HARM TO: N/A    21  WEIGHT BEARING STATUS:         Left Leg: Full        Right Leg: Full    22  MENTAL STATUS:Alert and Oriented    23  FUNCTION:        Walk: With Help        Transfer: With Help        Toilet: With Help        Feed: Self    24  IMMUNIZATIONS/SCREENING:   There is no immunization history for the selected administration types on file for this patient  25  BOWEL: Continent    26  BLADDER: Continent    27   SENDING FACILITY CONTACT: W                Title: RN        Unit: 4N        Phone: [phone] [address] (if known):        Title:        Unit:         Phone:         FORM PREFILLED BY (if applicable)       Title:       Unit:        Phone:         FORM COMPLETED BY Sarah Santana RN      Title: RN      Phone: [phone]

## 2021-03-23 NOTE — UTILIZATION REVIEW
Notification of Discharge  This is a Notification of Discharge from our facility [address]  Please be advised that this patient has been discharge from our facility  Below you will find the admission and discharge date and time including the patients disposition  PRESENTATION DATE: 3/18/2021  1:36 PM  OBS ADMISSION DATE:   IP ADMISSION DATE: 3/18/21 1606   DISCHARGE DATE: 3/22/2021  8:16 PM  DISPOSITION: Non [facility] Non [facility] SN[facility]/SNU   Admission Orders listed below:  Admission Orders (From admission, onward)     Ordered        03/18/21 1606  Inpatient Admission  Once                   Please contact the UR Department if additional information is required to close this patient's authorization/case  [place]  [facility] Utilization Review Department  Main: [phone] x carefully listen to the prompts  All voicemails are confidential   [email or web address]  Send all requests for admission clinical reviews, approved or denied determinations and any other requests to dedicated fax number below belonging to the campus where the patient is receiving treatment   List of dedicated fax numbers:  [address] DENIALS (Administrative/Medical Necessity) [phone]   [address] (Maternity/NICU/Pediatrics) [phone] 54[address] [facility] [phone]   Harrison Hoffman [phone]   Felipe Maynard [phone]   [facility] 1525 [facility] [phone]   [facility]  [phone]   [address] [facility]  2401 [facility] [address] [facility] [phone]

## 2021-03-29 NOTE — TELEPHONE ENCOUNTER
[address] at [phone] and spoke to Maisie Closs she stated pt is short term and to schedule an in office visit  Sched HFU appt 6/3/2021 with Dr Selene Browning in [facility]  Mailed NP paperwork to pt's home  SLW/Extremity Weakness/[employer]    NOTE FROM CHART:  Chief Complaint   Patient presents with    Extremity Weakness   Terry Aguilar will need follow up in 2-3 months with general attending or advance practitioner  He will not require outpatient neurological testing

## 2021-04-28 NOTE — ASSESSMENT & PLAN NOTE
Wt Readings from Last 3 Encounters:   01/21/21 99 6 kg (219 lb 9 3 oz)   07/15/19 99 5 kg (219 lb 5 7 oz)   12/05/16 99 4 kg (219 lb 2 2 oz)     Management as per primary team h/o seizures last 5 month ago, follows simple commends

## 2021-06-02 NOTE — TELEPHONE ENCOUNTER
THE [facility] for patient to [facility] and confirm appointment with Dr Mary Carlson  Gave direct numbers in [place]

## 2021-08-17 PROBLEM — N18.4 CKD (CHRONIC KIDNEY DISEASE) STAGE 4, GFR 15-29 ML/MIN (HCC): Status: ACTIVE | Noted: 2021-01-01

## 2021-08-17 PROBLEM — I21.4 NSTEMI (NON-ST ELEVATED MYOCARDIAL INFARCTION) (HCC): Status: ACTIVE | Noted: 2021-01-01

## 2021-08-17 NOTE — CONSULTS
Consultation - Cardiology   Courtney Bryan [de-identified] y o  male MRN: [id number]  Unit/Bed#: [address] [place] [address] Encounter: [id number]    Assessment/Plan     Assessment:  1  MI type 2 in the setting of uncontrolled hypertension and known significant coronary artery disease to native vessels demonstrated by positive troponins and ischemic changes on EKG  2  Chronic kidney disease stage 4  3  Hypertension  4  Diabetes  5  Coronary artery disease with history of CABG x3 in 2016       Plan:  Patient has been admitted to the hospitalist service  1  Agree with based heparin due to abnormal troponins the setting MI type 2  Will continue heparin for 48 hours  2  Continue high-intensity statin therapy with Lipitor 80 mg daily    3  Continue aspirin and Plavix with monitoring for bleeding    4  Add PPI for GI prophylaxis    5  Continue beta-blocker, Lopressor 25 mg q 12 hours    6  Diabetic management per primary team     7  Avoid ACE/ARB and NSAIDs due to history of chronic kidney disease    8  Will repeat 2D echocardiogram to re-evaluate EF and wall motion  9  Continue trend troponins until they peak  History of Present Illness   Physician Requesting Consult: Nathan De La Cruz MD  Reason for Consult / Principal Problem:  Type 2 MI, chest pain, hypertensive urgency      HPI: Courtney Bryan is a [de-identified]y o  year old male who presented to the emergency room with complaints of 2-3 day history chest discomfort which worsened with exertion  His 1st troponin was 0 13, and initial EKG demonstrated ST depression 2 mm seen in the lateral leads  Patient does have a history for coronary artery disease with previous coronary artery bypass grafting x3 done in 2016  Patient recently underwent cardiac catheterization in January of 2021 which demonstrated severe triple-vessel disease to his native arteries, but LIMA to LAD and SVG to LAD were noted to be patent, SVG to obtuse marginal 2  Demonstrated a 40% ostial lesion    At that time optimal medical management was elected  Unfortunately postprocedure patient did have contrast induced nephropathy requiring short period of hemodialysis  Patient previously been following with [facility]  His last appointment was in March of 2021  Other history includes chronic kidney disease stage 4, diabetes, hypertension, dyslipidemia and diastolic heart failure  Inpatient consult to Cardiology  Consult performed by: TABITHA Perkins  Consult ordered by: Awais Cordova PA-C          Review of Systems   Constitutional: Negative  Negative for activity change, diaphoresis and fever  HENT: Negative  Negative for congestion, facial swelling, postnasal drip and sore throat  Eyes: Negative  Negative for photophobia and visual disturbance  Respiratory: Positive for chest tightness and shortness of breath  Cardiovascular: Positive for chest pain  Negative for palpitations and leg swelling  Gastrointestinal: Negative  Negative for abdominal distention, blood in stool, constipation, diarrhea and vomiting  Endocrine: Negative  Negative for polydipsia, polyphagia and polyuria  Genitourinary: Negative  Negative for difficulty urinating  Musculoskeletal: Negative  Negative for arthralgias  Skin: Negative  Neurological: Negative for dizziness  Hematological: Negative  Psychiatric/Behavioral: Negative          Historical Information   Past Medical History:   Diagnosis Date    Acute on chronic diastolic CHF (congestive heart failure) (HCC) 7/4/2019    Arthritis     Back pain     Bladder cancer ([facility] [address] )     diagnosed  2/2016    Cancer [facility])     bladder; chemo; resection;     Constipation 2/1/2021    Coronary artery disease     has 2 coronary stents    Dental crowns present      5    Diabetes mellitus ([address] )     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[facility] [address] )     fluid behind right eye    Hematuria     hx of    High anion gap metabolic acidosis 2/25/4959    Hypercholesteremia     Hypertension     Kidney stones     Wears glasses      Past Surgical History:   Procedure Laterality Date    ABDOMINAL SURGERY      CARDIAC SURGERY      CHOLECYSTECTOMY      open    CORONARY ANGIOPLASTY WITH STENT PLACEMENT      2 stents  2009    CYSTECTOMY, PARTIAL N/A 11/30/2016    Procedure: PARTIAL CYSTECTOMY, LEFT LYMPHADENECTOMY;  Surgeon: Rio Pedroza MD;  Location: [address] [place] [address];  Service:    Jojo Carney Left 10/20/2016    Procedure: CYSTOSCOPY, BILATERAL RETROGRADE PYLEOGRAM, LEFT SIDE BLADDER BIOPSY, TURBT;  Surgeon: Rio Pedroza MD;  Location: [address] [place] [address];  Service:     HERNIA REPAIR      repair RIH    IR NON-TUNNELED CENTRAL LINE PLACEMENT  1/25/2021    IR TEMPORARY DIALYSIS CATHETER PLACEMENT  7/8/2019    IR TUNNELED CENTRAL LINE PLACEMENT  2/10/2021    IR TUNNELED DIALYSIS CATHETER PLACEMENT  7/12/2019    IR TUNNELED DIALYSIS CATHETER REMOVAL  9/9/2019    IR TUNNELED DIALYSIS CATHETER REMOVAL  3/22/2021    FL CYSTOURETHROSCOPY N/A 2/25/2016    Procedure: CYSTOSCOPY;  Surgeon: Rio Pedroza MD;  Location: [address] [place] [address];  Service: Urology    FL CYSTOURETHROSCOPY,FULGUR 2-5 CM LESN N/A 2/25/2016    Procedure: TRANSURETHRAL RESECTION OF BLADDER TUMOR (TURBT);   Surgeon: Rio Pedroza MD;  Location: [address] [place] [address];  Service: Urology    TRANSURETHRAL RESECTION OF BLADDER TUMOR N/A 7/7/2016    Procedure: TRANSURETHRAL RESECTION OF BLADDER TUMOR (TURBT), Cystoscopy, deep biopsy;  Surgeon: Rio Pedroza MD;  Location: [address] [place] [address];  Service:     TRANSURETHRAL RESECTION OF BLADDER TUMOR Bilateral 6/9/2016    Procedure: TRANSURETHRAL RESECTION OF BLADDER TUMOR (TURBT), Cysto, retrograde, BLADDER BIOPSY;  Surgeon: Rio Pedroza MD;  Location: [facility];  Service:      Social History     Substance and Sexual Activity   Alcohol Use Yes    Comment: socially     Social History     Substance and Sexual Activity   Drug Use No     E-Cigarette/Vaping     E-Cigarette/Vaping Substances     Social History     Tobacco Use   Smoking Status Former Smoker    Packs/day: 0 50    Years: 5 00    Pack years: 2 50    Quit date:     Years since quittin 6   Smokeless Tobacco Never Used     Family History:   Family History   Problem Relation Age of Onset    Heart disease Mother     Diabetes Mother     Diabetes Father        Meds/Allergies   all current active meds have been reviewed, current meds:   Current Facility-Administered Medications   Medication Dose Route Frequency    aspirin (ECOTRIN LOW STRENGTH) EC tablet 81 mg  81 mg Oral Daily    atorvastatin (LIPITOR) tablet 80 mg  80 mg Oral QPM    clopidogrel (PLAVIX) tablet 75 mg  75 mg Oral Daily    docusate sodium (COLACE) capsule 100 mg  100 mg Oral BID    escitalopram (LEXAPRO) tablet 10 mg  10 mg Oral HS    heparin (porcine) 25,000 units in 0 45% NaCl 250 mL infusion (premix)  3-20 Units/kg/hr (Order-Specific) Intravenous Titrated    hydrALAZINE (APRESOLINE) tablet 25 mg  25 mg Oral Q6H    insulin detemir (LEVEMIR) subcutaneous injection 20 Units  20 Units Subcutaneous Q12H [address]    isosorbide mononitrate (IMDUR) 24 hr tablet 30 mg  30 mg Oral Daily    metoprolol tartrate (LOPRESSOR) tablet 25 mg  25 mg Oral Q12H [address]    nitroglycerin (NITROSTAT) SL tablet 0 4 mg  0 4 mg Sublingual Q5 Min PRN    pneumococcal 13-valent conjugate vaccine (PREVNAR-13) IM injection 0 5 mL  0 5 mL Intramuscular Prior to discharge    polyethylene glycol (MIRALAX) packet 17 g  17 g Oral Daily PRN    primidone (MYSOLINE) tablet 50 mg  50 mg Oral HS    torsemide (DEMADEX) tablet 40 mg  40 mg Oral Daily    and PTA meds:   Prior to Admission Medications   Prescriptions Last Dose Informant Patient Reported?  Taking?   aspirin (ASPIR-LOW) 81 mg EC tablet 2021 at Unknown time  Yes Yes   Sig: Daily   atorvastatin (LIPITOR) 80 mg tablet 2021 at Unknown time  No Yes   Sig: Take 1 tablet (80 mg total) by mouth every evening   bisacodyl (DULCOLAX) 10 mg suppository Not Taking at Unknown time  No No   Sig: Insert 1 suppository (10 mg total) into the rectum daily   Patient not taking: Reported on 8/17/2021   clopidogrel (PLAVIX) 75 mg tablet 8/16/2021 at Unknown time  No Yes   Sig: Take 1 tablet (75 mg total) by mouth daily   docusate sodium (COLACE) 100 mg capsule 8/16/2021 at Unknown time  No Yes   Sig: Take 1 capsule (100 mg total) by mouth 2 (two) times a day   escitalopram (LEXAPRO) 10 mg tablet 8/16/2021 at Unknown time  No Yes   Sig: Take 1 tablet (10 mg total) by mouth daily at bedtime   hydrALAZINE (APRESOLINE) 25 mg tablet 8/16/2021 at Unknown time  No Yes   Sig: Take 1 tablet (25 mg total) by mouth every 8 (eight) hours   insulin detemir (LEVEMIR) 100 units/mL subcutaneous injection 8/16/2021 at Unknown time  No Yes   Sig: Inject 20 Units under the skin every 12 (twelve) hours   insulin lispro (HumaLOG) 100 units/mL injection 8/16/2021 at Unknown time  No Yes   Sig: Inject 1-6 Units under the skin daily at bedtime   insulin lispro (HumaLOG) 100 units/mL injection 8/16/2021 at Unknown time  No Yes   Sig: Inject 2-12 Units under the skin 3 (three) times a day before meals   isosorbide mononitrate (IMDUR) 30 mg 24 hr tablet 8/16/2021 at Unknown time  No Yes   Sig: Take 1 tablet (30 mg total) by mouth daily   metoprolol tartrate (LOPRESSOR) 50 mg tablet 8/16/2021 at Unknown time  No Yes   Sig: Take 0 5 tablets (25 mg total) by mouth every 12 (twelve) hours   nitroglycerin (NITROSTAT) 0 4 mg SL tablet 8/16/2021 at Unknown time  No Yes   Sig: Place 1 tablet (0 4 mg total) under the tongue every 5 (five) minutes as needed for chest pain   polyethylene glycol (MIRALAX) 17 g packet Unknown at Unknown time  No No   Sig: Take 17 g by mouth daily as needed (Constipation)   primidone (MYSOLINE) 50 mg tablet 8/16/2021 at Unknown time  No Yes   Sig: Take 1 tablet (50 mg total) by mouth daily at bedtime   torsemide (DEMADEX) 20 mg tablet 8/16/2021 at Unknown time  No Yes   Sig: Take 2 tablets (40 mg total) by mouth daily      Facility-Administered Medications: None     No Known Allergies    Objective   Vitals: Blood pressure 158/72, pulse 60, temperature 98 3 °F (36 8 °C), resp  rate 17, height 6' 1" (1 854 m), weight 88 2 kg (194 lb 8 oz), SpO2 95 %  Orthostatic Blood Pressures      Most Recent Value   Blood Pressure  158/72 filed at 08/17/2021 0758   Patient Position - Orthostatic VS  Lying filed at 08/17/2021 0115            Intake/Output Summary (Last 24 hours) at 8/17/2021 0955  Last data filed at 8/17/2021 0201  Gross per 24 hour   Intake --   Output 50 ml   Net -50 ml       Invasive Devices     Peripheral Intravenous Line            Peripheral IV 08/16/21 1 day    Peripheral IV 08/17/21 Right Antecubital <1 day          Drain            Open Drain Left; Anterior; Other (Comment) LLQ 1720 days                Physical Exam  Vitals and nursing note reviewed  Constitutional:       Appearance: Normal appearance  He is obese  HENT:      Right Ear: External ear normal       Left Ear: External ear normal       Nose: Nose normal    Eyes:      General: No scleral icterus  Right eye: No discharge  Left eye: No discharge  Conjunctiva/sclera: Conjunctivae normal    Cardiovascular:      Rate and Rhythm: Normal rate and regular rhythm  Pulses: Normal pulses  Heart sounds: No murmur heard  Pulmonary:      Effort: Pulmonary effort is normal  No respiratory distress  Breath sounds: Normal breath sounds  No wheezing, rhonchi or rales  Abdominal:      General: Bowel sounds are normal  There is no distension  Palpations: Abdomen is soft  Musculoskeletal:      Right lower leg: No edema  Left lower leg: No edema  Skin:     General: Skin is warm and dry  Capillary Refill: Capillary refill takes less than 2 seconds  Neurological:      General: No focal deficit present        Mental Status: He is alert and oriented to person, place, and time  Mental status is at baseline  Lab Results:   I have personally reviewed pertinent lab results      CBC with diff:   Results from last 7 days   Lab Units 08/17/21  0654   WBC Thousand/uL 10 88*   RBC Million/uL 3 19*   HEMOGLOBIN g/dL 9 8*   HEMATOCRIT % 31 5*   MCV fL 99*   MCH pg 30 7   MCHC g/dL 31 1*   RDW % 13 3   MPV fL 10 9   PLATELETS Thousands/uL 122*     CMP:   Results from last 7 days   Lab Units 08/17/21  0654 08/16/21  2354   SODIUM mmol/L 142 140   POTASSIUM mmol/L 4 8 4 5   CHLORIDE mmol/L 106 103   CO2 mmol/L 22 25   BUN mg/dL 67* 66*   CREATININE mg/dL 2 03* 2 16*   CALCIUM mg/dL 8 9 9 2   AST U/L  --  39   ALT U/L  --  70   ALK PHOS U/L  --  131*   EGFR ml/min/1 73sq m 30 28     Troponin:   0   Lab Value Date/Time    TROPONINI 13 12 (H) 08/17/2021 0654    TROPONINI 2 76 (H) 08/17/2021 0326    TROPONINI 0 13 (H) 08/16/2021 2354    TROPONINI 0 07 (H) 03/18/2021 1356    TROPONINI 7 37 (H) 01/22/2021 0937    TROPONINI 5 59 (H) 01/22/2021 0623    TROPONINI 1 58 (H) 01/22/2021 0104    TROPONINI 0 28 (H) 01/21/2021 2213    TROPONINI 0 03 01/21/2021 1812    TROPONINI 2 74 (H) 07/06/2019 1616    TROPONINI 3 56 (H) 07/06/2019 1211    TROPONINI 3 68 (H) 07/06/2019 0914    TROPONINI 5 16 (H) 07/06/2019 0553    TROPONINI 1 08 (H) 07/05/2019 2208    TROPONINI 0 13 (H) 07/05/2019 1921    TROPONINI 0 05 (H) 07/05/2019 1616    TROPONINI <0 02 07/03/2019 1045    TROPONINI 11 34 (H) 12/04/2016 2338    TROPONINI 19 28 (H) 12/04/2016 1811    TROPONINI 23 91 (H) 12/04/2016 1510    TROPONINI 14 27 (H) 12/04/2016 1126    TROPONINI 2 34 (H) 12/04/2016 0804    TROPONINI 0 39 (H) 12/04/2016 0440     BNP:   Results from last 7 days   Lab Units 08/17/21  0654   POTASSIUM mmol/L 4 8   CHLORIDE mmol/L 106   CO2 mmol/L 22   BUN mg/dL 67*   CREATININE mg/dL 2 03*   CALCIUM mg/dL 8 9   EGFR ml/min/1 73sq m 30     Coags:   Results from last 7 days   Lab Units 08/17/21  0654 08/17/21  0044   PTT seconds 60* 29   INR   --  1 20*     Magnesium:   Results from last 7 days   Lab Units 08/16/21  2354   MAGNESIUM mg/dL 2 3     Lipid Profile:     Imaging: I have personally reviewed pertinent reports      EKG:  Initial EKG demonstrated sinus rhythm with ST depression seen in the lateral leads, there was improvement over the 2nd and 3rd EKG  VTE Prophylaxis: Sequential compression device Trudy Paris     Code Status: Level 1 - Full Code  Advance Directive and Living Will:      Power of :    POLST:      Berto Merrill, [facility]  Cardiology

## 2021-08-17 NOTE — ASSESSMENT & PLAN NOTE
Patient presented with chest pain, substernal, 5/10, associated with some SOB and nausea  Patient has known CAD s/p CABG with recent cath in 1/2021 showing significant triple vessel CAD, patent grafts of LIMA to LAD and SVG to OM2  Follows with cardiology, opting for medical management    - EKG showed ST depression in I, aVL, V3-V6, no ST elevations  - troponin 0 13, continue to trend  - ED discussed with cardiology, patient will be started on heparin  - Received SL nitro x1 prior to arrival in ED with improvement in pain, currently 1/10, also received full dose of ASA  - Monitor on telemetry  - Continue home medications including Lopressor 25 mg BID, Plavix, ASA, Imdur  - States hydralazine was previously discontinued, but is currently taking hydralazine 25 mg Q6  - Cardiology consult appreciated

## 2021-08-17 NOTE — H&P
Karissa 45  H&P- Juve Almonte 1940, [de-identified] y o  male MRN: [id number]  Unit/Bed#: ED 08 Encounter: [id number]  Primary Care Provider: Jaja Pabon MD   Date and time admitted to hospital: 8/16/2021 11:44 PM    * NSTEMI (non-ST elevated myocardial infarction) [facility])  Assessment & Plan  Patient presented with chest pain, substernal, 5/10, associated with some SOB and nausea  Patient has known CAD s/p CABG with recent cath in 1/2021 showing significant triple vessel CAD, patent grafts of LIMA to LAD and SVG to OM2  Follows with cardiology, opting for medical management    - EKG showed ST depression in I, aVL, V3-V6, no ST elevations  - troponin 0 13, continue to trend  - ED discussed with cardiology, patient will be started on heparin  - Received SL nitro x1 prior to arrival in ED with improvement in pain, currently 1/10, also received full dose of ASA  - Monitor on telemetry  - Continue home medications including Lopressor 25 mg BID, Plavix, ASA, Imdur  - States hydralazine was previously discontinued, but is currently taking hydralazine 25 mg Q6  - Cardiology consult appreciated      CKD (chronic kidney disease) stage 4, GFR 15-29 ml/min [facility])  Assessment & Plan  Lab Results   Component Value Date    EGFR 28 08/16/2021    EGFR 23 03/22/2021    EGFR 27 03/21/2021    CREATININE 2 16 (H) 08/16/2021    CREATININE 2 50 (H) 03/22/2021    CREATININE 2 22 (H) 03/21/2021     Cr 2 16 on admission, at baseline  Continue to monitor w/ daily BMP    Hx of CABG  Assessment & Plan  Hx of CABG in 2016, s/p PCI to prior CABG  Cardiac cath in 1/2021 showed significant triple vessel CAD  Continue home medications as above    Benign hypertension with chronic kidney disease, stage III  Assessment & Plan  Continue Lopressor 25 mg Q12, hydralazine, Demadex 40 mg daily     Type 2 diabetes mellitus with hyperglycemia, with long-term current use of insulin [facility])  Assessment & Plan  Lab Results   Component Value Date    HGBA1C 6 6 (H) 03/19/2021       No results for input(s): POCGLU in the last 72 hours  Blood Sugar Average: Last 72 hrs:     Continue levemir 20 U Q12 with SSI coverage AC + QHS      Hypercholesteremia  Assessment & Plan  Continue atorvastatin 80 mg daily    VTE Prophylaxis: Heparin Drip  / sequential compression device   Code Status: Level 1  POLST: POLST is not applicable to this patient  Discussion with family: No    Anticipated Length of Stay:  Patient will be admitted on an Observation basis with an anticipated length of stay of  < 2 midnights  Justification for Hospital Stay: NSTEMI    Total Time for Visit, including Counseling / Coordination of Care: 30 minutes  Greater than 50% of this total time spent on direct patient counseling and coordination of care  Chief Complaint:   Chest pain    History of Present Illness:    Jes Hernandez is a [de-identified] y o  male who presents with PMH of HTN, HLD, DM2, CAD s/p CABG and PCI with triple vessel disease managed medically, CKD IV -previously on dialysis  He presented to the ED today due to progressively worsening chest pain  He states he has frequent episodes of chest pain and over the past few days it has been happening more frequently, and tonight the pain was more severe  The pain is substernal, sharp/shooting and rated 5/10 at its worst tonight  He received ASA and 1 SL nitro tab and pain is now 1/10  He reports some SOB and nausea associated with the chest pain  He has been checking his BP at home and states it has been as high as 190s  He is taking his medication regularly, no missed doses  He denies any palpitations, lightheadedness, dizziness, diaphoresis  Over the past few days he's had a few episodes of vomiting  Review of Systems:    Review of Systems   Constitutional: Negative for chills, diaphoresis, fatigue and fever  HENT: Negative for congestion, rhinorrhea and sore throat  Eyes: Negative for visual disturbance     Respiratory: Positive for shortness of breath  Negative for cough and wheezing  Cardiovascular: Positive for chest pain  Negative for palpitations and leg swelling  Gastrointestinal: Positive for nausea  Negative for abdominal distention, abdominal pain, diarrhea and vomiting  Genitourinary: Negative for dysuria, frequency, hematuria and urgency  Musculoskeletal: Negative for gait problem and myalgias  Skin: Negative for rash and wound  Neurological: Negative for dizziness, weakness, light-headedness, numbness and headaches          Past Medical and Surgical History:     Past Medical History:   Diagnosis Date    [facility] 7/4/2019    Arthritis     Back pain     Bladder cancer ([facility] )     diagnosed  2/2016    Cancer [facility])     bladder; chemo; resection;     Constipation 2/1/2021    Coronary artery disease     has 2 coronary stents    Dental crowns present      5    Diabetes mellitus ([place] [address] )     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[place] [address] )     fluid behind right eye    Hematuria     hx of    High anion gap metabolic acidosis 7/45/0857    Hypercholesteremia     Hypertension     Kidney stones     Wears glasses        Past Surgical History:   Procedure Laterality Date    ABDOMINAL SURGERY      CARDIAC SURGERY      CHOLECYSTECTOMY      open    CORONARY ANGIOPLASTY WITH STENT PLACEMENT      2 stents  2009    CYSTECTOMY, PARTIAL N/A 11/30/2016    Procedure: PARTIAL CYSTECTOMY, LEFT LYMPHADENECTOMY;  Surgeon: Elham Moncada MD;  Location: [address] [place] [address];  Service:     CYSTOSCOPY Left 10/20/2016    Procedure: CYSTOSCOPY, BILATERAL RETROGRADE PYLEOGRAM, LEFT SIDE BLADDER BIOPSY, TURBT;  Surgeon: Elham Moncada MD;  Location: [address] [place] [address];  Service:     HERNIA REPAIR      repair [facility]    IR NON-TUNNELED CENTRAL LINE PLACEMENT  1/25/2021    IR TEMPORARY DIALYSIS CATHETER PLACEMENT  7/8/2019    IR TUNNELED CENTRAL LINE PLACEMENT  2/10/2021    IR TUNNELED DIALYSIS CATHETER PLACEMENT  7/12/2019    IR TUNNELED DIALYSIS CATHETER REMOVAL  9/9/2019    IR TUNNELED DIALYSIS CATHETER REMOVAL  3/22/2021    [place] CYSTOURETHROSCOPY N/A 2/25/2016    Procedure: [place];  Surgeon: Helen Denise MD;  Location: [address] [place] [address];  Service: Urology    [place] CYSTOURETHROSCOPY,FULGUR 2-5 CM LESN N/A 2/25/2016    Procedure: TRANSURETHRAL RESECTION OF BLADDER TUMOR (TURBT); Surgeon: Helen Denise MD;  Location: [address] [place] [address];  Service: Urology    TRANSURETHRAL RESECTION OF BLADDER TUMOR N/A 7/7/2016    Procedure: TRANSURETHRAL RESECTION OF BLADDER TUMOR (TURBT), Cystoscopy, deep biopsy;  Surgeon: Helen Denise MD;  Location: [address] [place] [address];  Service:     TRANSURETHRAL RESECTION OF BLADDER TUMOR Bilateral 6/9/2016    Procedure: TRANSURETHRAL RESECTION OF BLADDER TUMOR (TURBT), Cysto, retrograde, BLADDER BIOPSY;  Surgeon: Helen Denise MD;  Location: [facility];  Service:        Meds/Allergies:    Prior to Admission medications    Medication Sig Start Date End Date Taking?  Authorizing Provider   aspirin (ASPIR-LOW) 81 mg EC tablet Daily 2/24/17  Yes Historical Provider, MD   atorvastatin (LIPITOR) 80 mg tablet Take 1 tablet (80 mg total) by mouth every evening 3/22/21  Yes Lance Vides MD   clopidogrel (PLAVIX) 75 mg tablet Take 1 tablet (75 mg total) by mouth daily 3/23/21 8/17/21 Yes Lance Vides MD   docusate sodium (COLACE) 100 mg capsule Take 1 capsule (100 mg total) by mouth 2 (two) times a day 3/22/21  Yes Lance Vides MD   escitalopram (LEXAPRO) 10 mg tablet Take 1 tablet (10 mg total) by mouth daily at bedtime 7/13/19  Yes Lona Brown MD   hydrALAZINE (APRESOLINE) 25 mg tablet Take 1 tablet (25 mg total) by mouth every 8 (eight) hours 2/12/21  Yes Oliverio Palmer MD   insulin detemir (LEVEMIR) 100 units/mL subcutaneous injection Inject 20 Units under the skin every 12 (twelve) hours 7/13/19  Yes Lona Brown MD   insulin lispro (HumaLOG) 100 units/mL injection 5976)    Physical Exam  Vitals reviewed  Constitutional:       General: He is not in acute distress  Appearance: He is well-developed  He is not ill-appearing or diaphoretic  HENT:      Head: Normocephalic and atraumatic  Cardiovascular:      Rate and Rhythm: Normal rate and regular rhythm  Heart sounds: Normal heart sounds  No murmur heard  No gallop  Pulmonary:      Effort: Pulmonary effort is normal  No respiratory distress  Breath sounds: Normal breath sounds  No wheezing, rhonchi or rales  Chest:      Chest wall: No tenderness  Abdominal:      General: Bowel sounds are normal  There is no distension  Palpations: Abdomen is soft  Tenderness: There is no abdominal tenderness  There is no guarding  Musculoskeletal:         General: No tenderness  Right lower leg: No edema  Left lower leg: No edema  Skin:     General: Skin is warm and dry  Findings: No erythema or rash  Neurological:      Mental Status: He is alert and oriented to person, place, and time  Psychiatric:         Mood and Affect: Mood normal          Behavior: Behavior normal          Additional Data:     Lab Results: I have personally reviewed pertinent reports  Results from last 7 days   Lab Units 08/16/21  2354   WBC Thousand/uL 11 43*   HEMOGLOBIN g/dL 10 6*   HEMATOCRIT % 33 1*   PLATELETS Thousands/uL 140*   NEUTROS PCT % 86*   LYMPHS PCT % 8*   MONOS PCT % 4   EOS PCT % 1     Results from last 7 days   Lab Units 08/16/21  2354   SODIUM mmol/L 140   POTASSIUM mmol/L 4 5   CHLORIDE mmol/L 103   CO2 mmol/L 25   BUN mg/dL 66*   CREATININE mg/dL 2 16*   ANION GAP mmol/L 12   CALCIUM mg/dL 9 2   ALBUMIN g/dL 3 8   TOTAL BILIRUBIN mg/dL 1 40*   ALK PHOS U/L 131*   ALT U/L 70   AST U/L 39   GLUCOSE RANDOM mg/dL 217*     Results from last 7 days   Lab Units 08/17/21  0044   INR  1 20*                   Imaging: I have personally reviewed pertinent reports        XR chest 1 view portable (Results Pending)       EKG, Pathology, and Other Studies Reviewed on Admission:   · EKG: sinus w/ 1st degree AVB, QTc 484, ST depression in I, aVL, V3-V6    Allscripts / Epic Records Reviewed: Yes     ** Please Note: This note has been constructed using a voice recognition system   **

## 2021-08-17 NOTE — PLAN OF CARE
Problem: Potential for Falls  Goal: Patient will remain free of falls  Description: INTERVENTIONS:  - Educate patient/family on patient safety including physical limitations  - Instruct patient to call for assistance with activity   - Consult OT/PT to assist with strengthening/mobility   - Keep Call bell within reach  - Keep bed low and locked with side rails adjusted as appropriate  - Keep care items and personal belongings within reach  - Initiate and maintain comfort rounds  - Make Fall Risk Sign visible to staff  - Apply yellow socks and bracelet for high fall risk patients  - Consider moving patient to room near nurses station  Outcome: Progressing     Problem: Nutrition/Hydration-ADULT  Goal: Nutrient/Hydration intake appropriate for improving, restoring or maintaining nutritional needs  Description: Monitor and assess patient's nutrition/hydration status for malnutrition  Collaborate with interdisciplinary team and initiate plan and interventions as ordered  Monitor patient's weight and dietary intake as ordered or per policy  Utilize nutrition screening tool and intervene as necessary  Determine patient's food preferences and provide high-protein, high-caloric foods as appropriate       INTERVENTIONS:  - Monitor oral intake, urinary output, labs, and treatment plans  - Assess nutrition and hydration status and recommend course of action  - Evaluate amount of meals eaten  - Assist patient with eating if necessary   - Allow adequate time for meals  - Recommend/ encourage appropriate diets, oral nutritional supplements, and vitamin/mineral supplements  - Order, calculate, and assess calorie counts as needed  - Recommend, monitor, and adjust tube feedings and TPN/PPN based on assessed needs  - Assess need for intravenous fluids  - Provide specific nutrition/hydration education as appropriate  - Include patient/family/caregiver in decisions related to nutrition  Outcome: Progressing     Problem: MOBILITY - ADULT  Goal: Maintain or return to baseline ADL function  Description: INTERVENTIONS:  -  Assess patient's ability to carry out ADLs; assess patient's baseline for ADL function and identify physical deficits which impact ability to perform ADLs (bathing, care of mouth/teeth, toileting, grooming, dressing, etc )  - Assess/evaluate cause of self-care deficits   - Assess range of motion  - Assess patient's mobility; develop plan if impaired  - Assess patient's need for assistive devices and provide as appropriate  - Encourage maximum independence but intervene and supervise when necessary  - Involve family in performance of ADLs  - Assess for home care needs following discharge   - Consider OT consult to assist with ADL evaluation and planning for discharge  - Provide patient education as appropriate  Outcome: Progressing  Goal: Maintains/Returns to pre admission functional level  Description: INTERVENTIONS:  - Perform BMAT or MOVE assessment daily    - Set and communicate daily mobility goal to care team and patient/family/caregiver  - Collaborate with rehabilitation services on mobility goals if consulted  - Perform Range of Motion 3 times a day  - Reposition patient every  2 hours    - Dangle patient 3 times a day  - Stand patient 3  times a day  - Ambulate patient  3 times a day  - Out of bed to chair 3 times a day   - Out of bed for meals 3  times a day  - Out of bed for toileting  - Record patient progress and toleration of activity level   Outcome: Progressing     Problem: CARDIOVASCULAR - ADULT  Goal: Maintains optimal cardiac output and hemodynamic stability  Description: INTERVENTIONS:  - Monitor I/O, vital signs and rhythm  - Monitor for S/S and trends of decreased cardiac output  - Administer and titrate ordered vasoactive medications to optimize hemodynamic stability  - Assess quality of pulses, skin color and temperature  - Assess for signs of decreased coronary artery perfusion  - Instruct patient to report change in severity of symptoms  Outcome: Progressing  Goal: Absence of cardiac dysrhythmias or at baseline rhythm  Description: INTERVENTIONS:  - Continuous cardiac monitoring, vital signs, obtain 12 lead EKG if ordered  - Administer antiarrhythmic and heart rate control medications as ordered  - Monitor electrolytes and administer replacement therapy as ordered  Outcome: Progressing

## 2021-08-17 NOTE — ASSESSMENT & PLAN NOTE
Lab Results   Component Value Date    HGBA1C 6 6 (H) 03/19/2021       No results for input(s): POCGLU in the last 72 hours      Blood Sugar Average: Last 72 hrs:     Continue levemir 20 U Q12 with SSI coverage AC + QHS

## 2021-08-17 NOTE — PHYSICAL THERAPY NOTE
PT EVALUATION       08/17/21 1330   Note Type   Note type Evaluation   Pain Assessment   Pain Assessment Tool Pain Assessment not indicated - pt denies pain   Home Living   Type of Home House   Home Layout Multi-level; Able to live on main level with bedroom/bathroom  (5+5 SILVINO to get to main living area)   [address] [facility]   Prior Function   Level of [place] Independent with ADLs and functional mobility   Lives With Alone   Receives Help From   (, )   ADL Assistance Independent   Vocational Retired   Restrictions/Precautions   Other Precautions Fall Risk   General   Additional Pertinent History Pt admitted with CP/NSTEMI  Cognition   Overall Cognitive Status WFL   RLE Assessment   RLE Assessment   (ROM WFL, MMT 4/5)   LLE Assessment   LLE Assessment   (ROM WFL, MMT 4/5)   Bed Mobility   Supine to Sit 4  Minimal assistance   Transfers   Sit to Stand 4  Minimal assistance   Stand to Sit 4  Minimal assistance   Stand pivot 4  Minimal assistance   Ambulation/Elevation   Gait Assistance   (supervision)   Assistive Device Rolling walker   Distance 50 feet   Balance   Static Sitting Fair +   Dynamic Sitting Fair +   Static Standing Fair +   Dynamic Standing Fair +   Ambulatory Fair   Assessment   Prognosis Good   Problem List Decreased strength;Decreased endurance; Impaired balance;Decreased mobility   Assessment Patient seen for Physical Therapy evaluation  Patient admitted with NSTEMI (non-ST elevated myocardial infarction) ([facility] )  Comorbidities affecting patient's physical performance include: CHF, DM, NSTEMI  Personal factors affecting patient at time of initial evaluation include: ambulating with assistive device, stairs to enter home and inability to perform dynamic tasks in community  Prior to admission, patient was independent with functional mobility with walker and independent with ADLS    Please find objective findings from Physical Therapy assessment regarding body systems outlined above with impairments and limitations including weakness, impaired balance, decreased endurance, decreased activity tolerance, decreased functional mobility tolerance and fall risk  The Barthel Index was used as a functional outcome tool presenting with a score of 65 today indicating moderate limitations of functional mobility and ADLS  Patient's clinical presentation is currently evolving as seen in patient's presentation of increased fall risk, new onset of impairment of functional mobility, decreased endurance and new onset of weakness  Pt would benefit from continued Physical Therapy treatment to address deficits as defined above and maximize level of functional mobility  As demonstrated by objective findings, the assigned level of complexity for this evaluation is moderate  The patient's AM-[facility] Basic Mobility Inpatient Short Form Raw Score is 18, Standardized Score is 41 05  A standardized score less than 42 9 suggests the patient may benefit from discharge to post-acute rehabilitation services, however anticipate pt will be able to go home with home PT upon DC  Goals   Patient Goals "get out of bed"   STG Expiration Date 08/24/21   Short Term Goal #1 independent bed mobility, independent transfers, independent ambulation indoor level surfaces 100 feet so pt can navigate his home, supervision up and down 10 steps so pt can enter and exit his home  LTG Expiration Date 08/31/21   Long Term Goal #1 independent ambulation with a walker outdoor level surfaces 150 feet so pt can go out to lunch  Plan   Treatment/Interventions ADL retraining;Functional transfer training;LE strengthening/ROM; Therapeutic exercise; Endurance training;Gait training;Bed mobility; Equipment eval/education;Patient/family training   PT Frequency 5x/wk   Recommendation   PT Discharge Recommendation Home with home health rehabilitation   Equipment Recommended   (pt has a walker)   Kimberly Jaffe in Bed Without Bedrails 3   Lying on Back to Sitting on Edge of Flat Bed 3   Moving Bed to Chair 3   Standing Up From Chair 3   Walk in Room 3   Climb 3-5 Stairs 3   Basic Mobility Inpatient Raw Score 18   Basic Mobility Standardized Score 41 05   Barthel Index   Feeding 10   Bathing 0   Grooming Score 0   Dressing Score 5   Bladder Score 10   Bowels Score 10   Toilet Use Score 5   Transfers (Bed/Chair) Score 10   Mobility (Level Surface) Score 10   Stairs Score 5   Barthel Index Score 72   Licensure   [place] License Number  James Fuller PT [id number]       Time In:1320  Time Out:1330  Total Time: 10      S: "It feels good to get up and walk  O:  Supervision ambulation in alfonso with rolling walker x 150 feet with multiple direction changes  A:  Pt tolerated activity well   Sp02 96% on RA, HR 84 bpm   P: Continue PT     Karma Nieto, PT

## 2021-08-17 NOTE — ED PROVIDER NOTES
History  Chief Complaint   Patient presents with    Chest Pain     Pt arrives via EMS with chest pain persisting for 2-3 days  Pt with history of CABG/Stent placement, pt took 324mg aspirin at home and was administered one SL nitroglycerin tablet via EMS, pt reports slight relief in pain at this time  [de-identified] y/o male presents with chest pain substernal started 2-3 days ago gradual onset pressure like continuous,worse on exertion nothing makes it better  No nausea,vomiting, no cough, no diaphoresis or weakness  History of CABG, s/p cardiac stent placement CAD in the past, last cardiac cath January of 2021 recommending medical management  Medics gave him nitro no relief from the medicine, also received aspirin  Denies any pleurisy or dyspnea  History provided by:  Patient   used: No        Prior to Admission Medications   Prescriptions Last Dose Informant Patient Reported?  Taking?   aspirin (ASPIR-LOW) 81 mg EC tablet 8/16/2021 at Unknown time  Yes Yes   Sig: Daily   atorvastatin (LIPITOR) 80 mg tablet 8/16/2021 at Unknown time  No Yes   Sig: Take 1 tablet (80 mg total) by mouth every evening   bisacodyl (DULCOLAX) 10 mg suppository Not Taking at Unknown time  No No   Sig: Insert 1 suppository (10 mg total) into the rectum daily   Patient not taking: Reported on 8/17/2021   clopidogrel (PLAVIX) 75 mg tablet 8/16/2021 at Unknown time  No Yes   Sig: Take 1 tablet (75 mg total) by mouth daily   docusate sodium (COLACE) 100 mg capsule 8/16/2021 at Unknown time  No Yes   Sig: Take 1 capsule (100 mg total) by mouth 2 (two) times a day   escitalopram (LEXAPRO) 10 mg tablet 8/16/2021 at Unknown time  No Yes   Sig: Take 1 tablet (10 mg total) by mouth daily at bedtime   hydrALAZINE (APRESOLINE) 25 mg tablet 8/16/2021 at Unknown time  No Yes   Sig: Take 1 tablet (25 mg total) by mouth every 8 (eight) hours   insulin detemir (LEVEMIR) 100 units/mL subcutaneous injection 8/16/2021 at Unknown time  No Yes Sig: Inject 20 Units under the skin every 12 (twelve) hours   insulin lispro (HumaLOG) 100 units/mL injection 8/16/2021 at Unknown time  No Yes   Sig: Inject 1-6 Units under the skin daily at bedtime   insulin lispro (HumaLOG) 100 units/mL injection 8/16/2021 at Unknown time  No Yes   Sig: Inject 2-12 Units under the skin 3 (three) times a day before meals   isosorbide mononitrate (IMDUR) 30 mg 24 hr tablet 8/16/2021 at Unknown time  No Yes   Sig: Take 1 tablet (30 mg total) by mouth daily   metoprolol tartrate (LOPRESSOR) 50 mg tablet 8/16/2021 at Unknown time  No Yes   Sig: Take 0 5 tablets (25 mg total) by mouth every 12 (twelve) hours   nitroglycerin (NITROSTAT) 0 4 mg SL tablet 8/16/2021 at Unknown time  No Yes   Sig: Place 1 tablet (0 4 mg total) under the tongue every 5 (five) minutes as needed for chest pain   polyethylene glycol (MIRALAX) 17 g packet Unknown at Unknown time  No No   Sig: Take 17 g by mouth daily as needed (Constipation)   primidone (MYSOLINE) 50 mg tablet 8/16/2021 at Unknown time  No Yes   Sig: Take 1 tablet (50 mg total) by mouth daily at bedtime   torsemide (DEMADEX) 20 mg tablet 8/16/2021 at Unknown time  No Yes   Sig: Take 2 tablets (40 mg total) by mouth daily      Facility-Administered Medications: None       Past Medical History:   Diagnosis Date    Acute on chronic diastolic CHF (congestive heart failure) (HCC) 7/4/2019    Arthritis     Back pain     [facility]adder [facility] ([facility] )     diagnosed  2/2016    Cancer [facility])     bladder; chemo; resection;     Constipation 2/1/2021    Coronary artery disease     has 2 coronary stents    Dental crowns present      5    Diabetes mellitus ([facility] )     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 behind right eye    Hematuria     hx of    High anion gap metabolic acidosis 5/20/9321    Hypercholesteremia     Hypertension     Kidney stones     Wears glasses        Past Surgical History:   Procedure Laterality Date    ABDOMINAL SURGERY  CARDIAC SURGERY      CHOLECYSTECTOMY      open    CORONARY ANGIOPLASTY WITH STENT PLACEMENT      2 stents  2009    CYSTECTOMY, PARTIAL N/A 11/30/2016    Procedure: PARTIAL CYSTECTOMY, LEFT LYMPHADENECTOMY;  Surgeon: Rio Pedroza MD;  Location: [address] [place] [address];  Service:    Birdcassie Dikes Left 10/20/2016    Procedure: CYSTOSCOPY, BILATERAL RETROGRADE PYLEOGRAM, LEFT SIDE BLADDER BIOPSY, TURBT;  Surgeon: Rio Pedroza MD;  Location: [address] [place] [address];  Service:     HERNIA REPAIR      repair RIH    IR NON-TUNNELED CENTRAL LINE PLACEMENT  1/25/2021    IR TEMPORARY DIALYSIS CATHETER PLACEMENT  7/8/2019    IR TUNNELED CENTRAL LINE PLACEMENT  2/10/2021    IR TUNNELED DIALYSIS CATHETER PLACEMENT  7/12/2019    IR TUNNELED DIALYSIS CATHETER REMOVAL  9/9/2019    IR TUNNELED DIALYSIS CATHETER REMOVAL  3/22/2021    RI CYSTOURETHROSCOPY N/A 2/25/2016    Procedure: CYSTOSCOPY;  Surgeon: Rio Pedroza MD;  Location: [address] [place] [address];  Service: Urology    RI CYSTOURETHROSCOPY,FULGUR 2-5 CM LESN N/A 2/25/2016    Procedure: TRANSURETHRAL RESECTION OF BLADDER TUMOR (TURBT); Surgeon: Rio Pedroza MD;  Location: [address] [place] [address];  Service: Urology    TRANSURETHRAL RESECTION OF BLADDER TUMOR N/A 7/7/2016    Procedure: TRANSURETHRAL RESECTION OF BLADDER TUMOR (TURBT), Cystoscopy, deep biopsy;  Surgeon: Rio Pedroza MD;  Location: [facility];  Service:     TRANSURETHRAL RESECTION OF BLADDER TUMOR Bilateral 6/9/2016    Procedure: TRANSURETHRAL RESECTION OF BLADDER TUMOR (TURBT), Cysto, retrograde, BLADDER BIOPSY;  Surgeon: Rio Pedroza MD;  Location: [facility];  Service:        Family History   Problem Relation Age of Onset    Heart disease Mother     Diabetes Mother     Diabetes Father      I have reviewed and agree with the history as documented      E-Cigarette/Vaping     E-Cigarette/Vaping Substances     Social History     Tobacco Use    Smoking status: Former Smoker     Packs/day: 0 50     Years: 5 00     Pack years: 2 50 Quit date: 65     Years since quittin 7    Smokeless tobacco: Never Used   Substance Use Topics    Alcohol use: Yes     Comment: socially    Drug use: No       Review of Systems   Constitutional: Negative  HENT: Negative  Eyes: Negative  Respiratory: Negative  Cardiovascular: Positive for chest pain  Gastrointestinal: Negative  Endocrine: Negative  Genitourinary: Negative  Musculoskeletal: Negative  Skin: Negative  Allergic/Immunologic: Negative  Neurological: Negative  Hematological: Negative  Psychiatric/Behavioral: Negative  All other systems reviewed and are negative  Physical Exam  Physical Exam  Constitutional:       Appearance: Normal appearance  HENT:      Head: Normocephalic and atraumatic  Nose: Nose normal       Mouth/Throat:      Mouth: Mucous membranes are moist    Eyes:      Extraocular Movements: Extraocular movements intact  Pupils: Pupils are equal, round, and reactive to light  Cardiovascular:      Rate and Rhythm: Normal rate and regular rhythm  Pulmonary:      Effort: Pulmonary effort is normal       Breath sounds: Normal breath sounds  Abdominal:      General: Abdomen is flat  Bowel sounds are normal       Palpations: Abdomen is soft  Musculoskeletal:         General: Normal range of motion  Cervical back: Normal range of motion and neck supple  Skin:     General: Skin is warm  Capillary Refill: Capillary refill takes less than 2 seconds  Neurological:      General: No focal deficit present  Mental Status: He is alert and oriented to person, place, and time  Mental status is at baseline  Psychiatric:         Mood and Affect: Mood normal          Thought Content:  Thought content normal          Vital Signs  ED Triage Vitals [21 2351]   Temp Pulse Respirations Blood Pressure SpO2   -- 85 18 (!) 185/79 96 %      Temp src Heart Rate Source Patient Position - Orthostatic VS BP Location FiO2 (%) -- Monitor Lying Right arm --      Pain Score       3           Vitals:    08/16/21 2351 08/17/21 0045   BP: (!) 185/79 (!) 180/81   Pulse: 85 80   Patient Position - Orthostatic VS: Lying Lying         Visual Acuity      ED Medications  Medications   heparin (porcine) 25,000 units in 0 45% NaCl 250 mL infusion (premix) (has no administration in time range)   nitroglycerin (NITROSTAT) SL tablet 0 4 mg (0 4 mg Sublingual Given 8/17/21 0036)       Diagnostic Studies  Results Reviewed     Procedure Component Value Units Date/Time    APTT six (6) hours after Heparin bolus/drip initiation or dosing change [[id number]]  (Normal) Collected: 08/17/21 0044    Lab Status: Final result Specimen: Blood from Arm, Left Updated: 08/17/21 0100     PTT 29 seconds     Protime-INR [[id number]]  (Abnormal) Collected: 08/17/21 0044    Lab Status: Final result Specimen: Blood from Arm, Left Updated: 08/17/21 0100     Protime 15 1 seconds      INR 1 20    Troponin I [[id number]]  (Abnormal) Collected: 08/16/21 2354    Lab Status: Final result Specimen: Blood from Arm, Left Updated: 08/17/21 0019     Troponin I 0 13 ng/mL     Comprehensive metabolic panel [[id number]]  (Abnormal) Collected: 08/16/21 2354    Lab Status: Final result Specimen: Blood from Arm, Left Updated: 08/17/21 0018     Sodium 140 mmol/L      Potassium 4 5 mmol/L      Chloride 103 mmol/L      CO2 25 mmol/L      ANION GAP 12 mmol/L      BUN 66 mg/dL      Creatinine 2 16 mg/dL      Glucose 217 mg/dL      Calcium 9 2 mg/dL      AST 39 U/L      ALT 70 U/L      Alkaline Phosphatase 131 U/L      Total Protein 8 3 g/dL      Albumin 3 8 g/dL      Total Bilirubin 1 40 mg/dL      eGFR 28 ml/min/1 73sq m     Narrative:      [place] guidelines for Chronic Kidney Disease (CKD):     Stage 1 with normal or high GFR (GFR > 90 mL/min/1 73 square meters)    Stage 2 Mild CKD (GFR = 60-89 mL/min/1 73 square meters)    Stage 3A Moderate CKD (GFR = 45-59 mL/min/1 73 square meters)    Stage 3B Moderate CKD (GFR = 30-44 mL/min/1 73 square meters)    Stage 4 Severe CKD (GFR = 15-29 mL/min/1 73 square meters)    Stage 5 End Stage CKD (GFR <15 mL/min/1 73 square meters)  Note: GFR calculation is accurate only with a steady state creatinine    Magnesium [[id number]]  (Normal) Collected: 08/16/21 2354    Lab Status: Final result Specimen: Blood from Arm, Left Updated: 08/17/21 0017     Magnesium 2 3 mg/dL     CBC and differential [[id number]]  (Abnormal) Collected: 08/16/21 2354    Lab Status: Final result Specimen: Blood from Arm, Left Updated: 08/16/21 2359     WBC 11 43 Thousand/uL      RBC 3 46 Million/uL      Hemoglobin 10 6 g/dL      Hematocrit 33 1 %      MCV 96 fL      MCH 30 6 pg      MCHC 32 0 g/dL      RDW 13 3 %      MPV 10 8 fL      Platelets 160 Thousands/uL      nRBC 0 /100 WBCs      Neutrophils Relative 86 %      Immat GRANS % 1 %      Lymphocytes Relative 8 %      Monocytes Relative 4 %      Eosinophils Relative 1 %      Basophils Relative 0 %      Neutrophils Absolute 9 85 Thousands/µL      Immature Grans Absolute 0 07 Thousand/uL      Lymphocytes Absolute 0 91 Thousands/µL      Monocytes Absolute 0 49 Thousand/µL      Eosinophils Absolute 0 07 Thousand/µL      Basophils Absolute 0 04 Thousands/µL                  XR chest 1 view portable    (Results Pending)              Procedures  ECG 12 Lead Documentation Only    Date/Time: 8/16/2021 11:54 PM  Performed by: Rinku Lorenzana DO  Authorized by: Rinku Lorenzana DO     Patient location:  ED  Previous ECG:     Previous ECG:  Unavailable    Comparison to cardiac monitor: Yes    Interpretation:     Interpretation: non-specific    Rate:     ECG rate assessment: normal    Rhythm:     Rhythm: sinus rhythm    Ectopy:     Ectopy: none    QRS:     QRS axis:  Normal  Conduction:     Conduction: normal    ST segments:     ST segments:  Non-specific  T waves:     T waves: non-specific               ED Course HEART Risk Score      Most Recent Value   Heart Score Risk Calculator   History  1 Filed at: 08/17/2021 0103   ECG  1 Filed at: 08/17/2021 0103   Age  2 Filed at: 08/17/2021 0103   Risk Factors  2 Filed at: 08/17/2021 0103   Troponin  1 Filed at: 08/17/2021 0103   HEART Score  7 Filed at: 08/17/2021 0103                      SBIRT 22yo+      Most Recent Value   SBIRT (23 yo +)   In order to provide better care to our patients, we are screening all of our patients for alcohol and drug use  Would it be okay to ask you these screening questions? No Filed at: 08/17/2021 0006                    MDM  Number of Diagnoses or Management Options  Chest pain  NSTEMI (non-ST elevated myocardial infarction) [facility])  Diagnosis management comments: Spoke to cardiology dr Maribel Kennedy, recommended admit for observation  Amount and/or Complexity of Data Reviewed  Clinical lab tests: ordered and reviewed  Tests in the radiology section of CPT®: ordered and reviewed  Tests in the medicine section of CPT®: ordered and reviewed    Patient Progress  Patient progress: stable      Disposition  Final diagnoses:   Chest pain   NSTEMI (non-ST elevated myocardial infarction) ([address] )     Time reflects when diagnosis was documented in both MDM as applicable and the Disposition within this note     Time User Action Codes Description Comment    8/17/2021 12:37 AM Yelena Lucretiakristen Uriostegui Add [R07 9] Chest pain     8/17/2021 12:37 AM Henny Kirk Add [I21 4] NSTEMI (non-ST elevated myocardial infarction) [facility])       ED Disposition     ED Disposition Condition Date/Time Comment    Admit Stable Tue Aug 17, 2021 12:37 AM          Follow-up Information    None         Patient's Medications   Discharge Prescriptions    No medications on file     No discharge procedures on file      PDMP Review     None          ED Provider  Electronically Signed by           Sharmila Winn DO  08/17/21 0103       Henny Kirk DO  08/17/21 [id number]

## 2021-08-17 NOTE — PLAN OF CARE
Problem: Potential for Falls  Goal: Patient will remain free of falls  Description: INTERVENTIONS:  - Educate patient/family on patient safety including physical limitations  - Instruct patient to call for assistance with activity   - Consult OT/PT to assist with strengthening/mobility   - Keep Call bell within reach  - Keep bed low and locked with side rails adjusted as appropriate  - Keep care items and personal belongings within reach  - Initiate and maintain comfort rounds  - Make Fall Risk Sign visible   - Initiate/Maintain bed/ chair alarm  - Apply yellow socks and bracelet for high fall risk patients  - Consider moving patient to room near nurses station  Outcome: Progressing     Problem: Nutrition/Hydration-ADULT  Goal: Nutrient/Hydration intake appropriate for improving, restoring or maintaining nutritional needs  Description: Monitor and assess patient's nutrition/hydration status for malnutrition  Collaborate with interdisciplinary team and initiate plan and interventions as ordered  Monitor patient's weight and dietary intake as ordered or per policy  Utilize nutrition screening tool and intervene as necessary  Determine patient's food preferences and provide high-protein, high-caloric foods as appropriate       INTERVENTIONS:  - Monitor oral intake, urinary output, labs, and treatment plans  - Assess nutrition and hydration status and recommend course of action  - Evaluate amount of meals eaten  - Assist patient with eating if necessary   - Allow adequate time for meals  - Recommend/ encourage appropriate diets, oral nutritional supplements, and vitamin/mineral supplements  - Order, calculate, and assess calorie counts as needed  - Recommend, monitor, and adjust tube feedings and TPN/PPN based on assessed needs  - Assess need for intravenous fluids  - Provide specific nutrition/hydration education as appropriate  - Include patient/family/caregiver in decisions related to nutrition  Outcome: Progressing     Problem: MOBILITY - ADULT  Goal: Maintain or return to baseline ADL function  Description: INTERVENTIONS:  -  Assess patient's ability to carry out ADLs; assess patient's baseline for ADL function and identify physical deficits which impact ability to perform ADLs (bathing, care of mouth/teeth, toileting, grooming, dressing, etc )  - Assess/evaluate cause of self-care deficits   - Assess range of motion  - Assess patient's mobility; develop plan if impaired  - Assess patient's need for assistive devices and provide as appropriate  - Encourage maximum independence but intervene and supervise when necessary  - Involve family in performance of ADLs  - Assess for home care needs following discharge   - Consider OT consult to assist with ADL evaluation and planning for discharge  - Provide patient education as appropriate  Outcome: Progressing  Goal: Maintains/Returns to pre admission functional level  Description: INTERVENTIONS:  - Perform BMAT or MOVE assessment daily    - Set and communicate daily mobility goal to care team and patient/family/caregiver     - Collaborate with rehabilitation services on mobility goals if consulted  - Perform Range of Motion 3 times a day  - Ambulate patient 3 times a day  - Out of bed to chair 3 times a day   - Out of bed for meals 3 times a day  - Out of bed for toileting  - Record patient progress and toleration of activity level   Outcome: Progressing     Problem: CARDIOVASCULAR - ADULT  Goal: Maintains optimal cardiac output and hemodynamic stability  Description: INTERVENTIONS:  - Monitor I/O, vital signs and rhythm  - Monitor for S/S and trends of decreased cardiac output  - Administer and titrate ordered vasoactive medications to optimize hemodynamic stability  - Assess quality of pulses, skin color and temperature  - Assess for signs of decreased coronary artery perfusion  - Instruct patient to report change in severity of symptoms  Outcome: Progressing  Goal: Absence of cardiac dysrhythmias or at baseline rhythm  Description: INTERVENTIONS:  - Continuous cardiac monitoring, vital signs, obtain 12 lead EKG if ordered  - Administer antiarrhythmic and heart rate control medications as ordered  - Monitor electrolytes and administer replacement therapy as ordered  Outcome: Progressing

## 2021-08-17 NOTE — UTILIZATION REVIEW
Initial Clinical Review    Observation 8/17/21 @ 0037, converted to inpatient admission 8/17/21 @ [phone] for continued care & tx for NSTEMI  Start of care in ER 8/16/21 @ 2337    Admission: Date/Time/Statement:   Admission Orders (From admission, onward)     Ordered        08/17/21 1536  Inpatient Admission  Once         08/17/21 0037  Place in Observation  Once                   Orders Placed This Encounter   Procedures    Inpatient Admission     Standing Status:   Standing     Number of Occurrences:   1     Order Specific Question:   Level of Care     Answer:   Med Surg [16]     Order Specific Question:   Estimated length of stay     Answer:   More than 2 Midnights     Order Specific Question:   Certification     Answer:   I certify that inpatient services are medically necessary for this patient for a duration of greater than two midnights  See H&P and MD Progress Notes for additional information about the patient's course of treatment  ED Arrival Information     Expected Arrival Acuity    - 8/16/2021 23:36 Emergent         Means of arrival Escorted by Service Admission type    Ambulance [facility] Emergency         Arrival complaint    chest pain        Chief Complaint   Patient presents with    Chest Pain     Pt arrives via EMS with chest pain persisting for 2-3 days  Pt with history of CABG/Stent placement, pt took 324mg aspirin at home and was administered one SL nitroglycerin tablet via EMS, pt reports slight relief in pain at this time  Initial Presentation:   [de-identified] y/o male presents with chest pain substernal started 2-3 days ago gradual onset pressure like continuous,worse on exertion nothing makes it better  No nausea,vomiting, no cough, no diaphoresis or weakness  History of CABG, s/p cardiac stent placement CAD in the past, last cardiac cath January of 2021 recommending medical management  Medics gave him nitro no relief from the medicine, also received aspirin   Denies any pleurisy or dyspnea  Placed in observation status  Observation to IP admission 8/17/21:  Elevated troponin in the setting of known severe diffuse three-vessel coronary artery disease and uncontrolled hypertension most likely secondary to type 2 MI  Seen by cardio  Remains on IV heparin gtt  Metoprolol changed to Coreg for better BP control, increase hydralazine dosing, continue diuretics & statins  Scheduled for Echo for LV function  Echocardiogram demonstrates an EF of 60% with mild concentric left ventricular hypertrophy and grade 2 diastolic dysfunction    Day 2- 8/18/21:  MI type 2:  in the setting of uncontrolled hypertension and known significant coronary artery disease to native vessels demonstrated by positive troponins and ischemic changes on EKG  Transition off IV heparin, continue dual anti-platelet therapy with ASA & plavix  C/o headache, BP still elevated despite increasing hydralazine; increase Imdur dosing to 60mg QD, monitor BP closely         ED Triage Vitals   Temperature Pulse Respirations Blood Pressure SpO2   08/17/21 0518 08/16/21 2351 08/16/21 2351 08/16/21 2351 08/16/21 2351   98 3 °F (36 8 °C) 85 18 (!) 185/79 96 %      Temp Source Heart Rate Source Patient Position - Orthostatic VS BP Location FiO2 (%)   08/17/21 1520 08/16/21 2351 08/16/21 2351 08/16/21 2351 --   Oral Monitor Lying Right arm       Pain Score       08/16/21 2351       3          Wt Readings from Last 1 Encounters:   08/17/21 88 2 kg (194 lb 8 oz)     Additional Vital Signs:   Date/Time  Temp  Pulse  Resp  BP  MAP (mmHg)  SpO2  Calculated FIO2 (%) - Nasal Cannula  Nasal Cannula O2 Flow Rate (L/min)  O2 Device  Patient Position - Orthostatic VS   08/17/21 05:18:46  98 3 °F (36 8 °C)  67  18  152/79  103  96 %  --  --  --  --   08/17/21 0308  --  --  --  --  --  --  28  2 L/min  Nasal cannula  --   08/17/21 02:36:37  --  87  24Abnormal   198/94Abnormal   129  97 %  --  --  --  --   08/17/21 0115  --  74  20  174/75Abnormal   108 97 %  28  2 L/min  Nasal cannula  Lying   08/17/21 0045  --  80  20  180/81Abnormal   116  97 %  28  2 L/min  Nasal cannula  Lying   08/17/21 0014  --  --  --  --  --  --  --  --  None (Room air)  --   08/16/21 2351  --  85  18  185/79Abnormal   --  96 %  --  --  None (Room air)  Lying     Pertinent Labs/Diagnostic Test Results:   Results from last 7 days   Lab Units 08/17/21  0654 08/16/21  2354   WBC Thousand/uL 10 88* 11 43*   HEMOGLOBIN g/dL 9 8* 10 6*   HEMATOCRIT % 31 5* 33 1*   PLATELETS Thousands/uL 122* 140*   NEUTROS ABS Thousands/µL 8 63* 9 85*     Results from last 7 days   Lab Units 08/18/21  0529 08/17/21  0654 08/16/21  2354   SODIUM mmol/L 137 142 140   POTASSIUM mmol/L 5 4* 4 8 4 5   CHLORIDE mmol/L 104 106 103   CO2 mmol/L 21 22 25   ANION GAP mmol/L 12 14* 12   BUN mg/dL 71* 67* 66*   CREATININE mg/dL 2 07* 2 03* 2 16*   EGFR ml/min/1 73sq m 29 30 28   CALCIUM mg/dL 8 7 8 9 9 2   MAGNESIUM mg/dL  --   --  2 3     Results from last 7 days   Lab Units 08/16/21  2354   AST U/L 39   ALT U/L 70   ALK PHOS U/L 131*   TOTAL PROTEIN g/dL 8 3*   ALBUMIN g/dL 3 8   TOTAL BILIRUBIN mg/dL 1 40*     Results from last 7 days   Lab Units 08/18/21  0806 08/18/21  0036 08/17/21  2137 08/17/21  1610 08/17/21  1117 08/17/21  0725 08/17/21  0247   POC GLUCOSE mg/dl 135 240* 299* 284* 287* 213* 224*     Results from last 7 days   Lab Units 08/18/21  0529 08/17/21  0654 08/16/21  2354   GLUCOSE RANDOM mg/dL 153* 226* 217*     BETA-HYDROXYBUTYRATE   Date Value Ref Range Status   02/03/2021 0 0 <0 6 mmol/L Final      Results from last 7 days   Lab Units 08/17/21  1315 08/17/21  1130 08/17/21  0654 08/17/21  0326 08/16/21  2354   TROPONIN I ng/mL 22 18* 23 04* 13 12* 2 76* 0 13*     Results from last 7 days   Lab Units 08/18/21  0610 08/18/21  0354 08/17/21  2120 08/17/21  0044   PROTIME seconds  --   --   --  15 1*   INR   --   --   --  1 20*   PTT seconds 175* 76* 76* 29     8/16  Ekg=  Rhythm: sinus rhythm     Ectopy:   Ectopy: none     QRS:     QRS axis:  Normal   Conduction:     Conduction: normal     ST segments:     ST segments:  Non-specific   T waves:     T waves: non-specific   8/17  Cxr=  Cardiomegaly    Fullness of the central pulmonary vasculature, suggests chronic congestive changes    ED Treatment:   Medication Administration from 08/16/2021 2336 to 08/17/2021 0233       Date/Time Order Dose Route Action     08/17/2021 0036 nitroglycerin (NITROSTAT) SL tablet 0 4 mg 0 4 mg Sublingual Given     08/17/2021 0134 heparin (porcine) 25,000 units in 0 45% NaCl 250 mL infusion (premix) 11 1 Units/kg/hr Intravenous New Bag        Past Medical History:   Diagnosis Date    Acute on chronic diastolic CHF (congestive heart failure) ([facility]) 7/4/2019    Arthritis     Back pain     Bladder cancer ([facility] )     diagnosed  2/2016    Cancer [facility])     bladder; chemo; resection;     Constipation 2/1/2021    Coronary artery disease     has 2 coronary stents    Dental crowns present      5    Diabetes mellitus ([place] [address] )     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[place] [address] )     fluid behind right eye    Hematuria     hx of    High anion gap metabolic acidosis 1/46/0801    Hypercholesteremia     Hypertension     Kidney stones     Wears glasses      Present on Admission:   NSTEMI (non-ST elevated myocardial infarction) ([facility] )   Benign hypertension with chronic kidney disease, stage III   Hypercholesteremia   CKD (chronic kidney disease) stage 4, GFR 15-29 ml/min ([facility])    Admitting Diagnosis: Chest pain [R07 9]  NSTEMI (non-ST elevated myocardial infarction) ([place] [address] ) [I21 4]  Age/Sex: [de-identified] y o  male  Admission Orders:  Labs per IV heparin gtt protocol  Telemetry  accuchecks  Consult cardio  Pt/ot eval & tx  Scd/foot pumps    Scheduled Medications:  aspirin, 81 mg, Oral, Daily  atorvastatin, 80 mg, Oral, QPM  carvedilol, 6 25 mg, Oral, BID With Meals  clopidogrel, 75 mg, Oral, Daily  docusate sodium, 100 mg, Oral, BID  escitalopram, 10 mg, Oral, HS  hydrALAZINE, 50 mg, Oral, Q8H [address]  insulin detemir, 20 Units, Subcutaneous, Q12H [place]  insulin lispro, 1-5 Units, Subcutaneous, HS  insulin lispro, 1-6 Units, Subcutaneous, TID AC  [START ON 8/19/2021] isosorbide mononitrate, 60 mg, Oral, Daily  pantoprazole, 40 mg, Oral, Early Morning  primidone, 50 mg, Oral, HS  torsemide, 40 mg, Oral, Daily    Continuous IV Infusions:  heparin (porcine) 25,000 units in 0 45% NaCl 250 mL infusion  Rate: 2 7-18 mL/hr Dose: 3-20 Units/kg/hr  Weight Dosing Info: 90 kg (Order-Specific)  Freq: Titrated Route: IV  Last Dose: Stopped (08/18/21 0951)  Start: 08/17/21 0045 End: 08/18/21 0927    PRN Meds:  acetaminophen, 650 mg, Oral, Q6H PRN  nitroglycerin, 0 4 mg, Sublingual, Q5 Min PRN  polyethylene glycol, 17 g, Oral, Daily PRN    Network Utilization Review Department  ATTENTION: Please call with any questions or concerns to [phone] and carefully listen to the prompts so that you are directed to the right person  All voicemails are confidential   Kady Sofia all requests for admission clinical reviews, approved or denied determinations and any other requests to dedicated fax number below belonging to the campus where the patient is receiving treatment   List of dedicated fax numbers for the Facilities:  [address] DENIALS (Administrative/Medical Necessity) [phone]   [address] (Maternity/NICU/Pediatrics) [phone]   [address] [address] [place] [address] Anthony Mcneill Jaqu[address] [phone]   [address] [facility] [place] [address] [phone]

## 2021-08-17 NOTE — ASSESSMENT & PLAN NOTE
Lab Results   Component Value Date    EGFR 28 08/16/2021    EGFR 23 03/22/2021    EGFR 27 03/21/2021    CREATININE 2 16 (H) 08/16/2021    CREATININE 2 50 (H) 03/22/2021    CREATININE 2 22 (H) 03/21/2021     Cr 2 16 on admission, at baseline  Continue to monitor w/ daily BMP

## 2021-08-17 NOTE — ASSESSMENT & PLAN NOTE
Hx of CABG in 2016, s/p PCI to prior CABG  Cardiac cath in 1/2021 showed significant triple vessel CAD  Continue home medications as above

## 2021-08-18 PROBLEM — I21.A1 TYPE 2 MYOCARDIAL INFARCTION (HCC): Status: ACTIVE | Noted: 2021-01-01

## 2021-08-18 NOTE — ASSESSMENT & PLAN NOTE
Patient presented with chest pain, substernal, 5/10, associated with some SOB and nausea  Patient has known CAD s/p CABG with recent cath in 1/2021 showing significant triple vessel CAD, patent grafts of LIMA to LAD and SVG to OM2  Follows with cardiology, opting for medical management  - EKG showed ST depression in I, aVL, V3-V6, no ST elevations  Patient's initial troponin was 0 13 and peak troponin was 23  Patient has been on heparin drip for 24 hours which was discontinued today  Continue aspirin  Metoprolol changed to Coreg for better blood pressure control  Hydralazine dose was increased    Patient's Imdur dose was increased and added on Ranexa today

## 2021-08-18 NOTE — PROGRESS NOTES
[facility] 45  Progress Note - Antony Rider 1940, [de-identified] y o  male MRN: [id number]  Unit/Bed#: [address] [place] [address] Encounter: [id number]  Primary Care Provider: Marisol Robles MD   Date and time admitted to hospital: 8/16/2021 11:44 PM    * Type 2 myocardial infarction [facility])  Assessment & Plan  Patient presented with chest pain, substernal, 5/10, associated with some SOB and nausea  Patient has known CAD s/p CABG with recent cath in 1/2021 showing significant triple vessel CAD, patent grafts of LIMA to LAD and SVG to OM2  Follows with cardiology, opting for medical management  - EKG showed ST depression in I, aVL, V3-V6, no ST elevations  Patient's initial troponin was 0 13 and peak troponin was 23  Patient has been on heparin drip for 24 hours which was discontinued today  Continue aspirin  Metoprolol changed to Coreg for better blood pressure control  Hydralazine dose was increased  Patient's Imdur dose was increased and added on Ranexa today    Type 2 diabetes mellitus with hyperglycemia, with long-term current use of insulin [facility])  Assessment & Plan  Lab Results   Component Value Date    HGBA1C 6 6 (H) 03/19/2021       Recent Labs     08/17/21  2137 08/18/21  0036 08/18/21  0806 08/18/21  1222   POCGLU 299* 240* 135 254*       Blood Sugar Average: Last 72 hrs:  (P) 242   Continue Levemir 20 units subcutaneous q 12 hours along with Humalog sliding scale  CKD (chronic kidney disease) stage 4, GFR 15-29 ml/min [facility])  Assessment & Plan  Lab Results   Component Value Date    EGFR 29 08/18/2021    EGFR 30 08/17/2021    EGFR 28 08/16/2021    CREATININE 2 07 (H) 08/18/2021    CREATININE 2 03 (H) 08/17/2021    CREATININE 2 16 (H) 08/16/2021     Cr 2 16 on admission, at baseline  Creatinine continues to remain close to baseline      Hx of CABG  Assessment & Plan  Hx of CABG in 2016, s/p PCI to prior CABG  Cardiac cath in 1/2021 showed significant triple vessel CAD  Continue home medications as above    Benign hypertension with chronic kidney disease, stage III  Assessment & Plan  Patient's metoprolol was changed to Coreg 6 25 milligram p o  B i d  Hydralazine dose was increased to 50 milligram p o  Q 8 hours  Continue torsemide 20 milligram p o  Daily    Hypercholesteremia  Assessment & Plan  Continue atorvastatin 80 mg daily        VTE Pharmacologic Prophylaxis:   Pharmacologic: Heparin  Mechanical VTE Prophylaxis in Place: Yes    Patient Centered Rounds: I have performed bedside rounds with nursing staff today  Discussions with Specialists or Other Care Team Provider: yes    Education and Discussions with Family / Patient: yes    Time Spent for Care: 45 minutes  More than 50% of total time spent on counseling and coordination of care as described above  Current Length of Stay: 1 day(s)    Current Patient Status: Inpatient   Certification Statement: The patient will continue to require additional inpatient hospital stay due to Type 2 myocardial infarction    Discharge Plan:  Home    Code Status: Level 1 - Full Code      Subjective:   Patient complained of some headache this morning  Denies any chest pain or shortness of breath  Patient slept poorly overnight and complains of fatigue and tiredness today  Objective:     Vitals:   Temp (24hrs), Av 2 °F (36 8 °C), Min:97 5 °F (36 4 °C), Max:99 3 °F (37 4 °C)    Temp:  [97 5 °F (36 4 °C)-99 3 °F (37 4 °C)] 97 5 °F (36 4 °C)  HR:  [57-72] 72  Resp:  [18] 18  BP: (100-161)/(55-69) 100/55  SpO2:  [94 %-99 %] 96 %  Body mass index is 25 66 kg/m²  Input and Output Summary (last 24 hours): Intake/Output Summary (Last 24 hours) at 2021 1510  Last data filed at 2021 0850  Gross per 24 hour   Intake 240 ml   Output 1220 ml   Net -980 ml       Physical Exam:     Physical Exam  Constitutional:       General: He is not in acute distress  HENT:      Head: Normocephalic and atraumatic     Eyes:      Conjunctiva/sclera: Conjunctivae normal       Pupils: Pupils are equal, round, and reactive to light  Cardiovascular:      Rate and Rhythm: Normal rate and regular rhythm  Heart sounds: Normal heart sounds  Pulmonary:      Effort: Pulmonary effort is normal  No respiratory distress  Breath sounds: No wheezing, rhonchi or rales  Chest:      Chest wall: No tenderness  Abdominal:      General: Bowel sounds are normal  There is no distension  Palpations: Abdomen is soft  Tenderness: There is no abdominal tenderness  There is no guarding or rebound  Musculoskeletal:      Cervical back: Normal range of motion and neck supple  Skin:     General: Skin is warm and dry  Findings: No rash  Neurological:      Mental Status: He is alert  Cranial Nerves: No cranial nerve deficit  Additional Data:     Labs:    Results from last 7 days   Lab Units 08/17/21  0654   WBC Thousand/uL 10 88*   HEMOGLOBIN g/dL 9 8*   HEMATOCRIT % 31 5*   PLATELETS Thousands/uL 122*   NEUTROS PCT % 80*   LYMPHS PCT % 14   MONOS PCT % 6   EOS PCT % 0     Results from last 7 days   Lab Units 08/18/21  0529 08/16/21  2354   POTASSIUM mmol/L 5 4* 4 5   CHLORIDE mmol/L 104 103   CO2 mmol/L 21 25   BUN mg/dL 71* 66*   CREATININE mg/dL 2 07* 2 16*   CALCIUM mg/dL 8 7 9 2   ALK PHOS U/L  --  131*   ALT U/L  --  70   AST U/L  --  39     Results from last 7 days   Lab Units 08/17/21  0044   INR  1 20*       * I Have Reviewed All Lab Data Listed Above  * Additional Pertinent Lab Tests Reviewed:  [facility] 66 Admission Reviewed      Recent Cultures (last 7 days):           Last 24 Hours Medication List:   Current Facility-Administered Medications   Medication Dose Route Frequency Provider Last Rate    acetaminophen  650 mg Oral Q6H PRN Key Nance MD      aspirin  81 mg Oral Daily Selene Mcintyre PA-C      atorvastatin  80 mg Oral QPM Selene Mcintyre PA-C      carvedilol  6 25 mg Oral BID With Meals Zhao Love MD      clopidogrel  75 mg Oral Daily [place], [place]      docusate sodium  100 mg Oral BID [place]      escitalopram  10 mg Oral HS [place], [place]      hydrALAZINE  50 mg Oral [facility] Zhao Love MD      insulin detemir  20 Units Subcutaneous Q12H [facility], [place]      insulin lispro  1-5 Units Subcutaneous HS Melany Encarnacion MD      insulin lispro  1-6 Units Subcutaneous TID AC Melany Encarnacion MD      [START ON 8/19/2021] isosorbide mononitrate  60 mg Oral Daily TABITHA Hurd      nitroglycerin  0 4 mg Sublingual Q5 Min PRN Daniel Rene PA-C      pantoprazole  40 mg Oral Early Morning TABITHA Hurd      polyethylene glycol  17 g Oral Daily PRN Daniel Rene PA-C      primidone  50 mg Oral HS Daniel Rene PA-C      ranolazine  500 mg Oral Q12H [facility] TABITHA Hurd      torsemide  40 mg Oral Daily Daniel Rene PA-C          Today, Patient Was Seen By: Melany Encarnacion MD    ** Please Note: Dictation voice to text software may have been used in the creation of this document   **

## 2021-08-18 NOTE — ASSESSMENT & PLAN NOTE
Patient's metoprolol was changed to Coreg 6 25 milligram p o  B i d  Hydralazine dose was increased to 50 milligram p o  Q 8 hours  Continue torsemide 20 milligram p o   Daily

## 2021-08-18 NOTE — ASSESSMENT & PLAN NOTE
Lab Results   Component Value Date    HGBA1C 6 6 (H) 03/19/2021       Recent Labs     08/17/21  2137 08/18/21  0036 08/18/21  0806 08/18/21  1222   POCGLU 299* 240* 135 254*       Blood Sugar Average: Last 72 hrs:  (P) 242   Continue Levemir 20 units subcutaneous q 12 hours along with Humalog sliding scale

## 2021-08-18 NOTE — OCCUPATIONAL THERAPY NOTE
OT EVALUATION       08/18/21 1102   Note Type   Note type Evaluation   Restrictions/Precautions   Other Precautions Chair Alarm; Bed Alarm; Fall Risk   Pain Assessment   Pain Assessment Tool [place] FACES   [place] FACES Pain Rating 4   Pain Location/Orientation Location: Head   Home Living   Type of Home House   Home Layout Multi-level; Able to live on main level with bedroom/bathroom  (5+5 steps to enter from garage to main living area)   Bathroom Shower/Tub Tub/shower unit   Bathroom Equipment Tub transfer [place]   Prior Function   Level of [address] with ADLs and functional mobility   Lives With [address] Help From Family   ADL Assistance Independent   IADLs Needs assistance   ADL   Eating Assistance 7  Independent   Grooming Assistance 5  Supervision/Setup   UB Bathing Assistance 5  Supervision/Setup   LB Bathing Assistance 4  Minimal Assistance   UB Dressing Assistance 5  Supervision/Setup   LB Dressing Assistance 4  [address]  4  Minimal Assistance   Bed Mobility   Supine to Sit 5  Supervision   Sit to Supine 5  Supervision   Transfers   Sit to Stand 4  Minimal assistance   Stand to Sit 4  Minimal assistance   Additional Comments pt stood to urinate over toilet with min assist for balance with use of 1 hand on walker    Functional Mobility   Functional Mobility 4  Minimal assistance   Additional Comments 15 feet x 2, verbal cues for safe walker use while getting into the bathroom    Additional items Rolling walker   Balance   Static Sitting Fair +   Dynamic Sitting Fair +   Static Standing Fair   Dynamic Standing Fair -   Activity Tolerance   Activity Tolerance Patient limited by fatigue  (spO2 on room air 95% during activity )   RUE Assessment   RUE Assessment WFL   LUE Assessment   LUE Assessment WFL   Cognition   Overall Cognitive Status WFL   Arousal/Participation Cooperative   Attention Within functional limits   Orientation Level Oriented X4   Following Commands Follows multistep commands with increased time or repetition   Comments pt fatigued reporting he had a "bad night"   Assessment   Limitation Decreased ADL status; Decreased UE strength;Decreased Safe judgement during ADL;Decreased endurance;Decreased high-level ADLs; Decreased self-care trans  (decreased balance and mobility )   Prognosis Good   Assessment Patient evaluated by Occupational Therapy  Patient admitted with Type 2 myocardial infarction [facility])  The patients occupational profile, medical and therapy history includes a extensive additional review of physical, cognitive, or psychosocial history related to current functional performance  Comorbidities affecting functional mobility and ADLS include: arthritis, CAD, cancer, chronic back pain, diabetes and hypertension  Prior to admission, patient was independent with functional mobility with walker, independent with ADLS and requiring assist for IADLS  The evaluation identifies the following performance deficits: weakness, impaired balance, decreased endurance, increased fall risk, new onset of impairment of functional mobility, decreased ADLS, decreased IADLS, decreased activity tolerance, decreased safety awareness, impaired judgement and decreased strength, that result in activity limitations and/or participation restrictions  This evaluation requires clinical decision making of high complexity, because the patient presents with comorbidites that affect occupational performance and required significant modification of tasks or assistance with consideration of multiple treatment options  The Barthel Index was used as a functional outcome tool presenting with a score of 65, indicating moderate limitations of functional mobility and ADLS  The patient's raw score on the AM-PAC Daily Activity inpatient short form is 21, standardized score is 44 27, greater than 39 4   Patients at this level are likely to benefit from DC to home  Please refer to the recommendation of the Occupational Therapist for safe DC planning  Patient will benefit from skilled Occupational Therapy services to address above deficits and facilitate a safe return to prior level of function  Goals   Patient Goals to go home    STG Time Frame   (1-7 days)   Short Term Goal  Goals established to promote patient goal of going home:  Patient will increase standing tolerance to 3 minutes during ADL task to decrease assistance level and decrease fall risk; Patient will increase bed mobility to independent in preparation for ADLS and transfers; Patient will increase functional mobility to and from bathroom with rolling walker with supervision to increase performance with ADLS and to use a toilet; Patient will tolerate 10 minutes of UE ROM/strengthening to increase general activity tolerance and performance in ADLS/IADLS; Patient will improve functional activity tolerance to 10 minutes of sustained functional tasks to increase participation in basic self-care and decrease assistance level;  Patient will be able to to verbalize understanding and perform energy conservation/proper body mechanics during ADLS and functional mobility at least 75% of the time with minimal cueing to decrease signs of fatigue and increase stamina to return to prior level of function; Patient will increase dynamic sitting balance to good to improve the ability to sit at edge of bed or on a chair for ADLS;  Patient will increase dynamic standing balance to fair to improve postural stability and decrease fall risk during standing ADLS and transfers       LTG Time Frame   (8-14 days)   Long Term Goal Patient will increase standing tolerance to 6 minutes during ADL task to decrease assistance level and decrease fall risk;Patient will increase functional mobility to and from bathroom with rolling walker independently to increase performance with ADLS and to use a toilet; Patient will tolerate 20 minutes of UE ROM/strengthening to increase general activity tolerance and performance in ADLS/IADLS; Patient will improve functional activity tolerance to 20 minutes of sustained functional tasks to increase participation in basic self-care and decrease assistance level;  Patient will be able to to verbalize understanding and perform energy conservation/proper body mechanics during ADLS and functional mobility at least 90% of the time without cueing to decrease signs of fatigue and increase stamina to return to prior level of function; Patient will increase dynamic standing balance to fair+ to improve postural stability and decrease fall risk during standing ADLS and transfers  Pt will score >/= 24/24 on AM-PAC Daily Activity Inpatient scale to promote safe independence with ADLs and functional mobility; Pt will score >/= 100/100 on Barthel Index in order to decrease caregiver assistance needed and increase ability to perform ADLs and functional mobility  Functional Transfer Goals   Pt Will Perform All Functional Transfers   (STG supervision LTG Independent )   ADL Goals   Pt Will Perform Grooming Standing at sink  (STG supervision LTG Independent )   Pt Will Perform Bathing   (STG supervision LTG Independent )   Pt Will Perform LE Dressing   (STG supervision LTG Independent )   Pt Will Perform Toileting   (STG supervision LTG Independent )   Plan   Treatment Interventions ADL retraining;Functional transfer training;UE strengthening/ROM; Endurance training;Patient/family training;Equipment evaluation/education; Activityengagement; Compensatory technique education; Energy conservation   Goal Expiration Date 09/01/21   OT Frequency 3-5x/wk   Recommendation   OT Discharge Recommendation Home with home health rehabilitation   [facility] Daily Activity Inpatient   Lower Body Dressing 3   Bathing 3   Toileting 3   Upper Body Dressing 4   Grooming 4   Eating 4   Daily Activity Raw Score 21   Daily Activity Standardized Score (Calc for Raw Score >=11) 44 27   AM-PAC Applied Cognition Inpatient   Following a Speech/Presentation 4   Understanding Ordinary Conversation 4   Taking Medications 4   Remembering Where Things Are Placed or Put Away 4   Remembering List of 4-5 Errands 4   Taking Care of Complicated Tasks 4   Applied Cognition Raw Score 24   Applied Cognition Standardized Score 62 21   Barthel Index   Feeding 10   Bathing 0   Grooming Score 0   Dressing Score 5   Bladder Score 10   Bowels Score 10   Toilet Use Score 5   Transfers (Bed/Chair) Score 10   Mobility (Level Surface) Score 10   Stairs Score 5   Barthel Index Score 72   Licensure   [place] License Number  Tobias Donnelly Akbar 87 OTR/L [id number]

## 2021-08-18 NOTE — PLAN OF CARE
Problem: Potential for Falls  Goal: Patient will remain free of falls  Description: INTERVENTIONS:  - Educate patient/family on patient safety including physical limitations  - Instruct patient to call for assistance with activity   - Consult OT/PT to assist with strengthening/mobility   - Keep Call bell within reach  - Keep bed low and locked with side rails adjusted as appropriate  - Keep care items and personal belongings within reach  - Initiate and maintain comfort rounds  - Make Fall Risk Sign visible to staff  - Offer Toileting every 2 Hours, in advance of need  - Initiate/Maintain bed alarm  - Obtain necessary fall risk management equipment:   - Apply yellow socks and bracelet for high fall risk patients  - Consider moving patient to room near nurses station  Outcome: Progressing     Problem: Nutrition/Hydration-ADULT  Goal: Nutrient/Hydration intake appropriate for improving, restoring or maintaining nutritional needs  Description: Monitor and assess patient's nutrition/hydration status for malnutrition  Collaborate with interdisciplinary team and initiate plan and interventions as ordered  Monitor patient's weight and dietary intake as ordered or per policy  Utilize nutrition screening tool and intervene as necessary  Determine patient's food preferences and provide high-protein, high-caloric foods as appropriate       INTERVENTIONS:  - Monitor oral intake, urinary output, labs, and treatment plans  - Assess nutrition and hydration status and recommend course of action  - Evaluate amount of meals eaten  - Assist patient with eating if necessary   - Allow adequate time for meals  - Recommend/ encourage appropriate diets, oral nutritional supplements, and vitamin/mineral supplements  - Order, calculate, and assess calorie counts as needed  - Recommend, monitor, and adjust tube feedings and TPN/PPN based on assessed needs  - Assess need for intravenous fluids  - Provide specific nutrition/hydration education as appropriate  - Include patient/family/caregiver in decisions related to nutrition  Outcome: Progressing     Problem: MOBILITY - ADULT  Goal: Maintain or return to baseline ADL function  Description: INTERVENTIONS:  -  Assess patient's ability to carry out ADLs; assess patient's baseline for ADL function and identify physical deficits which impact ability to perform ADLs (bathing, care of mouth/teeth, toileting, grooming, dressing, etc )  - Assess/evaluate cause of self-care deficits   - Assess range of motion  - Assess patient's mobility; develop plan if impaired  - Assess patient's need for assistive devices and provide as appropriate  - Encourage maximum independence but intervene and supervise when necessary  - Involve family in performance of ADLs  - Assess for home care needs following discharge   - Consider OT consult to assist with ADL evaluation and planning for discharge  - Provide patient education as appropriate  Outcome: Progressing  Goal: Maintains/Returns to pre admission functional level  Description: INTERVENTIONS:  - Perform BMAT or MOVE assessment daily    - Set and communicate daily mobility goal to care team and patient/family/caregiver  - Collaborate with rehabilitation services on mobility goals if consulted  - Perform Range of Motion 3 times a day  - Reposition patient every 2 hours    - Ambulate patient 2 times a day  - Out of bed to chair 2 times a day   - Out of bed for meals 2 times a day  - Out of bed for toileting  - Record patient progress and toleration of activity level   Outcome: Progressing     Problem: CARDIOVASCULAR - ADULT  Goal: Maintains optimal cardiac output and hemodynamic stability  Description: INTERVENTIONS:  - Monitor I/O, vital signs and rhythm  - Monitor for S/S and trends of decreased cardiac output  - Administer and titrate ordered vasoactive medications to optimize hemodynamic stability  - Assess quality of pulses, skin color and temperature  - Assess for signs of decreased coronary artery perfusion  - Instruct patient to report change in severity of symptoms  Outcome: Progressing  Goal: Absence of cardiac dysrhythmias or at baseline rhythm  Description: INTERVENTIONS:  - Continuous cardiac monitoring, vital signs, obtain 12 lead EKG if ordered  - Administer antiarrhythmic and heart rate control medications as ordered  - Monitor electrolytes and administer replacement therapy as ordered  Outcome: Progressing

## 2021-08-18 NOTE — ASSESSMENT & PLAN NOTE
Lab Results   Component Value Date    EGFR 29 08/18/2021    EGFR 30 08/17/2021    EGFR 28 08/16/2021    CREATININE 2 07 (H) 08/18/2021    CREATININE 2 03 (H) 08/17/2021    CREATININE 2 16 (H) 08/16/2021     Cr 2 16 on admission, at baseline  Creatinine continues to remain close to baseline

## 2021-08-18 NOTE — CASE MANAGEMENT
DANAY advised by Attending that pt is anticipated to be discharged home tomorrow  Patient is being recommended for [address] services by PT/OT  SW met with pt to discuss  Pt states he is agreeable with recommendation  Freedom of Choice reviewed  Pt has no preference as long as agency is in-network with insurance  Mult  Referrals sent and pending  Pt notes his PCP is Dr Mae Santoro  Preferred pharmacy is [facility]  When ready for discharge a friend will transport home  No questions or concerns expressed by patient at this time  SW will follow-up tomorrow

## 2021-08-18 NOTE — PROGRESS NOTES
Progress Note - Cardiology   [facility]     Kandi Eller [de-identified] y o  male MRN: [id number]  : 1940  Unit/Bed#: [place] [address] Encounter: [id number]    Assessment and Plan:   1  MI type 2:  in the setting of uncontrolled hypertension and known significant coronary artery disease to native vessels demonstrated by positive troponins and ischemic changes on EKG  -  peak troponin was 23 04    -  will discontinue heparin today and continue dual anti-platelet therapy with aspirin and Plavix    -  continue Coreg 6 25 mg b i d     -  continue Lipitor 80 mg daily    -  will increase Imdur to 60 mg daily    -  consider adding Ranexa    2  Chronic kidney disease stage 4:  Baseline creatinine appears to be 2 2-2 6  Will continue to monitor    3  Hypertension:  Blood pressure slightly elevated  Will increase Imdur to 60 mg once a day    -  continue other medications from home    -  monitor vital signs per routine    4  Diabetes:  Managed per the primary team    5  Coronary artery disease with history of CABG x3 in 2016:    no further complaints of chest pain  Peak troponin was 23 04     - cardiac catheterization in 2021 demonstrated severe native triple-vessel disease with patent LIMA to LAD and SVG to distal LAD  SVG to 2nd obtuse marginal demonstrate a 40% ostial lesion     -  care per #1 as above    Subjective / Objective:   Patient seen and examined  No further complaints of chest discomfort  He does note though headache  Blood pressure this morning higher than usual   Will increase patient's Imdur to 60 mg daily and continue to monitor    Echocardiogram performed yesterday demonstrates an EF of 60% with mild concentric left ventricular hypertrophy and grade 2 diastolic dysfunction  Vitals: Blood pressure 161/65, pulse 69, temperature 97 8 °F (36 6 °C), resp  rate 18, height 6' 1" (1 854 m), weight 88 2 kg (194 lb 8 oz), SpO2 96 %    Vitals:    21 0236   Weight: 88 2 kg (194 lb 8 oz)     Body mass index is 25 66 kg/m²  BP Readings from Last 3 Encounters:   08/18/21 161/65   03/22/21 105/58   02/12/21 122/58     Orthostatic Blood Pressures      Most Recent Value   Blood Pressure  161/65 filed at 08/18/2021 0837   Patient Position - Orthostatic VS  Lying filed at 08/17/2021 0115        I/O       08/16 0701 - 08/17 0700 08/17 0701 - 08/18 0700 08/18 0701 - 08/19 0700    P  O    240    Total Intake(mL/kg)   240 (2 7)    Urine (mL/kg/hr) 50 1820 (0 9) 200 (0 8)    Total Output 50 1820 200    Net -50 -1820 +40               Invasive Devices     Peripheral Intravenous Line            Peripheral IV 08/17/21 Right Antecubital 1 day          Drain            Open Drain Left; Anterior; Other (Comment) LLQ 1721 days                  Intake/Output Summary (Last 24 hours) at 8/18/2021 0941  Last data filed at 8/18/2021 0850  Gross per 24 hour   Intake 240 ml   Output 2020 ml   Net -1780 ml         Physical Exam:   Physical Exam  Vitals and nursing note reviewed  Constitutional:       Appearance: Normal appearance  He is well-developed  He is obese  HENT:      Head: Normocephalic  Eyes:      General: No scleral icterus  Right eye: No discharge  Left eye: No discharge  Pupils: Pupils are equal, round, and reactive to light  Neck:      Thyroid: No thyromegaly  Cardiovascular:      Rate and Rhythm: Normal rate and regular rhythm  Pulses: Normal pulses  Heart sounds: No murmur heard  Pulmonary:      Effort: Pulmonary effort is normal  No accessory muscle usage or respiratory distress  Breath sounds: Examination of the right-lower field reveals decreased breath sounds  Examination of the left-lower field reveals decreased breath sounds  Decreased breath sounds present  No wheezing  Abdominal:      General: Bowel sounds are normal       Palpations: Abdomen is soft  Musculoskeletal:      Cervical back: Normal range of motion and neck supple     Skin: General: Skin is warm and dry  Capillary Refill: Capillary refill takes less than 2 seconds  Neurological:      General: No focal deficit present  Mental Status: He is alert  Mental status is at baseline                     Medications/ Allergies:     Current Facility-Administered Medications   Medication Dose Route Frequency Provider Last Rate    acetaminophen  650 mg Oral Q6H PRN Manav Norman MD      aspirin  81 mg Oral Daily Benny Oz, PA-SUZY      atorvastatin  80 mg Oral QPM Benny Oz, PA-C      carvedilol  6 25 mg Oral BID With Meals Melissa Mancilla MD      clopidogrel  75 mg Oral Daily Benny Oz, PA-SUZY      docusate sodium  100 mg Oral BID Benny Oz, PA-C      escitalopram  10 mg Oral HS Benny Oz, PA-C      hydrALAZINE  50 mg Oral Q8H [address] Melissa Mancilla MD      insulin detemir  20 Units Subcutaneous Q12H [address] Benny Oz, PA-C      insulin lispro  1-5 Units Subcutaneous HS Manav Norman MD      insulin lispro  1-6 Units Subcutaneous TID AC Manav Norman MD      isosorbide mononitrate  30 mg Oral Daily Benny Oz, PA-C      nitroglycerin  0 4 mg Sublingual Q5 Min PRN Benny Oz, PA-C      pantoprazole  40 mg Oral Early Morning TABITHA Leyva      polyethylene glycol  17 g Oral Daily PRN Benny Oz, PA-C      primidone  50 mg Oral HS Benny Oz, PA-C      torsemide  40 mg Oral Daily Benny Oz, PA-C       acetaminophen, 650 mg, Q6H PRN  nitroglycerin, 0 4 mg, Q5 Min PRN  polyethylene glycol, 17 g, Daily PRN      No Known Allergies    VTE Pharmacologic Prophylaxis:   Sequential compression device (Venodyne)     Labs:   Troponins:  Results from last 7 days   Lab Units 08/17/21  1315 08/17/21  1130 08/17/21  0654   TROPONIN I ng/mL 22 18* 23 04* 13 12*     CBC with diff:  Results from last 7 days   Lab Units 08/17/21  0654 08/16/21  2354   WBC Thousand/uL 10 88* 11 43*   HEMOGLOBIN g/dL 9 8* 10 6*   HEMATOCRIT % 31 5* 33 1*   MCV fL 99* 96   PLATELETS Thousands/uL 122* 140*   MCH pg 30 7 30 6   MCHC g/dL 31 1* 32 0   RDW % 13 3 13 3   MPV fL 10 9 10 8   NRBC AUTO /100 WBCs 0 0     CMP:  Results from last 7 days   Lab Units 08/18/21  0529 08/17/21  0654 08/16/21  2354   SODIUM mmol/L 137 142 140   POTASSIUM mmol/L 5 4* 4 8 4 5   CHLORIDE mmol/L 104 106 103   CO2 mmol/L 21 22 25   ANION GAP mmol/L 12 14* 12   BUN mg/dL 71* 67* 66*   CREATININE mg/dL 2 07* 2 03* 2 16*   CALCIUM mg/dL 8 7 8 9 9 2   AST U/L  --   --  39   ALT U/L  --   --  70   ALK PHOS U/L  --   --  131*   TOTAL PROTEIN g/dL  --   --  8 3*   ALBUMIN g/dL  --   --  3 8   TOTAL BILIRUBIN mg/dL  --   --  1 40*   EGFR ml/min/1 73sq m 29 30 28     Magnesium:  Results from last 7 days   Lab Units 08/16/21  2354   MAGNESIUM mg/dL 2 3     Coags:  Results from last 7 days   Lab Units 08/18/21  0610 08/18/21  0354 08/17/21  2120 08/17/21  1315 08/17/21  0654 08/17/21  0044   PTT seconds 175* 76* 76* 102* 60* 29   INR   --   --   --   --   --  1 20*       Imaging & Testing   I have personally reviewed pertinent reports  XR chest 1 view portable    Result Date: 8/17/2021  Narrative: CHEST INDICATION:   chest pain  COMPARISON:  Chest x-ray 2/6/2021 EXAM PERFORMED/VIEWS:  XR CHEST PORTABLE FINDINGS:  There has been prior sternotomy  The heart appears enlarged, stable  Fullness of the central pulmonary vasculature, suggests chronic congestive changes  The lungs are otherwise clear  No pneumothorax or pleural effusion  Osseous structures appear within normal limits for patient age  Impression: Cardiomegaly  Fullness of the central pulmonary vasculature, suggests chronic congestive changes  Workstation performed: [id number]      EKG / Monitor: Personally reviewed      Sinus rhythm    Cardiac testing:   Results for orders placed during the hospital encounter of 01/22/21    Echo complete with contrast if indicated    Narrative  [facility] [address] [facility]  [phone]    Transthoracic Echocardiogram  2D, M-mode, Doppler, and Color Doppler    Study date:  2021    Patient: Annmarie Villafuerte  MR number: [id number]  Account number: [de-identified]  : [id number]  Age: [de-identified] years  Gender: Male  Status: Inpatient  Location: Bedside  Height: 73 in  Weight: 218 9 lb  BP: 146/ 73 mmHg    Indications: CAD    Diagnoses: I21 4 - Non-ST elevation (NSTEMI) myocardial infarction, I25 10 - Atherosclerotic heart disease of native coronary artery without angina pectoris    Sonographer:  ROHINI Brasher  Primary Physician:  Diandra Delong MD  Referring Physician:  Merly Vega MD  Group:  [facility]  Interpreting Physician:  Lynnette Sharma MD    SUMMARY    LEFT VENTRICLE:  Systolic function was normal  Ejection fraction was estimated to be 60 %  There was hypokinesis of the basal inferior wall(s)  Wall thickness was moderately increased  Features were consistent with a pseudonormal left ventricular filling pattern, with concomitant abnormal relaxation and increased filling pressure (grade 2 diastolic dysfunction)  LEFT ATRIUM:  The atrium was mildly dilated  MITRAL VALVE:  There was mild annular calcification  There was moderate regurgitation  AORTIC VALVE:  Transaortic velocity was increased due to valvular stenosis  There was mild stenosis  TRICUSPID VALVE:  There was mild regurgitation  Estimated peak PA pressure was 45 mmHg  PULMONIC VALVE:  There was mild regurgitation  HISTORY: PRIOR HISTORY: NSTEMI; Hypercholesterol; CKD; DM; Elevated troponin; CCAD; CABG; HTN    PROCEDURE: The procedure was performed at the bedside  This was a routine study  The transthoracic approach was used  The study included complete 2D imaging, M-mode, complete spectral Doppler, and color Doppler  The heart rate was 68 bpm,  at the start of the study   Images were obtained from the parasternal, apical, subcostal, and suprasternal notch acoustic windows  Echocardiographic views were limited due to restricted patient mobility and poor patient compliance  This was  a technically difficult study  LEFT VENTRICLE: Size was normal  Systolic function was normal  Ejection fraction was estimated to be 60 %  There was hypokinesis of the basal inferior wall(s)  Wall thickness was moderately increased  No evidence of apical thrombus  DOPPLER: Features were consistent with a pseudonormal left ventricular filling pattern, with concomitant abnormal relaxation and increased filling pressure (grade 2 diastolic dysfunction)  RIGHT VENTRICLE: The size was normal  Systolic function was normal  Wall thickness was normal     LEFT ATRIUM: The atrium was mildly dilated  RIGHT ATRIUM: Size was normal     MITRAL VALVE: There was mild annular calcification  Valve structure was normal  There was normal leaflet separation  DOPPLER: The transmitral velocity was within the normal range  There was no evidence for stenosis  There was moderate  regurgitation  AORTIC VALVE: The valve was trileaflet  Leaflets exhibited mild to moderate calcification and mildly reduced cuspal separation  DOPPLER: Transaortic velocity was increased due to valvular stenosis  There was mild stenosis  There was no  significant regurgitation  TRICUSPID VALVE: The valve structure was normal  There was normal leaflet separation  DOPPLER: The transtricuspid velocity was within the normal range  There was no evidence for stenosis  There was mild regurgitation  Estimated peak PA  pressure was 45 mmHg  PULMONIC VALVE: Leaflets exhibited normal thickness, no calcification, and normal cuspal separation  DOPPLER: The transpulmonic velocity was within the normal range  There was mild regurgitation  PERICARDIUM: There was no pericardial effusion  AORTA: The root exhibited normal size      SYSTEMIC VEINS: IVC: The inferior vena cava was normal in size  SYSTEM MEASUREMENT TABLES    2D  %FS: 39 29 %  Ao Diam: 3 53 cm  Ao asc: 3 86 cm  EDV(Teich): 93 32 ml  EF(Teich): 69 91 %  ESV(Teich): 28 08 ml  IVSd: 1 44 cm  LA Area: 20 85 cm2  LA Diam: 4 71 cm  LVEDV MOD A4C: 122 74 ml  LVEF MOD A4C: 77 19 %  LVESV MOD A4C: 28 ml  LVIDd: 4 52 cm  LVIDs: 2 74 cm  LVLd A4C: 9 07 cm  LVLs A4C: 7 39 cm  LVOT Diam: 2 02 cm  LVPWd: 1 32 cm  RA Area: 14 38 cm2  RVIDd: 3 78 cm  SV MOD A4C: 94 74 ml  SV(Teich): 65 24 ml    CW  AV Env  Ti: 249 29 ms  AV MaxP 56 mmHg  AV VTI: 32 94 cm  AV Vmax: 1 91 m/s  AV Vmean: 1 32 m/s  AV meanP 04 mmHg  TR MaxP 43 mmHg  TR Vmax: 3 22 m/s    MM  TAPSE: 1 39 cm    PW  VIRAL (VTI): 1 06 cm2  VIRAL Vmax: 1 45 cm2  E' Sept: 0 09 m/s  E/E' Sept: 13 84  LVOT Env  Ti: 230 26 ms  LVOT VTI: 10 81 cm  LVOT Vmax: 0 86 m/s  LVOT Vmean: 0 5 m/s  LVOT maxP 95 mmHg  LVOT meanP 26 mmHg  LVSV Dopp: 34 8 ml  MV A Darvin: 0 75 m/s  MV Dec Eaton: 5 1 m/s2  MV DecT: 233 34 ms  MV E Darvin: 1 19 m/s  MV E/A Ratio: 1 59  MV PHT: 67 67 ms  MVA By PHT: 3 25 cm2    [facility]    Prepared and electronically signed by    Angie Vanessa MD  Signed 2021 07:58:05      TABITHA Hickey        "This note has been constructed using a voice recognition system  Therefore there may be syntax, spelling, and/or grammatical errors   Please call if you have any questions  "

## 2021-08-19 NOTE — ASSESSMENT & PLAN NOTE
Lab Results   Component Value Date    EGFR 24 08/19/2021    EGFR 29 08/18/2021    EGFR 30 08/17/2021    CREATININE 2 45 (H) 08/19/2021    CREATININE 2 07 (H) 08/18/2021    CREATININE 2 03 (H) 08/17/2021     Cr 2 16 on admission, at baseline  Creatinine continues to remain close to baseline  Creatinine increased to 2 4 but is still close to the baseline  Patient to be discharged home on current Demadex dose with follow-up BMP in 1 week

## 2021-08-19 NOTE — CASE MANAGEMENT
Patient will discharge home / [facility]  Contact information provided on AVS  Orders to be faxed when available  No other discharge needs known at this time  Friend to transport home

## 2021-08-19 NOTE — PLAN OF CARE
Problem: Potential for Falls  Goal: Patient will remain free of falls  Description: INTERVENTIONS:  - Educate patient/family on patient safety including physical limitations  - Instruct patient to call for assistance with activity   - Consult OT/PT to assist with strengthening/mobility   - Keep Call bell within reach  - Keep bed low and locked with side rails adjusted as appropriate  - Keep care items and personal belongings within reach  - Initiate and maintain comfort rounds  - Make Fall Risk Sign visible to staff  - Offer Toileting every 2 Hours, in advance of need  - Initiate/Maintain bed alarm  - Obtain necessary fall risk management equipment:   - Apply yellow socks and bracelet for high fall risk patients  - Consider moving patient to room near nurses station  Outcome: Progressing     Problem: Nutrition/Hydration-ADULT  Goal: Nutrient/Hydration intake appropriate for improving, restoring or maintaining nutritional needs  Description: Monitor and assess patient's nutrition/hydration status for malnutrition  Collaborate with interdisciplinary team and initiate plan and interventions as ordered  Monitor patient's weight and dietary intake as ordered or per policy  Utilize nutrition screening tool and intervene as necessary  Determine patient's food preferences and provide high-protein, high-caloric foods as appropriate       INTERVENTIONS:  - Monitor oral intake, urinary output, labs, and treatment plans  - Assess nutrition and hydration status and recommend course of action  - Evaluate amount of meals eaten  - Assist patient with eating if necessary   - Allow adequate time for meals  - Recommend/ encourage appropriate diets, oral nutritional supplements, and vitamin/mineral supplements  - Order, calculate, and assess calorie counts as needed  - Recommend, monitor, and adjust tube feedings and TPN/PPN based on assessed needs  - Assess need for intravenous fluids  - Provide specific nutrition/hydration education as appropriate  - Include patient/family/caregiver in decisions related to nutrition  Outcome: Progressing     Problem: MOBILITY - ADULT  Goal: Maintain or return to baseline ADL function  Description: INTERVENTIONS:  -  Assess patient's ability to carry out ADLs; assess patient's baseline for ADL function and identify physical deficits which impact ability to perform ADLs (bathing, care of mouth/teeth, toileting, grooming, dressing, etc )  - Assess/evaluate cause of self-care deficits   - Assess range of motion  - Assess patient's mobility; develop plan if impaired  - Assess patient's need for assistive devices and provide as appropriate  - Encourage maximum independence but intervene and supervise when necessary  - Involve family in performance of ADLs  - Assess for home care needs following discharge   - Consider OT consult to assist with ADL evaluation and planning for discharge  - Provide patient education as appropriate  Outcome: Progressing  Goal: Maintains/Returns to pre admission functional level  Description: INTERVENTIONS:  - Perform BMAT or MOVE assessment daily    - Set and communicate daily mobility goal to care team and patient/family/caregiver  - Collaborate with rehabilitation services on mobility goals if consulted  - Perform Range of Motion 3  times a day  - Reposition patient every 2 hours    - Dangle patient 3 times a day  - Stand patient 3 times a day  - Ambulate patient 3 times a day  - Out of bed to chair 3 times a day   - Out of bed for meals 3 times a day  - Out of bed for toileting  - Record patient progress and toleration of activity level   Outcome: Progressing     Problem: CARDIOVASCULAR - ADULT  Goal: Maintains optimal cardiac output and hemodynamic stability  Description: INTERVENTIONS:  - Monitor I/O, vital signs and rhythm  - Monitor for S/S and trends of decreased cardiac output  - Administer and titrate ordered vasoactive medications to optimize hemodynamic stability  - Assess quality of pulses, skin color and temperature  - Assess for signs of decreased coronary artery perfusion  - Instruct patient to report change in severity of symptoms  Outcome: Progressing  Goal: Absence of cardiac dysrhythmias or at baseline rhythm  Description: INTERVENTIONS:  - Continuous cardiac monitoring, vital signs, obtain 12 lead EKG if ordered  - Administer antiarrhythmic and heart rate control medications as ordered  - Monitor electrolytes and administer replacement therapy as ordered  Outcome: Progressing

## 2021-08-19 NOTE — ASSESSMENT & PLAN NOTE
Patient presented with chest pain, substernal, 5/10, associated with some SOB and nausea  Patient has known CAD s/p CABG with recent cath in 1/2021 showing significant triple vessel CAD, patent grafts of LIMA to LAD and SVG to OM2  Follows with cardiology, opting for medical management  - EKG showed ST depression in I, aVL, V3-V6, no ST elevations  Patient's initial troponin was 0 13 and peak troponin was 23  Patient has been on heparin drip for 24 hours which was discontinued today  Continue aspirin  Metoprolol changed to Coreg for better blood pressure control  Hydralazine dose was increased  Continue dual antiplatelet therapy with aspirin and Plavix  Patient to continue Coreg 6 25 milligram p o  B i d , hydralazine 50 milligram p o  Q 8 hours, Imdur 60 milligram p o  Daily and Ranexa 500 milligram p o  B i d

## 2021-08-19 NOTE — ASSESSMENT & PLAN NOTE
Lab Results   Component Value Date    HGBA1C 6 6 (H) 03/19/2021       Recent Labs     08/18/21  1553 08/18/21 2018 08/19/21  0746 08/19/21  1106   POCGLU 282* 315* 106 160*       Blood Sugar Average: Last 72 hrs:  (P) 233 25   Continue Levemir 20 units subcutaneous q 12 hours along with Humalog sliding scale

## 2021-08-19 NOTE — PLAN OF CARE
Problem: Potential for Falls  Goal: Patient will remain free of falls  Description: INTERVENTIONS:  - Educate patient/family on patient safety including physical limitations  - Instruct patient to call for assistance with activity   - Consult OT/PT to assist with strengthening/mobility   - Keep Call bell within reach  - Keep bed low and locked with side rails adjusted as appropriate  - Keep care items and personal belongings within reach  - Initiate and maintain comfort rounds  - Make Fall Risk Sign visible to staff  - Offer Toileting every 2 Hours, in advance of need  - Initiate/Maintain bed alarm  - Obtain necessary fall risk management equipment: yellow socks, siderails, bed alarm  - Apply yellow socks and bracelet for high fall risk patients  - Consider moving patient to room near nurses station  Outcome: Progressing     Problem: Nutrition/Hydration-ADULT  Goal: Nutrient/Hydration intake appropriate for improving, restoring or maintaining nutritional needs  Description: Monitor and assess patient's nutrition/hydration status for malnutrition  Collaborate with interdisciplinary team and initiate plan and interventions as ordered  Monitor patient's weight and dietary intake as ordered or per policy  Utilize nutrition screening tool and intervene as necessary  Determine patient's food preferences and provide high-protein, high-caloric foods as appropriate       INTERVENTIONS:  - Monitor oral intake, urinary output, labs, and treatment plans  - Assess nutrition and hydration status and recommend course of action  - Evaluate amount of meals eaten  - Assist patient with eating if necessary   - Allow adequate time for meals  - Recommend/ encourage appropriate diets, oral nutritional supplements, and vitamin/mineral supplements  - Order, calculate, and assess calorie counts as needed  - Recommend, monitor, and adjust tube feedings and TPN/PPN based on assessed needs  - Assess need for intravenous fluids  - Provide specific nutrition/hydration education as appropriate  - Include patient/family/caregiver in decisions related to nutrition  Outcome: Progressing     Problem: MOBILITY - ADULT  Goal: Maintain or return to baseline ADL function  Description: INTERVENTIONS:  -  Assess patient's ability to carry out ADLs; assess patient's baseline for ADL function and identify physical deficits which impact ability to perform ADLs (bathing, care of mouth/teeth, toileting, grooming, dressing, etc )  - Assess/evaluate cause of self-care deficits   - Assess range of motion  - Assess patient's mobility; develop plan if impaired  - Assess patient's need for assistive devices and provide as appropriate  - Encourage maximum independence but intervene and supervise when necessary  - Involve family in performance of ADLs  - Assess for home care needs following discharge   - Consider OT consult to assist with ADL evaluation and planning for discharge  - Provide patient education as appropriate  Outcome: Progressing  Goal: Maintains/Returns to pre admission functional level  Description: INTERVENTIONS:  - Perform BMAT or MOVE assessment daily    - Set and communicate daily mobility goal to care team and patient/family/caregiver  - Collaborate with rehabilitation services on mobility goals if consulted  - Perform Range of Motion 4 times a day  - Reposition patient every 2 hours    - Dangle patient 3 times a day  - Stand patient 3 times a day  - Ambulate patient 3 times a day  - Out of bed to chair 3 times a day   - Out of bed for meals 3 times a day  - Out of bed for toileting  - Record patient progress and toleration of activity level   Outcome: Progressing     Problem: CARDIOVASCULAR - ADULT  Goal: Maintains optimal cardiac output and hemodynamic stability  Description: INTERVENTIONS:  - Monitor I/O, vital signs and rhythm  - Monitor for S/S and trends of decreased cardiac output  - Administer and titrate ordered vasoactive medications to optimize hemodynamic stability  - Assess quality of pulses, skin color and temperature  - Assess for signs of decreased coronary artery perfusion  - Instruct patient to report change in severity of symptoms  Outcome: Progressing  Goal: Absence of cardiac dysrhythmias or at baseline rhythm  Description: INTERVENTIONS:  - Continuous cardiac monitoring, vital signs, obtain 12 lead EKG if ordered  - Administer antiarrhythmic and heart rate control medications as ordered  - Monitor electrolytes and administer replacement therapy as ordered  Outcome: Progressing

## 2021-08-19 NOTE — PHYSICAL THERAPY NOTE
PT TREATMENT     08/19/21 1030   Note Type   Note Type Treatment   Pain Assessment   Pain Assessment Tool Pain Assessment not indicated - pt denies pain   Restrictions/Precautions   Other Precautions Fall Risk   General   Chart Reviewed Yes   Family/Caregiver Present No   Cognition   Arousal/Participation Cooperative   Subjective   Subjective Patient anxious to return home   Bed Mobility   Supine to Sit 7  Independent   Transfers   Sit to Stand 5  Supervision   Stand to Sit 5  Supervision   Ambulation/Elevation   Gait Assistance 5  Supervision   Additional items Verbal cues   Assistive Device Rolling walker   Distance 150 feet with changes in direction and no balance loss noted  Patient HR 84 with gait and SpO2 at 94% on room air   Exercises   Hip Flexion Sitting;10 reps;Bilateral   Knee AROM Long Arc Quad Sitting;20 reps;Bilateral   Ankle Pumps Sitting;20 reps;Bilateral   Marching Standing;20 reps;Bilateral   Balance training  sit to and from stand x3 for balance and strength training  Balance training with sidestepping and backward walking    Assessment   Assessment Patient cooperative and motivated and will benefit from continued PT with progression as tolerated  The patient's AM-PAC Basic Mobility Inpatient Short Form Raw Score is 19, Standardized Score is 42 48  A standardized score less than 42 9 suggests the patient may benefit from discharge to post-acute rehabilitation services although patient able to return home with home PT services  Please also refer to the recommendation of the Physical Therapist for safe discharge planning  Plan   Treatment/Interventions ADL retraining;Functional transfer training;LE strengthening/ROM; Therapeutic exercise; Endurance training;Patient/family training;Equipment eval/education;Gait training; Compensatory technique education   PT Frequency 5x/wk   Recommendation   PT Discharge Recommendation Home with home health rehabilitation   [facility] In[facility]   Turning in Bed Without Bedrails 4   Lying on Back to Sitting on Edge of Flat Bed 3   Moving Bed to Chair 3   Standing Up From Chair 3   Walk in Room 3   Climb 3-5 Stairs 3   Basic Mobility Inpatient Raw Score 19   Basic Mobility Standardized Score 63 33   Licensure   [place] License Number  Marina Asencio  [id number]

## 2021-08-19 NOTE — PROGRESS NOTES
Progress Note - Cardiology   [facility]     Flo Oswald [de-identified] y o  male MRN: [id number]  : 1940  Unit/Bed#: [address] [place] [address] Encounter: [id number]    Assessment and Plan:   1  MI type 2:  in the setting of uncontrolled hypertension and known significant coronary artery disease to native vessels demonstrated by positive troponins and ischemic changes on EKG  -  peak troponin was 23 04    -  continue dual anti-platelet therapy with aspirin and Plavix    -  continue Coreg 6 25 mg b i d     -  continue Lipitor 80 mg daily    -  continue Imdur to 60 mg daily    -  Ranexa 500 mg q 12 hours was added on 2021 in the evening  Will need to monitor as patient is also on primidone which can cause decrease in Ranexa plasma concentration      2  Chronic kidney disease stage 4:  Baseline creatinine appears to be 2 2-2 6  Will continue to monitor  Nephrology consultation appreciated     3  Hypertension:  Blood pressure improved and headache gone with increased dose Imdur     -  continue other medications from home    -  monitor vital signs per routine     4  Diabetes:  Managed per the primary team     5  Coronary artery disease with history of CABG x3 in 2016:    no further complaints of chest pain  Peak troponin was 23 04     - cardiac catheterization in 2021 demonstrated severe native triple-vessel disease with patent LIMA to LAD and SVG to distal LAD  SVG to 2nd obtuse marginal demonstrate a 40% ostial lesion     -  care per #1 as above       Subjective / Objective:   Patient seen and examined  He states that he is feeling well, no further chest discomfort and his headache has resolved  Blood pressure is slowly improving  Requested patient get up and walk around unit to make sure he does not have recurrence of symptoms  Patient for tentative discharge later today  Vitals: Blood pressure 146/63, pulse 69, temperature 97 8 °F (36 6 °C), resp   rate 20, height 6' 1" (1 854 m), weight 88 2 kg (194 lb 8 oz), SpO2 97 %  Vitals:    08/17/21 0236   Weight: 88 2 kg (194 lb 8 oz)     Body mass index is 25 66 kg/m²  BP Readings from Last 3 Encounters:   08/19/21 146/63   03/22/21 105/58   02/12/21 122/58     Orthostatic Blood Pressures      Most Recent Value   Blood Pressure  146/63 filed at 08/19/2021 0801   Patient Position - Orthostatic VS  Lying filed at 08/17/2021 0115        I/O       08/17 0701 - 08/18 0700 08/18 0701 - 08/19 0700 08/19 0701 - 08/20 0700    P  O   1680     Total Intake(mL/kg)  1680 (19)     Urine (mL/kg/hr) 1820 (0 9) 1500 (0 7) 300 (1 1)    Total Output 1820 1500 300    Net -1820 +180 -300               Invasive Devices     Peripheral Intravenous Line            Peripheral IV 08/17/21 Right Antecubital 2 days          Drain            Open Drain Left; Anterior; Other (Comment) LLQ 1722 days                  Intake/Output Summary (Last 24 hours) at 8/19/2021 1007  Last data filed at 8/19/2021 0905  Gross per 24 hour   Intake 960 ml   Output 1400 ml   Net -440 ml         Physical Exam:   Physical Exam  Vitals and nursing note reviewed  Constitutional:       General: He is not in acute distress  Appearance: Normal appearance  He is well-developed  He is obese  HENT:      Head: Normocephalic  Right Ear: External ear normal       Left Ear: External ear normal    Eyes:      General: No scleral icterus  Right eye: No discharge  Left eye: No discharge  Neck:      Thyroid: No thyromegaly  Cardiovascular:      Rate and Rhythm: Normal rate and regular rhythm  No extrasystoles are present  Pulses: Normal pulses  Heart sounds: Murmur heard  Systolic murmur is present with a grade of 1/6  Pulmonary:      Effort: Pulmonary effort is normal  No accessory muscle usage or respiratory distress  Breath sounds: Examination of the right-lower field reveals rhonchi  Rhonchi present  No wheezing        Comments: Coarse bases, no wheezing  Abdominal:      General: Bowel sounds are normal  There is no distension  Palpations: Abdomen is soft  Musculoskeletal:      Cervical back: Normal range of motion and neck supple  Right lower leg: No edema  Left lower leg: No edema  Skin:     General: Skin is warm and dry  Capillary Refill: Capillary refill takes less than 2 seconds  Neurological:      General: No focal deficit present  Mental Status: He is alert and oriented to person, place, and time  Mental status is at baseline                     Medications/ Allergies:     Current Facility-Administered Medications   Medication Dose Route Frequency Provider Last Rate    acetaminophen  650 mg Oral Q6H PRN Melany Encarnacion MD      aspirin  81 mg Oral Daily Daniel Rene PA-C      atorvastatin  80 mg Oral QPM Daniel Rene PA-C      carvedilol  6 25 mg Oral BID With Meals Zhao Love MD      clopidogrel  75 mg Oral Daily Daniel Rene PA-C      docusate sodium  100 mg Oral BID Daniel Rene PA-C      escitalopram  10 mg Oral HS Daniel Rene PA-C      heparin (porcine)  5,000 Units Subcutaneous [facility] Melany Encarnacion MD      hydrALAZINE  50 mg Oral [facility] Zhao Love MD      insulin detemir  20 Units Subcutaneous Q12H [address] [place] PA[place]      insulin lispro  1-5 Units Subcutaneous HS Melany Encarnacion MD      insulin lispro  1-6 Units Subcutaneous TID AC Swathi Schumacher MD      isosorbide mononitrate  60 mg Oral Daily TABITHA Hurd      nitroglycerin  0 4 mg Sublingual Q5 Min PRN Daniel Rene PA-C      pantoprazole  40 mg Oral Early Morning TABITHA Hurd      polyethylene glycol  17 g Oral Daily PRN Daniel Rene PA-C      primidone  50 mg Oral HS Daniel eRne PA-C      ranolazine  500 mg Oral Q12H [address] TABITHA Hurd      torsemide  40 mg Oral Daily [facility] [place] acetaminophen, 650 mg, Q6H PRN  nitroglycerin, 0 4 mg, Q5 Min PRN  polyethylene glycol, 17 g, Daily PRN      No Known Allergies    VTE Pharmacologic Prophylaxis:   Sequential compression device (Venodyne)     Labs:   Troponins:  Results from last 7 days   Lab Units 08/17/21  1315 08/17/21  1130 08/17/21  0654   TROPONIN I ng/mL 22 18* 23 04* 13 12*     CBC with diff:  Results from last 7 days   Lab Units 08/19/21  0531 08/17/21  0654 08/16/21  2354   WBC Thousand/uL 7 24 10 88* 11 43*   HEMOGLOBIN g/dL 8 0* 9 8* 10 6*   HEMATOCRIT % 25 3* 31 5* 33 1*   MCV fL 96 99* 96   PLATELETS Thousands/uL 104* 122* 140*   MCH pg 30 3 30 7 30 6   MCHC g/dL 31 6 31 1* 32 0   RDW % 13 2 13 3 13 3   MPV fL 11 1 10 9 10 8   NRBC AUTO /100 WBCs  --  0 0     CMP:  Results from last 7 days   Lab Units 08/19/21  0531 08/18/21  0529 08/17/21  0654 08/16/21  2354   SODIUM mmol/L 137 137 142 140   POTASSIUM mmol/L 4 2 5 4* 4 8 4 5   CHLORIDE mmol/L 102 104 106 103   CO2 mmol/L 25 21 22 25   ANION GAP mmol/L 10 12 14* 12   BUN mg/dL 71* 71* 67* 66*   CREATININE mg/dL 2 45* 2 07* 2 03* 2 16*   CALCIUM mg/dL 8 3 8 7 8 9 9 2   AST U/L  --   --   --  39   ALT U/L  --   --   --  70   ALK PHOS U/L  --   --   --  131*   TOTAL PROTEIN g/dL  --   --   --  8 3*   ALBUMIN g/dL  --   --   --  3 8   TOTAL BILIRUBIN mg/dL  --   --   --  1 40*   EGFR ml/min/1 73sq m 24 29 30 28     Magnesium:  Results from last 7 days   Lab Units 08/16/21  2354   MAGNESIUM mg/dL 2 3     Coags:  Results from last 7 days   Lab Units 08/18/21  0610 08/18/21  0354 08/17/21  2120 08/17/21  1315 08/17/21  0654 08/17/21  0044   PTT seconds 175* 76* 76* 102* 60* 29   INR   --   --   --   --   --  1 20*       Imaging & Testing   I have personally reviewed pertinent reports  XR chest 1 view portable    Result Date: 8/17/2021  Narrative: CHEST INDICATION:   chest pain   COMPARISON:  Chest x-ray 2/6/2021 EXAM PERFORMED/VIEWS:  XR CHEST PORTABLE FINDINGS:  There has been prior sternotomy  The heart appears enlarged, stable  Fullness of the central pulmonary vasculature, suggests chronic congestive changes  The lungs are otherwise clear  No pneumothorax or pleural effusion  Osseous structures appear within normal limits for patient age  Impression: Cardiomegaly  Fullness of the central pulmonary vasculature, suggests chronic congestive changes  Workstation performed: [id number]     EKG / Monitor: Personally reviewed  Sinus rhythm    Cardiac testing:   Results for orders placed during the hospital encounter of 21    Echo complete with contrast if indicated    Narrative  [place] [address], [facility]  [phone]    Transthoracic Echocardiogram  2D, M-mode, Doppler, and Color Doppler    Study date:  2021    Patient: Dariana Sahni  MR number: [id number]  Account number: [de-identified]  : [id number]  Age: [de-identified] years  Gender: Male  Status: Inpatient  Location: Bedside  Height: 73 in  Weight: 218 9 lb  BP: 146/ 73 mmHg    Indications: CAD    Diagnoses: I21 4 - Non-ST elevation (NSTEMI) myocardial infarction, I25 10 - Atherosclerotic heart disease of native coronary artery without angina pectoris    Sonographer:  ROHINI Coe  Primary Physician:  Herlinda Childs MD  Referring Physician:  Jethro Maravilla MD  Group:  [facility]  Interpreting Physician:  Gene Ellington MD    SUMMARY    LEFT VENTRICLE:  Systolic function was normal  Ejection fraction was estimated to be 60 %  There was hypokinesis of the basal inferior wall(s)  Wall thickness was moderately increased  Features were consistent with a pseudonormal left ventricular filling pattern, with concomitant abnormal relaxation and increased filling pressure (grade 2 diastolic dysfunction)  LEFT ATRIUM:  The atrium was mildly dilated  MITRAL VALVE:  There was mild annular calcification  There was moderate regurgitation      AORTIC VALVE:  Transaortic velocity was increased due to valvular stenosis  There was mild stenosis  TRICUSPID VALVE:  There was mild regurgitation  Estimated peak PA pressure was 45 mmHg  PULMONIC VALVE:  There was mild regurgitation  HISTORY: PRIOR HISTORY: NSTEMI; Hypercholesterol; CKD; DM; Elevated troponin; CCAD; CABG; HTN    PROCEDURE: The procedure was performed at the bedside  This was a routine study  The transthoracic approach was used  The study included complete 2D imaging, M-mode, complete spectral Doppler, and color Doppler  The heart rate was 68 bpm,  at the start of the study  Images were obtained from the parasternal, apical, subcostal, and suprasternal notch acoustic windows  Echocardiographic views were limited due to restricted patient mobility and poor patient compliance  This was  a technically difficult study  LEFT VENTRICLE: Size was normal  Systolic function was normal  Ejection fraction was estimated to be 60 %  There was hypokinesis of the basal inferior wall(s)  Wall thickness was moderately increased  No evidence of apical thrombus  DOPPLER: Features were consistent with a pseudonormal left ventricular filling pattern, with concomitant abnormal relaxation and increased filling pressure (grade 2 diastolic dysfunction)  RIGHT VENTRICLE: The size was normal  Systolic function was normal  Wall thickness was normal     LEFT ATRIUM: The atrium was mildly dilated  RIGHT ATRIUM: Size was normal     MITRAL VALVE: There was mild annular calcification  Valve structure was normal  There was normal leaflet separation  DOPPLER: The transmitral velocity was within the normal range  There was no evidence for stenosis  There was moderate  regurgitation  AORTIC VALVE: The valve was trileaflet  Leaflets exhibited mild to moderate calcification and mildly reduced cuspal separation  DOPPLER: Transaortic velocity was increased due to valvular stenosis  There was mild stenosis   There was no  significant regurgitation  TRICUSPID VALVE: The valve structure was normal  There was normal leaflet separation  DOPPLER: The transtricuspid velocity was within the normal range  There was no evidence for stenosis  There was mild regurgitation  Estimated peak PA  pressure was 45 mmHg  PULMONIC VALVE: Leaflets exhibited normal thickness, no calcification, and normal cuspal separation  DOPPLER: The transpulmonic velocity was within the normal range  There was mild regurgitation  PERICARDIUM: There was no pericardial effusion  AORTA: The root exhibited normal size  SYSTEMIC VEINS: IVC: The inferior vena cava was normal in size  SYSTEM MEASUREMENT TABLES    2D  %FS: 39 29 %  Ao Diam: 3 53 cm  Ao asc: 3 86 cm  EDV(Teich): 93 32 ml  EF(Teich): 69 91 %  ESV(Teich): 28 08 ml  IVSd: 1 44 cm  LA Area: 20 85 cm2  LA Diam: 4 71 cm  LVEDV MOD A4C: 122 74 ml  LVEF MOD A4C: 77 19 %  LVESV MOD A4C: 28 ml  LVIDd: 4 52 cm  LVIDs: 2 74 cm  LVLd A4C: 9 07 cm  LVLs A4C: 7 39 cm  LVOT Diam: 2 02 cm  LVPWd: 1 32 cm  RA Area: 14 38 cm2  RVIDd: 3 78 cm  SV MOD A4C: 94 74 ml  SV(Teich): 65 24 ml    CW  AV Env  Ti: 249 29 ms  AV MaxP 56 mmHg  AV VTI: 32 94 cm  AV Vmax: 1 91 m/s  AV Vmean: 1 32 m/s  AV meanP 04 mmHg  TR MaxP 43 mmHg  TR Vmax: 3 22 m/s    MM  TAPSE: 1 39 cm    PW  VIRAL (VTI): 1 06 cm2  VIRAL Vmax: 1 45 cm2  E' Sept: 0 09 m/s  E/E' Sept: 13 84  LVOT Env  Ti: 230 26 ms  LVOT VTI: 10 81 cm  LVOT Vmax: 0 86 m/s  LVOT Vmean: 0 5 m/s  LVOT maxP 95 mmHg  LVOT meanP 26 mmHg  LVSV Dopp: 34 8 ml  MV A Darvin: 0 75 m/s  MV Dec Andrew: 5 1 m/s2  MV DecT: 233 34 ms  MV E Darvin: 1 19 m/s  MV E/A Ratio: 1 59  MV PHT: 67 67 ms  MVA By PHT: 3 25 cm2    [employer] Accredited Echocardiography Laboratory    Prepared and electronically signed by    Rupinder Rico MD  Signed 2021 07:58:05        TABITHA Regalado        "This note has been constructed using a voice recognition system  Therefore there may be syntax, spelling, and/or grammatical errors   Please call if you have any questions  "

## 2021-08-19 NOTE — DISCHARGE SUMMARY
Karissa 45  Discharge- [facility] Prior 1940, [de-identified] y o  male MRN: [id number]  Unit/Bed#: [address] [place] [address] Encounter: [id number]  Primary Care Provider: Arlene Michaels MD   Date and time admitted to hospital: 8/16/2021 11:44 PM    * Type 2 myocardial infarction [facility])  Assessment & Plan  Patient presented with chest pain, substernal, 5/10, associated with some SOB and nausea  Patient has known CAD s/p CABG with recent cath in 1/2021 showing significant triple vessel CAD, patent grafts of LIMA to LAD and SVG to OM2  Follows with cardiology, opting for medical management  - EKG showed ST depression in I, aVL, V3-V6, no ST elevations  Patient's initial troponin was 0 13 and peak troponin was 23  Patient has been on heparin drip for 24 hours which was discontinued today  Continue aspirin  Metoprolol changed to Coreg for better blood pressure control  Hydralazine dose was increased  Continue dual antiplatelet therapy with aspirin and Plavix  Patient to continue Coreg 6 25 milligram p o  B i d , hydralazine 50 milligram p o  Q 8 hours, Imdur 60 milligram p o  Daily and Ranexa 500 milligram p o  B i d  Type 2 diabetes mellitus with hyperglycemia, with long-term current use of insulin [facility])  Assessment & Plan  Lab Results   Component Value Date    HGBA1C 6 6 (H) 03/19/2021       Recent Labs     08/18/21  1553 08/18/21  2018 08/19/21  0746 08/19/21  1106   POCGLU 282* 315* 106 160*       Blood Sugar Average: Last 72 hrs:  (P) 233 25   Continue Levemir 20 units subcutaneous q 12 hours along with Humalog sliding scale  CKD (chronic kidney disease) stage 4, GFR 15-29 ml/min [facility])  Assessment & Plan  Lab Results   Component Value Date    EGFR 24 08/19/2021    EGFR 29 08/18/2021    EGFR 30 08/17/2021    CREATININE 2 45 (H) 08/19/2021    CREATININE 2 07 (H) 08/18/2021    CREATININE 2 03 (H) 08/17/2021     Cr 2 16 on admission, at baseline  Creatinine continues to remain close to baseline  Creatinine increased to 2 4 but is still close to the baseline  Patient to be discharged home on current Demadex dose with follow-up BMP in 1 week  Hx of CABG  Assessment & Plan  Hx of CABG in 2016, s/p PCI to prior CABG  Cardiac cath in 1/2021 showed significant triple vessel CAD  Continue home medications as above    Benign hypertension with chronic kidney disease, stage III  Assessment & Plan  Patient's metoprolol was changed to Coreg 6 25 milligram p o  B i d  Hydralazine dose was increased to 50 milligram p o  Q 8 hours  Continue torsemide 20 milligram p o  Daily    Hypercholesteremia  Assessment & Plan  Continue atorvastatin 80 mg daily      Discharging Physician / Practitioner: Manav Norman MD  PCP: Etta Burroughs MD  Admission Date:   Admission Orders (From admission, onward)     Ordered        08/17/21 1536  Inpatient Admission  Once         08/17/21 0037  Place in Observation  Once                   Discharge Date: 08/19/21    Medical Problems     Resolved Problems  Date Reviewed: 8/19/2021    None                Consultations During Hospital Stay:  · Cardiology    Procedures Performed:   · 2D echo showed EF of 50 for 60% with mild concentric hypertrophy and grade 2 diastolic dysfunction     Outpatient Tests Requested:  · BMP in 1 week  Follow up outpatient cardiology  Complications:  None    Reason for Admission:  Chest pain    Hospital Course:     Davion Solano is a [de-identified] y o  male patient with past medical history of hypertension, hyperlipidemia, diabetes, CAD status post CABG and PCI, CKD 4 who originally presented to the hospital on 8/16/2021 due to chest pain  Patient's initial troponin was only mildly elevated then later continued to increase  Patient was started on IV heparin drip  Patient was seen in consult with Cardiology  Patient continued to remain asymptomatic  Heparin drip continued for 24 hours    Patient's medications were adjusted for better control of blood pressure  Patient continued remained stable with stable creatinine  Patient to be discharged home with outpatient follow-up with Cardiology      Please see above list of diagnoses and related plan for additional information  Condition at Discharge: stable     Discharge Day Visit / Exam:     Subjective:  Patient is feeling well  Denies any chest pain, shortness of breath  Patient walked with help of physical therapy in the hallway without any chest pain or shortness of breath  Vitals: Blood Pressure: 136/61 (08/19/21 1230)  Pulse: 64 (08/19/21 1230)  Temperature: 97 8 °F (36 6 °C) (08/19/21 0801)  Temp Source: Oral (08/17/21 1520)  Respirations: 18 (08/19/21 1230)  Height: 6' 1" (185 4 cm) (08/18/21 1422)  Weight - Scale: 88 2 kg (194 lb 8 oz) (08/17/21 0236)  SpO2: 97 % (08/19/21 1230)  Exam:   Physical Exam  Constitutional:       Appearance: Normal appearance  HENT:      Head: Normocephalic and atraumatic  Eyes:      Extraocular Movements: Extraocular movements intact  Pupils: Pupils are equal, round, and reactive to light  Cardiovascular:      Rate and Rhythm: Normal rate and regular rhythm  Heart sounds: No murmur heard  No gallop  Pulmonary:      Effort: Pulmonary effort is normal       Breath sounds: Normal breath sounds  Abdominal:      General: Bowel sounds are normal       Palpations: Abdomen is soft  Tenderness: There is no abdominal tenderness  Musculoskeletal:         General: No swelling or deformity  Normal range of motion  Cervical back: Normal range of motion and neck supple  Skin:     General: Skin is warm and dry  Neurological:      General: No focal deficit present  Mental Status: He is alert  Discharge instructions/Information to patient and family:   See after visit summary for information provided to patient and family        Provisions for Follow-Up Care:  See after visit summary for information related to follow-up care and any pertinent home health orders  Disposition:     Home with VNA Services (Reminder: Complete face to face encounter)      Planned Readmission: No     Discharge Statement:  I spent 35 minutes discharging the patient  This time was spent on the day of discharge  I had direct contact with the patient on the day of discharge  Greater than 50% of the total time was spent examining patient, answering all patient questions, arranging and discussing plan of care with patient as well as directly providing post-discharge instructions  Additional time then spent on discharge activities  Discharge Medications:  See after visit summary for reconciled discharge medications provided to patient and family        ** Please Note: This note has been constructed using a voice recognition system **

## 2021-08-19 NOTE — CONSULTS
2           Consultation - Nephrology   Reggie Simpson [de-identified] y o  male MRN: [id number]  Unit/Bed#: [address] 410[address]01 Encounter: [id number]      Assessment/Plan     Assessment / Plan:  1  Renal    Patient is chronic kidney disease and his creatinine on admission was around 2 1 and is increased to 2 4  This increases still within his baseline range  He likely has diabetic nephropathy his urinalysis in the past at proteinuria  His blood pressure has improved he is clinically stable and I reviewed his medications  At the present time would just continue medications creatinine is in his baseline range  The patient told me he was on dialysis twice did after he had contrast exposure so really awaiting Cardiology plan as to whether not it will be maximizing medical therapy versus catheterization  Given that he states he is not urinating that much will check bladder scan to make sure there is no urine retention  Check bladder scan to look for postvoid residual  No changes in medications for now  Monitor with Cardiology workup and plan  BMP a m     2  Cardiac    Patient's initial symptoms as an outpatient were increasing hypertension and he developed chest pain and suffered MI  He has a history of severe diffuse 3 vessel coronary disease with CABG in the past   For now he is pain free blood pressures improved cardiology is following  Will await their decision on plan for medical therapy verses catheterization  Again he did have a catheterization and required dialysis post procedure  Last catheterization per Cardiology note was January of this year  History of Present Illness   Physician Requesting Consult: Jaymie Ruby MD  Reason for Consult / Principal Problem:  Acute on chronic kidney disease  Hx and PE limited by:   HPI: Reggie Simpson is a [de-identified]y o  year old male who has history of chronic kidney disease and reports baseline creatinine of around 2 2-2 5    He is followed by a nephrologist locally and says his last creatinine as an outpatient was 2 7  He states that he was at home and he was monitor his blood pressure for couple days and noticed that it was higher than usual   He then developed chest pain call the ambulance came into the hospital and was diagnosed by myocardial infarction with elevated troponin I  At the present time the patient has no discomfort no shortness of breath and were asked to see him for chronic kidney disease and a rise in the creatinine  History obtained from chart review and the patient  Consults    Review of Systems   Constitutional: Negative for chills, diaphoresis, fatigue and fever  HENT: Negative  Eyes: Negative  Respiratory: Negative for cough, chest tightness, shortness of breath and wheezing  Cardiovascular: Negative for chest pain, palpitations and leg swelling  No orthopnea   Gastrointestinal: Negative for abdominal distention, abdominal pain, diarrhea, nausea and vomiting  Genitourinary: Negative for dysuria, flank pain and hematuria  Feels he is urinating a lot   Neurological: Negative for dizziness, syncope, light-headedness and headaches  Psychiatric/Behavioral: Negative for agitation, behavioral problems and confusion  The patient is not hyperactive          Historical Information   Patient Active Problem List   Diagnosis    Coronary artery disease    Acute renal failure superimposed on stage 3 chronic kidney disease ([facility] )    Bladder cancer (HCC)    Hypercholesteremia    Type 2 diabetes mellitus with hyperglycemia, with long-term current use of insulin (HCC)    Benign hypertension with chronic kidney disease, stage III    Hx of CABG    Acute renal failure superimposed on chronic kidney disease ([facility] )    Acute on chronic diastolic congestive heart failure (HCC)    Anemia    Hyponatremia    Urinary retention    Right hip pain    Acute ischemic stroke (HCC)    Elevated troponin    Essential tremor    Type 2 myocardial infarction ([facility] )    CKD (chronic kidney disease) stage 4, GFR 15-29 ml/min ([facility])     Past Medical History:   Diagnosis Date    Acute on chronic diastolic CHF (congestive heart failure) ([place] [address] ) 7/4/2019    Arthritis     Back pain     Bladder cancer ([facility] )     diagnosed  2/2016    Cancer [facility])     bladder; chemo; resection;     Constipation 2/1/2021    Coronary artery disease     has 2 coronary stents    Dental crowns present      5    Diabetes mellitus ([place] [address] )     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[place] [address] )     fluid behind right eye    Hematuria     hx of    High anion gap metabolic acidosis 4/70/2426    Hypercholesteremia     Hypertension     Kidney stones     Wears glasses      Past Surgical History:   Procedure Laterality Date    ABDOMINAL SURGERY      CARDIAC SURGERY      CHOLECYSTECTOMY      open    CORONARY ANGIOPLASTY WITH STENT PLACEMENT      2 stents  2009    CYSTECTOMY, PARTIAL N/A 11/30/2016    Procedure: PARTIAL CYSTECTOMY, LEFT LYMPHADENECTOMY;  Surgeon: Michael Garcia MD;  Location: [address] [place] [address];  Service:     CYSTOSCOPY Left 10/20/2016    Procedure: CYSTOSCOPY, BILATERAL RETROGRADE PYLEOGRAM, LEFT SIDE BLADDER BIOPSY, TURBT;  Surgeon: Michael Garcia MD;  Location: [address] [place] [address];  Service:     HERNIA REPAIR      repair RIH    IR NON-TUNNELED CENTRAL LINE PLACEMENT  1/25/2021    IR TEMPORARY DIALYSIS CATHETER PLACEMENT  7/8/2019    IR TUNNELED CENTRAL LINE PLACEMENT  2/10/2021    IR TUNNELED DIALYSIS CATHETER PLACEMENT  7/12/2019    IR TUNNELED DIALYSIS CATHETER REMOVAL  9/9/2019    IR TUNNELED DIALYSIS CATHETER REMOVAL  3/22/2021    MN CYSTOURETHROSCOPY N/A 2/25/2016    Procedure: CYSTOSCOPY;  Surgeon: Michael Garcia MD;  Location: [address] [place] [address];  Service: Urology    MN CYSTOURETHROSCOPY,FULGUR 2-5 CM LESN N/A 2/25/2016    Procedure: TRANSURETHRAL RESECTION OF BLADDER TUMOR (TURBT);   Surgeon: Michael Garcia MD;  Location: [address] [place] [address];  Service: Urology    TRANSURETHRAL RESECTION OF BLADDER TUMOR N/A 2016    Procedure: TRANSURETHRAL RESECTION OF BLADDER TUMOR (TURBT), Cystoscopy, deep biopsy;  Surgeon: August Trujillo MD;  Location: [facility];  Service:     TRANSURETHRAL RESECTION OF BLADDER TUMOR Bilateral 2016    Procedure: TRANSURETHRAL RESECTION OF BLADDER TUMOR (TURBT), Cysto, retrograde, BLADDER BIOPSY;  Surgeon: August Trujillo MD;  Location: [facility];  Service:      Social History   Social History     Substance and Sexual Activity   Alcohol Use Yes    Comment: socially     Social History     Substance and Sexual Activity   Drug Use No     Social History     Tobacco Use   Smoking Status Former Smoker    Packs/day: 0 50    Years: 5 00    Pack years: 2 50    Quit date:     Years since quittin 6   Smokeless Tobacco Never Used     Family History   Problem Relation Age of Onset    Heart disease Mother     Diabetes Mother     Diabetes Father        Meds/Allergies   current meds:   Current Facility-Administered Medications   Medication Dose Route Frequency    acetaminophen (TYLENOL) tablet 650 mg  650 mg Oral Q6H PRN    aspirin (ECOTRIN LOW STRENGTH) EC tablet 81 mg  81 mg Oral Daily    atorvastatin (LIPITOR) tablet 80 mg  80 mg Oral QPM    carvedilol (COREG) tablet 6 25 mg  6 25 mg Oral BID With Meals    clopidogrel (PLAVIX) tablet 75 mg  75 mg Oral Daily    docusate sodium (COLACE) capsule 100 mg  100 mg Oral BID    escitalopram (LEXAPRO) tablet 10 mg  10 mg Oral HS    heparin (porcine) subcutaneous injection 5,000 Units  5,000 Units Subcutaneous Q8H [facility]    hydrALAZINE (APRESOLINE) tablet 50 mg  50 mg Oral Q8H [facility]    insulin detemir (LEVEMIR) subcutaneous injection 20 Units  20 Units Subcutaneous Q12H [facility]    insulin lispro (HumaLOG) 100 units/mL subcutaneous injection 1-5 Units  1-5 Units Subcutaneous HS    insulin lispro (HumaLOG) 100 units/mL subcutaneous injection 1-6 Units  1-6 Units Subcutaneous TID AC    isosorbide mononitrate (IMDUR) 24 hr tablet 60 mg  60 mg Oral Daily    nitroglycerin (NITROSTAT) SL tablet 0 4 mg  0 4 mg Sublingual Q5 Min PRN    pantoprazole (PROTONIX) EC tablet 40 mg  40 mg Oral Early Morning    polyethylene glycol (MIRALAX) packet 17 g  17 g Oral Daily PRN    primidone (MYSOLINE) tablet 50 mg  50 mg Oral HS    ranolazine (RANEXA) 12 hr tablet 500 mg  500 mg Oral Q12H [facility]    torsemide (DEMADEX) tablet 40 mg  40 mg Oral Daily     No Known Allergies    Objective     Intake/Output Summary (Last 24 hours) at 8/19/2021 0806  Last data filed at 8/19/2021 0535  Gross per 24 hour   Intake 1200 ml   Output 1500 ml   Net -300 ml     Body mass index is 25 66 kg/m²  Invasive Devices:        PHYSICAL EXAM:  /63   Pulse 69   Temp 97 8 °F (36 6 °C)   Resp 20   Ht 6' 1" (1 854 m)   Wt 88 2 kg (194 lb 8 oz)   SpO2 97%   BMI 25 66 kg/m²     Physical Exam  Constitutional:       General: He is not in acute distress  Appearance: Normal appearance  He is not toxic-appearing or diaphoretic  HENT:      Head: Normocephalic and atraumatic  Mouth/Throat:      Mouth: Mucous membranes are dry  Eyes:      General: No scleral icterus  Extraocular Movements: Extraocular movements intact  Cardiovascular:      Rate and Rhythm: Normal rate and regular rhythm  Heart sounds: No friction rub  No gallop  Comments: Minimal pretibial edema  Pulmonary:      Effort: Pulmonary effort is normal  No respiratory distress  Breath sounds: Normal breath sounds  No wheezing, rhonchi or rales  Abdominal:      General: Bowel sounds are normal  There is no distension  Palpations: Abdomen is soft  Tenderness: There is no abdominal tenderness  There is no rebound  Musculoskeletal:      Cervical back: Normal range of motion and neck supple  Neurological:      General: No focal deficit present  Mental Status: He is alert and oriented to person, place, and time  Mental status is at baseline     Psychiatric: Mood and Affect: Mood normal          Behavior: Behavior normal          Thought Content:  Thought content normal          Judgment: Judgment normal            Current Weight: Weight - Scale: 88 2 kg (194 lb 8 oz)  First Weight: Weight - Scale: 88 2 kg (194 lb 8 oz)    Lab Results:    Results from last 7 days   Lab Units 08/19/21  0531   WBC Thousand/uL 7 24   HEMOGLOBIN g/dL 8 0*   HEMATOCRIT % 25 3*   PLATELETS Thousands/uL 104*     Results from last 7 days   Lab Units 08/19/21  0531   POTASSIUM mmol/L 4 2   CHLORIDE mmol/L 102   CO2 mmol/L 25   BUN mg/dL 71*   CREATININE mg/dL 2 45*   CALCIUM mg/dL 8 3     Results from last 7 days   Lab Units 08/19/21  0531 08/16/21  2354   POTASSIUM mmol/L 4 2 4 5   CHLORIDE mmol/L 102 103   CO2 mmol/L 25 25   BUN mg/dL 71* 66*   CREATININE mg/dL 2 45* 2 16*   CALCIUM mg/dL 8 3 9 2   ALK PHOS U/L  --  131*   ALT U/L  --  70   AST U/L  --  39

## 2021-08-20 NOTE — UTILIZATION REVIEW
Notification of Discharge   This is a Notification of Discharge from our facility [address]  Please be advised that this patient has been discharge from our facility  Below you will find the admission and discharge date and time including the patients disposition  UTILIZATION REVIEW CONTACT:  Chaisdy Quiros  Utilization   Network Utilization Review Department  Phone: [phone] x carefully listen to the prompts  All voicemails are confidential   Email: [email or web address]     PHYSICIAN ADVISORY SERVICES:  FOR [id number] REVIEW - MEDICAL NECESSITY DENIAL  Phone: [phone]  Fax: [phone]  Email: [email or web address]     PRESENTATION DATE: 8/16/2021 11:44 PM  OBERVATION ADMISSION DATE:   INPATIENT ADMISSION DATE: 8/17/21  3:36 PM   DISCHARGE DATE: 8/19/2021  4:16 PM  DISPOSITION: Home with [place] with [address] INFORMATION:  Send all requests for admission clinical reviews, approved or denied determinations and any other requests to dedicated fax number below belonging to the campus where the patient is receiving treatment   List of dedicated fax numbers:  [address] [place] [address] DENIALS (Administrative/Medical Necessity) [phone]   [address] [place] [address] (Maternity/NICU/Pediatrics) [phone]   [facility] [phone]   Alexandre Rutledge [phone]   Shiv Najera [phone]   [facility] [address] [place] [address] [phone]   [facility]  [phone]   [address] [place] [address], [facility]  [address] [facility] [address] [facility] [phone]

## 2022-01-01 ENCOUNTER — APPOINTMENT (INPATIENT)
Dept: RADIOLOGY | Facility: HOSPITAL | Age: 82
DRG: 052 | End: 2022-01-01
Payer: COMMERCIAL

## 2022-01-01 ENCOUNTER — HOSPITAL ENCOUNTER (INPATIENT)
Facility: HOSPITAL | Age: 82
LOS: 4 days | DRG: 052 | End: 2022-03-20
Attending: SURGERY | Admitting: SURGERY
Payer: COMMERCIAL

## 2022-01-01 ENCOUNTER — APPOINTMENT (EMERGENCY)
Dept: MRI IMAGING | Facility: HOSPITAL | Age: 82
DRG: 052 | End: 2022-01-01
Payer: COMMERCIAL

## 2022-01-01 ENCOUNTER — APPOINTMENT (EMERGENCY)
Dept: CT IMAGING | Facility: HOSPITAL | Age: 82
DRG: 052 | End: 2022-01-01
Payer: COMMERCIAL

## 2022-01-01 ENCOUNTER — APPOINTMENT (EMERGENCY)
Dept: RADIOLOGY | Facility: HOSPITAL | Age: 82
DRG: 052 | End: 2022-01-01
Payer: COMMERCIAL

## 2022-01-01 VITALS
SYSTOLIC BLOOD PRESSURE: 152 MMHG | HEART RATE: 57 BPM | TEMPERATURE: 96.1 F | DIASTOLIC BLOOD PRESSURE: 69 MMHG | BODY MASS INDEX: 25.68 KG/M2 | WEIGHT: 193.78 LBS | RESPIRATION RATE: 38 BRPM | HEIGHT: 73 IN | OXYGEN SATURATION: 100 %

## 2022-01-01 DIAGNOSIS — S14.109D INJURY OF CERVICAL SPINAL CORD, SUBSEQUENT ENCOUNTER (HCC): ICD-10-CM
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
## 2022-01-01 DIAGNOSIS — S14.109A ACUTE TRAUMATIC INJURY OF CERVICAL SPINE (HCC): ICD-10-CM

## 2022-01-01 DIAGNOSIS — J96.01 ACUTE RESPIRATORY FAILURE WITH HYPOXIA AND HYPERCAPNIA (HCC): ICD-10-CM

## 2022-01-01 DIAGNOSIS — J96.02 ACUTE RESPIRATORY FAILURE WITH HYPOXIA AND HYPERCAPNIA (HCC): ICD-10-CM

## 2022-01-01 DIAGNOSIS — S14.109A INJURY OF CERVICAL SPINAL CORD, INITIAL ENCOUNTER (HCC): ICD-10-CM

## 2022-01-01 LAB
2HR DELTA HS TROPONIN: -1 NG/L
ABO GROUP BLD BPU: NORMAL
ABO GROUP BLD: NORMAL
ABO GROUP BLD: NORMAL
ALBUMIN SERPL BCP-MCNC: 3.1 G/DL (ref 3.5–5)
ALBUMIN SERPL BCP-MCNC: 3.3 G/DL (ref 3.5–5)
ALP SERPL-CCNC: 59 U/L (ref 46–116)
ALP SERPL-CCNC: 69 U/L (ref 46–116)
ALT SERPL W P-5'-P-CCNC: 27 U/L (ref 12–78)
ALT SERPL W P-5'-P-CCNC: 33 U/L (ref 12–78)
ANION GAP SERPL CALCULATED.3IONS-SCNC: 10 MMOL/L (ref 4–13)
ANION GAP SERPL CALCULATED.3IONS-SCNC: 12 MMOL/L (ref 4–13)
ANION GAP SERPL CALCULATED.3IONS-SCNC: 14 MMOL/L (ref 4–13)
ANION GAP SERPL CALCULATED.3IONS-SCNC: 15 MMOL/L (ref 4–13)
ANION GAP SERPL CALCULATED.3IONS-SCNC: 15 MMOL/L (ref 4–13)
APTT PPP: 30 SECONDS (ref 23–37)
ARTERIAL PATENCY WRIST A: YES
AST SERPL W P-5'-P-CCNC: 24 U/L (ref 5–45)
AST SERPL W P-5'-P-CCNC: 36 U/L (ref 5–45)
ATRIAL RATE: 57 BPM
BASE EXCESS BLDA CALC-SCNC: -3 MMOL/L (ref -2–3)
BASE EXCESS BLDA CALC-SCNC: -4.1 MMOL/L
BASE EXCESS BLDA CALC-SCNC: -6 MMOL/L (ref -2–3)
BASE EXCESS BLDA CALC-SCNC: -7.7 MMOL/L
BASOPHILS # BLD AUTO: 0.01 THOUSANDS/ΜL (ref 0–0.1)
BASOPHILS # BLD AUTO: 0.02 THOUSANDS/ΜL (ref 0–0.1)
BASOPHILS NFR BLD AUTO: 0 % (ref 0–1)
BILIRUB SERPL-MCNC: 0.83 MG/DL (ref 0.2–1)
BILIRUB SERPL-MCNC: 1.37 MG/DL (ref 0.2–1)
BLD GP AB SCN SERPL QL: NEGATIVE
BPU ID: NORMAL
BUN SERPL-MCNC: 65 MG/DL (ref 5–25)
BUN SERPL-MCNC: 67 MG/DL (ref 5–25)
BUN SERPL-MCNC: 82 MG/DL (ref 5–25)
BUN SERPL-MCNC: 89 MG/DL (ref 5–25)
BUN SERPL-MCNC: 90 MG/DL (ref 5–25)
CA-I BLD-SCNC: 1.03 MMOL/L (ref 1.12–1.32)
CA-I BLD-SCNC: 1.04 MMOL/L (ref 1.12–1.32)
CA-I BLD-SCNC: 1.2 MMOL/L (ref 1.12–1.32)
CA-I BLD-SCNC: 1.22 MMOL/L (ref 1.12–1.32)
CALCIUM ALBUM COR SERPL-MCNC: 9.2 MG/DL (ref 8.3–10.1)
CALCIUM ALBUM COR SERPL-MCNC: 9.4 MG/DL (ref 8.3–10.1)
CALCIUM SERPL-MCNC: 7.9 MG/DL (ref 8.3–10.1)
CALCIUM SERPL-MCNC: 8.1 MG/DL (ref 8.3–10.1)
CALCIUM SERPL-MCNC: 8.5 MG/DL (ref 8.3–10.1)
CALCIUM SERPL-MCNC: 8.6 MG/DL (ref 8.3–10.1)
CALCIUM SERPL-MCNC: 8.8 MG/DL (ref 8.3–10.1)
CARDIAC TROPONIN I PNL SERPL HS: 28 NG/L
CARDIAC TROPONIN I PNL SERPL HS: 29 NG/L
CFFMA (FUNCTIONAL FIBRINOGEN MAX AMPLITUDE): 41.2 MM (ref 15–32)
CHLORIDE SERPL-SCNC: 101 MMOL/L (ref 100–108)
CHLORIDE SERPL-SCNC: 102 MMOL/L (ref 100–108)
CHLORIDE SERPL-SCNC: 102 MMOL/L (ref 100–108)
CHLORIDE SERPL-SCNC: 104 MMOL/L (ref 100–108)
CHLORIDE SERPL-SCNC: 106 MMOL/L (ref 100–108)
CK SERPL-CCNC: 91 U/L (ref 39–308)
CKLY30: 0 % (ref 0–2.6)
CKR(REACTION TIME): 3.7 MIN (ref 4.6–9.1)
CO2 SERPL-SCNC: 19 MMOL/L (ref 21–32)
CO2 SERPL-SCNC: 20 MMOL/L (ref 21–32)
CO2 SERPL-SCNC: 22 MMOL/L (ref 21–32)
CO2 SERPL-SCNC: 23 MMOL/L (ref 21–32)
CO2 SERPL-SCNC: 24 MMOL/L (ref 21–32)
CREAT SERPL-MCNC: 2.9 MG/DL (ref 0.6–1.3)
CREAT SERPL-MCNC: 2.91 MG/DL (ref 0.6–1.3)
CREAT SERPL-MCNC: 3.88 MG/DL (ref 0.6–1.3)
CREAT SERPL-MCNC: 4.48 MG/DL (ref 0.6–1.3)
CREAT SERPL-MCNC: 4.92 MG/DL (ref 0.6–1.3)
CROSSMATCH: NORMAL
CRTMA(RAPIDTEG MAX AMPLITUDE): 68.9 MM (ref 52–70)
EOSINOPHIL # BLD AUTO: 0 THOUSAND/ΜL (ref 0–0.61)
EOSINOPHIL # BLD AUTO: 0 THOUSAND/ΜL (ref 0–0.61)
EOSINOPHIL # BLD AUTO: 0.01 THOUSAND/ΜL (ref 0–0.61)
EOSINOPHIL # BLD AUTO: 0.04 THOUSAND/ΜL (ref 0–0.61)
EOSINOPHIL NFR BLD AUTO: 0 % (ref 0–6)
EOSINOPHIL NFR BLD AUTO: 1 % (ref 0–6)
ERYTHROCYTE [DISTWIDTH] IN BLOOD BY AUTOMATED COUNT: 13.3 % (ref 11.6–15.1)
ERYTHROCYTE [DISTWIDTH] IN BLOOD BY AUTOMATED COUNT: 13.4 % (ref 11.6–15.1)
ERYTHROCYTE [DISTWIDTH] IN BLOOD BY AUTOMATED COUNT: 14.1 % (ref 11.6–15.1)
ERYTHROCYTE [DISTWIDTH] IN BLOOD BY AUTOMATED COUNT: 14.1 % (ref 11.6–15.1)
GFR SERPL CREATININE-BSD FRML MDRD: 10 ML/MIN/1.73SQ M
GFR SERPL CREATININE-BSD FRML MDRD: 11 ML/MIN/1.73SQ M
GFR SERPL CREATININE-BSD FRML MDRD: 13 ML/MIN/1.73SQ M
GFR SERPL CREATININE-BSD FRML MDRD: 19 ML/MIN/1.73SQ M
GFR SERPL CREATININE-BSD FRML MDRD: 19 ML/MIN/1.73SQ M
GLUCOSE SERPL-MCNC: 116 MG/DL (ref 65–140)
GLUCOSE SERPL-MCNC: 132 MG/DL (ref 65–140)
GLUCOSE SERPL-MCNC: 134 MG/DL (ref 65–140)
GLUCOSE SERPL-MCNC: 150 MG/DL (ref 65–140)
GLUCOSE SERPL-MCNC: 154 MG/DL (ref 65–140)
GLUCOSE SERPL-MCNC: 159 MG/DL (ref 65–140)
GLUCOSE SERPL-MCNC: 162 MG/DL (ref 65–140)
GLUCOSE SERPL-MCNC: 184 MG/DL (ref 65–140)
GLUCOSE SERPL-MCNC: 194 MG/DL (ref 65–140)
GLUCOSE SERPL-MCNC: 200 MG/DL (ref 65–140)
GLUCOSE SERPL-MCNC: 204 MG/DL (ref 65–140)
GLUCOSE SERPL-MCNC: 206 MG/DL (ref 65–140)
GLUCOSE SERPL-MCNC: 220 MG/DL (ref 65–140)
GLUCOSE SERPL-MCNC: 234 MG/DL (ref 65–140)
GLUCOSE SERPL-MCNC: 236 MG/DL (ref 65–140)
GLUCOSE SERPL-MCNC: 241 MG/DL (ref 65–140)
GLUCOSE SERPL-MCNC: 241 MG/DL (ref 65–140)
GLUCOSE SERPL-MCNC: 255 MG/DL (ref 65–140)
GLUCOSE SERPL-MCNC: 318 MG/DL (ref 65–140)
HCO3 BLDA-SCNC: 20.2 MMOL/L (ref 22–28)
HCO3 BLDA-SCNC: 21 MMOL/L (ref 22–28)
HCO3 BLDA-SCNC: 22.2 MMOL/L (ref 24–30)
HCO3 BLDA-SCNC: 22.5 MMOL/L (ref 22–28)
HCT VFR BLD AUTO: 22.5 % (ref 36.5–49.3)
HCT VFR BLD AUTO: 28.3 % (ref 36.5–49.3)
HCT VFR BLD AUTO: 29.7 % (ref 36.5–49.3)
HCT VFR BLD AUTO: 35.3 % (ref 36.5–49.3)
HCT VFR BLD CALC: 21 % (ref 36.5–49.3)
HCT VFR BLD CALC: 33 % (ref 36.5–49.3)
HGB BLD-MCNC: 10 G/DL (ref 12–17)
HGB BLD-MCNC: 11.7 G/DL (ref 12–17)
HGB BLD-MCNC: 7.1 G/DL (ref 12–17)
HGB BLD-MCNC: 8 G/DL (ref 12–17)
HGB BLD-MCNC: 9.6 G/DL (ref 12–17)
HGB BLDA-MCNC: 11.2 G/DL (ref 12–17)
HGB BLDA-MCNC: 7.1 G/DL (ref 12–17)
IMM GRANULOCYTES # BLD AUTO: 0.04 THOUSAND/UL (ref 0–0.2)
IMM GRANULOCYTES # BLD AUTO: 0.05 THOUSAND/UL (ref 0–0.2)
IMM GRANULOCYTES # BLD AUTO: 0.05 THOUSAND/UL (ref 0–0.2)
IMM GRANULOCYTES # BLD AUTO: 0.18 THOUSAND/UL (ref 0–0.2)
IMM GRANULOCYTES NFR BLD AUTO: 1 % (ref 0–2)
INR PPP: 1.22 (ref 0.84–1.19)
LYMPHOCYTES # BLD AUTO: 0.55 THOUSANDS/ΜL (ref 0.6–4.47)
LYMPHOCYTES # BLD AUTO: 0.71 THOUSANDS/ΜL (ref 0.6–4.47)
LYMPHOCYTES # BLD AUTO: 0.75 THOUSANDS/ΜL (ref 0.6–4.47)
LYMPHOCYTES # BLD AUTO: 0.77 THOUSANDS/ΜL (ref 0.6–4.47)
LYMPHOCYTES NFR BLD AUTO: 10 % (ref 14–44)
LYMPHOCYTES NFR BLD AUTO: 5 % (ref 14–44)
LYMPHOCYTES NFR BLD AUTO: 5 % (ref 14–44)
LYMPHOCYTES NFR BLD AUTO: 7 % (ref 14–44)
MAGNESIUM SERPL-MCNC: 2.5 MG/DL (ref 1.6–2.6)
MAGNESIUM SERPL-MCNC: 2.7 MG/DL (ref 1.6–2.6)
MAGNESIUM SERPL-MCNC: 2.8 MG/DL (ref 1.6–2.6)
MCH RBC QN AUTO: 29.3 PG (ref 26.8–34.3)
MCH RBC QN AUTO: 30.2 PG (ref 26.8–34.3)
MCH RBC QN AUTO: 30.4 PG (ref 26.8–34.3)
MCH RBC QN AUTO: 30.5 PG (ref 26.8–34.3)
MCHC RBC AUTO-ENTMCNC: 31.6 G/DL (ref 31.4–37.4)
MCHC RBC AUTO-ENTMCNC: 33.1 G/DL (ref 31.4–37.4)
MCHC RBC AUTO-ENTMCNC: 33.7 G/DL (ref 31.4–37.4)
MCHC RBC AUTO-ENTMCNC: 33.9 G/DL (ref 31.4–37.4)
MCV RBC AUTO: 89 FL (ref 82–98)
MCV RBC AUTO: 90 FL (ref 82–98)
MCV RBC AUTO: 92 FL (ref 82–98)
MCV RBC AUTO: 93 FL (ref 82–98)
MONOCYTES # BLD AUTO: 0.47 THOUSAND/ΜL (ref 0.17–1.22)
MONOCYTES # BLD AUTO: 0.88 THOUSAND/ΜL (ref 0.17–1.22)
MONOCYTES # BLD AUTO: 0.9 THOUSAND/ΜL (ref 0.17–1.22)
MONOCYTES # BLD AUTO: 1 THOUSAND/ΜL (ref 0.17–1.22)
MONOCYTES NFR BLD AUTO: 6 % (ref 4–12)
MONOCYTES NFR BLD AUTO: 7 % (ref 4–12)
MONOCYTES NFR BLD AUTO: 8 % (ref 4–12)
MONOCYTES NFR BLD AUTO: 8 % (ref 4–12)
NASAL CANNULA: 6
NEUTROPHILS # BLD AUTO: 13.2 THOUSANDS/ΜL (ref 1.85–7.62)
NEUTROPHILS # BLD AUTO: 6.06 THOUSANDS/ΜL (ref 1.85–7.62)
NEUTROPHILS # BLD AUTO: 9.05 THOUSANDS/ΜL (ref 1.85–7.62)
NEUTROPHILS # BLD AUTO: 9.26 THOUSANDS/ΜL (ref 1.85–7.62)
NEUTS SEG NFR BLD AUTO: 82 % (ref 43–75)
NEUTS SEG NFR BLD AUTO: 84 % (ref 43–75)
NEUTS SEG NFR BLD AUTO: 86 % (ref 43–75)
NEUTS SEG NFR BLD AUTO: 87 % (ref 43–75)
NRBC BLD AUTO-RTO: 0 /100 WBCS
O2 CT BLDA-SCNC: 14.2 ML/DL (ref 16–23)
O2 CT BLDA-SCNC: 15.1 ML/DL (ref 16–23)
OXYHGB MFR BLDA: 94.4 % (ref 94–97)
OXYHGB MFR BLDA: 96.2 % (ref 94–97)
P AXIS: 54 DEGREES
PCO2 BLD: 23 MMOL/L (ref 21–32)
PCO2 BLD: 24 MMOL/L (ref 21–32)
PCO2 BLD: 37.1 MM HG (ref 42–50)
PCO2 BLD: 58.7 MM HG (ref 36–44)
PCO2 BLDA: 34.2 MM HG (ref 36–44)
PCO2 BLDA: 57.9 MM HG (ref 36–44)
PH BLD: 7.19 [PH] (ref 7.35–7.45)
PH BLD: 7.38 [PH] (ref 7.3–7.4)
PH BLDA: 7.18 [PH] (ref 7.35–7.45)
PH BLDA: 7.39 [PH] (ref 7.35–7.45)
PHOSPHATE SERPL-MCNC: 4.6 MG/DL (ref 2.3–4.1)
PHOSPHATE SERPL-MCNC: 7.5 MG/DL (ref 2.3–4.1)
PHOSPHATE SERPL-MCNC: 7.5 MG/DL (ref 2.3–4.1)
PLATELET # BLD AUTO: 115 THOUSANDS/UL (ref 149–390)
PLATELET # BLD AUTO: 124 THOUSANDS/UL (ref 149–390)
PLATELET # BLD AUTO: 133 THOUSANDS/UL (ref 149–390)
PLATELET # BLD AUTO: 157 THOUSANDS/UL (ref 149–390)
PMV BLD AUTO: 10.5 FL (ref 8.9–12.7)
PMV BLD AUTO: 10.8 FL (ref 8.9–12.7)
PMV BLD AUTO: 11 FL (ref 8.9–12.7)
PMV BLD AUTO: 11.3 FL (ref 8.9–12.7)
PO2 BLD: 30 MM HG (ref 35–45)
PO2 BLD: 79 MM HG (ref 75–129)
PO2 BLDA: 103.2 MM HG (ref 75–129)
PO2 BLDA: 92.9 MM HG (ref 75–129)
POTASSIUM BLD-SCNC: 4.3 MMOL/L (ref 3.5–5.3)
POTASSIUM BLD-SCNC: 5.6 MMOL/L (ref 3.5–5.3)
POTASSIUM SERPL-SCNC: 4.3 MMOL/L (ref 3.5–5.3)
POTASSIUM SERPL-SCNC: 4.6 MMOL/L (ref 3.5–5.3)
POTASSIUM SERPL-SCNC: 5.3 MMOL/L (ref 3.5–5.3)
POTASSIUM SERPL-SCNC: 5.3 MMOL/L (ref 3.5–5.3)
POTASSIUM SERPL-SCNC: 5.7 MMOL/L (ref 3.5–5.3)
PR INTERVAL: 258 MS
PROT SERPL-MCNC: 6.7 G/DL (ref 6.4–8.2)
PROT SERPL-MCNC: 6.9 G/DL (ref 6.4–8.2)
PROTHROMBIN TIME: 15.4 SECONDS (ref 11.6–14.5)
QRS AXIS: 10 DEGREES
QRSD INTERVAL: 112 MS
QT INTERVAL: 560 MS
QTC INTERVAL: 495 MS
RBC # BLD AUTO: 2.42 MILLION/UL (ref 3.88–5.62)
RBC # BLD AUTO: 3.18 MILLION/UL (ref 3.88–5.62)
RBC # BLD AUTO: 3.29 MILLION/UL (ref 3.88–5.62)
RBC # BLD AUTO: 3.84 MILLION/UL (ref 3.88–5.62)
RH BLD: POSITIVE
RH BLD: POSITIVE
SAO2 % BLD FROM PO2: 56 % (ref 60–85)
SAO2 % BLD FROM PO2: 92 % (ref 60–85)
SODIUM BLD-SCNC: 136 MMOL/L (ref 136–145)
SODIUM BLD-SCNC: 139 MMOL/L (ref 136–145)
SODIUM SERPL-SCNC: 135 MMOL/L (ref 136–145)
SODIUM SERPL-SCNC: 136 MMOL/L (ref 136–145)
SODIUM SERPL-SCNC: 139 MMOL/L (ref 136–145)
SODIUM SERPL-SCNC: 139 MMOL/L (ref 136–145)
SODIUM SERPL-SCNC: 140 MMOL/L (ref 136–145)
SPECIMEN EXPIRATION DATE: NORMAL
SPECIMEN SOURCE: ABNORMAL
T WAVE AXIS: 67 DEGREES
UNIT DISPENSE STATUS: NORMAL
UNIT PRODUCT CODE: NORMAL
UNIT PRODUCT VOLUME: 350 ML
UNIT RH: NORMAL
VENTRICULAR RATE: 47 BPM
WBC # BLD AUTO: 10.55 THOUSAND/UL (ref 4.31–10.16)
WBC # BLD AUTO: 10.99 THOUSAND/UL (ref 4.31–10.16)
WBC # BLD AUTO: 15.15 THOUSAND/UL (ref 4.31–10.16)
WBC # BLD AUTO: 7.33 THOUSAND/UL (ref 4.31–10.16)

## 2022-01-01 PROCEDURE — 94003 VENT MGMT INPAT SUBQ DAY: CPT

## 2022-01-01 PROCEDURE — 93010 ELECTROCARDIOGRAM REPORT: CPT | Performed by: INTERNAL MEDICINE

## 2022-01-01 PROCEDURE — G1004 CDSM NDSC: HCPCS

## 2022-01-01 PROCEDURE — 94762 N-INVAS EAR/PLS OXIMTRY CONT: CPT

## 2022-01-01 PROCEDURE — 86920 COMPATIBILITY TEST SPIN: CPT

## 2022-01-01 PROCEDURE — 85014 HEMATOCRIT: CPT

## 2022-01-01 PROCEDURE — 36600 WITHDRAWAL OF ARTERIAL BLOOD: CPT

## 2022-01-01 PROCEDURE — 93005 ELECTROCARDIOGRAM TRACING: CPT

## 2022-01-01 PROCEDURE — 84100 ASSAY OF PHOSPHORUS: CPT | Performed by: NURSE PRACTITIONER

## 2022-01-01 PROCEDURE — 99254 IP/OBS CNSLTJ NEW/EST MOD 60: CPT | Performed by: INTERNAL MEDICINE

## 2022-01-01 PROCEDURE — 85397 CLOTTING FUNCT ACTIVITY: CPT | Performed by: SURGERY

## 2022-01-01 PROCEDURE — 83735 ASSAY OF MAGNESIUM: CPT | Performed by: NURSE PRACTITIONER

## 2022-01-01 PROCEDURE — 70450 CT HEAD/BRAIN W/O DYE: CPT

## 2022-01-01 PROCEDURE — 85610 PROTHROMBIN TIME: CPT | Performed by: PHYSICIAN ASSISTANT

## 2022-01-01 PROCEDURE — 97760 ORTHOTIC MGMT&TRAING 1ST ENC: CPT

## 2022-01-01 PROCEDURE — 85018 HEMOGLOBIN: CPT | Performed by: PHYSICIAN ASSISTANT

## 2022-01-01 PROCEDURE — 86900 BLOOD TYPING SEROLOGIC ABO: CPT | Performed by: PHYSICIAN ASSISTANT

## 2022-01-01 PROCEDURE — 82330 ASSAY OF CALCIUM: CPT

## 2022-01-01 PROCEDURE — 99291 CRITICAL CARE FIRST HOUR: CPT | Performed by: PHYSICIAN ASSISTANT

## 2022-01-01 PROCEDURE — 72141 MRI NECK SPINE W/O DYE: CPT

## 2022-01-01 PROCEDURE — 85025 COMPLETE CBC W/AUTO DIFF WBC: CPT | Performed by: PHYSICIAN ASSISTANT

## 2022-01-01 PROCEDURE — 82803 BLOOD GASES ANY COMBINATION: CPT

## 2022-01-01 PROCEDURE — 85347 COAGULATION TIME ACTIVATED: CPT | Performed by: SURGERY

## 2022-01-01 PROCEDURE — 36415 COLL VENOUS BLD VENIPUNCTURE: CPT | Performed by: PHYSICIAN ASSISTANT

## 2022-01-01 PROCEDURE — 80048 BASIC METABOLIC PNL TOTAL CA: CPT | Performed by: NURSE PRACTITIONER

## 2022-01-01 PROCEDURE — 99253 IP/OBS CNSLTJ NEW/EST LOW 45: CPT | Performed by: NEUROLOGICAL SURGERY

## 2022-01-01 PROCEDURE — 71045 X-RAY EXAM CHEST 1 VIEW: CPT

## 2022-01-01 PROCEDURE — 90662 IIV NO PRSV INCREASED AG IM: CPT | Performed by: SURGERY

## 2022-01-01 PROCEDURE — 99285 EMERGENCY DEPT VISIT HI MDM: CPT

## 2022-01-01 PROCEDURE — P9016 RBC LEUKOCYTES REDUCED: HCPCS

## 2022-01-01 PROCEDURE — 86850 RBC ANTIBODY SCREEN: CPT | Performed by: PHYSICIAN ASSISTANT

## 2022-01-01 PROCEDURE — 86901 BLOOD TYPING SEROLOGIC RH(D): CPT | Performed by: PHYSICIAN ASSISTANT

## 2022-01-01 PROCEDURE — 94150 VITAL CAPACITY TEST: CPT

## 2022-01-01 PROCEDURE — 83735 ASSAY OF MAGNESIUM: CPT | Performed by: PHYSICIAN ASSISTANT

## 2022-01-01 PROCEDURE — NC001 PR NO CHARGE: Performed by: NEUROLOGICAL SURGERY

## 2022-01-01 PROCEDURE — 82948 REAGENT STRIP/BLOOD GLUCOSE: CPT

## 2022-01-01 PROCEDURE — 82550 ASSAY OF CK (CPK): CPT | Performed by: PHYSICIAN ASSISTANT

## 2022-01-01 PROCEDURE — 84100 ASSAY OF PHOSPHORUS: CPT | Performed by: PHYSICIAN ASSISTANT

## 2022-01-01 PROCEDURE — 92610 EVALUATE SWALLOWING FUNCTION: CPT

## 2022-01-01 PROCEDURE — 99254 IP/OBS CNSLTJ NEW/EST MOD 60: CPT | Performed by: PHYSICAL MEDICINE & REHABILITATION

## 2022-01-01 PROCEDURE — 82330 ASSAY OF CALCIUM: CPT | Performed by: NURSE PRACTITIONER

## 2022-01-01 PROCEDURE — 82947 ASSAY GLUCOSE BLOOD QUANT: CPT

## 2022-01-01 PROCEDURE — 94760 N-INVAS EAR/PLS OXIMETRY 1: CPT

## 2022-01-01 PROCEDURE — 90471 IMMUNIZATION ADMIN: CPT | Performed by: SURGERY

## 2022-01-01 PROCEDURE — 96375 TX/PRO/DX INJ NEW DRUG ADDON: CPT

## 2022-01-01 PROCEDURE — 85576 BLOOD PLATELET AGGREGATION: CPT | Performed by: SURGERY

## 2022-01-01 PROCEDURE — 99292 CRITICAL CARE ADDL 30 MIN: CPT | Performed by: SURGERY

## 2022-01-01 PROCEDURE — 36430 TRANSFUSION BLD/BLD COMPNT: CPT

## 2022-01-01 PROCEDURE — 94002 VENT MGMT INPAT INIT DAY: CPT

## 2022-01-01 PROCEDURE — 99232 SBSQ HOSP IP/OBS MODERATE 35: CPT | Performed by: PHYSICIAN ASSISTANT

## 2022-01-01 PROCEDURE — 84484 ASSAY OF TROPONIN QUANT: CPT | Performed by: PHYSICIAN ASSISTANT

## 2022-01-01 PROCEDURE — 97167 OT EVAL HIGH COMPLEX 60 MIN: CPT

## 2022-01-01 PROCEDURE — 92526 ORAL FUNCTION THERAPY: CPT

## 2022-01-01 PROCEDURE — 99255 IP/OBS CONSLTJ NEW/EST HI 80: CPT | Performed by: INTERNAL MEDICINE

## 2022-01-01 PROCEDURE — 30233N1 TRANSFUSION OF NONAUTOLOGOUS RED BLOOD CELLS INTO PERIPHERAL VEIN, PERCUTANEOUS APPROACH: ICD-10-PCS | Performed by: SURGERY

## 2022-01-01 PROCEDURE — 99232 SBSQ HOSP IP/OBS MODERATE 35: CPT | Performed by: INTERNAL MEDICINE

## 2022-01-01 PROCEDURE — 85384 FIBRINOGEN ACTIVITY: CPT | Performed by: SURGERY

## 2022-01-01 PROCEDURE — 80053 COMPREHEN METABOLIC PANEL: CPT | Performed by: PHYSICIAN ASSISTANT

## 2022-01-01 PROCEDURE — NC001 PR NO CHARGE: Performed by: PHYSICIAN ASSISTANT

## 2022-01-01 PROCEDURE — NC001 PR NO CHARGE: Performed by: NURSE PRACTITIONER

## 2022-01-01 PROCEDURE — 72125 CT NECK SPINE W/O DYE: CPT

## 2022-01-01 PROCEDURE — 96365 THER/PROPH/DIAG IV INF INIT: CPT

## 2022-01-01 PROCEDURE — 99233 SBSQ HOSP IP/OBS HIGH 50: CPT | Performed by: STUDENT IN AN ORGANIZED HEALTH CARE EDUCATION/TRAINING PROGRAM

## 2022-01-01 PROCEDURE — 99291 CRITICAL CARE FIRST HOUR: CPT | Performed by: SURGERY

## 2022-01-01 PROCEDURE — 84295 ASSAY OF SERUM SODIUM: CPT

## 2022-01-01 PROCEDURE — 74177 CT ABD & PELVIS W/CONTRAST: CPT

## 2022-01-01 PROCEDURE — 82805 BLOOD GASES W/O2 SATURATION: CPT | Performed by: NURSE PRACTITIONER

## 2022-01-01 PROCEDURE — 71260 CT THORAX DX C+: CPT

## 2022-01-01 PROCEDURE — 85025 COMPLETE CBC W/AUTO DIFF WBC: CPT | Performed by: NURSE PRACTITIONER

## 2022-01-01 PROCEDURE — 99291 CRITICAL CARE FIRST HOUR: CPT | Performed by: NURSE PRACTITIONER

## 2022-01-01 PROCEDURE — 85730 THROMBOPLASTIN TIME PARTIAL: CPT | Performed by: PHYSICIAN ASSISTANT

## 2022-01-01 PROCEDURE — 84132 ASSAY OF SERUM POTASSIUM: CPT

## 2022-01-01 PROCEDURE — 99238 HOSP IP/OBS DSCHRG MGMT 30/<: CPT | Performed by: NURSE PRACTITIONER

## 2022-01-01 PROCEDURE — 82330 ASSAY OF CALCIUM: CPT | Performed by: PHYSICIAN ASSISTANT

## 2022-01-01 PROCEDURE — 97163 PT EVAL HIGH COMPLEX 45 MIN: CPT

## 2022-01-01 PROCEDURE — NC001 PR NO CHARGE: Performed by: EMERGENCY MEDICINE

## 2022-01-01 RX ORDER — OXYCODONE HYDROCHLORIDE 5 MG/1
5 TABLET ORAL EVERY 6 HOURS PRN
Status: DISCONTINUED | OUTPATIENT
Start: 2022-01-01 | End: 2022-01-01

## 2022-01-01 RX ORDER — HEPARIN SODIUM 5000 [USP'U]/ML
5000 INJECTION, SOLUTION INTRAVENOUS; SUBCUTANEOUS EVERY 8 HOURS SCHEDULED
Status: DISCONTINUED | OUTPATIENT
Start: 2022-01-01 | End: 2022-01-01

## 2022-01-01 RX ORDER — ASPIRIN 81 MG/1
81 TABLET, CHEWABLE ORAL DAILY
COMMUNITY
End: 2022-01-01 | Stop reason: HOSPADM

## 2022-01-01 RX ORDER — ASPIRIN 81 MG/1
81 TABLET, CHEWABLE ORAL DAILY
Status: DISCONTINUED | OUTPATIENT
Start: 2022-01-01 | End: 2022-01-01

## 2022-01-01 RX ORDER — HYDRALAZINE HYDROCHLORIDE 20 MG/ML
10 INJECTION INTRAMUSCULAR; INTRAVENOUS EVERY 6 HOURS PRN
Status: DISCONTINUED | OUTPATIENT
Start: 2022-01-01 | End: 2022-01-01

## 2022-01-01 RX ORDER — DEXTROSE 10 % IN WATER 10 %
250 INTRAVENOUS SOLUTION INTRAVENOUS ONCE
Status: COMPLETED | OUTPATIENT
Start: 2022-01-01 | End: 2022-01-01

## 2022-01-01 RX ORDER — LABETALOL 20 MG/4 ML (5 MG/ML) INTRAVENOUS SYRINGE
5 ONCE
Status: DISCONTINUED | OUTPATIENT
Start: 2022-01-01 | End: 2022-01-01

## 2022-01-01 RX ORDER — OXYCODONE HYDROCHLORIDE 5 MG/1
2.5 TABLET ORAL EVERY 4 HOURS PRN
Status: DISCONTINUED | OUTPATIENT
Start: 2022-01-01 | End: 2022-01-01

## 2022-01-01 RX ORDER — HYDRALAZINE HYDROCHLORIDE 10 MG/1
10 TABLET, FILM COATED ORAL 3 TIMES DAILY
Status: ON HOLD | COMMUNITY
End: 2022-01-01 | Stop reason: DRUGHIGH

## 2022-01-01 RX ORDER — HYDROMORPHONE HCL/PF 1 MG/ML
0.2 SYRINGE (ML) INJECTION
Status: DISCONTINUED | OUTPATIENT
Start: 2022-01-01 | End: 2022-01-01

## 2022-01-01 RX ORDER — RANOLAZINE 500 MG/1
500 TABLET, EXTENDED RELEASE ORAL EVERY 12 HOURS
COMMUNITY
End: 2022-01-01 | Stop reason: HOSPADM

## 2022-01-01 RX ORDER — HYDROMORPHONE HCL/PF 1 MG/ML
0.5 SYRINGE (ML) INJECTION EVERY 2 HOUR PRN
Status: DISCONTINUED | OUTPATIENT
Start: 2022-01-01 | End: 2022-01-01

## 2022-01-01 RX ORDER — PANTOPRAZOLE SODIUM 40 MG/1
40 TABLET, DELAYED RELEASE ORAL
Status: DISCONTINUED | OUTPATIENT
Start: 2022-01-01 | End: 2022-01-01

## 2022-01-01 RX ORDER — HYDROMORPHONE HCL/PF 1 MG/ML
0.5 SYRINGE (ML) INJECTION
Status: DISCONTINUED | OUTPATIENT
Start: 2022-01-01 | End: 2022-01-01

## 2022-01-01 RX ORDER — GLYCOPYRROLATE 0.2 MG/ML
0.1 INJECTION INTRAMUSCULAR; INTRAVENOUS EVERY 4 HOURS PRN
Qty: 1 ML | Refills: 0
Start: 2022-01-01 | End: 2022-01-01 | Stop reason: HOSPADM

## 2022-01-01 RX ORDER — SODIUM CHLORIDE, SODIUM GLUCONATE, SODIUM ACETATE, POTASSIUM CHLORIDE, MAGNESIUM CHLORIDE, SODIUM PHOSPHATE, DIBASIC, AND POTASSIUM PHOSPHATE .53; .5; .37; .037; .03; .012; .00082 G/100ML; G/100ML; G/100ML; G/100ML; G/100ML; G/100ML; G/100ML
50 INJECTION, SOLUTION INTRAVENOUS CONTINUOUS
Status: DISCONTINUED | OUTPATIENT
Start: 2022-01-01 | End: 2022-01-01

## 2022-01-01 RX ORDER — GLYCOPYRROLATE 0.2 MG/ML
0.1 INJECTION INTRAMUSCULAR; INTRAVENOUS EVERY 4 HOURS PRN
Status: DISCONTINUED | OUTPATIENT
Start: 2022-01-01 | End: 2022-01-01 | Stop reason: HOSPADM

## 2022-01-01 RX ORDER — HALOPERIDOL 5 MG/ML
0.5 INJECTION INTRAMUSCULAR EVERY 2 HOUR PRN
Qty: 1 ML | Refills: 0
Start: 2022-01-01 | End: 2022-01-01 | Stop reason: HOSPADM

## 2022-01-01 RX ORDER — LABETALOL 20 MG/4 ML (5 MG/ML) INTRAVENOUS SYRINGE
2.5 ONCE
Status: COMPLETED | OUTPATIENT
Start: 2022-01-01 | End: 2022-01-01

## 2022-01-01 RX ORDER — CLOPIDOGREL BISULFATE 75 MG/1
75 TABLET ORAL DAILY
COMMUNITY
End: 2022-01-01 | Stop reason: HOSPADM

## 2022-01-01 RX ORDER — POLYETHYLENE GLYCOL 3350 17 G/17G
17 POWDER, FOR SOLUTION ORAL DAILY PRN
COMMUNITY
End: 2022-01-01 | Stop reason: HOSPADM

## 2022-01-01 RX ORDER — DEXMEDETOMIDINE HYDROCHLORIDE 4 UG/ML
.1-.7 INJECTION, SOLUTION INTRAVENOUS
Status: DISCONTINUED | OUTPATIENT
Start: 2022-01-01 | End: 2022-01-01

## 2022-01-01 RX ORDER — LORAZEPAM 2 MG/ML
1 INJECTION INTRAMUSCULAR
Status: COMPLETED | OUTPATIENT
Start: 2022-01-01 | End: 2022-01-01

## 2022-01-01 RX ORDER — LORAZEPAM 2 MG/ML
1 INJECTION INTRAMUSCULAR
Status: DISCONTINUED | OUTPATIENT
Start: 2022-01-01 | End: 2022-01-01 | Stop reason: HOSPADM

## 2022-01-01 RX ORDER — AMOXICILLIN 250 MG
2 CAPSULE ORAL 2 TIMES DAILY
Status: DISCONTINUED | OUTPATIENT
Start: 2022-01-01 | End: 2022-01-01

## 2022-01-01 RX ORDER — TORSEMIDE 20 MG/1
40 TABLET ORAL DAILY
COMMUNITY
End: 2022-01-01 | Stop reason: HOSPADM

## 2022-01-01 RX ORDER — DOCUSATE SODIUM 100 MG/1
100 CAPSULE, LIQUID FILLED ORAL 2 TIMES DAILY
COMMUNITY
End: 2022-01-01 | Stop reason: HOSPADM

## 2022-01-01 RX ORDER — ACETAMINOPHEN 325 MG/1
650 TABLET ORAL EVERY 4 HOURS PRN
Status: DISCONTINUED | OUTPATIENT
Start: 2022-01-01 | End: 2022-01-01

## 2022-01-01 RX ORDER — PANTOPRAZOLE SODIUM 40 MG/1
40 TABLET, DELAYED RELEASE ORAL DAILY
COMMUNITY
End: 2022-01-01 | Stop reason: HOSPADM

## 2022-01-01 RX ORDER — ACETAMINOPHEN 325 MG/1
650 TABLET ORAL EVERY 4 HOURS PRN
Status: DISCONTINUED | OUTPATIENT
Start: 2022-01-01 | End: 2022-01-01 | Stop reason: HOSPADM

## 2022-01-01 RX ORDER — FUROSEMIDE 10 MG/ML
80 INJECTION INTRAMUSCULAR; INTRAVENOUS ONCE
Status: COMPLETED | OUTPATIENT
Start: 2022-01-01 | End: 2022-01-01

## 2022-01-01 RX ORDER — BISACODYL 10 MG
10 SUPPOSITORY, RECTAL RECTAL DAILY
Status: DISCONTINUED | OUTPATIENT
Start: 2022-01-01 | End: 2022-01-01 | Stop reason: HOSPADM

## 2022-01-01 RX ORDER — HYDROMORPHONE HCL/PF 1 MG/ML
1 SYRINGE (ML) INJECTION EVERY 2 HOUR PRN
Status: DISCONTINUED | OUTPATIENT
Start: 2022-01-01 | End: 2022-01-01 | Stop reason: HOSPADM

## 2022-01-01 RX ORDER — SODIUM CHLORIDE, SODIUM GLUCONATE, SODIUM ACETATE, POTASSIUM CHLORIDE, MAGNESIUM CHLORIDE, SODIUM PHOSPHATE, DIBASIC, AND POTASSIUM PHOSPHATE .53; .5; .37; .037; .03; .012; .00082 G/100ML; G/100ML; G/100ML; G/100ML; G/100ML; G/100ML; G/100ML
500 INJECTION, SOLUTION INTRAVENOUS ONCE
Status: COMPLETED | OUTPATIENT
Start: 2022-01-01 | End: 2022-01-01

## 2022-01-01 RX ORDER — LANOLIN ALCOHOL/MO/W.PET/CERES
6 CREAM (GRAM) TOPICAL
Status: DISCONTINUED | OUTPATIENT
Start: 2022-01-01 | End: 2022-01-01

## 2022-01-01 RX ORDER — ONDANSETRON 2 MG/ML
INJECTION INTRAMUSCULAR; INTRAVENOUS
Status: COMPLETED
Start: 2022-01-01 | End: 2022-01-01

## 2022-01-01 RX ORDER — ACETAMINOPHEN 325 MG/1
975 TABLET ORAL EVERY 8 HOURS
Status: DISCONTINUED | OUTPATIENT
Start: 2022-01-01 | End: 2022-01-01

## 2022-01-01 RX ORDER — HYDROMORPHONE HCL/PF 1 MG/ML
0.5 SYRINGE (ML) INJECTION ONCE
Status: COMPLETED | OUTPATIENT
Start: 2022-01-01 | End: 2022-01-01

## 2022-01-01 RX ORDER — ESCITALOPRAM OXALATE 10 MG/1
10 TABLET ORAL DAILY
COMMUNITY
End: 2022-01-01 | Stop reason: HOSPADM

## 2022-01-01 RX ORDER — DEXTROSE MONOHYDRATE 25 G/50ML
50 INJECTION, SOLUTION INTRAVENOUS ONCE
Status: DISCONTINUED | OUTPATIENT
Start: 2022-01-01 | End: 2022-01-01

## 2022-01-01 RX ORDER — FENTANYL CITRATE-0.9 % NACL/PF 10 MCG/ML
50 PLASTIC BAG, INJECTION (ML) INTRAVENOUS CONTINUOUS
Refills: 0 | Status: SHIPPED | OUTPATIENT
Start: 2022-01-01 | End: 2022-01-01 | Stop reason: HOSPADM

## 2022-01-01 RX ORDER — HYDRALAZINE HYDROCHLORIDE 50 MG/1
50 TABLET, FILM COATED ORAL EVERY 8 HOURS
COMMUNITY
End: 2022-01-01 | Stop reason: HOSPADM

## 2022-01-01 RX ORDER — BISACODYL 10 MG
10 SUPPOSITORY, RECTAL RECTAL DAILY PRN
Status: DISCONTINUED | OUTPATIENT
Start: 2022-01-01 | End: 2022-01-01 | Stop reason: HOSPADM

## 2022-01-01 RX ORDER — CALCIUM GLUCONATE 20 MG/ML
1 INJECTION, SOLUTION INTRAVENOUS ONCE
Status: COMPLETED | OUTPATIENT
Start: 2022-01-01 | End: 2022-01-01

## 2022-01-01 RX ORDER — ATORVASTATIN CALCIUM 40 MG/1
80 TABLET, FILM COATED ORAL
Status: DISCONTINUED | OUTPATIENT
Start: 2022-01-01 | End: 2022-01-01

## 2022-01-01 RX ORDER — ATORVASTATIN CALCIUM 80 MG/1
80 TABLET, FILM COATED ORAL DAILY
COMMUNITY
End: 2022-01-01 | Stop reason: HOSPADM

## 2022-01-01 RX ORDER — CLOPIDOGREL BISULFATE 75 MG/1
75 TABLET ORAL DAILY
Status: DISCONTINUED | OUTPATIENT
Start: 2022-01-01 | End: 2022-01-01

## 2022-01-01 RX ORDER — ESCITALOPRAM OXALATE 10 MG/1
10 TABLET ORAL DAILY
Status: DISCONTINUED | OUTPATIENT
Start: 2022-01-01 | End: 2022-01-01

## 2022-01-01 RX ORDER — LORAZEPAM 2 MG/ML
1 INJECTION INTRAMUSCULAR
Qty: 2 ML | Refills: 0 | Status: SHIPPED | OUTPATIENT
Start: 2022-01-01 | End: 2022-01-01 | Stop reason: HOSPADM

## 2022-01-01 RX ORDER — ISOSORBIDE MONONITRATE 60 MG/1
60 TABLET, EXTENDED RELEASE ORAL DAILY
COMMUNITY
End: 2022-01-01 | Stop reason: HOSPADM

## 2022-01-01 RX ORDER — NITROGLYCERIN 0.3 MG/1
0.4 TABLET SUBLINGUAL
COMMUNITY
End: 2022-01-01 | Stop reason: HOSPADM

## 2022-01-01 RX ORDER — PRIMIDONE 50 MG/1
50 TABLET ORAL
COMMUNITY
End: 2022-01-01 | Stop reason: HOSPADM

## 2022-01-01 RX ORDER — ONDANSETRON 2 MG/ML
4 INJECTION INTRAMUSCULAR; INTRAVENOUS EVERY 6 HOURS PRN
Status: DISCONTINUED | OUTPATIENT
Start: 2022-01-01 | End: 2022-01-01

## 2022-01-01 RX ORDER — CARVEDILOL 6.25 MG/1
6.25 TABLET ORAL 2 TIMES DAILY WITH MEALS
COMMUNITY
End: 2022-01-01 | Stop reason: HOSPADM

## 2022-01-01 RX ORDER — FENTANYL CITRATE-0.9 % NACL/PF 10 MCG/ML
50 PLASTIC BAG, INJECTION (ML) INTRAVENOUS CONTINUOUS
Status: DISCONTINUED | OUTPATIENT
Start: 2022-01-01 | End: 2022-01-01 | Stop reason: HOSPADM

## 2022-01-01 RX ORDER — HALOPERIDOL 5 MG/ML
0.5 INJECTION INTRAMUSCULAR EVERY 2 HOUR PRN
Status: DISCONTINUED | OUTPATIENT
Start: 2022-01-01 | End: 2022-01-01 | Stop reason: HOSPADM

## 2022-01-01 RX ORDER — SODIUM CHLORIDE, SODIUM GLUCONATE, SODIUM ACETATE, POTASSIUM CHLORIDE, MAGNESIUM CHLORIDE, SODIUM PHOSPHATE, DIBASIC, AND POTASSIUM PHOSPHATE .53; .5; .37; .037; .03; .012; .00082 G/100ML; G/100ML; G/100ML; G/100ML; G/100ML; G/100ML; G/100ML
75 INJECTION, SOLUTION INTRAVENOUS CONTINUOUS
Status: DISCONTINUED | OUTPATIENT
Start: 2022-01-01 | End: 2022-01-01

## 2022-01-01 RX ORDER — CARVEDILOL 3.12 MG/1
3.12 TABLET ORAL 2 TIMES DAILY WITH MEALS
Status: DISCONTINUED | OUTPATIENT
Start: 2022-01-01 | End: 2022-01-01

## 2022-01-01 RX ADMIN — INSULIN HUMAN 10 UNITS: 100 INJECTION, SOLUTION PARENTERAL at 17:45

## 2022-01-01 RX ADMIN — INSULIN LISPRO 2 UNITS: 100 INJECTION, SOLUTION INTRAVENOUS; SUBCUTANEOUS at 00:15

## 2022-01-01 RX ADMIN — SODIUM CHLORIDE, SODIUM GLUCONATE, SODIUM ACETATE, POTASSIUM CHLORIDE, MAGNESIUM CHLORIDE, SODIUM PHOSPHATE, DIBASIC, AND POTASSIUM PHOSPHATE 100 ML/HR: .53; .5; .37; .037; .03; .012; .00082 INJECTION, SOLUTION INTRAVENOUS at 02:30

## 2022-01-01 RX ADMIN — HYDROMORPHONE HYDROCHLORIDE 0.5 MG: 1 INJECTION, SOLUTION INTRAMUSCULAR; INTRAVENOUS; SUBCUTANEOUS at 11:16

## 2022-01-01 RX ADMIN — HEPARIN SODIUM 5000 UNITS: 5000 INJECTION INTRAVENOUS; SUBCUTANEOUS at 06:11

## 2022-01-01 RX ADMIN — LORAZEPAM 1 MG: 2 INJECTION INTRAMUSCULAR; INTRAVENOUS at 01:21

## 2022-01-01 RX ADMIN — BISACODYL 10 MG: 10 SUPPOSITORY RECTAL at 09:46

## 2022-01-01 RX ADMIN — HYDROMORPHONE HYDROCHLORIDE 0.5 MG: 1 INJECTION, SOLUTION INTRAMUSCULAR; INTRAVENOUS; SUBCUTANEOUS at 05:55

## 2022-01-01 RX ADMIN — DESMOPRESSIN ACETATE 26 MCG: 4 SOLUTION INTRAVENOUS at 22:01

## 2022-01-01 RX ADMIN — SODIUM BICARBONATE 50 MEQ: 84 INJECTION, SOLUTION INTRAVENOUS at 17:48

## 2022-01-01 RX ADMIN — SODIUM CHLORIDE, SODIUM GLUCONATE, SODIUM ACETATE, POTASSIUM CHLORIDE, MAGNESIUM CHLORIDE, SODIUM PHOSPHATE, DIBASIC, AND POTASSIUM PHOSPHATE 100 ML/HR: .53; .5; .37; .037; .03; .012; .00082 INJECTION, SOLUTION INTRAVENOUS at 10:40

## 2022-01-01 RX ADMIN — INSULIN LISPRO 1 UNITS: 100 INJECTION, SOLUTION INTRAVENOUS; SUBCUTANEOUS at 06:10

## 2022-01-01 RX ADMIN — SODIUM CHLORIDE, SODIUM GLUCONATE, SODIUM ACETATE, POTASSIUM CHLORIDE, MAGNESIUM CHLORIDE, SODIUM PHOSPHATE, DIBASIC, AND POTASSIUM PHOSPHATE 500 ML: .53; .5; .37; .037; .03; .012; .00082 INJECTION, SOLUTION INTRAVENOUS at 10:41

## 2022-01-01 RX ADMIN — SODIUM CHLORIDE, SODIUM GLUCONATE, SODIUM ACETATE, POTASSIUM CHLORIDE, MAGNESIUM CHLORIDE, SODIUM PHOSPHATE, DIBASIC, AND POTASSIUM PHOSPHATE 100 ML/HR: .53; .5; .37; .037; .03; .012; .00082 INJECTION, SOLUTION INTRAVENOUS at 17:22

## 2022-01-01 RX ADMIN — CLOPIDOGREL BISULFATE 75 MG: 75 TABLET ORAL at 12:29

## 2022-01-01 RX ADMIN — HYDROMORPHONE HYDROCHLORIDE 0.5 MG: 1 INJECTION, SOLUTION INTRAMUSCULAR; INTRAVENOUS; SUBCUTANEOUS at 08:32

## 2022-01-01 RX ADMIN — INSULIN LISPRO 2 UNITS: 100 INJECTION, SOLUTION INTRAVENOUS; SUBCUTANEOUS at 05:51

## 2022-01-01 RX ADMIN — FUROSEMIDE 80 MG: 10 INJECTION, SOLUTION INTRAMUSCULAR; INTRAVENOUS at 12:58

## 2022-01-01 RX ADMIN — SODIUM CHLORIDE, SODIUM LACTATE, POTASSIUM CHLORIDE, AND CALCIUM CHLORIDE 1000 ML: .6; .31; .03; .02 INJECTION, SOLUTION INTRAVENOUS at 21:04

## 2022-01-01 RX ADMIN — SODIUM CHLORIDE, SODIUM GLUCONATE, SODIUM ACETATE, POTASSIUM CHLORIDE, MAGNESIUM CHLORIDE, SODIUM PHOSPHATE, DIBASIC, AND POTASSIUM PHOSPHATE 150 ML/HR: .53; .5; .37; .037; .03; .012; .00082 INJECTION, SOLUTION INTRAVENOUS at 04:43

## 2022-01-01 RX ADMIN — SODIUM CHLORIDE, SODIUM GLUCONATE, SODIUM ACETATE, POTASSIUM CHLORIDE, MAGNESIUM CHLORIDE, SODIUM PHOSPHATE, DIBASIC, AND POTASSIUM PHOSPHATE 150 ML/HR: .53; .5; .37; .037; .03; .012; .00082 INJECTION, SOLUTION INTRAVENOUS at 21:06

## 2022-01-01 RX ADMIN — ASPIRIN 81 MG CHEWABLE TABLET 81 MG: 81 TABLET CHEWABLE at 12:29

## 2022-01-01 RX ADMIN — PANTOPRAZOLE SODIUM 40 MG: 40 TABLET, DELAYED RELEASE ORAL at 05:53

## 2022-01-01 RX ADMIN — HEPARIN SODIUM 5000 UNITS: 5000 INJECTION INTRAVENOUS; SUBCUTANEOUS at 05:53

## 2022-01-01 RX ADMIN — ONDANSETRON 4 MG: 2 INJECTION INTRAMUSCULAR; INTRAVENOUS at 08:12

## 2022-01-01 RX ADMIN — INSULIN DETEMIR 10 UNITS: 100 INJECTION, SOLUTION SUBCUTANEOUS at 21:24

## 2022-01-01 RX ADMIN — ACETAMINOPHEN 650 MG: 325 TABLET ORAL at 14:41

## 2022-01-01 RX ADMIN — IOHEXOL 100 ML: 350 INJECTION, SOLUTION INTRAVENOUS at 19:40

## 2022-01-01 RX ADMIN — HEPARIN SODIUM 5000 UNITS: 5000 INJECTION INTRAVENOUS; SUBCUTANEOUS at 13:00

## 2022-01-01 RX ADMIN — LORAZEPAM 1 MG: 2 INJECTION INTRAMUSCULAR; INTRAVENOUS at 14:28

## 2022-01-01 RX ADMIN — LABETALOL HYDROCHLORIDE 2.5 MG: 5 INJECTION, SOLUTION INTRAVENOUS at 15:11

## 2022-01-01 RX ADMIN — INSULIN LISPRO 1 UNITS: 100 INJECTION, SOLUTION INTRAVENOUS; SUBCUTANEOUS at 13:00

## 2022-01-01 RX ADMIN — INFLUENZA A VIRUS A/VICTORIA/2570/2019 IVR-215 (H1N1) ANTIGEN (FORMALDEHYDE INACTIVATED), INFLUENZA A VIRUS A/TASMANIA/503/2020 IVR-221 (H3N2) ANTIGEN (FORMALDEHYDE INACTIVATED), INFLUENZA B VIRUS B/PHUKET/3073/2013 ANTIGEN (FORMALDEHYDE INACTIVATED), AND INFLUENZA B VIRUS B/WASHINGTON/02/2019 ANTIGEN (FORMALDEHYDE INACTIVATED) 0.7 ML: 60; 60; 60; 60 INJECTION, SUSPENSION INTRAMUSCULAR at 09:40

## 2022-01-01 RX ADMIN — LORAZEPAM 1 MG: 2 INJECTION INTRAMUSCULAR; INTRAVENOUS at 14:02

## 2022-01-01 RX ADMIN — HEPARIN SODIUM 5000 UNITS: 5000 INJECTION INTRAVENOUS; SUBCUTANEOUS at 14:20

## 2022-01-01 RX ADMIN — LORAZEPAM 1 MG: 2 INJECTION INTRAMUSCULAR; INTRAVENOUS at 09:55

## 2022-01-01 RX ADMIN — Medication 25 MCG/HR: at 18:30

## 2022-01-01 RX ADMIN — HYDROMORPHONE HYDROCHLORIDE 0.5 MG: 1 INJECTION, SOLUTION INTRAMUSCULAR; INTRAVENOUS; SUBCUTANEOUS at 03:55

## 2022-01-01 RX ADMIN — NOREPINEPHRINE BITARTRATE 2 MCG/MIN: 1 INJECTION, SOLUTION, CONCENTRATE INTRAVENOUS at 22:02

## 2022-01-01 RX ADMIN — SODIUM BICARBONATE 50 MEQ: 84 INJECTION, SOLUTION INTRAVENOUS at 17:45

## 2022-01-01 RX ADMIN — HEPARIN SODIUM 5000 UNITS: 5000 INJECTION INTRAVENOUS; SUBCUTANEOUS at 21:09

## 2022-01-01 RX ADMIN — HYDROMORPHONE HYDROCHLORIDE 1 MG: 1 INJECTION, SOLUTION INTRAMUSCULAR; INTRAVENOUS; SUBCUTANEOUS at 20:21

## 2022-01-01 RX ADMIN — ESCITALOPRAM OXALATE 10 MG: 10 TABLET ORAL at 10:41

## 2022-01-01 RX ADMIN — INSULIN LISPRO 2 UNITS: 100 INJECTION, SOLUTION INTRAVENOUS; SUBCUTANEOUS at 00:12

## 2022-01-01 RX ADMIN — HYDROMORPHONE HYDROCHLORIDE 0.2 MG: 1 INJECTION, SOLUTION INTRAMUSCULAR; INTRAVENOUS; SUBCUTANEOUS at 22:19

## 2022-01-01 RX ADMIN — HYDRALAZINE HYDROCHLORIDE 10 MG: 20 INJECTION INTRAMUSCULAR; INTRAVENOUS at 03:46

## 2022-01-01 RX ADMIN — INSULIN LISPRO 3 UNITS: 100 INJECTION, SOLUTION INTRAVENOUS; SUBCUTANEOUS at 17:20

## 2022-01-01 RX ADMIN — HYDROMORPHONE HYDROCHLORIDE 0.5 MG: 1 INJECTION, SOLUTION INTRAMUSCULAR; INTRAVENOUS; SUBCUTANEOUS at 16:07

## 2022-01-01 RX ADMIN — HYDROMORPHONE HYDROCHLORIDE 1 MG: 1 INJECTION, SOLUTION INTRAMUSCULAR; INTRAVENOUS; SUBCUTANEOUS at 12:56

## 2022-01-01 RX ADMIN — INSULIN LISPRO 1 UNITS: 100 INJECTION, SOLUTION INTRAVENOUS; SUBCUTANEOUS at 18:34

## 2022-01-01 RX ADMIN — DEXMEDETOMIDINE HYDROCHLORIDE 0.6 MCG/KG/HR: 400 INJECTION INTRAVENOUS at 20:21

## 2022-01-01 RX ADMIN — DEXTROSE MONOHYDRATE 250 ML: 100 INJECTION, SOLUTION INTRAVENOUS at 17:45

## 2022-01-01 RX ADMIN — NOREPINEPHRINE BITARTRATE 2 MCG/MIN: 1 INJECTION, SOLUTION, CONCENTRATE INTRAVENOUS at 21:29

## 2022-01-01 RX ADMIN — DEXMEDETOMIDINE HYDROCHLORIDE 0.2 MCG/KG/HR: 400 INJECTION INTRAVENOUS at 15:05

## 2022-01-01 RX ADMIN — INSULIN LISPRO 3 UNITS: 100 INJECTION, SOLUTION INTRAVENOUS; SUBCUTANEOUS at 10:52

## 2022-01-01 RX ADMIN — SENNOSIDES AND DOCUSATE SODIUM 2 TABLET: 50; 8.6 TABLET ORAL at 10:41

## 2022-01-01 RX ADMIN — HEPARIN SODIUM 5000 UNITS: 5000 INJECTION INTRAVENOUS; SUBCUTANEOUS at 21:24

## 2022-01-01 RX ADMIN — HYDROMORPHONE HYDROCHLORIDE 0.5 MG: 1 INJECTION, SOLUTION INTRAMUSCULAR; INTRAVENOUS; SUBCUTANEOUS at 10:46

## 2022-01-01 RX ADMIN — BISACODYL 10 MG: 10 SUPPOSITORY RECTAL at 10:27

## 2022-01-01 RX ADMIN — SODIUM CHLORIDE, SODIUM GLUCONATE, SODIUM ACETATE, POTASSIUM CHLORIDE, MAGNESIUM CHLORIDE, SODIUM PHOSPHATE, DIBASIC, AND POTASSIUM PHOSPHATE 150 ML/HR: .53; .5; .37; .037; .03; .012; .00082 INJECTION, SOLUTION INTRAVENOUS at 11:40

## 2022-01-01 RX ADMIN — HYDROMORPHONE HYDROCHLORIDE 1 MG: 1 INJECTION, SOLUTION INTRAMUSCULAR; INTRAVENOUS; SUBCUTANEOUS at 18:54

## 2022-01-01 RX ADMIN — DEXMEDETOMIDINE HYDROCHLORIDE 0.3 MCG/KG/HR: 400 INJECTION INTRAVENOUS at 11:05

## 2022-01-01 RX ADMIN — INSULIN LISPRO 1 UNITS: 100 INJECTION, SOLUTION INTRAVENOUS; SUBCUTANEOUS at 05:39

## 2022-01-01 RX ADMIN — CALCIUM GLUCONATE 1 G: 20 INJECTION, SOLUTION INTRAVENOUS at 05:25

## 2022-01-01 RX ADMIN — HYDROMORPHONE HYDROCHLORIDE 0.2 MG: 1 INJECTION, SOLUTION INTRAMUSCULAR; INTRAVENOUS; SUBCUTANEOUS at 09:39

## 2022-01-01 RX ADMIN — PANTOPRAZOLE SODIUM 40 MG: 40 TABLET, DELAYED RELEASE ORAL at 05:01

## 2022-01-01 RX ADMIN — HYDROMORPHONE HYDROCHLORIDE 0.2 MG: 1 INJECTION, SOLUTION INTRAMUSCULAR; INTRAVENOUS; SUBCUTANEOUS at 04:49

## 2022-03-16 PROBLEM — S14.109A CERVICAL SPINAL CORD INJURY (HCC): Status: ACTIVE | Noted: 2022-01-01

## 2022-03-16 NOTE — ED PROVIDER NOTES
Emergency Department Airway Evaluation and Management Form    History  Obtained from: EMS  Patient has no allergy information on record  Chief Complaint   Patient presents with    Trauma     arrived via EMS, fall from standing with stroke symptoms, unknown downtime, +thinners, unknown LOC, R side weakness, unkown last well time      Mike Ledezma is an 80 y o  male who presents via EMS as a trauma alert level B  He was found on the ground at his home by a friend who comes and checks on him once a day  He was last seen yesterday  The patient denies remembering falling  He does not appear to be moving his extremities  History provided by:  Patient and EMS personnel   used: No        No past medical history on file  No past surgical history on file  No family history on file  Social History     Tobacco Use    Smoking status: Not on file    Smokeless tobacco: Not on file   Substance Use Topics    Alcohol use: Not on file    Drug use: Not on file     I have reviewed and agree with the history as documented  Review of Systems   Respiratory: Positive for shortness of breath  Neurological: Positive for weakness  Physical Exam  BP 98/52   Pulse (!) 45   Temp (!) 94 4 °F (34 7 °C) (Rectal)   Resp 16   Wt 86 1 kg (189 lb 13 1 oz)   SpO2 97%     Physical Exam  Constitutional:       General: He is in acute distress  Pulmonary:      Effort: Pulmonary effort is normal  No respiratory distress  Neurological:      Mental Status: He is alert and oriented to person, place, and time  ED Medications  Medications   iohexol (OMNIPAQUE) 350 MG/ML injection (SINGLE-DOSE) 100 mL (100 mL Intravenous Given 3/1940)       Intubation  Procedures    Notes  Patient is not in respiratory distress and is alert an oriented  No need for emergent airway intervention        Final Diagnosis  Final diagnoses:   None       ED Provider  Electronically Signed by     Paul Germain MD  03/16/22 2029

## 2022-03-17 PROBLEM — I10 HTN (HYPERTENSION): Status: ACTIVE | Noted: 2022-01-01

## 2022-03-17 PROBLEM — E11.9 DM2 (DIABETES MELLITUS, TYPE 2) (HCC): Status: ACTIVE | Noted: 2022-01-01

## 2022-03-17 PROBLEM — N18.4 STAGE 4 CHRONIC KIDNEY DISEASE (HCC): Status: ACTIVE | Noted: 2022-01-01

## 2022-03-17 PROBLEM — K92.2 GI BLEED: Status: ACTIVE | Noted: 2022-01-01

## 2022-03-17 PROBLEM — I50.30 DIASTOLIC HEART FAILURE (HCC): Status: ACTIVE | Noted: 2022-01-01

## 2022-03-17 NOTE — SPEECH THERAPY NOTE
Speech-Language Pathology Bedside Swallow Evaluation        Patient Name: Shanon Cervantes    UYCYK'C Date: 3/17/2022     Problem List  Principal Problem:    Cervical spinal cord injury ([facility] )  Active Problems:    DM2 (diabetes mellitus, type 2) ([facility] )    HTN (hypertension)    GI bleed    Stage 4 chronic kidney disease ([facility] )    Diastolic heart failure ([facility] )         Past Medical History  Past Medical History:   Diagnosis Date    Bladder cancer ([place] [address] )     CAD (coronary artery disease)     CHF (congestive heart failure) ([facility] )     CVA (cerebral vascular accident) ([facility] )     Primary hypertension     Stage 4 chronic kidney disease ([place] [address] )     Type 2 diabetes mellitus ([place] [address] )        Past Surgical History  History reviewed  No pertinent surgical history  Summary    Pt presents with Mild oral/pharygngel dysphagia, pt with risk for aspiration due to weak cough/voice, but swallow initiation was prompt and complete with no s/s aspiration  Pt w/ frequent belching throughout session, stating he feels like "it's clogged up "     Recommendations:   Diet: soft/level 3 diet and thin liquids   Meds: whole with puree   Frequent Oral care: 2x/day  Aspiration precautions and compensatory swallowing strategies: upright posture, slow rate of feeding and small bites/sips  Other Recommendations/ considerations: will cont to follow for diet tolerance       Current Medical Status  Pt is a 80 y o  male who presented to [address] [place] [address]  with cervical SCI  Pt presents after unwitnessed fall at home  Patient was found down on the ground by his neighbor who checks on him daily  Patient reports he was feeling his normal self prior to the fall and woke up today on the floor and laid there for a few hours before his neighbor found him  He denies pain  Past medical history:   Please see H&P for details    Special Studies:  MRI C- spine w/o contrast: 3/16/22 1    Chronic, severe mass effect on the cord at C3-4 C4-5 secondary to disc and facet degenerative change  2   Severe mass effect on the cord at C3-4 C4-5  Suggestion of subtle hyperintensity in the cord may indicate gliosis  Social/Education/Vocational Hx:  Pt lives with family    Swallow Information   Current Risks for Dysphagia & Aspiration: trauma- SCI; quadriplegia  Current Symptoms/Concerns: weak voice/cough  Current Diet: NPO   Baseline Diet: regular diet and thin liquids  Takes pills-    Baseline Assessment   Behavior/Cognition: alert; confused, asking about his car in the parking lot, wanted to try to stand up  Speech/Language Status: able to participate in basic conversation and able to follow commands  Patient Positioning: upright in bed     Swallow Mechanism Exam   Facial: symmetrical  Labial: WFL  Lingual: WFL  Velum: unable to visualize  Mandible: adequate ROM  Dentition: adequate  Vocal quality:weak and aphonic   Volitional Cough: weak ; minimal  Respiratory: NC    Consistencies Assessed and Performance   Consistencies Administered: thin liquids, nectar thick, honey thick, puree and hard solids    Oral Stage: pt able to take single sips by cup and straw; appeared with good oral control & transfer  Mastication, manipulation of foods consistencies were somewhat prolonged but WFL  No oral residue noted  Pharyngeal Stage: swallows were delayed but complete  No coughing, throat clearing noted  No change in vocal quality  Pt c/o feeling his throat is clogged up- belching noted  Esophageal Concerns:  frequent belching noted throughout session         Results Reviewed with: patient, RN, CRNP and family   Dysphagia Goals: pt will tolerate dysphagia 3 diet  with thin liquids without s/s of aspiration x48 hours       April Jazmín Buck MA CCC-SLP  Speech Pathologist  [place] license # [id number]  [place] license # [id number]  Available via [employer]

## 2022-03-17 NOTE — CONSULTS
Consultation - Palliative & Supportive Care   Rodger Lobo  80 y o   male  ICU 07/ICU 07   MRN: [id number]  Encounter: [id number]    ASSESSMENT:    Patient Active Problem List   Diagnosis    [facility]ervical [facility]    DM2 [facility]diabetes mellitus, type 2) [facility]    HTN (hypertension)    GI bleed    Stage 4 chronic kidney disease ([facility] )    Diastolic heart failure ([facility] )     Active issues specifically addressed today include: goals of care, c-spine injury, CKD4, loss os sensation, flaccid paralysis, psychosocial support    81M w/ acute cervical spinal cord injury resulting in loss of function in nearly all extremities (some retained movement in R hand), loss os sensation below the chest  PSC consulted for goals of care, psychosocial support  PLAN:    1  Goals:    Code status set to [facility] / DNAR + DNI prior to Palliative consult   Patient has not completed any advanced directives but expresses interest in doing so (as well as completing financial directives)  Asked 2005 [facility] team re: best way to secure paperwork when a patient cannot sign documentation   Patient states he is willing to work w/ PT+OT, go to [facility] or acute/subacute rehab to see if some function can return to his limbs  He would prefer to eventually return home, though, and is amenable to hiring home aides as needed   Patient concerned about his cat Derek  He has a  arranged for the time being  Discussed the possibility of a pet visit with patient's RN   Palliative will follow for ongoing goals of care discussions as situation evolves  2  Social Support:   Supportive listening provided   Patient declined offer of Pastoral Care /  support; he states he is not spiritual or Sikhism  3  Symptom management:   Per primary team       Code status: Level 3 - DNAR and DNI   Decisional apparatus:  Patient does have capacity to make medical decisions on my exam today   If such capacity is lost, patient's substitute decision maker would default to his sister by [place]  Advance Directive / Living Will / POLST:  None completed    I have reviewed the patient's controlled substance dispensing history in the Prescription Drug Monitoring Program in compliance with the [facility] regulations before prescribing any controlled substances  We appreciate the opportunity to participate in this patient's care  We will continue to follow  Please do not hesitate to contact our on-call provider through our clinic answering service at [phone] should you have acute symptom control concerns  IDENTIFICATION:  Inpatient consult to Palliative Care  Consult performed by: Marisol Govea MD  Consult ordered by: TABITHA Vickers      Reason for Consult / Principal Problem: goals of care    HISTORY OF PRESENT ILLNESS:    Doron Roach is a 80 y o  male with CAD s/p MI and s/p CABG, DM2, CKD4 who was found down in the home by a friend, unknown time of fall (though friend checks on him daily)  Patient denies memory of fall  Noted in the ED to have b/l LE paralysis, decreased strength in upper extremities, preserved sensation to level of upper chest, no rectal tone  3/16/22 MRI C-spine notes chronic severe mass effect on spinal cord at C3-4 and C4-5 s/t degenerative disease, possible gliosis  No fractures, no ICH  [facility] consulted for goals of care  Patient states he has some slight ability to move his right hand, but there is no purposeful movement in his LUE or his legs  He states he would like to eat, but unfortunately there were issues w/ the bedside swallowing w/ his nurse; SLP evaluation is pending  He denies pain, denies GI symptoms, denies significant anxiousness or dysphoric mood  Patient is aware he is facing significant challenges in the coming days, weeks, months, and beyond  He is currently focused on getting care for his cat Hector Cartagena, and getting to see Hector Cartagena soon  Hector Sparrowca is currently with a    The patient is supported by his sister and his nephew, but they unfortunately are not local  His nephew is making arrangements to come see him  Patient has not completed any advanced directives but intends to do so, and complete financial paperwork  Note: chart marked for pati luna/ MRN [id number] (duplicate patient)  Review of Systems   Constitutional: Positive for activity change  HENT: Positive for trouble swallowing  Eyes: Negative for pain and itching  Respiratory: Negative for shortness of breath  Gastrointestinal: Negative for abdominal pain, nausea and vomiting  Endocrine: Negative for polydipsia and polyphagia  Musculoskeletal: Positive for gait problem  Negative for arthralgias, back pain, myalgias and neck pain  Neurological: Positive for weakness and numbness  Negative for dizziness, seizures, facial asymmetry, speech difficulty and light-headedness  Fall  Psychiatric/Behavioral: Negative for dysphoric mood  The patient is not nervous/anxious  Past Medical History:   Diagnosis Date    Bladder cancer ([place] [address] )     CAD (coronary artery disease)     [facility]congestive heart failure) [facility]     CVA (cerebral vascular accident) ([place] [address] )     Primary hypertension     Stage 4 chronic kidney disease ([place] [address] )     Type 2 diabetes mellitus ([place] [address] )      History reviewed  No pertinent surgical history    Social History     Socioeconomic History    Marital status: Single     Spouse name: Not on file    Number of children: Not on file    Years of education: Not on file    Highest education level: Not on file   Occupational History    Not on file   Tobacco Use    Smoking status: Never Smoker    Smokeless tobacco: Never Used   Vaping Use    Vaping Use: Never used   Substance and Sexual Activity    Alcohol use: Not Currently     Comment: occasional beer    Drug use: Never    Sexual activity: Not on file   Other Topics Concern    Not on file   Social History Narrative    Not on file Social Determinants of Health     Financial Resource Strain: Not on file   Food Insecurity: Not on file   Transportation Needs: Not on file   Physical Activity: Not on file   Stress: Not on file   Social Connections: Not on file   Intimate Partner Violence: Not on file   Housing Stability: Not on file     Family History:  From merged chart: Mother with heart disease and diabetes, Father with diabetes  MEDICATIONS / ALLERGIES:  all current active meds have been reviewed and current meds:   Current Facility-Administered Medications   Medication Dose Route Frequency    HYDROmorphone (DILAUDID) injection 0 2 mg  0 2 mg Intravenous Q1H PRN    insulin lispro (HumaLOG) 100 units/mL subcutaneous injection 1-6 Units  1-6 Units Subcutaneous Q6H [address]    multi-electrolyte (PLASMALYTE-A/ISOLYTE-S PH 7 4) IV solution  150 mL/hr Intravenous Continuous    norepinephrine (LEVOPHED) 4 mg (STANDARD CONCENTRATION) IV in sodium chloride 0 9% 250 mL  1-30 mcg/min Intravenous Titrated    ondansetron (ZOFRAN) injection 4 mg  4 mg Intravenous Q6H PRN    pantoprazole (PROTONIX) EC tablet 40 mg  40 mg Oral Early Morning       No Known Allergies    OBJECTIVE:  /67   Pulse 74   Temp 98 2 °F (36 8 °C)   Resp 18   Wt 86 1 kg (189 lb 13 1 oz)   SpO2 94%   Nursing notes reviewed  Physical Exam  Vitals reviewed  Constitutional:       General: He is not in acute distress  Appearance: He is overweight  He is ill-appearing  Interventions: Cervical collar in place  HENT:      Head: Normocephalic and atraumatic  Right Ear: External ear normal       Left Ear: External ear normal    Eyes:      General: No scleral icterus  Right eye: No discharge  Left eye: No discharge  Extraocular Movements: Extraocular movements intact  Conjunctiva/sclera: Conjunctivae normal       Pupils: Pupils are equal, round, and reactive to light  Cardiovascular:      Rate and Rhythm: Normal rate     Pulmonary: Effort: Pulmonary effort is normal  No tachypnea, bradypnea, accessory muscle usage or respiratory distress  Abdominal:      General: There is no distension  Musculoskeletal:      Cervical back: Decreased range of motion (neck immobilized in C-spine collar)  Right lower leg: No edema  Left lower leg: No edema  Skin:     General: Skin is dry  Coloration: Skin is not pale  Neurological:      Mental Status: He is alert and oriented to person, place, and time  Cranial Nerves: No dysarthria  Sensory: Sensory deficit (loss of sensation from chest down) present  Motor: Weakness present  Psychiatric:         Attention and Perception: Attention normal          Mood and Affect: Mood is not anxious or depressed  Affect is not inappropriate  Speech: Speech normal          Behavior: Behavior normal  Behavior is cooperative  Thought Content: Thought content normal          Cognition and Memory: Cognition and memory normal          Judgment: Judgment normal        Lab Results: I have personally reviewed pertinent labs  CBC:  Lab Results   Component Value Date    WBC 10 99 (H) 03/17/2022    HGB 9 6 (L) 03/17/2022    HCT 28 3 (L) 03/17/2022    MCV 89 03/17/2022     (L) 03/17/2022    MCH 30 2 03/17/2022    MCHC 33 9 03/17/2022    RDW 13 3 03/17/2022    MPV 11 3 03/17/2022    NRBC 0 03/17/2022     CMP:  Lab Results   Component Value Date    SODIUM 135 (L) 03/17/2022    K 4 6 03/17/2022     03/17/2022    CO2 19 (L) 03/17/2022    CO2 23 03/16/2022    BUN 67 (H) 03/17/2022    CREATININE 2 90 (H) 03/17/2022    GLUCOSE 134 03/16/2022    CALCIUM 8 8 03/17/2022    AST 36 03/17/2022    ALT 33 03/17/2022    ALKPHOS 69 03/17/2022    EGFR 19 03/17/2022     PT/PTT:  Lab Results   Component Value Date    PTT 30 03/16/2022     Albumin:  0   Lab Value Date/Time    ALB 3 3 (L) 03/17/2022 0442     Imaging Studies: I have personally reviewed pertinent reports    EKG, Pathology, and Other Studies: I have personally reviewed pertinent reports  Counseling / Coordination of Care: Total floor / unit time spent today 35+ minutes  Greater than 50% of total time was spent with the patient and / or family counseling and / or coordination of care  A description of the counseling / coordination of care: symptom assessment and management, medication review, psychosocial support, chart review, imaging review, lab review, advanced directives, goals of care, supportive listening and anticipatory guidance  Maricel Paige MD  [facility] Palliative and Supportive Care      Portions of this document may have been created using dictation software and as such some "sound alike" terms may have been generated by the system  Do not hesitate to contact me with any questions or clarifications

## 2022-03-17 NOTE — TELEMEDICINE
I was contacted by TRAUMA AP on call,  Mya Angry, regarding patient brought in after he was found down at his home by his neighbor this morning  Per provider patient is alert and communicates well, but had difficulty moving his lower extremities, has also sensory function loss below his below his chest    Exam per provider, alert and oriented x3, able to shrug his shoulder and somewhat move his upper extremities decreased strength  Lower extremities strength 0/5  Sensory level at his upper chest  No rectal tone  Imagings:    CT of brain without contrast on 03/16/2022 demonstrate no acute intracranial abnormality  Official reports pending  CT of cervical spine without contrast demonstrates multilevel degenerative changes and extent osteophyte complete, did not see any fracture or malalignment  Official reports pending  CT CAP  some degenerative change but no thoracic vertebral bone fracture or malalignment  Reports pending  MRI of cervical spine ordered-pending  Recommend also thoracic spine MRI    Labs:  Hemoglobin 7 1%, platelets 463, RBC 8 96, BUN 65; creatinine 2 91  INR=1 22    Discussed with Dr Herlinda Newberry thinks patient might have SC infarction, and will review the images  Plan:  1  Close neuromonitoring, Q1H  2  Full SCI precaution until imaging completed and clear Dx is made  3  MAP>85  4  Recommend IM consult for Anemia, possible transfusion  5  No AC/AP or Pharm DVT ppx for now  6  NPO  7  Will review the image once it is done  8  Call with question or concern  A total of 15 minutes were spent reviewing patient's history, physical examination and  images, 50% of the time was spent discussing management plan with the provider and the provider is in agreement with the plan

## 2022-03-17 NOTE — CONSULTS
Consultation - Geriatric Medicine   Flo Vera 80 y o  male MRN: [id number]  Unit/Bed#: ICU 07 Encounter: [id number]        Inpatient consult to Gerontology for Joseph Henriquez performed by: Joe Beltran MD  Consult ordered by: Nickie Phoenix, PA-C            Assessment/Plan   1 -mechanical fall -patient was tripped by his cat he was attempting not to injure the animal when he fell injuring his neck  2 -cervical spinal cord injury  -patient was found to be flaccid in all 4 extremities  Patient was referred to the critical care unit trying to maintain adequate blood pressures through the use of IV fluids, blood transfusion, Levophed trying to maintain mean arterial pressure over 85 to  Patient was noted to have spinal cord injury C3 through C5     3 -GI bleed -there was evidence of bleeding in the CT scan the cecum patient received 3 units of packed red blood cells with good response  They are trying to maintain a hemoglobin over 10  They held his Plavix and aspirin  4 -chronic renal failure stage 4 -patient's GFR was 19 with a creatinine of 2 90  Suspect this is secondary to his diabetic nephropathy  5 -hyponatremia -suspect dilutional patient's sodium was 135 we need to adjust fluids  6 -diabetes mellitus type 2 -I cannot find results for hemoglobin A1c would recommend obtaining 1 on this admission    7 -anemia secondary to GI bleed -the patient's hemoglobin stabilized at 9 6 with hematocrit 28 3 today he has received 3 units of packed red blood cells  8 -hypertension -his antihypertensive medication was held on admission due to the fact that the patient was hypotensive  9 -coronary disease -patient with previous history of bypass surgery patient with previous history of diastolic heart failure      10 -diabetic retinopathy    11 -diabetic neuropathy    12  -history of bladder cancer status post chemo and resection         History of Present Illness   Physician Requesting Consult: So Pérez MD  Reason for Consult / Principal Problem:  Mechanical fall  Hx and PE limited by:  No limitations  Additional history obtained from:  Patient was able to relate an excellent history  HPI: Angela Up is a 80y o  year old male who presents with a history of a mechanical fall  He states that he got treated by his cat and maneuvered so he would not fall on top of his CT  He fell in the bathroom and soon afterwards had difficulty moving all extremities  He was found by his neighbor  Imaging studies were done the MRI of the cervical spine showed severe mass effect on the cord at C3-C4 and C4-C5 secondary to disc and facet degenerative changes  Patient was noted to have anemia and hypotension he received 3 units of packed red blood cells, was placed on Levophed and his anti-platelet agents Plavix and aspirin were held  Patient was referred to the intensive care unit he does have multiple comorbidities which include chronic kidney disease stage 4 most likely secondary to diabetic nephropathy, coronary artery disease with previous myocardial infarction and previous bypass surgery, history of bladder cancer patient had chemo and resection, patient has history of hypertension and history of diastolic heart failure in the past     Review of Systems   Constitutional:        Patient has become quadriplegic as a result of his accident  HENT: Negative  Eyes: Negative  Respiratory: Negative  Cardiovascular:        Patient with history of coronary artery disease   Gastrointestinal: Positive for constipation  Endocrine:        Patient with history of diabetes mellitus for over 30 years   Genitourinary: Negative  Musculoskeletal:        Patient with quadriplegia   Skin: Negative  Neurological:        Patient with history of neuropathy in upper extremities quadriplegia present     Hematological: Negative  Psychiatric/Behavioral: Negative          Memory/Cognitive screening:  No evidence of any cognitive impairment  Mobility:  Patient was able to ambulate prior to accident with a walker with front wheels  Falls:  Patient had a fall approximately a year ago with no major injury  Assistive Devices:  Uses walker with front wheels  Fraility:  Had no evidence of frailty prior to present fall  Nutrition/weight loss/grocery shopping/meal preparation:  No evidence of any weight loss  Vision impairment:  Has evidence of diabetic retinopathy  Hearing impairment:  No hearing impairment  Incontinence:  No previous history of urinary incontinence  Delirium:  No previous history of delirium  Polypharmacy:   No current facility-administered medications on file prior to encounter       Current Outpatient Medications on File Prior to Encounter   Medication Sig Dispense Refill    aspirin 81 mg chewable tablet Chew 81 mg daily      atorvastatin (LIPITOR) 80 mg tablet Take 80 mg by mouth daily      carvedilol (COREG) 6 25 mg tablet Take 6 25 mg by mouth 2 (two) times a day with meals      clopidogrel (PLAVIX) 75 mg tablet Take 75 mg by mouth daily      docusate sodium (COLACE) 100 mg capsule Take 100 mg by mouth 2 (two) times a day      escitalopram (LEXAPRO) 10 mg tablet Take 10 mg by mouth daily      hydrALAZINE (APRESOLINE) 50 mg tablet Take 50 mg by mouth every 8 (eight) hours        insulin detemir (LEVEMIR) 100 units/mL subcutaneous injection Inject 20 Units under the skin every 12 (twelve) hours      insulin lispro (HumaLOG) 100 units/mL injection Inject under the skin 3 (three) times a day with meals      isosorbide mononitrate (IMDUR) 60 mg 24 hr tablet Take 60 mg by mouth daily      nitroglycerin (NITROSTAT) 0 3 mg SL tablet Place 0 4 mg under the tongue every 5 (five) minutes as needed for chest pain        pantoprazole (PROTONIX) 40 mg tablet Take 40 mg by mouth daily      polyethylene glycol (MIRALAX) 17 g packet Take 17 g by mouth daily as needed      primidone (MYSOLINE) 50 mg tablet Take 50 mg by mouth daily at bedtime        ranolazine (RANEXA) 500 mg 12 hr tablet Take 500 mg by mouth every 12 (twelve) hours      torsemide (DEMADEX) 20 mg tablet Take 40 mg by mouth daily        [DISCONTINUED] hydrALAZINE (APRESOLINE) 10 mg tablet Take 10 mg by mouth 3 (three) times a day       Patients primary residence:  Patient lives in his own home Lives with:  He lives with his cat  iADL's:  Patient had enjoyed all his independent activities of daily living prior to his accident  ADL's:  Patient had enjoyed all his basic activities of daily living prior to accident    Historical Information   Past medical history:   Past Medical History:   Diagnosis Date    Bladder cancer ([place] [address] )     CAD (coronary artery disease)     CHF (congestive heart failure) ([place] [address] )     CVA (cerebral vascular accident) ([place] [address] )     Primary hypertension     Stage 4 chronic kidney disease ([place] [address] )     Type 2 diabetes mellitus ([place] [address] )      Past surgical history: History reviewed  No pertinent surgical history    Social history:  Social History     Socioeconomic History    Marital status: Single     Spouse name: Not on file    Number of children: Not on file    Years of education: Not on file    Highest education level: Not on file   Occupational History    Not on file   Tobacco Use    Smoking status: Never Smoker    Smokeless tobacco: Never Used   Vaping Use    Vaping Use: Never used   Substance and Sexual Activity    Alcohol use: Not Currently     Comment: occasional beer    Drug use: Never    Sexual activity: Not on file   Other Topics Concern    Not on file   Social History Narrative    Not on file     Social Determinants of Health     Financial Resource Strain: Not on file   Food Insecurity: Not on file   Transportation Needs: Not on file   Physical Activity: Not on file   Stress: Not on file   Social Connections: Not on file   Intimate Partner Violence: Not on file   Housing Stability: Not on file Family history:History reviewed  No pertinent family history  Meds/Allergies   All current active meds have been reviewed  No Known Allergies    Objective   Vitals:    03/17/22 1515   BP: (!) 189/81   Pulse: 70   Resp: 18   Temp: 99 °F (37 2 °C)   SpO2: 99%       Intake/Output Summary (Last 24 hours) at 3/17/2022 1631  Last data filed at 3/17/2022 1400  Gross per 24 hour   Intake 3047 5 ml   Output 590 ml   Net 2457 5 ml     Invasive Devices  Report    Peripheral Intravenous Line            Peripheral IV 03/16/22 Left Antecubital <1 day    Peripheral IV 03/17/22 Right Antecubital <1 day          Drain            Urethral Catheter Temperature probe 16 Fr  <1 day                Physical Exam  HENT:      Head: Normocephalic  Right Ear: Ear canal and external ear normal       Left Ear: Ear canal and external ear normal       Nose: Nose normal       Mouth/Throat:      Mouth: Mucous membranes are moist    Eyes:      Extraocular Movements: Extraocular movements intact  Conjunctiva/sclera: Conjunctivae normal       Pupils: Pupils are equal, round, and reactive to light  Neck:      Comments: Patient concurrently with a cervical neck brace  Cardiovascular:      Rate and Rhythm: Normal rate and regular rhythm  Pulses: Normal pulses  Heart sounds: Normal heart sounds  Pulmonary:      Effort: Pulmonary effort is normal       Breath sounds: Normal breath sounds  Abdominal:      General: Bowel sounds are normal       Palpations: Abdomen is soft  Musculoskeletal:      Comments: Patient with evidence of flaccid paralysis upper and lower extremities   Neurological:      Mental Status: He is alert  Comments: Patient with flaccid paralysis of 4 extremities he is able to moves the left hand slightly  Toes are upgoing on plantar reflex   Psychiatric:         Mood and Affect: Mood normal          Behavior: Behavior normal          Thought Content:  Thought content normal          Judgment: Judgment normal          Lab Results:   I have personally reviewed pertinent lab and imaging results  VTE Prophylaxis:  Heparin 5000 units subQ every 8 hours    Code Status: Level 3 - DNAR and DNI  Advance Directive and Living Will:      Power of :    POLST:      Family and Social Support:  Patient does not have any children he does have some relatives and good friends  Patient used to be in the [employer] business  Living Arrangements: Lives Alone  Support Systems: Friend;  Family members (Pt's sister, two nephews, and niece are bedside )  Type of Current Residence: Private residence

## 2022-03-17 NOTE — ASSESSMENT & PLAN NOTE
· As evidenced on CT scan, in cecum  · Received 3 units of pRBC's with appropriate response in blood pressure  · Would keep hgb >10 per [facility]   · Holding PTA Plavix, ASA - restart as appropriate   · Consider GI vs colorectal consult if outward evidence of GI bleeding

## 2022-03-17 NOTE — ASSESSMENT & PLAN NOTE
No results found for: HGBA1C    No results for input(s): POCGLU in the last 72 hours      Blood Sugar Average: Last 72 hrs:    · Accuchecks and ISS q6 hours while NPO  · Holding PTA basal insulin while NPO

## 2022-03-17 NOTE — ASSESSMENT & PLAN NOTE
Lab Results   Component Value Date    EGFR 19 03/16/2022    CREATININE 2 91 (H) 03/16/2022     · Baseline Cr appears to be 2 0-2 5, currently 2 9  · Continue IVF at 150mL/hr  · Trend labs and manage electrolytes  · Of note, pt has need transient dialysis in the past when given contrast for studies

## 2022-03-17 NOTE — H&P
H&P - Trauma   Darylene Bound 80 y o  male MRN: [id number]  Unit/Bed#: ICU 07 Encounter: [id number]    Trauma Alert: Level B   Model of Arrival: Ambulance    Trauma Team: Attending Ginette Robins and VIVI Taylor  Consultants:     Neurosurgery: Lady Cornelius - STAT consult; returned call/text yes discussed patient at [address];     Assessment/Plan   Active Problems / Assessment:   Spinal injury with quadriplegia  Unwitnessed fall  Spinal shock with hypotension  CKD 4  DM type 2  HTN  Hx CVA without deficit  EKG  CBC/CMP/Coag/Teg/CK/trop      Plan:   CT Head/Cspine/Chest/Abdomen/Pelvis - No cervical spine fracture large osteophyte cervical spine, possible lower GI bleed at the cecum  Stat MRI c/t spine discussed with Dr Galileo Rivero  Discussed with neurosurgery - needs Map >85   Levophed starting at 2mcg/min peripherally titrated to map > 85 while patient in MRI  2 unites PRBC ordered, okay for 1 uncrossmatched asap while patient   Central line placement pending - patient currently in MRI  1L LR bolus then Isolyte at 150cc/hr   Patient is Level 1 Code discussed with patient      History of Present Illness     Chief Complaint: Fall  Mechanism:Fall     HPI:    Darylene Bound is a 80 y o  male who presents after unwitnessed fall at home  Patient was found down on the ground by his neighbor who checks on him daily  Patient reports he was feeling his normal self prior to the fall and woke up today on the floor and laid there for a few hours before his neighbor found him  He denies pain  Review of Systems   Constitutional: Negative for activity change, appetite change, diaphoresis, fatigue and fever  HENT: Negative  Negative for congestion, ear discharge, ear pain, facial swelling, hearing loss, mouth sores, nosebleeds, postnasal drip, rhinorrhea, sinus pressure, sinus pain, sneezing and sore throat  Eyes: Negative for photophobia, pain and redness  Respiratory: Negative for cough, chest tightness, shortness of breath and wheezing  Cardiovascular: Negative for chest pain and palpitations  Gastrointestinal: Positive for constipation  Negative for abdominal distention, abdominal pain, blood in stool, diarrhea, nausea and vomiting  Genitourinary: Negative for dysuria, frequency, hematuria and urgency  Musculoskeletal: Negative for back pain and neck pain  Skin: Negative for wound  Neurological: Negative for dizziness, seizures, syncope, weakness, numbness and headaches  12-point, complete review of systems was reviewed and negative except as stated above  Historical Information     Past Medical History:   Diagnosis Date    CAD (coronary artery disease)     [facility]     [facility]ce[facility] )     Primary hypertension     Stage 4 chronic kidney disease ([place] [address] )     Type 2 diabetes mellitus ([place] [address] )      History reviewed  No pertinent surgical history  Social History     Tobacco Use    Smoking status: Not on file    Smokeless tobacco: Not on file   Substance Use Topics    Alcohol use: Not on file    Drug use: Not on file       There is no immunization history on file for this patient  Last Tetanus: n/a  Family History: Non-contributory    1  Before the illness or injury that brought you to the Emergency, did you need someone to help you on a regular basis? 0=No   2  Since the illness or injury that brought you to the Emergency, have you needed more help than usual to take care of yourself? 1=Yes   3  Have you been hospitalized for one or more nights during the past 6 months (excluding a stay in the Emergency Department)? 1=Yes   4  In general, do you see well? 0=Yes   5  In general, do you have serious problems with your memory? 0=No   6  Do you take more than three different medications everyday?  1=Yes   TOTAL   3     Did you order a geriatric consult if the score was 2 or greater?: yes     Meds/Allergies   all current active meds have been reviewed  Allergies have not been reviewed; No Known Allergies    Objective   Initial Vitals:   Temperature: (!) 94 °F (34 4 °C) (03/16/22 1900)  Pulse: (!) 46 (03/16/22 1900)  Respirations: 18 (03/16/22 1900)  Blood Pressure: 113/55 (03/16/22 1900)    Primary Survey:   Airway:        Status: patent;        Pre-hospital Interventions: none        Hospital Interventions: none  Breathing:        Pre-hospital Interventions: none       Effort: normal       Right breath sounds: normal       Left breath sounds: normal  Circulation:        Rhythm: regular       Rate: regular   Right Pulses Left Pulses    R radial: 2+  R femoral: 2+  R pedal: 2+     L radial: 2+  L femoral: 2+  L pedal: 2+       Disability: Interventions: Patient attempting to follow commands however unable to secondary to paralysis       GCS: Eye: 4; Verbal: 5 Motor: 6 Total: 15       Right Pupil: round;  reactive         Left Pupil:  round;  reactive      R Motor Strength L Motor Strength    R : 0/5  R dorsiflex: 0/5  R plantarflex: 0/5 L : 0/5  L dorsiflex: 0/5  L plantarflex: 0/5        Sensory:  Sensory deficit (asensate from chest down)  Exposure:       Completed: Yes      Secondary Survey:  Physical Exam  Vitals and nursing note reviewed  Constitutional:       General: He is not in acute distress  Appearance: Normal appearance  He is normal weight  He is not ill-appearing or toxic-appearing  HENT:      Head: Normocephalic  Right Ear: Tympanic membrane normal       Left Ear: Tympanic membrane normal       Nose: Nose normal  No congestion or rhinorrhea  Mouth/Throat:      Mouth: Mucous membranes are moist       Pharynx: Oropharynx is clear  No oropharyngeal exudate  Eyes:      Extraocular Movements: Extraocular movements intact  Pupils: Pupils are equal, round, and reactive to light  Cardiovascular:      Rate and Rhythm: Regular rhythm  Bradycardia present  Heart sounds: No murmur heard  No friction rub  No gallop      Pulmonary:      Effort: Pulmonary effort is normal       Breath sounds: No wheezing, rhonchi or rales  Abdominal:      General: Abdomen is flat  There is no distension  Palpations: Abdomen is soft  Tenderness: There is no abdominal tenderness  There is no guarding or rebound  Musculoskeletal:      Right shoulder: No swelling or deformity  Decreased strength  Left shoulder: No swelling or deformity  Decreased strength  Right upper arm: No swelling or deformity  Left upper arm: No swelling or deformity  Right elbow: No swelling or deformity  Left elbow: No swelling or deformity  Right forearm: No swelling or deformity  Left forearm: No swelling or deformity  Right wrist: No swelling or deformity  Left wrist: No swelling or deformity  Right hand: No swelling or deformity  Left hand: No swelling or deformity  Cervical back: No deformity or tenderness  Thoracic back: No deformity or tenderness  Lumbar back: Normal  No swelling, deformity or tenderness  Right hip: No deformity or tenderness  Decreased strength  Left hip: No deformity or tenderness  Decreased strength  Right upper leg: No swelling or deformity  Left upper leg: No swelling or deformity  Right knee: No swelling or deformity  Left knee: No swelling or deformity  Right lower leg: No swelling  Left lower leg: No swelling  Right ankle: No swelling or deformity  Left ankle: No swelling or deformity  Right foot: No swelling or deformity  Left foot: No swelling or deformity  Skin:     General: Skin is warm and dry  Capillary Refill: Capillary refill takes less than 2 seconds  Findings: No bruising or lesion  Neurological:      Mental Status: He is alert and oriented to person, place, and time  GCS: GCS eye subscore is 4  GCS verbal subscore is 5  GCS motor subscore is 6  Cranial Nerves: Cranial nerves are intact   No cranial nerve deficit, dysarthria or facial asymmetry  Sensory: Sensory deficit (asensate from chest wall down  Sensation to light touch from shoulders to elbows, decreased sensation from biceps to elbows  Sesnation over bilateral anterior thighs decreased, asensate from knees down) present        Comments: Motor 0/5 left upper, right upper, right lower  1/5 left biceps   5/5 shoulder shrug bilaterally   Psychiatric:         Attention and Perception: Attention and perception normal          Mood and Affect: Mood normal          Speech: Speech normal          Invasive Devices  Report    Peripheral Intravenous Line            Peripheral IV 03/16/22 Right Arm 1 day    Peripheral IV 03/16/22 Distal;Left;Ventral (anterior) Forearm <1 day    Peripheral IV 03/16/22 Left Antecubital <1 day          Drain            Urethral Catheter Temperature probe 16 Fr  <1 day              Lab Results:   BMP/CMP:   Lab Results   Component Value Date    SODIUM 140 03/16/2022    K 4 3 03/16/2022     03/16/2022    CO2 24 03/16/2022    CO2 23 03/16/2022    BUN 65 (H) 03/16/2022    CREATININE 2 91 (H) 03/16/2022    GLUCOSE 134 03/16/2022    CALCIUM 8 5 03/16/2022    AST 24 03/16/2022    ALT 27 03/16/2022    ALKPHOS 59 03/16/2022    EGFR 19 03/16/2022   , CBC:   Lab Results   Component Value Date    WBC 7 33 03/16/2022    HGB 8 0 (L) 03/16/2022    HCT 22 5 (L) 03/16/2022    MCV 93 03/16/2022     (L) 03/16/2022    MCH 29 3 03/16/2022    MCHC 31 6 03/16/2022    RDW 13 4 03/16/2022    MPV 11 0 03/16/2022    NRBC 0 03/16/2022   , Coagulation:   Lab Results   Component Value Date    INR 1 22 (H) 03/16/2022   , CK:   Lab Results   Component Value Date    CKTOTAL 91 03/16/2022    and Troponin: No results found for: TROPONINI    Imaging Results: I have personally reviewed pertinent films in PACS  Chest Xray(s): N/A   FAST exam(s): negative for acute findings   CT Scan(s): positive for acute findings: possible lower GI bleed at cecum large osteophyte cervical spine   Additional Xray(s): N/A     Other Studies: MRI C/T spine: pending    Code Status: Level 1 - Full Code  Advance Directive and Living Will:      Power of :    POLST:    I have spent 75 minutes with Patient  today in which greater than 50% of this time was spent in counseling/coordination of care regarding Diagnostic results, Prognosis and patient care, MRI coordination, coordination of specialists

## 2022-03-17 NOTE — PHYSICAL THERAPY NOTE
PHYSICAL THERAPY EVALUATION NOTE    Patient Name: Flo Vera  JBOAF'K Date: 3/17/2022     AGE:   80 y o  Mrn:   [id number]  ADMIT DX:  Head injury [S09 90XA]  Acute traumatic injury of cervical spine ([facility] ) [S14 109A]    Past Medical History:   Diagnosis Date    Bladder cancer ([place] [address] )     CAD (coronary artery disease)     [facility]     CVA (cerebral vascular accident) ([place] [address] )     Primary hypertension     Stage 4 chronic kidney disease ([place] [address] )     Type 2 diabetes mellitus ([place] [address] )      Length Of Stay: 1  PHYSICAL THERAPY EVALUATION :   Patient's identity confirmed via 2 patient identifiers (full name and ) at start of session       22 1120   PT Last Visit   PT Visit Date 22   Note Type   Note type Evaluation;Orthotic Management/Training   Type of Brace   Brace Applied [place] Collar Set   Patient Position When Brace Applied Supine   Bracing Recommendations   (Launie Cooks at all times)   Education   Education Provided Yes  (written handout provided)   Pain Assessment   Pain Assessment Tool 0-10   Pain Score 8   Pain Location/Orientation Location: Neck  (back of neck)   Pain Onset/Description Onset: Ongoing   Patient's Stated Pain Goal No pain   Hospital Pain Intervention(s) Repositioned; Ambulation/increased activity  (placed in new C/s collar)   Restrictions/Precautions   Weight Bearing Precautions Per Order No   Braces or Orthoses C/S Collar   Other Precautions Bed Alarm; Fall Risk;Spinal precautions;Pain;Telemetry  (hardwired telemetry)   Home Living   Type of [address] [place] [address] One level;Stairs to enter with rails  ([address])   Bathroom Shower/Tub Tub/shower unit   Home Equipment Walker;Cane   Additional Comments Pt reports ambulating w/ RW PTA   Prior Function   Level of [place] Independent with ADLs and functional mobility   Lives With Alone  (Cat, [place])   Receives Help From Friend(s); Family   ADL Assistance Independent   IADLs Needs assistance   Falls in the last 6 months 1 to 4  (at least 1 leading to admission)   Vocational Retired  ()   Comments Pt reports living home alone, sister is 79 y/o    General   Additional Pertinent History "3/16/22 MRI C-spine notes chronic severe mass effect on spinal cord at C3-4 and C4-5 s/t degenerative disease" ; Per Trauma, plan for MAP goal > 75   Family/Caregiver Present No   Cognition   Overall Cognitive Status WFL  (will continue to assess, )   Arousal/Participation Responsive   Orientation Level Oriented X4   Memory Within functional limits   Following Commands Follows multistep commands with increased time or repetition   Comments Pt very pleasant and agreeable, understandably flat affect but smiles and jokes with therapist appropriately   Strength RLE   RLE Overall Strength 0/5  (purposeful movement)   Tone RLE   RLE Tone   (pt w/ involuntary movement of RLE )   Strength LLE   LLE Overall Strength 0/5   Tone LLE   LLE Tone Hypotonic   Vision-Basic Assessment   Current Vision Wears glasses all the time   Coordination   Movements are Fluid and Coordinated 0   Coordination and Movement Description Pt w/ involuntary    Sensation X   Light Touch   RLE Light Touch Absent   LLE Light Touch Absent   Bed Mobility   Rolling R 1  Dependent   Additional items Assist x 2; Increased time required;Verbal cues;LE management  (partial roll)   Rolling L 1  Dependent   Additional items Assist x 2; Increased time required;LE management  (to partial roll)   Additional Comments Placed pt w/ HOB at 45 degrees  Obtained BP, unchanged from BP in supine  Pt w/ no c/o   Able to maintain for 5 min, pt asking to lay down flatter due to wanting to sleep   Balance   Static Sitting Zero   Activity Tolerance   Activity Tolerance Patient limited by fatigue   Medical Staff Fercho fuentes w/ OT Charla Sit to [address] to SEYMOUR Moraima   Assessment   Prognosis Guarded   Problem List Decreased strength;Decreased range of motion;Decreased endurance; Impaired balance;Decreased coordination;Decreased mobility; Impaired tone;Pain; Impaired vision  (spinal precautions)   Assessment Darylene Bound is a 80 y o  Male who [facility] on 3/16/2022 from home s/p found down for unknown time and diagnosis of cervical spinal cord injury (C3-C4)  Orders for PT eval and treat received, w/ activity orders of up w/ assist and fall, cervical precautions  Pt presents w/ comorbidities of hx of bladder cx, CAD, GI bleed, diabetes, HTN, CHF, CKD stage 4, hx of CVA  At baseline, pt mobilizes independently w/ RW, and reports at least 1 falls in the last 6 months  Upon evaluation, pt presents w/ the following deficits: weakness, decreased ROM, altered sensation, impaired tone, impaired balance, decreased endurance and pain limiting functional mobility  Pt is presently dependent for all care w/ very, very limited motor control of extremities (only L hand)  Pt's clinical presentation is unstable/unpredictable due to new C/s SCI w/ functional quadriplegia at this time  Patient is at an increased risk of falls due to physical deficits  Given the above findings, discharge recommendation is for Post-acute inpatient rehabilitation  During this admission, pt would benefit from continued skilled inpatient PT in the acute care setting in order to address the abovementioned deficits to maximize function and mobility before DC from acute care  Goals   Patient Goals to go home to his cat   STG Expiration Date 03/31/22   Short Term Goal #1 Patient will:  Tolerate sitting chair position or with HOB >45 degrees for at least 2 total hours/day vs 1 hour, 3x/day to demonstrate improved upright tolerance, pt will maintain present BLE ROM to decrease risk of contractures, tolerate therex/neuromuscular re-ed of BLEs w/ therapist for at least 15 min to optimize neuro pathways for healing, pt will verbalize understanding of basic SCI education provided by therapist throughout admission   Plan   Treatment/Interventions LE strengthening/ROM; Therapeutic exercise; Endurance training;Patient/family training;Equipment eval/education; Bed mobility   PT Frequency Other (Comment)  (1-3x/wk)   Recommendation   PT Discharge Recommendation Post acute rehabilitation services   Additional Comments Pt requires doc lift for OOB; recommend RN staff places pt in chair position and appropriately monitors BP    AM-PAC Basic Mobility Inpatient   Turning in Bed Without Bedrails 1   Lying on Back to Sitting on Edge of Flat Bed 1   Moving Bed to Chair 1   Standing Up From Chair 1   Walk in Room 1   Climb 3-5 Stairs 1   Basic Mobility Inpatient Raw Score 6   Turning Head Towards Sound 3   Follow Simple Instructions 3   Low Function Basic Mobility Raw Score 12   Low Function Basic Mobility Standardized Score 18 33   Highest Level Of Mobility   JH-HLM Goal 2: Bed activities/Dependent transfer   JH-HLM Highest Level of Mobility 2: Bed activities/Dependent transfer   JH-HLM Goal Achieved Yes     Pt seen as a co-eval due to the patient's co-morbidities, clinically unstable presentation, and present impairments which are a regression from the patient's baseline  The patient's AM-PAC Basic Mobility Inpatient Short Form Raw Score is  , Standardized Score is    A standardized score less than 38 32 (raw score of 16) suggests the patient may benefit from discharge to post-acute rehabilitation services which may not coincide with above PT recommendations  However please refer to therapist recommendation for discharge planning given other factors that may influence destination      Pt would benefit from skilled inpatient PT during this admission in order to facilitate progress towards goals to maximize functional independence    Ricky Houser, PT

## 2022-03-17 NOTE — CONSULTS
PHYSICAL MEDICINE AND REHABILITATION CONSULT NOTE  Angela Up 80 y o  male MRN: [id number]  Unit/Bed#: ICU 07 Encounter: [id number]    Requested by (Physician/Service): So Pérez MD  Reason for Consultation:  Assessment of rehabilitation needs    Assessment:  Rehabilitation Diagnosis:    Spinal Cord injury C3-C5   Paraplegia    Impaired mobility and self care     Recommendations:  Rehabilitation Plan:   Recommend Maryclare Holy and bilateral multi-podus boots bilaterally   Therapy evaluations are pending however at this time recommend dedicated spinal cord inpatient rehabilitation once medically stable  Will follow at this time   Covid-19 Testing: [facility] inpatient rehabilitation units require testing within 48 hours of all potential admissions at this time  *Re-testing is NOT required for patients recovering from COVID-19 infection if isolation has been discontinued per CDC criteria  Medical Co-morbidities Plan:  · GI bleed s/p 3 units PRBCs  · Diabetes  · CKD stage 4  · Diastolic heart failure  · Hypertension   · Hx of bladder cancer s/p chemo/resection  · CAD  · DVT ppx: SCD    Thank you for this consultation  Do not hesitate to contact service with further questions  TABITHA Figueroa  PM&R    History of Present Illness:  Angela Up is a 80 y o  male with a PMH of CKD, MI, diabetes type 2, bladder cancer s/p chemo/resection, CAD and hypertension who presented to the [address] [place] [address] on 3/16/22 after falling at home  He reported he fell in the bathroom and afterward had difficulty moving all extremities  He was found down by his neighbor  CT of the cervical spine without contrast demonstrated multilevel degenerative changes and extent osteophyte complete  MRI of the cervical spine showed severe mass effect on the cord at C3-C4, C4-C5 secondary to disc and facet degenerative change  Severe mass effect on the cord at C3-C4, C4-C5 with gliosis  He was anemic as well and he was found to have a GI bleed in cecm  he was given 3 units of PRBC's, plavix and ASA were held  He was started on Levophed to maintain MAP > 85  PM&R are consulted for rehabilitation recommendations  The patient was seen in the ICU with family at bedside  He reports pain in his neck currently  He reports being unable to move his upper or lower extremities  Review of Systems: 10 point ROS negative except for what is noted in HPI    Function:  Prior level of function and living situation:  Per prior documentation from admission 8/2021 the patient lives in a multi-level home with 5 + 5 SILVINO, bedroom/bathroom are on the main level  He lives alone and was independent  Current level of function:  Physical Therapy:  Pending   Occupational Therapy:  Pending     Physical Exam:  /67   Pulse 74   Temp 98 2 °F (36 8 °C)   Resp 18   Wt 86 1 kg (189 lb 13 1 oz)   SpO2 94%        Intake/Output Summary (Last 24 hours) at 3/17/2022 1029  Last data filed at 3/17/2022 0801  Gross per 24 hour   Intake 2147 5 ml   Output 410 ml   Net 1737 5 ml       There is no height or weight on file to calculate BMI  Physical Exam   Gen: No acute distress, fatigued appearing  HEENT:  Normocephalic/Atraumatic, cervical collar in place  Extremities: No edema  Pulmonary: Non-labored breathing  MSK: Increased tone bilateral UE, hypotonic LE  Integumentary: Skin is warm, dry  No rashes or ulcers  Neuro: AAOx3, Speech is fluent  Patient is following commands  Sensation to light touch is decreased, no clonus noted bilateral LE  Motor: Able to shrug shoulders bilaterally but otherwise no movement noted UE or LE        Social History:    Social History     Socioeconomic History    Marital status: Single     Spouse name: None    Number of children: None    Years of education: None    Highest education level: None   Occupational History    None   Tobacco Use    Smoking status: Never Smoker    Smokeless tobacco: Never Used   Vaping Use    Vaping Use: Never used   Substance and Sexual Activity    Alcohol use: Not Currently     Comment: occasional beer    Drug use: Never    Sexual activity: None   Other Topics Concern    None   Social History Narrative    None     Social Determinants of Health     Financial Resource Strain: Not on file   Food Insecurity: Not on file   Transportation Needs: Not on file   Physical Activity: Not on file   Stress: Not on file   Social Connections: Not on file   Intimate Partner Violence: Not on file   Housing Stability: Not on file        Family History:    History reviewed  No pertinent family history        Medications:     Current Facility-Administered Medications:     HYDROmorphone (DILAUDID) injection 0 2 mg, 0 2 mg, Intravenous, Q1H PRN, Keli Sanches PA-C, 0 2 mg at 03/17/22 4638    insulin lispro (HumaLOG) 100 units/mL subcutaneous injection 1-6 Units, 1-6 Units, Subcutaneous, Q6H [address], 1 Units at 03/17/22 0539 **AND** Fingerstick Glucose (POCT), , , Q6H, Keli Sanches PA-C    multi-electrolyte (PLASMALYTE-A/ISOLYTE-S PH 7 4) IV solution, 150 mL/hr, Intravenous, Continuous, Leonor Wilson PA-C, Last Rate: 150 mL/hr at 03/17/22 0443, 150 mL/hr at 03/17/22 0443    norepinephrine (LEVOPHED) 4 mg (STANDARD CONCENTRATION) IV in sodium chloride 0 9% 250 mL, 1-30 mcg/min, Intravenous, Titrated, Leonor Wilson PA-C, Stopped at 03/16/22 2228    ondansetron (ZOFRAN) injection 4 mg, 4 mg, Intravenous, Q6H PRN, Ng Delfina, CRNP, 4 mg at 03/17/22 5758    pantoprazole (PROTONIX) EC tablet 40 mg, 40 mg, Oral, Early Morning, Leonor Wilson PA-C, 40 mg at 03/17/22 0501    Past Medical History:     Past Medical History:   Diagnosis Date    Bladder cancer ([facility] )     CAD (coronary artery disease)     CHF (congestive heart failure) ([facility] )     CVA (cerebral vascular accident) ([facility] )     Primary hypertension     Stage 4 chronic kidney disease ([facility] )     Type 2 diabetes mellitus ([facility] )         Past Surgical History:     History reviewed  No pertinent surgical history  Allergies:     No Known Allergies        LABORATORY RESULTS:      Lab Results   Component Value Date    HGB 9 6 (L) 03/17/2022    HCT 28 3 (L) 03/17/2022    WBC 10 99 (H) 03/17/2022     Lab Results   Component Value Date    BUN 67 (H) 03/17/2022    K 4 6 03/17/2022     03/17/2022    GLUCOSE 134 03/16/2022    CREATININE 2 90 (H) 03/17/2022     Lab Results   Component Value Date    PROTIME 15 4 (H) 03/16/2022    INR 1 22 (H) 03/16/2022        DIAGNOSTIC STUDIES: Reviewed  TRAUMA - CT head wo contrast    Result Date: 3/16/2022  Impression: 1  No intracranial hemorrhage or calvarial fracture  2   Right parieto-occipital scalp hematoma  Workstation performed: [id number]     TRAUMA - CT spine cervical wo contrast    Result Date: 3/16/2022  Impression: 1  No cervical spine fracture or traumatic malalignment  2   Severe central canal stenosis at C3-4 secondary to a disc bulge  Workstation performed: [id number]     MRI cervical spine wo contrast    Result Date: 3/16/2022  Impression: 1  Chronic, severe mass effect on the cord at C3-4 C4-5 secondary to disc and facet degenerative change  2   Severe mass effect on the cord at C3-4 C4-5  Suggestion of subtle hyperintensity in the cord may indicate gliosis  Workstation performed: [id number]     TRAUMA - CT chest abdomen pelvis w contrast    Result Date: 3/16/2022  Impression: 1  No acute traumatic findings in the chest, abdomen, or pelvis  2   Findings compatible with a nonspecific colitis particularly involving the cecum, with colonic hyperdense material most focally seen in the cecum which could represent ingested material or potentially a GI bleed with active extravasation  Additionally, there are findings which are, though not definitive for, suggestive of contained perforation between the distal sigmoid and cecum   The urinary bladder has a new appearance of potential tethering to the cecum and distal sigmoid, and the possibility of recurrent bladder cancer as a causative etiology should be excluded  3   Suggestion of a rim-enhancing 9 mm lesion in the interpolar left kidney which is too small to characterize and incompletely evaluated on single phase study  Though many such lesions are either benign or clinically insignificant, further characterization is suggested with contrast-enhanced MRI abdomen in 6 months for earlier characterization, or in 12 months to evaluate for growth  Alternatively, renal protocol CT without and with intravenous contrast could be performed at the same 6 or 12 month interval (noting that MRI is preferred if not contraindicated because of the specificity for characterizing small cysts)  Reference: Ivon García KXYOIJ [id number]; [id number]  I personally discussed this study with Kane Amanda on 3/16/2022 at 8:59 PM  The study was marked in [facility] for follow-up  Workstation performed: [id number]     XR Trauma multiple (SLB/SLRA trauma bay ONLY)    Result Date: 3/17/2022  Impression: No acute cardiopulmonary disease within limitations of supine imaging   Workstation performed: [id number]

## 2022-03-17 NOTE — OCCUPATIONAL THERAPY NOTE
Occupational Therapy Evaluation     Patient Name: Neri DIEGO Date: 3/17/2022  Problem List  Principal Problem:    Cervical spinal cord injury ([facility] )  Active Problems:    DM2 (diabetes mellitus, type 2) ([facility] )    HTN (hypertension)    GI bleed    Stage 4 chronic kidney disease (HCC)    Diastolic heart failure ([facility] )    Past Medical History  Past Medical History:   Diagnosis Date    Bladder cancer ([facility] )     CAD (coronary artery disease)     [facility]     CVA (cerebral vascular accident) ([facility] )     Primary hypertension     Stage 4 chronic kidney disease ([facility] )     Type 2 diabetes mellitus ([facility] )      Past Surgical History  History reviewed  No pertinent surgical history  03/17/22 1133   OT Last Visit   OT Visit Date 03/17/22   Note Type   Note type Evaluation   Restrictions/Precautions   Weight Bearing Precautions Per Order No   Braces or Orthoses C/S Collar   Other Precautions Bed Alarm;Multiple lines;Telemetry; Fall Risk   Pain Assessment   Pain Assessment Tool 0-10   Pain Score 8   Pain Location/Orientation Location: Neck  (back of neck)   Hospital Pain Intervention(s) Repositioned   Home Living   Type of [address] [place] [address] One level;Stairs to enter with rails; Laundry in basement  (10 SILVINO)   Bathroom Shower/Tub Tub/shower unit   Bathroom Toilet Raised   Bathroom Equipment Shower chair;Hand-held shower   Bathroom Accessibility Accessible via walker   Home Equipment Walker;Cane   Additional Comments use of RW at baseline   Prior Function   Lives With Alone  (+ cat "Derek")   Receives Help From Friend(s); Family   ADL Assistance Independent   IADLs Independent   Falls in the last 6 months 1 to 4   Vocational Retired  ()   Comments (+)drives   Lifestyle   Autonomy PTA pt living alone in [facility], pt (I) with all ADLs and IADLs, (+)fall, (+)drives, use of RW at baseline   Reciprocal Relationships sister is 80 yrs old and lives nearby   Service to [employer] retired , reports working on [address] [employer]  enjoys going out and about and shopping and driving   Subjective   Subjective "I really just want to sleep"   ADL   Eating Assistance 1  Total Assistance   Grooming Assistance 1  Total Assistance   [address] 1  Total Assistance   LB Bathing Assistance 1  Total Assistance   [address] 1  Total Assistance   LB Dressing Assistance 1  Total [address]  1  Total Assistance   Bed Mobility   Rolling R 1  Dependent   Additional items Assist x 2   Rolling L 1  Dependent   Additional items Assist x 2   Activity Tolerance   Activity Tolerance Patient limited by fatigue   Medical Staff Made Aware PT SEYMOUR Martínez   RUE Assessment   RUE Assessment X  (Simultaneous filing  User may not have seen previous data )   RUE Strength   RUE Overall Strength Deficits  (able to only shrug shldrs, otherwise 0/5)   RUE Tone   RUE Tone Hypotonic   LUE Assessment   LUE Assessment X  (Simultaneous filing  User may not have seen previous data )   LUE Strength   LUE Overall Strength Deficits  (able to only shrug shldrs, otherwise 0/5)   LUE Tone   LUE Tone Hypotonic   Hand Function   Gross Motor Coordination Impaired   Fine Motor Coordination Impaired   Vision-Basic Assessment   Current Vision Wears glasses all the time  (currently broken, but pt wearing)   Cognition   Overall Cognitive Status WFL   Arousal/Participation Alert;Persistent stimuli required   Attention Within functional limits   Orientation Level Oriented X4   Memory Within functional limits   Following Commands Follows one step commands with increased time or repetition   Comments flat affect,(understandable due to situation) does joke and smile with therapists    Assessment   Limitation Decreased ADL status; Decreased UE strength;Decreased Safe judgement during ADL;Decreased cognition;Decreased endurance;Decreased self-care trans;Decreased high-level ADLs;Decreased UE ROM; Decreased sensation;Decreased fine motor control;Non-func R UE;Non-func L UE   Prognosis Guarded   Assessment Pt is a 80 y o  male seen for OT evaluation s/p admission to [address] [place] [address] on 3/16/2022 due to fall at home  Pt diagnosed with Cervical spinal cord injury ([facility] )  Pt with no recent admissions in the last 2 months  Pt has a significant PMH impacting occupational performance including: DM II, GI bleed, CKD, diastolic heart failure  Pt with active OT evaluation and treatment orders and activity orders for Up with assistance  PTA pt living alone in [facility], pt (I) with all ADLs and IADLs, (+)fall, (+)drives, use of RW at baseline  Pt is motivated to return to home  Personal and environmental factors supporting pt at time of IE include attitude towards recovery  Personal and environmental factors inhibiting engagement in occupations include advanced age, current habits and behavioral patterns, limited social support, inaccessible home environment, inaccessible bathroom environment, difficulty completing ADLs and difficulty completing IADLs  During evaluation pt performed as is outlined above in flowsheet  Standardized assessments used to assist in identifying performance deficits include AMPAC 6-Clicks and Barthel ADL Index  Performance deficits that affect the pts occupational performance during the initial evaluation include impaired balance, functional mobility, endurance, activity tolerance, fine motor coordination, gross motor coordination, sensation, dexterity, visual motor integration, forward functional reach, trunk control, functional standing tolerance and overall strength  Based on pts functional performance and deficits the following occupations will be addressed in OT treatments in order to maximize pts independence and overall occupational performance: grooming, bathing/showering, toileting and toilet hygiene, dressing, feeding and functional mobility   Goals are listed below  Upon discharge from acute care setting recommend d/c to post-acute rehabilitation services  This evaluation required an extensive review of medical and/or therapy records and additional review of physical, cognitive and psychosocial history related to functional performance  Based upon functional performance deficits and assessments, this evaluation has been identified as a high complexity evaluation  Goals   Patient Goals to go home to "[place]"   [facility] Time Frame 10-14   Long Term Goal see goals listed below   Plan   Treatment Interventions ADL retraining;Functional transfer training; Endurance training;UE strengthening/ROM; Patient/family training;Equipment evaluation/education; Compensatory technique education; Energy conservation; Activityengagement; Fine motor coordination activities; Neuromuscular reeducation   Goal Expiration Date 03/27/22   OT Treatment Day 0   OT Frequency 1-2x/wk   Recommendation   OT Discharge Recommendation Post acute rehabilitation services   AM-PAC Daily Activity Inpatient   Lower Body Dressing 1   Bathing 1   Toileting 1   Upper Body Dressing 1   Grooming 1   Eating 1   Daily Activity Raw Score 6   Turning Head Towards Sound 3   Follow Simple Instructions 3   Low Function Daily Activity Raw Score 12   Low Function Daily Activity Standardized Score 21 38   AM-PAC Applied Cognition Inpatient   Following a Speech/Presentation 3   Understanding Ordinary Conversation 4   Taking Medications 3   Remembering Where Things Are Placed or Put Away 3   Remembering List of 4-5 Errands 3   Taking Care of Complicated Tasks 3   Applied Cognition Raw Score 19   Applied Cognition Standardized Score 39 77   Barthel Index   Feeding 0   Bathing 0   Grooming Score 0   Dressing Score 0   Bladder Score 0   Bowels Score 0   Toilet Use Score 0   Transfers (Bed/Chair) Score 0   Mobility (Level Surface) Score 0   Stairs Score 0   Barthel Index Score 0        GOALS:   Goals established in order to promote pt's established goal of going home to "[place]"     -Patient will increase sitting tolerance in chair position for 2-4 hours per day to increase endurance for eventual participation in self care tasks    -Patient will participate in UE ROM/therex to increase/maintain present ROM and strength for purposeful tasks    -Patient will verbalize understanding of repositioning and be able to notify RN staff in a timely manner to maintain skin integrity      The patient's raw score on the AM-PAC Daily Activity inpatient short form low function score is 12, standardized score is Low Function Daily Activity Standardized Score: 21 38  Patients with a standardized score less than 39 4 are likely to benefit from discharge to post-acute rehab services  Please refer to the recommendation of the Occupational Therapist for safe discharge planning  This session, pt required and most appropriately benefited from skilled OT/PT co-eval due to extensive physical assistance of SKILLED therapists, significant regression from functional baseline and decreased activity tolerance  OT and PT goals were addressed separately as seen in documentation       At the end of the session, all needs met and pt supine in bed, bed alarm activated, HOB elevated and call bell within reach    [facility], OTR/L

## 2022-03-17 NOTE — UTILIZATION REVIEW
Initial Clinical Review    Admission: Date/Time/Statement:   Admission Orders (From admission, onward)     Ordered        03/16/22 2206  Inpatient Admission  Once                      Orders Placed This Encounter   Procedures    Inpatient Admission     Standing Status:   Standing     Number of Occurrences:   1     Order Specific Question:   Level of Care     Answer:   Critical Care [15]     Order Specific Question:   Bed Type     Answer:   Trauma [7]     Order Specific Question:   Estimated length of stay     Answer:   More than 2 Midnights     Order Specific Question:   Certification     Answer:   I certify that inpatient services are medically necessary for this patient for a duration of greater than two midnights  See H&P and MD Progress Notes for additional information about the patient's course of treatment  ED Arrival Information     Expected Arrival Acuity    - 3/16/2022 18:54 Emergent         Means of arrival Escorted by Service Admission type    Ambulance [facility] complaint            Chief Complaint   Patient presents with    Trauma     arrived via EMS, fall from standing with stroke symptoms, unknown downtime, +thinners, unknown LOC, R side weakness, unkown last well time        Initial Presentation: 79 yo male w/ pmh CKD stage 4, DM type 2, htn, CVA, to ED by ems admitted Inpatient d/t an unwitnessed fall with Spinal injury with quadriplegia  Spinal shock with hypotension  found down on the ground by his neighbor who checks on him daily  Patient reports he was feeling his normal self prior to the fall and woke up today on the floor and laid there for a few hours before his neighbor found him  difficulty moving his lower extremities,  also sensory function loss below his below his chest Lower extremities strength 0/5  Sensory level at his upper chest  No rectal tone  Imaging shows Right parieto-occipital scalp hematoma  No cervical spine fracture large osteophyte cervical spine, possible lower GI bleed at the cecum  Chronic, severe mass effect on the cord at C3-4 C4-5 secondary to disc and facet degenerative change  Severe mass effect on the cord at C3-4 C4-5  Suggestion of subtle hyperintensity in the cord may indicate gliosis  IVF, IV levophed gtt  IV antiemetics, IV analgesics  PRBC 3 units  Neurosurgery consult  3/16 Neurosurgery consult:found down  might have SC infarction  Close neuromonitoring, Q1H  Anemia, possible transfusion  Trauma believes he may have blood in his bowel  MRI demonstrates cord signal change and severe spinal stenosis  Date: 3/17   Day 2:  Flaccid paralysis in all 4 extremities - 0/5 strength in all 4   5/5 strength with bilateral shoulder shrug  No sensory from chest down,  + light touch to upper extremities  Attempting to follow commands, however unable  STAT MRI completed with mass effect and degenerative changes  Baseline Creat appears to be 2 0-2 5, currently 2 9  NPO pending neurosurgical eval for possible OR  IVF  Levophed gtt  Holding PTA Plavix, ASA  Received 3 units of pRBC's with appropriate response in blood pressure  Accuchecks and ISS  [place] vista collar, to wear all the time and es collar for shower  Continue neurocheck Q2 hours  Surgical decompressions from posterior approach discussed with patient, however patient not wanting procedure for now      ED Triage Vitals   Temperature Pulse Respirations Blood Pressure SpO2   03/16/22 1900 03/16/22 1900 03/16/22 1900 03/16/22 1900 03/16/22 1900   (!) 94 °F (34 4 °C) (!) 46 18 113/55 99 %      Temp Source Heart Rate Source Patient Position - Orthostatic VS BP Location FiO2 (%)   03/16/22 1900 03/16/22 1900 03/16/22 1945 03/16/22 1945 --   Oral Monitor Lying Left arm       Pain Score       03/16/22 2323       No Pain          Wt Readings from Last 1 Encounters:   03/17/22 86 1 kg (189 lb 13 1 oz)     Additional Vital Signs:   03/17/22 1116 -- -- -- 178/79 Abnormal  113 -- -- --   03/17/22 0900 98 2 °F (36 8 °C) 74 18 152/67 97 94 % None (Room air) --   03/17/22 0800 -- 75 24 Abnormal  170/73 105 96 % -- --   03/17/22 0700 97 8 °F (36 6 °C) 74 22 176/76 Abnormal  109 96 % None (Room air) Lying   03/17/22 0515 -- 72 35 Abnormal  152/66 95 96 % -- --                         03/17/22 0224 97 4 °F (36 3 °C) Abnormal  68 25 Abnormal  162/69 99 99 % None (Room air) Lying   03/17/22 0100 -- 67 21 155/71 102 98 % -- --                                                                                03/16/22 2323 97 6 °F (36 4 °C) -- -- 133/62 89 -- None (Room air) Lying   03/16/22 2314 -- 59 21 133/62 89 99 % None (Room air) Lying   03/16/22 2254 97 4 °F (36 3 °C) Abnormal  60 16 126/60 -- 98 % -- --   03/16/22 2237 97 3 °F (36 3 °C) Abnormal  64 16 147/63 -- 99 % None (Room air) --                                                                     03/16/22 2203 97 4 °F (36 3 °C) Abnormal  62 18 157/67 -- -- -- --                                                          03/16/22 2115 -- 46 Abnormal  16 93/55 -- 97 % None (Room air) --   03/16/22 2110 -- 45 Abnormal  16 84/47 Abnormal   -- -- None (Room air) --   03/16/22 2045 -- 46 Abnormal  16 88/51 Abnormal  67 98 % None (Room air) Lying                                                          03/16/22 1915 -- 47 Abnormal  16 111/54 -- 100 % None (Room air) --   03/16/22 1905 94 4 °F (34 7 °C) Abnormal  62 18 108/56 -- 100 % None (Room air) --   03/16/22 1900 94 °F (34 4 °C) Abnormal  46 Abnormal  18 113/55 -- 99 % None (Room air)        Pertinent Labs/Diagnostic Test Results:   MRI cervical spine wo contrast   Final Result by Zeynep Kumar MD (03/16 2224)         1  Chronic, severe mass effect on the cord at C3-4 C4-5 secondary to disc and facet degenerative change  2   Severe mass effect on the cord at C3-4 C4-5  Suggestion of subtle hyperintensity in the cord may indicate gliosis                 Workstation performed: [id number]         TRAUMA - CT head wo contrast   Final Result by Tj Cerna MD (03/16 2119)      1  No intracranial hemorrhage or calvarial fracture  2   Right parieto-occipital scalp hematoma  Workstation performed: [id number]         TRAUMA - CT spine cervical wo contrast   Final Result by Tj Cerna MD (03/16 2119)      1  No cervical spine fracture or traumatic malalignment  2   Severe central canal stenosis at C3-4 secondary to a disc bulge  Workstation performed: [id number]         TRAUMA - CT chest abdomen pelvis w contrast   Final Result by Tj Cerna MD (03/16 2115)      1  No acute traumatic findings in the chest, abdomen, or pelvis  2   Findings compatible with a nonspecific colitis particularly involving the cecum, with colonic hyperdense material most focally seen in the cecum which could represent ingested material or potentially a GI bleed with active extravasation  Additionally, there are findings which are, though not definitive for, suggestive of contained perforation between the distal sigmoid and cecum  The urinary bladder has a new appearance of potential tethering to the cecum and distal sigmoid, and the    possibility of recurrent bladder cancer as a causative etiology should be excluded  3   Suggestion of a rim-enhancing 9 mm lesion in the interpolar left kidney which is too small to characterize and incompletely evaluated on single phase study  Though many such lesions are either benign or clinically insignificant, further    characterization is suggested with contrast-enhanced MRI abdomen in 6 months for earlier characterization, or in 12 months to evaluate for growth  Alternatively, renal protocol CT without and with intravenous contrast could be performed at the same 6 or    12 month interval (noting that MRI is preferred if not contraindicated because of the specificity for characterizing small cysts)  Reference: Lisa Keene DBOJSR [id number]; 39:296-761  I personally discussed this study with Cliff Oakley on 3/16/2022 at 8:59 PM  The study was marked in [facility] for follow-up  Workstation performed: [id number]         XR Trauma multiple (SLB/SLRA trauma bay ONLY)   Final Result by Seda Melara MD (03/17 5948)      No acute cardiopulmonary disease within limitations of supine imaging                 Workstation performed: [id number]         XR chest portable    (Results Pending)         Results from last 7 days   Lab Units 03/17/22 0442 03/16/22  2254 03/16/22  1925 03/16/22  1909   WBC Thousand/uL 10 99*  --  7 33  --    HEMOGLOBIN g/dL 9 6* 8 0* 7 1*  --    I STAT HEMOGLOBIN g/dl  --   --   --  7 1*   HEMATOCRIT % 28 3*  --  22 5*  --    HEMATOCRIT, ISTAT %  --   --   --  21*   PLATELETS Thousands/uL 133*  --  124*  --    NEUTROS ABS Thousands/µL 9 26*  --  6 06  --          Results from last 7 days   Lab Units 03/17/22 0442 03/16/22  1923 03/16/22  1909   SODIUM mmol/L 135* 140  --    POTASSIUM mmol/L 4 6 4 3  --    CHLORIDE mmol/L 104 106  --    CO2 mmol/L 19* 24  --    CO2, I-STAT mmol/L  --   --  23   ANION GAP mmol/L 12 10  --    BUN mg/dL 67* 65*  --    CREATININE mg/dL 2 90* 2 91*  --    EGFR ml/min/1 73sq m 19 19  --    CALCIUM mg/dL 8 8 8 5  --    CALCIUM, IONIZED mmol/L 1 03*  --   --    CALCIUM, IONIZED, ISTAT mmol/L  --   --  1 20   MAGNESIUM mg/dL 2 5  --   --    PHOSPHORUS mg/dL 4 6*  --   --      Results from last 7 days   Lab Units 03/17/22 0442 03/16/22 1923   AST U/L 36 24   ALT U/L 33 27   ALK PHOS U/L 69 59   TOTAL PROTEIN g/dL 6 9 6 7   ALBUMIN g/dL 3 3* 3 1*   TOTAL BILIRUBIN mg/dL 1 37* 0 83     Results from last 7 days   Lab Units 03/17/22  1212   POC GLUCOSE mg/dl 159*     Results from last 7 days   Lab Units 03/17/22  0442 03/16/22  1923   GLUCOSE RANDOM mg/dL 162* 132       Results from last 7 days   Lab Units 03/16/22  1909   PH, HARPER I-STAT  7 384   PCO2, HARPER ISTAT mm HG 37 1*   PO2, HARPER ISTAT mm HG 30 0*   HCO3, HARPER ISTAT mmol/L 22 2*   I STAT BASE EXC mmol/L -3*   I STAT O2 SAT % 56*     Results from last 7 days   Lab Units 03/16/22 1923   CK TOTAL U/L 91     Results from last 7 days   Lab Units 03/16/22  2254 03/16/22 1924   HS TNI 0HR ng/L  --  29   HS TNI 2HR ng/L 28  --    HSTNI D2 ng/L -1  --          Results from last 7 days   Lab Units 03/16/22 1923   PROTIME seconds 15 4*   INR  1 22*   PTT seconds 30       ED Treatment:   Medication Administration from 03/16/2022 1851 to 03/16/2022 2313       Date/Time Order Dose Route Action                             03/16/2022 2201 desmopressin (DDAVP) 26 mcg in sodium chloride 0 9 % 50 mL IVPB 26 mcg Intravenous New Bag       03/16/2022 2104 lactated ringers bolus 1,000 mL 1,000 mL Intravenous New Bag                  03/16/2022 2106 multi-electrolyte (PLASMALYTE-A/ISOLYTE-S PH 7 4) IV solution 150 mL/hr Intravenous New Bag                  03/16/2022 2202 norepinephrine (LEVOPHED) 4 mg (STANDARD CONCENTRATION) IV in sodium chloride 0 9% 250 mL 2 mcg/min Intravenous New Bag       03/16/2022 2129 norepinephrine (LEVOPHED) 4 mg (STANDARD CONCENTRATION) IV in sodium chloride 0 9% 250 mL 2 mcg/min Intravenous New Bag                  03/16/2022 2219 HYDROmorphone (DILAUDID) injection 0 2 mg 0 2 mg Intravenous Given          Past Medical History:   Diagnosis Date    Bladder cancer ([facility] )     CAD (coronary artery disease)     [facility]     CVA (cerebral vascular accident) ([facility] )     Primary hypertension     Stage 4 chronic kidney disease ([facility] )     Type 2 diabetes mellitus ([facility] )      Present on Admission:   Cervical spinal cord injury ([facility] )   DM2 (diabetes mellitus, type 2) ([facility] )   HTN (hypertension)   GI bleed   Stage 4 chronic kidney disease ([facility] )   Diastolic heart failure ([address] )      Admitting Diagnosis: Head injury [S09 [id number]]  Acute traumatic injury of cervical spine ([facility] ) [[id number]]  Age/Sex: 80 y o  male  Admission Orders:  Scheduled Medications:  heparin (porcine), 5,000 Units, Subcutaneous, Q8H [address]  insulin lispro, 1-6 Units, Subcutaneous, Q6H [place]  pantoprazole, 40 mg, Oral, Early Morning      Continuous IV Infusions:  multi-electrolyte, 150 mL/hr, Intravenous, Continuous  norepinephrine, 1-30 mcg/min, Intravenous, Titrated      PRN Meds:  HYDROmorphone, 0 5 mg, Intravenous, Q1H PRN 3/16 X 1, 3/17 X2  ondansetron, 4 mg, Intravenous, Q6H PRN 3/17 X 1    Speech eval  PT/OT  SCD  NPO  I&O  OOB  DAILY WEIGHTS  NEURO CHECKS Q1H  CARDIO-PULM MONITORING  3/16: PRBC 3 UNITS  CERVICAL COLLAR  FALL PRECAUTIONS  NEUROVASCULAR CHECKS Q1H  DYSPHAGIA ASSESSMENT      IP CONSULT TO GERONTOLOGY  IP CONSULT TO NEUROSURGERY  IP CONSULT TO CASE MANAGEMENT  IP CONSULT TO PALLIATIVE CARE  IP CONSULT TO PHYSICAL MEDICINE REHAB    Network Utilization Review Department  ATTENTION: Please call with any questions or concerns to [phone] and carefully listen to the prompts so that you are directed to the right person  All voicemails are confidential   Sudie Crigler all requests for admission clinical reviews, approved or denied determinations and any other requests to dedicated fax number below belonging to the campus where the patient is receiving treatment   List of dedicated fax numbers for the Facilities:  [address] DENIALS (Administrative/Medical Necessity) [phone]   [address] (Maternity/NICU/Pediatrics) [phone]   [address] [id number] [address] [facility] [phone]   Elizabeth Shabazz 37 [address] [place] [address] [phone]

## 2022-03-17 NOTE — ASSESSMENT & PLAN NOTE
· Found down, flaccid in all four extremities   · No cervical spine fracture, however with large compressive osteophyte with multi level degenerative changes  · STAT MRI completed with mass effect and degenerative changes   · Neurosurgery consulted - input appreciated  · Keep NPO pending neurosurgical eval for possible OR   · Would maintain MAP >75 per Dr Yareli Goodman given concern for possible worsening GI bleeding if MAP higher   · Continue IVF at 150mL/hr   · Levophed if needed to maintain MAPs

## 2022-03-17 NOTE — PROGRESS NOTES
This is an 80year old male who was found down      He does nt move his lower extremeties and has minimal movement of the upper extremeties    He has a low hemoglobin    Trauma believes he may have blood in his bowel    MRI demonstrates cord signal change and severe spinal stenosis    Rec  Increase MAP to 85 or greater if possible  Correct Hemoglobin to 10 or greater

## 2022-03-17 NOTE — CONSULTS
Consultation - Neurosurgery   Shanon Cervantes 80 y o  male MRN: [id number]  Unit/Bed#: ICU 07 Encounter: [id number]    Images reviewed at morning rounds on 03/17/2022 we to at 7:00AM  Patient was seen and examined on 03/17/2022 at 7:30 a m  Inpatient consult to Neurosurgery  Consult performed by: Dominique Hernandez PA-C  Consult ordered by: Andres Gutierrez PA-C          Assessment/Plan               Assessment:  1  Severe C3-4/C4-5 CCS with gliosis  2  Myelopathy  3  Anemia with Hgb of 7 1%  4  Quadriplegia       Plan:   · Exam: A&OX3, SF, UE: strength 0/5 ( , elbow flexion/extension, ) shrugs shoulders, LEs: both feet DF/PF, 2+/5, Knee and hip flexion 0/5  Sensation to LT and PP sensory level below nipples  DST & JPT negative  Slightly hypertonic upper extremities bilateral, lower Limbs slightly decreased tone  DTR depressed  through out  Babinski big toes up-going bilaterally, but no clonus symmetrically  Tenderness on posterior cervical spine palpation  Soft Collar on  No rectal tone -exam done by Brad gorman in       · Things reviewed personally and by attendings  Findings as follows:  · MRI of cervical spine without contrast on 03/16/2022 demonstrate chronic, severe mass effect on the cord at C3-4 and C4-5 due to disc and facet degenerative change  Severe mass effect on the cord at C3-4 and C4-5 with septal hyperintensity in the cord may indicate gliosis  · CT of cervical spine without contrast on 03/16/2022 demonstrate no cervical spine fracture or traumatic malalignment, severe central canal stenosis at C3-4 secondary to disc bulge  · CT CAP on 03/16/2022 demonstrates findings compatible with a nonspecific colitis involving the cecum and colonic hyperdense material most focally seen in the cecum which could represent ingested mother over the [place] GI bleed with active extravasation    Findings suggestive of contained perforation between the distal sigmoid and cecum, tethered bladder to cecum and distal sigmoid and possiblity of cancer  · Pain control:  Per primary team  · DVT ppx: SCDs bilateral legs  HSQ  · Activity:  As tolerated  · PT/OT evaluation & treatment  · Brace: [place] collar, to wear all the time and es collar for shower  · Medical Mx:  Per primary team  · Neurocheck:  Routine  · MAP>75  · Dr Marika Joseph discussed with the patient the options for the management of cervical stenosis extensively  Surgical decompressions from posterior approach, the risks, benefits and alternatives  Given his comorbid medical issues and advanced age-again the risk of the surgery is also explained to the patient  Patient's questions and concerns were answered  Patient is not keen to have the procedure for now  NSx will reach out to his sisters and additionally discuss the Management options  NSx will continue monitor the patient  Continue neurocheck Q2 hours, MAP>75, SCI precaution  Once his  Neck pain controlled and stable, can get to recliner for  Few hours  Call with question or concern  History of Present Illness     HPI: Charan Reddy is a 80 y o  male with PMH of her cancer status post cystectomy in 2016, cardiac bypass procedure in 2017 baby aspirin and Plavix, diabetes mellitus, congestive heart failure, TIA, hypertension, stage 4 chronic kidney disease  Patient brought in from home after he was found down by his neighbor  Has history  features of myelopathy with gait instability and bilateral upper extremity weakness in the past   Patient reports he fell down at home, in the bathroom after he lost balance  Following the fall, he noticed difficulty moving all his extremities take pain became worse, only shrugs his shoulders and wiggles his toes in LEs  Denies any fever, chills, rigors, cough or chest pain  Patient denies any follow-up with Neurosurgery in the past and denies any PT or CHARLEY    Denies history of seizures, or bleeding disorders,  Denies history of smoking cigarettes but drinks alcohol occasionally  Denies drinking yesterday  Image findings as described in the assessment section above  Review of Systems   Constitutional: Negative for activity change, chills and fever  HENT: Negative for trouble swallowing and voice change  Eyes: Negative for photophobia and visual disturbance  Respiratory: Negative for cough, chest tightness and wheezing  Gastrointestinal: Positive for nausea  Negative for vomiting  Genitourinary:        Indwelling Marinelli catheter in place   Musculoskeletal: Positive for gait problem and neck pain  Neurological: Positive for weakness and numbness  Negative for dizziness, seizures, syncope, facial asymmetry, speech difficulty, light-headedness and headaches  Psychiatric/Behavioral: Negative for behavioral problems and confusion  Historical Information   Past Medical History:   Diagnosis Date    CAD (coronary artery disease)     [facility]     CVA (cerebral vascular accident) ([place] [address] )     Primary hypertension     Stage 4 chronic kidney disease ([place] [address] )     Type 2 diabetes mellitus ([place] [address] )      History reviewed  No pertinent surgical history  Social History     Substance and Sexual Activity   Alcohol Use None     Social History     Substance and Sexual Activity   Drug Use Not on file     Social History     Tobacco Use   Smoking Status Not on file   Smokeless Tobacco Not on file     History reviewed  No pertinent family history      Meds/Allergies   all current active meds have been reviewed, current meds:   Current Facility-Administered Medications   Medication Dose Route Frequency    heparin (porcine) subcutaneous injection 5,000 Units  5,000 Units Subcutaneous Q8H [address]    HYDROmorphone (DILAUDID) injection 0 5 mg  0 5 mg Intravenous Q1H PRN    insulin lispro (HumaLOG) 100 units/mL subcutaneous injection 1-6 Units  1-6 Units Subcutaneous Q6H [address]    multi-electrolyte (PLASMALYTE-A/ISOLYTE-S PH 7 4) IV solution 150 mL/hr Intravenous Continuous    norepinephrine (LEVOPHED) 4 mg (STANDARD CONCENTRATION) IV in sodium chloride 0 9% 250 mL  1-30 mcg/min Intravenous Titrated    ondansetron (ZOFRAN) injection 4 mg  4 mg Intravenous Q6H PRN    pantoprazole (PROTONIX) EC tablet 40 mg  40 mg Oral Early Morning    and PTA meds:   Prior to Admission Medications   Prescriptions Last Dose Informant Patient Reported? Taking?   aspirin 81 mg chewable tablet   Yes Yes   Sig: Chew 81 mg daily   atorvastatin (LIPITOR) 80 mg tablet   Yes Yes   Sig: Take 80 mg by mouth daily   carvedilol (COREG) 6 25 mg tablet   Yes Yes   Sig: Take 6 25 mg by mouth 2 (two) times a day with meals   docusate sodium (COLACE) 100 mg capsule   Yes Yes   Sig: Take 100 mg by mouth 2 (two) times a day   escitalopram (LEXAPRO) 10 mg tablet   Yes Yes   Sig: Take 10 mg by mouth daily   hydrALAZINE (APRESOLINE) 50 mg tablet   Yes Yes   Sig: Take 50 mg by mouth every 8 (eight) hours        Facility-Administered Medications: None     No Known Allergies    Objective   I/O       03/15 0701  03/16 0700 03/16 0701  03/17 0700 03/17 0701  03/18 0700    I V  (mL/kg)  397 5 (4 6)     Blood  350     IV Piggyback  1050     Total Intake(mL/kg)  1797 5 (20 9)     Urine (mL/kg/hr)  325     Total Output  325     Net  +1472 5                  Physical Exam  Constitutional:       Appearance: Normal appearance  HENT:      Head: Normocephalic and atraumatic  Eyes:      Extraocular Movements: Extraocular movements intact  Cardiovascular:      Rate and Rhythm: Normal rate and regular rhythm  Pulmonary:      Effort: Pulmonary effort is normal    Musculoskeletal:      Cervical back: Tenderness present  Neurological:      Mental Status: He is alert  GCS: GCS eye subscore is 4  GCS verbal subscore is 5  GCS motor subscore is 6  Cranial Nerves: No cranial nerve deficit  Sensory: Sensory deficit present  Motor: Weakness present        Coordination: Coordination abnormal  Finger-Nose-Finger Test normal       Deep Tendon Reflexes: Reflexes abnormal       Reflex Scores:       Tricep reflexes are 1+ on the right side and 1+ on the left side  Bicep reflexes are 1+ on the right side and 1+ on the left side  Brachioradialis reflexes are 1+ on the right side and 1+ on the left side  Patellar reflexes are 1+ on the right side and 1+ on the left side  Achilles reflexes are 1+ on the right side and 1+ on the left side  Psychiatric:         Speech: Speech normal        Neurologic Exam     Mental Status   Speech: speech is normal   Level of consciousness: alert    Cranial Nerves     CN III, IV, VI   Nystagmus: none     CN XI   CN XI normal      Motor Exam   Muscle bulk: normal  Overall muscle tone: normal  Right arm tone: increased  Left arm tone: increased  Right leg tone: decreased  Left leg tone: decreasedSlightly increased upper extremity tones and decreased lower extremity tones  Finger-to-nose and drift test deferred     Sensory Exam   Light touch and pinprick sensation decreased below nipples and also in the upper extremity forearms, lower extremity  It position test and double stimulus test tapping relative bilaterally     Gait, Coordination, and Reflexes     Coordination   Finger to nose coordination: normal    Reflexes   Right brachioradialis: 1+  Left brachioradialis: 1+  Right biceps: 1+  Left biceps: 1+  Right triceps: 1+  Left triceps: 1+  Right patellar: 1+  Left patellar: 1+  Right achilles: 1+  Left achilles: 1+  Right : 1+  Left : 1+  Right Thomas: absent  Left Thomas: absent  Right ankle clonus: absent  Left pendular knee jerk: absent      Vitals:Blood pressure (!) 176/76, pulse 74, temperature 97 8 °F (36 6 °C), temperature source Oral, resp  rate 22, weight 86 1 kg (189 lb 13 1 oz), SpO2 96 %  ,There is no height or weight on file to calculate BMI       Lab Results:   Results from last 7 days   Lab Units 03/17/22  9344 03/16/22 2254 03/16/22 1925 03/16/22 1909   WBC Thousand/uL 10 99*  --  7 33  --    HEMOGLOBIN g/dL 9 6* 8 0* 7 1*  --    I STAT HEMOGLOBIN g/dl  --   --   --  7 1*   HEMATOCRIT % 28 3*  --  22 5*  --    HEMATOCRIT, ISTAT %  --   --   --  21*   PLATELETS Thousands/uL 133*  --  124*  --    NEUTROS PCT % 84*  --  82*  --    MONOS PCT % 8  --  6  --      Results from last 7 days   Lab Units 03/17/22 0442 03/16/22 1923 03/16/22  1909   POTASSIUM mmol/L 4 6 4 3  --    CHLORIDE mmol/L 104 106  --    CO2 mmol/L 19* 24  --    CO2, I-STAT mmol/L  --   --  23   BUN mg/dL 67* 65*  --    CREATININE mg/dL 2 90* 2 91*  --    CALCIUM mg/dL 8 8 8 5  --    ALK PHOS U/L 69 59  --    ALT U/L 33 27  --    AST U/L 36 24  --    GLUCOSE, ISTAT mg/dl  --   --  134     Results from last 7 days   Lab Units 03/17/22  0442   MAGNESIUM mg/dL 2 5     Results from last 7 days   Lab Units 03/17/22 0442   PHOSPHORUS mg/dL 4 6*     Results from last 7 days   Lab Units 03/16/22 1923   INR  1 22*   PTT seconds 30     No results found for: TROPONINT  ABG:No results found for: PHART, EWV4LVV, PO2ART, VNM0FGM, O2XYWQPP, BEART, SOURCE    Imaging Studies: I have personally reviewed pertinent reports   , I have personally reviewed pertinent films in PACS and I have personally reviewed pertinent films in PACS with a Radiologist     EKG, Pathology, and Other Studies: I have personally reviewed pertinent reports   , I have personally reviewed pertinent films in PACS and I have personally reviewed pertinent films in PACS with a Radiologist     VTE Prophylaxis: Heparin    Code Status: Level 1 - Full Code  Advance Directive and Living Will:      Power of :    POLST:      Counseling / Coordination of Care  I spent 20 minutes with the patient

## 2022-03-17 NOTE — PROCEDURES
POC FAST US    Date/Time: 3/16/2022 7:05 PM  Performed by: Smith Sanchez PA-C  Authorized by: Smith Sanchez PA-C     Patient location:  Trauma  Procedure details:     Exam Type:  Diagnostic    Indications: blunt abdominal trauma and blunt chest trauma      Assess for:  Intra-abdominal fluid and pericardial effusion    Technique: FAST      Views obtained:  Heart - Pericardial sac, LUQ - Splenorenal space, Suprapubic - Pouch of [place] and RUQ - [place]'s Pouch    Image quality: diagnostic      Image availability:  Images available in PACS and video obtained  FAST Findings:     RUQ (Hepatorenal) free fluid: absent      LUQ (Splenorenal) free fluid: absent      Suprapubic free fluid: absent      Cardiac wall motion: identified      Pericardial effusion: absent    Interpretation:     Impressions: negative

## 2022-03-17 NOTE — CONSULTS
Nick 1940, 80 y o  male MRN: [id number]  Unit/Bed#: ICU 07 Encounter: [id number]  Primary Care Provider: Raghu Beatty MD   Date and time admitted to hospital: 3/16/2022  6:54 PM    Consults    * Cervical spinal cord injury [facility])  Assessment & Plan  · Found down, flaccid in all four extremities   · No cervical spine fracture, however with large compressive osteophyte with multi level degenerative changes  · STAT MRI completed with mass effect and degenerative changes   · Neurosurgery consulted - input appreciated  · Keep NPO pending neurosurgical eval for possible OR   · Would maintain MAP >75 per Dr Sarah Naranjo given concern for possible worsening GI bleeding if MAP higher   · Continue IVF at 150mL/hr   · Levophed if needed to maintain MAPs     GI bleed  Assessment & Plan  · As evidenced on CT scan, in cecum  · Received 3 units of pRBC's with appropriate response in blood pressure  · Would keep hgb >10 per Neurosx   · Holding PTA Plavix, ASA - restart as appropriate   · Consider GI vs colorectal consult if outward evidence of GI bleeding     [facility]  Assessment & Plan  Lab Results   Component Value Date    EGFR 19 03/16/2022    CREATININE 2 91 (H) 03/16/2022     · Baseline Cr appears to be 2 0-2 5, currently 2 9  · Continue IVF at 150mL/hr  · Trend labs and manage electrolytes  · Of note, pt has need transient dialysis in the past when given contrast for studies     Diastolic heart failure ([facility] )  Assessment & Plan  Wt Readings from Last 3 Encounters:   03/17/22 86 1 kg (189 lb 13 1 oz)     · Currently holding PTA Imdur given need to elevated MAP - restart as appropriate     HTN (hypertension)  Assessment & Plan  · Currently holding all PTA antihypertensives given need for MAP pushes  · Restart as appropriate     DM2 (diabetes mellitus, type 2) ([facility] )  Assessment & Plan  No results found for: HGBA1C    No results for input(s): POCGLU in the last 72 hours  Blood Sugar Average: Last 72 hrs:    · Accuchecks and ISS q6 hours while NPO  · Holding PTA basal insulin while NPO     -------------------------------------------------------------------------------------------------------------  Chief Complaint: Spinal Cord Injury     History of Present Illness   Naima Grey is a 80 y o  male with a PMH significant for DM2, HTN, CKD4, diastolic heart failure, bladder CA s/p chemo/resection, and CAD who presented as a trauma alert after being found down by his neighbor  His neighbor reportedly checks on him daily, and he was found to have flaccid paralysis   Trauma scans reveal no acute injury however he does multilevel changes in his cervical spine with a large compressive osteophyte  Critical care consulted for admission  History obtained from chart review and the patient   -------------------------------------------------------------------------------------------------------------  Dispo: Admit to Critical Care     Code Status: Level 1 - Full Code  --------------------------------------------------------------------------------------------------------------  Review of Systems   Constitutional: Negative  HENT: Negative  Respiratory: Negative  Cardiovascular: Negative  Gastrointestinal: Positive for constipation  Musculoskeletal: Negative  Skin: Negative  Neurological: Negative  Psychiatric/Behavioral: Negative  A 12-point, complete review of systems was reviewed and negative except as stated above     Physical Exam  Vitals and nursing note reviewed  Constitutional:       General: He is awake  He is not in acute distress  Appearance: He is not ill-appearing  HENT:      Head: Normocephalic and atraumatic  Mouth/Throat:      Mouth: Mucous membranes are moist       Pharynx: Oropharynx is clear     Eyes:      Conjunctiva/sclera: Conjunctivae normal       Pupils: Pupils are equal, round, and reactive to light  Cardiovascular:      Rate and Rhythm: Normal rate and regular rhythm  Pulmonary:      Effort: Pulmonary effort is normal       Breath sounds: Normal breath sounds  Abdominal:      General: There is no distension  Palpations: Abdomen is soft  Tenderness: There is no abdominal tenderness  Musculoskeletal:      Cervical back: Neck supple  Skin:     General: Skin is warm and dry  Neurological:      Mental Status: He is alert  GCS: GCS eye subscore is 4  GCS verbal subscore is 5  GCS motor subscore is 6  Comments: Flaccid paralysis in all 4 extremities - 0/5 strength in all 4  5/5 strength with bilateral shoulder shrug   No sensory from chest down,  + light touch to upper extremities     Attempting to follow commands, however unable      Psychiatric:         Behavior: Behavior is cooperative        --------------------------------------------------------------------------------------------------------------  Vitals:   Vitals:    03/17/22 0015 03/17/22 0100 03/17/22 0224 03/17/22 0310   BP: 142/57 155/71 162/69 157/69   BP Location:   Right arm    Pulse: 66 67 68 71   Resp: 13 21 (!) 25 (!) 24   Temp:   (!) 97 4 °F (36 3 °C)    TempSrc:   Axillary    SpO2: 97% 98% 99% 99%   Weight:   86 1 kg (189 lb 13 1 oz)      Temp  Min: 94 °F (34 4 °C)  Max: 97 6 °F (36 4 °C)        There is no height or weight on file to calculate BMI      Laboratory and Diagnostics:  Results from last 7 days   Lab Units 03/16/22 2254 03/16/22  1925 03/16/22  1909   WBC Thousand/uL  --  7 33  --    HEMOGLOBIN g/dL 8 0* 7 1*  --    I STAT HEMOGLOBIN g/dl  --   --  7 1*   HEMATOCRIT %  --  22 5*  --    HEMATOCRIT, ISTAT %  --   --  21*   PLATELETS Thousands/uL  --  124*  --    NEUTROS PCT %  --  82*  --    MONOS PCT %  --  6  --      Results from last 7 days   Lab Units 03/16/22  1923 03/16/22  1909   SODIUM mmol/L 140  --    POTASSIUM mmol/L 4 3  --    CHLORIDE mmol/L 106  --    CO2 mmol/L 24  -- CO2, I-STAT mmol/L  --  23   ANION GAP mmol/L 10  --    BUN mg/dL 65*  --    CREATININE mg/dL 2 91*  --    CALCIUM mg/dL 8 5  --    GLUCOSE RANDOM mg/dL 132  --    ALT U/L 27  --    AST U/L 24  --    ALK PHOS U/L 59  --    ALBUMIN g/dL 3 1*  --    TOTAL BILIRUBIN mg/dL 0 83  --           Results from last 7 days   Lab Units 03/16/22  1923   INR  1 22*   PTT seconds 30      EKG: Telemetry reviewed  Imaging: I have personally reviewed pertinent reports  and I have personally reviewed pertinent films in PACS    Historical Information   Past Medical History:   Diagnosis Date    CAD (coronary artery disease)     [facility]est[facility]     CVA (cerebral vascular accident) ([place] [address] )     Primary hypertension     Stage 4 chronic kidney disease ([place] [address] )     Type 2 diabetes mellitus ([place] [address] )      History reviewed  No pertinent surgical history  Social History   Social History     Substance and Sexual Activity   Alcohol Use None     Social History     Substance and Sexual Activity   Drug Use Not on file     Social History     Tobacco Use   Smoking Status Not on file   Smokeless Tobacco Not on file     Exercise History: Independent with ADL's  Family History:   History reviewed  No pertinent family history    I have reviewed this patient's family history and commented on sigificant items within the HPI    Medications:  Current Facility-Administered Medications   Medication Dose Route Frequency    HYDROmorphone (DILAUDID) injection 0 2 mg  0 2 mg Intravenous Q1H PRN    insulin lispro (HumaLOG) 100 units/mL subcutaneous injection 1-6 Units  1-6 Units Subcutaneous Q6H [address]    multi-electrolyte (PLASMALYTE-A/ISOLYTE-S PH 7 4) IV solution  150 mL/hr Intravenous Continuous    norepinephrine (LEVOPHED) 4 mg (STANDARD CONCENTRATION) IV in sodium chloride 0 9% 250 mL  1-30 mcg/min Intravenous Titrated    pantoprazole (PROTONIX) EC tablet 40 mg  40 mg Oral Early Morning     Home medications:  None     Allergies:  No Known Allergies  ------------------------------------------------------------------------------------------------------------  Advance Directive and Living Will:      Power of :    POLST:    ------------------------------------------------------------------------------------------------------------  Care Time Delivered:   Upon my evaluation, this patient had a high probability of imminent or life-threatening deterioration due to acute paralysis, which required my direct attention, intervention, and personal management  I have personally provided 45 minutes (0000 to 0530) of critical care time, exclusive of procedures, teaching, family meetings, and any prior time recorded by providers other than myself  Leeann Cordon PA-C    Portions of the record may have been created with voice recognition software  Occasional wrong word or "sound a like" substitutions may have occurred due to the inherent limitations of voice recognition software    Read the chart carefully and recognize, using context, where substitutions have occurred

## 2022-03-17 NOTE — SOCIAL WORK
Palliative LSW saw patient at the bedside today with Dr Jessica Kline  LSW appreciates the opportunity to provide patient/family with inpatient emotional support and guidance while patient continues to receive medical attention from the medical team     Topics discussed: Met with pt at bedside  Overview of role of [facility] services provided  Pt discussed he has some slight ability to move his right hand only  Pt aware of the challenges he will likely be facing long-term  He is willing to work with PT/OT to see if he can gain some function and do possible rehab, but his preference would be to return home  Dr Jessica Kline discussed with pt he would likely need 24/7 care at home--which pt states he would be able to hire additional help  His main focus during conversation is on his cat, Sissy Boas (currently being cared for by a pet-sitter) whom he would like to see as soon as possible  Pt's main sources of support are his sister and nephew, but they are not local  Pt's nephew is working on arrangements to come see pt  Pt does not have any completed AD, but he discussed he was in process of working on making those arrangements/completing financial ppwk  Pt agreeable with continued support from [facility] team during his hospitalization  He declines Spiritual Care consult as he does not identify as a spiritual or [employer] person      Areas that need follow-up: Emotional Support  Resources given: None  Others present: Dr George Morales will continue to follow as requested by the medical team, patient, or family

## 2022-03-17 NOTE — PLAN OF CARE
Problem: OCCUPATIONAL THERAPY ADULT  Goal: Performs self-care activities at highest level of function for planned discharge setting  See evaluation for individualized goals  Description: Treatment Interventions: ADL retraining,Functional transfer training,Endurance training,UE strengthening/ROM,Patient/family training,Equipment evaluation/education,Compensatory technique education,Energy conservation,Activityengagement,Fine motor coordination activities,Neuromuscular reeducation          See flowsheet documentation for full assessment, interventions and recommendations  Note: Limitation: Decreased ADL status,Decreased UE strength,Decreased Safe judgement during ADL,Decreased cognition,Decreased endurance,Decreased self-care trans,Decreased high-level ADLs,Decreased UE ROM,Decreased sensation,Decreased fine motor control,Non-func R UE,Non-func L UE  Prognosis: Guarded  Assessment: Pt is a 80 y o  male seen for OT evaluation s/p admission to [address] [place] [address] on 3/16/2022 due to fall at home  Pt diagnosed with Cervical spinal cord injury ([facility] [address] )  Pt with no recent admissions in the last 2 months  Pt has a significant PMH impacting occupational performance including: DM II, GI bleed, CKD, diastolic heart failure  Pt with active OT evaluation and treatment orders and activity orders for Up with assistance  PTA pt living alone in [facility], pt (I) with all ADLs and IADLs, (+)fall, (+)drives, use of RW at baseline  Pt is motivated to return to home  Personal and environmental factors supporting pt at time of IE include attitude towards recovery  Personal and environmental factors inhibiting engagement in occupations include advanced age, current habits and behavioral patterns, limited social support, inaccessible home environment, inaccessible bathroom environment, difficulty completing ADLs and difficulty completing IADLs  During evaluation pt performed as is outlined above in flowsheet   Standardized assessments used to assist in identifying performance deficits include AMPAC 6-Clicks and Barthel ADL Index  Performance deficits that affect the pts occupational performance during the initial evaluation include impaired balance, functional mobility, endurance, activity tolerance, fine motor coordination, gross motor coordination, sensation, dexterity, visual motor integration, forward functional reach, trunk control, functional standing tolerance and overall strength  Based on pts functional performance and deficits the following occupations will be addressed in OT treatments in order to maximize pts independence and overall occupational performance: grooming, bathing/showering, toileting and toilet hygiene, dressing, feeding and functional mobility  Goals are listed below  Upon discharge from acute care setting recommend d/c to post-acute rehabilitation services  This evaluation required an extensive review of medical and/or therapy records and additional review of physical, cognitive and psychosocial history related to functional performance  Based upon functional performance deficits and assessments, this evaluation has been identified as a high complexity evaluation       OT Discharge Recommendation: Post acute rehabilitation services

## 2022-03-17 NOTE — ASSESSMENT & PLAN NOTE
Wt Readings from Last 3 Encounters:   03/17/22 86 1 kg (189 lb 13 1 oz)     · Currently holding PTA Imdur given need to elevated MAP - restart as appropriate

## 2022-03-17 NOTE — PLAN OF CARE
Problem: PHYSICAL THERAPY ADULT  Goal: Performs mobility at highest level of function for planned discharge setting  See evaluation for individualized goals  Description: Treatment/Interventions: LE strengthening/ROM,Therapeutic exercise,Endurance training,Patient/family training,Equipment eval/education,Bed mobility          See flowsheet documentation for full assessment, interventions and recommendations  3/17/2022 1541 by Bette Flores PT  Note: Prognosis: Guarded  Problem List: Decreased strength,Decreased range of motion,Decreased endurance,Impaired balance,Decreased coordination,Decreased mobility,Impaired tone,Pain,Impaired vision (spinal precautions)  Assessment: Chad King is a 80 y o  Male who [facility] on 3/16[facility]2022 from home s/p found down for unknown time and diagnosis of cervical spinal cord injury (C3-C4)  Orders for PT eval and treat received, w/ activity orders of up w/ assist and fall, cervical precautions  Pt presents w/ comorbidities of hx of bladder cx, CAD, GI bleed, diabetes, HTN, CHF, CKD stage 4, hx of CVA  At baseline, pt mobilizes independently w/ RW, and reports at least 1 falls in the last 6 months  Upon evaluation, pt presents w/ the following deficits: weakness, decreased ROM, altered sensation, impaired tone, impaired balance, decreased endurance and pain limiting functional mobility  Pt is presently dependent for all care w/ very, very limited motor control of extremities (only L hand)  Pt's clinical presentation is unstable/unpredictable due to new C/s SCI w/ functional quadriplegia at this time  Patient is at an increased risk of falls due to physical deficits  Given the above findings, discharge recommendation is for Post-acute inpatient rehabilitation  During this admission, pt would benefit from continued skilled inpatient PT in the acute care setting in order to address the abovementioned deficits to maximize function and mobility before DC from acute care  PT Discharge Recommendation: Post acute rehabilitation services          See flowsheet documentation for full assessment

## 2022-03-18 PROBLEM — J96.02 ACUTE RESPIRATORY FAILURE WITH HYPOXIA AND HYPERCAPNIA (HCC): Status: ACTIVE | Noted: 2022-01-01

## 2022-03-18 PROBLEM — G93.41 ACUTE METABOLIC ENCEPHALOPATHY: Status: ACTIVE | Noted: 2022-01-01

## 2022-03-18 PROBLEM — J96.01 ACUTE RESPIRATORY FAILURE WITH HYPOXIA AND HYPERCAPNIA (HCC): Status: ACTIVE | Noted: 2022-01-01

## 2022-03-18 PROBLEM — N18.4 STAGE 4 CHRONIC KIDNEY DISEASE (HCC): Status: RESOLVED | Noted: 2022-01-01 | Resolved: 2022-01-01

## 2022-03-18 PROBLEM — N17.9 ACUTE KIDNEY INJURY (HCC): Status: ACTIVE | Noted: 2022-01-01

## 2022-03-18 NOTE — ASSESSMENT & PLAN NOTE
· Concern for worsening respiratory fatigue in the setting of high C-spine injury  · Check ABG now  · Check pulmonary mechanics  · Aggressive chest physiotherapy and pulmonary toilet   · Wean supplemental oxygen as needed to maintain SpO2 > 90%   · Will need to discuss ongoing foals of care today

## 2022-03-18 NOTE — ASSESSMENT & PLAN NOTE
· Home ASA 81mg and plavix 75mg daily was on hold for possible OR intervention   · Resume today   · Start home atorvastatin 80mg qHS   · Antihypertensive plan as detailed above

## 2022-03-18 NOTE — ASSESSMENT & PLAN NOTE
· Maintain SBP < 180  · Start 50% home coreg 6 25mg BID and increase as needed   · Continue to home patient's imdur and hydralazine for now   · Maintain MAP > 75 for spinal cord perfusion

## 2022-03-18 NOTE — ASSESSMENT & PLAN NOTE
Lab Results   Component Value Date    EGFR 13 03/18/2022    EGFR 19 03/17/2022    EGFR 19 03/16/2022    CREATININE 3 88 (H) 03/18/2022    CREATININE 2 90 (H) 03/17/2022    CREATININE 2 91 (H) 03/16/2022     · See plan above for BRIAN  · Of note, patientt has need transient dialysis in the past when given contrast for studies

## 2022-03-18 NOTE — PLAN OF CARE
Problem: Prexisting or High Potential for Compromised Skin Integrity  Goal: Skin integrity is maintained or improved  Description: INTERVENTIONS:  - Identify patients at risk for skin breakdown  - Assess and monitor skin integrity  - Assess and monitor nutrition and hydration status  - Monitor labs   - Assess for incontinence   - Turn and reposition patient  - Assist with mobility/ambulation  - Relieve pressure over bony prominences  - Avoid friction and shearing  - Provide appropriate hygiene as needed including keeping skin clean and dry  - Evaluate need for skin moisturizer/barrier cream  - Collaborate with interdisciplinary team   - Patient/family teaching  - Consider wound care consult   3/18/2022 0216 by Carl Parker RN  Outcome: Progressing     Problem: MOBILITY - ADULT  Goal: Maintain or return to baseline ADL function  Description: INTERVENTIONS:  -  Assess patient's ability to carry out ADLs; assess patient's baseline for ADL function and identify physical deficits which impact ability to perform ADLs (bathing, care of mouth/teeth, toileting, grooming, dressing, etc )  - Assess/evaluate cause of self-care deficits   - Assess range of motion  - Assess patient's mobility; develop plan if impaired  - Assess patient's need for assistive devices and provide as appropriate  - Encourage maximum independence but intervene and supervise when necessary  - Involve family in performance of ADLs  - Assess for home care needs following discharge   - Consider OT consult to assist with ADL evaluation and planning for discharge  - Provide patient education as appropriate  3/18/2022 0216 by Carl Parker RN  Outcome: Not Progressing  Goal: Maintains/Returns to pre admission functional level  Description: INTERVENTIONS:  - Perform BMAT or MOVE assessment daily    - Set and communicate daily mobility goal to care team and patient/family/caregiver     - Collaborate with rehabilitation services on mobility goals if consulted  - Perform Range of Motion 3 times a day  - Reposition patient every 2 hours    - Dangle patient 3 times a day  - Stand patient 3 times a day  - Ambulate patient 3 times a day  - Out of bed to chair 3 times a day   - Out of bed for meals 3 times a day  - Out of bed for toileting  - Record patient progress and toleration of activity level   3/18/2022 0216 by Patrick Whitehead RN  Outcome: Not Progressing  3/18/2022 0216 by Patrick Whitehead RN  Outcome: Progressing

## 2022-03-18 NOTE — ASSESSMENT & PLAN NOTE
Wt Readings from Last 3 Encounters:   03/18/22 85 9 kg (189 lb 6 oz)     · Last echocardiogram 8/17/21: EF 55-60%, no RWMA, grade 2 diastolic dysfunction, mod MR   · No sign of acute exacerbation  · Holding afterload reduction and diuresis in the setting of BRIAN and spinal cord injury

## 2022-03-18 NOTE — ASSESSMENT & PLAN NOTE
· Multifactorial related to poor intake while down, IV contrast, possible hypoperfusion in the setting of known heart failure   · Baseline creatinine: 2 03 - 2 50  · Admission creatinine: 2 91    · Current creatinine: 3 88  · Maintain chowdhury for strict I/O monitoring q2h   · Start maintenance IV fluid now + bolus isolyte 500ml x1  · Repeat BMP later this afternoon  · Will clarify goals of care and consider renal consult   · Despite BIRAN, would likely benefit from diuresis given long-term torsemide use

## 2022-03-18 NOTE — ASSESSMENT & PLAN NOTE
· Evidence of possible spread seen on CT from admission   · 3/16/22 CT A/P: The urinary bladder has a new appearance of potential tethering to the cecum and distal sigmoid, and the possibility of recurrent bladder cancer as a causative etiology should be excluded  · No plan for treatment at this time, will discuss findings with patient and family

## 2022-03-18 NOTE — ASSESSMENT & PLAN NOTE
· 3/17/22 CT A/P: Findings compatible with a nonspecific colitis particularly involving the cecum, with colonic hyperdense material most focally seen in the cecum which could represent ingested material or potentially a GI bleed with active extravasation  Additionally, there are findings which are, though not definitive for, suggestive of contained perforation between the distal sigmoid and cecum   Received 3 units of pRBC's with appropriate response in blood pressure  · Holding all antiplatelet agents at this time   · Continue to monitor for ongoing bleeding

## 2022-03-18 NOTE — ASSESSMENT & PLAN NOTE
· Confused and lethargic this AM  · Likely related to delirium   · Routine neuro checks  · Sleep/wake cycle regulation   · Start melatonin 6mg qHS   · Limit opiates and benzos as able given advanced age   · Holding home primidone 50mg qHS   · Resume home lexapro 10mg daily

## 2022-03-18 NOTE — PLAN OF CARE
Problem: MOBILITY - ADULT  Goal: Maintain or return to baseline ADL function  Description: INTERVENTIONS:  -  Assess patient's ability to carry out ADLs; assess patient's baseline for ADL function and identify physical deficits which impact ability to perform ADLs (bathing, care of mouth/teeth, toileting, grooming, dressing, etc )  - Assess/evaluate cause of self-care deficits   - Assess range of motion  - Assess patient's mobility; develop plan if impaired  - Assess patient's need for assistive devices and provide as appropriate  - Encourage maximum independence but intervene and supervise when necessary  - Involve family in performance of ADLs  - Assess for home care needs following discharge   - Consider OT consult to assist with ADL evaluation and planning for discharge  - Provide patient education as appropriate  Outcome: Progressing  Goal: Maintains/Returns to pre admission functional level  Description: INTERVENTIONS:  - Perform BMAT or MOVE assessment daily    - Set and communicate daily mobility goal to care team and patient/family/caregiver  - Collaborate with rehabilitation services on mobility goals if consulted  - Perform Range of Motion 3 times a day  - Reposition patient every 2 hours    - Dangle patient 3 times a day  - Stand patient 3 times a day  - Ambulate patient 3 times a day  - Out of bed to chair 3 times a day   - Out of bed for meals 3 times a day  - Out of bed for toileting  - Record patient progress and toleration of activity level   Outcome: Progressing     Problem: Prexisting or High Potential for Compromised Skin Integrity  Goal: Skin integrity is maintained or improved  Description: INTERVENTIONS:  - Identify patients at risk for skin breakdown  - Assess and monitor skin integrity  - Assess and monitor nutrition and hydration status  - Monitor labs   - Assess for incontinence   - Turn and reposition patient  - Assist with mobility/ambulation  - Relieve pressure over bony prominences  - Avoid friction and shearing  - Provide appropriate hygiene as needed including keeping skin clean and dry  - Evaluate need for skin moisturizer/barrier cream  - Collaborate with interdisciplinary team   - Patient/family teaching  - Consider wound care consult   Outcome: Progressing     Problem: Potential for Falls  Goal: Patient will remain free of falls  Description: INTERVENTIONS:  - Educate patient/family on patient safety including physical limitations  - Instruct patient to call for assistance with activity   - Consult OT/PT to assist with strengthening/mobility   - Keep Call bell within reach  - Keep bed low and locked with side rails adjusted as appropriate  - Keep care items and personal belongings within reach  - Initiate and maintain comfort rounds  - Make Fall Risk Sign visible to staff  - Offer Toileting every 2 Hours, in advance of need  - Initiate/Maintain bed alarm  - Obtain necessary fall risk management equipment:   - Apply yellow socks and bracelet for high fall risk patients  - Consider moving patient to room near nurses station  Outcome: Progressing     Problem: PAIN - ADULT  Goal: Verbalizes/displays adequate comfort level or baseline comfort level  Description: Interventions:  - Encourage patient to monitor pain and request assistance  - Assess pain using appropriate pain scale  - Administer analgesics based on type and severity of pain and evaluate response  - Implement non-pharmacological measures as appropriate and evaluate response  - Consider cultural and social influences on pain and pain management  - Notify physician/advanced practitioner if interventions unsuccessful or patient reports new pain  Outcome: Progressing     Problem: INFECTION - ADULT  Goal: Absence or prevention of progression during hospitalization  Description: INTERVENTIONS:  - Assess and monitor for signs and symptoms of infection  - Monitor lab/diagnostic results  - Monitor all insertion sites, i e  indwelling lines, tubes, and drains  - Monitor endotracheal if appropriate and nasal secretions for changes in amount and color  - [place] appropriate cooling/warming therapies per order  - Administer medications as ordered  - Instruct and encourage patient and family to use good hand hygiene technique  - Identify and instruct in appropriate isolation precautions for identified infection/condition  Outcome: Progressing     Problem: SAFETY ADULT  Goal: Patient will remain free of falls  Description: INTERVENTIONS:  - Educate patient/family on patient safety including physical limitations  - Instruct patient to call for assistance with activity   - Consult OT/PT to assist with strengthening/mobility   - Keep Call bell within reach  - Keep bed low and locked with side rails adjusted as appropriate  - Keep care items and personal belongings within reach  - Initiate and maintain comfort rounds  - Make Fall Risk Sign visible to staff  - Offer Toileting every 2 Hours, in advance of need  - Initiate/Maintain bed alarm  - Obtain necessary fall risk management equipment:  - Apply yellow socks and bracelet for high fall risk patients  - Consider moving patient to room near nurses station  Outcome: Progressing     Problem: DISCHARGE PLANNING  Goal: Discharge to home or other facility with appropriate resources  Description: INTERVENTIONS:  - Identify barriers to discharge w/patient and caregiver  - Arrange for needed discharge resources and transportation as appropriate  - Identify discharge learning needs (meds, wound care, etc )  - Arrange for interpretive services to assist at discharge as needed  - Refer to Case Management Department for coordinating discharge planning if the patient needs post-hospital services based on physician/advanced practitioner order or complex needs related to functional status, cognitive ability, or social support system  Outcome: Progressing

## 2022-03-18 NOTE — PROGRESS NOTES
[facility]  Progress Note - Joey Pete 1940, 80 y o  male MRN: [id number]  Unit/Bed#: ICU 07 Encounter: [id number]  Primary Care Provider: Heber Westfall MD   Date and time admitted to hospital: 3/16/2022  6:54 PM    * Cervical spinal cord injury [facility])  Assessment & Plan  · Found down, flaccid in all four extremities   · No cervical spine fracture, however with large compressive osteophyte with multi level degenerative changes  · STAT MRI completed with mass effect and degenerative changes   · Neurosurgery consulted, no plan for intervention at this time   · Maintain MAP >75 for spinal cord perfusion  · Patient currently maintaining pressures without intervention  · Overall poor functional and overall prognosis, palliative care consulted to help clarify goals of care     GI bleed  Assessment & Plan  · 3/17/22 CT A/P: Findings compatible with a nonspecific colitis particularly involving the cecum, with colonic hyperdense material most focally seen in the cecum which could represent ingested material or potentially a GI bleed with active extravasation  Additionally, there are findings which are, though not definitive for, suggestive of contained perforation between the distal sigmoid and cecum   Received 3 units of pRBC's with appropriate response in blood pressure  · Holding all antiplatelet agents at this time   · Continue to monitor for ongoing bleeding      Acute kidney injury ([address] )  Assessment & Plan  · Multifactorial related to poor intake while down, IV contrast, possible hypoperfusion in the setting of known heart failure   · Baseline creatinine: 2 03 - 2 50  · Admission creatinine: 2 91    · Current creatinine: 3 88  · Maintain chowdhury for strict I/O monitoring q2h   · Start maintenance IV fluid now + bolus isolyte 500ml x1  · Repeat BMP later this afternoon  · Will clarify goals of care and consider renal consult   · Despite BRIAN, would likely benefit from diuresis given long-term torsemide use     Acute metabolic encephalopathy  Assessment & Plan  · Confused and lethargic this AM  · Likely related to delirium   · Routine neuro checks  · Sleep/wake cycle regulation   · Start melatonin 6mg qHS   · Limit opiates and benzos as able given advanced age   · Holding home primidone 50mg qHS   · Resume home lexapro 10mg daily    Stage 4 chronic kidney disease [facility])  Assessment & Plan  Lab Results   Component Value Date    EGFR 13 03/18/2022    EGFR 19 03/17/2022    EGFR 19 03/16/2022    CREATININE 3 88 (H) 03/18/2022    CREATININE 2 90 (H) 03/17/2022    CREATININE 2 91 (H) 03/16/2022     · See plan above for BRIAN  · Of note, patientt has need transient dialysis in the past when given contrast for studies     Acute respiratory failure with hypoxia and hypercapnia (HCC)  Assessment & Plan  · Concern for worsening respiratory fatigue in the setting of high C-spine injury  · Check ABG now  · Check pulmonary mechanics  · Aggressive chest physiotherapy and pulmonary toilet   · Wean supplemental oxygen as needed to maintain SpO2 > 90%   · Will need to discuss ongoing foals of care today     Bladder cancer [facility])  Assessment & Plan  · Evidence of possible spread seen on CT from admission   · 3/16/22 CT A/P: The urinary bladder has a new appearance of potential tethering to the cecum and distal sigmoid, and the possibility of recurrent bladder cancer as a causative etiology should be excluded  · No plan for treatment at this time, will discuss findings with patient and family     CAD (coronary artery disease)  Assessment & Plan  · Home ASA 81mg and plavix 75mg daily was on hold for possible OR intervention   · Resume today   · Start home atorvastatin 80mg qHS   · Antihypertensive plan as detailed above    Diastolic heart failure ([address] )  Assessment & Plan  Wt Readings from Last 3 Encounters:   03/18/22 85 9 kg (189 lb 6 oz)     · Last echocardiogram 8/17/21: EF 55-60%, no RWMA, grade 2 diastolic dysfunction, mod MR   · No sign of acute exacerbation  · Holding afterload reduction and diuresis in the setting of BRIAN and spinal cord injury    HTN (hypertension)  Assessment & Plan  · Maintain SBP < 180  · Start 50% home coreg 6 25mg BID and increase as needed   · Continue to home patient's imdur and hydralazine for now   · Maintain MAP > 75 for spinal cord perfusion     DM2 (diabetes mellitus, type 2) ([facility] [address] )  Assessment & Plan  No results found for: HGBA1C    Recent Labs     03/17/22  1832 03/17/22  2355 03/18/22  0011 03/18/22  0551   POCGLU 184* 206* 220* 194*       Blood Sugar Average: Last 72 hrs:  (P) 192 6  · Change diet to diabetic carb controlled  · Change SSI to AC/HS   · Start levemir 10 units qHS   · On home levemir 20 units q12h   · Adjust regimen as needed to maintain -180       ----------------------------------------------------------------------------------------  HPI/24hr events:   · Increased oxygen demands overnight  · Continues to deny pain   · Persistent flaccid paralysis     Patient appropriate for transfer out of the ICU today?: No  Disposition: Continue Stepdown Level 1 level of care   Code Status: Level 3 - DNAR and DNI  ---------------------------------------------------------------------------------------  SUBJECTIVE  "I'm okay"     Review of Systems   Constitutional: Positive for fatigue  Respiratory: Negative for shortness of breath  Gastrointestinal: Negative for abdominal distention and nausea  Musculoskeletal: Negative for neck pain  Neurological: Positive for weakness  Negative for dizziness and headaches       Review of systems was reviewed and negative unless stated above in HPI/24-hour events   ---------------------------------------------------------------------------------------  OBJECTIVE    Vitals   Vitals:    03/18/22 0915 03/18/22 1046 03/18/22 1055 03/18/22 1056   BP:    159/72   BP Location:    Left arm   Pulse: 79  80 79   Resp: (!) 25  (!) 24 17   Temp: Radha Garcia ) 95 7 °F (35 4 °C) 97 5 °F (36 4 °C)     TempSrc:       SpO2: 93%  94%    Weight:         Temp (24hrs), Av 9 °F (36 1 °C), Min:95 5 °F (35 3 °C), Max:99 °F (37 2 °C)  Current: Temperature: 97 5 °F (36 4 °C)          Respiratory:    Nasal Cannula O2 Flow Rate (L/min): 6 L/min      Physical Exam  Vitals reviewed  Constitutional:       General: He is sleeping  He is not in acute distress  Appearance: Normal appearance  Interventions: Cervical collar and nasal cannula in place  HENT:      Head: Normocephalic and atraumatic  Mouth/Throat:      Lips: Pink  Mouth: Mucous membranes are dry  Eyes:      Conjunctiva/sclera: Conjunctivae normal       Pupils: Pupils are equal, round, and reactive to light  Cardiovascular:      Rate and Rhythm: Normal rate and regular rhythm  Pulses:           Radial pulses are 2+ on the right side and 2+ on the left side  Dorsalis pedis pulses are 2+ on the right side and 2+ on the left side  Heart sounds: Normal heart sounds  Pulmonary:      Effort: Tachypnea present  Breath sounds: Examination of the right-middle field reveals decreased breath sounds  Examination of the right-lower field reveals decreased breath sounds  Examination of the left-lower field reveals decreased breath sounds  Decreased breath sounds present  No wheezing, rhonchi or rales  Genitourinary:     Comments: Marinelli: clear, malka   Musculoskeletal:      Right lower leg: No edema  Left lower leg: No edema  Skin:     General: Skin is warm and dry  Capillary Refill: Capillary refill takes less than 2 seconds  Neurological:      General: No focal deficit present  Mental Status: He is easily aroused  He is lethargic and confused  GCS: GCS eye subscore is 4  GCS verbal subscore is 5  GCS motor subscore is 6  Sensory: Sensory deficit present  Motor: Weakness and abnormal muscle tone present        Comments: No movement of extremities x4  Sensation beginning around low-neck/clavicles              Laboratory and Diagnostics:  Results from last 7 days   Lab Units 03/18/22  1050 03/18/22  0547 03/17/22 0442 03/16/22 2254 03/16/22 1925 03/16/22  1909   WBC Thousand/uL  --  15 15* 10 99*  --  7 33  --    HEMOGLOBIN g/dL  --  11 7* 9 6* 8 0* 7 1*  --    I STAT HEMOGLOBIN g/dl 11 2*  --   --   --   --  7 1*   HEMATOCRIT %  --  35 3* 28 3*  --  22 5*  --    HEMATOCRIT, ISTAT % 33*  --   --   --   --  21*   PLATELETS Thousands/uL  --  157 133*  --  124*  --    NEUTROS PCT %  --  87* 84*  --  82*  --    MONOS PCT %  --  7 8  --  6  --      Results from last 7 days   Lab Units 03/18/22  1050 03/18/22  0547 03/17/22 0442 03/16/22 1923 03/16/22  1909   SODIUM mmol/L  --  136 135* 140  --    POTASSIUM mmol/L  --  5 3 4 6 4 3  --    CHLORIDE mmol/L  --  102 104 106  --    CO2 mmol/L  --  20* 19* 24  --    CO2, I-STAT mmol/L 24  --   --   --  23   ANION GAP mmol/L  --  14* 12 10  --    BUN mg/dL  --  82* 67* 65*  --    CREATININE mg/dL  --  3 88* 2 90* 2 91*  --    CALCIUM mg/dL  --  8 6 8 8 8 5  --    GLUCOSE RANDOM mg/dL  --  204* 162* 132  --    ALT U/L  --   --  33 27  --    AST U/L  --   --  36 24  --    ALK PHOS U/L  --   --  69 59  --    ALBUMIN g/dL  --   --  3 3* 3 1*  --    TOTAL BILIRUBIN mg/dL  --   --  1 37* 0 83  --      Results from last 7 days   Lab Units 03/18/22  0547 03/17/22 0442   MAGNESIUM mg/dL 2 7* 2 5   PHOSPHORUS mg/dL 7 5* 4 6*      Results from last 7 days   Lab Units 03/16/22 1923   INR  1 22*   PTT seconds 30              ABG:    VBG:          Micro        EKG: NSR   Imaging: I have personally reviewed pertinent reports  and I have personally reviewed pertinent films in PACS    Intake and Output  I/O       03/16 0701 03/17 0700 03/17 0701 03/18 0700 03/18 0701 03/19 0700    P  O   0 50    I V  (mL/kg) 397 5 (4 6) 1200 (14)     Blood 350      IV Piggyback 1100      Total Intake(mL/kg) 1847 5 (21 5) 1200 (14) 50 (0 6) Urine (mL/kg/hr) 325 515 (0 2) 200 (0 5)    Stool   0    Total Output 325 515 200    Net +1522 5 +685 -150           Unmeasured Stool Occurrence   1 x          Height and Weights         There is no height or weight on file to calculate BMI  Weight (last 2 days)     Date/Time Weight    03/18/22 0242 85 9 (189 38)    03/17/22 0536 86 1 (189 82)    03/17/22 0224 86 1 (189 82)    03/16/22 1900 86 1 (189 82)            Nutrition       Diet Orders   (From admission, onward)             Start     Ordered    03/18/22 0843  Diet Dysphagia/Modified Consistency; Dysphagia 3-Dental Soft; Thin Liquid; Consistent Carbohydrate Diet Level 2 (5 carb servings/75 grams CHO/meal)  Diet effective now        References:    Nutrtion Support Algorithm Enteral vs  Parenteral   Question Answer Comment   Diet Type Dysphagia/Modified Consistency    Dysphagia/Modified Consistency Dysphagia 3-Dental Soft    Liquid Modifier Thin Liquid    Other Restriction(s): Consistent Carbohydrate Diet Level 2 (5 carb servings/75 grams CHO/meal)    RD to adjust diet per protocol?  Yes        03/18/22 0845    03/17/22 0845  Room Service  Once        Question:  Type of Service  Answer:  Room Service - Appropriate with Assistance    03/17/22 0844                  Active Medications  Scheduled Meds:  Current Facility-Administered Medications   Medication Dose Route Frequency Provider Last Rate    aspirin  81 mg Oral Daily Alvin Lam, TABITHA      atorvastatin  80 mg Oral Daily With [employer], CRNP      bisacodyl  10 mg Rectal Daily Alvin Lam, CRNP      carvedilol  3 125 mg Oral BID With Meals Alvin Lam, ADALIDNP      clopidogrel  75 mg Oral Daily Alvin Lam, CRNP      escitalopram  10 mg Oral Daily Alvin Lam, CRNP      heparin (porcine)  5,000 Units Subcutaneous Q8H [facility] TABITHA Friedman      hydrALAZINE  10 mg Intravenous Q6H PRN Denise Dover PA-C      HYDROmorphone  0 5 mg Intravenous Q1H PRN Parth Gonsalez, TABITHA      insulin detemir  10 Units Subcutaneous HS Lola Antonito, CRNP      insulin lispro  1-5 Units Subcutaneous HS Lola Antonito, CRNP      insulin lispro  1-6 Units Subcutaneous TID AC Lola Antonito, CRNP      melatonin  6 mg Oral HS Lola Antonito, CRNP      multi-electrolyte  100 mL/hr Intravenous Continuous Lola Antonito, CRNP 100 mL/hr (03/18/22 1040)    ondansetron  4 mg Intravenous Q6H PRN Parth Gonsalez, CRNP      pantoprazole  40 mg Oral Early Morning Leonor Wilson PA-C      senna-docusate sodium  2 tablet Oral BID Lola Antonito, CRNP       Continuous Infusions:  multi-electrolyte, 100 mL/hr, Last Rate: 100 mL/hr (03/18/22 1040)      PRN Meds:   hydrALAZINE, 10 mg, Q6H PRN  HYDROmorphone, 0 5 mg, Q1H PRN  ondansetron, 4 mg, Q6H PRN        Invasive Devices Review  Invasive Devices  Report    Peripheral Intravenous Line            Peripheral IV 03/17/22 Right Antecubital <1 day          Drain            Urethral Catheter Temperature probe 16 Fr  1 day                Rationale for remaining devices: D/C chowdhury  ---------------------------------------------------------------------------------------  Advance Directive and Living Will:      Power of :    POLST:    ---------------------------------------------------------------------------------------  Care Time Delivered:   Upon my evaluation, this patient had a high probability of imminent or life-threatening deterioration due to concern for impending respiratory failure, severe BRIAN with possible worsening and hyperkalemia, ongoing goals of care discussions, which required my direct attention, intervention, and personal management  I have personally provided 40 minutes ([phone]) of critical care time, exclusive of procedures, teaching, family meetings, and any prior time recorded by providers other than myself         TABITHA Baptiste      Portions of the record may have been created with voice recognition software  Occasional wrong word or "sound a like" substitutions may have occurred due to the inherent limitations of voice recognition software    Read the chart carefully and recognize, using context, where substitutions have occurred

## 2022-03-18 NOTE — SPEECH THERAPY NOTE
Speech Language/Pathology    Speech/Language Pathology Progress Note    Patient Name: Queen Dinorah  PXEIL'O Date: 3/18/2022       Subjective:  Pt seen for dysphagia tx at lunch; per nsg, pt doesn't seem to be swallowing well, she suctioned eggs out of his mouth this am, took meds crushed ok, no coughing, choking noted with thin liquids, but concern for change in status  Voice cont to be weak/soft  CXR completed this am showed new bilateral perihilar opacities, R >L  Objective:  Pt seen w/ lunch tray; sitting upright, hard cervical collar in place  Pt repeating "ok"  Eyes intermittently open  Pt mostly refusing to take po, but took a few bites of various consistencies w/ encouragement  Pt took a few bites of leni pudding- about 1/2 tsp amts; 2 bites of chicken salad, NTL and thin liquids by straw  Pt w/ slow manipulation and transfer w/ pudding  Prolonged, munching mastication w/ chicken salad, oral residue noted throughout; applesauce and sips of liquids aided transfer of soft/solid and to clear oral cavity  Swallows cont to be mildly delayed but complete  No coughing or throat clearing noted, frequent belching noted  Pt stated he did not want to eat much  Assessment:  Pt w/ increased oral dysphagia- prolonged, oral processing, may be related to altered mental status compared to initial eval yesterday  No overt s/s aspiration noted with thin liquids    Plan/Recommendations:  Cont dysphagia 3 diet with thin liquids for now   Aspiration precautions  Feed when awake and alert  meds in applesauce, crush as needed     Will cont to follow     Jose Gomez CCC-SLP  Speech Pathologist  Available via  tiger text

## 2022-03-18 NOTE — PLAN OF CARE
Pt w/ poor po intake today, increased oral dysphagia today, but no overt s/s aspiration noted with thin liquids  Cont meds in applesauce

## 2022-03-18 NOTE — PROGRESS NOTES
Progress Note - Palliative & Supportive Care  Bj Powers  80 y o   male  ICU 07/ICU 07   MRN: [id number]  Encounter: [id number]     ASSESSMENT:    Patient Active Problem List   Diagnosis    Cervical spinal cord injury ([place] [address] )    DM2 (diabetes mellitus, type 2) ([place] [address] )    HTN (hypertension)    GI bleed    Stage 4 chronic kidney disease (HCC)    Diastolic heart failure (HCC)    Acute metabolic encephalopathy    Acute kidney injury ([place] [address] )    CAD (coronary artery disease)    Bladder cancer ([place] [address] )    CHF (congestive heart failure) ([place] [address] )    Acute respiratory failure with hypoxia and hypercapnia ([place] [address] )     Active issues specifically addressed today include: goals of care, acute hypoxic + hypercarbic respiratory failure, cervical spinal cord injury, CHF, acute encephalopathy    81M w/ acute cervical spinal cord injury resulting in loss of function in nearly all extremities (some retained movement in R hand), loss os sensation below the chest  PSC consulted for goals of care, psychosocial support  PLAN:    1  Goals:    Patient confused today, likely s/t progressive hypoxic + hypercarbic respiratory failure  In the absence of new information   Attempted to reach patient's nephew, no answer (2 attempts made)  RN had reported the nephew stated today that he would want the team to follow the patient's wishes   Was able to reach patient's sister Michael Leung ([phone])  Though she has some hearing deficit she was able to understand me and communicate well  She understands the acuity of the patient's condition and is amenable to continuing DNAR+DNI status, and continue to attempt BiPAP as tolerated to see if he improves in the coming days   With this in mind, continue LOC3 / DNAR+DNI  Patient historically accepting of the concept of BiPAP but struggling w/ discomfort from mask and airflow  Use of Precedex in the setting of agitation and encephalopathy is reasonable     Per 3/17/22 note:  o "Patient states he is willing to work w/ PT+OT, go to [facility] or acute/subacute rehab to see if some function can return to his limbs  He would prefer to eventually return home, though, and is amenable to hiring home aides as needed "  o "Patient concerned about his cat Jessy Born  He has a  arranged    " - please consider pet visit   Palliative will follow for ongoing goals of care discussions as situation evolves  2  Social Support:   Supportive listening provided   Provided anxiety containment   Advocated for patient/family with interdisciplinary team    3  Symptom management:   Recommend global delirium precautions including:   o Establishment of day/night cycle via lights during the day and blinds open  o Limit interruptions at night as medically appropriate  o Provide glasses/hearing aids as appropriate  o Minimize deliriogenic meds as able  o Provide reorientation including date on board and visible clock  o Avoid restraints as able, frequent verbal reorientations or patient care sitter as appropriate   Otherwise, per primary team     Code status: Level 3 - DNAR and DNI   Decisional apparatus:  Patient does not have capacity to make medical decisions on my exam today  If such capacity is lost, patient's substitute decision maker would default to his sister by [place]  Advance Directive / Living Will / POLST:  None completed  Patient expressed interest in doing so this hospitalization, but acuity of condition prevents this at this time  We appreciate the opportunity to participate in this patient's care  We will continue to follow  Please do not hesitate to contact our on-call provider through our clinic answering service at [phone] should you have acute symptom control concerns  INTERVAL HISTORY:    Chart reviewed prior to visit  Case discussed w/ RN, Neurosurgery, Critical care after bedside visit  Patient seen supine in bed w/ head elevated, C-spine immobilization in place   He has experienced respiratory decline today and at times has required BiPAP; per staff he had repeatedly asked for the BiPAP mask to be removed  On my exam, his voice is low but audible, and he is understandable  He is only oriented to self and location; for time he is aware it is March but states the year is 2020  When asked about BiPAP he states he would accept it again; several minutes later he states, "Doc I can't breathe" but with no signs of respiratory distress and oxygenating 96% on O2 via NC  He states he does not want intubation and ventilation but overall seems confused      Review of Systems   Unable to perform ROS: Mental status change       MEDICATIONS / ALLERGIES:  all current active meds have been reviewed and current meds:   Current Facility-Administered Medications   Medication Dose Route Frequency    acetaminophen (TYLENOL) tablet 650 mg  650 mg Oral Q4H PRN    aspirin chewable tablet 81 mg  81 mg Oral Daily    atorvastatin (LIPITOR) tablet 80 mg  80 mg Oral Daily With Dinner    bisacodyl (DULCOLAX) rectal suppository 10 mg  10 mg Rectal Daily    carvedilol (COREG) tablet 3 125 mg  3 125 mg Oral BID With Meals    clopidogrel (PLAVIX) tablet 75 mg  75 mg Oral Daily    dexmedeTOMIDine (Precedex) 400 mcg in sodium chloride 0 9% 100 mL  0 1-0 7 mcg/kg/hr Intravenous Titrated    escitalopram (LEXAPRO) tablet 10 mg  10 mg Oral Daily    heparin (porcine) subcutaneous injection 5,000 Units  5,000 Units Subcutaneous Q8H [address]    hydrALAZINE (APRESOLINE) injection 10 mg  10 mg Intravenous Q6H PRN    HYDROmorphone (DILAUDID) injection 0 5 mg  0 5 mg Intravenous Q1H PRN    insulin detemir (LEVEMIR) subcutaneous injection 10 Units  10 Units Subcutaneous HS    insulin lispro (HumaLOG) 100 units/mL subcutaneous injection 1-5 Units  1-5 Units Subcutaneous HS    insulin lispro (HumaLOG) 100 units/mL subcutaneous injection 1-6 Units  1-6 Units Subcutaneous TID AC    melatonin tablet 6 mg  6 mg Oral HS    multi-electrolyte (PLASMALYTE-A/ISOLYTE-S PH 7 4) IV solution  100 mL/hr Intravenous Continuous    ondansetron (ZOFRAN) injection 4 mg  4 mg Intravenous Q6H PRN    pantoprazole (PROTONIX) EC tablet 40 mg  40 mg Oral Early Morning    senna-docusate sodium (SENOKOT S) 8 6-50 mg per tablet 2 tablet  2 tablet Oral BID       No Known Allergies    OBJECTIVE:  /69 (BP Location: Left arm)   Pulse 76   Temp 97 5 °F (36 4 °C)   Resp 16   Ht 6' 1" (1 854 m) Comment: per previous encounter  Wt 85 9 kg (189 lb 6 oz)   SpO2 94%   BMI 24 99 kg/m²   Nursing notes reviewed  Physical Exam  Constitutional:       General: He is awake  Appearance: He is ill-appearing  Interventions: Cervical collar and nasal cannula in place  Comments: Initially alert but would become lethargic several times during exam; waxing and waning alertness  HENT:      Right Ear: External ear normal       Left Ear: External ear normal       Nose: No rhinorrhea  Eyes:      General: No scleral icterus  Right eye: No discharge  Left eye: No discharge  Extraocular Movements: Extraocular movements intact  Conjunctiva/sclera: Conjunctivae normal    Cardiovascular:      Rate and Rhythm: Normal rate  Pulmonary:      Effort: Pulmonary effort is normal  No tachypnea, bradypnea, accessory muscle usage or respiratory distress  Abdominal:      General: There is no distension  Musculoskeletal:      Right lower leg: No edema  Left lower leg: No edema  Skin:     General: Skin is warm and dry  Coloration: Skin is not pale  Neurological:      Mental Status: He is confused  Sensory: Sensory deficit (no sensation below approximately C7 level) present  Motor: Weakness present  No seizure activity  Deep Tendon Reflexes: Reflexes abnormal       Comments: Alert at times, lethargic at times  Hypophonia notes  Psychiatric:         Attention and Perception: He is inattentive (at times)  Behavior: Behavior is withdrawn  Behavior is not agitated or aggressive  Thought Content: Thought content is not delusional       Comments: Unable to fully assess  Lab Results: I have personally reviewed pertinent labs  Imaging Studies: I have personally reviewed pertinent reports  EKG, Pathology, and Other Studies: I have personally reviewed pertinent reports  Counseling / Coordination of Care: Total floor / unit time spent today 35 minutes  Greater than 50% of total time was spent with the patient and / or family counseling and / or coordination of care  A description of the counseling / coordination of care: symptom assessment and management, medication review, psychosocial support, chart review, imaging review, lab review, goals of care, supportive listening and anticipatory guidance  Favio Reynaga MD  [facility] Palliative and Supportive Care      Portions of this document may have been created using dictation software and as such some "sound alike" terms may have been generated by the system  Do not hesitate to contact me with any questions or clarifications

## 2022-03-18 NOTE — PROGRESS NOTES
Progress Note - Neurosurgery   Chad King 80 y o  male MRN: [id number]  Unit/Bed#: ICU 07 Encounter: [id number]    Assessment:  1  Severe C3-4/C4-5 CCS with gliosis  2  Myelopathy  3  Anemia with Hgb of 7 1%  4  Quadriplegia         Plan:   § Exam: A&OX3, SF, UE: strength 0/5 ( , elbow flexion/extension, ) shrugs shoulders, LEs: both feet wiggles toes,otherwise 0/5 bilat  Sensation to LT and PP sensory level below nipples  Slightly hypertonic upper & lower limbs  bilateral  DTR depressed  through out  Babinski big toes up-going bilaterally, but no clonus symmetrically  Tenderness on posterior cervical spine palpation  [place] collar on  No rectal tone  Folley cath in         § Imagings reviewed personally and by attendings  Findings as follows:  § MRI of cervical spine without contrast on 03/16/2022 demonstrate chronic, severe mass effect on the cord at C3-4 and C4-5 due to disc and facet degenerative change  Severe mass effect on the cord at C3-4 and C4-5 with septal hyperintensity in the cord may indicate gliosis  § CT of cervical spine without contrast on 03/16/2022 demonstrate no cervical spine fracture or traumatic malalignment, severe central canal stenosis at C3-4 secondary to disc bulge  § CT CAP on 03/16/2022 demonstrates findings compatible with a nonspecific colitis involving the cecum and colonic hyperdense material most focally seen in the cecum which could represent ingested mother over the [place] GI bleed with active extravasation  Findings suggestive of contained perforation between the distal sigmoid and cecum, tethered bladder to cecum and distal sigmoid and possiblity of cancer  ? Pain control:  Per primary team  ? DVT ppx: SCDs bilateral legs  HSQ  ? Activity:  As tolerated  ? PT/OT evaluation & treatment  ? Brace: [place] collar, to wear all the time and es collar for shower  ? Medical Mx:  Per primary team  ? Neurocheck:  Routine  ? MAP>75  ?  Patient became hypoxic, with confusion and likely encephalopathy  Palliative care on board, per patient's wish DNAR and DNI with supplemental oxygen and Bipap  From NSx perspective, no procedure is indicated  Continue conservative Mx with bracing, and pain control  NSx will sign off  Follow up on PRN basis  Call with question or concern  Subjective/Objective   Chief Complaint: " Patient confused"    Subjective: Per patient's RN, patient's oxygen saturation dropped  Requiring 4-5 litres of oxygen via nasal cannula   Patient is confused, with most likely hypoxic encephalopathy  Info limited, as he repeatedly states remove the mask  Info limited    Objective: Patient confused, on oxygen supplement    I/O       03/16 0701 03/17 0700 03/17 0701  03/18 0700 03/18 0701 03/19 0700    P  O   0 50    I V  (mL/kg) 397 5 (4 6) 1200 (14) 500 (5 8)    Blood 350      IV Piggyback 1100      Total Intake(mL/kg) 1847 5 (21 5) 1200 (14) 550 (6 4)    Urine (mL/kg/hr) 325 515 (0 2) 200 (0 3)    Stool   0    Total Output 325 515 200    Net +1522 5 +685 +350           Unmeasured Stool Occurrence   1 x          Invasive Devices  Report    Peripheral Intravenous Line            Peripheral IV 03/17/22 Right Antecubital <1 day                Physical Exam:  Vitals: Blood pressure 159/72, pulse 79, temperature 97 5 °F (36 4 °C), resp  rate 17, height 6' 1" (1 854 m), weight 85 9 kg (189 lb 6 oz), SpO2 95 %  ,Body mass index is 24 99 kg/m²        General appearance: mild distress, confused, repeatedly says remove the mask  Head: Normocephalic, without obvious abnormality, atraumatic  Eyes: EOMI, PERRL  Neck: On [place] collar  Back: no kyphosis present, no tenderness to percussion or palpation  Lungs: non labored breathing  Heart: regular heart rate  Neurologic:   Mental status: appears in mild distress,  Confused and disoriented  Cranial nerves: grossly intact (Cranial nerves II-XII)  Sensory: see exam above  Motor: see exam above  Reflexes: Depressed  Coordination: exam limited      Lab Results:  Results from last 7 days   Lab Units 03/18/22  1050 03/18/22  0547 03/17/22 0442 03/16/22 2254 03/16/22 1925   WBC Thousand/uL  --  15 15* 10 99*  --  7 33   HEMOGLOBIN g/dL  --  11 7* 9 6*   < > 7 1*   I STAT HEMOGLOBIN g/dl 11 2*  --   --   --   --    HEMATOCRIT %  --  35 3* 28 3*   < > 22 5*   HEMATOCRIT, ISTAT % 33*  --   --   --   --    PLATELETS Thousands/uL  --  157 133*  --  124*   NEUTROS PCT %  --  87* 84*  --  82*   MONOS PCT %  --  7 8  --  6    < > = values in this interval not displayed  Results from last 7 days   Lab Units 03/18/22  1050 03/18/22  0547 03/17/22 0442 03/16/22 1923 03/16/22 1923 03/16/22  1909   POTASSIUM mmol/L  --  5 3 4 6  --  4 3  --    CHLORIDE mmol/L  --  102 104  --  106  --    CO2 mmol/L  --  20* 19*   < > 24  --    CO2, I-STAT mmol/L 24  --   --   --   --  23   BUN mg/dL  --  82* 67*  --  65*  --    CREATININE mg/dL  --  3 88* 2 90*  --  2 91*  --    CALCIUM mg/dL  --  8 6 8 8  --  8 5  --    ALK PHOS U/L  --   --  69  --  59  --    ALT U/L  --   --  33  --  27  --    AST U/L  --   --  36  --  24  --    GLUCOSE, ISTAT mg/dl 234*  --   --   --   --  134    < > = values in this interval not displayed  Results from last 7 days   Lab Units 03/18/22 0547 03/17/22 0442   MAGNESIUM mg/dL 2 7* 2 5     Results from last 7 days   Lab Units 03/18/22 0547 03/17/22 0442   PHOSPHORUS mg/dL 7 5* 4 6*     Results from last 7 days   Lab Units 03/16/22 1923   INR  1 22*   PTT seconds 30     No results found for: TROPONINT  ABG:  Lab Results   Component Value Date    PHART 7 177 (LL) 03/18/2022    [id number] 57 9 (HH) 03/18/2022    PO2ART 92 9 03/18/2022    UAY1CRK 21 0 (L) 03/18/2022    BEART -7 7 03/18/2022    SOURCE Radial, Right 03/18/2022       Imaging Studies: I have personally reviewed pertinent reports     and I have personally reviewed pertinent films in PACS    EKG, Pathology, and Other Studies: I have personally reviewed pertinent reports        VTE Pharmacologic Prophylaxis: Sequential compression device (Venodyne)  and Heparin    VTE Mechanical Prophylaxis: sequential compression device

## 2022-03-18 NOTE — ASSESSMENT & PLAN NOTE
· Found down, flaccid in all four extremities   · No cervical spine fracture, however with large compressive osteophyte with multi level degenerative changes  · STAT MRI completed with mass effect and degenerative changes   · Neurosurgery consulted, no plan for intervention at this time   · Maintain MAP >75 for spinal cord perfusion  · Patient currently maintaining pressures without intervention  · Overall poor functional and overall prognosis, palliative care consulted to help clarify goals of care

## 2022-03-18 NOTE — ASSESSMENT & PLAN NOTE
No results found for: HGBA1C    Recent Labs     03/17/22  1832 03/17/22  2355 03/18/22  0011 03/18/22  0551   POCGLU 184* 206* 220* 194*       Blood Sugar Average: Last 72 hrs:  (P) 192 6  · Change diet to diabetic carb controlled  · Change SSI to AC/HS   · Start levemir 10 units qHS   · On home levemir 20 units q12h   · Adjust regimen as needed to maintain -180

## 2022-03-18 NOTE — RESPIRATORY THERAPY NOTE
NIF -30 / VC 400ml, poor effort, patient not understanding test, also pt stating he can't breathe, ABG drawn and ran on ISTAT

## 2022-03-19 NOTE — ASSESSMENT & PLAN NOTE
· 3/17/22 CT A/P: Findings compatible with a nonspecific colitis particularly involving the cecum, with colonic hyperdense material most focally seen in the cecum which could represent ingested material or potentially a GI bleed with active extravasation  Additionally, there are findings which are, though not definitive for, suggestive of contained perforation between the distal sigmoid and cecum     · Received 3 units of pRBC's with appropriate response in blood pressure  · Continue to monitor for ongoing bleeding  · Likely secondary to invasive recurrence of bladder cancer

## 2022-03-19 NOTE — ASSESSMENT & PLAN NOTE
· Multifactorial secondary to high C-spine injury with likely diaphragmatic and intercostal muscle involvement along with pulmonary edema in the setting of diastolic CHF and volume overload   · ABG improved this AM with the initiation of BiPAP   · NIF -30 when checked yesterday by RT   · Adjust supplemental oxygen as needed to maintain SpO2 > 90%  · Plan to transition to comfort care today  · Will maintain BiPAP until family is able to be present at bedside before transitioning off

## 2022-03-19 NOTE — ASSESSMENT & PLAN NOTE
· Evidence of possible spread seen on CT from admission   · 3/16/22 CT A/P: The urinary bladder has a new appearance of potential tethering to the cecum and distal sigmoid, and the possibility of recurrent bladder cancer as a causative etiology should be excluded  · No plan for treatment at this time, will discuss findings with patient and family today as indicated

## 2022-03-19 NOTE — ASSESSMENT & PLAN NOTE
· Confused and lethargic again this morning   · Likely related to delirium, uremia, hypercapnic-hypoxic respiratory failure   · Continue precedex titrated to RASS 0 to -1 for comfort tolerating BiPAP   · Routine neuro checks  · Sleep/wake cycle regulation   · Melatonin 6mg qHS   · Limit opiates and benzos as able given advanced age   · Holding home primidone 50mg qHS   · Continue home lexapro 10mg daily Negative

## 2022-03-19 NOTE — ASSESSMENT & PLAN NOTE
· Multifactorial related to poor intake while down, IV contrast, possible hypoperfusion in the setting of known heart failure   · Baseline creatinine: 2 03 - 2 50  · Admission creatinine: 2 91    · Current creatinine: 4 92  · Maintain chowdhury for strict I/O monitoring q2h   · Continue gentle hydration with Isolyte 75ml/hr  · Dose lasix 120mg IV prior to removal of BiPAP later today to ease comfort of transition in the event of ongoing pulmonary congestion

## 2022-03-19 NOTE — ASSESSMENT & PLAN NOTE
· Maintain SBP < 180  · Continue 50% home coreg 6 25mg BID and increase as needed   · Continue to home patient's imdur and hydralazine for now

## 2022-03-19 NOTE — ASSESSMENT & PLAN NOTE
No results found for: HGBA1C    Recent Labs     03/18/22  1818 03/18/22  2124 03/18/22  2345 03/19/22  0558   POCGLU 318* 255* 200* 154*       Blood Sugar Average: Last 72 hrs:  (P) 215 [id number]  · NPO at this time given tenuous respiratory status  · Continue q6h SSI regimen   · Continue levemir 10 units qHS   · On home levemir 20 units q12h   · Adjust regimen as needed to maintain -180

## 2022-03-19 NOTE — PROGRESS NOTES
[facility]  Progress Note - Dierdre Koyanagi 1940, 80 y o  male MRN: [id number]  Unit/Bed#: ICU 07 Encounter: [id number]  Primary Care Provider: Stan Rock MD   Date and time admitted to hospital: 3/16/2022  6:54 PM    * Cervical spinal cord injury [facility])  Assessment & Plan  · Found down, flaccid in all four extremities   · No cervical spine fracture, however with large compressive osteophyte with multi level degenerative changes  · STAT MRI completed with mass effect and degenerative changes   · Neurosurgery consulted, no plan for intervention at this time   · Overall poor functional and overall prognosis, palliative care consulted to help clarify goals of care   · Family plan to transition to comfort measures today     GI bleed  Assessment & Plan  · 3/17/22 CT A/P: Findings compatible with a nonspecific colitis particularly involving the cecum, with colonic hyperdense material most focally seen in the cecum which could represent ingested material or potentially a GI bleed with active extravasation  Additionally, there are findings which are, though not definitive for, suggestive of contained perforation between the distal sigmoid and cecum     · Received 3 units of pRBC's with appropriate response in blood pressure  · Continue to monitor for ongoing bleeding  · Likely secondary to invasive recurrence of bladder cancer       Acute kidney injury ([facility] [address] )  Assessment & Plan  · Multifactorial related to poor intake while down, IV contrast, possible hypoperfusion in the setting of known heart failure   · Baseline creatinine: 2 03 - 2 50  · Admission creatinine: 2 91    · Current creatinine: 4 92  · Maintain chowdhury for strict I/O monitoring q2h   · Continue gentle hydration with Isolyte 75ml/hr  · Dose lasix 120mg IV prior to removal of BiPAP later today to ease comfort of transition in the event of ongoing pulmonary congestion     Acute metabolic encephalopathy  Assessment & Plan  · Confused and lethargic again this morning   · Likely related to delirium, uremia, hypercapnic-hypoxic respiratory failure   · Continue precedex titrated to RASS 0 to -1 for comfort tolerating BiPAP   · Routine neuro checks  · Sleep/wake cycle regulation   · Melatonin 6mg qHS   · Limit opiates and benzos as able given advanced age   · Holding home primidone 50mg qHS   · Continue home lexapro 10mg daily    Stage 4 chronic kidney disease [facility])  Assessment & Plan  Lab Results   Component Value Date    EGFR 10 03/19/2022    EGFR 11 03/18/2022    EGFR 13 03/18/2022    CREATININE 4 92 (H) 03/19/2022    CREATININE 4 48 (H) 03/18/2022    CREATININE 3 88 (H) 03/18/2022     · See plan above for BRIAN  · Of note, patientt has need transient dialysis in the past when given contrast for studies     Acute respiratory failure with hypoxia and hypercapnia (HCC)  Assessment & Plan  · Multifactorial secondary to high C-spine injury with likely diaphragmatic and intercostal muscle involvement along with pulmonary edema in the setting of diastolic CHF and volume overload   · ABG improved this AM with the initiation of BiPAP   · NIF -30 when checked yesterday by RT   · Adjust supplemental oxygen as needed to maintain SpO2 > 90%  · Plan to transition to comfort care today  · Will maintain BiPAP until family is able to be present at bedside before transitioning off     Bladder cancer [facility])  Assessment & Plan  · Evidence of possible spread seen on CT from admission   · 3/16/22 CT A/P: The urinary bladder has a new appearance of potential tethering to the cecum and distal sigmoid, and the possibility of recurrent bladder cancer as a causative etiology should be excluded  · No plan for treatment at this time, will discuss findings with patient and family today as indicated     CAD (coronary artery disease)  Assessment & Plan  · ASA 81mg   · Plavix 75mg daily   · Atorvastatin 80mg qHS   · Antihypertensive plan as detailed above    Diastolic heart failure (HCC)  Assessment & Plan  Wt Readings from Last 3 Encounters:   03/19/22 87 9 kg (193 lb 12 6 oz)     · Last echocardiogram 8/17/21: EF 55-60%, no RWMA, grade 2 diastolic dysfunction, mod MR   · No sign of acute exacerbation  · Holding afterload reduction in the setting of BRIAN and spinal cord injury  · Consider diuresis today for the purposes of optimizing respiratory status as detailed above     HTN (hypertension)  Assessment & Plan  · Maintain SBP < 180  · Continue 50% home coreg 6 25mg BID and increase as needed   · Continue to home patient's imdur and hydralazine for now       DM2 (diabetes mellitus, type 2) ([facility] [address] )  Assessment & Plan  No results found for: HGBA1C    Recent Labs     03/18/22  1818 03/18/22  2124 03/18/22  2345 03/19/22  0558   POCGLU 318* 255* 200* 154*       Blood Sugar Average: Last 72 hrs:  (P) 215 [id number]  · NPO at this time given tenuous respiratory status  · Continue q6h SSI regimen   · Continue levemir 10 units qHS   · On home levemir 20 units q12h   · Adjust regimen as needed to maintain -180           ----------------------------------------------------------------------------------------  HPI/24hr events:   · Tolerated BiPAP overnight with addition of Precedex   · Increased bradycardia with stable BP     Patient appropriate for transfer out of the ICU today?: Patient does not meet criteria for referral to the ICU Follow-Up Clinic; referral has not been made  Disposition: Continue Stepdown Level 1 level of care   Code Status: Level 3 - DNAR and DNI  ---------------------------------------------------------------------------------------  SUBJECTIVE  "My neck hurts"     Review of Systems   Constitutional: Positive for fatigue  Respiratory: Positive for shortness of breath  Cardiovascular: Negative for chest pain  Gastrointestinal: Negative for abdominal pain and nausea  Musculoskeletal: Positive for neck pain       Review of systems was reviewed and negative unless stated above in HPI/24-hour events   ---------------------------------------------------------------------------------------  OBJECTIVE    Vitals   Vitals:    22 0407 22 0500 22 0559 22 0600   BP:  (!) 173/76  157/69   BP Location:    Left arm   Pulse:  59  58   Resp:     Temp:       TempSrc:       SpO2: 98% 99%  99%   Weight:   87 9 kg (193 lb 12 6 oz)    Height:         Temp (24hrs), Av 9 °F (35 5 °C), Min:95 5 °F (35 3 °C), Max:97 5 °F (36 4 °C)  Current: Temperature: (!) 96 1 °F (35 6 °C)          Respiratory:    BiPAP Settings  Non-Invasive Ventilation Mode: BiPAP  IPAP (cm): 12 cm  EPAP (cm): 6 cm  FiO2 (%): 40  Rate (Set): 12  Leak (lpm): 25  Total Rate: 28  Spontaneous Vt (mL): 437        Physical Exam  Vitals reviewed  Constitutional:       General: He is awake  Appearance: Normal appearance  Interventions: Cervical collar and face mask in place  HENT:      Head: Normocephalic and atraumatic  Mouth/Throat:      Lips: Pink  Mouth: Mucous membranes are dry  Eyes:      Conjunctiva/sclera: Conjunctivae normal       Pupils: Pupils are equal, round, and reactive to light  Cardiovascular:      Pulses:           Radial pulses are 2+ on the right side and 2+ on the left side  Dorsalis pedis pulses are 2+ on the right side and 2+ on the left side  Heart sounds: Normal heart sounds  Pulmonary:      Effort: Tachypnea present  Breath sounds: Examination of the right-middle field reveals decreased breath sounds  Examination of the left-middle field reveals decreased breath sounds  Examination of the right-lower field reveals decreased breath sounds  Examination of the left-lower field reveals decreased breath sounds  Decreased breath sounds present  No wheezing, rhonchi or rales  Abdominal:      Palpations: Abdomen is soft  Tenderness: There is no abdominal tenderness     Genitourinary:     Comments: Marinelli: clear, yellow urine   Musculoskeletal:      Cervical back: Pain with movement and muscular tenderness present  Decreased range of motion  Right lower le+ Pitting Edema present  Left lower le+ Pitting Edema present  Skin:     General: Skin is warm and dry  Capillary Refill: Capillary refill takes less than 2 seconds  Neurological:      Mental Status: He is lethargic and confused  GCS: GCS eye subscore is 4  GCS verbal subscore is 4  GCS motor subscore is 6  Sensory: Sensory deficit present  Motor: Abnormal muscle tone present  No weakness        Comments: 0/5 strength in all 4 extremities          Laboratory and Diagnostics:  Results from last 7 days   Lab Units 22  1050 22  0547 22  0442 22  2254 22  1909   WBC Thousand/uL  --  15 15* 10 99*  --  7 33  --    HEMOGLOBIN g/dL  --  11 7* 9 6* 8 0* 7 1*  --    I STAT HEMOGLOBIN g/dl 11 2*  --   --   --   --  7 1*   HEMATOCRIT %  --  35 3* 28 3*  --  22 5*  --    HEMATOCRIT, ISTAT % 33*  --   --   --   --  21*   PLATELETS Thousands/uL  --  157 133*  --  124*  --    NEUTROS PCT %  --  87* 84*  --  82*  --    MONOS PCT %  --  7 8  --  6  --      Results from last 7 days   Lab Units 22  0552 22  1615 22  1050 22  0547 222 22  1909   SODIUM mmol/L 139 139  --  136 135* 140  --    POTASSIUM mmol/L 5 3 5 7*  --  5 3 4 6 4 3  --    CHLORIDE mmol/L 101 102  --  102 104 106  --    CO2 mmol/L 23 22  --  20* 19* 24  --    CO2, I-STAT mmol/L  --   --  24  --   --   --  23   ANION GAP mmol/L 15* 15*  --  14* 12 10  --    BUN mg/dL 90* 89*  --  82* 67* 65*  --    CREATININE mg/dL 4 92* 4 48*  --  3 88* 2 90* 2 91*  --    CALCIUM mg/dL 7 9* 8 1*  --  8 6 8 8 8 5  --    GLUCOSE RANDOM mg/dL 150* 241*  --  204* 162* 132  --    ALT U/L  --   --   --   --  33 27  --    AST U/L  --   --   --   --  36 24  --    ALK PHOS U/L  --   --   --   -- 69 59  --    ALBUMIN g/dL  --   --   --   --  3 3* 3 1*  --    TOTAL BILIRUBIN mg/dL  --   --   --   --  1 37* 0 83  --      Results from last 7 days   Lab Units 03/19/22  0552 03/18/22  0547 03/17/22  0442   MAGNESIUM mg/dL 2 8* 2 7* 2 5   PHOSPHORUS mg/dL 7 5* 7 5* 4 6*      Results from last 7 days   Lab Units 03/16/22  1923   INR  1 22*   PTT seconds 30              ABG:  Results from last 7 days   Lab Units 03/19/22  0406 03/18/22  1358 03/18/22  1358   PH ART  7 389   < > 7 177*   PCO2 ART mm Hg 34 2*   < > 57 9*   PO2 ART mm Hg 103 2   < > 92 9   HCO3 ART mmol/L 20 2*   < > 21 0*   BASE EXC ART mmol/L -4 1   < > -7 7   ABG SOURCE   --   --  Radial, Right    < > = values in this interval not displayed  VBG:  Results from last 7 days   Lab Units 03/18/22  1358   ABG SOURCE  Radial, Right           Micro        EKG: Sinus bradycardia   Imaging: I have personally reviewed pertinent reports  and I have personally reviewed pertinent films in PACS    Intake and Output  I/O       03/17 0701  03/18 0700 03/18 0701  03/19 0700    P  O  0 50    I V  (mL/kg) 1200 (14) 2305 8 (26 2)    IV Piggyback  250    Total Intake(mL/kg) 1200 (14) 2605 8 (29 6)    Urine (mL/kg/hr) 515 (0 2) 200 (0 1)    Stool  0    Total Output 515 200    Net +685 +2405 8          Unmeasured Stool Occurrence  1 x            Height and Weights   Height: 6' 1" (185 4 cm) (per previous encounter)  IBW (Ideal Body Weight): 79 9 kg  Body mass index is 25 57 kg/m²  Weight (last 2 days)     Date/Time Weight    03/19/22 0559 87 9 (193 78)    03/18/22 0242 85 9 (189 38)    03/17/22 0536 86 1 (189 82)    03/17/22 0224 86 1 (189 82)            Nutrition       Diet Orders   (From admission, onward)             Start     Ordered    03/18/22 1651  Diet NPO  Diet effective now        References:    Nutrtion Support Algorithm Enteral vs  Parenteral   Question Answer Comment   Diet Type NPO    RD to adjust diet per protocol?  Yes        03/18/22 9308    03/18/22 1458  Dietary nutrition supplements  Once        Question Answer Comment   Select Supplement: Ensure Enlive-Vanilla    Frequency Breakfast, Dinner        03/18/22 1457    03/17/22 0845  Room Service  Once        Question:  Type of Service  Answer:  Room Service - Appropriate with Assistance    03/17/22 0844                  Active Medications  Scheduled Meds:  Current Facility-Administered Medications   Medication Dose Route Frequency Provider Last Rate    acetaminophen  650 mg Oral Q4H PRN Eulice Capuchin, CRNP      aspirin  81 mg Oral Daily Eulice Capuchin, CRNP      atorvastatin  80 mg Oral Daily With [employer], CRNP      bisacodyl  10 mg Rectal Daily Eulice Capuchin, CRNP      carvedilol  3 125 mg Oral BID With Meals Eulice Capuchin, ADALIDNP      clopidogrel  75 mg Oral Daily Eulice Capuchin, CRNP      dexmedetomidine  0 1-0 7 mcg/kg/hr Intravenous Titrated Eulice Capuchin, CRNP 0 3 mcg/kg/hr (03/19/22 8771)    escitalopram  10 mg Oral Daily Eulice Capuchin, CRNP      heparin (porcine)  5,000 Units Subcutaneous Q8H [address] Kenzie [place], CRNP      hydrALAZINE  10 mg Intravenous Q6H PRN Christiano Burnette PA-C      HYDROmorphone  0 5 mg Intravenous Q1H PRN Kenzie Ocean, CRNP      insulin detemir  10 Units Subcutaneous HS Eulice Capuchin, CRNP      insulin lispro  1-6 Units Subcutaneous Q6H [address] Tonya [place], TABITHA      melatonin  6 mg Oral HS Eulice Capuchin, CRNP      multi-electrolyte  75 mL/hr Intravenous Continuous Eulice Capuchin, CRNP 100 mL/hr (03/19/22 0230)    ondansetron  4 mg Intravenous Q6H PRN Kenzie Ocean, CRNP      pantoprazole  40 mg Oral Early Morning Leonor Wilson PA-C      senna-docusate sodium  2 tablet Oral BID Eulice Capuchin, CRNP       Continuous Infusions:  dexmedetomidine, 0 1-0 7 mcg/kg/hr, Last Rate: 0 3 mcg/kg/hr (03/19/22 7923)  multi-electrolyte, 75 mL/hr, Last Rate: 100 mL/hr (03/19/22 0230)      PRN Meds: acetaminophen, 650 mg, Q4H PRN  hydrALAZINE, 10 mg, Q6H PRN  HYDROmorphone, 0 5 mg, Q1H PRN  ondansetron, 4 mg, Q6H PRN        Invasive Devices Review  Invasive Devices  Report    Peripheral Intravenous Line            Peripheral IV 03/17/22 Right Antecubital 1 day    Peripheral IV 03/18/22 Left;Ventral (anterior) Forearm <1 day                Rationale for remaining devices: N/A  ---------------------------------------------------------------------------------------  Advance Directive and Living Will:      Power of :    POLST:    ---------------------------------------------------------------------------------------  Care Time Delivered:   No Critical Care time spent       TABITHA Aviles      Portions of the record may have been created with voice recognition software  Occasional wrong word or "sound a like" substitutions may have occurred due to the inherent limitations of voice recognition software    Read the chart carefully and recognize, using context, where substitutions have occurred

## 2022-03-19 NOTE — ASSESSMENT & PLAN NOTE
Wt Readings from Last 3 Encounters:   03/19/22 87 9 kg (193 lb 12 6 oz)     · Last echocardiogram 8/17/21: EF 55-60%, no RWMA, grade 2 diastolic dysfunction, mod MR   · No sign of acute exacerbation  · Holding afterload reduction in the setting of BRIAN and spinal cord injury  · Consider diuresis today for the purposes of optimizing respiratory status as detailed above

## 2022-03-19 NOTE — ASSESSMENT & PLAN NOTE
· ASA 81mg   · Plavix 75mg daily   · Atorvastatin 80mg qHS   · Antihypertensive plan as detailed above

## 2022-03-19 NOTE — ASSESSMENT & PLAN NOTE
Lab Results   Component Value Date    EGFR 10 03/19/2022    EGFR 11 03/18/2022    EGFR 13 03/18/2022    CREATININE 4 92 (H) 03/19/2022    CREATININE 4 48 (H) 03/18/2022    CREATININE 3 88 (H) 03/18/2022     · See plan above for BRIAN  · Of note, patientt has need transient dialysis in the past when given contrast for studies

## 2022-03-19 NOTE — ASSESSMENT & PLAN NOTE
· Found down, flaccid in all four extremities   · No cervical spine fracture, however with large compressive osteophyte with multi level degenerative changes  · STAT MRI completed with mass effect and degenerative changes   · Neurosurgery consulted, no plan for intervention at this time   · Overall poor functional and overall prognosis, palliative care consulted to help clarify goals of care   · Family plan to transition to comfort measures today

## 2022-03-20 NOTE — PLAN OF CARE
Problem: MOBILITY - ADULT  Goal: Maintain or return to baseline ADL function  Description: INTERVENTIONS:  -  Assess patient's ability to carry out ADLs; assess patient's baseline for ADL function and identify physical deficits which impact ability to perform ADLs (bathing, care of mouth/teeth, toileting, grooming, dressing, etc )  - Assess/evaluate cause of self-care deficits   - Assess range of motion  - Assess patient's mobility; develop plan if impaired  - Assess patient's need for assistive devices and provide as appropriate  - Encourage maximum independence but intervene and supervise when necessary  - Involve family in performance of ADLs  - Assess for home care needs following discharge   - Consider OT consult to assist with ADL evaluation and planning for discharge  - Provide patient education as appropriate  Outcome: Not Progressing  Goal: Maintains/Returns to pre admission functional level  Description: INTERVENTIONS:  - Perform BMAT or MOVE assessment daily    - Set and communicate daily mobility goal to care team and patient/family/caregiver     - Collaborate with rehabilitation services on mobility goals if consulted  - Out of bed for toileting  - Record patient progress and toleration of activity level   Outcome: Not Progressing     Problem: Prexisting or High Potential for Compromised Skin Integrity  Goal: Skin integrity is maintained or improved  Description: INTERVENTIONS:  - Identify patients at risk for skin breakdown  - Assess and monitor skin integrity  - Assess and monitor nutrition and hydration status  - Monitor labs   - Assess for incontinence   - Turn and reposition patient  - Assist with mobility/ambulation  - Relieve pressure over bony prominences  - Avoid friction and shearing  - Provide appropriate hygiene as needed including keeping skin clean and dry  - Evaluate need for skin moisturizer/barrier cream  - Collaborate with interdisciplinary team   - Patient/family teaching  - Consider wound care consult   Outcome: Not Progressing     Problem: Potential for Falls  Goal: Patient will remain free of falls  Description: INTERVENTIONS:  - Educate patient/family on patient safety including physical limitations  - Instruct patient to call for assistance with activity   - Consult OT/PT to assist with strengthening/mobility   - Keep Call bell within reach  - Keep bed low and locked with side rails adjusted as appropriate  - Keep care items and personal belongings within reach  - Initiate and maintain comfort rounds  - Make Fall Risk Sign visible to staff  - Apply yellow socks and bracelet for high fall risk patients  - Consider moving patient to room near nurses station  Outcome: Not Progressing     Problem: PAIN - ADULT  Goal: Verbalizes/displays adequate comfort level or baseline comfort level  Description: Interventions:  - Encourage patient to monitor pain and request assistance  - Assess pain using appropriate pain scale  - Administer analgesics based on type and severity of pain and evaluate response  - Implement non-pharmacological measures as appropriate and evaluate response  - Consider cultural and social influences on pain and pain management  - Notify physician/advanced practitioner if interventions unsuccessful or patient reports new pain  Outcome: Not Progressing     Problem: INFECTION - ADULT  Goal: Absence or prevention of progression during hospitalization  Description: INTERVENTIONS:  - Assess and monitor for signs and symptoms of infection  - Monitor lab/diagnostic results  - Monitor all insertion sites, i e  indwelling lines, tubes, and drains  - Monitor endotracheal if appropriate and nasal secretions for changes in amount and color  - [place] appropriate cooling/warming therapies per order  - Administer medications as ordered  - Instruct and encourage patient and family to use good hand hygiene technique  - Identify and instruct in appropriate isolation precautions for identified infection/condition  Outcome: Not Progressing     Problem: SAFETY ADULT  Goal: Patient will remain free of falls  Description: INTERVENTIONS:  - Educate patient/family on patient safety including physical limitations  - Instruct patient to call for assistance with activity   - Consult OT/PT to assist with strengthening/mobility   - Keep Call bell within reach  - Keep bed low and locked with side rails adjusted as appropriate  - Keep care items and personal belongings within reach  - Initiate and maintain comfort rounds  - Make Fall Risk Sign visible to staff  - Apply yellow socks and bracelet for high fall risk patients  - Consider moving patient to room near nurses station  Outcome: Not Progressing     Problem: DISCHARGE PLANNING  Goal: Discharge to home or other facility with appropriate resources  Description: INTERVENTIONS:  - Identify barriers to discharge w/patient and caregiver  - Arrange for needed discharge resources and transportation as appropriate  - Identify discharge learning needs (meds, wound care, etc )  - Arrange for interpretive services to assist at discharge as needed  - Refer to Case Management Department for coordinating discharge planning if the patient needs post-hospital services based on physician/advanced practitioner order or complex needs related to functional status, cognitive ability, or social support system  Outcome: Not Progressing     Problem: Nutrition/Hydration-ADULT  Goal: Nutrient/Hydration intake appropriate for improving, restoring or maintaining nutritional needs  Description: Monitor and assess patient's nutrition/hydration status for malnutrition  Collaborate with interdisciplinary team and initiate plan and interventions as ordered  Monitor patient's weight and dietary intake as ordered or per policy  Utilize nutrition screening tool and intervene as necessary  Determine patient's food preferences and provide high-protein, high-caloric foods as appropriate  INTERVENTIONS:  - Monitor oral intake, urinary output, labs, and treatment plans  - Assess nutrition and hydration status and recommend course of action  - Evaluate amount of meals eaten  - Assist patient with eating if necessary   - Allow adequate time for meals  - Recommend/ encourage appropriate diets, oral nutritional supplements, and vitamin/mineral supplements  - Order, calculate, and assess calorie counts as needed  - Recommend, monitor, and adjust tube feedings and TPN/PPN based on assessed needs  - Assess need for intravenous fluids  - Provide specific nutrition/hydration education as appropriate  - Include patient/family/caregiver in decisions related to nutrition  Outcome: Not Progressing

## 2022-03-20 NOTE — CASE MANAGEMENT
Case Management Discharge Planning Note    Patient name Arabella Sanchez  Location S MS 5/S -35 MRN [id number]  : 1940 Date 3/20/2022       Current Admission Date: 3/16/2022  Current Admission Diagnosis:Cervical spinal cord injury [facility])   Patient Active Problem List    Diagnosis Date Noted    Acute metabolic encephalopathy     Acute kidney injury ([facility] ) 2022    Acute respiratory failure with hypoxia and hypercapnia ([facility] ) 2022    CAD (coronary artery disease)     [facility]     CHF (congestive heart failure) ([facility] )     DM2 (diabetes mellitus, type 2) ([facility] ) 2022    HTN (hypertension) 2022    GI bleed 2022    Stage 4 chronic kidney disease ([facility] )     Diastolic heart failure ([facility] ) 2022    Cervical spinal cord injury ([facility] ) 2022      LOS (days): 4  Geometric Mean LOS (GMLOS) (days): 4 20  Days to GMLOS:0 4     OBJECTIVE:  Risk of Unplanned Readmission Score: 23         Current admission status: Inpatient   Preferred Pharmacy:   [address] [place] [address]  Phone: [phone] Fax: [phone]    Primary Care Provider: Rafael Collins MD    Primary Insurance: [address] [place] [address],[address] REP  Secondary Insurance:     DISCHARGE DETAILS:          Dispatcher Contacted: Yes  Number/Name of Dispatcher: [facility] [id number]  Transported by (Company and Unit #):  [id number] (Date): 22  ETA of Transport (Time): Jarrod Name, [address] : [address]  Receiving Facility/Agency Phone Number: [phone]  Facility/Agency Fax Number: NO FAX NEEDED

## 2022-03-20 NOTE — CASE MANAGEMENT
Case Management Discharge Planning Note    Patient name Bj Magallon S MS 5/S -40 MRN [id number]  : 1940 Date 3/20/2022       Current Admission Date: 3/16/2022  Current Admission Diagnosis:Cervical spinal cord injury [facility])   Patient Active Problem List    Diagnosis Date Noted    Acute metabolic encephalopathy     [facility] ) 2022    Acute respiratory failure with hypoxia and hypercapnia ([facility] ) 2022    CAD (coronary artery disease)     Bladder cancer ([facility] )     CHF (congestive heart failure) ([facility] )     DM2 (diabetes mellitus, type 2) ([facility] ) 2022    HTN (hypertension) 2022    GI bleed 2022    Stage 4 chronic kidney disease ([facility] )     Diastolic heart failure ([facility] ) 2022    C[facility] 2022      LOS (days): 4  Geometric Mean LOS (GMLOS) (days): 4 20  Days to GMLOS:0 5     OBJECTIVE:  Risk of Unplanned Readmission Score: 23         Current admission status: Inpatient   Preferred Pharmacy:   [address] [place] [address]  Phone: [phone] Fax: [phone]    Primary Care Provider: Jose Mendoza MD    Primary Insurance: [facility]  Secondary Insurance:     DISCHARGE DETAILS:    Discharge planning discussed with[de-identified] Melany Lis of Choice: Yes  Comments - Freedom of Choice: Amber Olivas confirmed it's the Pt's family wish for hospice care for the Pt  Amber Olivas denied having an hospice agency of preference and gave permission to make a referral to [facility] to gather additional information  CM discussed freedom of choice, made the referral and notified [facility]           Contacts  Patient Contacts: Amber Olivas  Relationship to Patient[de-identified] Family  Contact Method: Phone  Phone Number: [phone]  Reason/Outcome: Continuity of [address]         Is the patient interested in [facility] at discharge?: No    DME Referral Provided  Referral made for DME?: No    Other Referral/Resources/Interventions Provided:  Interventions: Hospice  Referral Comments: Referral made to [facility]         Treatment Team Recommendation: Hospice  Discharge Destination Plan[de-identified] Hospice  Transport at Discharge : [facility] Ambulance

## 2022-03-20 NOTE — PROGRESS NOTES
[facility]  Progress Note - Eagle Guan 1940, 80 y o  male MRN: [id number]  Unit/Bed#: S -34 Encounter: [id number]  Primary Care Provider: Amanda Benoit MD   Date and time admitted to hospital: 3/16/2022  6:54 PM        HPI:  Patient is s/ Found Down from presumably from a fall on 3/17/22 sustaining severe injury to his cervical spinal cord (C3-C4/C4-C5 with gliosis) now quadriplegic  Hospital Course:  Patient was admitted to the ICU for closer monitoring, respiratory and Pressor support  He was found to have ABLA, Acute on Chronic kidney disease, Diastolic Heart Failure, Possible cecal mass and Metabolic encephalopathy  Due to his devastating injury Palliative care was consulted  The patient was made DNR/DNI  Patient transitioned to comfort care  On Fentanyl Gtt for pain  Ativan PRN anxiety  Hospice Consulted  SUBJECTIVE:  Chief Complaint: None  Patient states he has no pain  He did tell express to the nurses that he wants to die  Subjective:  Patient is stable for now  He states he has no complaints no pain  He states he does not have any sensation in his arms or legs, with the rest of his body  OBJECTIVE:   Vitals:        Intake/Output:  I/O       03/18 0701  03/19 0700 03/19 0701  03/20 0700 03/20 0701  03/21 0700    P  O  50 240     I V  (mL/kg) 2305 8 (26 2) 776 1 (8 8)     IV Piggyback 250      Total Intake(mL/kg) 2605 8 (29 6) 1016 1 (11 6)     Urine (mL/kg/hr) 200 (0 1) 300 (0 1)     Stool 0      Total Output 200 300     Net +2405 8 +716 1            Unmeasured Stool Occurrence 1 x           Nutrition: Diet Regular; Pleasure Feed  GI Proph/Bowel Reg:  To collect suppository daily  VTE Prophylaxis:Reason for no pharmacologic prophylaxis Comfort care     Physical Exam:   GENERAL APPEARANCE: NAD lying in bed  NEURO: GCS 14-15, weakly mouthing words, does follow commands  HEENT: PERRL  CV: Tachycardic and irregular  LUNGS:  Clear to auscultation bilaterally throughout  GI:  Abdomen soft, positive bowel sounds  :  Marinelli catheter in place draining some clear yellow urine  MSK:  Does not move extremities upper or lower, has some slight movement to the right foot, DP PT pulses bilaterally intact  SKIN:  Intact    Invasive Devices  Report    Peripheral Intravenous Line            Peripheral IV 03/17/22 Right Antecubital 2 days    Peripheral IV 03/18/22 Left;Ventral (anterior) Forearm 1 day          Drain            Urethral Catheter 16 Fr  <1 day                      Lab Results: Results: I have personally reviewed all pertinent laboratory/tests results, BMP/CMP: No results found for: SODIUM, K, CL, CO2, ANIONGAP, BUN, CREATININE, GLUCOSE, CALCIUM, AST, ALT, ALKPHOS, PROT, BILITOT, EGFR and CBC: No results found for: WBC, HGB, HCT, MCV, PLT, ADJUSTEDWBC, MCH, MCHC, RDW, MPV, NRBC  Imaging/EKG Studies: I have personally reviewed pertinent reports     Other Studies: none

## 2022-03-20 NOTE — DISCHARGE SUMMARY
[facility]  Discharge- April Kendall 1940, 80 y o  male MRN: [id number]  Unit/Bed#: S -61 Encounter: [id number]  Primary Care Provider: Dulce Pena MD   Date and time admitted to hospital: 3/16/2022  6:54 PM    HPI:  Patient is s/ Found Down from presumably from a fall on 3/17/22 sustaining severe injury to his cervical spinal cord (C3-C4/C4-C5 with gliosis) now quadriplegic      Hospital Course:  Patient was admitted to the ICU for closer monitoring, respiratory and Pressor support  NSGY, Geriatric medicine, SW consulted  He was found to have ABLA, other chronic conditions: Acute on Chronic kidney disease, Diastolic Heart Failure, Possible cecal mass and Metabolic encephalopathy  Due to his devastating injury Palliative care was consulted  The patient was made DNR/DNI  Patient transitioned to comfort care  On Fentanyl Gtt for pain  Ativan PRN anxiety  Hospice Consulted  Patient, Family: nephew and sister in agreement  Patient transferred to Hospice today  C-Collar was removed for comfort         SUBJECTIVE:  Chief Complaint: None  Patient states he has no pain  He did tell express to the nurses that he wants to die       Subjective:  Patient is stable for now  He states he has no complaints no pain  He states he does not have any sensation in his arms or legs, with the rest of his body       OBJECTIVE:   Vitals:      Intake/Output:         I/O        03/18 0701  03/19 0700 03/19 0701  03/20 0700 03/20 0701  03/21 0700     P  O  50 240       I V  (mL/kg) 2305 8 (26 2) 776 1 (8 8)       IV Piggyback 250         Total Intake(mL/kg) 2605 8 (29 6) 1016 1 (11 6)       Urine (mL/kg/hr) 200 (0 1) 300 (0 1)       Stool 0         Total Output 200 300       Net +2405 8 +716  1                   Unmeasured Stool Occurrence 1 x              Nutrition: Diet Regular; Pleasure Feed  GI Proph/Bowel Reg:  To collect suppository daily  VTE Prophylaxis:Reason for no pharmacologic prophylaxis Comfort care      Physical Exam:   GENERAL APPEARANCE: NAD lying in bed  NEURO: GCS 14-15, weakly mouthing words, does follow commands  HEENT: PERRL  CV: Tachycardic and irregular  LUNGS:  Clear to auscultation bilaterally throughout  GI:  Abdomen soft, positive bowel sounds  :  Marinelli catheter in place draining some clear yellow urine  MSK:  Does not move extremities upper or lower, has some slight movement to the right foot, DP PT pulses bilaterally intact  SKIN:  Intact          Invasive Devices  Report           Peripheral Intravenous Line                     Peripheral IV 03/17/22 Right Antecubital 2 days      Peripheral IV 03/18/22 Left;Ventral (anterior) Forearm 1 day                   Drain                     Urethral Catheter 16 Fr  <1 day                           Lab Results: Results: I have personally reviewed all pertinent laboratory/tests results, BMP/CMP: No results found for: SODIUM, K, CL, CO2, ANIONGAP, BUN, CREATININE, GLUCOSE, CALCIUM, AST, ALT, ALKPHOS, PROT, BILITOT, EGFR and CBC: No results found for: WBC, HGB, HCT, MCV, PLT, ADJUSTEDWBC, MCH, MCHC, RDW, MPV, NRBC  Imaging/EKG Studies: I have personally reviewed pertinent reports     Other Studies: none

## 2022-03-20 NOTE — ASSESSMENT & PLAN NOTE
Lab Results   Component Value Date    EGFR 10 03/19/2022    EGFR 11 03/18/2022    EGFR 13 03/18/2022    CREATININE 4 92 (H) 03/19/2022    CREATININE 4 48 (H) 03/18/2022    CREATININE 3 88 (H) 03/18/2022

## 2022-03-20 NOTE — ASSESSMENT & PLAN NOTE
No results found for: HGBA1C    Recent Labs     03/18/22  2124 03/18/22  2345 03/19/22  0558 03/19/22  1140   POCGLU 255* 200* 154* 116       Blood Sugar Average: Last 72 hrs:  (P) 064 [id number]

## 2022-03-21 NOTE — UTILIZATION REVIEW
Notification of Discharge   This is a Notification of Discharge from our facility [address]  Please be advised that this patient has been discharge from our facility  Below you will find the admission and discharge date and time including the patients disposition  UTILIZATION REVIEW CONTACT:  Shanika Hobbs MA  Utilization   Network Utilization Review Department  Phone: [phone] x carefully listen to the prompts  All voicemails are confidential   Email: [email or web address]     PHYSICIAN ADVISORY SERVICES:  FOR YHNZ-CD-KDVI REVIEW - MEDICAL NECESSITY DENIAL  Phone: [phone]  Fax: [phone]  Email: [email or web address]     PRESENTATION DATE: 3/16/2022  6:54 PM  OBERVATION ADMISSION DATE:   INPATIENT ADMISSION DATE: 3/16/22 10:06 PM   DISCHARGE DATE: 3/20/2022  7:18 PM  DISPOSITION: [address] House/Swing Bed [facility] House/Swing Bed      IMPORTANT INFORMATION:  Send all requests for admission clinical reviews, approved or denied determinations and any other requests to dedicated fax number below belonging to the campus where the patient is receiving treatment   List of dedicated fax numbers:  [address] [place] [address] DENIALS (Administrative/Medical Necessity) [phone]   [address]  (Maternity/NICU/Pediatrics) [phone]   [facility] [phone]   [address] [facility] [phone]   [address] [place] [address] [phone]   [address] [place] [address],[address] Floor [address] [phone]   [facility]  [phone]   [address] [place] [address], [facility]  [address] [facility] [address] [facility] [phone]

## 2022-08-06 NOTE — CASE MANAGEMENT
CM received notification from admissions at [facility] that medical director does not feel that patient is appropriate for Acute Rehab  CM will follow up with family on additional post-acute rehab choices  No

## 2022-11-18 NOTE — PROGRESS NOTES
Progress Note - Bharat Acosta 1940, [de-identified] y o  male MRN: [id number]    Unit/Bed#: S -01 Encounter: [id number]    Primary Care Provider: Malcolm Leal MD   Date and time admitted to hospital: 1/22/2021 12:28 PM        * Coronary artery disease  Assessment & Plan    Presented with chest pain at [facility]   Hx of CAD s/p stenting and CABG 2016  Received ASA 324mg at home and SL nitro x1 in the ER  Troponin peaked at 5 5  Continue cardiovascular medications with aspirin, Plavix, statin, beta-blocker and Imdur  Cardiac Cath Completed at [facility]   - three-vessel disease with 99% occluded proximal circumflex, 50% distal left main, mid % occluded, RCA proximally 100% occluded with collaterals from left circulation  Patient has his patent grafts of LIMA to LAD and SVG to OM2  Most likely Type 2 MI as per cardiology  Medical intervention recommend at this time  Echo complete - reveals 60% EF      Constipation  Assessment & Plan  Patient reports >1 week since last bowel movement  Complains of abdominal pain 2/2/2021  Abdomen is rigid on examination with diffuse tenderness  Most likely secondary to constipation, however patient states he had fecal impaction during previous hospitalization that required manual disimpaction      - Schedule miralax  - Schedule senna/docusate  - Encourage PO hydration  - Suppositories   - Check Obstruction series    Urinary retention  Assessment & Plan  Overnight 1/27/2021 nursing reported no urine output  Bladder scan revealed >800cc urine  Patient was straight catheterized  - Continue urinary retention protocol    Oral candida  Assessment & Plan  · Nystatin mouthwash  Hyponatremia  Assessment & Plan  · Likely due to hypervolemia/CHF  129  2/1/2021  Asymptomatic  · Nephrology on board  · Monitor      Anemia  Assessment & Plan  · Monitor closely  · Pt now has oozing around HD cath site  · Have consented for blood if needed  · Required 1 unit PRBC overnight 1/30/2021 after hemoglobin found to be 6 7  Another unit required overnight 2/1/2021 after hemoglobin found to be 5 6  · Improved to 8 7  · Unknown source  · Continue to monitor    Acute on chronic diastolic congestive heart failure [facility])  Assessment & Plan  · Cardiologist and nephrologist on board  · D/c Bumex drip as per nephrology  · Continue cardiovascular and heart failure medications  · Monitor input and output  · Daily weights  · Currently on 2L o2 supplementation    Hx of CABG  Assessment & Plan  CAD s/p CABG in 2016; history of stenting prior to CABG (LIMA to LAD and SVG to OM)  Status post cardiac catheterization: significant triple vessel coronary artery disease  Patient has his patent grafts of LIMA to LAD and SVG to OM2  Continue cardiovascular meds  Leukocytosis  Assessment & Plan  · Leukocytosis 14 2/1/2021  · COVID-19, influenza, RSV tests were negative x2  · Chest x-ray officially read as multifocal pneumonia  · Thus will continue to treat as hospital-acquired pneumonia with IV cefepime 10 of 10  · Blood cultures x2 no growth  · Cough medicine/antitussive, lozenges and Chloraseptic spray p r n       Diabetes mellitus [facility])  Assessment & Plan  Lab Results   Component Value Date    HGBA1C 6 7 (H) 07/03/2019       Recent Labs     02/01/21  1303 02/01/21  1617 02/01/21  2137 02/02/21  0742   POCGLU 393* 181* 439* 315*       Blood Sugar Average: Last 72 hrs:  (P) 313 [id number]   Still poorly controlled  Continue Humalog sliding scale with Accu-Cheks q a c  And HS  Will adjust levemir  We will add Humalog 3 times a day with meals  Adjust treatment accordingly      Acute renal failure superimposed on stage 3 chronic kidney disease [facility])  Assessment & Plan  Lab Results   Component Value Date    EGFR 12 02/01/2021    EGFR 14 01/31/2021    EGFR 20 01/30/2021    CREATININE 4 41 (H) 02/01/2021    CREATININE 3 88 (H) 01/31/2021    CREATININE 2 88 (H) 01/30/2021     Pending new Creatinine  Most recent 4 41, up from 3 88 Baseline creatinine around 2 3-2 5  Previously on HD in 2019;   IR placed HD Cath    Nephrology on board   Most likely ATN post contrast for Cath  No longer on IV diuresis  As per nephrology, will continue dialysis today            VTE Pharmacologic Prophylaxis:   Pharmacologic: Heparin  Mechanical VTE Prophylaxis in Place: Yes    Discussions with Specialists or Other Care Team Provider:   Hospitalist    Education and Discussions with Family / Patient: Patient    Current Length of Stay: 11 day(s)    Current Patient Status: Inpatient     Discharge Plan / Estimated Discharge Date: TBD    Code Status: Level 1 - Full Code      Subjective:   Patient was seen at bedside complaining of abdominal pain  States he has not had a bowel movement in over a week  We discussed him being on a bowel regimen that includes suppositories  He reports a similar episode during a previous hospitalization that required manual disimpaction  Objective:     Vitals:   Temp (24hrs), Av 3 °F (36 8 °C), Min:97 9 °F (36 6 °C), Max:98 5 °F (36 9 °C)    Temp:  [97 9 °F (36 6 °C)-98 5 °F (36 9 °C)] 98 3 °F (36 8 °C)  HR:  [53-65] 65  Resp:  [16-20] 16  BP: ()/(39-59) 129/59  SpO2:  [100 %] 100 %  There is no height or weight on file to calculate BMI  Input and Output Summary (last 24 hours): Intake/Output Summary (Last 24 hours) at 2021 1043  Last data filed at 2021 0328  Gross per 24 hour   Intake 1601 ml   Output 600 ml   Net 1001 ml       Physical Exam:     Physical Exam  Vitals signs and nursing note reviewed  Constitutional:       General: He is not in acute distress  Appearance: He is well-developed  He is not ill-appearing or toxic-appearing  HENT:      Head: Normocephalic and atraumatic  Eyes:      General: No scleral icterus  Right eye: No discharge  Left eye: No discharge        Conjunctiva/sclera: Conjunctivae normal  Cardiovascular:      Rate and Rhythm: Normal rate and regular rhythm  Heart sounds: Normal heart sounds  No murmur  Pulmonary:      Effort: No respiratory distress  Breath sounds: Wheezing and rhonchi present  Abdominal:      General: Bowel sounds are normal  There is distension  Palpations: Abdomen is soft  Tenderness: There is abdominal tenderness  Musculoskeletal: Normal range of motion  General: No tenderness  Skin:     General: Skin is warm  Findings: No erythema or rash  Comments: HD cath in place  Blood oozing around site   Neurological:      Mental Status: He is alert  Motor: No weakness  Psychiatric:         Behavior: Behavior normal            Additional Data:     Labs:    Results from last 7 days   Lab Units 02/02/21  0528  02/01/21  0333   WBC Thousand/uL  --   --  14 66*   HEMOGLOBIN g/dL 8 7*   < > 7 8*   HEMATOCRIT % 25 6*   < > 23 5*   PLATELETS Thousands/uL  --   --  135*   NEUTROS PCT %  --   --  71   LYMPHS PCT %  --   --  13*   MONOS PCT %  --   --  10   EOS PCT %  --   --  3    < > = values in this interval not displayed  Results from last 7 days   Lab Units 02/01/21  0333   POTASSIUM mmol/L 3 9   CHLORIDE mmol/L 93*   CO2 mmol/L 22   BUN mg/dL 113*   CREATININE mg/dL 4 41*   CALCIUM mg/dL 8 1*     Results from last 7 days   Lab Units 01/31/21  2100   INR  1 18       * I Have Reviewed All Lab Data Listed Above  * Additional Pertinent Lab Tests Reviewed:  All Labs Within Last 24 Hours Reviewed    Imaging:    Imaging Reports Reviewed Today Include: N/A  Imaging Personally Reviewed by Myself Includes:  N/A    Recent Cultures (last 7 days):           Last 24 Hours Medication List:   Current Facility-Administered Medications   Medication Dose Route Frequency Provider Last Rate    acetaminophen  650 mg Oral Q6H PRN Swathi Schumacher MD      aluminum-magnesium hydroxide-simethicone  30 mL Oral Q6H PRN TABITHA Castillo      amLODIPine  5 mg Oral BID [facility], PA-C      aspirin  81 mg Oral Daily Willie Bansal MD      atorvastatin  40 mg Oral Daily With Chris Ruiz MD      benzonatate  100 mg Oral TID PRN Keysha Officer, TABITHA      bisacodyl  10 mg Rectal Daily PRN Jaun Ch DO      calcium carbonate  1,000 mg Oral Daily PRN Willie Bansal MD      cefepime  2,000 mg Intravenous Q24H Faye Correa MD 2,000 mg (02/01/21 1138)    clopidogrel  75 mg Oral Daily Willie Bansal MD      escitalopram  10 mg Oral HS Willie Bansal MD      guaiFENesin  1,200 mg Oral Q12H [address] Keysha Officer, TABITHA      heparin (porcine)  5,000 Units Subcutaneous Q8H [address] Faye Correa MD      hydrALAZINE  50 mg Oral [facility], PA-C      HYDROmorphone  0 2 mg Intravenous Q3H PRN Faye Correa MD      insulin detemir  30 Units Subcutaneous Q12H [address] Geeta Simon MD      insulin lispro  1-6 Units Subcutaneous TID AC Swathi Schumacher MD      insulin lispro  1-6 Units Subcutaneous HS Willie Bansal MD      insulin lispro  5 Units Subcutaneous TID With Meals Geeta Simon MD      iron polysaccharides  150 mg Oral Daily [facility], PA-C      melatonin  3 mg Oral HS TABITHA Cr      metoprolol tartrate  25 mg Oral Q12H [address] Willie Bansal MD      nitroglycerin  0 4 mg Sublingual Q5 Min PRN Willie Bansal MD      nystatin  500,000 Units Swish & Swallow 4x Daily Faye Correa MD      ondansetron  4 mg Intravenous Q6H PRN Willie Bansal MD      oxyCODONE  2 5 mg Oral Q4H PRN Faye Correa MD      pantoprazole  40 mg Intravenous Q12H Kathy Bullock MD      phenol  1 spray Mouth/Throat Q4H PRN Faye Correa MD      pneumococcal 13-valent conjugate vaccine  0 5 mL Intramuscular Prior to discharge Willie Bansal MD      polyethylene glycol  17 g Oral Daily Toyann Waldrop MD      senna-docusate sodium  1 tablet Oral HS Keshawn Waldrop MD  sodium chloride  1 spray Each Nare Q1H PRN Vianey Coyne MD          Today, Patient Was Seen By: Samanta Potter MD    ** Please Note: This note has been constructed using a voice recognition system   ** General

## 2024-11-19 NOTE — ASSESSMENT & PLAN NOTE
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